# Patient Record
Sex: FEMALE | Race: WHITE | NOT HISPANIC OR LATINO | Employment: OTHER | URBAN - METROPOLITAN AREA
[De-identification: names, ages, dates, MRNs, and addresses within clinical notes are randomized per-mention and may not be internally consistent; named-entity substitution may affect disease eponyms.]

---

## 2017-01-03 ENCOUNTER — COMMUNICATION - HEALTHEAST (OUTPATIENT)
Dept: FAMILY MEDICINE | Facility: CLINIC | Age: 62
End: 2017-01-03

## 2017-01-06 ENCOUNTER — COMMUNICATION - HEALTHEAST (OUTPATIENT)
Dept: FAMILY MEDICINE | Facility: CLINIC | Age: 62
End: 2017-01-06

## 2017-01-09 ENCOUNTER — COMMUNICATION - HEALTHEAST (OUTPATIENT)
Dept: FAMILY MEDICINE | Facility: CLINIC | Age: 62
End: 2017-01-09

## 2017-01-09 DIAGNOSIS — F32.A DEPRESSION: ICD-10-CM

## 2017-01-10 ENCOUNTER — COMMUNICATION - HEALTHEAST (OUTPATIENT)
Dept: FAMILY MEDICINE | Facility: CLINIC | Age: 62
End: 2017-01-10

## 2017-01-12 ENCOUNTER — RECORDS - HEALTHEAST (OUTPATIENT)
Dept: ADMINISTRATIVE | Facility: OTHER | Age: 62
End: 2017-01-12

## 2017-01-17 ENCOUNTER — COMMUNICATION - HEALTHEAST (OUTPATIENT)
Dept: FAMILY MEDICINE | Facility: CLINIC | Age: 62
End: 2017-01-17

## 2017-01-18 ENCOUNTER — AMBULATORY - HEALTHEAST (OUTPATIENT)
Dept: FAMILY MEDICINE | Facility: CLINIC | Age: 62
End: 2017-01-18

## 2017-01-18 ENCOUNTER — COMMUNICATION - HEALTHEAST (OUTPATIENT)
Dept: FAMILY MEDICINE | Facility: CLINIC | Age: 62
End: 2017-01-18

## 2017-02-06 ENCOUNTER — COMMUNICATION - HEALTHEAST (OUTPATIENT)
Dept: FAMILY MEDICINE | Facility: CLINIC | Age: 62
End: 2017-02-06

## 2017-02-09 ENCOUNTER — OFFICE VISIT - HEALTHEAST (OUTPATIENT)
Dept: FAMILY MEDICINE | Facility: CLINIC | Age: 62
End: 2017-02-09

## 2017-02-09 DIAGNOSIS — T07.XXXA MULTIPLE FRACTURES: ICD-10-CM

## 2017-02-09 DIAGNOSIS — M79.603 ARM PAIN: ICD-10-CM

## 2017-02-15 ENCOUNTER — COMMUNICATION - HEALTHEAST (OUTPATIENT)
Dept: FAMILY MEDICINE | Facility: CLINIC | Age: 62
End: 2017-02-15

## 2017-02-23 ENCOUNTER — RECORDS - HEALTHEAST (OUTPATIENT)
Dept: ADMINISTRATIVE | Facility: OTHER | Age: 62
End: 2017-02-23

## 2017-02-24 ENCOUNTER — COMMUNICATION - HEALTHEAST (OUTPATIENT)
Dept: FAMILY MEDICINE | Facility: CLINIC | Age: 62
End: 2017-02-24

## 2017-03-28 ENCOUNTER — COMMUNICATION - HEALTHEAST (OUTPATIENT)
Dept: FAMILY MEDICINE | Facility: CLINIC | Age: 62
End: 2017-03-28

## 2017-04-09 ENCOUNTER — COMMUNICATION - HEALTHEAST (OUTPATIENT)
Dept: FAMILY MEDICINE | Facility: CLINIC | Age: 62
End: 2017-04-09

## 2017-04-09 DIAGNOSIS — F32.A DEPRESSION: ICD-10-CM

## 2017-04-14 ENCOUNTER — COMMUNICATION - HEALTHEAST (OUTPATIENT)
Dept: FAMILY MEDICINE | Facility: CLINIC | Age: 62
End: 2017-04-14

## 2017-04-21 ENCOUNTER — OFFICE VISIT - HEALTHEAST (OUTPATIENT)
Dept: ENDOCRINOLOGY | Facility: CLINIC | Age: 62
End: 2017-04-21

## 2017-04-21 DIAGNOSIS — M81.0 OSTEOPOROSIS: ICD-10-CM

## 2017-04-21 LAB
CREAT SERPL-MCNC: 0.75 MG/DL (ref 0.6–1.1)
GFR SERPL CREATININE-BSD FRML MDRD: >60 ML/MIN/1.73M2

## 2017-04-21 ASSESSMENT — MIFFLIN-ST. JEOR: SCORE: 1100.51

## 2017-04-25 ENCOUNTER — OFFICE VISIT - HEALTHEAST (OUTPATIENT)
Dept: FAMILY MEDICINE | Facility: CLINIC | Age: 62
End: 2017-04-25

## 2017-04-25 DIAGNOSIS — R52 WHOLE BODY PAIN: ICD-10-CM

## 2017-04-25 DIAGNOSIS — R11.0 NAUSEA: ICD-10-CM

## 2017-04-25 DIAGNOSIS — R10.2 PELVIC PAIN: ICD-10-CM

## 2017-04-26 ENCOUNTER — COMMUNICATION - HEALTHEAST (OUTPATIENT)
Dept: ENDOCRINOLOGY | Facility: CLINIC | Age: 62
End: 2017-04-26

## 2017-04-28 ENCOUNTER — COMMUNICATION - HEALTHEAST (OUTPATIENT)
Dept: FAMILY MEDICINE | Facility: CLINIC | Age: 62
End: 2017-04-28

## 2017-04-30 ENCOUNTER — COMMUNICATION - HEALTHEAST (OUTPATIENT)
Dept: FAMILY MEDICINE | Facility: CLINIC | Age: 62
End: 2017-04-30

## 2017-05-01 ENCOUNTER — RECORDS - HEALTHEAST (OUTPATIENT)
Dept: ADMINISTRATIVE | Facility: OTHER | Age: 62
End: 2017-05-01

## 2017-05-01 ENCOUNTER — COMMUNICATION - HEALTHEAST (OUTPATIENT)
Dept: ENDOCRINOLOGY | Facility: CLINIC | Age: 62
End: 2017-05-01

## 2017-05-02 ENCOUNTER — AMBULATORY - HEALTHEAST (OUTPATIENT)
Dept: ENDOCRINOLOGY | Facility: CLINIC | Age: 62
End: 2017-05-02

## 2017-05-02 DIAGNOSIS — M81.0 OSTEOPOROSIS: ICD-10-CM

## 2017-05-04 ENCOUNTER — AMBULATORY - HEALTHEAST (OUTPATIENT)
Dept: FAMILY MEDICINE | Facility: CLINIC | Age: 62
End: 2017-05-04

## 2017-05-04 ENCOUNTER — COMMUNICATION - HEALTHEAST (OUTPATIENT)
Dept: FAMILY MEDICINE | Facility: CLINIC | Age: 62
End: 2017-05-04

## 2017-06-05 ENCOUNTER — COMMUNICATION - HEALTHEAST (OUTPATIENT)
Dept: FAMILY MEDICINE | Facility: CLINIC | Age: 62
End: 2017-06-05

## 2017-06-09 ENCOUNTER — COMMUNICATION - HEALTHEAST (OUTPATIENT)
Dept: FAMILY MEDICINE | Facility: CLINIC | Age: 62
End: 2017-06-09

## 2017-07-13 ENCOUNTER — COMMUNICATION - HEALTHEAST (OUTPATIENT)
Dept: FAMILY MEDICINE | Facility: CLINIC | Age: 62
End: 2017-07-13

## 2017-07-13 DIAGNOSIS — F32.A DEPRESSION: ICD-10-CM

## 2017-07-19 ENCOUNTER — COMMUNICATION - HEALTHEAST (OUTPATIENT)
Dept: FAMILY MEDICINE | Facility: CLINIC | Age: 62
End: 2017-07-19

## 2017-08-05 ENCOUNTER — COMMUNICATION - HEALTHEAST (OUTPATIENT)
Dept: FAMILY MEDICINE | Facility: CLINIC | Age: 62
End: 2017-08-05

## 2017-08-17 ENCOUNTER — COMMUNICATION - HEALTHEAST (OUTPATIENT)
Dept: FAMILY MEDICINE | Facility: CLINIC | Age: 62
End: 2017-08-17

## 2017-09-29 ENCOUNTER — COMMUNICATION - HEALTHEAST (OUTPATIENT)
Dept: FAMILY MEDICINE | Facility: CLINIC | Age: 62
End: 2017-09-29

## 2017-10-03 ENCOUNTER — COMMUNICATION - HEALTHEAST (OUTPATIENT)
Dept: FAMILY MEDICINE | Facility: CLINIC | Age: 62
End: 2017-10-03

## 2017-10-03 DIAGNOSIS — I10 ESSENTIAL HYPERTENSION: ICD-10-CM

## 2017-10-04 ENCOUNTER — COMMUNICATION - HEALTHEAST (OUTPATIENT)
Dept: FAMILY MEDICINE | Facility: CLINIC | Age: 62
End: 2017-10-04

## 2017-10-15 ENCOUNTER — COMMUNICATION - HEALTHEAST (OUTPATIENT)
Dept: FAMILY MEDICINE | Facility: CLINIC | Age: 62
End: 2017-10-15

## 2017-10-15 DIAGNOSIS — F32.A DEPRESSION: ICD-10-CM

## 2017-10-30 ENCOUNTER — COMMUNICATION - HEALTHEAST (OUTPATIENT)
Dept: FAMILY MEDICINE | Facility: CLINIC | Age: 62
End: 2017-10-30

## 2017-11-07 ENCOUNTER — AMBULATORY - HEALTHEAST (OUTPATIENT)
Dept: ENDOCRINOLOGY | Facility: CLINIC | Age: 62
End: 2017-11-07

## 2017-11-20 ENCOUNTER — COMMUNICATION - HEALTHEAST (OUTPATIENT)
Dept: FAMILY MEDICINE | Facility: CLINIC | Age: 62
End: 2017-11-20

## 2017-11-25 ENCOUNTER — COMMUNICATION - HEALTHEAST (OUTPATIENT)
Dept: FAMILY MEDICINE | Facility: CLINIC | Age: 62
End: 2017-11-25

## 2017-12-08 ENCOUNTER — OFFICE VISIT - HEALTHEAST (OUTPATIENT)
Dept: FAMILY MEDICINE | Facility: CLINIC | Age: 62
End: 2017-12-08

## 2017-12-08 ENCOUNTER — RECORDS - HEALTHEAST (OUTPATIENT)
Dept: ADMINISTRATIVE | Facility: OTHER | Age: 62
End: 2017-12-08

## 2017-12-08 DIAGNOSIS — I10 ESSENTIAL HYPERTENSION: ICD-10-CM

## 2017-12-08 DIAGNOSIS — Z12.11 SCREENING FOR MALIGNANT NEOPLASM OF COLON: ICD-10-CM

## 2017-12-08 DIAGNOSIS — Z12.31 VISIT FOR SCREENING MAMMOGRAM: ICD-10-CM

## 2017-12-08 DIAGNOSIS — R10.2 PELVIC PAIN: ICD-10-CM

## 2017-12-25 ENCOUNTER — COMMUNICATION - HEALTHEAST (OUTPATIENT)
Dept: FAMILY MEDICINE | Facility: CLINIC | Age: 62
End: 2017-12-25

## 2017-12-28 ENCOUNTER — COMMUNICATION - HEALTHEAST (OUTPATIENT)
Dept: FAMILY MEDICINE | Facility: CLINIC | Age: 62
End: 2017-12-28

## 2018-01-09 ENCOUNTER — COMMUNICATION - HEALTHEAST (OUTPATIENT)
Dept: FAMILY MEDICINE | Facility: CLINIC | Age: 63
End: 2018-01-09

## 2018-01-09 DIAGNOSIS — I10 ESSENTIAL HYPERTENSION: ICD-10-CM

## 2018-01-17 ENCOUNTER — COMMUNICATION - HEALTHEAST (OUTPATIENT)
Dept: FAMILY MEDICINE | Facility: CLINIC | Age: 63
End: 2018-01-17

## 2018-01-17 DIAGNOSIS — F32.A DEPRESSION: ICD-10-CM

## 2018-01-25 ENCOUNTER — COMMUNICATION - HEALTHEAST (OUTPATIENT)
Dept: FAMILY MEDICINE | Facility: CLINIC | Age: 63
End: 2018-01-25

## 2018-02-05 ENCOUNTER — COMMUNICATION - HEALTHEAST (OUTPATIENT)
Dept: FAMILY MEDICINE | Facility: CLINIC | Age: 63
End: 2018-02-05

## 2018-02-05 DIAGNOSIS — R11.0 NAUSEA: ICD-10-CM

## 2018-02-12 ENCOUNTER — COMMUNICATION - HEALTHEAST (OUTPATIENT)
Dept: FAMILY MEDICINE | Facility: CLINIC | Age: 63
End: 2018-02-12

## 2018-02-26 ENCOUNTER — COMMUNICATION - HEALTHEAST (OUTPATIENT)
Dept: FAMILY MEDICINE | Facility: CLINIC | Age: 63
End: 2018-02-26

## 2018-03-10 ENCOUNTER — COMMUNICATION - HEALTHEAST (OUTPATIENT)
Dept: FAMILY MEDICINE | Facility: CLINIC | Age: 63
End: 2018-03-10

## 2018-03-10 DIAGNOSIS — G89.4 CHRONIC PAIN SYNDROME: ICD-10-CM

## 2018-03-10 DIAGNOSIS — G90.519 REFLEX SYMPATHETIC DYSTROPHY OF UPPER EXTREMITY: ICD-10-CM

## 2018-03-21 ENCOUNTER — AMBULATORY - HEALTHEAST (OUTPATIENT)
Dept: ENDOCRINOLOGY | Facility: CLINIC | Age: 63
End: 2018-03-21

## 2018-03-21 ENCOUNTER — COMMUNICATION - HEALTHEAST (OUTPATIENT)
Dept: ENDOCRINOLOGY | Facility: CLINIC | Age: 63
End: 2018-03-21

## 2018-03-21 DIAGNOSIS — M81.0 OSTEOPOROSIS: ICD-10-CM

## 2018-03-22 ENCOUNTER — COMMUNICATION - HEALTHEAST (OUTPATIENT)
Dept: FAMILY MEDICINE | Facility: CLINIC | Age: 63
End: 2018-03-22

## 2018-03-22 ENCOUNTER — AMBULATORY - HEALTHEAST (OUTPATIENT)
Dept: ENDOCRINOLOGY | Facility: CLINIC | Age: 63
End: 2018-03-22

## 2018-03-22 DIAGNOSIS — R11.0 NAUSEA: ICD-10-CM

## 2018-03-22 DIAGNOSIS — R53.83 FATIGUE: ICD-10-CM

## 2018-03-23 ENCOUNTER — COMMUNICATION - HEALTHEAST (OUTPATIENT)
Dept: ENDOCRINOLOGY | Facility: CLINIC | Age: 63
End: 2018-03-23

## 2018-03-30 ENCOUNTER — COMMUNICATION - HEALTHEAST (OUTPATIENT)
Dept: FAMILY MEDICINE | Facility: CLINIC | Age: 63
End: 2018-03-30

## 2018-04-02 ENCOUNTER — COMMUNICATION - HEALTHEAST (OUTPATIENT)
Dept: FAMILY MEDICINE | Facility: CLINIC | Age: 63
End: 2018-04-02

## 2018-04-02 DIAGNOSIS — G90.519 REFLEX SYMPATHETIC DYSTROPHY OF UPPER EXTREMITY: ICD-10-CM

## 2018-04-02 DIAGNOSIS — G89.4 CHRONIC PAIN SYNDROME: ICD-10-CM

## 2018-04-03 ENCOUNTER — AMBULATORY - HEALTHEAST (OUTPATIENT)
Dept: LAB | Facility: CLINIC | Age: 63
End: 2018-04-03

## 2018-04-03 DIAGNOSIS — R53.83 FATIGUE: ICD-10-CM

## 2018-04-03 DIAGNOSIS — M81.0 OSTEOPOROSIS: ICD-10-CM

## 2018-04-03 LAB
CALCIUM SERPL-MCNC: 8.9 MG/DL (ref 8.5–10.5)
CREAT SERPL-MCNC: 0.73 MG/DL (ref 0.6–1.1)
GFR SERPL CREATININE-BSD FRML MDRD: >60 ML/MIN/1.73M2
POTASSIUM BLD-SCNC: 4 MMOL/L (ref 3.5–5)
T4 FREE SERPL-MCNC: 0.9 NG/DL (ref 0.7–1.8)
TSH SERPL DL<=0.005 MIU/L-ACNC: 0.7 UIU/ML (ref 0.3–5)

## 2018-04-04 LAB
25(OH)D3 SERPL-MCNC: 29.3 NG/ML (ref 30–80)
25(OH)D3 SERPL-MCNC: 29.3 NG/ML (ref 30–80)

## 2018-04-15 ENCOUNTER — COMMUNICATION - HEALTHEAST (OUTPATIENT)
Dept: FAMILY MEDICINE | Facility: CLINIC | Age: 63
End: 2018-04-15

## 2018-04-15 DIAGNOSIS — F32.A DEPRESSION: ICD-10-CM

## 2018-04-24 ENCOUNTER — COMMUNICATION - HEALTHEAST (OUTPATIENT)
Dept: ENDOCRINOLOGY | Facility: CLINIC | Age: 63
End: 2018-04-24

## 2018-04-24 DIAGNOSIS — M81.0 OSTEOPOROSIS: ICD-10-CM

## 2018-04-30 ENCOUNTER — COMMUNICATION - HEALTHEAST (OUTPATIENT)
Dept: FAMILY MEDICINE | Facility: CLINIC | Age: 63
End: 2018-04-30

## 2018-05-07 ENCOUNTER — OFFICE VISIT - HEALTHEAST (OUTPATIENT)
Dept: ENDOCRINOLOGY | Facility: CLINIC | Age: 63
End: 2018-05-07

## 2018-05-07 DIAGNOSIS — M81.0 OSTEOPOROSIS: ICD-10-CM

## 2018-05-07 ASSESSMENT — MIFFLIN-ST. JEOR: SCORE: 1186.13

## 2018-05-26 ENCOUNTER — COMMUNICATION - HEALTHEAST (OUTPATIENT)
Dept: FAMILY MEDICINE | Facility: CLINIC | Age: 63
End: 2018-05-26

## 2018-06-14 ENCOUNTER — COMMUNICATION - HEALTHEAST (OUTPATIENT)
Dept: FAMILY MEDICINE | Facility: CLINIC | Age: 63
End: 2018-06-14

## 2018-06-14 DIAGNOSIS — G90.519 REFLEX SYMPATHETIC DYSTROPHY OF UPPER EXTREMITY: ICD-10-CM

## 2018-06-14 DIAGNOSIS — G89.4 CHRONIC PAIN SYNDROME: ICD-10-CM

## 2018-06-17 ENCOUNTER — COMMUNICATION - HEALTHEAST (OUTPATIENT)
Dept: FAMILY MEDICINE | Facility: CLINIC | Age: 63
End: 2018-06-17

## 2018-06-17 DIAGNOSIS — R11.0 NAUSEA: ICD-10-CM

## 2018-07-14 ENCOUNTER — COMMUNICATION - HEALTHEAST (OUTPATIENT)
Dept: FAMILY MEDICINE | Facility: CLINIC | Age: 63
End: 2018-07-14

## 2018-07-14 DIAGNOSIS — G90.519 REFLEX SYMPATHETIC DYSTROPHY OF UPPER EXTREMITY: ICD-10-CM

## 2018-07-14 DIAGNOSIS — G89.4 CHRONIC PAIN SYNDROME: ICD-10-CM

## 2018-07-15 ENCOUNTER — COMMUNICATION - HEALTHEAST (OUTPATIENT)
Dept: FAMILY MEDICINE | Facility: CLINIC | Age: 63
End: 2018-07-15

## 2018-07-15 DIAGNOSIS — I10 ESSENTIAL HYPERTENSION: ICD-10-CM

## 2018-07-16 ENCOUNTER — COMMUNICATION - HEALTHEAST (OUTPATIENT)
Dept: FAMILY MEDICINE | Facility: CLINIC | Age: 63
End: 2018-07-16

## 2018-07-16 DIAGNOSIS — F32.A DEPRESSION: ICD-10-CM

## 2018-07-27 ENCOUNTER — COMMUNICATION - HEALTHEAST (OUTPATIENT)
Dept: SCHEDULING | Facility: CLINIC | Age: 63
End: 2018-07-27

## 2018-07-31 ENCOUNTER — COMMUNICATION - HEALTHEAST (OUTPATIENT)
Dept: FAMILY MEDICINE | Facility: CLINIC | Age: 63
End: 2018-07-31

## 2018-08-13 ENCOUNTER — COMMUNICATION - HEALTHEAST (OUTPATIENT)
Dept: FAMILY MEDICINE | Facility: CLINIC | Age: 63
End: 2018-08-13

## 2018-08-13 DIAGNOSIS — G89.4 CHRONIC PAIN SYNDROME: ICD-10-CM

## 2018-08-13 DIAGNOSIS — G90.519 REFLEX SYMPATHETIC DYSTROPHY OF UPPER EXTREMITY: ICD-10-CM

## 2018-08-24 ENCOUNTER — RECORDS - HEALTHEAST (OUTPATIENT)
Dept: GENERAL RADIOLOGY | Facility: CLINIC | Age: 63
End: 2018-08-24

## 2018-08-24 ENCOUNTER — OFFICE VISIT - HEALTHEAST (OUTPATIENT)
Dept: FAMILY MEDICINE | Facility: CLINIC | Age: 63
End: 2018-08-24

## 2018-08-24 DIAGNOSIS — R60.9 EDEMA: ICD-10-CM

## 2018-08-24 DIAGNOSIS — M79.621 PAIN OF RIGHT UPPER ARM: ICD-10-CM

## 2018-08-24 DIAGNOSIS — M79.621 PAIN IN RIGHT UPPER ARM: ICD-10-CM

## 2018-08-24 DIAGNOSIS — L40.9 PSORIASIS: ICD-10-CM

## 2018-09-13 ENCOUNTER — COMMUNICATION - HEALTHEAST (OUTPATIENT)
Dept: FAMILY MEDICINE | Facility: CLINIC | Age: 63
End: 2018-09-13

## 2018-09-13 DIAGNOSIS — G89.4 CHRONIC PAIN SYNDROME: ICD-10-CM

## 2018-09-13 DIAGNOSIS — G90.519 REFLEX SYMPATHETIC DYSTROPHY OF UPPER EXTREMITY: ICD-10-CM

## 2018-10-08 ENCOUNTER — COMMUNICATION - HEALTHEAST (OUTPATIENT)
Dept: FAMILY MEDICINE | Facility: CLINIC | Age: 63
End: 2018-10-08

## 2018-10-08 DIAGNOSIS — R11.0 NAUSEA: ICD-10-CM

## 2018-10-12 ENCOUNTER — OFFICE VISIT - HEALTHEAST (OUTPATIENT)
Dept: FAMILY MEDICINE | Facility: CLINIC | Age: 63
End: 2018-10-12

## 2018-10-12 DIAGNOSIS — R41.3 MEMORY CHANGES: ICD-10-CM

## 2018-10-12 DIAGNOSIS — Z00.01 ENCOUNTER FOR GENERAL ADULT MEDICAL EXAMINATION WITH ABNORMAL FINDINGS: ICD-10-CM

## 2018-10-12 DIAGNOSIS — Z00.00 HEALTH CARE MAINTENANCE: ICD-10-CM

## 2018-10-12 DIAGNOSIS — E53.8 VITAMIN B12 DEFICIENCY (NON ANEMIC): ICD-10-CM

## 2018-10-12 DIAGNOSIS — M54.12 CERVICAL RADICULOPATHY: ICD-10-CM

## 2018-10-12 DIAGNOSIS — J44.9 COPD (CHRONIC OBSTRUCTIVE PULMONARY DISEASE) (H): ICD-10-CM

## 2018-10-12 DIAGNOSIS — F32.4 MAJOR DEPRESSION IN PARTIAL REMISSION (H): ICD-10-CM

## 2018-10-12 DIAGNOSIS — Z79.899 CONTROLLED SUBSTANCE AGREEMENT SIGNED: ICD-10-CM

## 2018-10-12 DIAGNOSIS — M54.16 LUMBAR RADICULOPATHY: ICD-10-CM

## 2018-10-12 DIAGNOSIS — G47.00 INSOMNIA: ICD-10-CM

## 2018-10-12 DIAGNOSIS — M84.454A: ICD-10-CM

## 2018-10-12 ASSESSMENT — MIFFLIN-ST. JEOR: SCORE: 1167.31

## 2018-10-18 ENCOUNTER — AMBULATORY - HEALTHEAST (OUTPATIENT)
Dept: FAMILY MEDICINE | Facility: CLINIC | Age: 63
End: 2018-10-18

## 2018-10-18 ENCOUNTER — COMMUNICATION - HEALTHEAST (OUTPATIENT)
Dept: FAMILY MEDICINE | Facility: CLINIC | Age: 63
End: 2018-10-18

## 2018-11-01 ENCOUNTER — COMMUNICATION - HEALTHEAST (OUTPATIENT)
Dept: FAMILY MEDICINE | Facility: CLINIC | Age: 63
End: 2018-11-01

## 2018-11-01 DIAGNOSIS — I10 ESSENTIAL HYPERTENSION: ICD-10-CM

## 2018-11-03 ENCOUNTER — COMMUNICATION - HEALTHEAST (OUTPATIENT)
Dept: FAMILY MEDICINE | Facility: CLINIC | Age: 63
End: 2018-11-03

## 2018-11-20 ENCOUNTER — HOSPITAL ENCOUNTER (OUTPATIENT)
Dept: MRI IMAGING | Facility: HOSPITAL | Age: 63
Discharge: HOME | End: 2018-11-20
Attending: FAMILY MEDICINE

## 2018-11-20 DIAGNOSIS — M84.454A: ICD-10-CM

## 2018-11-20 DIAGNOSIS — M54.16 LUMBAR RADICULOPATHY: ICD-10-CM

## 2018-11-20 DIAGNOSIS — M54.12 CERVICAL RADICULOPATHY: ICD-10-CM

## 2018-11-23 ENCOUNTER — COMMUNICATION - HEALTHEAST (OUTPATIENT)
Dept: FAMILY MEDICINE | Facility: CLINIC | Age: 63
End: 2018-11-23

## 2018-11-29 ENCOUNTER — COMMUNICATION - HEALTHEAST (OUTPATIENT)
Dept: FAMILY MEDICINE | Facility: CLINIC | Age: 63
End: 2018-11-29

## 2018-11-29 DIAGNOSIS — G89.4 CHRONIC PAIN SYNDROME: ICD-10-CM

## 2018-11-29 DIAGNOSIS — G90.519 REFLEX SYMPATHETIC DYSTROPHY OF UPPER EXTREMITY: ICD-10-CM

## 2018-12-05 ENCOUNTER — HOSPITAL ENCOUNTER (OUTPATIENT)
Dept: MRI IMAGING | Facility: HOSPITAL | Age: 63
Discharge: HOME | End: 2018-12-05
Attending: FAMILY MEDICINE

## 2018-12-05 DIAGNOSIS — M54.12 CERVICAL RADICULOPATHY: ICD-10-CM

## 2018-12-05 DIAGNOSIS — M54.16 LUMBAR RADICULOPATHY: ICD-10-CM

## 2018-12-05 LAB
CREAT BLD-MCNC: 0.9 MG/DL
CREAT BLD-MCNC: 0.9 MG/DL (ref 0.6–1.1)
POC GFR AMER AF HE - HISTORICAL: >60 ML/MIN/1.73M2
POC GFR NON AMER AF HE - HISTORICAL: >60 ML/MIN/1.73M2

## 2018-12-12 ENCOUNTER — AMBULATORY - HEALTHEAST (OUTPATIENT)
Dept: LAB | Facility: CLINIC | Age: 63
End: 2018-12-12

## 2018-12-12 DIAGNOSIS — Z00.00 HEALTH CARE MAINTENANCE: ICD-10-CM

## 2018-12-12 LAB
ALBUMIN SERPL-MCNC: 3.9 G/DL (ref 3.5–5)
ALP SERPL-CCNC: 72 U/L (ref 45–120)
ALT SERPL W P-5'-P-CCNC: 12 U/L (ref 0–45)
ANION GAP SERPL CALCULATED.3IONS-SCNC: 9 MMOL/L (ref 5–18)
AST SERPL W P-5'-P-CCNC: 16 U/L (ref 0–40)
BILIRUB SERPL-MCNC: 0.3 MG/DL (ref 0–1)
BUN SERPL-MCNC: 9 MG/DL (ref 8–22)
CALCIUM SERPL-MCNC: 9.7 MG/DL (ref 8.5–10.5)
CHLORIDE BLD-SCNC: 105 MMOL/L (ref 98–107)
CHOLEST SERPL-MCNC: 219 MG/DL
CO2 SERPL-SCNC: 29 MMOL/L (ref 22–31)
CREAT SERPL-MCNC: 0.82 MG/DL (ref 0.6–1.1)
ERYTHROCYTE [DISTWIDTH] IN BLOOD BY AUTOMATED COUNT: 12 % (ref 11–14.5)
FASTING STATUS PATIENT QL REPORTED: YES
GFR SERPL CREATININE-BSD FRML MDRD: >60 ML/MIN/1.73M2
GLUCOSE BLD-MCNC: 102 MG/DL (ref 70–125)
HCT VFR BLD AUTO: 41.2 % (ref 35–47)
HDLC SERPL-MCNC: 55 MG/DL
HGB BLD-MCNC: 14.3 G/DL (ref 12–16)
LDLC SERPL CALC-MCNC: 133 MG/DL
MCH RBC QN AUTO: 30.3 PG (ref 27–34)
MCHC RBC AUTO-ENTMCNC: 34.8 G/DL (ref 32–36)
MCV RBC AUTO: 87 FL (ref 80–100)
PLATELET # BLD AUTO: 198 THOU/UL (ref 140–440)
PMV BLD AUTO: 8.8 FL (ref 7–10)
POTASSIUM BLD-SCNC: 5 MMOL/L (ref 3.5–5)
PROT SERPL-MCNC: 6.3 G/DL (ref 6–8)
RBC # BLD AUTO: 4.73 MILL/UL (ref 3.8–5.4)
SODIUM SERPL-SCNC: 143 MMOL/L (ref 136–145)
TRIGL SERPL-MCNC: 153 MG/DL
VIT B12 SERPL-MCNC: 150 PG/ML (ref 213–816)
WBC: 5.5 THOU/UL (ref 4–11)

## 2018-12-15 ENCOUNTER — COMMUNICATION - HEALTHEAST (OUTPATIENT)
Dept: FAMILY MEDICINE | Facility: CLINIC | Age: 63
End: 2018-12-15

## 2018-12-28 ENCOUNTER — COMMUNICATION - HEALTHEAST (OUTPATIENT)
Dept: FAMILY MEDICINE | Facility: CLINIC | Age: 63
End: 2018-12-28

## 2019-01-02 ENCOUNTER — AMBULATORY - HEALTHEAST (OUTPATIENT)
Dept: FAMILY MEDICINE | Facility: CLINIC | Age: 64
End: 2019-01-02

## 2019-01-06 ENCOUNTER — COMMUNICATION - HEALTHEAST (OUTPATIENT)
Dept: FAMILY MEDICINE | Facility: CLINIC | Age: 64
End: 2019-01-06

## 2019-01-06 DIAGNOSIS — G89.4 CHRONIC PAIN SYNDROME: ICD-10-CM

## 2019-01-06 DIAGNOSIS — G90.519 REFLEX SYMPATHETIC DYSTROPHY OF UPPER EXTREMITY: ICD-10-CM

## 2019-01-11 ENCOUNTER — COMMUNICATION - HEALTHEAST (OUTPATIENT)
Dept: FAMILY MEDICINE | Facility: CLINIC | Age: 64
End: 2019-01-11

## 2019-01-22 ENCOUNTER — COMMUNICATION - HEALTHEAST (OUTPATIENT)
Dept: FAMILY MEDICINE | Facility: CLINIC | Age: 64
End: 2019-01-22

## 2019-01-25 ENCOUNTER — COMMUNICATION - HEALTHEAST (OUTPATIENT)
Dept: FAMILY MEDICINE | Facility: CLINIC | Age: 64
End: 2019-01-25

## 2019-01-25 DIAGNOSIS — G89.4 CHRONIC PAIN SYNDROME: ICD-10-CM

## 2019-01-25 DIAGNOSIS — G90.519 REFLEX SYMPATHETIC DYSTROPHY OF UPPER EXTREMITY: ICD-10-CM

## 2019-03-04 ENCOUNTER — COMMUNICATION - HEALTHEAST (OUTPATIENT)
Dept: FAMILY MEDICINE | Facility: CLINIC | Age: 64
End: 2019-03-04

## 2019-03-04 DIAGNOSIS — G90.519 REFLEX SYMPATHETIC DYSTROPHY OF UPPER EXTREMITY: ICD-10-CM

## 2019-03-04 DIAGNOSIS — G89.4 CHRONIC PAIN SYNDROME: ICD-10-CM

## 2019-03-07 ENCOUNTER — COMMUNICATION - HEALTHEAST (OUTPATIENT)
Dept: FAMILY MEDICINE | Facility: CLINIC | Age: 64
End: 2019-03-07

## 2019-03-08 ENCOUNTER — COMMUNICATION - HEALTHEAST (OUTPATIENT)
Dept: FAMILY MEDICINE | Facility: CLINIC | Age: 64
End: 2019-03-08

## 2019-04-04 ENCOUNTER — COMMUNICATION - HEALTHEAST (OUTPATIENT)
Dept: FAMILY MEDICINE | Facility: CLINIC | Age: 64
End: 2019-04-04

## 2019-04-04 DIAGNOSIS — G89.4 CHRONIC PAIN SYNDROME: ICD-10-CM

## 2019-04-04 DIAGNOSIS — G90.519 REFLEX SYMPATHETIC DYSTROPHY OF UPPER EXTREMITY: ICD-10-CM

## 2019-04-18 ENCOUNTER — COMMUNICATION - HEALTHEAST (OUTPATIENT)
Dept: ENDOCRINOLOGY | Facility: CLINIC | Age: 64
End: 2019-04-18

## 2019-04-18 ENCOUNTER — AMBULATORY - HEALTHEAST (OUTPATIENT)
Dept: ENDOCRINOLOGY | Facility: CLINIC | Age: 64
End: 2019-04-18

## 2019-04-18 DIAGNOSIS — M81.0 AGE RELATED OSTEOPOROSIS, UNSPECIFIED PATHOLOGICAL FRACTURE PRESENCE: ICD-10-CM

## 2019-04-19 ENCOUNTER — COMMUNICATION - HEALTHEAST (OUTPATIENT)
Dept: FAMILY MEDICINE | Facility: CLINIC | Age: 64
End: 2019-04-19

## 2019-04-19 DIAGNOSIS — F32.A DEPRESSION: ICD-10-CM

## 2019-05-06 ENCOUNTER — COMMUNICATION - HEALTHEAST (OUTPATIENT)
Dept: FAMILY MEDICINE | Facility: CLINIC | Age: 64
End: 2019-05-06

## 2019-05-06 DIAGNOSIS — R11.0 NAUSEA: ICD-10-CM

## 2019-05-07 ENCOUNTER — HOSPITAL ENCOUNTER (OUTPATIENT)
Dept: LAB | Age: 64
Setting detail: SPECIMEN
Discharge: HOME | End: 2019-05-07

## 2019-05-07 ENCOUNTER — OFFICE VISIT - HEALTHEAST (OUTPATIENT)
Dept: FAMILY MEDICINE | Facility: CLINIC | Age: 64
End: 2019-05-07

## 2019-05-07 DIAGNOSIS — E55.9 MILD VITAMIN D DEFICIENCY: ICD-10-CM

## 2019-05-07 DIAGNOSIS — Z12.11 COLON CANCER SCREENING: ICD-10-CM

## 2019-05-07 DIAGNOSIS — E56.9 VITAMIN DEFICIENCY: ICD-10-CM

## 2019-05-07 DIAGNOSIS — R05.9 COUGH: ICD-10-CM

## 2019-05-07 DIAGNOSIS — R10.11 RUQ ABDOMINAL PAIN: ICD-10-CM

## 2019-05-07 DIAGNOSIS — F32.0 CURRENT MILD EPISODE OF MAJOR DEPRESSIVE DISORDER, UNSPECIFIED WHETHER RECURRENT (H): ICD-10-CM

## 2019-05-07 DIAGNOSIS — Z00.00 HEALTH CARE MAINTENANCE: ICD-10-CM

## 2019-05-07 DIAGNOSIS — R11.0 NAUSEA: ICD-10-CM

## 2019-05-07 DIAGNOSIS — Z12.31 VISIT FOR SCREENING MAMMOGRAM: ICD-10-CM

## 2019-05-07 DIAGNOSIS — I10 ESSENTIAL HYPERTENSION: ICD-10-CM
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
## 2019-05-07 LAB — VIT B12 SERPL-MCNC: 673 PG/ML (ref 213–816)

## 2019-05-08 LAB
25(OH)D3 SERPL-MCNC: 39.7 NG/ML (ref 30–80)
25(OH)D3 SERPL-MCNC: 39.7 NG/ML (ref 30–80)

## 2019-05-15 ENCOUNTER — RECORDS - HEALTHEAST (OUTPATIENT)
Dept: ADMINISTRATIVE | Facility: OTHER | Age: 64
End: 2019-05-15

## 2019-05-15 LAB
COLOGUARD-ABSTRACT: NEGATIVE
COLOGUARD-ABSTRACT: NEGATIVE

## 2019-05-18 ENCOUNTER — COMMUNICATION - HEALTHEAST (OUTPATIENT)
Dept: ENDOCRINOLOGY | Facility: CLINIC | Age: 64
End: 2019-05-18

## 2019-05-18 DIAGNOSIS — M81.0 OSTEOPOROSIS: ICD-10-CM

## 2019-05-21 ENCOUNTER — TRANSFERRED RECORDS (OUTPATIENT)
Dept: HEALTH INFORMATION MANAGEMENT | Facility: CLINIC | Age: 64
End: 2019-05-21

## 2019-05-21 ENCOUNTER — RECORDS - HEALTHEAST (OUTPATIENT)
Dept: ADMINISTRATIVE | Facility: OTHER | Age: 64
End: 2019-05-21

## 2019-05-21 ENCOUNTER — COMMUNICATION - HEALTHEAST (OUTPATIENT)
Dept: FAMILY MEDICINE | Facility: CLINIC | Age: 64
End: 2019-05-21

## 2019-05-21 DIAGNOSIS — F40.240 CLAUSTROPHOBIA: ICD-10-CM

## 2019-05-29 ENCOUNTER — TRANSFERRED RECORDS (OUTPATIENT)
Dept: HEALTH INFORMATION MANAGEMENT | Facility: CLINIC | Age: 64
End: 2019-05-29

## 2019-05-29 ENCOUNTER — HOSPITAL ENCOUNTER (OUTPATIENT)
Dept: MRI IMAGING | Facility: HOSPITAL | Age: 64
Discharge: HOME | End: 2019-05-29

## 2019-05-29 DIAGNOSIS — R93.5 ABNORMAL CT OF THE ABDOMEN: ICD-10-CM

## 2019-05-30 ENCOUNTER — TRANSFERRED RECORDS (OUTPATIENT)
Dept: HEALTH INFORMATION MANAGEMENT | Facility: CLINIC | Age: 64
End: 2019-05-30

## 2019-06-04 ENCOUNTER — COMMUNICATION - HEALTHEAST (OUTPATIENT)
Dept: FAMILY MEDICINE | Facility: CLINIC | Age: 64
End: 2019-06-04

## 2019-06-04 ENCOUNTER — RECORDS - HEALTHEAST (OUTPATIENT)
Dept: HEALTH INFORMATION MANAGEMENT | Facility: CLINIC | Age: 64
End: 2019-06-04

## 2019-06-13 ENCOUNTER — COMMUNICATION - HEALTHEAST (OUTPATIENT)
Dept: FAMILY MEDICINE | Facility: CLINIC | Age: 64
End: 2019-06-13

## 2019-06-13 DIAGNOSIS — G90.519 REFLEX SYMPATHETIC DYSTROPHY OF UPPER EXTREMITY: ICD-10-CM

## 2019-06-13 DIAGNOSIS — G89.4 CHRONIC PAIN SYNDROME: ICD-10-CM

## 2019-07-11 ENCOUNTER — COMMUNICATION - HEALTHEAST (OUTPATIENT)
Dept: FAMILY MEDICINE | Facility: CLINIC | Age: 64
End: 2019-07-11

## 2019-07-17 ENCOUNTER — COMMUNICATION - HEALTHEAST (OUTPATIENT)
Dept: FAMILY MEDICINE | Facility: CLINIC | Age: 64
End: 2019-07-17

## 2019-07-17 DIAGNOSIS — G90.519 REFLEX SYMPATHETIC DYSTROPHY OF UPPER EXTREMITY: ICD-10-CM

## 2019-07-17 DIAGNOSIS — G89.4 CHRONIC PAIN SYNDROME: ICD-10-CM

## 2019-08-20 ENCOUNTER — COMMUNICATION - HEALTHEAST (OUTPATIENT)
Dept: FAMILY MEDICINE | Facility: CLINIC | Age: 64
End: 2019-08-20

## 2019-08-20 DIAGNOSIS — G89.4 CHRONIC PAIN SYNDROME: ICD-10-CM

## 2019-08-20 DIAGNOSIS — G90.519 REFLEX SYMPATHETIC DYSTROPHY OF UPPER EXTREMITY: ICD-10-CM

## 2019-08-22 ENCOUNTER — COMMUNICATION - HEALTHEAST (OUTPATIENT)
Dept: FAMILY MEDICINE | Facility: CLINIC | Age: 64
End: 2019-08-22

## 2019-08-26 ENCOUNTER — HOSPITAL ENCOUNTER (OUTPATIENT)
Dept: MAMMOGRAPHY | Facility: CLINIC | Age: 64
Discharge: HOME | End: 2019-08-26
Attending: FAMILY MEDICINE

## 2019-08-26 DIAGNOSIS — Z12.31 VISIT FOR SCREENING MAMMOGRAM: ICD-10-CM

## 2019-08-30 ENCOUNTER — HOSPITAL ENCOUNTER (OUTPATIENT)
Dept: MAMMOGRAPHY | Facility: CLINIC | Age: 64
Discharge: HOME | End: 2019-08-30
Attending: FAMILY MEDICINE

## 2019-08-30 ENCOUNTER — COMMUNICATION - HEALTHEAST (OUTPATIENT)
Dept: FAMILY MEDICINE | Facility: CLINIC | Age: 64
End: 2019-08-30

## 2019-08-30 DIAGNOSIS — N64.89 BREAST ASYMMETRY: ICD-10-CM

## 2019-08-30 DIAGNOSIS — G47.00 INSOMNIA, UNSPECIFIED TYPE: ICD-10-CM

## 2019-09-05 ENCOUNTER — COMMUNICATION - HEALTHEAST (OUTPATIENT)
Dept: FAMILY MEDICINE | Facility: CLINIC | Age: 64
End: 2019-09-05

## 2019-09-05 DIAGNOSIS — R11.0 NAUSEA: ICD-10-CM

## 2019-09-06 ENCOUNTER — COMMUNICATION - HEALTHEAST (OUTPATIENT)
Dept: FAMILY MEDICINE | Facility: CLINIC | Age: 64
End: 2019-09-06

## 2019-09-06 DIAGNOSIS — I10 ESSENTIAL HYPERTENSION: ICD-10-CM

## 2019-09-26 ENCOUNTER — COMMUNICATION - HEALTHEAST (OUTPATIENT)
Dept: FAMILY MEDICINE | Facility: CLINIC | Age: 64
End: 2019-09-26

## 2019-09-26 DIAGNOSIS — G89.4 CHRONIC PAIN SYNDROME: ICD-10-CM

## 2019-09-26 DIAGNOSIS — G90.519 REFLEX SYMPATHETIC DYSTROPHY OF UPPER EXTREMITY: ICD-10-CM

## 2019-10-30 ENCOUNTER — COMMUNICATION - HEALTHEAST (OUTPATIENT)
Dept: FAMILY MEDICINE | Facility: CLINIC | Age: 64
End: 2019-10-30

## 2019-10-30 DIAGNOSIS — G90.519 REFLEX SYMPATHETIC DYSTROPHY OF UPPER EXTREMITY: ICD-10-CM

## 2019-10-30 DIAGNOSIS — G89.4 CHRONIC PAIN SYNDROME: ICD-10-CM

## 2019-11-04 ENCOUNTER — COMMUNICATION - HEALTHEAST (OUTPATIENT)
Dept: FAMILY MEDICINE | Facility: CLINIC | Age: 64
End: 2019-11-04

## 2019-11-04 DIAGNOSIS — I10 ESSENTIAL HYPERTENSION: ICD-10-CM

## 2019-11-15 ENCOUNTER — COMMUNICATION - HEALTHEAST (OUTPATIENT)
Dept: FAMILY MEDICINE | Facility: CLINIC | Age: 64
End: 2019-11-15

## 2019-11-15 DIAGNOSIS — R11.0 NAUSEA: ICD-10-CM

## 2019-11-26 ENCOUNTER — COMMUNICATION - HEALTHEAST (OUTPATIENT)
Dept: FAMILY MEDICINE | Facility: CLINIC | Age: 64
End: 2019-11-26

## 2019-11-26 DIAGNOSIS — G47.00 INSOMNIA, UNSPECIFIED TYPE: ICD-10-CM

## 2019-12-26 ENCOUNTER — COMMUNICATION - HEALTHEAST (OUTPATIENT)
Dept: FAMILY MEDICINE | Facility: CLINIC | Age: 64
End: 2019-12-26

## 2019-12-26 DIAGNOSIS — G90.519 REFLEX SYMPATHETIC DYSTROPHY OF UPPER EXTREMITY: ICD-10-CM

## 2019-12-26 DIAGNOSIS — G89.4 CHRONIC PAIN SYNDROME: ICD-10-CM

## 2020-01-04 ENCOUNTER — COMMUNICATION - HEALTHEAST (OUTPATIENT)
Dept: FAMILY MEDICINE | Facility: CLINIC | Age: 65
End: 2020-01-04

## 2020-01-04 DIAGNOSIS — R11.0 NAUSEA: ICD-10-CM

## 2020-01-23 ENCOUNTER — COMMUNICATION - HEALTHEAST (OUTPATIENT)
Dept: FAMILY MEDICINE | Facility: CLINIC | Age: 65
End: 2020-01-23

## 2020-01-23 DIAGNOSIS — G90.519 REFLEX SYMPATHETIC DYSTROPHY OF UPPER EXTREMITY: ICD-10-CM

## 2020-01-23 DIAGNOSIS — G89.4 CHRONIC PAIN SYNDROME: ICD-10-CM

## 2020-01-30 ENCOUNTER — COMMUNICATION - HEALTHEAST (OUTPATIENT)
Dept: FAMILY MEDICINE | Facility: CLINIC | Age: 65
End: 2020-01-30

## 2020-01-30 DIAGNOSIS — G47.00 INSOMNIA, UNSPECIFIED TYPE: ICD-10-CM

## 2020-01-31 ENCOUNTER — HOSPITAL ENCOUNTER (OUTPATIENT)
Dept: PHYSICAL MEDICINE AND REHAB | Facility: CLINIC | Age: 65
Discharge: HOME | End: 2020-01-31
Attending: PHYSICIAN ASSISTANT

## 2020-01-31 ENCOUNTER — COMMUNICATION - HEALTHEAST (OUTPATIENT)
Dept: PHYSICAL MEDICINE AND REHAB | Facility: CLINIC | Age: 65
End: 2020-01-31

## 2020-01-31 DIAGNOSIS — M48.061 SPINAL STENOSIS OF LUMBAR REGION, UNSPECIFIED WHETHER NEUROGENIC CLAUDICATION PRESENT: ICD-10-CM

## 2020-01-31 DIAGNOSIS — F40.240 CLAUSTROPHOBIA: ICD-10-CM

## 2020-01-31 DIAGNOSIS — R26.9 GAIT DISTURBANCE: ICD-10-CM

## 2020-01-31 DIAGNOSIS — M81.0 SENILE OSTEOPOROSIS: ICD-10-CM

## 2020-01-31 DIAGNOSIS — M47.816 LUMBAR FACET ARTHROPATHY: ICD-10-CM

## 2020-01-31 DIAGNOSIS — M43.16 SPONDYLOLISTHESIS OF LUMBAR REGION: ICD-10-CM

## 2020-01-31 DIAGNOSIS — R29.898 WEAKNESS OF BOTH LOWER EXTREMITIES: ICD-10-CM

## 2020-02-03 ENCOUNTER — TRANSCRIBE ORDERS (OUTPATIENT)
Dept: OTHER | Age: 65
End: 2020-02-03

## 2020-02-03 DIAGNOSIS — R29.898 WEAKNESS OF BOTH LOWER EXTREMITIES: ICD-10-CM

## 2020-02-03 DIAGNOSIS — R26.9 GAIT DISTURBANCE: Primary | ICD-10-CM

## 2020-02-04 ENCOUNTER — COMMUNICATION - HEALTHEAST (OUTPATIENT)
Dept: ADMINISTRATIVE | Facility: CLINIC | Age: 65
End: 2020-02-04

## 2020-02-14 ENCOUNTER — OFFICE VISIT - HEALTHEAST (OUTPATIENT)
Dept: FAMILY MEDICINE | Facility: CLINIC | Age: 65
End: 2020-02-14

## 2020-02-14 DIAGNOSIS — F11.20 OPIOID TYPE DEPENDENCE, CONTINUOUS (H): ICD-10-CM

## 2020-02-14 DIAGNOSIS — J44.9 CHRONIC OBSTRUCTIVE PULMONARY DISEASE, UNSPECIFIED COPD TYPE (H): ICD-10-CM

## 2020-02-14 DIAGNOSIS — J40 BRONCHITIS: ICD-10-CM

## 2020-02-14 DIAGNOSIS — R10.13 ABDOMINAL PAIN, EPIGASTRIC: ICD-10-CM

## 2020-02-14 DIAGNOSIS — R10.11 RUQ ABDOMINAL PAIN: ICD-10-CM

## 2020-02-14 DIAGNOSIS — F32.0 CURRENT MILD EPISODE OF MAJOR DEPRESSIVE DISORDER, UNSPECIFIED WHETHER RECURRENT (H): ICD-10-CM

## 2020-02-14 DIAGNOSIS — R11.0 NAUSEA: ICD-10-CM

## 2020-02-14 ASSESSMENT — ANXIETY QUESTIONNAIRES
IF YOU CHECKED OFF ANY PROBLEMS ON THIS QUESTIONNAIRE, HOW DIFFICULT HAVE THESE PROBLEMS MADE IT FOR YOU TO DO YOUR WORK, TAKE CARE OF THINGS AT HOME, OR GET ALONG WITH OTHER PEOPLE: NOT DIFFICULT AT ALL
3. WORRYING TOO MUCH ABOUT DIFFERENT THINGS: NOT AT ALL
GAD7 TOTAL SCORE: 0
5. BEING SO RESTLESS THAT IT IS HARD TO SIT STILL: NOT AT ALL
6. BECOMING EASILY ANNOYED OR IRRITABLE: NOT AT ALL
2. NOT BEING ABLE TO STOP OR CONTROL WORRYING: NOT AT ALL
1. FEELING NERVOUS, ANXIOUS, OR ON EDGE: NOT AT ALL
4. TROUBLE RELAXING: NOT AT ALL
7. FEELING AFRAID AS IF SOMETHING AWFUL MIGHT HAPPEN: NOT AT ALL

## 2020-02-14 ASSESSMENT — PATIENT HEALTH QUESTIONNAIRE - PHQ9: SUM OF ALL RESPONSES TO PHQ QUESTIONS 1-9: 6

## 2020-02-17 ENCOUNTER — TRANSCRIBE ORDERS (OUTPATIENT)
Dept: OTHER | Age: 65
End: 2020-02-17

## 2020-02-17 DIAGNOSIS — R10.13 ABDOMINAL PAIN, EPIGASTRIC: Primary | ICD-10-CM

## 2020-02-17 DIAGNOSIS — R10.11 RUQ ABDOMINAL PAIN: ICD-10-CM

## 2020-03-10 ENCOUNTER — COMMUNICATION - HEALTHEAST (OUTPATIENT)
Dept: FAMILY MEDICINE | Facility: CLINIC | Age: 65
End: 2020-03-10

## 2020-03-10 DIAGNOSIS — R11.0 NAUSEA: ICD-10-CM

## 2020-03-12 ENCOUNTER — HOSPITAL ENCOUNTER (OUTPATIENT)
Dept: RADIOLOGY | Facility: HOSPITAL | Age: 65
Discharge: HOME | End: 2020-03-12
Attending: PHYSICIAN ASSISTANT

## 2020-03-12 ENCOUNTER — HOSPITAL ENCOUNTER (OUTPATIENT)
Dept: MRI IMAGING | Facility: HOSPITAL | Age: 65
Discharge: HOME | End: 2020-03-12
Attending: PHYSICIAN ASSISTANT

## 2020-03-12 ENCOUNTER — COMMUNICATION - HEALTHEAST (OUTPATIENT)
Dept: PHYSICAL MEDICINE AND REHAB | Facility: CLINIC | Age: 65
End: 2020-03-12

## 2020-03-12 DIAGNOSIS — M43.16 SPONDYLOLISTHESIS OF LUMBAR REGION: ICD-10-CM

## 2020-03-12 DIAGNOSIS — M48.061 SPINAL STENOSIS OF LUMBAR REGION, UNSPECIFIED WHETHER NEUROGENIC CLAUDICATION PRESENT: ICD-10-CM

## 2020-03-12 DIAGNOSIS — M47.816 LUMBAR FACET ARTHROPATHY: ICD-10-CM

## 2020-03-13 ENCOUNTER — COMMUNICATION - HEALTHEAST (OUTPATIENT)
Dept: PHYSICAL MEDICINE AND REHAB | Facility: CLINIC | Age: 65
End: 2020-03-13

## 2020-03-14 ENCOUNTER — COMMUNICATION - HEALTHEAST (OUTPATIENT)
Dept: FAMILY MEDICINE | Facility: CLINIC | Age: 65
End: 2020-03-14

## 2020-03-14 DIAGNOSIS — G89.4 CHRONIC PAIN SYNDROME: ICD-10-CM

## 2020-03-14 DIAGNOSIS — G90.519 REFLEX SYMPATHETIC DYSTROPHY OF UPPER EXTREMITY: ICD-10-CM

## 2020-03-26 ENCOUNTER — DOCUMENTATION ONLY (OUTPATIENT)
Dept: CARE COORDINATION | Facility: CLINIC | Age: 65
End: 2020-03-26

## 2020-03-27 ENCOUNTER — COMMUNICATION - HEALTHEAST (OUTPATIENT)
Dept: FAMILY MEDICINE | Facility: CLINIC | Age: 65
End: 2020-03-27

## 2020-03-27 DIAGNOSIS — G47.00 INSOMNIA, UNSPECIFIED TYPE: ICD-10-CM

## 2020-03-30 ENCOUNTER — NURSE TRIAGE (OUTPATIENT)
Dept: NURSING | Facility: CLINIC | Age: 65
End: 2020-03-30

## 2020-03-30 NOTE — TELEPHONE ENCOUNTER
Nauseated with vomiting and headache. Sx x 2 days. Drinking clear fluids in sips.  Afebrile. Upper abdominal pain [5 on 1-10 scale] has been ongoing couple months, but has been worse past 2 days and now constant. Has umbilical hernia.    Reason for Disposition    Pain lasting > 10 minutes and over 35 years old and at least one cardiac risk factor    Additional Information    Negative: Passed out (i.e., fainted, collapsed and was not responding)    Negative: Shock suspected (e.g., cold/pale/clammy skin, too weak to stand, low BP, rapid pulse)    Negative: Visible sweat on face or sweat is dripping down    Negative: Chest pain    Protocols used: ABDOMINAL PAIN - UPPER-A-OH

## 2020-03-31 ENCOUNTER — OFFICE VISIT - HEALTHEAST (OUTPATIENT)
Dept: FAMILY MEDICINE | Facility: CLINIC | Age: 65
End: 2020-03-31

## 2020-03-31 DIAGNOSIS — K44.9 HIATAL HERNIA: ICD-10-CM

## 2020-03-31 DIAGNOSIS — R10.13 ABDOMINAL PAIN, CHRONIC, EPIGASTRIC: ICD-10-CM

## 2020-03-31 DIAGNOSIS — R11.0 NAUSEA: ICD-10-CM

## 2020-03-31 DIAGNOSIS — G89.29 ABDOMINAL PAIN, CHRONIC, EPIGASTRIC: ICD-10-CM

## 2020-04-07 ENCOUNTER — COMMUNICATION - HEALTHEAST (OUTPATIENT)
Dept: FAMILY MEDICINE | Facility: CLINIC | Age: 65
End: 2020-04-07

## 2020-04-07 DIAGNOSIS — R11.0 NAUSEA: ICD-10-CM

## 2020-04-08 ENCOUNTER — COMMUNICATION - HEALTHEAST (OUTPATIENT)
Dept: FAMILY MEDICINE | Facility: CLINIC | Age: 65
End: 2020-04-08

## 2020-04-13 ENCOUNTER — COMMUNICATION - HEALTHEAST (OUTPATIENT)
Dept: FAMILY MEDICINE | Facility: CLINIC | Age: 65
End: 2020-04-13

## 2020-04-13 DIAGNOSIS — G90.519 REFLEX SYMPATHETIC DYSTROPHY OF UPPER EXTREMITY: ICD-10-CM

## 2020-04-13 DIAGNOSIS — G89.4 CHRONIC PAIN SYNDROME: ICD-10-CM

## 2020-04-14 ENCOUNTER — COMMUNICATION - HEALTHEAST (OUTPATIENT)
Dept: FAMILY MEDICINE | Facility: CLINIC | Age: 65
End: 2020-04-14

## 2020-04-14 DIAGNOSIS — R11.0 NAUSEA: ICD-10-CM

## 2020-04-20 ENCOUNTER — PATIENT OUTREACH (OUTPATIENT)
Dept: GASTROENTEROLOGY | Facility: CLINIC | Age: 65
End: 2020-04-20

## 2020-04-20 ENCOUNTER — VIRTUAL VISIT (OUTPATIENT)
Dept: GASTROENTEROLOGY | Facility: CLINIC | Age: 65
End: 2020-04-20
Payer: COMMERCIAL

## 2020-04-20 ENCOUNTER — PRE VISIT (OUTPATIENT)
Dept: GASTROENTEROLOGY | Facility: CLINIC | Age: 65
End: 2020-04-20

## 2020-04-20 ENCOUNTER — TELEPHONE (OUTPATIENT)
Dept: GASTROENTEROLOGY | Facility: CLINIC | Age: 65
End: 2020-04-20

## 2020-04-20 DIAGNOSIS — R10.11 ABDOMINAL PAIN, RIGHT UPPER QUADRANT: Primary | ICD-10-CM

## 2020-04-20 RX ORDER — ATENOLOL 25 MG/1
25 TABLET ORAL
COMMUNITY
Start: 2019-11-04 | End: 2022-04-29

## 2020-04-20 RX ORDER — TIZANIDINE 2 MG/1
1 TABLET ORAL
COMMUNITY
Start: 2020-03-24 | End: 2020-06-25

## 2020-04-20 RX ORDER — ZOLPIDEM TARTRATE 10 MG/1
TABLET ORAL
COMMUNITY
Start: 2020-03-27 | End: 2021-08-09

## 2020-04-20 RX ORDER — ONDANSETRON 4 MG/1
4 TABLET, FILM COATED ORAL
COMMUNITY
Start: 2020-04-08 | End: 2021-08-13

## 2020-04-20 RX ORDER — METOCLOPRAMIDE 10 MG/1
TABLET ORAL
COMMUNITY
Start: 2020-04-15 | End: 2020-06-25

## 2020-04-20 RX ORDER — AMLODIPINE BESYLATE 5 MG/1
TABLET ORAL
COMMUNITY
Start: 2019-09-06 | End: 2021-07-19

## 2020-04-20 RX ORDER — UBIDECARENONE 75 MG
100 CAPSULE ORAL DAILY
COMMUNITY
End: 2021-07-19

## 2020-04-20 RX ORDER — BACLOFEN 10 MG/1
20 TABLET ORAL
COMMUNITY
Start: 2020-04-13 | End: 2021-08-17

## 2020-04-20 ASSESSMENT — PAIN SCALES - GENERAL: PAINLEVEL: MILD PAIN (2)

## 2020-04-20 NOTE — PROGRESS NOTES
"Mariah Koehler is a 64 year old female who is being evaluated via a billable video visit.      The patient has been notified of following:     \"This video visit will be conducted via a call between you and your physician/provider. We have found that certain health care needs can be provided without the need for an in-person physical exam.  This service lets us provide the care you need with a video conversation.  If a prescription is necessary we can send it directly to your pharmacy.  If lab work is needed we can place an order for that and you can then stop by our lab to have the test done at a later time.    Video visits are billed at different rates depending on your insurance coverage.  Please reach out to your insurance provider with any questions.    If during the course of the call the physician/provider feels a video visit is not appropriate, you will not be charged for this service.\"    Patient has given verbal consent for Video visit? Yes    How would you like to obtain your AVS? Jac    Patient would like the video invitation sent by: Text to cell phone: [phone]      Video Start Time: 2:05        ------------------------------------------------------------------------------------------------------------------------------------        Reason for consultation: Chronic nausea vomiting right upper quadrant abdominal pain    History of present illness: This is a 64-year-old who underwent cholecystectomy back in 2011.  Prior to this had ERCP with biliary sphinterotomy and balloon stone extraction, removing 2-3 large cholesterol stones.  This apparently alleviated her symptoms of right upper quadrant abdominal pain and nausea for around 6 to  9 months.  Unfortunately, her pain recurred and she underwent repeat endoscopic ultrasound had normal liver tests at the time found to have some filling defects in the biliary tree which were eventually not found at ERCP in 2013.  Her symptoms improved after this " again for about 6 to 9 months she did not have biliary sphincterotomy done on the second procedure.      The patient has a complex past medical history of chronic pain in the back and neck and ultimately in the last few years  underwent implantation of a spinal pump which administers fentanyl for her left upper extremity.  This extremity was injured in a fall less than week after carpal tunnel surgery back in 2004.  Apparently she was on relatively high-dose oral narcotics up until that point for quite a while developed constipation nausea vomiting gastroesophageal reflux-like symptoms while on narcotics.  However, when she withdrew from most of her oral narcotics (she still takes some orally (most of her constipation improved her her vomiting improved as well.  However, and interestingly, the patient's main complaint is the nausea and vomiting she takes oxycodone 5 mg twice daily and also takes baclofen 3 times a day.      She tells me that she is nauseated pretty much constantly lives on Zofran.  There are times when she becomes extremely nauseated and cannot do much and is lay in bed and then during those episodes usually begins to vomit and these episodes last for days so she eventually needs to go to the emergency department and be treated with IV medication.  Multiple transaxial abdominal imaging studies have been performed multiple CTs and multiple MRIs detailing the mildly dilated post cholecystectomy like biliary system as well as a mildly very mildly dilated pancreatic ductal system.  MRCP is have not demonstrated any filling defects or strictures or stones.     She does not vomit blood she has had multiple abdominal surgeries which include cholecystectomy hysterectomy laparoscopy appendectomy.  Apparently had abdominal pain evaluation past that included heavy metals.    She was evaluated for some type of muscle weakness that would occur after muscle use with some concern she might have myasthenia gravis  had a muscle biopsy back in the early 1990s.  She was never given a clear-cut diagnosis for this.  She tells me she is to golf and bowl but cannot do either of those things anymore due to her weakness.  She denies fevers cough shortness of breath she does get headaches but she does not think these are migraine headaches she takes ibuprofen maybe 3 times a week 2 tablets uses Tylenol as well.      She denies chest pain vomiting blood black or bloody stools she has some difficulty with balance.  She was told she had a hiatal hernia and has some occasional issues with heartburn and sore throat as well as lower chest/upper abdominal discomfort.  She is some difficulty urinating feels like she is having pelvic floor dysfunction.  She was evaluated in 2014 while on high-dose narcotics for pelvic floor dysfunction.  Her issues with constipation improved dramatically after her dramatically reducing the dose of her narcotics.  She currently uses a cane and a walker and does not walk far.  She is not losing weight does not have night sweats.    She tells me she has not had a colonoscopy but she did undergo Cologuard testing mid June last year and the test was negative.    Her abdominal pain is located on the right side right by the ribs it does not radiate it is constant it does not ever completely leave.  It occasionally gets much worse and at times it is associated with her vomiting.  She usually gets severe abdominal pain about 2 times a week.     Past medical history:   Asthma/COPD currently smoking  Osteoporosis  Arthritis-back and neck  B12 deficiency  Hypertension  Chronic nausea and vomiting  Chronic right upper quadrant abdominal pain  Chronic narcotic use  Tobacco abuse  RSD left upper extremity-has an indwelling fentanyl pump  Pelvic floor dysfunction (although evaluation was done on narcotics)  Prior history of cholelithiasis and choledocholithiasis  Status post cholecystectomy, appendectomy, and  hysterectomy.    Current medications:   Current Outpatient Medications   Medication     amLODIPine (NORVASC) 5 MG tablet     atenolol (TENORMIN) 25 MG tablet     baclofen (LIORESAL) 10 MG tablet     cyanocobalamin (VITAMIN B-12) 100 MCG tablet     metoclopramide (REGLAN) 10 MG tablet     omeprazole (PRILOSEC) 20 MG DR capsule     ondansetron (ZOFRAN) 4 MG tablet     tiZANidine (ZANAFLEX) 2 MG tablet     zolpidem (AMBIEN) 10 MG tablet     No current facility-administered medications for this visit.         Allergies   Allergen Reactions     Codeine Nausea and Vomiting     Morphine Nausea and Vomiting     Bacitracin Rash     Methadone Rash     Family history:  Has a sister with severe abdominal pain is more generalized  Her mother had gallbladder issues.    Social history:  Current everyday smoker  Does not drink  Has limited ability to ambulate due to neuromuscular issues    Physical exam:  General: Appears well in no acute distress  Skin: No jaundice  Eyes: Pupils are equal normal size, sclera anicteric, non-injected  Neurologically: Cranial nerves intact patient is alert and oriented    Multiple recent CTs and MRIs of showed mildly dilated pancreatic and biliary ductal systems without any other objective evidence for cause for abdominal pain or nausea.      Assessment:  Chronic nausea vomiting and right upper quadrant abdominal pain for greater than 7 years.  Distressing attacks of nausea vomiting abdominal pain which have not been able to be definitively diagnosed or treated  Chronic narcotic use  Chronic baclofen use  Dilated pancreatic and biliary ducts likely secondary to postcholecystectomy state    Plan:  Patient is here for second opinion about her nausea and vomiting abdominal pain was seen by [place] [facility] recently who told her that she would have to live with this.  She is not satisfied with this and wants this to be further evaluated at this time.  Recommendations will include #1 upper  endoscopy and (yes again) endoscopic ultrasound to evaluate for chronic pancreatitis  #2 nuclear medicine gastric emptying study possible EMS and impedance pH if that is negative  #3 laboratory evaluation for celiac disease (TTG) nutrition (iron copper selenium vitamin D INR vitamin A vitamin E)   Repeat blood liver enzymes, pancreatic enzymes, CRP, chest x-ray,     If all negative recommend discontinuing narcotics and baclofen.     I spent 65 minutes in non-face to face time reviewing this patient's chart.  The prior consult notes, ed visits, laboratory results, imaging results, procedural results were reviewed.      Video-Visit Details    Type of service:  Video Visit    Video End Time (time video stopped): 2:52    Originating Location (pt. Location): Home     Distant Location (provider location):  [facility]     Mode of Communication:  Video Conference via [employer]     pe  Abhi Nash MD

## 2020-04-20 NOTE — NURSING NOTE
Chief Complaint   Patient presents with     Consult     new pt here for abdominal pain       There were no vitals filed for this visit.    There is no height or weight on file to calculate BMI.    Jonnathan Pineda, EMT

## 2020-04-20 NOTE — PROGRESS NOTES
Spoke with pt to set up video visit for appointment with Dr. Nash 4/20.  Pt will enter the visit via text  [phone]  
Normal

## 2020-05-06 ENCOUNTER — RECORDS - HEALTHEAST (OUTPATIENT)
Dept: ADMINISTRATIVE | Facility: OTHER | Age: 65
End: 2020-05-06

## 2020-05-06 ENCOUNTER — COMMUNICATION - HEALTHEAST (OUTPATIENT)
Dept: FAMILY MEDICINE | Facility: CLINIC | Age: 65
End: 2020-05-06

## 2020-05-06 DIAGNOSIS — G47.00 INSOMNIA, UNSPECIFIED TYPE: ICD-10-CM

## 2020-05-06 DIAGNOSIS — G89.4 CHRONIC PAIN SYNDROME: ICD-10-CM

## 2020-05-06 DIAGNOSIS — G90.519 REFLEX SYMPATHETIC DYSTROPHY OF UPPER EXTREMITY: ICD-10-CM

## 2020-05-13 ENCOUNTER — AMBULATORY - HEALTHEAST (OUTPATIENT)
Dept: FAMILY MEDICINE | Facility: CLINIC | Age: 65
End: 2020-05-13

## 2020-05-13 ENCOUNTER — COMMUNICATION - HEALTHEAST (OUTPATIENT)
Dept: FAMILY MEDICINE | Facility: CLINIC | Age: 65
End: 2020-05-13

## 2020-05-13 DIAGNOSIS — Z00.00 HEALTH CARE MAINTENANCE: ICD-10-CM

## 2020-05-13 DIAGNOSIS — I10 ESSENTIAL HYPERTENSION: ICD-10-CM

## 2020-05-21 ENCOUNTER — RECORDS - HEALTHEAST (OUTPATIENT)
Dept: ADMINISTRATIVE | Facility: OTHER | Age: 65
End: 2020-05-21

## 2020-05-29 ENCOUNTER — COMMUNICATION - HEALTHEAST (OUTPATIENT)
Dept: FAMILY MEDICINE | Facility: CLINIC | Age: 65
End: 2020-05-29

## 2020-05-29 DIAGNOSIS — G90.519 REFLEX SYMPATHETIC DYSTROPHY OF UPPER EXTREMITY: ICD-10-CM

## 2020-05-29 DIAGNOSIS — G89.4 CHRONIC PAIN SYNDROME: ICD-10-CM

## 2020-06-07 ENCOUNTER — COMMUNICATION - HEALTHEAST (OUTPATIENT)
Dept: FAMILY MEDICINE | Facility: CLINIC | Age: 65
End: 2020-06-07

## 2020-06-07 DIAGNOSIS — K44.9 HIATAL HERNIA: ICD-10-CM

## 2020-06-08 ENCOUNTER — COMMUNICATION - HEALTHEAST (OUTPATIENT)
Dept: FAMILY MEDICINE | Facility: CLINIC | Age: 65
End: 2020-06-08

## 2020-06-08 DIAGNOSIS — G90.519 REFLEX SYMPATHETIC DYSTROPHY OF UPPER EXTREMITY: ICD-10-CM

## 2020-06-08 DIAGNOSIS — G89.4 CHRONIC PAIN SYNDROME: ICD-10-CM

## 2020-06-15 ENCOUNTER — HOSPITAL ENCOUNTER (OUTPATIENT)
Dept: MRI IMAGING | Facility: HOSPITAL | Age: 65
Discharge: HOME | End: 2020-06-15
Attending: PSYCHIATRY & NEUROLOGY

## 2020-06-15 ENCOUNTER — RECORDS - HEALTHEAST (OUTPATIENT)
Dept: LAB | Facility: HOSPITAL | Age: 65
End: 2020-06-15

## 2020-06-15 DIAGNOSIS — R29.818 POSITIVE ROMBERG TEST: ICD-10-CM

## 2020-06-15 DIAGNOSIS — R26.89 BALANCE PROBLEM: ICD-10-CM

## 2020-06-15 LAB
CK SERPL-CCNC: 131 U/L (ref 30–190)
ERYTHROCYTE [SEDIMENTATION RATE] IN BLOOD BY WESTERGREN METHOD: 2 MM/HR (ref 0–20)
FASTING STATUS PATIENT QL REPORTED: YES
FERRITIN SERPL-MCNC: 74 NG/ML (ref 10–130)
GLUCOSE BLD-MCNC: 95 MG/DL (ref 70–125)
TSH SERPL DL<=0.005 MIU/L-ACNC: 0.49 UIU/ML (ref 0.3–5)
VIT B12 SERPL-MCNC: 468 PG/ML (ref 213–816)

## 2020-06-16 ENCOUNTER — COMMUNICATION - HEALTHEAST (OUTPATIENT)
Dept: FAMILY MEDICINE | Facility: CLINIC | Age: 65
End: 2020-06-16

## 2020-06-16 DIAGNOSIS — G90.519 REFLEX SYMPATHETIC DYSTROPHY OF UPPER EXTREMITY: ICD-10-CM

## 2020-06-16 DIAGNOSIS — R11.0 NAUSEA: ICD-10-CM

## 2020-06-16 DIAGNOSIS — G89.4 CHRONIC PAIN SYNDROME: ICD-10-CM

## 2020-06-16 LAB
ALBUMIN PERCENT: 72.8 % (ref 51–67)
ALBUMIN SERPL ELPH-MCNC: 4.6 G/DL (ref 3.2–4.7)
ALPHA 1 PERCENT: 1.9 % (ref 2–4)
ALPHA 2 PERCENT: 8.3 % (ref 5–13)
ALPHA1 GLOB SERPL ELPH-MCNC: 0.1 G/DL (ref 0.1–0.3)
ALPHA2 GLOB SERPL ELPH-MCNC: 0.5 G/DL (ref 0.4–0.9)
ANA SER QL: 0.2 U
B-GLOBULIN SERPL ELPH-MCNC: 0.6 G/DL (ref 0.7–1.2)
BETA PERCENT: 9.2 % (ref 10–17)
GAMMA GLOB SERPL ELPH-MCNC: 0.5 G/DL (ref 0.6–1.4)
GAMMA GLOBULIN PERCENT: 7.8 % (ref 9–20)
LYME TOTAL ANTIBODY - HISTORICAL: 0.04 INDEX VALUE
PATH ICD:: ABNORMAL
PROT PATTERN SERPL ELPH-IMP: ABNORMAL
PROT SERPL-MCNC: 6.3 G/DL (ref 6–8)
REVIEWING PATHOLOGIST: ABNORMAL

## 2020-06-18 LAB — METHYLMALONATE SERPL-SCNC: 0.23 UMOL/L (ref 0–0.4)

## 2020-06-19 LAB — VIT B1 PYROPHOSHATE BLD-SCNC: 83 NMOL/L (ref 70–180)

## 2020-06-25 ENCOUNTER — OFFICE VISIT (OUTPATIENT)
Dept: NEUROLOGY | Facility: CLINIC | Age: 65
End: 2020-06-25
Payer: COMMERCIAL

## 2020-06-25 ENCOUNTER — RECORDS - HEALTHEAST (OUTPATIENT)
Dept: ADMINISTRATIVE | Facility: OTHER | Age: 65
End: 2020-06-25

## 2020-06-25 VITALS — HEIGHT: 63 IN | WEIGHT: 130 LBS | BODY MASS INDEX: 23.04 KG/M2

## 2020-06-25 DIAGNOSIS — R29.818 POSITIVE ROMBERG TEST: ICD-10-CM

## 2020-06-25 DIAGNOSIS — R26.89 POOR BALANCE: Primary | ICD-10-CM

## 2020-06-25 PROCEDURE — 99214 OFFICE O/P EST MOD 30 MIN: CPT | Mod: GT | Performed by: PSYCHIATRY & NEUROLOGY

## 2020-06-25 ASSESSMENT — MIFFLIN-ST. JEOR: SCORE: 1108.81

## 2020-06-25 NOTE — NURSING NOTE
Chief Complaint   Patient presents with     balance issues     Extremity Weakness     Positive Romberg test       Abigail Ni on 6/25/2020 at 10:00 AM

## 2020-06-25 NOTE — PROGRESS NOTES
"NEUROLOGY OUTPATIENT PROGRESS NOTE (VIDEO)  Jun 25, 2020     CHIEF COMPLAINT/REASON FOR VISIT/REASON FOR CONSULT  Patient presents with:  balance issues  Extremity Weakness  Positive Romberg test    Video Visit Consent  Patient is being evaluated via a billable video visit. The patient has been notified of following:   \"This video visit will be conducted via a call between you and your physician/provider. We have found that certain health care needs can be provided without the need for an in-person physical exam. This service lets us provide the care you need with a video conversation. If a prescription is necessary we can send it directly to your pharmacy. If lab work is needed we can place an order for that and you can then stop by our lab to have the test done at a later time.  If during the course of the call the physician/provider feels a video visit is not appropriate, you will not be charged for this service.  Physician has received verbal consent for a Video Visit from the patient? YES  Patient would like the video invitation sent by: Email/SMS    Video Visit Details  Type of service: Video Visit  Video Start Time: 1:25  Video End Time (time video stopped): 1:45  Originating Location (pt. Location): Patient's Home  Distant Location (provider location): [facility] [place]   Mode of Communication: [facility] Conference [facility]      HISTORY OF PRESENT ILLNESS  Mariah Koehler is a 64 year old female seen today for evaluation of weakness in the legs and feet. Symptoms have been going on for the last 9 months. She reports that her balance is off and she has difficulty standing on her feet. She does report difficulty getting out of chairs as well. In addition to the weakness she also complains of some numbness in her toes in the ball of her feet. She does complain of baseline neck pain. She also has numbness in the left arm. This is more related to previous RSD. She is also had 7 carpal " tunnel surgeries bilaterally. The strength on the right side is normal. Symptoms have been progressive over the last 9 months and have been constant. Denies any triggers for her symptoms.    Patient does have some history of B12 deficiency in the past.    Patient also complains of some mid back pain.    Patient recently had a MRI of her lumbar spine where there is mild spinal stenosis.    > Patient returns today reports that the weakness is about the same.  Denies any other new numbness.  Does complain of some pain in the back which was more of a sharp pain. Her MRI cervical spine and thoracic spine did not show any significant spinal stenosis.  Has not done the EMGs of her.  Blood work was negative for causes of balance issues.  B12 was within normal range.  No positive test for neuropathy.  Patient is concerned about myasthenia gravis.  We talked about possible Parkinson's disease though that does not cause a positive Romberg.  Patient has not been able to do physical therapy.  She does some therapy for her back.    Previous history is reviewed and this is unchanged.    PAST MEDICAL/SURGICAL HISTORY  Past Medical History:   Diagnosis Date     Hypertension    Significant for back pain, stomach issues, dilated common bile duct and gallbladder surgery and pancreatic issues.    FAMILY HISTORY  Family history positive for hemorrhagic stroke and non-incisional tremors    SOCIAL HISTORY  Social History     Tobacco Use     Smoking status: Current Every Day Smoker     Packs/day: 0.75     Years: 51.00     Pack years: 38.25     Types: Cigarettes     Start date: 1/1/1968     Smokeless tobacco: Never Used   Substance Use Topics     Alcohol use: Yes     Drug use: Never       SYSTEMS REVIEW  Twelve-system ROS was done and other than the HPI this was negative.    MEDICATIONS  amLODIPine (NORVASC) 5 MG tablet, TAKE 1 TABLET BY MOUTH EVERY DAY  atenolol (TENORMIN) 25 MG tablet, Take 25 mg by mouth  baclofen (LIORESAL) 10 MG tablet,  "Take 20 mg by mouth  cyanocobalamin (VITAMIN B-12) 100 MCG tablet, Take 100 mcg by mouth daily  omeprazole (PRILOSEC) 20 MG DR capsule, Take 20 mg by mouth  ondansetron (ZOFRAN) 4 MG tablet, Take 4 mg by mouth  zolpidem (AMBIEN) 10 MG tablet, Take one daily for insomnia    No current facility-administered medications on file prior to visit.        PHYSICAL EXAMINATION  VITALS: Ht 1.6 m (5' 3\")   Wt 59 kg (130 lb)   BMI 23.03 kg/m    PHYSICAL EXAM  Exam was limited due to video encounter.    Vitals-Unable to do on video  GENERAL -Health appearing, No apparent distress  EYES- No scleral icterus, no eyelid droop, Pupils symmetric  HEENT - Normocephalic, atraumatic, Hearing grossly intact; Oral mucosa moist and pink in color. External Ears and nose intact.   Neck - soft/flexible with normal ROM  PULM - Good spontaneous respiratory effort;  CV- No edema on visual inspection  MSK- Gait - see Neuro section; Strength and tone- see Neuro section; Range of motion grossly intact.  Psych- Normal mood and affect. Good judgment and insight.    Neurological  Mental status - Patient is awake and oriented. Attention span is normal. Language is fluent and follows commands appropriately.   Cranial nerves - Pupils are reactive and symmetric; EOMI, NLF symmetric  Motor - Antigravity in all 4 ext.  Tone - No evidence of rigidity on visual inspection. No tremor.  Reflexes - Unable to do on video  Sensation - Unable to do on Video  Coordination - Finger to nose without dysmetria.  Mild dysmetria in the left upper extremity related to her RSD.  Gait and station - Romberg is positive. Gait is wide based.    DIAGNOSTICS  MRI  IMPRESSION: - 2020 L spine  1.  Severe facet arthropathy L4-L5 with progressive degenerative anterior spondylolisthesis. Mild spinal canal stenosis with severe lateral recess stenoses. Severe right and moderate left foraminal stenosis.  2.  Unchanged small right posterior disc protrusion at L2-L3 causing mild right " lateral recess stenosis.  3.  Otherwise mild spondylosis without significant spinal canal or foraminal stenosis.  4.  Unchanged dilation of the biliary and pancreatic ducts.    MRI C spine  IMPRESSION:   CONCLUSION:  1. Mild multilevel degenerative changes of the cervical spine, slightly progressed from the prior exam. There is no high-grade spinal canal or foraminal stenosis.  2. No abnormal cord enhancement, or cord signal abnormality.    MRI 2020  CERVICAL SPINE MRI:  1.  Moderate degenerative changes of the cervical spine, stable to slightly progressed compared to prior cervical spine MRI 12/05/2018.  2.  Mild spinal canal narrowing at C5-C6 and C6-C7.  3.  Mild to moderate left and mild right neuroforaminal narrowing at C5-C6. Mild right neuroforaminal narrowing at C3-C4. Mild left neuroforaminal narrowing at C4-C5.  4.  Moderate intervertebral degenerative disc changes at C5-C6 and C6-C7.    THORACIC SPINE MRI:  1.  Mild degenerative changes of the thoracic spine.  2.  Mild narrowing of the right lateral recess at T6-T7.  3.  No high-grade spinal canal neuroforaminal narrowing.    RELEVANT LABS  Component      Latest Ref Rng & Units 6/15/2020   Albumin %      51.0 - 67.0 % 72.8 (H)   Albumin       3.2 - 4.7 g/dL 4.6   Alpha 1 %      2.0 - 4.0 % 1.9 (L)   Alpha 1      0.1 - 0.3 g/dL 0.1   Alpha 2 %      5.0 - 13.0 % 8.3   Alpha 2      0.4 - 0.9 g/dL 0.5   % Beta      10.0 - 17.0 % 9.2 (L)   Beta      0.7 - 1.2 g/dL 0.6 (L)   Gamma Globulin %      9.0 - 20.0 % 7.8 (L)   Gamma Globulin      0.6 - 1.4 g/dL 0.5 (L)   ELP Comment       Decreased beta globulin fraction, which can be seen in kidney disease, coagulopathies, and malnutrition, among other etiologies. . . .   Protein, Total      6.0 - 8.0 g/dL 6.3   Path ICD:       R23.89; R29.818   Interpreted By:       Steve Moses MD   Glucose      70 - 125 mg/dL 95   Patient Fasting > 8hrs?       Yes   CK, Total      30 - 190 U/L 131   TSH      0.30 - 5.00  uIU/mL 0.49   Sed Rate      0 - 20 mm/hr 2   REEMA Screen [place]      <=2.9 U 0.2   Ferritin      10 - 130 ng/mL 74   Vitamin B1, Whole Blood      70 - 180 nmol/L 83   Vitamin B-12      213 - 816 pg/mL 468   Lyme Total Antibody      <0.90 Index Value 0.04   MMA Serum/Plasma, Vitamin B12 Status      0.00 - 0.40 umol/L 0.23       OUTSIDE RECORDS  Outside referral notes were reviewed and pertinent information has been summarized in the HPI.    IMPRESSION/REPORT/PLAN  Poor balance  Positive Romberg test    This is a 64 year old female with some weakness in her legs and balance issues with a positive Romberg on exam.  Her MRIs and blood work has been noncontributory.  I would like to have her get an EMG and then meet in person in clinic to see what might be causing her balance issues.  We will need to decide further testing after that.  We might need to consider MRI of the brain to better evaluate her cerebellum if the EMG is negative.    -     EMG    Nikko Peter MD  Neurologist  [facility]  Tel:- [phone]    This note was dictated using voice recognition software.  Any grammatical or context distortions are unintentional and inherent to the software.

## 2020-06-25 NOTE — LETTER
"    6/25/2020         RE: Mariah Koehler  [address]  [facility] [address]        Dear Colleague,    Thank you for referring your patient, Mariah Koehler, to the [facility] [place]. Please see a copy of my visit note below.    NEUROLOGY OUTPATIENT PROGRESS NOTE (VIDEO)  Jun 25, 2020     CHIEF COMPLAINT/REASON FOR VISIT/REASON FOR CONSULT  Patient presents with:  balance issues  Extremity Weakness  Positive Romberg test    Video Visit Consent  Patient is being evaluated via a billable video visit. The patient has been notified of following:   \"This video visit will be conducted via a call between you and your physician/provider. We have found that certain health care needs can be provided without the need for an in-person physical exam. This service lets us provide the care you need with a video conversation. If a prescription is necessary we can send it directly to your pharmacy. If lab work is needed we can place an order for that and you can then stop by our lab to have the test done at a later time.  If during the course of the call the physician/provider feels a video visit is not appropriate, you will not be charged for this service.  Physician has received verbal consent for a Video Visit from the patient? YES  Patient would like the video invitation sent by: Email/SMS    Video Visit Details  Type of service: Video Visit  Video Start Time: 1:25  Video End Time (time video stopped): 1:45  Originating Location (pt. Location): Patient's Home  Distant Location (provider location): [facility] [place]   Mode of Communication: Video Conference via Angle      HISTORY OF PRESENT ILLNESS  Mariah Koehler is a 64 year old female seen today for evaluation of weakness in the legs and feet. Symptoms have been going on for the last 9 months. She reports that her balance is off and she has difficulty standing on her feet. She does report difficulty getting out of " chairs as well. In addition to the weakness she also complains of some numbness in her toes in the ball of her feet. She does complain of baseline neck pain. She also has numbness in the left arm. This is more related to previous RSD. She is also had 7 carpal tunnel surgeries bilaterally. The strength on the right side is normal. Symptoms have been progressive over the last 9 months and have been constant. Denies any triggers for her symptoms.    Patient does have some history of B12 deficiency in the past.    Patient also complains of some mid back pain.    Patient recently had a MRI of her lumbar spine where there is mild spinal stenosis.    > Patient returns today reports that the weakness is about the same.  Denies any other new numbness.  Does complain of some pain in the back which was more of a sharp pain. Her MRI cervical spine and thoracic spine did not show any significant spinal stenosis.  Has not done the EMGs of her.  Blood work was negative for causes of balance issues.  B12 was within normal range.  No positive test for neuropathy.  Patient is concerned about myasthenia gravis.  We talked about possible Parkinson's disease though that does not cause a positive Romberg.  Patient has not been able to do physical therapy.  She does some therapy for her back.    Previous history is reviewed and this is unchanged.    PAST MEDICAL/SURGICAL HISTORY  Past Medical History:   Diagnosis Date     Hypertension    Significant for back pain, stomach issues, dilated common bile duct and gallbladder surgery and pancreatic issues.    FAMILY HISTORY  Family history positive for hemorrhagic stroke and non-incisional tremors    SOCIAL HISTORY  Social History     Tobacco Use     Smoking status: Current Every Day Smoker     Packs/day: 0.75     Years: 51.00     Pack years: 38.25     Types: Cigarettes     Start date: 1/1/1968     Smokeless tobacco: Never Used   Substance Use Topics     Alcohol use: Yes     Drug use: Never  "      SYSTEMS REVIEW  Twelve-system ROS was done and other than the HPI this was negative.    MEDICATIONS  amLODIPine (NORVASC) 5 MG tablet, TAKE 1 TABLET BY MOUTH EVERY DAY  atenolol (TENORMIN) 25 MG tablet, Take 25 mg by mouth  baclofen (LIORESAL) 10 MG tablet, Take 20 mg by mouth  cyanocobalamin (VITAMIN B-12) 100 MCG tablet, Take 100 mcg by mouth daily  omeprazole (PRILOSEC) 20 MG DR capsule, Take 20 mg by mouth  ondansetron (ZOFRAN) 4 MG tablet, Take 4 mg by mouth  zolpidem (AMBIEN) 10 MG tablet, Take one daily for insomnia    No current facility-administered medications on file prior to visit.        PHYSICAL EXAMINATION  VITALS: Ht 1.6 m (5' 3\")   Wt 59 kg (130 lb)   BMI 23.03 kg/m    PHYSICAL EXAM  Exam was limited due to video encounter.    Vitals-Unable to do on video  GENERAL -Health appearing, No apparent distress  EYES- No scleral icterus, no eyelid droop, Pupils symmetric  HEENT - Normocephalic, atraumatic, Hearing grossly intact; Oral mucosa moist and pink in color. External Ears and nose intact.   Neck - soft/flexible with normal ROM  PULM - Good spontaneous respiratory effort;  CV- No edema on visual inspection  MSK- Gait - see Neuro section; Strength and tone- see Neuro section; Range of motion grossly intact.  Psych- Normal mood and affect. Good judgment and insight.    Neurological  Mental status - Patient is awake and oriented. Attention span is normal. Language is fluent and follows commands appropriately.   Cranial nerves - Pupils are reactive and symmetric; EOMI, NLF symmetric  Motor - Antigravity in all 4 ext.  Tone - No evidence of rigidity on visual inspection. No tremor.  Reflexes - Unable to do on video  Sensation - Unable to do on Video  Coordination - Finger to nose without dysmetria.  Mild dysmetria in the left upper extremity related to her RSD.  Gait and station - Romberg is positive. Gait is wide based.    DIAGNOSTICS  MRI  IMPRESSION: - 2020 L spine  1.  Severe facet arthropathy " L4-L5 with progressive degenerative anterior spondylolisthesis. Mild spinal canal stenosis with severe lateral recess stenoses. Severe right and moderate left foraminal stenosis.  2.  Unchanged small right posterior disc protrusion at L2-L3 causing mild right lateral recess stenosis.  3.  Otherwise mild spondylosis without significant spinal canal or foraminal stenosis.  4.  Unchanged dilation of the biliary and pancreatic ducts.    MRI C spine  IMPRESSION:   CONCLUSION:  1. Mild multilevel degenerative changes of the cervical spine, slightly progressed from the prior exam. There is no high-grade spinal canal or foraminal stenosis.  2. No abnormal cord enhancement, or cord signal abnormality.    MRI 2020  CERVICAL SPINE MRI:  1.  Moderate degenerative changes of the cervical spine, stable to slightly progressed compared to prior cervical spine MRI 12/05/2018.  2.  Mild spinal canal narrowing at C5-C6 and C6-C7.  3.  Mild to moderate left and mild right neuroforaminal narrowing at C5-C6. Mild right neuroforaminal narrowing at C3-C4. Mild left neuroforaminal narrowing at C4-C5.  4.  Moderate intervertebral degenerative disc changes at C5-C6 and C6-C7.    THORACIC SPINE MRI:  1.  Mild degenerative changes of the thoracic spine.  2.  Mild narrowing of the right lateral recess at T6-T7.  3.  No high-grade spinal canal neuroforaminal narrowing.    RELEVANT LABS  Component      Latest Ref Rng & Units 6/15/2020   Albumin %      51.0 - 67.0 % 72.8 (H)   Albumin       3.2 - 4.7 g/dL 4.6   Alpha 1 %      2.0 - 4.0 % 1.9 (L)   Alpha 1      0.1 - 0.3 g/dL 0.1   Alpha 2 %      5.0 - 13.0 % 8.3   Alpha 2      0.4 - 0.9 g/dL 0.5   % Beta      10.0 - 17.0 % 9.2 (L)   Beta      0.7 - 1.2 g/dL 0.6 (L)   Gamma Globulin %      9.0 - 20.0 % 7.8 (L)   Gamma Globulin      0.6 - 1.4 g/dL 0.5 (L)   ELP Comment       Decreased beta globulin fraction, which can be seen in kidney disease, coagulopathies, and malnutrition, among other  etiologies. . . .   Protein, Total      6.0 - 8.0 g/dL 6.3   Path ICD:       R23.89; R29.818   Interpreted By:       Steve Moses MD   Glucose      70 - 125 mg/dL 95   Patient Fasting > 8hrs?       Yes   CK, Total      30 - 190 U/L 131   TSH      0.30 - 5.00 uIU/mL 0.49   Sed Rate      0 - 20 mm/hr 2   REEMA Screen [place]      <=2.9 U 0.2   Ferritin      10 - 130 ng/mL 74   Vitamin B1, Whole Blood      70 - 180 nmol/L 83   Vitamin B-12      213 - 816 pg/mL 468   Lyme Total Antibody      <0.90 Index Value 0.04   MMA Serum/Plasma, Vitamin B12 Status      0.00 - 0.40 umol/L 0.23       OUTSIDE RECORDS  Outside referral notes were reviewed and pertinent information has been summarized in the HPI.    IMPRESSION/REPORT/PLAN  Poor balance  Positive Romberg test    This is a 64 year old female with some weakness in her legs and balance issues with a positive Romberg on exam.  Her MRIs and blood work has been noncontributory.  I would like to have her get an EMG and then meet in person in clinic to see what might be causing her balance issues.  We will need to decide further testing after that.  We might need to consider MRI of the brain to better evaluate her cerebellum if the EMG is negative.    -     EMG    Nikko Peter MD  Neurologist  [facility]  Tel:- [phone]    This note was dictated using voice recognition software.  Any grammatical or context distortions are unintentional and inherent to the software.      Again, thank you for allowing me to participate in the care of your patient.        Sincerely,        Nikko Peter MD

## 2020-07-24 ENCOUNTER — TELEPHONE (OUTPATIENT)
Dept: NEUROLOGY | Facility: CLINIC | Age: 65
End: 2020-07-24

## 2020-07-24 DIAGNOSIS — R26.89 BALANCE PROBLEMS: Primary | ICD-10-CM

## 2020-07-24 NOTE — TELEPHONE ENCOUNTER
----- Message from Ye Hensley CMA sent at 7/24/2020  1:22 PM CDT -----  Regarding: RE: EMG and Follow up  There is no order. I can't tell what extremities are being tested. This is needed for scheduling purposes.  ----- Message -----  From: Abigail Ni  Sent: 7/24/2020  11:34 AM CDT  To: [facility] Work Team 2  Subject: EMG and Follow up                                Please schedule her EMG and follow up please.

## 2020-08-05 ENCOUNTER — COMMUNICATION - HEALTHEAST (OUTPATIENT)
Dept: FAMILY MEDICINE | Facility: CLINIC | Age: 65
End: 2020-08-05

## 2020-08-05 DIAGNOSIS — G89.4 CHRONIC PAIN SYNDROME: ICD-10-CM

## 2020-08-05 DIAGNOSIS — G90.519 REFLEX SYMPATHETIC DYSTROPHY OF UPPER EXTREMITY: ICD-10-CM

## 2020-08-10 ENCOUNTER — COMMUNICATION - HEALTHEAST (OUTPATIENT)
Dept: FAMILY MEDICINE | Facility: CLINIC | Age: 65
End: 2020-08-10

## 2020-08-10 DIAGNOSIS — I10 ESSENTIAL HYPERTENSION: ICD-10-CM

## 2020-09-03 ENCOUNTER — COMMUNICATION - HEALTHEAST (OUTPATIENT)
Dept: FAMILY MEDICINE | Facility: CLINIC | Age: 65
End: 2020-09-03

## 2020-09-03 DIAGNOSIS — G89.4 CHRONIC PAIN SYNDROME: ICD-10-CM

## 2020-09-03 DIAGNOSIS — G90.519 REFLEX SYMPATHETIC DYSTROPHY OF UPPER EXTREMITY: ICD-10-CM

## 2020-09-03 DIAGNOSIS — G47.00 INSOMNIA, UNSPECIFIED TYPE: ICD-10-CM

## 2020-10-09 ENCOUNTER — COMMUNICATION - HEALTHEAST (OUTPATIENT)
Dept: FAMILY MEDICINE | Facility: CLINIC | Age: 65
End: 2020-10-09

## 2020-10-09 DIAGNOSIS — G90.519 REFLEX SYMPATHETIC DYSTROPHY OF UPPER EXTREMITY: ICD-10-CM

## 2020-10-09 DIAGNOSIS — G89.4 CHRONIC PAIN SYNDROME: ICD-10-CM

## 2020-10-11 ENCOUNTER — COMMUNICATION - HEALTHEAST (OUTPATIENT)
Dept: FAMILY MEDICINE | Facility: CLINIC | Age: 65
End: 2020-10-11

## 2020-10-11 DIAGNOSIS — R11.0 NAUSEA: ICD-10-CM

## 2020-10-16 ENCOUNTER — COMMUNICATION - HEALTHEAST (OUTPATIENT)
Dept: FAMILY MEDICINE | Facility: CLINIC | Age: 65
End: 2020-10-16

## 2020-10-16 DIAGNOSIS — R11.0 NAUSEA: ICD-10-CM

## 2020-10-17 ENCOUNTER — COMMUNICATION - HEALTHEAST (OUTPATIENT)
Dept: FAMILY MEDICINE | Facility: CLINIC | Age: 65
End: 2020-10-17

## 2020-10-17 DIAGNOSIS — G47.00 INSOMNIA, UNSPECIFIED TYPE: ICD-10-CM

## 2020-11-10 ENCOUNTER — COMMUNICATION - HEALTHEAST (OUTPATIENT)
Dept: FAMILY MEDICINE | Facility: CLINIC | Age: 65
End: 2020-11-10

## 2020-11-10 DIAGNOSIS — G90.519 REFLEX SYMPATHETIC DYSTROPHY OF UPPER EXTREMITY: ICD-10-CM

## 2020-11-10 DIAGNOSIS — G89.4 CHRONIC PAIN SYNDROME: ICD-10-CM

## 2020-11-16 ENCOUNTER — COMMUNICATION - HEALTHEAST (OUTPATIENT)
Dept: FAMILY MEDICINE | Facility: CLINIC | Age: 65
End: 2020-11-16

## 2020-11-16 DIAGNOSIS — R11.0 NAUSEA: ICD-10-CM

## 2020-12-08 ENCOUNTER — COMMUNICATION - HEALTHEAST (OUTPATIENT)
Dept: FAMILY MEDICINE | Facility: CLINIC | Age: 65
End: 2020-12-08

## 2020-12-08 DIAGNOSIS — G90.519 REFLEX SYMPATHETIC DYSTROPHY OF UPPER EXTREMITY: ICD-10-CM

## 2020-12-08 DIAGNOSIS — G89.4 CHRONIC PAIN SYNDROME: ICD-10-CM

## 2020-12-16 ENCOUNTER — COMMUNICATION - HEALTHEAST (OUTPATIENT)
Dept: FAMILY MEDICINE | Facility: CLINIC | Age: 65
End: 2020-12-16

## 2020-12-16 ENCOUNTER — COMMUNICATION - HEALTHEAST (OUTPATIENT)
Dept: SCHEDULING | Facility: CLINIC | Age: 65
End: 2020-12-16

## 2020-12-16 DIAGNOSIS — R11.0 NAUSEA: ICD-10-CM

## 2020-12-17 ENCOUNTER — COMMUNICATION - HEALTHEAST (OUTPATIENT)
Dept: FAMILY MEDICINE | Facility: CLINIC | Age: 65
End: 2020-12-17

## 2020-12-17 DIAGNOSIS — G47.00 INSOMNIA, UNSPECIFIED TYPE: ICD-10-CM

## 2020-12-20 ENCOUNTER — COMMUNICATION - HEALTHEAST (OUTPATIENT)
Dept: FAMILY MEDICINE | Facility: CLINIC | Age: 65
End: 2020-12-20

## 2020-12-20 DIAGNOSIS — R11.0 NAUSEA: ICD-10-CM

## 2021-01-13 ENCOUNTER — COMMUNICATION - HEALTHEAST (OUTPATIENT)
Dept: FAMILY MEDICINE | Facility: CLINIC | Age: 66
End: 2021-01-13

## 2021-01-13 DIAGNOSIS — G89.4 CHRONIC PAIN SYNDROME: ICD-10-CM

## 2021-01-13 DIAGNOSIS — G90.519 REFLEX SYMPATHETIC DYSTROPHY OF UPPER EXTREMITY: ICD-10-CM

## 2021-01-15 ENCOUNTER — HEALTH MAINTENANCE LETTER (OUTPATIENT)
Age: 66
End: 2021-01-15

## 2021-01-20 ENCOUNTER — COMMUNICATION - HEALTHEAST (OUTPATIENT)
Dept: FAMILY MEDICINE | Facility: CLINIC | Age: 66
End: 2021-01-20

## 2021-01-20 DIAGNOSIS — R11.0 NAUSEA: ICD-10-CM

## 2021-01-26 ENCOUNTER — COMMUNICATION - HEALTHEAST (OUTPATIENT)
Dept: FAMILY MEDICINE | Facility: CLINIC | Age: 66
End: 2021-01-26

## 2021-01-26 DIAGNOSIS — G47.00 INSOMNIA, UNSPECIFIED TYPE: ICD-10-CM

## 2021-02-12 ENCOUNTER — COMMUNICATION - HEALTHEAST (OUTPATIENT)
Dept: FAMILY MEDICINE | Facility: CLINIC | Age: 66
End: 2021-02-12

## 2021-02-12 DIAGNOSIS — G89.4 CHRONIC PAIN SYNDROME: ICD-10-CM

## 2021-02-12 DIAGNOSIS — G90.519 REFLEX SYMPATHETIC DYSTROPHY OF UPPER EXTREMITY: ICD-10-CM

## 2021-02-18 ENCOUNTER — COMMUNICATION - HEALTHEAST (OUTPATIENT)
Dept: FAMILY MEDICINE | Facility: CLINIC | Age: 66
End: 2021-02-18

## 2021-02-18 DIAGNOSIS — R11.0 NAUSEA: ICD-10-CM

## 2021-02-25 ENCOUNTER — COMMUNICATION - HEALTHEAST (OUTPATIENT)
Dept: FAMILY MEDICINE | Facility: CLINIC | Age: 66
End: 2021-02-25

## 2021-02-25 DIAGNOSIS — G47.00 INSOMNIA, UNSPECIFIED TYPE: ICD-10-CM

## 2021-03-20 ENCOUNTER — HEALTH MAINTENANCE LETTER (OUTPATIENT)
Age: 66
End: 2021-03-20

## 2021-03-26 ENCOUNTER — COMMUNICATION - HEALTHEAST (OUTPATIENT)
Dept: FAMILY MEDICINE | Facility: CLINIC | Age: 66
End: 2021-03-26

## 2021-03-26 DIAGNOSIS — G47.00 INSOMNIA, UNSPECIFIED TYPE: ICD-10-CM

## 2021-04-14 ENCOUNTER — COMMUNICATION - HEALTHEAST (OUTPATIENT)
Dept: FAMILY MEDICINE | Facility: CLINIC | Age: 66
End: 2021-04-14

## 2021-04-14 ENCOUNTER — OFFICE VISIT - HEALTHEAST (OUTPATIENT)
Dept: FAMILY MEDICINE | Facility: CLINIC | Age: 66
End: 2021-04-14

## 2021-04-14 DIAGNOSIS — J44.9 CHRONIC OBSTRUCTIVE PULMONARY DISEASE, UNSPECIFIED COPD TYPE (H): ICD-10-CM

## 2021-04-14 DIAGNOSIS — K21.00 GASTROESOPHAGEAL REFLUX DISEASE WITH ESOPHAGITIS, UNSPECIFIED WHETHER HEMORRHAGE: ICD-10-CM

## 2021-04-14 DIAGNOSIS — K21.00 GASTROESOPHAGEAL REFLUX DISEASE WITH ESOPHAGITIS WITHOUT HEMORRHAGE: ICD-10-CM

## 2021-04-14 DIAGNOSIS — G47.00 INSOMNIA, UNSPECIFIED TYPE: ICD-10-CM

## 2021-04-14 DIAGNOSIS — R11.0 NAUSEA: ICD-10-CM

## 2021-04-14 DIAGNOSIS — F11.20 OPIOID TYPE DEPENDENCE, CONTINUOUS (H): ICD-10-CM

## 2021-04-14 DIAGNOSIS — I10 ESSENTIAL HYPERTENSION: ICD-10-CM

## 2021-04-14 DIAGNOSIS — Z00.00 HEALTH CARE MAINTENANCE: ICD-10-CM

## 2021-04-14 LAB
ALBUMIN SERPL-MCNC: 4.2 G/DL (ref 3.5–5)
ALP SERPL-CCNC: 77 U/L (ref 45–120)
ALT SERPL W P-5'-P-CCNC: <9 U/L (ref 0–45)
ANION GAP SERPL CALCULATED.3IONS-SCNC: 9 MMOL/L (ref 5–18)
AST SERPL W P-5'-P-CCNC: 15 U/L (ref 0–40)
BILIRUB SERPL-MCNC: 0.4 MG/DL (ref 0–1)
BUN SERPL-MCNC: 14 MG/DL (ref 8–22)
CALCIUM SERPL-MCNC: 8.9 MG/DL (ref 8.5–10.5)
CHLORIDE BLD-SCNC: 104 MMOL/L (ref 98–107)
CHOLEST SERPL-MCNC: 194 MG/DL
CO2 SERPL-SCNC: 27 MMOL/L (ref 22–31)
CREAT SERPL-MCNC: 0.9 MG/DL (ref 0.6–1.1)
FASTING STATUS PATIENT QL REPORTED: NO
GFR SERPL CREATININE-BSD FRML MDRD: >60 ML/MIN/1.73M2
GLUCOSE BLD-MCNC: 88 MG/DL (ref 70–125)
HDLC SERPL-MCNC: 56 MG/DL
LDLC SERPL CALC-MCNC: 113 MG/DL
POTASSIUM BLD-SCNC: 4.6 MMOL/L (ref 3.5–5)
PROT SERPL-MCNC: 6.4 G/DL (ref 6–8)
SODIUM SERPL-SCNC: 140 MMOL/L (ref 136–145)
TRIGL SERPL-MCNC: 124 MG/DL

## 2021-04-14 ASSESSMENT — PATIENT HEALTH QUESTIONNAIRE - PHQ9: SUM OF ALL RESPONSES TO PHQ QUESTIONS 1-9: 5

## 2021-04-14 ASSESSMENT — MIFFLIN-ST. JEOR: SCORE: 1094.73

## 2021-04-17 ENCOUNTER — COMMUNICATION - HEALTHEAST (OUTPATIENT)
Dept: FAMILY MEDICINE | Facility: CLINIC | Age: 66
End: 2021-04-17

## 2021-04-17 DIAGNOSIS — K44.9 HIATAL HERNIA: ICD-10-CM

## 2021-04-17 DIAGNOSIS — I10 ESSENTIAL HYPERTENSION: ICD-10-CM

## 2021-04-18 ENCOUNTER — COMMUNICATION - HEALTHEAST (OUTPATIENT)
Dept: FAMILY MEDICINE | Facility: CLINIC | Age: 66
End: 2021-04-18

## 2021-04-18 DIAGNOSIS — R11.0 NAUSEA: ICD-10-CM

## 2021-04-28 ENCOUNTER — COMMUNICATION - HEALTHEAST (OUTPATIENT)
Dept: FAMILY MEDICINE | Facility: CLINIC | Age: 66
End: 2021-04-28

## 2021-04-28 DIAGNOSIS — G47.00 INSOMNIA, UNSPECIFIED TYPE: ICD-10-CM

## 2021-05-10 ENCOUNTER — COMMUNICATION - HEALTHEAST (OUTPATIENT)
Dept: FAMILY MEDICINE | Facility: CLINIC | Age: 66
End: 2021-05-10

## 2021-05-10 DIAGNOSIS — K21.00 GASTROESOPHAGEAL REFLUX DISEASE WITH ESOPHAGITIS WITHOUT HEMORRHAGE: ICD-10-CM

## 2021-05-19 ENCOUNTER — COMMUNICATION - HEALTHEAST (OUTPATIENT)
Dept: FAMILY MEDICINE | Facility: CLINIC | Age: 66
End: 2021-05-19

## 2021-05-19 DIAGNOSIS — K21.00 GASTROESOPHAGEAL REFLUX DISEASE WITH ESOPHAGITIS WITHOUT HEMORRHAGE: ICD-10-CM

## 2021-05-24 ENCOUNTER — RECORDS - HEALTHEAST (OUTPATIENT)
Dept: ADMINISTRATIVE | Facility: CLINIC | Age: 66
End: 2021-05-24

## 2021-05-25 ENCOUNTER — RECORDS - HEALTHEAST (OUTPATIENT)
Dept: ADMINISTRATIVE | Facility: CLINIC | Age: 66
End: 2021-05-25

## 2021-05-27 ENCOUNTER — COMMUNICATION - HEALTHEAST (OUTPATIENT)
Dept: FAMILY MEDICINE | Facility: CLINIC | Age: 66
End: 2021-05-27

## 2021-05-27 DIAGNOSIS — R11.0 NAUSEA: ICD-10-CM

## 2021-05-27 ASSESSMENT — PATIENT HEALTH QUESTIONNAIRE - PHQ9
SUM OF ALL RESPONSES TO PHQ QUESTIONS 1-9: 5
SUM OF ALL RESPONSES TO PHQ QUESTIONS 1-9: 6

## 2021-05-27 NOTE — TELEPHONE ENCOUNTER
RN cannot approve Refill Request    RN can NOT refill this medication med is not covered by policy/route to provider.    Sinan Phillips, [facility] Triage/Med Refill 4/4/2019    Requested Prescriptions   Pending Prescriptions Disp Refills     baclofen (LIORESAL) 10 MG tablet [Pharmacy Med Name: BACLOFEN 10 MG TABLET] 240 tablet 0     Sig: TAKE 2 TABLETS (20 MG TOTAL) BY MOUTH 4 (FOUR) TIMES A DAY    There is no refill protocol information for this order

## 2021-05-28 ENCOUNTER — RECORDS - HEALTHEAST (OUTPATIENT)
Dept: ADMINISTRATIVE | Facility: CLINIC | Age: 66
End: 2021-05-28

## 2021-05-28 ASSESSMENT — ANXIETY QUESTIONNAIRES: GAD7 TOTAL SCORE: 0

## 2021-05-28 NOTE — TELEPHONE ENCOUNTER
Refill Approved    Rx renewed per Medication Renewal Policy. Medication was last renewed on 07 17 18  Seen 10 12 18  [facility] Triage/Med Refill 4/19/2019     Requested Prescriptions   Pending Prescriptions Disp Refills     sertraline (ZOLOFT) 50 MG tablet 90 tablet 2     Sig: Take 1 tablet (50 mg total) by mouth daily.       SSRI Refill Protocol  Passed - 4/19/2019  9:56 AM        Passed - PCP or prescribing provider visit in last year     Last office visit with prescriber/PCP: 8/24/2018 Alex Nolasco MD OR same dept: 8/24/2018 Alex Nolasco MD OR same specialty: 8/24/2018 Alex Nolasco MD  Last physical: 10/12/2018 Last MTM visit: Visit date not found   Next visit within 3 mo: Visit date not found  Next physical within 3 mo: Visit date not found  Prescriber OR PCP: Alex Nolasco MD  Last diagnosis associated with med order: 1. Depression  - sertraline (ZOLOFT) 50 MG tablet; Take 1 tablet (50 mg total) by mouth daily.  Dispense: 90 tablet; Refill: 2    If protocol passes may refill for 12 months if within 3 months of last provider visit (or a total of 15 months).

## 2021-05-28 NOTE — PATIENT INSTRUCTIONS - HE
-Recheck blood pressure at clinic in 10 days  -GI group will contact you for right upper abd pain  -start azithromycin for cough

## 2021-05-28 NOTE — PROGRESS NOTES
ASSESSMENT/PLAN  1. Fall, initial encounter  Patient is here status post ER visit for a fall  Because of the fall is unknown  Reviewed imaging and ER notes with the patient today  Discussed with patient with unknown etiology the possibility of an arrhythmia versus hypotension due to multiple medications versus even a seizure as patient has no recollection of why she fell she has a lot of lack of memory shortly afterwards  Patient is been asymptomatic since the incident  If further symptoms develop consideration for work-up by neurology  Lip laceration is healing well-absorbable sutures visible  Bruising healing on right temporal forehead  Any worsening neurological symptoms need to further evaluate discussed with the patient  She had been having headaches after the fall but these are slowly resolving    2. Current mild episode of major depressive disorder, unspecified whether recurrent (H)  Patient has been on sertraline in the past but discontinued the medication on her own  She like to see how she is doing without the medication moving forward    3. Nausea  Patient continues use Reglan and ondansetron for ongoing nausea  Refill sent to the pharmacy  - metoclopramide (REGLAN) 10 MG tablet; TAKE 1 TABLET (10 MG TOTAL) BY MOUTH DAILY AS NEEDED FOR NAUSEA.  Dispense: 30 tablet; Refill: 5    4. Visit for screening mammogram  Patient is due for mammogram referral be placed today  - Mammo Screening Bilateral; Future    5. Essential hypertension  Blood pressure elevated patient had previously been taking amlodipine periodically but has not been taking it on a regular basis  Discussed with her starting back on amlodipine 5 mg daily and rechecking blood pressure in 10 days  - amLODIPine (NORVASC) 5 MG tablet; Take 1 tablet (5 mg total) by mouth daily.  Dispense: 30 tablet; Refill: 5    6. Colon cancer screening  Patient is due for colon cancer screening  - Faby    7. RUQ abdominal pain  Noted enlarged biliary duct on  imaging  Patient has been followed by GI in the past and has had ERCP  Patient has been having regular right upper quadrant pain again and discussed with her unknown if bile duct dilation is secondary to procedures versus the possibility of further problems in the ductal system  Referral placed back to [facility]  - Ambulatory referral to Gastroenterology    8. Cough  Patient was having a productive loose sounding cough for greater than 10 days  No fevers or chills  She has rhonchi on examination we will start the patient on a Z-Eleonora  Contact me if symptoms are not improving  - azithromycin (ZITHROMAX Z-ELEONORA) 250 MG tablet; Take 2 tablets (500 mg) on  Day 1,  followed by 1 tablet (250 mg) once daily on Days 2 through 5.  Dispense: 6 tablet; Refill: 0    9. Health care maintenance  Patient has had problems with vitamin B-12 and D in the past  Check levels today and follow-up based on results  - Vitamin B12  - Vitamin D, Total (25-Hydroxy)    10. Vitamin deficiency  See above  - Vitamin B12  - Vitamin D, Total (25-Hydroxy)    11. Mild vitamin D deficiency  See above  - Vitamin D, Total (25-Hydroxy)        SUBJECTIVE:   Chief Complaint   Patient presents with     Follow-up     ED, [facility], [facility] on 05/02/19     Headache     Patient has been having severe headaches since falling,      Abdominal Pain     Patient has been having constant pain in abdonimal pain that shots up to the neck and jaw. x 2 months     Mariah Koehler 63 y.o. female    Current Outpatient Medications   Medication Sig Dispense Refill     albuterol (PROAIR HFA) 90 mcg/actuation inhaler Inhale 1-2 puffs every 4 (four) hours as needed. 1 Inhaler 3     albuterol (PROVENTIL) 2.5 mg /3 mL (0.083 %) nebulizer solution Take 3 mL (2.5 mg total) by nebulization every 6 (six) hours as needed for wheezing. 75 mL 12     atenolol (TENORMIN) 25 MG tablet Take 1 tablet (25 mg total) by mouth daily. 90 tablet 3     baclofen (LIORESAL) 10 MG tablet TAKE 2 TABLETS (20  MG TOTAL) BY MOUTH 4 (FOUR) TIMES A  tablet 0     ergocalciferol (VITAMIN D2) 50,000 unit capsule Take 1 tablet twice a week Monday and Friday 12 capsule 3     ipratropium (ATROVENT) 0.02 % nebulizer solution Take 2.5 mL (0.5 mg total) by nebulization 4 (four) times a day. 300 mL 0     metoclopramide (REGLAN) 10 MG tablet TAKE 1 TABLET (10 MG TOTAL) BY MOUTH DAILY AS NEEDED FOR NAUSEA. 30 tablet 5     ondansetron (ZOFRAN) 4 MG tablet TAKE 1 TABLET BY MOUTH EVERY 8 HOURS AS NEEDED FOR NAUSEA 90 tablet 1     pain IT pump - patient supplied 600 mcg by Intrathecal route continuous. The medication in this patient's IT pump  Fentanyl and bupivacaine as directed       polyethylene glycol (GLYCOLAX) 17 gram/dose powder Take 527 g by mouth daily.       triamcinolone (KENALOG) 0.1 % ointment Apply topically 2 (two) times a day. Apply to affected area twice daily 80 g 0     zolpidem (AMBIEN) 10 mg tablet TAKE 1 TABLET BY MOUTH EVERYDAY AT BEDTIME 30 tablet 0     amLODIPine (NORVASC) 5 MG tablet Take 1 tablet (5 mg total) by mouth daily. 30 tablet 5     azithromycin (ZITHROMAX Z-ELEONORA) 250 MG tablet Take 2 tablets (500 mg) on  Day 1,  followed by 1 tablet (250 mg) once daily on Days 2 through 5. 6 tablet 0     Current Facility-Administered Medications   Medication Dose Route Frequency Provider Last Rate Last Dose     denosumab 60 mg (PROLIA 60 mg/ml)  60 mg Subcutaneous Q6 Months Jonathan Watts MD   60 mg at 11/07/17 0924     Allergies: Bacitracin; Codeine; Morphine; and Methadone   No LMP recorded. Patient is postmenopausal.    HPI:   Patient is here for ER follow-up.  Patient sustained a fall while at home and unknown to why she fell.  She does not have good memory of the incident nor shortly afterwards.  She was seen at the ER and had a lip laceration repaired.  She has absorbable sutures in place and lip looks to be healing well.  Patient had imaging completed and we reviewed that imaging with her today.  No  signs of.  She does have some resolving bruising on her right temporal region.  She did imaging of her abdominal area which indicated a enlarged bile duct and she has been experiencing right upper quadrant pain on a regular basis she is status post gallbladder removal and further ERCP procedures for gallstones that were retained in the bile duct.  Will refer back to GI for further work-up of this enlarged bile duct.  Patient was having headache status post the fall but these headaches are slowly improving.  She has a productive cough on examination today and rhonchi on-she has had problems for greater than 10 days with her cough and we discussed starting antibiotics for suspected bronchitis.  Patient continues to work with her pain clinic-she is on oral medications as well as a spinal stimulator-discussed with her the possibility that the fall was brought on by medication-discussed the possibility of arrhythmias although.  Discussed possibility hypotension but as well with no recollection of the incident and being not clear about the following time.  Up until the ER concerns for the possibility of a seizure episode.  Patient has been asymptomatic since the episode and would like to monitor at this time but understands with any further symptoms or change or recurrence consideration for work-up by neurology.    Patient would like refills of her medication for her nausea.  She is due for colon cancer screening and mammogram.  Patient has had problems consisting of vitamin B12 and D levels we will check these levels today.  She has had problems with bone density but cannot afford her insurance will cover Prolia which she will need to reassess with DEXA scan in the near future.    Patient discontinued her sertraline on her own and would like to see how she goes patient-she has been doing well for some time without it and we will not refill that medication at this time.    Patient's blood pressure is elevated and she had  previously in the past been taking amlodipine but not taking on a regular basis any longer-I would encourage her to start back on her amlodipine 5 mg daily and recheck blood pressure in 10 days.    ROS: negative except as per HPI    OBJECTIVE:   The patient appears well, alert, oriented x 3, in no distress.  /87 (Patient Site: Left Arm, Patient Position: Sitting, Cuff Size: Adult Regular)   Pulse 72   Temp 98.1  F (36.7  C) (Oral)   Resp 18   Wt 144 lb 6.4 oz (65.5 kg)   BMI 25.58 kg/m      HEENT: bruising right temporal region, lip laceration healing well- absorbable sutures visible, minimal swelling but no other signs of infection  Lungs: clear, good air entry, no wheezes, rhonchi noted in BL lower lungs  Cardiac: S1 and S2 normal, no murmurs, regular rate and rhythm.   Abdomen: normal bowel sounds, soft- pt has been having RUQ pain- not currently  Extremities: show no edema, normal peripheral pulses.   Neurological: normal, no focal findings.  Skin: clear, dry, no rashes/lesions  Psych- normal mood and affect      Pt states an understanding and agrees to the above plan.  Greater than 40 minutes was spent today in interview and examination with Mariah Koehler with more than 50% of that time in counseling and coordination of care.

## 2021-05-28 NOTE — TELEPHONE ENCOUNTER
Patient Returning Call  Reason for call:  Patient returned missed call  Information relayed to patient:  Advised patient that appointment needs to be 40 minutes instead of a 20 minute appointment. Patient agreed to the 3:10 timeframe for the appointment change. Please correct schedule. [facility] unable to override held spots.  Patient has additional questions:  No  If YES, what are your questions/concerns:  n/a  Okay to leave a detailed message?: No call back needed

## 2021-05-28 NOTE — TELEPHONE ENCOUNTER
RN cannot approve Refill Request    RN can NOT refill this medication med is not covered by policy/route to provider. Last office visit: 8/24/2018 Alex Nolasco MD Last Physical: 10/12/2018 Last MTM visit: Visit date not found Last visit same specialty: 8/24/2018 Alex Nolasco MD.  Next visit within 3 mo: Visit date not found  Next physical within 3 mo: Visit date not found      Dustin Mckeon, [facility] Triage/Med Refill 5/6/2019    Requested Prescriptions   Pending Prescriptions Disp Refills     ondansetron (ZOFRAN) 4 MG tablet [Pharmacy Med Name: ONDANSETRON HCL 4 MG TABLET] 90 tablet 1     Sig: TAKE 1 TABLET BY MOUTH EVERY 8 HOURS AS NEEDED FOR NAUSEA       There is no refill protocol information for this order

## 2021-05-29 NOTE — TELEPHONE ENCOUNTER
RN cannot approve Refill Request    RN can NOT refill this medication med is not covered by policy/route to provider.    Sinan Phillips, [facility] Triage/Med Refill 6/13/2019    Requested Prescriptions   Pending Prescriptions Disp Refills     baclofen (LIORESAL) 10 MG tablet [Pharmacy Med Name: BACLOFEN 10 MG TABLET] 240 tablet 0     Sig: TAKE 2 TABLETS (20 MG TOTAL) BY MOUTH 4 (FOUR) TIMES A DAY       There is no refill protocol information for this order

## 2021-05-29 NOTE — TELEPHONE ENCOUNTER
----- Message from Alex Nolasco MD sent at 6/4/2019  5:00 PM CDT -----  Please inform patient that Cologuard test was negative  Recommend repeat in 3 years

## 2021-05-30 VITALS — WEIGHT: 125 LBS | BODY MASS INDEX: 22.14 KG/M2

## 2021-05-30 VITALS — BODY MASS INDEX: 22.75 KG/M2 | HEIGHT: 63 IN | WEIGHT: 128.4 LBS

## 2021-05-30 VITALS — WEIGHT: 128 LBS | BODY MASS INDEX: 22.5 KG/M2

## 2021-05-30 NOTE — TELEPHONE ENCOUNTER
RN cannot approve Refill Request    RN can NOT refill this medication med is not covered by policy/route to provider. Last office visit: Visit date not found Last Physical: Visit date not found Last MTM visit: Visit date not found Last visit same specialty: 5/7/2019 Alex Nolasco MD.  Next visit within 3 mo: Visit date not found  Next physical within 3 mo: Visit date not found  Last refill 6/13/2019 for 240/0  Last OV 5/7/2019  Keila Collier, [facility] Triage/Med Refill 7/17/2019    Requested Prescriptions   Pending Prescriptions Disp Refills     baclofen (LIORESAL) 10 MG tablet [Pharmacy Med Name: BACLOFEN 10 MG TABLET] 240 tablet 0     Sig: TAKE 2 TABLETS (20 MG TOTAL) BY MOUTH 4 (FOUR) TIMES A DAY       There is no refill protocol information for this order

## 2021-05-31 VITALS — BODY MASS INDEX: 25.48 KG/M2 | WEIGHT: 145 LBS

## 2021-05-31 NOTE — TELEPHONE ENCOUNTER
RN cannot approve Refill Request    RN can NOT refill this medication med is not covered by policy/route to provider. Last office visit: 5/7/2019 Alex Nolasco MD Last Physical: 10/12/2018 Last MTM visit: Visit date not found Last visit same specialty: 5/7/2019 Alex Nolasco MD.  Next visit within 3 mo: Visit date not found  Next physical within 3 mo: Visit date not found      Diann Wilkinson, [facility] Triage/Med Refill 8/20/2019    Requested Prescriptions   Pending Prescriptions Disp Refills     baclofen (LIORESAL) 10 MG tablet [Pharmacy Med Name: BACLOFEN 10 MG TABLET] 240 tablet 0     Sig: TAKE 2 TABLETS (20 MG TOTAL) BY MOUTH 4 (FOUR) TIMES A DAY       There is no refill protocol information for this order

## 2021-06-01 ENCOUNTER — COMMUNICATION - HEALTHEAST (OUTPATIENT)
Dept: FAMILY MEDICINE | Facility: CLINIC | Age: 66
End: 2021-06-01

## 2021-06-01 ENCOUNTER — RECORDS - HEALTHEAST (OUTPATIENT)
Dept: ADMINISTRATIVE | Facility: CLINIC | Age: 66
End: 2021-06-01

## 2021-06-01 VITALS — HEIGHT: 64 IN | WEIGHT: 146.4 LBS | BODY MASS INDEX: 25 KG/M2

## 2021-06-01 DIAGNOSIS — G47.00 INSOMNIA, UNSPECIFIED TYPE: ICD-10-CM

## 2021-06-01 DIAGNOSIS — R11.0 NAUSEA: ICD-10-CM

## 2021-06-01 NOTE — TELEPHONE ENCOUNTER
RN cannot approve Refill Request    RN can NOT refill this medication med is not covered by policy/route to provider. Last office visit: 5/7/2019 Alex Nolasco MD Last Physical: 10/12/2018 Last MTM visit: Visit date not found Last visit same specialty: 5/7/2019 Alex Nolasco MD.  Next visit within 3 mo: Visit date not found  Next physical within 3 mo: Visit date not found      Diann Wilkinson, [facility] Triage/Med Refill 9/26/2019    Requested Prescriptions   Pending Prescriptions Disp Refills     baclofen (LIORESAL) 10 MG tablet [Pharmacy Med Name: BACLOFEN 10 MG TABLET] 240 tablet 0     Sig: TAKE 2 TABLETS (20 MG TOTAL) BY MOUTH 4 (FOUR) TIMES A DAY       There is no refill protocol information for this order

## 2021-06-01 NOTE — TELEPHONE ENCOUNTER
RN cannot approve Refill Request    RN can NOT refill this medication med is not covered by policy/route to provider. Last office visit: 5/7/2019 Alex Nolasco MD Last Physical: 10/12/2018 Last MTM visit: Visit date not found Last visit same specialty: 5/7/2019 Alex Nolasco MD.  Next visit within 3 mo: Visit date not found  Next physical within 3 mo: Visit date not found      Becca Rdz, [facility] Triage/Med Refill 9/5/2019    Requested Prescriptions   Pending Prescriptions Disp Refills     ondansetron (ZOFRAN) 4 MG tablet [Pharmacy Med Name: ONDANSETRON HCL 4 MG TABLET] 90 tablet 1     Sig: TAKE 1 TABLET BY MOUTH EVERY 8 HOURS AS NEEDED FOR NAUSEA       There is no refill protocol information for this order

## 2021-06-01 NOTE — TELEPHONE ENCOUNTER
Refill Approved    Rx renewed per Medication Renewal Policy. Medication was last renewed on 5/7/2019.  Last OV 5/7/2019.    Na Davalos, [facility] Triage/Med Refill 9/6/2019     Requested Prescriptions   Pending Prescriptions Disp Refills     amLODIPine (NORVASC) 5 MG tablet [Pharmacy Med Name: AMLODIPINE BESYLATE 5 MG TAB] 90 tablet 1     Sig: TAKE 1 TABLET BY MOUTH EVERY DAY       Calcium-Channel Blockers Protocol Passed - 9/6/2019  1:33 AM        Passed - PCP or prescribing provider visit in past 12 months or next 3 months     Last office visit with prescriber/PCP: 5/7/2019 Alex Nolasco MD OR same dept: 5/7/2019 Alex Nolasco MD OR same specialty: 5/7/2019 Alex Nolasco MD  Last physical: 10/12/2018 Last MTM visit: Visit date not found   Next visit within 3 mo: Visit date not found  Next physical within 3 mo: Visit date not found  Prescriber OR PCP: Alex Nolasco MD  Last diagnosis associated with med order: 1. Essential hypertension  - amLODIPine (NORVASC) 5 MG tablet [Pharmacy Med Name: AMLODIPINE BESYLATE 5 MG TAB]; TAKE 1 TABLET BY MOUTH EVERY DAY  Dispense: 90 tablet; Refill: 1    If protocol passes may refill for 12 months if within 3 months of last provider visit (or a total of 15 months).             Passed - Blood pressure filed in past 12 months     BP Readings from Last 1 Encounters:   05/07/19 168/87

## 2021-06-02 ENCOUNTER — RECORDS - HEALTHEAST (OUTPATIENT)
Dept: ADMINISTRATIVE | Facility: CLINIC | Age: 66
End: 2021-06-02

## 2021-06-02 VITALS — HEIGHT: 63 IN | WEIGHT: 144 LBS | BODY MASS INDEX: 25.52 KG/M2

## 2021-06-02 VITALS — BODY MASS INDEX: 25.11 KG/M2 | WEIGHT: 144 LBS

## 2021-06-02 NOTE — TELEPHONE ENCOUNTER
RN cannot approve Refill Request    RN can NOT refill this medication med is not covered by policy/route to provider. Last office visit: 5/7/2019 Alex Nolasco MD Last Physical: 10/12/2018 Last MTM visit: Visit date not found Last visit same specialty: 5/7/2019 Alex Nolasco MD.      Makenna Landis, [facility] Triage/Med Refill 10/30/2019    Requested Prescriptions   Pending Prescriptions Disp Refills     baclofen (LIORESAL) 10 MG tablet [Pharmacy Med Name: BACLOFEN 10 MG TABLET] 240 tablet 0     Sig: TAKE 2 TABLETS (20 MG TOTAL) BY MOUTH 4 (FOUR) TIMES A DAY       There is no refill protocol information for this order

## 2021-06-03 VITALS — WEIGHT: 144.4 LBS | BODY MASS INDEX: 25.58 KG/M2

## 2021-06-03 NOTE — TELEPHONE ENCOUNTER
RN cannot approve Refill Request    RN can NOT refill this medication med is not covered by policy/route to provider. Last office visit: 5/7/2019 Alex Nolasco MD Last Physical: 10/12/2018 Last MTM visit: Visit date not found Last visit same specialty: 5/7/2019 Alex Nolasco MD.  Next visit within 3 mo: Visit date not found  Next physical within 3 mo: Visit date not found      Renetta Barton, [facility] Triage/Med Refill 11/15/2019    Requested Prescriptions   Pending Prescriptions Disp Refills     ondansetron (ZOFRAN) 4 MG tablet [Pharmacy Med Name: ONDANSETRON HCL 4 MG TABLET] 90 tablet 1     Sig: TAKE 1 TABLET BY MOUTH EVERY 8 HOURS AS NEEDED FOR NAUSEA       There is no refill protocol information for this order

## 2021-06-03 NOTE — TELEPHONE ENCOUNTER
Refill Approved    Rx renewed per Medication Renewal Policy. Medication was last renewed on 11/1/18.    Maria C Jimenez, [facility] Triage/Med Refill 11/4/2019     Requested Prescriptions   Pending Prescriptions Disp Refills     atenolol (TENORMIN) 25 MG tablet 90 tablet 3     Sig: Take 1 tablet (25 mg total) by mouth daily.       Beta-Blockers Refill Protocol Passed - 11/4/2019  7:09 AM        Passed - PCP or prescribing provider visit in past 12 months or next 3 months     Last office visit with prescriber/PCP: 5/7/2019 Alex Nolasco MD OR same dept: 5/7/2019 Alex Nolasco MD OR same specialty: 5/7/2019 Alex Nolasco MD  Last physical: 10/12/2018 Last MTM visit: Visit date not found   Next visit within 3 mo: Visit date not found  Next physical within 3 mo: Visit date not found  Prescriber OR PCP: Alex Nolasco MD  Last diagnosis associated with med order: 1. Essential hypertension  - atenolol (TENORMIN) 25 MG tablet; Take 1 tablet (25 mg total) by mouth daily.  Dispense: 90 tablet; Refill: 3    If protocol passes may refill for 12 months if within 3 months of last provider visit (or a total of 15 months).             Passed - Blood pressure filed in past 12 months     BP Readings from Last 1 Encounters:   05/07/19 168/87

## 2021-06-03 NOTE — TELEPHONE ENCOUNTER
Controlled Substance Refill Request  Medication:   Requested Prescriptions     Pending Prescriptions Disp Refills     zolpidem (AMBIEN) 10 mg tablet [Pharmacy Med Name: ZOLPIDEM TARTRATE 10 MG TABLET] 30 tablet 0     Sig: TAKE 1 TABLET BY MOUTH EVERYDAY AT BEDTIME     Date Last Fill: 8/30/19  Pharmacy:[id number]    Submit electronically to pharmacy  Controlled Substance Agreement on File:   Encounter-Level CSA Scan Date - 11/22/2016:    Scan on 11/22/2016       Last office visit: Last office visit pertaining to requested medication was 5/7/19.

## 2021-06-04 VITALS
OXYGEN SATURATION: 97 % | HEART RATE: 71 BPM | WEIGHT: 130.38 LBS | RESPIRATION RATE: 18 BRPM | TEMPERATURE: 97.1 F | DIASTOLIC BLOOD PRESSURE: 61 MMHG | SYSTOLIC BLOOD PRESSURE: 123 MMHG | BODY MASS INDEX: 23.09 KG/M2

## 2021-06-04 VITALS — BODY MASS INDEX: 23.37 KG/M2 | WEIGHT: 131.9 LBS

## 2021-06-04 NOTE — TELEPHONE ENCOUNTER
RN cannot approve Refill Request    RN can NOT refill this medication med is not covered by policy/route to provider. Last office visit: 5/7/2019 Alex Nolasco MD Last Physical: 10/12/2018 Last MTM visit: Visit date not found Last visit same specialty: 5/7/2019 Alex Nolasco MD.  Next visit within 3 mo: Visit date not found  Next physical within 3 mo: Visit date not found      Darlene Lewis, [facility] Triage/Med Refill 1/4/2020    Requested Prescriptions   Pending Prescriptions Disp Refills     ondansetron (ZOFRAN) 4 MG tablet 90 tablet 1     Sig: Take 1 tablet (4 mg total) by mouth every 8 (eight) hours as needed for nausea.       There is no refill protocol information for this order

## 2021-06-04 NOTE — TELEPHONE ENCOUNTER
RN cannot approve Refill Request    RN can NOT refill this medication med is not covered by policy/route to provider     . Last office visit: 5/7/2019 Alex Nolasco MD Last Physical: 10/12/2018 Last MTM visit: Visit date not found Last visit same specialty: 5/7/2019 Alex Nolasco MD.  Next visit within 3 mo: Visit date not found  Next physical within 3 mo: Visit date not found      Blanka Grimm, [facility] Triage/Med Refill 12/27/2019    Requested Prescriptions   Pending Prescriptions Disp Refills     baclofen (LIORESAL) 10 MG tablet [Pharmacy Med Name: BACLOFEN 10 MG TABLET] 240 tablet 0     Sig: TAKE 2 TABLETS (20 MG TOTAL) BY MOUTH 4 (FOUR) TIMES A DAY       There is no refill protocol information for this order

## 2021-06-05 VITALS
BODY MASS INDEX: 22.68 KG/M2 | RESPIRATION RATE: 24 BRPM | SYSTOLIC BLOOD PRESSURE: 128 MMHG | HEIGHT: 63 IN | WEIGHT: 128 LBS | DIASTOLIC BLOOD PRESSURE: 74 MMHG | HEART RATE: 64 BPM

## 2021-06-05 NOTE — TELEPHONE ENCOUNTER
Provider: Stella Larsen PA-C   Diagnosis: Senile osteoporosis     Is Amanda willing to manage? Patient of Dr Watts.

## 2021-06-05 NOTE — PROGRESS NOTES
ASSESSMENT: Mariah Koehler is a 64 y.o. female with past medical history significant for opioid dependence with continuous use, COPD, chronic pain syndrome, vitamin B12 deficiency, reflux sympathetic dystrophy of the left upper limb, osteoporosis, hypertension, depression, irritable bowel syndrome, GERD who presents today for new patient evaluation of chronic and progressive bilateral low back pain with radiation into the right lower extremity with left greater than right lower extremity weakness and bilateral foot numbness.  MRI lumbar spine from 2018 showed degenerative spondylolisthesis L4-5.  There is fluid in the facet joints.  This results in mild spinal canal stenosis with mild to moderate bilateral foraminal stenosis.  There is also a right paracentral/foraminal disc protrusion L2-3.  I am concerned that the patient may have dynamic instability at L4-5 given the amount of fluid in her facet joints which would cause a more significant spinal stenosis than appreciated on a supine MRI.  On exam, patient did demonstrate significant weakness in the left lower extremity.  She also ambulated with an abnormal gait.  It was quite unsteady and admits to significant balance problems.  She had an MRI scan of the cervical spine in December 2018 which did not show any spinal stenosis.  She did not have any hyperreflexia or pathologic reflexes. Patient had absent lower extremity reflexes.  I think there may be additional extraspinal factors contributing to gait disturbance/balance problems.  Patient reported sensory deficit bilateral plantar feet.      ESTEPHANIA: 60  Who 5:14    PLAN:  A shared decision making model was used.  The patient's values and choices were respected.  The following represents what was discussed and decided upon by the physician assistant and the patient.      1.  DIAGNOSTIC TESTS: I reviewed the x-ray lumbar spine from 2018.  I ordered an updated MRI lumbar spine.  I also ordered lumbar spine  flexion-extension x-rays for further evaluation.  Would like to evaluate for possible dynamic instability at L4-5 given the fluid in the facet joints.  I also ordered an EMG bilateral lower extremities for further evaluation.    2.  PHYSICAL THERAPY: No physical therapy was ordered.  We will await the results of her diagnostic tests.    3.  MEDICATIONS:   -Patient requests a prescription for oral sedation for claustrophobia the MRI scan.  I provided a prescription for Valium 5 mg, #2 with no refills.  -No other changes are made to the patient's medications.  Patient goes to [facility] pain clinic.  -She has an intrathecal pain pump with fentanyl, bupivacaine, and morphine.  -She also uses oxycodone 5 mg twice daily.  -She takes baclofen 20 mg 4 times daily.  -Uses ibuprofen as needed.    4.  INTERVENTIONS: No interventions were ordered.    5.  PATIENT EDUCATION: Patient is in agreement the above plan.  All questions were answered.    6.  REFERRALS:  -I entered a referral to neurology.  Patient's gait is not what I would expect for patient was spondylolisthesis.  He was more unsteady than his typical.  I would like to evaluate for other possible causes for gait disturbance/balance problems.  - I told the patient that if she has an abnormal EMG, significant movement on flexion-extension x-rays, or significant neural compromise on MRI, will refer to neurosurgery.  -Patient's osteoporosis is not currently treated.  She states that she cannot afford Prolia.  I am going to refer the patient back to the osteoporosis clinic to discuss options.    7.  FOLLOW-UP:   A nurse will call the patient with the results of her MRI, flexion-extension x-rays, EMG.  If she has questions or concerns in the meantime, she should not hesitate to call.      SUBJECTIVE:  Mariah Koehler  Is a 64 y.o. female who presents today as a self-referral for new patient evaluation of chronic and progressive low back pain with radiation into the right lower  extremity with left greater than right lower extremity weakness and bilateral foot paresthesias.  Patient reports that she has had chronic low back pain.  Symptoms have been getting progressively worse over the past 1 year.  Especially over the past 7 months, patient has noticed weakness and numbness and tingling in the legs which is abnormal for her.  She denies any specific injury or event to cause the pain.  She states that she was diagnosed with degenerative disc disease in her 20s and the pain has just been getting gradually worse.  Patient works with [facility] pain clinic for her chronic pain.  She has an intrathecal pain pump and uses oral medications to manage pain.  Patient self refers because she would like further evaluation of her worsening symptoms/weakness.    Patient complains of bilateral low back pain.  Pain spans across the low back at the lumbosacral junction.  She states that this pain is unbearable.  It is equally severe on both sides.  The pain then radiates into the right buttock and about intermediate down the right posterior thigh.  Patient states that she feels weak in both legs.  This is worse on the left than the right.  She states that she has fallen a couple of times.  She is very scared of falling.  She states that she feels like when she transitions from seated to standing that her legs could just give way from under her.  She is especially afraid of falling when she is going downstairs.  She does use a walker at home and a cane when she is out.  She has needed to use assistance since 2016 when she had pelvic insufficiency fractures.  Patient does admit that she has had some longstanding weakness.  She states that when she was in college she had weakness in her hands.  She was tested for myasthenia gravis and she was told that the muscle biopsy was negative for myasthenia gravis, but the results were not normal.  Nevertheless, the lower extremity weakness is much worse over the past 7 months  than her typical baseline.  Patient states that she has numbness and tingling on the plantar aspect of both feet affecting the balls of the feet and the toes.  Patient reports that symptoms are aggravated with standing for more than 5 minutes, sitting too long, and trying to stand with an upright posture.  Pain is alleviated with frequently repositioning.  She also feels compelled to lean forward.  She states that even in a sitting position she finds herself leaning forward at the waist.  She states that she feels like she cannot hold herself upright.  When she is walking, she leans forward drastically on her walker.  The patient reports that for 1 to 2 years she has had difficulty with her urinary stream.  She states this seemed to begin after getting her pain pump.  She states that she has to sit for  several minutes to start her urinary stream and then she is not sure that she empties completely.  She feels this is getting worse.  She denies loss of bowel control.    Patient ports that she did physical therapy many years ago.  She does not go to a chiropractor.  She had epidural steroid injections x2 in the lower back about 20 years ago which were not helpful.  She states that within the past few years she had injections for her neck pain.  These were very painful and did not help so she is tentative to get any additional injections.  She has never had spine surgery.  She states that she was told in her 20s or 30s by a spine surgeon that they did not have anything to offer her to help her with her symptoms at that time.  Patient has an intrathecal pain pump with fentanyl, bupivacaine, and morphine.  She uses oxycodone 5 mg twice daily.  She takes baclofen 20 mg 4 times per day.  She uses ibuprofen as needed.    Current Outpatient Medications on File Prior to Visit   Medication Sig Dispense Refill     albuterol (PROAIR HFA) 90 mcg/actuation inhaler Inhale 1-2 puffs every 4 (four) hours as needed. 1 Inhaler 3      albuterol (PROVENTIL) 2.5 mg /3 mL (0.083 %) nebulizer solution Take 3 mL (2.5 mg total) by nebulization every 6 (six) hours as needed for wheezing. 75 mL 12     amLODIPine (NORVASC) 5 MG tablet TAKE 1 TABLET BY MOUTH EVERY DAY 90 tablet 2     atenolol (TENORMIN) 25 MG tablet Take 1 tablet (25 mg total) by mouth daily. 90 tablet 1     baclofen (LIORESAL) 10 MG tablet TAKE 2 TABLETS (20 MG TOTAL) BY MOUTH 4 (FOUR) TIMES A  tablet 0     ergocalciferol (ERGOCALCIFEROL) 50,000 unit capsule TAKE 1 CAPSULE BY MOUTH TWICE A WEEK ON MONDAYS AND FRIDAYS AS DIRECTED. 12 capsule 0     ipratropium (ATROVENT) 0.02 % nebulizer solution Take 2.5 mL (0.5 mg total) by nebulization 4 (four) times a day. 300 mL 0     metoclopramide (REGLAN) 10 MG tablet TAKE 1 TABLET (10 MG TOTAL) BY MOUTH DAILY AS NEEDED FOR NAUSEA. 30 tablet 5     ondansetron (ZOFRAN) 4 MG tablet Take 1 tablet (4 mg total) by mouth every 8 (eight) hours as needed for nausea. 90 tablet 1     pain IT pump - patient supplied 600 mcg by Intrathecal route continuous. The medication in this patient's IT pump  Fentanyl and bupivacaine as directed       polyethylene glycol (GLYCOLAX) 17 gram/dose powder Take 527 g by mouth daily.       triamcinolone (KENALOG) 0.1 % ointment Apply topically 2 (two) times a day. Apply to affected area twice daily 80 g 0     zolpidem (AMBIEN) 10 mg tablet TAKE 1 TABLET BY MOUTH EVERYDAY AT BEDTIME 30 tablet 0     Current Facility-Administered Medications on File Prior to Visit   Medication Dose Route Frequency Provider Last Rate Last Dose     denosumab 60 mg (PROLIA 60 mg/ml)  60 mg Subcutaneous Q6 Months Jonathan Watts MD   60 mg at 11/07/17 0924       Allergies   Allergen Reactions     Bacitracin      Codeine Nausea And Vomiting     Morphine Nausea And Vomiting     Methadone Rash       Past Medical History:   Diagnosis Date     Opiate dependence (H)      RSD (reflex sympathetic dystrophy)      Sacral fracture, closed (H)        Patient Active Problem List   Diagnosis     Nausea     Chest Pain     Opioid Dependence With Continuous Use     Edema     Wheezing (Symptom)     Pain During Urination (Dysuria)     Frequent, Full-bladder Emptying (Polyuria)     Chronic Obstructive Pulmonary Disease     Chronic Pain Syndrome     Vitamin B12 Deficiency     Microscopic Hematuria     Cough     Abdominal Pain In The Central Upper Belly (Epigastric)     Reflex Sympathetic Dystrophy Of The Left Upper Limb     Osteoporosis     Carpal Tunnel Syndrome     Hypertension     Constipation     Abdominal Pain     Fatigue         Past Surgical History:   Procedure Laterality Date     HYSTERECTOMY  1984     OOPHORECTOMY Bilateral 1985     NC DECOMPRESS FASCIOTOMY FINGR/HAND      Description: Decompression Fasciotomy Of The Hand;  Recorded: 09/19/2011;     NC LAP,CHOLECYSTECTOMY      Description: Cholecystectomy Laparoscopic;  Recorded: 12/09/2011;     NC REVISE MEDIAN N/CARPAL TUNNEL SURG      Description: Neuroplasty Decompression Median Nerve At Carpal Tunnel;  Recorded: 09/19/2011;     NC SYMPATHECTOMY,CERVICOTHORACIC      Description: Surgical Sympathectomy Cervicothoracic;  Recorded: 09/19/2011;     NC TOTAL ABDOM HYSTERECTOMY      Description: Hysterectomy;  Recorded: 03/23/2011;       Family History   Problem Relation Age of Onset     Stroke Mother      Heart disease Father      Pulmonary Hypertension Father      Kidney failure Father        Social history: Patient is .  She is disabled.  She smokes 1/2-3/4 of pack of cigarettes per day.  She drinks 1-2 alcoholic beverages per year.  She denies illicit drug use.      ROS: Positive for headache, ringing in ears, changes in vision, chest pain, color changes in hands or feet, shortness of breath, cough, wheezing, abdominal pain, constipation, nausea/vomiting, reflux, difficulty urinating, joint pain, muscle pain, muscle fatigue, imbalance, dizziness, depression.  Specifically negative for bowel  dysfunction, fevers,chills, appetite changes, unexplained weight loss.   Otherwise 13 systems reviewed are negative.  Please see the patient's intake questionnaire from today for details.      OBJECTIVE:  PHYSICAL EXAMINATION:    CONSTITUTIONAL:  Vital signs as above.  Patient appears uncomfortable during the visit.  The patient is well nourished and well groomed.  PSYCHIATRIC:  The patient is awake, alert, oriented to person, place, time and answering questions appropriately with clear speech.    HEENT: Normocephalic, atraumatic.  Sclera clear.  Neck is supple.  SKIN:  Skin over the face, bilateral lower extremities, and posterior torso is clean, dry, intact without rashes.    GAIT:  Gait is very unsteady.  She walks with a cane.  She has a severely flexed forward posture at the hips, about 60 degrees.  The patient is able to rise onto toes and heels bilaterally with support.  STANDING EXAMINATION: Tender to palpation right greater than left lower lumbar paraspinous muscles and right greater than left sacroiliac joint.  Lumbar flexion within normal limits.  Lumbar extension severely restricted.  Patient is barely able to reach neutral posture before she has to stop due to back pain.  MUSCLE STRENGTH: The patient has 4/5 strength in the bilateral hip flexors, 5/5 right and 4/5 left knee flexors and extensors, 5/5 right and 4/5 left ankle dorsi/plantar flexors, 5/5 right and 3/5 left EHL.  NEUROLOGICAL: Absent patellar, and achilles reflexes bilaterally.  Negative Babinski's bilaterally.  No ankle clonus bilaterally.  Diminished sensation bilateral plantar feet.  VASCULAR:  2/4 dorsalis pedis pulses bilaterally.  Bilateral lower extremities are warm.  There is no pitting edema of the bilateral lower extremities.  ABDOMINAL:  Soft, non-distended, non-tender throughout all quadrants.  No pulsatile mass palpated in the left lower quadrant.  LYMPH NODES:  No palpable or tender inguinal lymph nodes.  MUSCULOSKELETAL:  Straight leg raise positive bilaterally.    RESULTS: I reviewed the MRI lumbar spine from [facility] dated December 5, 2018.  This shows mild anterolisthesis of L4-5.  There is moderate facet arthropathy with fluid in the facet joints.  To my eye, there may be some hyperintensity through the right L4 pedicle on STIR sequence.  There is mild spinal canal stenosis with mild to moderate bilateral foraminal stenosis level.  At L2-3 there is a right paracentral/foraminal disc protrusion with right lateral recess stenosis.  No severe spinal canal or neuroforaminal stenosis.  No lumbar spine compression fracture.  Please see report for further details.

## 2021-06-05 NOTE — TELEPHONE ENCOUNTER
Call placed to pt. Discussed this information with her. She stated understanding. She is aware she will be contacted to schedule.

## 2021-06-05 NOTE — TELEPHONE ENCOUNTER
Controlled Substance Refill Request  Medication Name:   Requested Prescriptions     Pending Prescriptions Disp Refills     zolpidem (AMBIEN) 10 mg tablet [Pharmacy Med Name: ZOLPIDEM TARTRATE 10 MG TABLET] 30 tablet 0     Sig: TAKE 1 TABLET BY MOUTH EVERYDAY AT BEDTIME     Date Last Fill: 11/27/19  Requested Pharmacy: [facility]  Submit electronically to pharmacy  Controlled Substance Agreement on file:   Encounter-Level CSA Scan Date - 10/12/2018:    Scan on 10/16/2018  1:55 PM           Encounter-Level CSA Scan Date - 11/22/2016:    Scan on 11/22/2016        Last office visit:  5/7/19

## 2021-06-05 NOTE — TELEPHONE ENCOUNTER
----- Message from Stella Larsen PA-C sent at 1/31/2020 12:47 PM CST -----  Regarding: osteoporosis clinic  Please call the patient let her know that I entered a referral for the patient to return to the osteoporosis clinic.  She said that she could not afford to the osteoporosis treatment recommended years ago.  I would like her to meet with them to find her with her any other options for her.

## 2021-06-05 NOTE — TELEPHONE ENCOUNTER
RN cannot approve Refill Request    RN can NOT refill this medication med is not covered by policy/route to provider. Last office visit: 5/7/2019 Alex Nolasco MD Last Physical: 10/12/2018 Last MTM visit: Visit date not found Last visit same specialty: 5/7/2019 Alex Nolasco MD.  Next visit within 3 mo: Visit date not found  Next physical within 3 mo: Visit date not found      Debbie Argueta, [facility] Triage/Med Refill 1/23/2020    Requested Prescriptions   Pending Prescriptions Disp Refills     baclofen (LIORESAL) 10 MG tablet [Pharmacy Med Name: BACLOFEN 10 MG TABLET] 240 tablet 0     Sig: TAKE 2 TABLETS (20 MG TOTAL) BY MOUTH 4 (FOUR) TIMES A DAY       There is no refill protocol information for this order

## 2021-06-06 NOTE — TELEPHONE ENCOUNTER
----- Message from Ameena Rubio DO sent at 3/12/2020  4:20 PM CDT -----  Nurse navigation to call and let her know that her MRI shows severe facet arthropathy with lateral recess stenosis and likely nerve root impingement at the L4-5 level.  This is likely the cause of her weakness in her legs as well as her pain.  She has her EMG with Dr. Keen on 3-18-20.  Would recommend she have that first before any recommendations for injections are given.  Her EMG can be reviewed and 3-18-20 and she can be contacted on 3-19-20 with further recommendations.

## 2021-06-06 NOTE — PROGRESS NOTES
ASSESSMENT/PLAN  1. Nausea  Patient regular still having problems with day-to-day nausea  She has is having epigastric and right upper quadrant pain on a regular basis  She has been seen by a couple different GI specialist without an answer to her ongoing problems she like to seek a second opinion at a different group  Referral placed to [facility]  - metoclopramide (REGLAN) 10 MG tablet; Take 1 tablet (10 mg total) by mouth 3 (three) times a day. TAKE 1 TABLET (10 MG TOTAL) BY MOUTH DAILY AS NEEDED FOR NAUSEA.  Dispense: 90 tablet; Refill: 1    2. Bronchitis  Patient has had upper respiratory symptoms over the last few weeks  She has some slight rhonchi at the bases bilaterally no wheezing  She is afebrile and vitals are otherwise stable  With duration of symptoms we will treat for bronchitis  Send Tessalon Perles for cough suppression  - azithromycin (ZITHROMAX Z-EELONORA) 250 MG tablet; Take 2 tablets (500 mg) on  Day 1,  followed by 1 tablet (250 mg) once daily on Days 2 through 5.  Dispense: 6 tablet; Refill: 0  - benzonatate (TESSALON PERLES) 100 MG capsule; Take 1 capsule (100 mg total) by mouth 3 (three) times a day as needed for cough.  Dispense: 30 capsule; Refill: 0    3. Abdominal pain, epigastric  Referral to GI at [facility]  Patient has known dilated bile duct  - Ambulatory referral to Gastroenterology    4. RUQ abdominal pain  Patient has a chronic nausea and abdominal discomfort that is been worsening over the last couple months  She like to seek a second opinion to sort out the etiology of this and we discussed referral to GI specialty at the [facility]  - Ambulatory referral to Gastroenterology    5. Opioid type dependence, continuous (H)  Patient follows with pain clinic is on a pain pump    6. Current mild episode of major depressive disorder, unspecified whether recurrent (H)  Patient has a history of depression is not on any current antidepressant medication  Feels  she is doing well at this time and we will monitor    7. Chronic obstructive pulmonary disease, unspecified COPD type (H)  Patient feels her breathing is at baseline  Continue with inhalers as needed        SUBJECTIVE:   Chief Complaint   Patient presents with     Abdominal Pain     c/o pain in the right side, upper abdomen x 2 months      Nausea     c/o bad nausea      Mariah Koehler 64 y.o. female    Current Outpatient Medications   Medication Sig Dispense Refill     albuterol (PROAIR HFA) 90 mcg/actuation inhaler Inhale 1-2 puffs every 4 (four) hours as needed. 1 Inhaler 3     albuterol (PROVENTIL) 2.5 mg /3 mL (0.083 %) nebulizer solution Take 3 mL (2.5 mg total) by nebulization every 6 (six) hours as needed for wheezing. 75 mL 12     amLODIPine (NORVASC) 5 MG tablet TAKE 1 TABLET BY MOUTH EVERY DAY 90 tablet 2     atenolol (TENORMIN) 25 MG tablet Take 1 tablet (25 mg total) by mouth daily. 90 tablet 1     baclofen (LIORESAL) 10 MG tablet TAKE 2 TABLETS (20 MG TOTAL) BY MOUTH 4 (FOUR) TIMES A  tablet 0     ipratropium (ATROVENT) 0.02 % nebulizer solution Take 2.5 mL (0.5 mg total) by nebulization 4 (four) times a day. 300 mL 0     metoclopramide (REGLAN) 10 MG tablet Take 1 tablet (10 mg total) by mouth 3 (three) times a day. TAKE 1 TABLET (10 MG TOTAL) BY MOUTH DAILY AS NEEDED FOR NAUSEA. 90 tablet 1     ondansetron (ZOFRAN) 4 MG tablet Take 1 tablet (4 mg total) by mouth every 8 (eight) hours as needed for nausea. 90 tablet 1     oxyCODONE (ROXICODONE) 5 MG immediate release tablet        pain IT pump - patient supplied 600 mcg by Intrathecal route continuous. The medication in this patient's IT pump  Fentanyl and bupivacaine as directed       polyethylene glycol (GLYCOLAX) 17 gram/dose powder Take 527 g by mouth daily.       triamcinolone (KENALOG) 0.1 % ointment Apply topically 2 (two) times a day. Apply to affected area twice daily 80 g 0     zolpidem (AMBIEN) 10 mg tablet TAKE 1 TABLET BY MOUTH  EVERYDAY AT BEDTIME 30 tablet 0     azithromycin (ZITHROMAX Z-ELEONORA) 250 MG tablet Take 2 tablets (500 mg) on  Day 1,  followed by 1 tablet (250 mg) once daily on Days 2 through 5. 6 tablet 0     benzonatate (TESSALON PERLES) 100 MG capsule Take 1 capsule (100 mg total) by mouth 3 (three) times a day as needed for cough. 30 capsule 0     diazePAM (VALIUM) 5 MG tablet Take 1-2 tablets (5-10 mg total) by mouth once in imaging for anxiety. Fill at preferred pharmacy and bring to MRI appointment. Do not take prior to arriving. You will need a  to bring you home after your appointment. 2 tablet 0     ergocalciferol (ERGOCALCIFEROL) 50,000 unit capsule TAKE 1 CAPSULE BY MOUTH TWICE A WEEK ON MONDAYS AND FRIDAYS AS DIRECTED. 12 capsule 0     Current Facility-Administered Medications   Medication Dose Route Frequency Provider Last Rate Last Dose     denosumab 60 mg (PROLIA 60 mg/ml)  60 mg Subcutaneous Q6 Months Jonathan Watts MD   60 mg at 11/07/17 0924     Allergies: Bacitracin; Codeine; Morphine; and Methadone   Patient's last menstrual period was 08/26/1984.    HPI:   Patient is here for follow-up regarding ongoing nausea abdominal discomfort  Patient has seen a couple different GI specialist in the past without clear explanation of her ongoing nausea and abdominal discomfort  She has had imaging indicating dilated bile duct with no other indication of overall discomfort  She has had an ERCP in the past 1 or 2 times that has led to improvement of symptoms afterwards but then there is seems to be progression back of her nausea and abdominal discomfort  She had to seek another opinion on her chronic nausea and abdominal discomfort like to do this with another group  Patient is a known history of depression is not on any active antidepressants at this time she feels things are stable and she like to monitor  She feels her breathing is at baseline she has a history of COPD and is on as needed inhalers    Patient  has had some upper respiratory symptoms over the last few weeks and has had increasing productive cough no fevers or chills  She has rhonchi on bilateral lung examination today and we discussed treating for bronchitis  Was sent Tessalon Perles to help with suppression of her cough    Patient continues to follow with pain clinic she uses a pain pump    ROS: negative except as per HPI    OBJECTIVE:   The patient appears alert, oriented x 3, in no distress.  /61 (Patient Site: Left Arm, Patient Position: Sitting, Cuff Size: Adult Regular)   Pulse 71   Temp 97.1  F (36.2  C) (Oral)   Resp 18   Wt 130 lb 6 oz (59.1 kg)   LMP 08/26/1984   SpO2 97%   BMI 23.09 kg/m      Lungs: clear, good air entry, no wheezes, rhonchi or rales.   Cardiac: S1 and S2 normal, no murmurs, regular rate and rhythm.   Abdomen: normal bowel sounds, pain in epigastric and RUQ chronic  Extremities: show no edema, normal peripheral pulses.   Neurological: normal, no focal findings.  Skin: clear, dry, no rashes/lesions  Psych- normal mood and affect      Pt states an understanding and agrees to the above plan.  Greater than 25 minutes was spent today in interview and examination with Mariah Koehler with more than 50% of that time in counseling and coordination of care.

## 2021-06-06 NOTE — TELEPHONE ENCOUNTER
Patient returning call. Results given and explained. Discussed she should move forward with the ordered MRI and EMG. States she had the MRI done today and is having the EMG next week. She is also scheduled to see neurology on 4/2/20.     Explained she will be called with the MRI and EMG results once the provider has received and reviewed them. Stated understanding.

## 2021-06-06 NOTE — TELEPHONE ENCOUNTER
Per Dr. Irvin: Nurse navigation to call the patient and let her know she does not have any instability of her spine at the L4-5 level on her xrays.  She should proceed with the updated MRI and EMG testing as ordered per Stella PARRA.     Phone call to patient to review results and provider's recommendations. Left message to return call.

## 2021-06-07 NOTE — TELEPHONE ENCOUNTER
Central PA team  [phone]  Pool: HE PA MED ([id number])          PA has been initiated.       PA form completed and faxed insurance via Cover My Meds     Key:  [id number] - PA Case ID: [id number]     Medication:  Omeprazole 20MG dr capsules    Insurance:  [employer]         Response will be received via fax and may take up to 5-10 business days depending on plan

## 2021-06-07 NOTE — TELEPHONE ENCOUNTER
Prior Authorization Request  Who s requesting:  Pharmacy  Pharmacy Name and Location: [id number]  Medication Name: Omeprazole 20 mg, one capsule two times a day  Insurance Plan: [employer]  Insurance Member ID Number: [id number]  CoverMyMeds Key:N/A - electronic request   Informed patient that prior authorizations can take up to 10 business days for response:   NA  Okay to leave a detailed message: No

## 2021-06-07 NOTE — TELEPHONE ENCOUNTER
Medication: zolpidem    Last Date Filled 1/30/2020     pulled: NO  Unable to pull  for this provider    Only PCP Prescribing?: YES    Taken as prescribed from physician notes?: YES    CSA in last year: NO 10/12/2018    Random Utox in last year: NO    Opioids + benzodiazepines? NO

## 2021-06-07 NOTE — TELEPHONE ENCOUNTER
RN cannot approve Refill Request    RN can NOT refill this medication med is not covered by policy/route to provider     . Last office visit: 2/14/2020 Alex Nolasco MD Last Physical: 10/12/2018 Last MTM visit: Visit date not found Last visit same specialty: 2/14/2020 Alex Nolasco MD.  Next visit within 3 mo: Visit date not found  Next physical within 3 mo: Visit date not found      Blanka Grimm, [facility] Triage/Med Refill 4/13/2020    Requested Prescriptions   Pending Prescriptions Disp Refills     baclofen (LIORESAL) 10 MG tablet [Pharmacy Med Name: BACLOFEN 10 MG TABLET] 240 tablet 0     Sig: TAKE 2 TABLETS (20 MG TOTAL) BY MOUTH 4 (FOUR) TIMES A DAY       There is no refill protocol information for this order

## 2021-06-07 NOTE — TELEPHONE ENCOUNTER
Refill Approved    Rx renewed per Medication Renewal Policy. Medication was last renewed on 3/11/20.    Blanka Grimm, [facility] Connection Triage/Med Refill 4/15/2020     Requested Prescriptions   Pending Prescriptions Disp Refills     metoclopramide (REGLAN) 10 MG tablet [Pharmacy Med Name: METOCLOPRAMIDE 10 MG TABLET] 90 tablet 0     Sig: TAKE 1 TABLET BY MOUTH 3 TIMES A DAY AS NEEDED FOR NAUSEA       Metoclopramide/Misoprostol Refill Protocol Passed - 4/14/2020 12:12 PM        Passed - Visit with PCP or prescribing provider visit in last 6 months or next 3 months     Last office visit with prescriber/PCP: Visit date not found OR same dept: 2/14/2020 Alex Nolasco MD OR same specialty: 2/14/2020 Alex Nolasco MD Last physical: Visit date not found Last MTM visit: Visit date not found     Next appt within 3 mo: Visit date not found  Next physical within 3 mo: Visit date not found  Prescriber OR PCP: Cherelle Aldana MD  Last diagnosis associated with med order: 1. Nausea  - metoclopramide (REGLAN) 10 MG tablet [Pharmacy Med Name: METOCLOPRAMIDE 10 MG TABLET]; TAKE 1 TABLET BY MOUTH 3 TIMES A DAY AS NEEDED FOR NAUSEA  Dispense: 90 tablet; Refill: 0    If protocol passes may refill for 6 months if within 3 months of last provider visit (or a total of 9 months).

## 2021-06-07 NOTE — TELEPHONE ENCOUNTER
RN cannot approve Refill Request    RN can NOT refill this medication med is not covered by policy/route to provider       . Last office visit: 2/14/2020 Alex Nolasco MD Last Physical: 10/12/2018 Last MTM visit: Visit date not found Last visit same specialty: 2/14/2020 Alex Nolasco MD.  Next visit within 3 mo: Visit date not found  Next physical within 3 mo: Visit date not found      Blanka Grimm, [facility] Triage/Med Refill 4/8/2020    Requested Prescriptions   Pending Prescriptions Disp Refills     ondansetron (ZOFRAN) 4 MG tablet 90 tablet 1     Sig: Take 1 tablet (4 mg total) by mouth every 8 (eight) hours as needed for nausea.       There is no refill protocol information for this order

## 2021-06-08 NOTE — TELEPHONE ENCOUNTER
Refill Approved    Rx renewed per Medication Renewal Policy. Medication was last renewed on 3/31/20.    Blanka Grimm, [facility] Connection Triage/Med Refill 6/9/2020     Requested Prescriptions   Pending Prescriptions Disp Refills     omeprazole (PRILOSEC) 20 MG capsule [Pharmacy Med Name: OMEPRAZOLE DR 20 MG CAPSULE] 60 capsule 1     Sig: TAKE 1 CAPSULE (20 MG TOTAL) BY MOUTH 2 (TWO) TIMES A DAY BEFORE MEALS.       GI Medications Refill Protocol Passed - 6/7/2020  9:28 AM        Passed - PCP or prescribing provider visit in last 12 or next 3 months.     Last office visit with prescriber/PCP: 6/17/2016 Olga Lim DO OR same dept: 2/14/2020 Alex Nolasco MD OR same specialty: 2/14/2020 Alex Nolasco MD  Last physical: Visit date not found Last MTM visit: Visit date not found   Next visit within 3 mo: Visit date not found  Next physical within 3 mo: Visit date not found  Prescriber OR PCP: Olga Lim DO  Last diagnosis associated with med order: 1. Hiatal hernia  - omeprazole (PRILOSEC) 20 MG capsule [Pharmacy Med Name: OMEPRAZOLE DR 20 MG CAPSULE]; Take 1 capsule (20 mg total) by mouth 2 (two) times a day before meals.  Dispense: 60 capsule; Refill: 1    If protocol passes may refill for 12 months if within 3 months of last provider visit (or a total of 15 months).

## 2021-06-08 NOTE — TELEPHONE ENCOUNTER
Patient Returning Call  Reason for call:  Patient called back.  Information relayed to patient:  n/a  Patient has additional questions:  Yes  If YES, what are your questions/concerns:  Calling on the status of this medication as she has been out of med for last 3 days and needs new one sent today to the pharmacy.  Okay to leave a detailed message?: Yes

## 2021-06-08 NOTE — TELEPHONE ENCOUNTER
Medication Request  Medication name: Valium one dose.  Requested Pharmacy: [facility]  Reason for request: Is having a MRI done and needs to take valium to have this done.  When did you use medication last?:  n/a  Patient offered appointment:  no  Okay to leave a detailed message: yes

## 2021-06-08 NOTE — TELEPHONE ENCOUNTER
Refill Request  Did you contact pharmacy: Yes  Medication name:   Requested Prescriptions     Pending Prescriptions Disp Refills     baclofen (LIORESAL) 10 MG tablet 240 tablet 0     Sig: Take 2 tablets (20 mg total) by mouth 4 (four) times a day.     Who prescribed the medication: Dr Nolasco  Requested Pharmacy: [facility]  Is patient out of medication: Yes  Patient states the pharmacy never received this medication.  Please resend due to no conformation from pharmacy that it was received.    E-Prescribing Status: Sent to pharmacy (6/1/2020  8:02 AM CDT)       Okay to leave a detailed message: yes

## 2021-06-08 NOTE — TELEPHONE ENCOUNTER
Requested Prescriptions     Pending Prescriptions Disp Refills     ondansetron (ZOFRAN) 4 MG tablet 90 tablet 3     Sig: Take 1 tablet (4 mg total) by mouth every 8 (eight) hours as needed for nausea.

## 2021-06-08 NOTE — TELEPHONE ENCOUNTER
Controlled Substance Refill Request  Medication Name:   Requested Prescriptions     Pending Prescriptions Disp Refills     zolpidem (AMBIEN) 10 mg tablet [Pharmacy Med Name: ZOLPIDEM TARTRATE 10 MG TABLET] 30 tablet 0     Sig: TAKE 1 TABLET BY MOUTH EVERY DAY FOR INSOMNIA     baclofen (LIORESAL) 10 MG tablet [Pharmacy Med Name: BACLOFEN 10 MG TABLET] 240 tablet 0     Sig: TAKE 2 TABLETS (20 MG TOTAL) BY MOUTH 4 (FOUR) TIMES A DAY     Date Last Fill: 3/27/20  Requested [facility]  Submit electronically to pharmacy  Controlled Substance Agreement on file:   Encounter-Level CSA Scan Date - 10/12/2018:    Scan on 10/16/2018  1:55 PM           Encounter-Level CSA Scan Date - 11/22/2016:    Scan on 11/22/2016        Last office visit:  3/31/20         Provider Refill Request  Medication:  baclofen  RN can NOT refill this medication per RN refill protocol because med is not covered by policy/route to provider

## 2021-06-08 NOTE — TELEPHONE ENCOUNTER
Patient Returning Call  Reason for call:  Patient called back.  Information relayed to patient:  n/a  Patient has additional questions:  Yes  If YES, what are your questions/concerns:  Calling on the status of this medication.  Please address and send to the pharmacy today as she is out of med and is nauseated.  Call patient when sent.  Okay to leave a detailed message?: Yes

## 2021-06-08 NOTE — TELEPHONE ENCOUNTER
Refill Approved    Rx renewed per Medication Renewal Policy. Medication was last renewed on 11/2/2019 with 1 refill.  Last office visit: 3/31/2020 with Dr ARTUR Lim @ [facility] virtual visit     Debbie Argueta, [facility] Triage/Med Refill 5/13/2020     Requested Prescriptions   Pending Prescriptions Disp Refills     atenoloL (TENORMIN) 25 MG tablet 90 tablet 1     Sig: Take 1 tablet (25 mg total) by mouth daily.       Beta-Blockers Refill Protocol Passed - 5/13/2020  2:44 PM        Passed - PCP or prescribing provider visit in past 12 months or next 3 months     Last office visit with prescriber/PCP: 2/14/2020 Alex Nolasco MD OR same dept: 2/14/2020 Alex Nolasco MD OR same specialty: 2/14/2020 Alex Nolasco MD  Last physical: 10/12/2018 Last MTM visit: Visit date not found   Next visit within 3 mo: Visit date not found  Next physical within 3 mo: Visit date not found  Prescriber OR PCP: Alex Nolasco MD  Last diagnosis associated with med order: 1. Essential hypertension  - atenoloL (TENORMIN) 25 MG tablet; Take 1 tablet (25 mg total) by mouth daily.  Dispense: 90 tablet; Refill: 1    If protocol passes may refill for 12 months if within 3 months of last provider visit (or a total of 15 months).             Passed - Blood pressure filed in past 12 months     BP Readings from Last 1 Encounters:   03/30/20 154/72

## 2021-06-08 NOTE — TELEPHONE ENCOUNTER
RN cannot approve Refill Request    RN can NOT refill this medication med is not covered by policy/route to provider. Last office visit: 2/14/2020 Alex Nolasco MD Last Physical: 10/12/2018 Last MTM visit: Visit date not found Last visit same specialty: 2/14/2020 Alex Nolasco MD.  Next visit within 3 mo: Visit date not found  Next physical within 3 mo: Visit date not found      Darlene Lewis, [facility] Triage/Med Refill 5/30/2020    Requested Prescriptions   Pending Prescriptions Disp Refills     baclofen (LIORESAL) 10 MG tablet [Pharmacy Med Name: BACLOFEN 10 MG TABLET] 240 tablet 0     Sig: TAKE 2 TABLETS (20 MG TOTAL) BY MOUTH 4 (FOUR) TIMES A DAY       There is no refill protocol information for this order

## 2021-06-08 NOTE — PROGRESS NOTES
ASSESSMENT & PLAN:    1. Multiple fractures  Due to patient's multiple fractures and nonhealing nature over the last few months I think consideration for cementing is a possibility for further improvement  She is in a contact the orthopedic surgeon who she visited with months ago to discuss this further  Due to multiple fractures and nonhealing nature she would like to meet with endocrinology which I think is a good idea for further discussion over options for increasing bone density  - Ambulatory referral to Endocrinology    2. Arm pain  Due to old work comp injury she has chronic left upper extremity arm and neck pain that has been improved with baclofen daily as well as zolpidem at night to help with her sleep  She needs documentation to why this is being prescribed to her  This letter will be formed up today and faxed      There are no Patient Instructions on file for this visit.    Orders Placed This Encounter   Procedures     Ambulatory referral to Endocrinology     Referral Priority:   Routine     Referral Type:   Consultation     Referral Reason:   Evaluation and Treatment     Requested Specialty:   Endocrinology     Number of Visits Requested:   1     Medications Discontinued During This Encounter   Medication Reason     oxyCODONE (OXYCONTIN) 10 mg 12 hr tablet Therapy completed     oxyCODONE (ROXICODONE) 5 MG immediate release tablet Duplicate order     oxyCODONE-acetaminophen (PERCOCET) 5-325 mg per tablet Therapy completed     ondansetron (ZOFRAN) 4 MG tablet Reorder     zolpidem (AMBIEN) 10 mg tablet Reorder       No Follow-up on file.    CHIEF COMPLAINT:  Chief Complaint   Patient presents with     Follow-up     Would like to discuss possible surgery, discuss possibly starting Reclast      Pain Management     Requesting letter to send to work comp regarding Baclofen and Zolpidem        HISTORY OF PRESENT ILLNESS:  Mariah is a 61 y.o. female presenting to the clinic today to follow up on her hip pain.      Hip Pain: She has bilateral sacral insufficiency fractures that are not healing. She has been seen by both an orthopedic surgeon and a pain specialist to discuss surgery options such as a sacroplasty. One suggested she have the surgery and the other did not. She would like another opinion today. She has had the insufficiency fracture for quite a while, and it has not improving. She also has pubic fractures that are giving her even more pain than her back. Unfortunately, she was told that there is not much that can be done about that. She used to get injections for increasing bone density, but she could not follow through with it because of financial and insurance reasons. It was recommended that she look into starting Reclast and inquires if that would be a good option for her. She is willing to meet with an endocrinologist to discuss this further.     Arm Pain: She would like a letter sent to work comp regarding her baclofen and zolpidem. She has a lot of residual nerve pain in her arm and shoulder from an old work injury. She has been taking baclofen and zolpidem for years.  Patient has been on these medications for years since seen her previous physician here who is retired.  Since starting these medications her pain in her upper extremity on the left side and neck have been improved.  She has better control during the day as well as sleep at night.    REVIEW OF SYSTEMS:   She has been vitamin D deficient in the past. All other systems are negative.    PFSH:  Reviewed, as below.     TOBACCO USE:  History   Smoking Status     Current Every Day Smoker     Packs/day: 0.50     Types: Cigarettes   Smokeless Tobacco     Not on file       VITALS:  Vitals:    02/09/17 1118   BP: 136/78   Patient Site: Right Arm   Patient Position: Sitting   Cuff Size: Adult Regular   Pulse: 92   Resp: 18   Temp: 99.3  F (37.4  C)   TempSrc: Oral   Weight: 125 lb (56.7 kg)     Wt Readings from Last 3 Encounters:   02/09/17 125 lb  (56.7 kg)   11/22/16 133 lb (60.3 kg)   11/18/16 131 lb (59.4 kg)       PHYSICAL EXAM:  GENERAL APPEARANCE: Alert, cooperative, no distress, appears stated age. She ambulates with a cane.  She is in discomfort sitting and standing both in clinical examination of the day she switches positions frequently to try to alleviate discomfort  LUNGS: Clear to auscultation bilaterally, respirations unlabored .  HEART: Regular rate and rhythm, S1 and S2 normal, no murmur, rub, or gallop.  NEUROLOGIC: No gross focal deficits  PSYCHOLOGIC: Normal mood and affect      ADDITIONAL HISTORY SUMMARIZED (FROM OLD RECORDS OR HISTORY FROM SOMEONE OTHER THAN THE PATIENT OR ANOTHER HEALTHCARE PROVIDER) (2 TOTAL): Reviewed 1/12/2017 pain specialist note regarding hip pain and treatment options    DECISION TO OBTAIN EXTRA INFORMATION (OLD RECORDS REQUESTED OR HISTORY FROM ANOTHER PERSON OR ACCESSING CARE EVERYWHERE) (1 TOTAL): None.     RADIOLOGY TESTS SUMMARIZED OR ORDERED (XRAY/CT/MRI/DXA) (1 TOTAL): Reviewed November MRI regarding fractures.     LABS REVIEWED OR ORDERED (1 TOTAL): None.    MEDICINE TESTS SUMMARIZED OR ORDERED (EKG/ECHO) (1 TOTAL): None.    INDEPENDENT REVIEW OF EKG OR X-RAY (2 EACH): None.     The visit lasted a total of 20 minutes face to face with the patient. Over 50% of the time was spent counseling and educating the patient about her back and hip pain.    ITeja, am scribing for and in the presence of, Dr. Nolasco.    I, Dr. Nolasco, personally performed the services described in this documentation, as scribed by Teja Oropeza in my presence, and it is both accurate and complete.    MEDICATIONS:  Current Outpatient Prescriptions   Medication Sig Dispense Refill     albuterol (PROAIR HFA) 90 mcg/actuation inhaler Inhale 1-2 puffs every 4 (four) hours as needed. 1 Inhaler 3     albuterol (PROVENTIL) 2.5 mg /3 mL (0.083 %) nebulizer solution Take 3 mL (2.5 mg total) by nebulization every 6 (six) hours as  needed for wheezing. 75 mL 12     amLODIPine (NORVASC) 5 MG tablet Take 1 tablet (5 mg total) by mouth daily. 90 tablet 1     atenolol (TENORMIN) 25 MG tablet TAKE 1 TABLET BY MOUTH DAILY 90 tablet 3     baclofen (LIORESAL) 10 MG tablet TAKE 2 TABLETS BY MOUTH FOUR TIMES DAILY 240 tablet 0     nitroglycerin (NITROSTAT) 0.4 MG SL tablet Place 1 tablet (0.4 mg total) under the tongue every 5 (five) minutes as needed for chest pain. 20 tablet 0     oxyCODONE (ROXICODONE) 5 MG immediate release tablet For break through pain not covered by Slow release - one tablet Q4-6hrs 30 tablet 0     pain IT pump - patient supplied 600 mcg by Intrathecal route continuous. The medication in this patient's IT pump  Fentanyl and bupivacaine as directed       polyethylene glycol (GLYCOLAX) 17 gram/dose powder Take 527 g by mouth daily.       sertraline (ZOLOFT) 50 MG tablet TAKE 1 TABLET BY MOUTH ONCE A DAY. 90 tablet 0     ondansetron (ZOFRAN) 4 MG tablet Take 1 tablet (4 mg total) by mouth every 8 (eight) hours as needed for nausea. 90 tablet 1     zolpidem (AMBIEN) 10 mg tablet Take 1 tablet (10 mg total) by mouth bedtime. 30 tablet 3     No current facility-administered medications for this visit.        Total Data Points: 3

## 2021-06-10 NOTE — PROGRESS NOTES
Progress Note    Reason for Visit:      Progress Note:    HPI:        This pleasant 61-year-old female patient seen in consultation at the request of her primary care physician because of osteoporosis.    The patient had multiple exposed spontaneous fracture of her pelvis she also had fracture of her elbow in 14 years ago.    She is a heavy smoker all her life.    She had hysterectomy at the age of 28.    She takes Norvasc 5 mg for hypertension together with atenolol 25 mg    She takes Zoloft 50 mg Ambien she has been controlled pump for RSD.        INDICATION: Pelvic and right-sided hip pain. Known sacral fractures on recent MRI.  TECHNIQUE: Routine.  COMPARISON: None.     FINDINGS:  RIGHT HIP JOINT: There are acute nondisplaced insufficiency fractures of the left and right superior and inferior pubic rami with surrounding marrow and soft tissue edema, see for example series 3 image 21 and series 3 image 14 as well as on series 6   image 20 and series 5 image 20. No evidence of a femoral fracture. There are small hip joint effusions bilaterally. Mild degenerative osteoarthritic changes in both hips. No muscle atrophy. No evidence of trochanteric bursitis.     SACRUM: There are bilateral acute sacral insufficiency fractures. No focal marrow replacing lesion.     BONES AND SOFT TISSUES: No soft tissue edema or mass. Pelvic intraperitoneal contents normal.     IMPRESSION:   CONCLUSION:  1. There are acute insufficiency fractures of the left and right superior and inferior pubic rami as well as both sacral ala.     2. Mild degenerative osteoarthritic changes in both hips.      Component      Latest Ref Rng & Units 1/19/2016 6/1/2016 8/12/2016 4/6/2017   Potassium      3.5 - 5.0 mmol/L 4.5   3.6   ALBUMIN      3.5 - 5.0 g/dL 4.2      Bilirubin, Total      0.0 - 1.0 mg/dL 0.4      BUN      8 - 22 mg/dL 18   16   Calcium      8.5 - 10.5 mg/dL 9.5  9.5 9.8   Chloride      98 - 107 mmol/L 106   103   Creatinine      0.60 -  1.10 mg/dL 0.72  0.76 0.69   CO2      22 - 31 mmol/L 25   28   GFR MDRD Non Af Amer      >60 mL/min/1.73m2 >60  >60 >60   GFR MDRD Af Amer      >60 mL/min/1.73m2 >60  >60 >60   PTH      10 - 86 pg/mL   69    Phosphorus      2.5 - 4.5 mg/dL   4.2    Vitamin D, Total (25-Hydroxy)      30.0 - 80.0 ng/mL 7.7 (L) 24.1 (L) 20.0 (L)         Patient Profile:  60 y.o. female, postmenopausal, is here for the first bone density test.   History of fractures - Elbow and sacrum Family history of osteoporosis - None. Family history of hip fracture: None. Smoking history - Current. Osteoporosis treatment past - No. Osteoporosis treatment current - No. Chronic medical problems - Chronic low back problems, Height loss, History of kidney stones, Hysterectomy, Oophorectomy and Premature menopause. High risk medications - None.        Assessment:     1. The spine bone density L1-L4 with T-score -2.6.  2. Femoral bone densities show left femoral neck T- score -2.4 and right femoral neck T-score -2.1.  3. Trabecular bone score indicates poor trabecular bone architecture.  4. Vertebral fracture assessment, done because of the height loss, did not show any vertebral compression fracture.     60 y.o. female with OSTEOPOROSIS and HIGH fracture risk.     Review of Systems:    Nervous System: No headache, dizziness, fainting or memory loss. No tingling sensation of hand or feet.  Ears: No hearing loss or ringing in the ears  Eyes: No blurring of vision, redness, itching or dryness.  Nose: No nosebleed or loss of smell  Mouth: No mouth sores or loss of taste  Throat: No hoarseness or difficulty swallowing  Neck: No enlarged thyroid or lymph nodes.  Heart: No chest pain, palpitation or irregular heartbeat. No swelling of hands or feet  Lungs: No shortness of breath, cough, night sweats, wheezing or hemoptysis.  Gastrointestinal: No nausea or vomiting, constipation or diarrhea.  No acid reflux, abdominal pain or blood in stools.  Kidney/Bladdr: No  polyuria, polydipsia, nocturia or hematuria.  Genital/Sexual: No loss of libido  Skin: No rash, hair loss or hirsutism.  No abnormal striae  Muscles/Joints/Bones: No morning stiffness, muscle aches and pain or loss of height.    Current Medications:  Current Outpatient Prescriptions   Medication Sig     albuterol (PROAIR HFA) 90 mcg/actuation inhaler Inhale 1-2 puffs every 4 (four) hours as needed.     albuterol (PROVENTIL) 2.5 mg /3 mL (0.083 %) nebulizer solution Take 3 mL (2.5 mg total) by nebulization every 6 (six) hours as needed for wheezing.     amLODIPine (NORVASC) 5 MG tablet Take 1 tablet (5 mg total) by mouth daily.     atenolol (TENORMIN) 25 MG tablet TAKE 1 TABLET BY MOUTH DAILY     baclofen (LIORESAL) 10 MG tablet TAKE 2 TABLETS BY MOUTH FOUR TIMES DAILY     nitroglycerin (NITROSTAT) 0.4 MG SL tablet Place 1 tablet (0.4 mg total) under the tongue every 5 (five) minutes as needed for chest pain.     ondansetron (ZOFRAN) 4 MG tablet Take 1 tablet (4 mg total) by mouth every 8 (eight) hours as needed for nausea.     ondansetron (ZOFRAN) 4 MG tablet Take 1 tablet (4 mg total) by mouth every 6 (six) hours for 3 days.     oxyCODONE (ROXICODONE) 5 MG immediate release tablet For break through pain not covered by Slow release - one tablet Q4-6hrs     pain IT pump - patient supplied 600 mcg by Intrathecal route continuous. The medication in this patient's IT pump  Fentanyl and bupivacaine as directed     polyethylene glycol (GLYCOLAX) 17 gram/dose powder Take 527 g by mouth daily.     sertraline (ZOLOFT) 50 MG tablet TAKE 1 TABLET BY MOUTH ONCE A DAY.     zolpidem (AMBIEN) 10 mg tablet Take 1 tablet (10 mg total) by mouth bedtime.       Patients Active Problems:  Patient Active Problem List   Diagnosis     Nausea     Chest Pain     Opioid Dependence With Continuous Use     Edema     Wheezing (Symptom)     Pain During Urination (Dysuria)     Frequent, Full-bladder Emptying (Polyuria)     Chronic Obstructive  Pulmonary Disease     Chronic Pain Syndrome     Vitamin B12 Deficiency     Microscopic Hematuria     Cough     Abdominal Pain In The Central Upper Belly (Epigastric)     Reflex Sympathetic Dystrophy Of The Left Upper Limb     Osteoporosis     Carpal Tunnel Syndrome     Hypertension     Constipation     Abdominal Pain       History:   reports that she has been smoking Cigarettes.  She has been smoking about 0.50 packs per day. She does not have any smokeless tobacco history on file.   reports that she has been smoking Cigarettes.  She has been smoking about 0.50 packs per day. She does not have any smokeless tobacco history on file. Her alcohol and drug histories are not on file.  History   Smoking Status     Current Every Day Smoker     Packs/day: 0.50     Types: Cigarettes   Smokeless Tobacco     Not on file      reports that she has been smoking Cigarettes.  She has been smoking about 0.50 packs per day. She does not have any smokeless tobacco history on file. Her alcohol and drug histories are not on file.  History   Sexual Activity     Sexual activity: Not on file     Past Medical History:   Diagnosis Date     Opiate dependence      RSD (reflex sympathetic dystrophy)      Sacral fracture, closed      Family History   Problem Relation Age of Onset     Stroke Mother      Heart disease Father      Pulmonary Hypertension Father      Kidney failure Father      Past Medical History:   Diagnosis Date     Opiate dependence      RSD (reflex sympathetic dystrophy)      Sacral fracture, closed      Past Surgical History:   Procedure Laterality Date     MA DECOMPRESS FASCIOTOMY FINGR/HAND      Description: Decompression Fasciotomy Of The Hand;  Recorded: 09/19/2011;     MA LAP,CHOLECYSTECTOMY      Description: Cholecystectomy Laparoscopic;  Recorded: 12/09/2011;     MA REVISE MEDIAN N/CARPAL TUNNEL SURG      Description: Neuroplasty Decompression Median Nerve At Carpal Tunnel;  Recorded: 09/19/2011;     MA  SYMPATHECTOMY,CERVICOTHORACIC      Description: Surgical Sympathectomy Cervicothoracic;  Recorded: 09/19/2011;     ME TOTAL ABDOM HYSTERECTOMY      Description: Hysterectomy;  Recorded: 03/23/2011;       Vitals   vitals were not taken for this visit.        Exam  General appearance: The patient looked well, not in acute distress.  Eyes: no evidence of thyroid eye disease.   Retinal exam: No evidence of diabetic retinopathy.  Mouth and Throat: Normal  Neck: No evidence of thyromegaly, enlarged lymph node or tenderness  Chest: Trachea is central. Chest is clear to auscultation and percussion. Breat sounds are normal.  Cardiovascular exam: JVP is not raised. Heart sounds are normal, no murmurs or rub  Peripheral pulses are palpable.   Abdomen: No masses or tenderness.    Back: No vertebral tenderness or kyphosis.  Extremities: No evidence of leg edema.   Skin: Normal to touch.  No abnormal striae  Neurologic exam:  Visual fields are intact by confrontation, grossly intact. No evidence of peripheral neuropathy.  Detailed foot exam normal.        Diagnosis:  No diagnosis found.    Orders:   No orders of the defined types were placed in this encounter.        Assessment and Plan:  Osteoporosis.    The patient had 3 children.    She had previous history of kidney stone.    Parathyroid hormone 80 phosphorus 4.6 TSH 0.6.    She does have acid reflux.    Family history mother and sister has osteoporosis.    She has significant vitamin D of 5.9.    Calcium cause her to have diarrhea.    She had a bone DEXA scan which told T score at the spine is -2.6 at the left hip -2.4 on the right -2.1.    I have discussed the pathophysiology of the disease with the patient and will go ahead and give her Prolia 60 mg subcutaneously every 6 month she cannot afford or insurance will not cover Forteo.    Vitamin D deficiency was given 50,000 units once a week.    We discussed about stopping smoking.    I also advised her not to have dental  work at least 8 weeks after the injection.    Patient return to clinic in 1 year I did spend 60 minutes with the patient more than 50% was spent on counseling and managing her care.

## 2021-06-10 NOTE — TELEPHONE ENCOUNTER
Refill Request  Did you contact pharmacy: Yes  Medication name:   Requested Prescriptions     Pending Prescriptions Disp Refills     amLODIPine (NORVASC) 5 MG tablet 90 tablet 2     Sig: Take 1 tablet (5 mg total) by mouth daily.     Who prescribed the medication: Dr. Nolasco  Requested Pharmacy: [facility]  Is patient out of medication: No.  7 days left  Patient notified refills processed in 3 business days:  yes  Okay to leave a detailed message: yes

## 2021-06-10 NOTE — TELEPHONE ENCOUNTER
RN cannot approve Refill Request    RN can NOT refill this medication med is not covered by policy/route to provider     . Last office visit: 2/14/2020 Alex Nolasco MD Last Physical: 10/12/2018 Last MTM visit: Visit date not found Last visit same specialty: 2/14/2020 Alex Nolasco MD.  Next visit within 3 mo: Visit date not found  Next physical within 3 mo: Visit date not found      Blanka Grimm, [facility] Triage/Med Refill 8/5/2020    Requested Prescriptions   Pending Prescriptions Disp Refills     baclofen (LIORESAL) 10 MG tablet 240 tablet 0     Sig: Take 2 tablets (20 mg total) by mouth 4 (four) times a day.       There is no refill protocol information for this order

## 2021-06-10 NOTE — TELEPHONE ENCOUNTER
Refill Approved    Rx renewed per Medication Renewal Policy. Medication was last renewed on 9/6/19.    Blanka Grimm, [facility] [place] Connection Triage/Med Refill 8/11/2020     Requested Prescriptions   Pending Prescriptions Disp Refills     amLODIPine (NORVASC) 5 MG tablet 90 tablet 2     Sig: Take 1 tablet (5 mg total) by mouth daily.       Calcium-Channel Blockers Protocol Passed - 8/10/2020 10:10 AM        Passed - PCP or prescribing provider visit in past 12 months or next 3 months     Last office visit with prescriber/PCP: 2/14/2020 Alex Nolasco MD OR same dept: 2/14/2020 Alex Nolasco MD OR same specialty: 2/14/2020 Alex Nolasco MD  Last physical: 10/12/2018 Last MTM visit: Visit date not found   Next visit within 3 mo: Visit date not found  Next physical within 3 mo: Visit date not found  Prescriber OR PCP: Alex Nolasco MD  Last diagnosis associated with med order: 1. Essential hypertension  - amLODIPine (NORVASC) 5 MG tablet; Take 1 tablet (5 mg total) by mouth daily.  Dispense: 90 tablet; Refill: 2    If protocol passes may refill for 12 months if within 3 months of last provider visit (or a total of 15 months).             Passed - Blood pressure filed in past 12 months     BP Readings from Last 1 Encounters:   03/30/20 154/72

## 2021-06-10 NOTE — PROGRESS NOTES
ASSESSMENT & PLAN:    1. Whole body pain  For years pt has had whole body pain and would like testing for possible tick born etiology  Discussed options and will move forward with testing for local tick born illness  F/u based on results  - Lyme Antibody Nash  - Ehrlichia Antibody Panel  - Babesia microti Antibody IgG    2. Pelvic pain  Discussed with patient- no injury to cause new fracture- pt in agreement no imaging needed today- will not change plan  Radiculopathy symptoms resolved- and pain slightly improved- in review of previous MRI- possible disc herniation leading to symptoms  Will cont to follow with pain clinic    3. Nausea  Start PA for zofran- working well but not getting enough for a month due to need      There are no Patient Instructions on file for this visit.    Orders Placed This Encounter   Procedures     Lyme Antibody Nash     Ehrlichia Antibody Panel     Babesia microti Antibody IgG     Medications Discontinued During This Encounter   Medication Reason     ondansetron (ZOFRAN) 4 MG tablet Duplicate order       No Follow-up on file.    CHIEF COMPLAINT:  Chief Complaint   Patient presents with     Pelvic Pain     Concern for possible fracture, having left sided pelvic and groin pain X2-3 weeks, did a lot of walking at [employer] and the following day started having pain      Nausea     Need PA for Zofran medication        HISTORY OF PRESENT ILLNESS:  Mariah is a 61 y.o. female presenting to the clinic today with complaints of pelvic pain.     Pelvic Pain: She has a history of pelvic insuffiencey fractures and is concerned that she might have suffered another one. She was walking around the [email or web address] a few weeks ago when she developed significant left sided pelvic pain. She did not fall and does not recall any form of injury. She notes that it feels the same as her other fractures, just in a different location. This pain is in the left upper and posterior pelvic region. She started to  develop pain deep in her groin that moved down her leg to her knee. Her toes also feel numb, but this is not new. The pain going down her leg resolved after the first few days, and she now just has pain in her left upper buttocks region. It has improved slightly since the original injury. It was discussed this could also be a back problem. She had a lumbar MRI done in November 2016 that showed disc herniations. 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 She is scared to do anything at this point. She recently met with endocrinology to discuss her osteoporosis. She was started on Vitamin D and discussed starting Prolia injections. She just needs to check with her insurance before starting Prolia.    Nausea: She had morphine added to her pain pump a couple of weeks ago. Prior to that she had fentanyl, and she did not react well to the addition of morphine. She became very nauseous and starting vomiting. She eventually went to the ER and was given IV fluids because she could not keep anything down. She was told it might take a little while to acclimate to the medication and that it is a large hassle to switch out the medication, so it was recommended she try to wait it out. Her next pump fill is not until June. She still has some nausea, but it has improved since the original change was made. She usually takes a couple of Zofran per day for nausea, but she needs a prior authorization to get the Zofran now. She does not remember the name of the last medication she tried for nausea, but it did not work. She was taking three per day at first, but she is taking one or two per day now because she is running low.     Body Pain: Last year she was bit by a tick and inquires if that could be causing her pain. It was embedded in her hand for a couple of days and was very small. She feeds the deer in her back yard and thinks it was a deer tick.  She would be interested in checking for tick borne diseases to see if that could be causing some of her problems.      REVIEW OF SYSTEMS:   Her blood pressure is in a good range. All other systems are negative.    PFSH:  Reviewed, as below.     TOBACCO USE:  History   Smoking Status     Current Every Day Smoker     Packs/day: 0.50     Types: Cigarettes   Smokeless Tobacco     Not on file       VITALS:  Vitals:    04/25/17 1523   BP: 110/64   Patient Site: Right Arm   Patient Position: Sitting   Cuff Size: Adult Regular   Pulse: 70   Resp: 16   Temp: 98.3  F (36.8  C)   TempSrc: Oral   Weight: 128 lb (58.1 kg)     Wt Readings from Last 3 Encounters:   04/25/17 128 lb (58.1 kg)   04/21/17 128 lb 6.4 oz (58.2 kg)   04/06/17 122 lb (55.3 kg)       PHYSICAL EXAM:  GENERAL APPEARANCE: Alert, cooperative, seems to be in pain with sitting and moves around frequently   She ambulates with a cane.   LUNGS: Clear to auscultation bilaterally, respirations unlabored .  HEART: Regular rate and rhythm, S1 and S2 normal, no murmur, rub, or gallop.   NEUROLOGIC: No gross focal deficits- pt describes symptoms that have resolved consistent with radiculopathy  PSYCHOLOGIC: Normal mood and affect      ADDITIONAL HISTORY SUMMARIZED (FROM OLD RECORDS OR HISTORY FROM SOMEONE OTHER THAN THE PATIENT OR ANOTHER HEALTHCARE PROVIDER) (2 TOTAL): Reviewed 4/6/2017 ER note regarding pain pump and nausea. Reviewed 4/21/2017 endocrinology note regarding osteoporosis.     DECISION TO OBTAIN EXTRA INFORMATION (OLD RECORDS REQUESTED OR HISTORY FROM ANOTHER PERSON OR ACCESSING CARE EVERYWHERE) (1 TOTAL): None.     RADIOLOGY TESTS SUMMARIZED OR ORDERED (XRAY/CT/MRI/DXA) (1 TOTAL): Reviewed November 2016 MRI    LABS REVIEWED OR ORDERED (1 TOTAL): Labs from 4/21/2017 reviewed. Labs ordered.     MEDICINE TESTS SUMMARIZED OR ORDERED (EKG/ECHO) (1 TOTAL): None.    INDEPENDENT REVIEW OF EKG OR X-RAY (2 EACH): None.     The visit lasted a total of 21 minutes  face to face with the patient. Over 50% of the time was spent counseling and educating the patient about her pelvic pain.    I, Teja Oropeza, am scribing for and in the presence of, Dr. Nolasco.    I, Dr. Nolasco, personally performed the services described in this documentation, as scribed by Teja Oropeza in my presence, and it is both accurate and complete.    MEDICATIONS:  Current Outpatient Prescriptions   Medication Sig Dispense Refill     albuterol (PROAIR HFA) 90 mcg/actuation inhaler Inhale 1-2 puffs every 4 (four) hours as needed. 1 Inhaler 3     albuterol (PROVENTIL) 2.5 mg /3 mL (0.083 %) nebulizer solution Take 3 mL (2.5 mg total) by nebulization every 6 (six) hours as needed for wheezing. 75 mL 12     amLODIPine (NORVASC) 5 MG tablet Take 1 tablet (5 mg total) by mouth daily. 90 tablet 1     atenolol (TENORMIN) 25 MG tablet TAKE 1 TABLET BY MOUTH DAILY 90 tablet 3     baclofen (LIORESAL) 10 MG tablet TAKE 2 TABLETS BY MOUTH FOUR TIMES DAILY 240 tablet 0     ergocalciferol (VITAMIN D2) 50,000 unit capsule Take 1 capsule (50,000 Units total) by mouth once a week. 4 capsule 12     nitroglycerin (NITROSTAT) 0.4 MG SL tablet Place 1 tablet (0.4 mg total) under the tongue every 5 (five) minutes as needed for chest pain. 20 tablet 0     ondansetron (ZOFRAN) 4 MG tablet Take 1 tablet (4 mg total) by mouth every 8 (eight) hours as needed for nausea. 90 tablet 1     oxyCODONE (ROXICODONE) 5 MG immediate release tablet For break through pain not covered by Slow release - one tablet Q4-6hrs 30 tablet 0     pain IT pump - patient supplied 600 mcg by Intrathecal route continuous. The medication in this patient's IT pump  Fentanyl and bupivacaine as directed       polyethylene glycol (GLYCOLAX) 17 gram/dose powder Take 527 g by mouth daily.       sertraline (ZOLOFT) 50 MG tablet TAKE 1 TABLET BY MOUTH ONCE A DAY. 90 tablet 0     zolpidem (AMBIEN) 10 mg tablet Take 1 tablet (10 mg total) by mouth bedtime. 30  tablet 3     No current facility-administered medications for this visit.        Total Data Points: 4

## 2021-06-11 NOTE — TELEPHONE ENCOUNTER
Controlled Substance Refill Request  Medication Name:   Requested Prescriptions     Pending Prescriptions Disp Refills     baclofen (LIORESAL) 10 MG tablet [Pharmacy Med Name: BACLOFEN 10 MG TABLET] 240 tablet 0     Sig: TAKE 2 TABLETS (20 MG TOTAL) BY MOUTH 4 (FOUR) TIMES A DAY.     zolpidem (AMBIEN) 10 mg tablet [Pharmacy Med Name: ZOLPIDEM TARTRATE 10 MG TABLET] 30 tablet 0     Sig: TAKE 1 TABLET BY MOUTH EVERY DAY FOR INSOMNIA     Date Last Fill: 5/7/20  Requested Pharmacy: [facility]  Submit electronically to pharmacy  Controlled Substance Agreement on file:   Encounter-Level CSA Scan Date - 10/12/2018:    Scan on 10/16/2018  1:55 PM           Encounter-Level CSA Scan Date - 11/22/2016:    Scan on 11/22/2016        Last office visit:  3/31/20       Provider Refill Request  Medication:  baclofen  RN can NOT refill this medication per RN refill protocol because med is not covered by policy/route to provider

## 2021-06-12 NOTE — TELEPHONE ENCOUNTER
Controlled Substance Refill Request  Medication Name:   Requested Prescriptions     Pending Prescriptions Disp Refills     zolpidem (AMBIEN) 10 mg tablet [Pharmacy Med Name: ZOLPIDEM TARTRATE 10 MG TABLET] 30 tablet 0     Sig: TAKE 1 TABLET BY MOUTH EVERY DAY FOR INSOMNIA     Date Last Fill: 9/9/20  Requested [facility]  Submit electronically to pharmacy  Controlled Substance Agreement on file:   Encounter-Level CSA Scan Date - 10/12/2018:    Scan on 10/16/2018  1:55 PM           Encounter-Level CSA Scan Date - 11/22/2016:    Scan on 11/22/2016        Last office visit:  3/31/20

## 2021-06-12 NOTE — TELEPHONE ENCOUNTER
RN cannot approve Refill Request    RN can NOT refill this medication PCP messaged that patient is overdue for Office Visit. Last office visit: Visit date not found Last Physical: Visit date not found Last MTM visit: Visit date not found Last visit same specialty: 2/14/2020 Alex Nolasco MD.  Next visit within 3 mo: Visit date not found  Next physical within 3 mo: Visit date not found      Darlene Lewis, [facility] Triage/Med Refill 10/18/2020    Requested Prescriptions   Pending Prescriptions Disp Refills     metoclopramide (REGLAN) 10 MG tablet [Pharmacy Med Name: METOCLOPRAMIDE 10 MG TABLET] 90 tablet 5     Sig: TAKE 1 TABLET BY MOUTH 3 TIMES A DAY AS NEEDED FOR NAUSEA       Metoclopramide/Misoprostol Refill Protocol Failed - 10/16/2020 11:07 AM        Failed - Visit with PCP or prescribing provider visit in last 6 months or next 3 months     Last office visit with prescriber/PCP: Visit date not found OR same dept: 2/14/2020 Alex Nolasco MD OR same specialty: 2/14/2020 Alex Nolasco MD Last physical: Visit date not found Last MTM visit: Visit date not found     Next appt within 3 mo: Visit date not found  Next physical within 3 mo: Visit date not found  Prescriber OR PCP: Cherelle Aldana MD  Last diagnosis associated with med order: 1. Nausea  - metoclopramide (REGLAN) 10 MG tablet [Pharmacy Med Name: METOCLOPRAMIDE 10 MG TABLET]; TAKE 1 TABLET BY MOUTH 3 TIMES A DAY AS NEEDED FOR NAUSEA  Dispense: 90 tablet; Refill: 5    If protocol passes may refill for 6 months if within 3 months of last provider visit (or a total of 9 months).

## 2021-06-12 NOTE — TELEPHONE ENCOUNTER
RN cannot approve Refill Request    RN can NOT refill this medication med is not covered by policy/route to provider. Last office visit: 2/14/2020 Alex Nolasco MD Last Physical: 10/12/2018 Last MTM visit: Visit date not found Last visit same specialty: 2/14/2020 Alex Nolasco MD.  Next visit within 3 mo: Visit date not found  Next physical within 3 mo: Visit date not found      Blanka Grimm, [facility] Triage/Med Refill 10/9/2020    Requested Prescriptions   Pending Prescriptions Disp Refills     baclofen (LIORESAL) 10 MG tablet 240 tablet 0     Sig: Take 2 tablets (20 mg total) by mouth 4 (four) times a day.       There is no refill protocol information for this order

## 2021-06-12 NOTE — TELEPHONE ENCOUNTER
Medication: ambien    Last Date Filled 9/9/2020     pulled: NO  ( Insert snip-it from last 2 months of )     Only PCP Prescribing?: unsure    Taken as prescribed from physician notes?: unsure  ( Insert text from last clinic note assessing medication)     CSA in last year: NO    Random Utox in last year: NO    Opioids + benzodiazepines? YES

## 2021-06-12 NOTE — TELEPHONE ENCOUNTER
RN cannot approve Refill Request    RN can NOT refill this medication med is not covered by policy/route to provider. Last office visit: 2/14/2020 Alex Nolasco MD Last Physical: 10/12/2018 Last MTM visit: Visit date not found Last visit same specialty: 2/14/2020 Alex Nolasco MD.  Next visit within 3 mo: Visit date not found  Next physical within 3 mo: Visit date not found      Debbie Argueta, [facility] Triage/Med Refill 10/11/2020    Requested Prescriptions   Pending Prescriptions Disp Refills     ondansetron (ZOFRAN) 4 MG tablet [Pharmacy Med Name: ONDANSETRON HCL 4 MG TABLET] 90 tablet 3     Sig: TAKE 1 TABLET BY MOUTH EVERY 8 HOURS AS NEEDED FOR NAUSEA       There is no refill protocol information for this order

## 2021-06-13 NOTE — TELEPHONE ENCOUNTER
RN cannot approve Refill Request    RN can NOT refill this medication med is not covered by policy/route to provider. Last office visit: 2/14/2020 Alex Nolasco MD Last Physical: 10/12/2018 Last MTM visit: Visit date not found Last visit same specialty: 2/14/2020 Alex Nolasco MD.  Next visit within 3 mo: Visit date not found  Next physical within 3 mo: Visit date not found      Debbie Argueta, [facility] Triage/Med Refill 11/11/2020    Requested Prescriptions   Pending Prescriptions Disp Refills     baclofen (LIORESAL) 10 MG tablet [Pharmacy Med Name: BACLOFEN 10 MG TABLET] 240 tablet 0     Sig: TAKE 2 TABLETS (20 MG TOTAL) BY MOUTH 4 (FOUR) TIMES A DAY.       There is no refill protocol information for this order

## 2021-06-13 NOTE — TELEPHONE ENCOUNTER
Requested Prescriptions     Pending Prescriptions Disp Refills     ondansetron (ZOFRAN) 4 MG tablet 90 tablet 0     Sig: Take 1 tablet (4 mg total) by mouth every 8 (eight) hours as needed for nausea.   Last Office Visit:  6/25/2020  Last Refill:  10/12/2020  CSA:  No  Date of Last Labs:  6/15/2020

## 2021-06-13 NOTE — TELEPHONE ENCOUNTER
"Spouse Alfred calling with patient. Stating symptoms started yesterday at 8 a.m. with nausea and vomiting. Reporting vomiting 8-10 times in past 24 hours. Most recent vomiting episode at 8 a.m. today. Patient stating she is very nauseated. Voiding scant amount of urine. Reporting \"a little diarrhea.\" Stating she is unable to take fluids. Temp 99.2 Oral. Increased fatigue weakness.     Disposition Emergency Room.    Alfred will drive patient to [facility] ED now.       Ayah Ramirez RN  [facility] Nurse Advisors    COVID 19 Nurse Triage Plan/Patient Instructions    Please be aware that novel coronavirus (COVID-19) may be circulating in the community. If you develop symptoms such as fever, cough, or SOB or if you have concerns about the presence of another infection including coronavirus (COVID-19), please contact your health care provider or visit [email or web address].     Disposition/Instructions    ED Visit recommended. Follow protocol based instructions.      Bring Your Own Device:  Please also bring your smart device(s) (smart phones, tablets, laptops) and their charging cables for your personal use and to communicate with your care team during your visit.      Thank you for taking steps to prevent the spread of this virus.  o Limit your contact with others.  o Wear a simple mask to cover your cough.  o Wash your hands well and often.    Resources    [facility]: About COVID-19: [email or web address]    CDC: What to Do If You're Sick: www.cdc.gov/coronavirus/2019-ncov/about/steps-when-sick.html    CDC: Ending Home Isolation: www.cdc.gov/coronavirus/2019-ncov/hcp/disposition-in-home-patients.html     CDC: Caring for Someone: www.cdc.gov/coronavirus/2019-ncov/if-you-are-sick/care-for-someone.html     [facility]: Interim Guidance for Hospital Discharge to Home: www.health.[facility].[place]/diseases/coronavirus/hcp/hospdischarge.pdf    [facility] clinical trials (COVID-19 research studies): " [email or web address]-clinical-trials     Below are the COVID-19 hotlines at the [place] Department of Health ([facility]). Interpreters are available.   o For health questions: Call 335-731-9349 or 1-588.426.6148 (7 a.m. to 7 p.m.)  o For questions about schools and childcare: Call 194-182-0263 or 1-674.311.7561 (7 a.m. to 7 p.m.)     Reason for Disposition    MODERATE vomiting (e.g., 3 - 5 times/day) and age > 60    Additional Information    Negative: Shock suspected (e.g., cold/pale/clammy skin, too weak to stand, low BP, rapid pulse)    Negative: Difficult to awaken or acting confused (e.g., disoriented, slurred speech)    Negative: Sounds like a life-threatening emergency to the triager    Negative: Vomiting occurs only while coughing    Negative: Pregnant < 20 Weeks and nausea/vomiting began in early pregnancy (i.e., 4-8 weeks pregnant)    Negative: Chest pain    Negative: Headache is main symptom    Negative: SEVERE vomiting (e.g., 6 or more times/day)    Protocols used: VOMITING-A-[place]

## 2021-06-13 NOTE — TELEPHONE ENCOUNTER
Controlled Substance Refill Request  Medication Name:   Requested Prescriptions     Pending Prescriptions Disp Refills     zolpidem (AMBIEN) 10 mg tablet [Pharmacy Med Name: ZOLPIDEM TARTRATE 10 MG TABLET] 30 tablet 0     Sig: TAKE 1 TABLET BY MOUTH EVERY DAY FOR INSOMNIA     Date Last Fill: 10/19/20  [facility]ed [facility]  Submit electronically to pharmacy  Controlled Substance Agreement on file:   Encounter-Level CSA Scan Date - 10/12/2018:    Scan on 10/16/2018  1:55 PM           Encounter-Level CSA Scan Date - 11/22/2016:    Scan on 11/22/2016        Last office visit:  3/31/20  Darlene Lewis RN, MA  [facility]    Triage Nurse Advisor

## 2021-06-13 NOTE — TELEPHONE ENCOUNTER
RN cannot approve Refill Request    RN can NOT refill this medication med is not covered by policy/route to provider. Last office visit: 2/14/2020 Alex Nolasco MD Last Physical: 10/12/2018 Last MTM visit: Visit date not found Last visit same specialty: 2/14/2020 Alex Nolasco MD.  Next visit within 3 mo: Visit date not found  Next physical within 3 mo: Visit date not found      Darlene Lewis, [facility] Triage/Med Refill 12/20/2020    Requested Prescriptions   Pending Prescriptions Disp Refills     ondansetron (ZOFRAN) 4 MG tablet [Pharmacy Med Name: ONDANSETRON HCL 4 MG TABLET] 90 tablet 0     Sig: TAKE 1 TABLET BY MOUTH EVERY 8 HOURS AS NEEDED FOR NAUSEA       There is no refill protocol information for this order

## 2021-06-13 NOTE — TELEPHONE ENCOUNTER
RN cannot approve Refill Request    RN can NOT refill this medication Protocol failed and NO refill given. Last office visit: 2/14/2020 Alex Nolasco MD Last Physical: 10/12/2018 Last MTM visit: Visit date not found Last visit same specialty: 2/14/2020 Alex Nolasco MD.  Next visit within 3 mo: Visit date not found  Next physical within 3 mo: Visit date not found      Blanka Grimm, [facility] Connection Triage/Med Refill 12/17/2020    Requested Prescriptions   Pending Prescriptions Disp Refills     metoclopramide (REGLAN) 10 MG tablet [Pharmacy Med Name: METOCLOPRAMIDE 10 MG TABLET] 90 tablet 1     Sig: TAKE 1 TABLET BY MOUTH 3 TIMES A DAY AS NEEDED FOR NAUSEA       Metoclopramide/Misoprostol Refill Protocol Failed - 12/16/2020  1:00 AM        Failed - Visit with PCP or prescribing provider visit in last 6 months or next 3 months     Last office visit with prescriber/PCP: Visit date not found OR same dept: 2/14/2020 Alex Nolasco MD OR same specialty: 2/14/2020 Alex Nolasco MD Last physical: Visit date not found Last MTM visit: Visit date not found     Next appt within 3 mo: Visit date not found  Next physical within 3 mo: Visit date not found  Prescriber OR PCP: Alex Nolasco MD  Last diagnosis associated with med order: 1. Nausea  - metoclopramide (REGLAN) 10 MG tablet [Pharmacy Med Name: METOCLOPRAMIDE 10 MG TABLET]; TAKE 1 TABLET BY MOUTH 3 TIMES A DAY AS NEEDED FOR NAUSEA  Dispense: 90 tablet; Refill: 1    If protocol passes may refill for 6 months if within 3 months of last provider visit (or a total of 9 months).

## 2021-06-13 NOTE — TELEPHONE ENCOUNTER
RN cannot approve Refill Request    RN can NOT refill this medication med is not covered by policy/route to provider. Last office visit: 2/14/2020 Alex Nolasco MD Last Physical: 10/12/2018 Last MTM visit: Visit date not found Last visit same specialty: 2/14/2020 Alex Nolasco MD.  Next visit within 3 mo: Visit date not found  Next physical within 3 mo: Visit date not found      Debbie Argueta, [facility] Triage/Med Refill 12/8/2020    Requested Prescriptions   Pending Prescriptions Disp Refills     baclofen (LIORESAL) 10 MG tablet 240 tablet 0     Sig: Take 2 tablets (20 mg total) by mouth 4 (four) times a day.       There is no refill protocol information for this order

## 2021-06-14 NOTE — TELEPHONE ENCOUNTER
RN cannot approve Refill Request    RN can NOT refill this medication med is not covered by policy/route to provider. Last office visit: 2/14/2020 Alex Nolasco MD Last Physical: 10/12/2018 Last MTM visit: Visit date not found Last visit same specialty: 2/14/2020 Alex Nolasco MD.  Next visit within 3 mo: Visit date not found  Next physical within 3 mo: Visit date not found      Blanka Grimm, [facility] Triage/Med Refill 1/13/2021    Requested Prescriptions   Pending Prescriptions Disp Refills     baclofen (LIORESAL) 10 MG tablet [Pharmacy Med Name: BACLOFEN 10 MG TABLET] 240 tablet 0     Sig: TAKE 2 TABLETS (20 MG TOTAL) BY MOUTH 4 (FOUR) TIMES A DAY.       There is no refill protocol information for this order

## 2021-06-14 NOTE — PROGRESS NOTES
ASSESSMENT/PLAN  1. Visit for screening mammogram  Will place referral  - Mammo Screening Bilateral; Future    2. Screening for malignant neoplasm of colon  Discussed options- paperwork given to patient  - [facility]    3. Essential hypertension  At goal- cont with current medications  Refills sent to the pharmacy    4. Pelvic pain  Pt follows with pain clinic  Discussed need to f/u with ortho again to discuss options- conservative treatment is not having improvement- pt is having increasing problems with ADLs- she is in agreement to contact ortho again  Suspect that further imaging will be needed- but will defer to ortho at this time        SUBJECTIVE:   Chief Complaint   Patient presents with     Pain Management     Having increased back and pelvic pain     Weight Gain     Concerns about continued weight gain      Mariah GARCIA Rodo 61 y.o. female    Current Outpatient Prescriptions   Medication Sig Dispense Refill     albuterol (PROAIR HFA) 90 mcg/actuation inhaler Inhale 1-2 puffs every 4 (four) hours as needed. 1 Inhaler 3     albuterol (PROVENTIL) 2.5 mg /3 mL (0.083 %) nebulizer solution Take 3 mL (2.5 mg total) by nebulization every 6 (six) hours as needed for wheezing. 75 mL 12     amLODIPine (NORVASC) 5 MG tablet TAKE 1 TABLET (5 MG TOTAL) BY MOUTH DAILY. 90 tablet 0     atenolol (TENORMIN) 25 MG tablet TAKE 1 TABLET BY MOUTH DAILY 90 tablet 0     baclofen (LIORESAL) 10 MG tablet TAKE 2 TABLETS BY MOUTH FOUR TIMES DAILY 240 tablet 0     ergocalciferol (VITAMIN D2) 50,000 unit capsule Take 1 capsule (50,000 Units total) by mouth once a week. 4 capsule 12     metoclopramide (REGLAN) 10 MG tablet TAKE 1 TABLET (10 MG TOTAL) BY MOUTH DAILY AS NEEDED FOR NAUSEA. 30 tablet 1     ondansetron (ZOFRAN) 4 MG tablet Take 1 tablet (4 mg total) by mouth every 8 (eight) hours as needed for nausea. 90 tablet 1     oxyCODONE (ROXICODONE) 5 MG immediate release tablet For break through pain not covered by Slow release - one  tablet Q4-6hrs 30 tablet 0     pain IT pump - patient supplied 600 mcg by Intrathecal route continuous. The medication in this patient's IT pump  Fentanyl and bupivacaine as directed       polyethylene glycol (GLYCOLAX) 17 gram/dose powder Take 527 g by mouth daily.       sertraline (ZOLOFT) 50 MG tablet TAKE 1 TABLET (50 MG TOTAL) BY MOUTH DAILY. 90 tablet 0     zolpidem (AMBIEN) 10 mg tablet TAKE 1 TABLET (10 MG TOTAL) BY MOUTH BEDTIME. 30 tablet 0     Current Facility-Administered Medications   Medication Dose Route Frequency Provider Last Rate Last Dose     denosumab 60 mg (PROLIA 60 mg/ml)  60 mg Subcutaneous Q6 Months Jonathan Watts MD   60 mg at 11/07/17 0924     Allergies: Bacitracin; Codeine; Morphine; and Methadone   No LMP recorded. Patient is postmenopausal.    HPI:   Pt is here for f/u on pain from pelvic insufficiency fractures  She was following with ortho last year and conservative choice was made but it has been a year and patient is not improving- she follows with pain clinic and her pain has not been improving- discussed with her as her ADL's are being affected she needs to pursue other options as well- initially conservative treatment was likely best but with a year and no improvement she needs to see other options- she is in agreement and will f/u with ortho.  HTN is at goal- cont with current medications.  She is due for colon cancer screening and mammogram.    ROS: negative except as per HPI    OBJECTIVE:   The patient appears in pain, can not sit or stand without grimacing in pain  /68 (Patient Site: Right Arm, Patient Position: Sitting, Cuff Size: Adult Regular)  Pulse 66  Temp 98.2  F (36.8  C) (Oral)   Resp 16  Wt 145 lb (65.8 kg)  BMI 25.48 kg/m2      Lungs: clear, good air entry, no wheezes, rhonchi or rales.   Cardiac: S1 and S2 normal, no murmurs, regular rate and rhythm.   Skin: clear, dry, no rashes/lesions  Psych- normal mood and affect      Pt states an  understanding and agrees to the above plan.

## 2021-06-14 NOTE — TELEPHONE ENCOUNTER
Controlled Substance Refill Request  Medication Name:   Requested Prescriptions     Pending Prescriptions Disp Refills     zolpidem (AMBIEN) 10 mg tablet [Pharmacy Med Name: ZOLPIDEM TARTRATE 10 MG TABLET] 30 tablet 0     Sig: TAKE 1 TABLET BY MOUTH EVERY DAY FOR INSOMNIA     Date Last Fill: 12/21/2020  Requested Pharmacy: [facility]  Submit electronically to pharmacy  Controlled Substance Agreement on file:   Encounter-Level CSA Scan Date - 10/12/2018:    Scan on 10/16/2018  1:55 PM           Encounter-Level CSA Scan Date - 11/22/2016:    Scan on 11/22/2016        Last office visit:  3/31/2020

## 2021-06-14 NOTE — TELEPHONE ENCOUNTER
RN cannot approve Refill Request    RN can NOT refill this medication med is not covered by policy/route to provider. Last office visit: 2/14/2020 Alex Nolasco MD Last Physical: 10/12/2018 Last MTM visit: Visit date not found Last visit same specialty: 2/14/2020 Alex Nolasco MD.  Next visit within 3 mo: Visit date not found  Next physical within 3 mo: Visit date not found      Blanka Grimm, [facility] Triage/Med Refill 1/21/2021    Requested Prescriptions   Pending Prescriptions Disp Refills     ondansetron (ZOFRAN) 4 MG tablet 90 tablet 0     Sig: Take 1 tablet (4 mg total) by mouth every 8 (eight) hours as needed for nausea.       There is no refill protocol information for this order

## 2021-06-15 NOTE — TELEPHONE ENCOUNTER
Requested Prescriptions     Pending Prescriptions Disp Refills     baclofen (LIORESAL) 10 MG tablet 240 tablet 3     Sig: Take 2 tablets (20 mg total) by mouth 4 (four) times a day.

## 2021-06-15 NOTE — TELEPHONE ENCOUNTER
RN cannot approve Refill Request    RN can NOT refill this medication Protocol failed and NO refill given. Last office visit: 2/14/2020 Alex Nolasco MD Last Physical: 10/12/2018 Last MTM visit: Visit date not found Last visit same specialty: 2/14/2020 Alex Nolasco MD.  Next visit within 3 mo: Visit date not found  Next physical within 3 mo: Visit date not found      Navneet Do, [facility] Connection Triage/Med Refill 2/19/2021    Requested Prescriptions   Pending Prescriptions Disp Refills     metoclopramide (REGLAN) 10 MG tablet 90 tablet 1     Sig: TAKE 1 TABLET BY MOUTH 3 TIMES A DAY AS NEEDED FOR NAUSEA       Metoclopramide/Misoprostol Refill Protocol Failed - 2/18/2021  4:12 PM        Failed - Visit with PCP or prescribing provider visit in last 6 months or next 3 months     Last office visit with prescriber/PCP: Visit date not found OR same dept: Visit date not found OR same specialty: 2/14/2020 Alex Nolasco MD Last physical: Visit date not found Last MTM visit: Visit date not found     Next appt within 3 mo: Visit date not found  Next physical within 3 mo: Visit date not found  Prescriber OR PCP: Alex Nolasco MD  Last diagnosis associated with med order: 1. Nausea  - metoclopramide (REGLAN) 10 MG tablet; TAKE 1 TABLET BY MOUTH 3 TIMES A DAY AS NEEDED FOR NAUSEA  Dispense: 90 tablet; Refill: 1    If protocol passes may refill for 6 months if within 3 months of last provider visit (or a total of 9 months).

## 2021-06-15 NOTE — TELEPHONE ENCOUNTER
RN cannot approve Refill Request    RN can NOT refill this medication med is not covered by policy/route to provider. Last office visit: 2/14/2020 Alex Nolasco MD Last Physical: 10/12/2018 Last MTM visit: Visit date not found Last visit same specialty: 2/14/2020 Alex Nolasco MD.  Next visit within 3 mo: Visit date not found  Next physical within 3 mo: Visit date not found      Lyric Blevins, [facility] Triage/Med Refill 2/12/2021    Requested Prescriptions   Pending Prescriptions Disp Refills     baclofen (LIORESAL) 10 MG tablet 240 tablet 3     Sig: Take 2 tablets (20 mg total) by mouth 4 (four) times a day.       There is no refill protocol information for this order

## 2021-06-15 NOTE — TELEPHONE ENCOUNTER
Controlled Substance Refill Request  Medication Name:   Requested Prescriptions     Pending Prescriptions Disp Refills     zolpidem (AMBIEN) 10 mg tablet [Pharmacy Med Name: ZOLPIDEM TARTRATE 10 MG TABLET] 30 tablet 0     Sig: TAKE 1 TABLET BY MOUTH EVERY DAY FOR INSOMNIA     Date Last Fill: 1/27/21  [facility]ed [facility]  Submit electronically to pharmacy  Controlled Substance Agreement on file:   Encounter-Level CSA Scan Date - 10/12/2018:    Scan on 10/16/2018  1:55 PM           Encounter-Level CSA Scan Date - 11/22/2016:    Scan on 11/22/2016        Last office visit:  3/31/20

## 2021-06-16 PROBLEM — R53.83 FATIGUE: Status: ACTIVE | Noted: 2018-03-22

## 2021-06-16 PROBLEM — F32.0 CURRENT MILD EPISODE OF MAJOR DEPRESSIVE DISORDER, UNSPECIFIED WHETHER RECURRENT (H): Status: ACTIVE | Noted: 2020-02-17

## 2021-06-16 NOTE — TELEPHONE ENCOUNTER
Refill Approved    Rx renewed per Medication Renewal Policy. Medication was last renewed on 2/19/21.    Navneet Do, [facility] Triage/Med Refill 4/19/2021     Requested Prescriptions   Pending Prescriptions Disp Refills     metoclopramide (REGLAN) 10 MG tablet [Pharmacy Med Name: METOCLOPRAMIDE 10 MG TABLET] 90 tablet 1     Sig: TAKE 1 TABLET BY MOUTH 3 TIMES A DAY AS NEEDED FOR NAUSEA       Metoclopramide/Misoprostol Refill Protocol Passed - 4/18/2021  9:31 AM        Passed - Visit with PCP or prescribing provider visit in last 6 months or next 3 months     Last office visit with prescriber/PCP: Visit date not found OR same dept: Visit date not found OR same specialty: 2/14/2020 Alex Nloasco MD Last physical: 4/14/2021 Last MTM visit: Visit date not found     Next appt within 3 mo: Visit date not found  Next physical within 3 mo: Visit date not found  Prescriber OR PCP: Alex Nolasco MD  Last diagnosis associated with med order: 1. Nausea  - metoclopramide (REGLAN) 10 MG tablet [Pharmacy Med Name: METOCLOPRAMIDE 10 MG TABLET]; TAKE 1 TABLET BY MOUTH 3 TIMES A DAY AS NEEDED FOR NAUSEA  Dispense: 90 tablet; Refill: 1    If protocol passes may refill for 6 months if within 3 months of last provider visit (or a total of 9 months).

## 2021-06-16 NOTE — TELEPHONE ENCOUNTER
Telephone Encounter by Kayla Phillips CMA at 3/8/2019  4:06 PM     Author: Kayla Phillips CMA Service: -- Author Type: Certified Medical Assistant    Filed: 3/8/2019  4:10 PM Encounter Date: 3/8/2019 Status: Signed    : Kayla Phillips CMA (Certified Medical Assistant)       Requested Prescriptions     Pending Prescriptions Disp Refills   ? zolpidem (AMBIEN) 10 mg tablet 30 tablet 0     Sig: TAKE 1 TABLET BY MOUTH EVERYDAY AT BEDTIME     Last Office Visit:  10/12/2018  Last Refill:  01/25/2019  CSA:  Yes:  10/12/2018  Date of Last Labs:     Ref Range & Units 12/12/18 0705     Sodium 136 - 145 mmol/L 143     Potassium 3.5 - 5.0 mmol/L 5.0     Chloride 98 - 107 mmol/L 105     CO2 22 - 31 mmol/L 29     Anion Gap, Calculation 5 - 18 mmol/L 9     Glucose 70 - 125 mg/dL 102     BUN 8 - 22 mg/dL 9     Creatinine 0.60 - 1.10 mg/dL 0.82     GFR MDRD Af Amer >60 mL/min/1.73m2 >60     GFR MDRD Non Af Amer >60 mL/min/1.73m2 >60     Bilirubin, Total 0.0 - 1.0 mg/dL 0.3     Calcium 8.5 - 10.5 mg/dL 9.7     Protein, Total 6.0 - 8.0 g/dL 6.3     Albumin 3.5 - 5.0 g/dL 3.9     Alkaline Phosphatase 45 - 120 U/L 72     AST 0 - 40 U/L 16     ALT 0 - 45 U/L 12        PLEASE advise on B12 also.

## 2021-06-16 NOTE — TELEPHONE ENCOUNTER
RN cannot approve Refill Request    RN can NOT refill this medication medication not on med list and change in medication order to Protonix. . Last office visit: Visit date not found Last Physical: Visit date not found Last MTM visit: Visit date not found Last visit same specialty: 2/14/2020 Alex Nolasco MD.  Next visit within 3 mo: Visit date not found  Next physical within 3 mo: Visit date not found    Message sent to pharmacy.     Debbie Arugeta, [facility] Triage/Med Refill 4/18/2021    Requested Prescriptions   Pending Prescriptions Disp Refills     omeprazole (PRILOSEC) 20 MG capsule [Pharmacy Med Name: OMEPRAZOLE DR 20 MG CAPSULE] 180 capsule 2     Sig: TAKE 1 CAPSULE (20 MG TOTAL) BY MOUTH TWICE A DAY BEFORE MEALS.       GI Medications Refill Protocol Passed - 4/17/2021  9:31 AM        Passed - PCP or prescribing provider visit in last 12 or next 3 months.     Last office visit with prescriber/PCP: Visit date not found OR same dept: Visit date not found OR same specialty: 2/14/2020 Alex Nolasco MD  Last physical: Visit date not found Last MTM visit: Visit date not found   Next visit within 3 mo: Visit date not found  Next physical within 3 mo: Visit date not found  Prescriber OR PCP: Olga Lim DO  Last diagnosis associated with med order: 1. Hiatal hernia  - omeprazole (PRILOSEC) 20 MG capsule [Pharmacy Med Name: OMEPRAZOLE DR 20 MG CAPSULE]; TAKE 1 CAPSULE (20 MG TOTAL) BY MOUTH TWICE A DAY BEFORE MEALS.  Dispense: 180 capsule; Refill: 2    If protocol passes may refill for 12 months if within 3 months of last provider visit (or a total of 15 months).

## 2021-06-16 NOTE — TELEPHONE ENCOUNTER
Controlled Substance Refill Request  Medication Name:   Requested Prescriptions     Pending Prescriptions Disp Refills     zolpidem (AMBIEN) 10 mg tablet [Pharmacy Med Name: ZOLPIDEM TARTRATE 10 MG TABLET] 30 tablet 0     Sig: TAKE 1 TABLET BY MOUTH EVERY DAY FOR INSOMNIA     Date Last Fill: 2/26/21  [facility]  Submit electronically to pharmacy  Controlled Substance Agreement on file:   Encounter-Level CSA Scan Date - 10/12/2018:    Scan on 10/16/2018  1:55 PM           Encounter-Level CSA Scan Date - 11/22/2016:    Scan on 11/22/2016        Last office visit:  3/31/20

## 2021-06-16 NOTE — TELEPHONE ENCOUNTER
Please inform patient we switched to 40mg once daily because insurance will not cover twice daily dosing

## 2021-06-16 NOTE — TELEPHONE ENCOUNTER
Telephone Encounter by Genevieve Prince CMA at 11/27/2019  7:49 AM     Author: Genevieve Prince CMA Service: -- Author Type: Certified Medical Assistant    Filed: 11/27/2019  7:54 AM Encounter Date: 11/26/2019 Status: Signed    : Genevieve Prince CMA (Certified Medical Assistant)       Medication: Ambien    Last Date Filled 08/30/19     pulled: YES      Only PCP Prescribing?: YES    Taken as prescribed from physician notes?: YES  5. Controlled substance agreement signed  Patient takes insomnia medications Ambien at night to help her sleep  Controlled substance agreement filled up for her today     6. Insomnia  Patient takes Ambien at night we discussed the risks of taking this medication especially in the setting of taking chronic pain medication in the form of a pain pump due to her chronic pain  Discussed the risk factors  She has been trying to limit her use of Ambien    CSA in last year: No, 10/12/18    Random Utox in last year: NO    Opioids + benzodiazepines? NO

## 2021-06-16 NOTE — TELEPHONE ENCOUNTER
Refill Approved    Rx renewed per Medication Renewal Policy. Medication was last renewed on 5/13/2020 with 3 refills.  Last office visit: 4/14/2021 Physical with PCP Dr LEANNE Nolasco.     Debbie Argueta, [facility] Triage/Med Refill 4/18/2021     Requested Prescriptions   Pending Prescriptions Disp Refills     atenoloL (TENORMIN) 25 MG tablet [Pharmacy Med Name: ATENOLOL 25 MG TABLET] 90 tablet 3     Sig: TAKE 1 TABLET BY MOUTH EVERY DAY       Beta-Blockers Refill Protocol Passed - 4/17/2021  9:31 AM        Passed - PCP or prescribing provider visit in past 12 months or next 3 months     Last office visit with prescriber/PCP: 2/14/2020 Alex Nolasco MD OR same dept: Visit date not found OR same specialty: 2/14/2020 Alex Nolasco MD  Last physical: 4/14/2021 Last MTM visit: Visit date not found   Next visit within 3 mo: Visit date not found  Next physical within 3 mo: Visit date not found  Prescriber OR PCP: Alex Nolasco MD  Last diagnosis associated with med order: 1. Essential hypertension  - atenoloL (TENORMIN) 25 MG tablet [Pharmacy Med Name: ATENOLOL 25 MG TABLET]; TAKE 1 TABLET BY MOUTH EVERY DAY  Dispense: 90 tablet; Refill: 3    If protocol passes may refill for 12 months if within 3 months of last provider visit (or a total of 15 months).             Passed - Blood pressure filed in past 12 months     BP Readings from Last 1 Encounters:   04/14/21 128/74

## 2021-06-16 NOTE — TELEPHONE ENCOUNTER
Telephone Encounter by Marcus Osei at 2/4/2020 12:23 PM     Author: Marcus Osei Service: -- Author Type: --    Filed: 2/4/2020 12:24 PM Encounter Date: 2/4/2020 Status: Signed    : Marcus Osei       Will follow referral      , Sheela GODINEZ RN  [facility] Scheduling Registration Pool 11 minutes ago (12:11 PM)      Pt last DXA was in 2016 so referring will need to order one.     Routing comment       Sheela Mcfarlane RN  [facility] Scheduling Registration Pool 12 minutes ago (12:11 PM)      Amanda can manage but I would offer alternatives as she is scheduling far out.

## 2021-06-16 NOTE — TELEPHONE ENCOUNTER
Fax from pharmacy stating Pantoprazole Sod DR 20 mg is not covered by her insurance for two times a day dosing.   Please advise.

## 2021-06-16 NOTE — TELEPHONE ENCOUNTER
Controlled Substance Refill Request  Medication:   Requested Prescriptions     Pending Prescriptions Disp Refills     zolpidem (AMBIEN) 10 mg tablet [Pharmacy Med Name: ZOLPIDEM TARTRATE 10 MG TABLET] 30 tablet 0     Sig: TAKE 1 TABLET BY MOUTH EVERYDAY AT BEDTIME     Date Last Fill: 3/12/19  [id number]   Submit electronically to pharmacy  Controlled Substance Agreement on File:   Encounter-Level CSA Scan Date - 11/22/2016:    Scan on 11/22/2016 (below)         Last office visit: Last office visit pertaining to requested medication was 5/7/19.    
Telephone Encounter by Genevieve Prince CMA at 8/30/2019  4:22 PM     Author: Genevieve Prince CMA Service: -- Author Type: Certified Medical Assistant    Filed: 8/30/2019  4:30 PM Encounter Date: 8/30/2019 Status: Signed    : Genevieve Prince CMA (Certified Medical Assistant)       Medication: Ambien    Last Date Filled 03/12/19     pulled: YES      Only PCP Prescribing?: YES    Taken as prescribed from physician notes?: YES  5. Controlled substance agreement signed  Patient takes insomnia medications Ambien at night to help her sleep  Controlled substance agreement filled up for her today     6. Insomnia  Patient takes Ambien at night we discussed the risks of taking this medication especially in the setting of taking chronic pain medication in the form of a pain pump due to her chronic pain  Discussed the risk factors  She has been trying to limit her use of Ambien    CSA in last year: Yes, 10/12/2018    Random Utox in last year: No, 06/06/2013    Opioids + benzodiazepines? NO           
General

## 2021-06-16 NOTE — TELEPHONE ENCOUNTER
Telephone Encounter by Genevieve Prince CMA at 1/30/2020 12:22 PM     Author: Genevieve Prince CMA Service: -- Author Type: Certified Medical Assistant    Filed: 1/30/2020 12:26 PM Encounter Date: 1/30/2020 Status: Signed    : Genevieve Prince CMA (Certified Medical Assistant)       Medication:   zolpidem (AMBIEN) 10 mg tablet 30 tablet         Last Date Filled 11/27/19     pulled: YES    Only PCP Prescribing?: YES    Taken as prescribed from physician notes?: YES     . Controlled substance agreement signed  Patient takes insomnia medications Ambien at night to help her sleep  Controlled substance agreement filled up for her today     6. Insomnia  Patient takes Ambien at night we discussed the risks of taking this medication especially in the setting of taking chronic pain medication in the form of a pain pump due to her chronic pain  Discussed the risk factors  She has been trying to limit her use of Ambien    CSA in last year: NO    Random Utox in last year: NO    Opioids + benzodiazepines? NO

## 2021-06-17 NOTE — TELEPHONE ENCOUNTER
Telephone Encounter by Dena Dhaliwal CMA at 9/8/2020 11:25 AM     Author: Dena Dhaliwal CMA Service: -- Author Type: Certified Medical Assistant    Filed: 9/8/2020 11:27 AM Encounter Date: 9/3/2020 Status: Signed    : Dena Dhaliwal CMA (Certified Medical Assistant)       Medication: Zolpidem     Last Date Filled 5/7/2020     pulled: YES      Only PCP Prescribing?: YES    Taken as prescribed from physician notes?: YES      CSA in last year: NO    Random Utox in last year: NO    Opioids + benzodiazepines? YES

## 2021-06-17 NOTE — PROGRESS NOTES
Progress Note    Reason for Visit:  Chief Complaint     Osteoporosis          Progress Note:    HPI: Patient is here for follow-up because of osteoporosis.  She has been taken Prolia 60 mg subcutaneously every 6 month.  Osteoporosis the patient is here for follow-up because of osteoporosis she has been taken Prolia 60 mg subcutaneously every 6 month.    The patient has taken 2 injections she is due for the third 1 today    Component      Latest Ref Rng & Units 8/12/2016 4/21/2017 4/3/2018   Creatinine      0.60 - 1.10 mg/dL  0.75 0.73   GFR MDRD Af Amer      >60 mL/min/1.73m2  >60 >60   GFR MDRD Non Af Amer      >60 mL/min/1.73m2  >60 >60   Vitamin D, Total (25-Hydroxy)      30.0 - 80.0 ng/mL 20.0 (L) 15.1 (L) 29.3 (L)   TSH      0.30 - 5.00 uIU/mL  0.76 0.70   Calcium      8.5 - 10.5 mg/dL  9.3 8.9   Free T4      0.7 - 1.8 ng/dL  0.9 0.9   Potassium      3.5 - 5.0 mmol/L   4.0     Patient Profile:  60 y.o. female, postmenopausal, is here for the first bone density test.   History of fractures - Elbow and sacrum Family history of osteoporosis - None.  Family history of hip fracture: None. Smoking history - Current. Osteoporosis treatment past -  No. Osteoporosis treatment current - No.  Chronic medical problems - Chronic low back problems, Height loss, History of kidney stones, Hysterectomy, Oophorectomy and Premature menopause. High risk medications -  None.        Assessment:     1. The spine bone density L1-L4 with T-score -2.6.  2. Femoral bone densities show left femoral neck T- score -2.4 and right femoral neck T-score -2.1.  3. Trabecular bone score indicates poor trabecular bone architecture.  4. Vertebral fracture assessment, done because of the height loss, did not show any vertebral compression fracture.     60 y.o. female with OSTEOPOROSIS and HIGH fracture risk.       Review of Systems:    Nervous System: No headache, dizziness, fainting or memory loss. No tingling sensation of hand or feet.  Ears: No  [facility]    CC: anemia    HPI 53 y.o. female with several months of microcytic, iron deficiency anemia associated with dark loose stool.  Pertinent medical problems include Protein C deficiency (LE DVTs, AAA thrombus s/p emergent repair) on Xarelto, DM, benign thyroid nodules, and Lupus (plaquenil).  She was noted to have a new anemia 2/2017 and was started on twice daily oral iron: iron studies at that time were consistent with JN.  Since around that time, she has had consistently loose, dark, tarry/sticky stool.  Chronically, she has mild constipation alternating with diarrhea.       She is a former heavy NSAID user but stopped in 2/2017.  She has GERD which is well controlled with a PPI.  She has post prandial abdominal discomfort, but otherwise denies other symptoms.  She had an aunt with colon cancer at age <60.  She has never had previous endoscopy.    EGD/Colonoscopy completed 6/19/17 revealing for mild gastritis (H.P. Negative) and a flat 10mm polyp (removed).    She is here for VCE to further investigate her anemia.      Past Medical History:   Diagnosis Date    Diabetes mellitus type II     Headache associated with hormonal factors     Hyperlipidemia     Hypertension     Lupus     Protein C deficiency     Tachycardia          Review of Systems  General ROS: negative for chills, fever or weight loss  Cardiovascular ROS: no chest pain or dyspnea on exertion  Gastrointestinal ROS: no abdominal pain, change in bowel habits, or black/ bloody stools    Physical Examination  There were no vitals taken for this visit.  General appearance: alert, cooperative, no distress  HENT: Normocephalic, atraumatic, neck symmetrical, no nasal discharge   Lungs: clear to auscultation bilaterally, no dullness to percussion bilaterally  Heart: regular rate and rhythm without rub; no displacement of the PMI   Abdomen: soft, non-tender; bowel sounds normoactive; no organomegaly  Extremities: extremities  hearing loss or ringing in the ears  Eyes: No blurring of vision, redness, itching or dryness.  Nose: No nosebleed or loss of smell  Mouth: No mouth sores or loss of taste  Throat: No hoarseness or difficulty swallowing  Neck: No enlarged thyroid or lymph nodes.  Heart: No chest pain, palpitation or irregular heartbeat. No swelling of hands or feet  Lungs: No shortness of breath, cough, night sweats, wheezing or hemoptysis.  Gastrointestinal: No nausea or vomiting, constipation or diarrhea.  No acid reflux, abdominal pain or blood in stools.  Kidney/Bladdr: No polyuria, polydipsia, nocturia or hematuria.  Genital/Sexual: No loss of libido  Skin: No rash, hair loss or hirsutism.  No abnormal striae  Muscles/Joints/Bones: No morning stiffness, muscle aches and pain or loss of height.    Current Medications:  Current Outpatient Prescriptions   Medication Sig     albuterol (PROAIR HFA) 90 mcg/actuation inhaler Inhale 1-2 puffs every 4 (four) hours as needed.     albuterol (PROVENTIL) 2.5 mg /3 mL (0.083 %) nebulizer solution Take 3 mL (2.5 mg total) by nebulization every 6 (six) hours as needed for wheezing.     atenolol (TENORMIN) 25 MG tablet TAKE 1 TABLET BY MOUTH DAILY     baclofen (LIORESAL) 10 MG tablet TAKE 2 TABLETS BY MOUTH FOUR TIMES DAILY     ergocalciferol (VITAMIN D2) 50,000 unit capsule Take 1 capsule (50,000 Units total) by mouth once a week.     ondansetron (ZOFRAN) 4 MG tablet Take 1 tablet (4 mg total) by mouth every 8 (eight) hours as needed for nausea.     oxyCODONE (ROXICODONE) 5 MG immediate release tablet For break through pain not covered by Slow release - one tablet Q4-6hrs     pain IT pump - patient supplied 600 mcg by Intrathecal route continuous. The medication in this patient's IT pump  Fentanyl and bupivacaine as directed     polyethylene glycol (GLYCOLAX) 17 gram/dose powder Take 527 g by mouth daily.     sertraline (ZOLOFT) 50 MG tablet TAKE 1 TABLET (50 MG TOTAL) BY MOUTH DAILY. DUE FOR  symmetric; no clubbing, cyanosis, or edema  Neurologic: Alert and oriented X 3, normal strength, normal coordination and gait    Assessment:   53 year old female with HTN, lupus, protein C deficiency with a new iron deficiency anemia and loose dark stool.  Her presentation is suspicion for chronic GI blood loss anemia, and EGD/colonoscopy were unrevealing for a source.     Plan:  - VCE today    Markos Calvert MD   [address] [place] [address], [address]   [place] [id number] [phone]     MEDICATION CHECK IN APRIL     zolpidem (AMBIEN) 10 mg tablet TAKE 1 TABLET (10 MG TOTAL) BY MOUTH BEDTIME.     metoclopramide (REGLAN) 10 MG tablet TAKE 1 TABLET (10 MG TOTAL) BY MOUTH DAILY AS NEEDED FOR NAUSEA.       Patients Active Problems:  Patient Active Problem List   Diagnosis     Nausea     Chest Pain     Opioid Dependence With Continuous Use     Edema     Wheezing (Symptom)     Pain During Urination (Dysuria)     Frequent, Full-bladder Emptying (Polyuria)     Chronic Obstructive Pulmonary Disease     Chronic Pain Syndrome     Vitamin B12 Deficiency     Microscopic Hematuria     Cough     Abdominal Pain In The Central Upper Belly (Epigastric)     Reflex Sympathetic Dystrophy Of The Left Upper Limb     Osteoporosis     Carpal Tunnel Syndrome     Hypertension     Constipation     Abdominal Pain     Fatigue       History:   reports that she has been smoking Cigarettes.  She has been smoking about 0.50 packs per day. She uses smokeless tobacco.   reports that she has been smoking Cigarettes.  She has been smoking about 0.50 packs per day. She uses smokeless tobacco. Her alcohol and drug histories are not on file.  History   Smoking Status     Current Every Day Smoker     Packs/day: 0.50     Types: Cigarettes   Smokeless Tobacco     Current User      reports that she has been smoking Cigarettes.  She has been smoking about 0.50 packs per day. She uses smokeless tobacco. Her alcohol and drug histories are not on file.  History   Sexual Activity     Sexual activity: Not on file     Past Medical History:   Diagnosis Date     Opiate dependence      RSD (reflex sympathetic dystrophy)      Sacral fracture, closed      Family History   Problem Relation Age of Onset     Stroke Mother      Heart disease Father      Pulmonary Hypertension Father      Kidney failure Father      Past Medical History:   Diagnosis Date     Opiate dependence      RSD (reflex sympathetic dystrophy)      Sacral fracture, closed      Past  "Surgical History:   Procedure Laterality Date     [place] DECOMPRESS FASCIOTOMY FINGR/HAND      Description: Decompression Fasciotomy Of The Hand;  Recorded: 09/19/2011;     [place] LAP,CHOLECYSTECTOMY      Description: Cholecystectomy Laparoscopic;  Recorded: 12/09/2011;     [place] REVISE MEDIAN N/CARPAL TUNNEL SURG      Description: Neuroplasty Decompression Median Nerve At Carpal Tunnel;  Recorded: 09/19/2011;     [place] SYMPATHECTOMY,CERVICOTHORACIC      Description: Surgical Sympathectomy Cervicothoracic;  Recorded: 09/19/2011;     [place] TOTAL ABDOM HYSTERECTOMY      Description: Hysterectomy;  Recorded: 03/23/2011;       Vitals   height is 5' 3.5\" (1.613 m) and weight is 146 lb 6.4 oz (66.4 kg). Her blood pressure is 126/74.         Exam  General appearance: The patient looked well, not in acute distress.  Eyes: no evidence of thyroid eye disease.   Retinal exam: No evidence of diabetic retinopathy.  Mouth and Throat: Normal  Neck: No evidence of thyromegaly, enlarged lymph node or tenderness  Chest: Trachea is central. Chest is clear to auscultation and percussion. Breat sounds are normal.  Cardiovascular exam: JVP is not raised. Heart sounds are normal, no murmurs or rub  Peripheral pulses are palpable.   Abdomen: No masses or tenderness.    Back: No vertebral tenderness or kyphosis.  Extremities: No evidence of leg edema.   Skin: Normal to touch.  No abnormal striae  Neurologic exam:  Visual fields are intact by confrontation, grossly intact. No evidence of peripheral neuropathy.  Detailed foot exam normal.        Diagnosis:  No diagnosis found.    Orders:   No orders of the defined types were placed in this encounter.        Assessment and Plan:   This patient is here for follow-up because of osteoporosis.  She has taken Prolia 60 mg subcutaneously every 6 months she has taken 2 injections she is due for the third 1 today.    The patient said that she since she has started the Prolia her blood pressure has been " dropping.    For the hypertension she was taken Norvasc 5 mg plus atenolol 25 mg right now Norvasc is stopped and she is just taking atenolol 25 mg.    I discussed with the patient the Prolia if anything causes hypertension hypotension and she has subcutaneous pain pump and they have added morphine to the fentanyl and that is the most likely cause of the hypotension not the Prolia.    The patient said that she has been having more bone aches as well.    She has multiple pelvic fractures but no vertebral fractures    She had a bone DEXA scan in 2016 which showed T score at the spine is -2.6.    I discussed with the patient that I would like her to continue on the Prolia if possible turned out that the patient cannot afford the co-pay which is $500 and she said that she would discuss with insurance and see if they will cover that and after that she will take the injection    Vitamin D deficiency her vitamin D was 5.9 I give her 50,000 units once a week slowly coming up at 29.3 would increase her vitamin D to twice a week.    She unfortunately she is still a smoker and advised her against    Hypertension on atenolol 25 mg blood pressure 126/74 she takes Zoloft 50 mg plus Ambien 10 mg    Calcium 8.9 she cannot tolerate calcium supplements because her to have diarrhea creatinine 0.73 TSH 0.7    Patient return to clinic in 1 year with a bone DEXA scan before.    I have reviewed and ordered clinical lab test    I have reviewed and ordered radiology tests.    I have reviewed and ordered her medication as required.    I have reviewed her test results and advised with the performing physician.    I have reviewed the patient's old records.    I have reviewed and summarized the patient old records.    I did spend 40 minutes with the patient more than 50% was spent on counseling and managing her care.

## 2021-06-17 NOTE — PROGRESS NOTES
Assessment and Plan:     Patient has been advised of split billing requirements and indicates understanding: Yes  1. Health care maintenance  Patient is not fasting today but we will obtain lab work  - Lipid Cascade  - Comprehensive Metabolic Panel    2. Nausea  Refills of Zofran sent to the pharmacy  - ondansetron (ZOFRAN) 4 MG tablet; Take 1 tablet (4 mg total) by mouth every 8 (eight) hours as needed for nausea.  Dispense: 90 tablet; Refill: 0    3. Insomnia, unspecified type  Uses zolpidem to help with sleep at night    4. Gastroesophageal reflux disease with esophagitis, unspecified whether hemorrhage  Patient taking proton pump inhibitor in the form of Protonix  Refill sent to the pharmacy  - pantoprazole (PROTONIX) 20 MG tablet; Take 1 tablet (20 mg total) by mouth 2 (two) times a day.  Dispense: 60 tablet; Refill: 1    5. Essential hypertension  Blood pressure goal range continue with current medication    6. Chronic obstructive pulmonary disease, unspecified COPD type (H)  Patient is on no chronic inhaler at this time  Encouraged her to exercise as tolerated    7. Opioid type dependence, continuous (H)  Patient follows with a pain clinic    The patient's current medical problems were reviewed.    The following high BMI interventions were performed this visit: encouragement to exercise  The following health maintenance schedule was reviewed with the patient and provided in printed form in the after visit summary:   Health Maintenance   Topic Date Due     DEPRESSION ACTION PLAN  Never done     HEPATITIS C SCREENING  Never done     SPIROMETRY  Never done     COPD ACTION PLAN  Never done     HIV SCREENING  Never done     ZOSTER VACCINES (1 of 2) Never done     Pneumococcal Vaccine: Pediatrics (0 to 5 Years) and At-Risk Patients (6 to 64 Years) (2 of 2) 01/19/2021     Pneumococcal Vaccine: 65+ Years (2 of 2) 01/19/2021     INFLUENZA VACCINE RULE BASED (1) 06/30/2021 (Originally 8/1/2020)     MAMMOGRAM   08/30/2021     MEDICARE ANNUAL WELLNESS VISIT  04/14/2022     FALL RISK ASSESSMENT  04/14/2022     COLORECTAL CANCER SCREENING  05/16/2022     LIPID  04/14/2026     ADVANCE CARE PLANNING  04/14/2026     TD 18+ HE  05/02/2029     DEXA SCAN  09/12/2031     TDAP ADULT ONE TIME DOSE  Completed     COVID-19 Vaccine  Completed     HEPATITIS B VACCINES  Aged Out       Subjective:   Chief Complaint: Mariah Koehler is an 65 y.o. female here for an Annual Wellness visit.   HPI: She is here for annual exam.  Patient is not fasting but will obtain blood work today.  Patient due for mammogram.  Blood pressure is at goal range we will continue with current dosing.  Patient follows with the pain clinic.  Patient is on zolpidem at night to help with sleep.  She takes Zofran on a regular basis for chronic nausea.  Patient has COPD and is just using albuterol as needed.  Patient feels her current Protonix prescription is helping with some of her nausea and acid reflux symptoms.  Patient has a pain pump-is following with pain clinic on a regular basis.  Discussed Covid pandemic, patient has received both Covid Pfizer vaccines.      Review of Systems:    Please see above.  The rest of the review of systems are negative for all systems.    Patient Care Team:  Alex Nolasco MD as PCP - General (Family Medicine)  Alex Nolasco MD as Assigned PCP     Patient Active Problem List   Diagnosis     Nausea     Chest Pain     Opioid Dependence With Continuous Use     Edema     Wheezing (Symptom)     Pain During Urination (Dysuria)     Frequent, Full-bladder Emptying (Polyuria)     Chronic Obstructive Pulmonary Disease     Chronic Pain Syndrome     Vitamin B12 Deficiency     Microscopic Hematuria     Cough     Abdominal Pain In The Central Upper Belly (Epigastric)     Reflex Sympathetic Dystrophy Of The Left Upper Limb     Osteoporosis     Carpal Tunnel Syndrome     Hypertension     Constipation     Abdominal Pain     Fatigue      Current mild episode of major depressive disorder, unspecified whether recurrent (H)     Past Medical History:   Diagnosis Date     Opiate dependence (H)      RSD (reflex sympathetic dystrophy)      Sacral fracture, closed (H)       Past Surgical History:   Procedure Laterality Date     HYSTERECTOMY  1984     OOPHORECTOMY Bilateral 1985     [place] DECOMPRESS FASCIOTOMY FINGR/HAND      Description: Decompression Fasciotomy Of The Hand;  Recorded: 09/19/2011;     [place] LAP,CHOLECYSTECTOMY      Description: Cholecystectomy Laparoscopic;  Recorded: 12/09/2011;     [place] REVISE MEDIAN N/CARPAL TUNNEL SURG      Description: Neuroplasty Decompression Median Nerve At Carpal Tunnel;  Recorded: 09/19/2011;     [place] SYMPATHECTOMY,CERVICOTHORACIC      Description: Surgical Sympathectomy Cervicothoracic;  Recorded: 09/19/2011;     [place] TOTAL ABDOM HYSTERECTOMY      Description: Hysterectomy;  Recorded: 03/23/2011;      Family History   Problem Relation Age of Onset     Stroke Mother      Diabetes Mother      Heart disease Mother      Hypertension Mother      Rheumatologic disease Mother      Heart disease Father      Pulmonary Hypertension Father      Kidney failure Father      Cancer Father         melanoma     Diabetes Sister      Hypertension Sister      Hypertension Brother       Social History     Socioeconomic History     Marital status:      Spouse name: Not on file     Number of children: Not on file     Years of education: Not on file     Highest education level: Not on file   Occupational History     Not on file   Social Needs     Financial resource strain: Not on file     Food insecurity     Worry: Not on file     Inability: Not on file     Transportation needs     Medical: Not on file     Non-medical: Not on file   Tobacco Use     Smoking status: Current Every Day Smoker     Packs/day: 0.50     Types: Cigarettes     Smokeless tobacco: Current User   Substance and Sexual Activity     Alcohol use: Not on file     Drug  use: Not on file     Sexual activity: Not on file   Lifestyle     Physical activity     Days per week: Not on file     Minutes per session: Not on file     Stress: Not on file   Relationships     Social connections     Talks on phone: Not on file     Gets together: Not on file     Attends L[employer]-day service: Not on file     Active member of club or organization: Not on file     Attends meetings of clubs or organizations: Not on file     Relationship status: Not on file     Intimate partner violence     Fear of current or ex partner: Not on file     Emotionally abused: Not on file     Physically abused: Not on file     Forced sexual activity: Not on file   Other Topics Concern     Not on file   Social History Narrative     Not on file      Current Outpatient Medications   Medication Sig Dispense Refill     albuterol (PROAIR HFA) 90 mcg/actuation inhaler Inhale 1-2 puffs every 4 (four) hours as needed. 1 Inhaler 3     albuterol (PROVENTIL) 2.5 mg /3 mL (0.083 %) nebulizer solution Take 3 mL (2.5 mg total) by nebulization every 6 (six) hours as needed for wheezing. 75 mL 12     amLODIPine (NORVASC) 5 MG tablet Take 1 tablet (5 mg total) by mouth daily. 90 tablet 2     baclofen (LIORESAL) 10 MG tablet Take 2 tablets (20 mg total) by mouth 4 (four) times a day. 240 tablet 3     NARCAN 4 mg/actuation nasal spray        ondansetron (ZOFRAN) 4 MG tablet Take 1 tablet (4 mg total) by mouth every 8 (eight) hours as needed for nausea. 90 tablet 0     oxyCODONE (ROXICODONE) 5 MG immediate release tablet        pain IT pump - patient supplied 600 mcg by Intrathecal route continuous. The medication in this patient's IT pump  Fentanyl and bupivacaine as directed       triamcinolone (KENALOG) 0.1 % ointment Apply topically 2 (two) times a day. Apply to affected area twice daily 80 g 0     zolpidem (AMBIEN) 10 mg tablet TAKE 1 TABLET BY MOUTH EVERY DAY FOR INSOMNIA 30 tablet 0     atenoloL (TENORMIN) 25 MG tablet Take 1 tablet (25 mg  "total) by mouth daily. 90 tablet 3     metoclopramide (REGLAN) 10 MG tablet TAKE 1 TABLET BY MOUTH 3 TIMES A DAY AS NEEDED FOR NAUSEA 270 tablet 1     pantoprazole (PROTONIX) 20 MG tablet Take 1 tablet (20 mg total) by mouth 2 (two) times a day. 60 tablet 1     pantoprazole (PROTONIX) 40 MG tablet Take 1 tablet (40 mg total) by mouth daily. 30 tablet 1     No current facility-administered medications for this visit.       Objective:   Vital Signs:   Visit Vitals  /74 (Patient Site: Right Arm, Patient Position: Sitting, Cuff Size: Adult Regular)   Pulse 64   Resp 24   Ht 5' 3\" (1.6 m)   Wt 128 lb (58.1 kg)   LMP 08/26/1984   BMI 22.67 kg/m           VisionScreening:  No exam data present     PHYSICAL EXAM  Physical Examination: General appearance - alert, well appearing, and in no distress  Mental status - alert, oriented to person, place, and time  Eyes - pupils equal and reactive, extraocular eye movements intact  Chest - clear to auscultation, no wheezes, rales or rhonchi, symmetric air entry  Heart - normal rate, regular rhythm, normal S1, S2, no murmurs, rubs, clicks or gallops  Abdomen - active bowel sounds  Extremities - peripheral pulses normal, no pedal edema, no clubbing or cyanosis  Skin - normal coloration and turgor, no rashes, no suspicious skin lesions noted      Assessment Results 4/14/2021   Activities of Daily Living No help needed   Instrumental Activities of Daily Living No help needed   Get Up and Go Score 12 seconds or more   Mini Cog Total Score 5   Some recent data might be hidden     A Mini-Cog score of 0-2 suggests the possibility of dementia, score of 3-5 suggests no dementia    Identified Health Risks:     She is at risk for lack of exercise and has been provided with information to increase physical activity for the benefit of her well-being.  The patient was counseled and encouraged to consider modifying their diet and eating habits. She was provided with information on recommended " healthy diet options.  The patient's PHQ-9 score is consistent with mild depression.   She is at risk for falling and has been provided with information to reduce the risk of falling at home.  Information regarding advance directives (living josé), including where she can download the appropriate form, was provided to the patient via the AVS.

## 2021-06-17 NOTE — TELEPHONE ENCOUNTER
Medication: Zolpidem     Last Date Filled 3/26/21      pulled: NO    Only PCP Prescribing?: YES    Taken as prescribed from physician notes?: YES     3. Insomnia, unspecified type  Uses zolpidem to help with sleep at night    CSA in last year: NO    Random Utox in last year: NO    Opioids + benzodiazepines? NO

## 2021-06-17 NOTE — TELEPHONE ENCOUNTER
Telephone Encounter by Nani Waters at 4/13/2020  6:11 AM     Author: Nani Waters Service: -- Author Type: --    Filed: 4/13/2020  6:11 AM Encounter Date: 4/8/2020 Status: Signed    : Nani Waters APPROVED:    Approval start date: 03/10/2020  Approval end date:  04/09/2023    Pharmacy has been notified of approval and will contact patient when medication is ready for pickup.

## 2021-06-18 NOTE — PATIENT INSTRUCTIONS - HE
Patient Instructions by Alex Nolasco MD at 4/14/2021  2:10 PM     Author: Alex Nolasco MD Service: -- Author Type: Physician    Filed: 4/26/2021  9:06 AM Encounter Date: 4/14/2021 Status: Signed    : Alex Nolasco MD (Physician)         Patient Education     Exercise for a Healthier Heart  You may wonder how you can improve the health of your heart. If youre thinking about exercise, youre on the right track. You dont need to become an athlete, but you do need a certain amount of brisk exercise to help strengthen your heart. If you have been diagnosed with a heart condition, your doctor may recommend exercise to help stabilize your condition. To help make exercise a habit, choose safe, fun activities.       Be sure to check with your health care provider before starting an exercise program.    Why exercise?  Exercising regularly offers many healthy rewards. It can help you do all of the following:    Improve your blood cholesterol levels to help prevent further heart trouble    Lower your blood pressure to help prevent a stroke or heart attack    Control diabetes, or reduce your risk of getting this disease    Improve your heart and lung function    Reach and maintain a healthy weight    Make your muscles stronger and more limber so you can stay active    Prevent falls and fractures by slowing the loss of bone mass (osteoporosis)    Manage stress better  Exercise tips  Ease into your routine. Set small goals. Then build on them.  Exercise on most days. Aim for a total of 150 or more minutes of moderate to  vigorous intensity activity each week. Consider 40 minutes, 3 to 4 times a week. For best results, activity should last for 40 minutes on average. It is OK to work up to the 40 minute period over time. Examples of moderate-intensity activity is walking one mile in 15 minutes or 30 to 45 minutes of yard work.  Step up your daily activity level. Along with your exercise program, try  being more active throughout the day. Walk instead of drive. Do more household tasks or yard work.  Choose one or more activities you enjoy. Walking is one of the easiest things you can do. You can also try swimming, riding a bike, or taking an exercise class.  Stop exercising and call your doctor if you:    Have chest pain or feel dizzy or lightheaded    Feel burning, tightness, pressure, or heaviness in your chest, neck, shoulders, back, or arms    Have unusual shortness of breath    Have increased joint or muscle pain    Have palpitations or an irregular heartbeat      3810-5176 [employer]. [address] [place] [address]. All rights reserved. This information is not intended as a substitute for professional medical care. Always follow your healthcare professional's instructions.         Patient Education   Understanding LiveStub MyPlate  The USDA (US Department of Agriculture) has guidelines to help you make healthy food choices. These are called MyPlate. MyPlate shows the food groups that make up healthy meals using the image of a place setting. Before you eat, think about the healthiest choices for what to put onto your plate or into your cup or bowl. To learn more about building a healthy plate, visit www.choosemyplate.gov.       The Food Groups    Fruits: Any fruit or 100% fruit juice counts as part of the Fruit Group. Fruits may be fresh, canned, frozen, or dried, and may be whole, cut-up, or pureed. Make half your plate fruits and vegetables.    Vegetables: Any vegetable or 100% vegetable juice counts as a member of the Vegetable Group. Vegetables may be fresh, frozen, canned, or dried. They can be served raw or cooked and may be whole, cut-up, or mashed. Make half your plate fruits and vegetables.     Grains: All foods made from grains are part of the Grains Group. These include wheat, rice, oats, cornmeal, and barley such as bread, pasta, oatmeal, cereal, tortillas, and grits. Grains  should be no more than a quarter of your plate. At least half of your grains should be whole grains.    Protein: This group includes meat, poultry, seafood, beans and peas, eggs, processed soy products (like tofu), nuts (including nut butters), and seeds. Make protein choices no more than a quarter of your plate. Meat and poultry choices should be lean or low fat.    Dairy: All fluid milk products and foods made from milk that contain calcium, like yogurt and cheese are part of the Dairy Group. (Foods that have little calcium, such as cream, butter, and cream cheese, are not part of the group.) Most dairy choices should be low-fat or fat-free.    Oils: These are fats that are liquid at room temperature. They include canola, corn, olive, soybean, and sunflower oil. Foods that are mainly oil include mayonnaise, certain salad dressings, and soft margarines. You should have only 5 to 7 teaspoons of oils a day. You probably already get this much from the food you eat.  Use Astrid to Help Build Your Meals  The Sijibang.comcker can help you plan and track your meals and activity. You can look up individual foods to see or compare their nutritional value. You can get guidelines for what and how much you should eat. You can compare your food choices. And you can assess personal physical activities and see ways you can improve. Go to www.kalidea.gov/supertracker/.    2672-0362 The happin!. [address] [place] [address], [place] [address]. All rights reserved. This information is not intended as a substitute for professional medical care. Always follow your healthcare professional's instructions.           Patient Education   Depression and Suicide in Older Adults  Nearly 2 million older Americans have some type of depression. Sadly, some of them even take their own lives. Yet depression among older adults is often ignored. Learn the warning signs. You may help spare a loved one needless pain. You may also save  a life.       What Is Depression?  Depression is a mood disorder that affects the way you think and feel. The most common symptom is a feeling of deep sadness. People who are depressed also may seem tired and listless. And nothing seems to give them pleasure. Its normal to grieve or be sad sometimes. But sadness lessens or passes with time. Depression rarely goes away or improves on its own. Other symptoms of depression are:    Sleeping more or less than normal    Eating more or less than normal    Having headaches, stomachaches, or other pains that dont go away    Feeling nervous, empty, or worthless    Crying a great deal    Thinking or talking about suicide or death    Feeling confused or forgetful  What Causes It?  The causes of depression arent fully known. Certain chemicals in the brain play a role. Depression does run in families. And life stresses can also trigger depression in some people. That may be the case with older adults. They often face great burdens, such as the death of friends or a spouse. They may have failing health. And they are more likely to be alone, lonely, or poor.  How You Can Help  Often, depressed people may not want to ask for help. When they do, they may be ignored. Or, they may receive the wrong treatment. You can help by showing parents and older friends love and support. If they seem depressed, help them find the right treatment. Talk to your doctor. Or contact a local mental health center, social service agency, or hospital. With modern treatment, no one has to suffer from depression.  Resources:    National [place] of Mental Health  887.152.6429  www.nimh.nih.gov    National [place] on Mental Illness  731.676.8315  www.anjana.org    Mental Health Sil  882.667.5846  www.nmha.org    National Suicide Hotline  494.606.2826 (800-SUICIDE)      0296-0262 The [employer]. [address] [place] [address], [place] [address]. All rights reserved. This information is not intended as a  substitute for professional medical care. Always follow your healthcare professional's instructions.         Patient Education   Preventing Falls in the Home  As you get older, falls are more likely. Thats because your reaction time slows. Your muscles and joints may also get stiffer, making them less flexible. Illness, medications, and vision changes can also affect your balance. A fall could leave you unable to live on your own. To make your home safer, follow these tips:    Floors    Put nonskid pads under area rugs.    Remove throw rugs.    Replace worn floor coverings.    Tack carpets firmly to each step on carpeted stairs. Put nonskid strips on the edges of uncarpeted stairs.    Keep floors and stairs free of clutter and cords.    Arrange furniture so there are clear pathways.    Clean up any spills right away.    Bathrooms    Install grab bars in the tub or shower.    Apply nonskid strips or put a nonskid rubber mat in the tub or shower.    Sit on a bath chair to bathe.    Use bathmats with nonskid backing.    Lighting    Keep a flashlight in each room.    Put a nightlight along the pathway between the bedroom and the bathroom.    3461-5216 The [employer]. [address] [place] [address]. All rights reserved. This information is not intended as a substitute for professional medical care. Always follow your healthcare professional's instructions.         Patient Education   Understanding Advance Care Planning  Advance care planning is the process of deciding ones own future medical care. It helps ensure that if you cant speak for yourself, your wishes can still be carried out. The plan is a series of legal documents that note a persons wishes. The documents vary by state. Advance care planning may be done when a person has a serious illness that is expected to get worse. It may be done before major surgery. And it can help you and your family be prepared in case of a major illness or  injury. Advance care planning helps with making decisions at these times.       A health care proxy is a person who acts as the voice of a patient when the patient cant speak for himself or herself. The name of this role varies by state. It may be called a Durable Medical Power of  or Durable Power of  for Healthcare. It may be called an agent, surrogate, or advocate. Or it may be called a representative or decision maker. It is an official duty that is identified by a legal document. The document also varies by state.    Why Is Advance Care Planning Important?  If a person communicates their healthcare wishes:    They will be given medical care that matches their values and goals.    Their family members will not be forced to make decisions in a crisis with no guidance.  Creating a Plan  Making an advance care plan is often done in 3 steps:    Thinking about ones wishes. To create an advance care plan, you should think about what kind of medical treatment you would want if you lose the ability to communicate. Are there any situations in which you would refuse or stop treatment? Are there therapies you would want or not want? And whom do you want to make decisions for you? There are many places to learn more about how to plan for your care. Ask your doctor or  for resources.    Picking a health care proxy. This means choosing a trusted person to speak for you only when you cant speak for yourself. When you cannot make medical decisions, your proxy makes sure the instructions in your advance care plan are followed. A proxy does not make decisions based on his or her own opinions. They must put aside those opinions and values if needed, and carry out your wishes.    Filling out the legal documents. There are several kinds of legal documents for advance care planning. Each one tells health care providers your wishes. The documents may vary by state. They must be signed and may need to be  witnessed or notarized. You can cancel or change them whenever you wish. Depending on your state, the documents may include a Healthcare Proxy form, Living Will, Durable Medical Power of , Advance Directive, or others.  The Familys Role  The best help a family can give is to support their loved ones wishes. Open and honest communication is vital. Family should express any concerns they have about the patients choices while the patient can still make decisions.    2500-6975 The [employer]. [address] [place] [address]. All rights reserved. This information is not intended as a substitute for professional medical care. Always follow your healthcare professional's instructions.         Also, [facility] [employer] [place] offers a free, downloadable health care directive that allows you to share your treatment choices and personal preferences if you cannot communicate your wishes. It also allows you to appoint another person (called a health care agent) to make health care decisions if you are unable to do so. You can download an advance directive by going here: [email or web address]     Patient Education   Personalized Prevention Plan  You are due for the preventive services outlined below.  Your care team is available to assist you in scheduling these services.  If you have already completed any of these items, please share that information with your care team to update in your medical record.  Health Maintenance Due   Topic Date Due   ? DEPRESSION ACTION PLAN  Never done   ? HEPATITIS C SCREENING  Never done   ? SPIROMETRY  Never done   ? COPD ACTION PLAN  Never done   ? HIV SCREENING  Never done   ? ZOSTER VACCINES (1 of 2) Never done   ? Pneumococcal Vaccine: Pediatrics (0 to 5 Years) and At-Risk Patients (6 to 64 Years) (2 of 2) 01/19/2021   ? Pneumococcal Vaccine: 65+ Years (2 of 2) 01/19/2021

## 2021-06-19 NOTE — LETTER
Letter by Alex Nolasco MD at      Author: Alex Nolasco MD Service: -- Author Type: --    Filed:  Encounter Date: 8/22/2019 Status: (Other)       Mariah Koehler  [address] [facility] [address]    August 22, 2019    Dear Mariah    In reviewing your records, we have determined a gap in your preventive services. Based on your age and health history, we recommend the follow service.     ? General Physical  ? Physical with a Pap Smear  ? Colon cancer screening  ? Mammogram  ? Immunization  ? Diabetic check  ? Blood pressure  ? Asthma check  ? Cholesterol test  ? Lab work  ? Med check    If you have had the service elsewhere, please contact us so we can update our records. Please let us know if you have transferred your care to another clinic.    Please call [phone] to schedule a nurse only appointment.    We believe that a strong preventive care program, including regular physicals and follow-up care is an important part of a healthy lifestyle and we are committed to helping you maintain your health.    Thank you for choosing us as your health care provider.    Sincerely,   [facility]  [address] [facility] [address]  Phone Number:  [phone]

## 2021-06-19 NOTE — LETTER
Letter by Alex Nolasco MD at      Author: Alex Nolasco MD Service: -- Author Type: --    Filed:  Encounter Date: 8/22/2019 Status: (Other)       Mariah Koehler  [address] [facility] [address]    August 22, 2019    Dear Mariah    In reviewing your records, we have determined a gap in your preventive services. Based on your age and health history, we recommend the follow service.     ? General Physical  ? Physical with a Pap Smear  ? Colon cancer screening  ? Mammogram  ? Immunization  ? Diabetic check  ? Blood pressure  ? Asthma check  ? Cholesterol test  ? Lab work  ? Med check    If you have had the service elsewhere, please contact us so we can update our records. Please let us know if you have transferred your care to another clinic.    Please call [phone] to schedule a nurse only appointment.    We believe that a strong preventive care program, including regular physicals and follow-up care is an important part of a healthy lifestyle and we are committed to helping you maintain your health.    Thank you for choosing us as your health care provider.    Sincerely,   [facility]  [address] [facility] [address]  Phone Number:  [phone]

## 2021-06-20 NOTE — PROGRESS NOTES
ASSESSMENT/PLAN  1. Edema  Patient periodically has some lower extremity edema that gets worse throughout the day but improved by the following morning  She presently has no edema at this time  We discussed with her exercise and foot elevation but if symptoms are to be progressive and not recycling on a day-to-day process to contact me for consideration of starting a diuretic    2. Pain of right upper arm  Status post a fall many months ago patient continues to have some irritation in her right shoulder and some pain in the deltoid and upper humerus  X-ray imaging today showing no acute fractures  Suspect contusion with some calcification in the joint there is leading to decreased range of motion and pain  Continue with home day-to-day exercise but if not improving consideration for physical therapy  - XR Humerus Right 2 VWS; Future    3. Psoriasis  Patient is a rash in her right lower extremity in her bilateral extensor surfaces of the elbows that looks to be consistent with possible psoriasis  She states this comes and goes over the years but never been this prevalent  We discussed a topical triamcinolone  If improving consideration as well as for referral to rheumatology for discussion over her ongoing arthritic problems with possibly new diagnosis of psoriasis        SUBJECTIVE:   Chief Complaint   Patient presents with     Arm Pain     Right arm pain      Edema     c/o bilateral leg edema since January, with shortness of breath, excessive sweating      Nausea     Medication check -would like Rx changed to at least 2 tablets daily      Mariah Koehler 62 y.o. female    Current Outpatient Prescriptions   Medication Sig Dispense Refill     albuterol (PROAIR HFA) 90 mcg/actuation inhaler Inhale 1-2 puffs every 4 (four) hours as needed. 1 Inhaler 3     albuterol (PROVENTIL) 2.5 mg /3 mL (0.083 %) nebulizer solution Take 3 mL (2.5 mg total) by nebulization every 6 (six) hours as needed for wheezing. 75 mL 12      atenolol (TENORMIN) 25 MG tablet Take 1 tablet (25 mg total) by mouth daily. 90 tablet 0     baclofen (LIORESAL) 10 MG tablet TAKE 2 TABLETS BY MOUTH FOUR TIMES DAILY 240 tablet 0     ergocalciferol (VITAMIN D2) 50,000 unit capsule Take 1 tablet twice a week Monday and Friday 12 capsule 3     metoclopramide (REGLAN) 10 MG tablet TAKE 1 TABLET (10 MG TOTAL) BY MOUTH DAILY AS NEEDED FOR NAUSEA. 30 tablet 2     ondansetron (ZOFRAN) 4 MG tablet Take 1 tablet (4 mg total) by mouth every 8 (eight) hours as needed for nausea. 90 tablet 1     oxyCODONE (ROXICODONE) 5 MG immediate release tablet For break through pain not covered by Slow release - one tablet Q4-6hrs 30 tablet 0     pain IT pump - patient supplied 600 mcg by Intrathecal route continuous. The medication in this patient's IT pump  Fentanyl and bupivacaine as directed       polyethylene glycol (GLYCOLAX) 17 gram/dose powder Take 527 g by mouth daily.       sertraline (ZOLOFT) 50 MG tablet Take 1 tablet (50 mg total) by mouth daily. 90 tablet 2     zolpidem (AMBIEN) 10 mg tablet TAKE 1 TABLET (10 MG TOTAL) BY MOUTH BEDTIME. 30 tablet 0     triamcinolone (KENALOG) 0.1 % ointment Apply topically 2 (two) times a day. Apply to affected area twice daily 80 g 0     Current Facility-Administered Medications   Medication Dose Route Frequency Provider Last Rate Last Dose     denosumab 60 mg (PROLIA 60 mg/ml)  60 mg Subcutaneous Q6 Months Jonathan Watts MD   60 mg at 11/07/17 0924     Allergies: Bacitracin; Codeine; Morphine; and Methadone   No LMP recorded. Patient is postmenopausal.    HPI:   Patient presented here for discussion over periodic problems with edema in her lower extremities for the last 6 months, refill needed for her nausea medication, right arm pain and a rash.  She is status post a fall many months ago was never evaluated but is been having ongoing problems with range of motion her right upper extremity and pain in the mid humeral shaft region-we  will obtain x-ray imaging today showing the area of some irritation but does not look like any acute fractures or previous fractures of the humerus-my suspicion was a contusion which is led to some calcifications in her joint and with range of motion of her upper extremity she is having some more discomfort.  We discussed with her continue with normal range of motion activities but if not improving referral to physical therapy to prevent further progression.    She uses Zofran on a regular basis but insurance is only been feeling small quantity of her medication we will refill that for her today    She periodically sees a problem with some lower extremity edema throughout the day but is improved by the following morning this is not a regular occurrence but does happen every now and then we discussed with her if it happens for more than 2 or 3 days in a row to consider starting a diuretic medication we consider echocardiogram as well    She has a rash she wants to get in her right lower extremity bilateral elbows which seems consistent with psoriasis-we discussed with her topical steroid use for this but also discussed with her that if this improves rapidly we should consider referral to rheumatology as she has had many problems in the past with ongoing arthritic problems which could be psoriatic arthritis and not osteoarthritis.    ROS: negative except as per HPI    OBJECTIVE:   The patient appears well, alert, oriented x 3, in no distress.  /89 (Patient Site: Right Arm, Patient Position: Sitting, Cuff Size: Adult Regular)  Pulse 67  Temp 98.3  F (36.8  C) (Oral)   Resp 16  Wt 144 lb (65.3 kg)  BMI 25.11 kg/m2    Lungs: clear, good air entry, no wheezes, rhonchi or rales.   Cardiac: S1 and S2 normal, no murmurs, regular rate and rhythm.   Abdomen: normal bowel sounds, soft   Extremities: show no edema, normal peripheral pulses.   Neurological: normal, no focal findings.  Skin: Right lower posterior leg  the popliteal region has a 2 cm psoriasis appearing lesion and she has small psoriasis appearing irritation in her bilateral extensor surfaces of her elbows  Psych- normal mood and affect      Pt states an understanding and agrees to the above plan.  Greater than 25 minutes was spent today in interview and examination with Mariah Koehler with more than 50% of that time in counseling and coordination of care.

## 2021-06-21 NOTE — PROGRESS NOTES
Assessment and Plan:     1. Health care maintenance  Patient is not fasting they will return fasting  Discussed the new shingles vaccine with her  She will return for flu vaccine in the future  She is due for mammogram and we gave her referral today  - Comprehensive Metabolic Panel; Future  - HM2(CBC w/o Differential); Future  - Lipid Cascade; Future  - Vitamin B12; Future    2. Cervical radiculopathy  Patient is following with the pain clinic but is having ongoing problems with lower extremity and upper extremity pain  She has had problems with stenosis and herniation in the past and concerning for increasing problems and pain with the right upper extremity and muscular weakness for the possibility of cervical etiology we discussed with her MRI of the cervical spine  We will follow-up based on results  - MR Cervical Spine Without Contrast; Future    3. Lumbar radiculopathy  Patient has had known stenosis and disc herniation has had increasing problems with pain in her lumbar back and radiation down her bilateral lower extremities  She like to further evaluate to see if the symptoms have worsened to see if there is a surgical option for improvement  She is also on a pain pump at this time  - MR Lumbar Spine Without Contrast; Future    4. Insufficiency fracture of pelvis  Patient has known insufficiency fractures of her pelvis x4 in the past  She does not feel these been healing well in the past and continues to have problems with pain  She like to have further evaluation to see if these have worsened or new ones develop that she has ongoing pain day-to-day  - MR Pelvis Without Contrast; Future    5. Controlled substance agreement signed  Patient takes insomnia medications Ambien at night to help her sleep  Controlled substance agreement filled up for her today    6. Insomnia  Patient takes Ambien at night we discussed the risks of taking this medication especially in the setting of taking chronic pain medication  in the form of a pain pump due to her chronic pain  Discussed the risk factors  She has been trying to limit her use of Ambien    7. Memory changes  Patient's been noticing some increasing memory changes  We discussed different possibilities and options for starting medication such as Aricept or Namenda  She like to first start evaluating her chronic pain situations with the imaging and consider the memory medications    8. COPD (chronic obstructive pulmonary disease) (H)  Patient is not taking any inhalers currently for her COPD and does note some shortness of breath with exertion she had previously in years past been on multiple different inhalers and always had problems with thrush and did not really see improvement with her breathing so she is no longer on them  She recalls having some improvement with Combivent and we discussed alternative form with nebulizers  She will try this and contact me with an update    9. Vitamin B12 deficiency (non anemic)  Patient has history of B12 deficiency and she feels her memory is worse when she is long B12  She like to have this checked when she returns for her lab work    10. Major depression in partial remission (H)  Patient feels her current dose of sertraline is working well like to continue with that medication at this time    The patient's current medical problems were reviewed.    The following high BMI interventions were performed this visit: encouragement to exercise  The following health maintenance schedule was reviewed with the patient and provided in printed form in the after visit summary:   Health Maintenance   Topic Date Due     DEPRESSION FOLLOW UP  1955     MAMMOGRAM  1955     PAP SMEAR  12/25/1985     COLONOSCOPY  12/25/2005     ADVANCE DIRECTIVES DISCUSSED WITH PATIENT  08/20/2014     ZOSTER VACCINE  12/25/2015     INFLUENZA VACCINE RULE BASED (1) 08/01/2018     TD 18+ HE  01/11/2021     TDAP ADULT ONE TIME DOSE  Completed        Subjective:    Chief Complaint: Mariah Koehler is an 62 y.o. female here for an Annual Wellness visit.   HPI: Patient is here for yearly exam.  She is not fasting and will return at a future date for fasting labs.  Patient's controlled substance agreement was updated and she uses zolpidem at night to help her sleep as she is trying to use this infrequently as she is aware of the possible complications and side effects of of interaction between this medication and her pain pump.  She has been noticing some increasing memory changes but also notes that she been having some problems with sleep because of uncontrolled pain.  We discussed options such as memory medications donepezil and Namenda but patient like to hold off at this time.  She follows with the pain clinic and has chronic pain issues they do not seem to be resolving and some are worsening.  She has known stenosis and disc herniation in her lumbar spine she has known sacral insufficiency fractures and she has been dealing with increasing right upper extremity pain and muscle weakness.  She like to have further imaging of these areas to assess change in symptoms in comparison and change in anatomy 2 years ago when she had the MRI is completed.  I think with her advancing pain I think this is appropriate.  She has had some edema in her lower extremities and restart frusemide 20 mg daily    Patient has COPD and is on no controller inhalers-she has been on multiple in the past and has had complications such as thrush on many of them and did not notice improvement of symptoms and due to the cost discontinue those-she did note she remembered some improvement with Combivent and we discussed with her a cheaper alternative in the form of nebulization she will try this over the next month  Patient has had problems with vitamin B12 deficiency in the past and noted to have problems with memory during that time due to her recent recollection of having increasing memory problems she  would also like to check a B12 level when she returns for fasting labs    Patient has been taking sertraline for depression and is controlling her symptoms well she like to continue with current dosing  Patient will check with insurance about coverage for the new shingles vaccine  She will delay influenza vaccine at this time  She is due for mammogram and we have placed referral for today    Review of Systems:   Please see above.  The rest of the review of systems are negative for all systems.    Patient Care Team:  Alex Nolasco MD as PCP - General (Family Medicine)     Patient Active Problem List   Diagnosis     Nausea     Chest Pain     Opioid Dependence With Continuous Use     Edema     Wheezing (Symptom)     Pain During Urination (Dysuria)     Frequent, Full-bladder Emptying (Polyuria)     Chronic Obstructive Pulmonary Disease     Chronic Pain Syndrome     Vitamin B12 Deficiency     Microscopic Hematuria     Cough     Abdominal Pain In The Central Upper Belly (Epigastric)     Reflex Sympathetic Dystrophy Of The Left Upper Limb     Osteoporosis     Carpal Tunnel Syndrome     Hypertension     Constipation     Abdominal Pain     Fatigue     Past Medical History:   Diagnosis Date     Opiate dependence (H)      RSD (reflex sympathetic dystrophy)      Sacral fracture, closed (H)       Past Surgical History:   Procedure Laterality Date     TX DECOMPRESS FASCIOTOMY FINGR/HAND      Description: Decompression Fasciotomy Of The Hand;  Recorded: 09/19/2011;     TX LAP,CHOLECYSTECTOMY      Description: Cholecystectomy Laparoscopic;  Recorded: 12/09/2011;     TX REVISE MEDIAN N/CARPAL TUNNEL SURG      Description: Neuroplasty Decompression Median Nerve At Carpal Tunnel;  Recorded: 09/19/2011;     TX SYMPATHECTOMY,CERVICOTHORACIC      Description: Surgical Sympathectomy Cervicothoracic;  Recorded: 09/19/2011;     TX TOTAL ABDOM HYSTERECTOMY      Description: Hysterectomy;  Recorded: 03/23/2011;      Family History    Problem Relation Age of Onset     Stroke Mother      Heart disease Father      Pulmonary Hypertension Father      Kidney failure Father       Social History     Social History     Marital status:      Spouse name: N/A     Number of children: N/A     Years of education: N/A     Occupational History     Not on file.     Social History Main Topics     Smoking status: Current Every Day Smoker     Packs/day: 0.50     Types: Cigarettes     Smokeless tobacco: Current User     Alcohol use Not on file     Drug use: Not on file     Sexual activity: Not on file     Other Topics Concern     Not on file     Social History Narrative      Current Outpatient Prescriptions   Medication Sig Dispense Refill     albuterol (PROAIR HFA) 90 mcg/actuation inhaler Inhale 1-2 puffs every 4 (four) hours as needed. 1 Inhaler 3     albuterol (PROVENTIL) 2.5 mg /3 mL (0.083 %) nebulizer solution Take 3 mL (2.5 mg total) by nebulization every 6 (six) hours as needed for wheezing. 75 mL 12     atenolol (TENORMIN) 25 MG tablet Take 1 tablet (25 mg total) by mouth daily. 90 tablet 0     baclofen (LIORESAL) 10 MG tablet TAKE 2 TABLETS BY MOUTH FOUR TIMES DAILY 240 tablet 0     ergocalciferol (VITAMIN D2) 50,000 unit capsule Take 1 tablet twice a week Monday and Friday 12 capsule 3     metoclopramide (REGLAN) 10 MG tablet TAKE 1 TABLET (10 MG TOTAL) BY MOUTH DAILY AS NEEDED FOR NAUSEA. 30 tablet 5     ondansetron (ZOFRAN) 4 MG tablet Take 1 tablet (4 mg total) by mouth every 8 (eight) hours as needed for nausea. 90 tablet 1     pain IT pump - patient supplied 600 mcg by Intrathecal route continuous. The medication in this patient's IT pump  Fentanyl and bupivacaine as directed       polyethylene glycol (GLYCOLAX) 17 gram/dose powder Take 527 g by mouth daily.       sertraline (ZOLOFT) 50 MG tablet Take 1 tablet (50 mg total) by mouth daily. 90 tablet 2     triamcinolone (KENALOG) 0.1 % ointment Apply topically 2 (two) times a day. Apply to  "affected area twice daily 80 g 0     zolpidem (AMBIEN) 10 mg tablet Take 1 tablet (10 mg total) by mouth at bedtime. 30 tablet 0     furosemide (LASIX) 20 MG tablet Take 1 tablet (20 mg total) by mouth daily. 30 tablet 1     ipratropium (ATROVENT) 0.02 % nebulizer solution Take 2.5 mL (0.5 mg total) by nebulization 4 (four) times a day. 300 mL 0     Current Facility-Administered Medications   Medication Dose Route Frequency Provider Last Rate Last Dose     denosumab 60 mg (PROLIA 60 mg/ml)  60 mg Subcutaneous Q6 Months Jonathan Watts MD   60 mg at 11/07/17 0924      Objective:   Vital Signs:   Visit Vitals     /70 (Patient Site: Right Arm, Patient Position: Sitting, Cuff Size: Adult Regular)     Pulse 68     Temp 97.9  F (36.6  C) (Oral)     Resp 18     Ht 5' 3\" (1.6 m)     Wt 144 lb (65.3 kg)     BMI 25.51 kg/m2        VisionScreening:  No exam data present     PHYSICAL EXAM  Physical Examination: General appearance - alert, well appearing, moves as in pain frequently  Mental status - alert, oriented to person, place, and time  Eyes - pupils equal and reactive, extraocular eye movements intact  Ears - bilateral TM's and external ear canals normal  Nose - normal and patent, no erythema, discharge or polyps  Mouth - mucous membranes moist, pharynx normal without lesions  Lymphatics - no palpable lymphadenopathy, no hepatosplenomegaly  Chest - clear to auscultation, no wheezes, rales or rhonchi, symmetric air entry  Heart - normal rate, regular rhythm, normal S1, S2, no murmurs, rubs, clicks or gallops  Abdomen - soft, nontender, nondistended, no masses or organomegaly  Back exam -she expresses pain in her lumbar back with radiation down the bilateral lower extremities  Musculoskeletal -diffuse pain in multiple joints and radicular symptoms expressed in her upper and lower extremities  Extremities - peripheral pulses normal, no pedal edema, no clubbing or cyanosis  Skin - normal coloration and turgor, no " rashes, no suspicious skin lesions noted      Assessment Results 10/12/2018   Activities of Daily Living 1 - Full function   Instrumental Activities of Daily Living 2-4 - Moderate impairment   Get Up and Go Score 12 seconds or more   Mini Cog Total Score 4   Some recent data might be hidden     A Mini-Cog score of 0-2 suggests the possibility of dementia, score of 3-5 suggests no dementia    Identified Health Risks:     The patient was provided with suggestions to help her develop a healthy lifestyle.   She is at risk for lack of exercise and has been provided with information to increase physical activity for the benefit of her well-being.  The patient was counseled and encouraged to consider modifying their diet and eating habits. She was provided with information on recommended healthy diet options.  The patient reports that she has difficulty with activities of daily living.  The patient reports that she has difficulty with instrumental activities of daily living.    The patient's PHQ-9 score is consistent with mild depression.   She is at risk for falling and has been provided with information to reduce the risk of falling at home.  Information regarding advance directives (living josé), including where she can download the appropriate form, was provided to the patient via the AVS.

## 2021-06-22 NOTE — TELEPHONE ENCOUNTER
RN cannot approve Refill Request    RN can NOT refill this medication med is not covered by policy/route to provider. Last office visit: 8/24/2018 Alex Nolasco MD Last Physical: 10/12/2018 Last MTM visit: Visit date not found Last visit same specialty: 8/24/2018 Alex Nolasco MD.  Next visit within 3 mo: Visit date not found  Next physical within 3 mo: Visit date not found      Debbie Argueta, [facility] Triage/Med Refill 1/6/2019    Requested Prescriptions   Pending Prescriptions Disp Refills     baclofen (LIORESAL) 10 MG tablet 240 tablet 0     Sig: Take 2 tablets (20 mg total) by mouth 4 (four) times a day.    There is no refill protocol information for this order

## 2021-06-23 ENCOUNTER — RECORDS - HEALTHEAST (OUTPATIENT)
Dept: FAMILY MEDICINE | Facility: CLINIC | Age: 66
End: 2021-06-23

## 2021-06-23 NOTE — TELEPHONE ENCOUNTER
Controlled Substance Refill Request  Medication:   Requested Prescriptions     Pending Prescriptions Disp Refills     zolpidem (AMBIEN) 10 mg tablet [Pharmacy Med Name: ZOLPIDEM TARTRATE 10 MG TABLET] 30 tablet 0     Sig: TAKE 1 TABLET BY MOUTH EVERYDAY AT BEDTIME     Date Last Fill: 12/18/18  [id number]   Submit electronically to pharmacy  Controlled Substance Agreement on File:   Encounter-Level CSA Scan Date - 11/22/2016:    Scan on 11/22/2016 (below)         Last office visit: Last office visit pertaining to requested medication was 10/12/18

## 2021-06-23 NOTE — TELEPHONE ENCOUNTER
RN cannot approve Refill Request    RN can NOT refill this medication med is not covered by policy/route to provider. Last office visit: 8/24/2018 Alex Nolasco MD Last Physical: 10/12/2018 Last MTM visit: Visit date not found Last visit same specialty: 8/24/2018 Alex Nolasco MD.  Next visit within 3 mo: Visit date not found  Next physical within 3 mo: Visit date not found      Maria C Jimenez, [facility] Triage/Med Refill 1/25/2019    Requested Prescriptions   Pending Prescriptions Disp Refills     baclofen (LIORESAL) 10 MG tablet [Pharmacy Med Name: BACLOFEN 10 MG TABLET] 240 tablet 0     Sig: TAKE 2 TABLETS (20 MG TOTAL) BY MOUTH 4 (FOUR) TIMES A DAY    There is no refill protocol information for this order

## 2021-06-24 NOTE — TELEPHONE ENCOUNTER
RN cannot approve Refill Request    RN can NOT refill this medication med is not covered by policy/route to provider. Last office visit: Visit date not found Last Physical: Visit date not found Last MTM visit: Visit date not found Last visit same specialty: 8/24/2018 Alex Nolasco MD.  Next visit within 3 mo: Visit date not found  Next physical within 3 mo: Visit date not found      Maria C Jimenez, [facility] Triage/Med Refill 3/5/2019    Requested Prescriptions   Pending Prescriptions Disp Refills     baclofen (LIORESAL) 10 MG tablet [Pharmacy Med Name: BACLOFEN 10 MG TABLET] 240 tablet 0     Sig: TAKE 2 TABLETS (20 MG TOTAL) BY MOUTH 4 (FOUR) TIMES A DAY    There is no refill protocol information for this order

## 2021-06-24 NOTE — TELEPHONE ENCOUNTER
Patient notified. She has been taking the Vitamin B12 since labs were drawn in 12/2018. She will be seeing endocrinology in May and can have the level rechecked at that time.

## 2021-06-24 NOTE — TELEPHONE ENCOUNTER
Please inform patient that vitamin supplement is very dependent upon absorption- the amount she is taking is fine- if it is enough for her would need to be determined with a blood level draw at her next visit.  Dr. Nolasco

## 2021-06-24 NOTE — TELEPHONE ENCOUNTER
RN cannot approve Refill Request    RN can NOT refill this medication med is not covered by policy/route to provider. Last office visit: 8/24/2018 Alex Nolasco MD Last Physical: 10/12/2018 Last MTM visit: Visit date not found Last visit same specialty: 8/24/2018 Alex Nolasco MD.  Next visit within 3 mo: Visit date not found  Next physical within 3 mo: Visit date not found      Maria C Jimenez, [facility] Triage/Med Refill 3/7/2019    Requested Prescriptions   Pending Prescriptions Disp Refills     ondansetron (ZOFRAN) 4 MG tablet 90 tablet 1     Sig: Take 1 tablet (4 mg total) by mouth every 8 (eight) hours as needed for nausea.    There is no refill protocol information for this order

## 2021-06-25 NOTE — TELEPHONE ENCOUNTER
Controlled Substance Refill Request  Medication Name:   Requested Prescriptions     Pending Prescriptions Disp Refills     zolpidem (AMBIEN) 10 mg tablet [Pharmacy Med Name: ZOLPIDEM TARTRATE 10 MG TABLET] 30 tablet 0     Sig: TAKE 1 TABLET BY MOUTH AT BEDTIME AS NEEDED FOR SLEEP.     ondansetron (ZOFRAN) 4 MG tablet [Pharmacy Med Name: ONDANSETRON HCL 4 MG TABLET] 90 tablet 0     Sig: TAKE 1 TABLET BY MOUTH EVERY 8 HOURS AS NEEDED FOR NAUSEA     Date Last Fill: 4/30/21  [facility]ed [facility]  Submit electronically to pharmacy  Controlled Substance Agreement on file:   Encounter-Level CSA Scan Date - 10/12/2018:    Scan on 10/16/2018  1:55 PM           Encounter-Level CSA Scan Date - 11/22/2016:    Scan on 11/22/2016        Last office visit:  4/14/21   Darlene Lewis RN, MA  [facility]    Triage Nurse Advisor

## 2021-06-25 NOTE — TELEPHONE ENCOUNTER
RN cannot approve Refill Request    RN can NOT refill this medication med is not covered by policy/route to provider. Last office visit: 2/14/2020 Alex Nolasco MD Last Physical: 4/14/2021 Last MTM visit: Visit date not found Last visit same specialty: 2/14/2020 Alex Nolasco MD.  Next visit within 3 mo: Visit date not found  Next physical within 3 mo: Visit date not found      Sonia Davies, [facility] Triage/Med Refill 5/27/2021    Requested Prescriptions   Pending Prescriptions Disp Refills     ondansetron (ZOFRAN) 4 MG tablet [Pharmacy Med Name: ONDANSETRON HCL 4 MG TABLET] 90 tablet 0     Sig: TAKE 1 TABLET BY MOUTH EVERY 8 HOURS AS NEEDED FOR NAUSEA       There is no refill protocol information for this order

## 2021-06-26 NOTE — TELEPHONE ENCOUNTER
She is scheduled with you on 7/19 already for an office visit. Should we just switch that to her pre op? Not sure if you want to do it the afternoon prior to surgery. Otherwise we can schedule with a partner sooner

## 2021-06-26 NOTE — TELEPHONE ENCOUNTER
New Appointment Needed  What is the reason for the visit:    Pre-Op Appt Request  When is the surgery? :  7/20  Where is the surgery?:   [facility]  Who is the surgeon? :  Dr. Aly  What type of surgery is being done?: Pain pump replacing or moving something  Provider Preference: Any available  How soon do you need to be seen?: before 7/20  Waitlist offered?: No  Okay to leave a detailed message:  Yes

## 2021-06-28 ENCOUNTER — COMMUNICATION - HEALTHEAST (OUTPATIENT)
Dept: FAMILY MEDICINE | Facility: CLINIC | Age: 66
End: 2021-06-28

## 2021-06-28 DIAGNOSIS — G47.00 INSOMNIA, UNSPECIFIED TYPE: ICD-10-CM

## 2021-07-03 NOTE — ADDENDUM NOTE
Addendum Note by Sarai Lazcano DO at 3/31/2020 11:20 AM     Author: Sarai Lazcano DO Service: -- Author Type: Physician    Filed: 4/1/2020  6:07 AM Encounter Date: 3/31/2020 Status: Signed    : Sarai Lazcano DO (Physician)    Addended by: SARAI LAZCANO on: 4/1/2020 06:07 AM        Modules accepted: Level of Service

## 2021-07-07 NOTE — TELEPHONE ENCOUNTER
Controlled Substance Refill Request  Medication Name:   Requested Prescriptions     Pending Prescriptions Disp Refills     zolpidem (AMBIEN) 10 mg tablet [Pharmacy Med Name: ZOLPIDEM TARTRATE 10 MG TABLET] 30 tablet 0     Sig: TAKE 1 TABLET BY MOUTH EVERY DAY AT BEDTIME AS NEEDED FOR SLEEP     Date Last Fill: 6/2/21  [facility]  Submit electronically to pharmacy  Controlled Substance Agreement on file:   Encounter-Level CSA Scan Date - 10/12/2018:    Scan on 10/16/2018  1:55 PM           Encounter-Level CSA Scan Date - 11/22/2016:    Scan on 11/22/2016        Last office visit:  4/14/21  Darlene Lewis RN, MA  [facility]    Triage Nurse Advisor

## 2021-07-19 ENCOUNTER — OFFICE VISIT (OUTPATIENT)
Dept: FAMILY MEDICINE | Facility: CLINIC | Age: 66
End: 2021-07-19
Payer: COMMERCIAL

## 2021-07-19 VITALS
DIASTOLIC BLOOD PRESSURE: 88 MMHG | WEIGHT: 123 LBS | RESPIRATION RATE: 20 BRPM | BODY MASS INDEX: 21.79 KG/M2 | HEART RATE: 74 BPM | SYSTOLIC BLOOD PRESSURE: 164 MMHG

## 2021-07-19 DIAGNOSIS — M79.662 PAIN OF LEFT LOWER LEG: ICD-10-CM

## 2021-07-19 DIAGNOSIS — G89.29 OTHER CHRONIC PAIN: ICD-10-CM

## 2021-07-19 DIAGNOSIS — Z01.818 PREOP GENERAL PHYSICAL EXAM: Primary | ICD-10-CM

## 2021-07-19 DIAGNOSIS — R41.3 MEMORY CHANGES: ICD-10-CM

## 2021-07-19 LAB
CREAT SERPL-MCNC: 0.73 MG/DL (ref 0.6–1.1)
ERYTHROCYTE [DISTWIDTH] IN BLOOD BY AUTOMATED COUNT: 13.2 % (ref 10–15)
GFR SERPL CREATININE-BSD FRML MDRD: 87 ML/MIN/1.73M2
HCT VFR BLD AUTO: 44.2 % (ref 35–47)
HGB BLD-MCNC: 14.3 G/DL (ref 11.7–15.7)
MCH RBC QN AUTO: 28.9 PG (ref 26.5–33)
MCHC RBC AUTO-ENTMCNC: 32.4 G/DL (ref 31.5–36.5)
MCV RBC AUTO: 90 FL (ref 78–100)
PLATELET # BLD AUTO: 221 10E3/UL (ref 150–450)
RBC # BLD AUTO: 4.94 10E6/UL (ref 3.8–5.2)
WBC # BLD AUTO: 6.4 10E3/UL (ref 4–11)

## 2021-07-19 PROCEDURE — 93005 ELECTROCARDIOGRAM TRACING: CPT | Performed by: FAMILY MEDICINE

## 2021-07-19 PROCEDURE — 36415 COLL VENOUS BLD VENIPUNCTURE: CPT | Performed by: FAMILY MEDICINE

## 2021-07-19 PROCEDURE — 82565 ASSAY OF CREATININE: CPT | Performed by: FAMILY MEDICINE

## 2021-07-19 PROCEDURE — 85027 COMPLETE CBC AUTOMATED: CPT | Performed by: FAMILY MEDICINE

## 2021-07-19 PROCEDURE — 99215 OFFICE O/P EST HI 40 MIN: CPT | Performed by: FAMILY MEDICINE

## 2021-07-19 PROCEDURE — 93010 ELECTROCARDIOGRAM REPORT: CPT | Performed by: INTERNAL MEDICINE

## 2021-07-19 RX ORDER — DONEPEZIL HYDROCHLORIDE 5 MG/1
5 TABLET, FILM COATED ORAL AT BEDTIME
Qty: 90 TABLET | Refills: 1 | Status: SHIPPED | OUTPATIENT
Start: 2021-07-19 | End: 2022-01-11

## 2021-07-19 RX ORDER — METOCLOPRAMIDE 10 MG/1
10 TABLET ORAL 3 TIMES DAILY
COMMUNITY
Start: 2021-06-28 | End: 2021-12-28

## 2021-07-19 RX ORDER — OXYCODONE HYDROCHLORIDE 5 MG/1
TABLET ORAL
COMMUNITY
Start: 2021-07-03 | End: 2022-09-30

## 2021-07-19 NOTE — PROGRESS NOTES
[facility]  [address] [facility] [address]  Phone: [phone]  Fax: [phone]  Primary Provider: Alex Nolasco  Pre-op Performing Provider: ALEX NOLASCO      PREOPERATIVE EVALUATION:  Today's date: 7/19/2021    Mariah Koehler is a 65 year old female who presents for a preoperative evaluation.    Surgical Information:  Surgery/Procedure: Replacing pain pump   Surgery Location: [facility]   Surgeon: Dr Ch  Surgery Date: 7/20/2021   Time of Surgery:  10 AM  Where patient plans to recover: At home with family  Fax number for surgical facility: [phone]    Type of Anesthesia Anticipated: General    Assessment & Plan     The proposed surgical procedure is considered LOW risk.    Preop general physical exam  Patient to proceed with surgery tomorrow  - EKG 12-lead, tracing only  - CBC with platelets; Future  - Creatinine; Future  - CBC with platelets  - Creatinine    Other chronic pain  Patient has a pain pump that is being exchanged tomorrow  She continues to have lower extremity left greater than right discomfort  Suspect progressive stenosis  Patient has concerns about falls with increasing weakness and we discussed physical therapy she will consider    Memory changes  Patient has been noting increasing issues with her memory in the last year  Discussed a trial on Aricept  Continue to work on mental and physical activities  - donepezil (ARICEPT) 5 MG tablet; Take 1 tablet (5 mg) by mouth At Bedtime    Pain of left lower leg  Suspect worsening stenosis in the lower extremities  She has extensive stenosis in cervical region as well  Discussed physical therapy which she is going to consider           Risks and Recommendations:  The patient has the following additional risks and recommendations for perioperative complications:   - No identified additional risk factors other than previously addressed    Medication Instructions:  Pt will  take beta blocker in the a.m.    RECOMMENDATION:  APPROVAL GIVEN to proceed with proposed procedure, without further diagnostic evaluation.      Subjective     HPI related to upcoming procedure: Patient with chronic pain following with pain clinic-scheduled for surgery [place] for replacement of pain pump.  She states a few months ago they replaced the battery but now her exchanging the entire pain pump.  They might be placing the pump a little deeper for more protection as she feels she a lot of problems bumping the current placement.  Patient expresses no new cardiovascular symptoms of concern.  Patient has noted some increasing memory problems we discussed this a few months ago and discussed mental and physical activity help maintain cognition.  We discussed a trial with Aricept which she is in agreement.  Patient has increasing problems and concerns about falls due to increasing lower extremity pain left greater than right.  She ambulates with the aid of a walker.  Discussed physical therapy which she will consider.  Suspect worsening stenosis leading to her current symptoms.    Preop Questions 7/19/2021   1. Have you ever had a heart attack or stroke? No   2. Have you ever had surgery on your heart or blood vessels, such as a stent placement, a coronary artery bypass, or surgery on an artery in your head, neck, heart, or legs? No   3. Do you have chest pain with activity? No   4. Do you have a history of  heart failure? No   5. Do you currently have a cold, bronchitis or symptoms of other infection? No   6. Do you have a cough, shortness of breath, or wheezing? No   7. Do you or anyone in your family have previous history of blood clots? No   8. Do you or does anyone in your family have a serious bleeding problem such as prolonged bleeding following surgeries or cuts? No   9. Have you ever had problems with anemia or been told to take iron pills? No   10. Have you had any abnormal blood loss such as black, tarry  or bloody stools, or abnormal vaginal bleeding? No   11. Have you ever had a blood transfusion? No   12. Are you willing to have a blood transfusion if it is medically needed before, during, or after your surgery? Yes   13. Have you or any of your relatives ever had problems with anesthesia? No   14. Do you have sleep apnea, excessive snoring or daytime drowsiness? No   15. Do you have any artifical heart valves or other implanted medical devices like a pacemaker, defibrillator, or continuous glucose monitor? No   16. Do you have artificial joints? No   17. Are you allergic to latex? No       Health Care Directive:  Patient does not have a Health Care Directive or Living Will: Discussed advance care planning with patient; information given to patient to review.    Preoperative Review of :   reviewed - controlled substances reflected in medication list.      Status of Chronic Conditions:  See problem list for active medical problems.  Problems all longstanding and stable, except as noted/documented.  See ROS for pertinent symptoms related to these conditions.      Review of Systems  Constitutional, neuro, ENT, endocrine, pulmonary, cardiac, gastrointestinal, genitourinary, musculoskeletal, integument and psychiatric systems are negative, except as otherwise noted.    Patient Active Problem List    Diagnosis Date Noted     Current mild episode of major depressive disorder, unspecified whether recurrent (H) 02/17/2020     Priority: Medium     Fatigue 03/22/2018     Priority: Medium     Nausea      Priority: Medium     Created by Conversion         Reflex Sympathetic Dystrophy Of The Left Upper Limb      Priority: Medium     Created by Conversion  Replacement Utility updated for latest IMO load         Osteoporosis      Priority: Medium     Created by Conversion  Replacement Utility updated for latest IMO load         Hypertension      Priority: Medium     Created by Conversion  Replacement Utility updated for  latest IMO load         Constipation      Priority: Medium     Created by Conversion  Replacement Utility updated for latest IMO load         Abdominal Pain      Priority: Medium     Created by Conversion  Replacement Utility updated for latest IMO load         Abdominal Pain In The Central Upper Belly (Epigastric)      Priority: Medium     Created by Conversion         Chest Pain      Priority: Medium     Created by Conversion         Opioid Dependence With Continuous Use      Priority: Medium     Created by Conversion         Edema      Priority: Medium     Created by Conversion         Wheezing (Symptom)      Priority: Medium     Created by Conversion         Pain During Urination (Dysuria)      Priority: Medium     Created by Conversion         Frequent, Full-bladder Emptying (Polyuria)      Priority: Medium     Created by Conversion         Chronic Obstructive Pulmonary Disease      Priority: Medium     Created by Conversion         Chronic Pain Syndrome      Priority: Medium     Created by Conversion         Vitamin B12 Deficiency      Priority: Medium     Created by Conversion         Microscopic Hematuria      Priority: Medium     Created by Conversion         Cough      Priority: Medium     Created by Conversion         Carpal Tunnel Syndrome      Priority: Medium     Created by Conversion          Past Medical History:   Diagnosis Date     Current mild episode of major depressive disorder, unspecified whether recurrent (H) 2/17/2020     Hypertension      Opiate dependence (H)      RSD (reflex sympathetic dystrophy)      Sacral fracture, closed (H)      Past Surgical History:   Procedure Laterality Date     APPENDECTOMY       C DECOMPRESS FASCIOTOMY FINGR/HAND      Description: Decompression Fasciotomy Of The Hand;  Recorded: 09/19/2011;     C LAP,CHOLECYSTECTOMY/EXPLORE      Description: Cholecystectomy Laparoscopic;  Recorded: 12/09/2011;     C TOTAL ABDOM HYSTERECTOMY      Description: Hysterectomy;   Recorded: 03/23/2011;     GYN SURGERY       HC REVISE MEDIAN N/CARPAL TUNNEL SURG      Description: Neuroplasty Decompression Median Nerve At Carpal Tunnel;  Recorded: 09/19/2011;     HYSTERECTOMY  1984     IR CERVICAL EPIDURAL STEROID INJECTION  1/14/2005     OOPHORECTOMY Bilateral 1985     TX SYMPATHECTOMY,CERVICOTHORACIC      Description: Surgical Sympathectomy Cervicothoracic;  Recorded: 09/19/2011;     Current Outpatient Medications   Medication Sig Dispense Refill     atenolol (TENORMIN) 25 MG tablet Take 25 mg by mouth       baclofen (LIORESAL) 10 MG tablet Take 20 mg by mouth       metoclopramide (REGLAN) 10 MG tablet Take 10 mg by mouth 3 times daily AS NEEDED FOR NAUSEA       omeprazole (PRILOSEC) 20 MG DR capsule Take 20 mg by mouth       ondansetron (ZOFRAN) 4 MG tablet Take 4 mg by mouth       oxyCODONE (ROXICODONE) 5 MG tablet TAKE ONE TABLET BY ORAL ROUTE UP TO TWO TIMES A DAY AS NEEDED FOR SEVERE PAIN**7/3       zolpidem (AMBIEN) 10 MG tablet Take one daily for insomnia       amLODIPine (NORVASC) 5 MG tablet TAKE 1 TABLET BY MOUTH EVERY DAY (Patient not taking: Reported on 7/19/2021)       cyanocobalamin (VITAMIN B-12) 100 MCG tablet Take 100 mcg by mouth daily (Patient not taking: Reported on 7/19/2021)         Allergies   Allergen Reactions     Codeine Nausea and Vomiting     Morphine Nausea and Vomiting     Bacitracin Rash     Methadone Rash        Social History     Tobacco Use     Smoking status: Current Every Day Smoker     Packs/day: 0.75     Years: 51.00     Pack years: 38.25     Types: Cigarettes     Start date: 1/1/1968     Smokeless tobacco: Never Used   Substance Use Topics     Alcohol use: Yes     Family History   Problem Relation Age of Onset     Cerebrovascular Disease Mother      Diabetes Mother      Heart Disease Mother      Hypertension Mother      Rheumatologic Disease Mother      Heart Disease Father      Pulmonary Hypertension Father      Kidney failure Father      Cancer Father          melanoma     Diabetes Sister      Hypertension Sister      Hypertension Brother      History   Drug Use Unknown         Objective     BP (!) 164/88 (BP Location: Left arm, Patient Position: Sitting, Cuff Size: Adult Regular)   Pulse 74   Resp 20   Wt 55.8 kg (123 lb)   BMI 21.79 kg/m      Physical Exam    GENERAL APPEARANCE: healthy, alert and no distress     EYES: EOMI,  PERRL     HENT: ear canals and TM's normal and nose and mouth without ulcers or lesions     RESP: lungs clear to auscultation - no rales, rhonchi or wheezes     CV: regular rates and rhythm, normal S1 S2, no S3 or S4 and no murmur, click or rub     ABDOMEN:  soft, nontender, no HSM or masses and bowel sounds normal     MS: extremities normal- no gross deformities noted, no evidence of inflammation in joints, FROM in all extremities.     SKIN: no suspicious lesions or rashes     NEURO: Normal strength and tone, sensory exam grossly normal, mentation intact and speech normal     PSYCH: mentation appears normal. and affect normal/bright    Recent Labs   Lab Test 04/14/21  1449 12/16/20  1544 12/16/20  1544 03/30/20  1451 11/25/19  1441   HGB  --  15.3  --  14.8 13.0   PLT  --  230  --  223 196   INR  --   --   --   --  1.01     --  141 139 143   POTASSIUM 4.6  --  3.6 3.9 3.9   CR 0.90  --  0.70 0.73 0.84        Diagnostics:  Labs pending   EKG: appears normal, NSR, reviewed by provider today, normal axis, normal intervals, no acute ST/T changes c/w ischemia, no LVH by voltage criteria, unchanged from previous tracings    Revised Cardiac Risk Index (RCRI):  The patient has the following serious cardiovascular risks for perioperative complications:   - No serious cardiac risks = 0 points     RCRI Interpretation: 0 points: Class I (very low risk - 0.4% complication rate)           Signed Electronically by: Alex Nolasco MD  Copy of this evaluation report is provided to requesting physician.

## 2021-07-19 NOTE — PATIENT INSTRUCTIONS
Monitor blood pressure at home next couple of weeks- if blood pressure running high over 130/80 then contact Dr. Nolasco and we can start back on amlodipine

## 2021-07-22 LAB
ATRIAL RATE - MUSE: 75 BPM
DIASTOLIC BLOOD PRESSURE - MUSE: NORMAL MMHG
INTERPRETATION ECG - MUSE: NORMAL
P AXIS - MUSE: 10 DEGREES
PR INTERVAL - MUSE: 140 MS
QRS DURATION - MUSE: 82 MS
QT - MUSE: 368 MS
QTC - MUSE: 410 MS
R AXIS - MUSE: 45 DEGREES
SYSTOLIC BLOOD PRESSURE - MUSE: NORMAL MMHG
T AXIS - MUSE: 55 DEGREES
VENTRICULAR RATE- MUSE: 75 BPM

## 2021-08-13 DIAGNOSIS — R11.0 NAUSEA: ICD-10-CM

## 2021-08-13 RX ORDER — ONDANSETRON 4 MG/1
TABLET, FILM COATED ORAL
Qty: 90 TABLET | Refills: 0 | Status: SHIPPED | OUTPATIENT
Start: 2021-08-13 | End: 2021-09-23

## 2021-09-02 DIAGNOSIS — G47.00 INSOMNIA, UNSPECIFIED TYPE: ICD-10-CM

## 2021-09-03 RX ORDER — ZOLPIDEM TARTRATE 10 MG/1
TABLET ORAL
Qty: 30 TABLET | Refills: 0 | Status: SHIPPED | OUTPATIENT
Start: 2021-09-03 | End: 2021-10-18

## 2021-09-03 NOTE — TELEPHONE ENCOUNTER
Routing refill request to provider for review/approval because:  Controlled substance request    Last Written Prescription Date:  8/9/21  Last Fill Quantity: 30,  # refills: 0   Last office visit provider:  7/19/21     Requested Prescriptions   Pending Prescriptions Disp Refills     zolpidem (AMBIEN) 10 MG tablet [Pharmacy Med Name: ZOLPIDEM TARTRATE 10 MG TABLET] 30 tablet 0     Sig: TAKE 1 TABLET BY MOUTH EVERY DAY AT BEDTIME AS NEEDED FOR SLEEP       There is no refill protocol information for this order          Navneet Do RN 09/03/21 11:46 AM

## 2021-09-04 ENCOUNTER — HEALTH MAINTENANCE LETTER (OUTPATIENT)
Age: 66
End: 2021-09-04

## 2021-09-21 DIAGNOSIS — R11.0 NAUSEA: ICD-10-CM

## 2021-09-21 DIAGNOSIS — K21.00 GASTRO-ESOPHAGEAL REFLUX DISEASE WITH ESOPHAGITIS, WITHOUT BLEEDING: ICD-10-CM

## 2021-09-22 NOTE — TELEPHONE ENCOUNTER
"Routing refill request to provider for review/approval because:  Failed protocol.    Ondansetron Last Written Prescription Date:  6/2/2021  Last Fill Quantity: 90,  # refills: 0   Last office visit provider:  7/19/2021 Dr. Nolasco     Omeprazole Last Written Prescription Date:  5/19/2021  Last Fill Quantity: 180,  # refills: 1   Last office visit provider:  7/19/2021 Dr. Nolasco       Requested Prescriptions   Pending Prescriptions Disp Refills     ondansetron (ZOFRAN) 4 MG tablet [Pharmacy Med Name: ONDANSETRON HCL 4 MG TABLET] 90 tablet 0     Sig: TAKE 1 TABLET BY MOUTH EVERY 8 HOURS AS NEEDED FOR NAUSEA        Antivertigo/Antiemetic Agents Passed - 9/21/2021  1:43 PM        Passed - Recent (12 mo) or future (30 days) visit within the authorizing provider's specialty     Patient has had an office visit with the authorizing provider or a provider within the authorizing providers department within the previous 12 mos or has a future within next 30 days. See \"Patient Info\" tab in inbasket, or \"Choose Columns\" in Meds & Orders section of the refill encounter.              Passed - Medication is active on med list        Passed - Patient is 18 years of age or older           omeprazole (PRILOSEC) 20 MG DR capsule [Pharmacy Med Name: OMEPRAZOLE DR 20 MG CAPSULE] 180 capsule 1     Sig: TAKE 1 CAPSULE BY MOUTH TWICE A DAY BEFORE MEALS       PPI Protocol Failed - 9/21/2021  1:43 PM        Failed - No diagnosis of osteoporosis on record        Passed - Not on Clopidogrel (unless Pantoprazole ordered)        Passed - Recent (12 mo) or future (30 days) visit within the authorizing provider's specialty     Patient has had an office visit with the authorizing provider or a provider within the authorizing providers department within the previous 12 mos or has a future within next 30 days. See \"Patient Info\" tab in inbasket, or \"Choose Columns\" in Meds & Orders section of the refill encounter.              Passed - Medication " is active on med list        Passed - Patient is age 18 or older        Passed - No active pregnacy on record        Passed - No positive pregnancy test in past 12 months             Maryuri Knox RN 09/21/21 7:47 PM

## 2021-09-23 RX ORDER — ONDANSETRON 4 MG/1
TABLET, FILM COATED ORAL
Qty: 90 TABLET | Refills: 0 | Status: SHIPPED | OUTPATIENT
Start: 2021-09-23 | End: 2021-10-18

## 2021-10-14 DIAGNOSIS — R11.0 NAUSEA: ICD-10-CM

## 2021-10-14 DIAGNOSIS — G47.00 INSOMNIA, UNSPECIFIED TYPE: ICD-10-CM

## 2021-10-15 NOTE — TELEPHONE ENCOUNTER
"Routing refill request to provider for review/approval because:  Controlled substance request    Last Written Prescription Date:  9/3/21  Last Fill Quantity: 30,  # refills: 0   Last office visit provider:  7/19/21     Requested Prescriptions   Pending Prescriptions Disp Refills     zolpidem (AMBIEN) 10 MG tablet [Pharmacy Med Name: ZOLPIDEM TARTRATE 10 MG TABLET] 30 tablet 0     Sig: TAKE 1 TABLET BY MOUTH AT BEDTIME AS NEEDED FOR SLEEP       There is no refill protocol information for this order        ondansetron (ZOFRAN) 4 MG tablet [Pharmacy Med Name: ONDANSETRON HCL 4 MG TABLET] 90 tablet 0     Sig: TAKE 1 TABLET BY MOUTH EVERY 8 HOURS AS NEEDED FOR NAUSEA        Antivertigo/Antiemetic Agents Passed - 10/14/2021  1:21 PM        Passed - Recent (12 mo) or future (30 days) visit within the authorizing provider's specialty     Patient has had an office visit with the authorizing provider or a provider within the authorizing providers department within the previous 12 mos or has a future within next 30 days. See \"Patient Info\" tab in inbasket, or \"Choose Columns\" in Meds & Orders section of the refill encounter.              Passed - Medication is active on med list        Passed - Patient is 18 years of age or older             Navneet Do RN 10/15/21 11:57 AM  "

## 2021-10-15 NOTE — TELEPHONE ENCOUNTER
"Routing refill request to provider for review/approval because:  Provider fill for acute dosing.    Last Written Prescription Date:  9/23/21  Last Fill Quantity: 90,  # refills: 0   Last office visit provider:  7/19/21     Requested Prescriptions   Pending Prescriptions Disp Refills     zolpidem (AMBIEN) 10 MG tablet [Pharmacy Med Name: ZOLPIDEM TARTRATE 10 MG TABLET] 30 tablet 0     Sig: TAKE 1 TABLET BY MOUTH AT BEDTIME AS NEEDED FOR SLEEP       There is no refill protocol information for this order        ondansetron (ZOFRAN) 4 MG tablet [Pharmacy Med Name: ONDANSETRON HCL 4 MG TABLET] 90 tablet 0     Sig: TAKE 1 TABLET BY MOUTH EVERY 8 HOURS AS NEEDED FOR NAUSEA        Antivertigo/Antiemetic Agents Passed - 10/14/2021  1:21 PM        Passed - Recent (12 mo) or future (30 days) visit within the authorizing provider's specialty     Patient has had an office visit with the authorizing provider or a provider within the authorizing providers department within the previous 12 mos or has a future within next 30 days. See \"Patient Info\" tab in inbasket, or \"Choose Columns\" in Meds & Orders section of the refill encounter.              Passed - Medication is active on med list        Passed - Patient is 18 years of age or older             Navneet Do RN 10/15/21 11:56 AM  "

## 2021-10-18 RX ORDER — ZOLPIDEM TARTRATE 10 MG/1
TABLET ORAL
Qty: 30 TABLET | Refills: 0 | Status: SHIPPED | OUTPATIENT
Start: 2021-10-18 | End: 2021-11-22

## 2021-10-18 RX ORDER — ONDANSETRON 4 MG/1
TABLET, FILM COATED ORAL
Qty: 90 TABLET | Refills: 0 | Status: SHIPPED | OUTPATIENT
Start: 2021-10-18 | End: 2021-12-31

## 2021-10-30 ENCOUNTER — HEALTH MAINTENANCE LETTER (OUTPATIENT)
Age: 66
End: 2021-10-30

## 2021-11-17 DIAGNOSIS — G47.00 INSOMNIA, UNSPECIFIED TYPE: ICD-10-CM

## 2021-11-19 NOTE — TELEPHONE ENCOUNTER
Routing refill request to provider for review/approval because:  Controlled substance request    Last Written Prescription Date:  10/18/21  Last Fill Quantity: 30,  # refills: 0   Last office visit provider:  7/19/21     Requested Prescriptions   Pending Prescriptions Disp Refills     zolpidem (AMBIEN) 10 MG tablet [Pharmacy Med Name: ZOLPIDEM TARTRATE 10 MG TABLET] 30 tablet 0     Sig: TAKE 1 TABLET BY MOUTH EVERY DAY AT BEDTIME AS NEEDED FOR SLEEP       There is no refill protocol information for this order          Navneet Do RN 11/19/21 11:10 AM

## 2021-11-22 RX ORDER — ZOLPIDEM TARTRATE 10 MG/1
TABLET ORAL
Qty: 30 TABLET | Refills: 0 | Status: SHIPPED | OUTPATIENT
Start: 2021-11-22 | End: 2021-12-31

## 2021-12-28 DIAGNOSIS — R11.0 NAUSEA: ICD-10-CM

## 2021-12-28 RX ORDER — METOCLOPRAMIDE 10 MG/1
TABLET ORAL
Qty: 270 TABLET | Refills: 1 | Status: ON HOLD | OUTPATIENT
Start: 2021-12-28 | End: 2023-02-21

## 2021-12-31 DIAGNOSIS — G47.00 INSOMNIA, UNSPECIFIED TYPE: ICD-10-CM

## 2021-12-31 DIAGNOSIS — R11.0 NAUSEA: ICD-10-CM

## 2021-12-31 RX ORDER — ZOLPIDEM TARTRATE 10 MG/1
TABLET ORAL
Qty: 30 TABLET | Refills: 0 | Status: SHIPPED | OUTPATIENT
Start: 2021-12-31 | End: 2022-01-31

## 2021-12-31 RX ORDER — ONDANSETRON 4 MG/1
TABLET, FILM COATED ORAL
Qty: 90 TABLET | Refills: 0 | Status: SHIPPED | OUTPATIENT
Start: 2021-12-31 | End: 2022-03-01

## 2022-01-11 DIAGNOSIS — R41.3 MEMORY CHANGES: ICD-10-CM

## 2022-01-11 RX ORDER — DONEPEZIL HYDROCHLORIDE 5 MG/1
TABLET, FILM COATED ORAL
Qty: 90 TABLET | Refills: 1 | Status: SHIPPED | OUTPATIENT
Start: 2022-01-11 | End: 2022-04-06

## 2022-01-27 DIAGNOSIS — G47.00 INSOMNIA, UNSPECIFIED TYPE: ICD-10-CM

## 2022-01-30 NOTE — TELEPHONE ENCOUNTER
Routing refill request to provider for review/approval because:  Controlled substance request    Last Written Prescription Date:  12/31/21  Last Fill Quantity: 30,  # refills: 0   Last office visit provider:  7/19/21     Requested Prescriptions   Pending Prescriptions Disp Refills     zolpidem (AMBIEN) 10 MG tablet [Pharmacy Med Name: ZOLPIDEM TARTRATE 10 MG TABLET] 30 tablet 0     Sig: TAKE 1 TABLET BY MOUTH EVERY DAY AT BEDTIME AS NEEDED FOR SLEEP       There is no refill protocol information for this order          Navneet Do RN 01/30/22 7:50 AM

## 2022-01-31 RX ORDER — ZOLPIDEM TARTRATE 10 MG/1
TABLET ORAL
Qty: 30 TABLET | Refills: 0 | Status: SHIPPED | OUTPATIENT
Start: 2022-01-31 | End: 2022-03-01

## 2022-02-25 DIAGNOSIS — R11.0 NAUSEA: ICD-10-CM

## 2022-02-25 DIAGNOSIS — G47.00 INSOMNIA, UNSPECIFIED TYPE: ICD-10-CM

## 2022-02-28 NOTE — TELEPHONE ENCOUNTER
"Routing refill request to provider for review/approval because:  Provider fill for acute dosing.    Last Written Prescription Date:  12/31/21  Last Fill Quantity: 90,  # refills: 0   Last office visit provider:  7/19/21     Requested Prescriptions   Pending Prescriptions Disp Refills     zolpidem (AMBIEN) 10 MG tablet [Pharmacy Med Name: ZOLPIDEM TARTRATE 10 MG TABLET] 30 tablet 0     Sig: TAKE 1 TABLET BY MOUTH AT BEDTIME AS NEEDED FOR SLEEP       There is no refill protocol information for this order        ondansetron (ZOFRAN) 4 MG tablet [Pharmacy Med Name: ONDANSETRON HCL 4 MG TABLET] 90 tablet 0     Sig: TAKE 1 TABLET BY MOUTH EVERY 8 HOURS AS NEEDED FOR NAUSEA        Antivertigo/Antiemetic Agents Passed - 2/25/2022 10:52 AM        Passed - Recent (12 mo) or future (30 days) visit within the authorizing provider's specialty     Patient has had an office visit with the authorizing provider or a provider within the authorizing providers department within the previous 12 mos or has a future within next 30 days. See \"Patient Info\" tab in inbasket, or \"Choose Columns\" in Meds & Orders section of the refill encounter.              Passed - Medication is active on med list        Passed - Patient is 18 years of age or older             Navneet Do RN 02/28/22 11:32 AM  "

## 2022-02-28 NOTE — TELEPHONE ENCOUNTER
"Routing refill request to provider for review/approval because:  Controlled substance request    Last Written Prescription Date:  1/31/22  Last Fill Quantity: 30,  # refills: 0   Last office visit provider:  7/19/21     Requested Prescriptions   Pending Prescriptions Disp Refills     zolpidem (AMBIEN) 10 MG tablet [Pharmacy Med Name: ZOLPIDEM TARTRATE 10 MG TABLET] 30 tablet 0     Sig: TAKE 1 TABLET BY MOUTH AT BEDTIME AS NEEDED FOR SLEEP       There is no refill protocol information for this order        ondansetron (ZOFRAN) 4 MG tablet [Pharmacy Med Name: ONDANSETRON HCL 4 MG TABLET] 90 tablet 0     Sig: TAKE 1 TABLET BY MOUTH EVERY 8 HOURS AS NEEDED FOR NAUSEA        Antivertigo/Antiemetic Agents Passed - 2/25/2022 10:52 AM        Passed - Recent (12 mo) or future (30 days) visit within the authorizing provider's specialty     Patient has had an office visit with the authorizing provider or a provider within the authorizing providers department within the previous 12 mos or has a future within next 30 days. See \"Patient Info\" tab in inbasket, or \"Choose Columns\" in Meds & Orders section of the refill encounter.              Passed - Medication is active on med list        Passed - Patient is 18 years of age or older             Navneet Do RN 02/28/22 11:33 AM  "

## 2022-03-01 RX ORDER — ZOLPIDEM TARTRATE 10 MG/1
TABLET ORAL
Qty: 30 TABLET | Refills: 0 | Status: SHIPPED | OUTPATIENT
Start: 2022-03-01 | End: 2022-03-28

## 2022-03-01 RX ORDER — ONDANSETRON 4 MG/1
TABLET, FILM COATED ORAL
Qty: 90 TABLET | Refills: 0 | Status: SHIPPED | OUTPATIENT
Start: 2022-03-01 | End: 2022-05-04

## 2022-03-24 DIAGNOSIS — G47.00 INSOMNIA, UNSPECIFIED TYPE: ICD-10-CM

## 2022-03-26 NOTE — TELEPHONE ENCOUNTER
Routing refill request to provider for review/approval because:  Controlled Substance Request    Last Written Prescription Date:  3/1/2022  Last Fill Quantity: 30,  # refills: 0   Last office visit provider:  7/19/2021     Requested Prescriptions   Pending Prescriptions Disp Refills     zolpidem (AMBIEN) 10 MG tablet [Pharmacy Med Name: ZOLPIDEM TARTRATE 10 MG TABLET] 30 tablet 0     Sig: TAKE 1 TABLET BY MOUTH EVERY DAY AT BEDTIME AS NEEDED FOR SLEEP       There is no refill protocol information for this order          Ana Paula Witt RN 03/25/22 11:08 PM

## 2022-03-28 RX ORDER — ZOLPIDEM TARTRATE 10 MG/1
TABLET ORAL
Qty: 30 TABLET | Refills: 0 | Status: SHIPPED | OUTPATIENT
Start: 2022-03-28 | End: 2022-04-06

## 2022-04-06 ENCOUNTER — OFFICE VISIT (OUTPATIENT)
Dept: FAMILY MEDICINE | Facility: CLINIC | Age: 67
End: 2022-04-06
Payer: COMMERCIAL

## 2022-04-06 VITALS
RESPIRATION RATE: 16 BRPM | WEIGHT: 117 LBS | HEART RATE: 73 BPM | HEIGHT: 63 IN | DIASTOLIC BLOOD PRESSURE: 76 MMHG | SYSTOLIC BLOOD PRESSURE: 144 MMHG | BODY MASS INDEX: 20.73 KG/M2

## 2022-04-06 DIAGNOSIS — R41.89 COGNITIVE CHANGE: Primary | ICD-10-CM

## 2022-04-06 DIAGNOSIS — F11.20 OPIOID TYPE DEPENDENCE, CONTINUOUS (H): ICD-10-CM

## 2022-04-06 DIAGNOSIS — F32.0 CURRENT MILD EPISODE OF MAJOR DEPRESSIVE DISORDER, UNSPECIFIED WHETHER RECURRENT (H): ICD-10-CM

## 2022-04-06 DIAGNOSIS — J44.9 CHRONIC OBSTRUCTIVE PULMONARY DISEASE, UNSPECIFIED COPD TYPE (H): ICD-10-CM

## 2022-04-06 DIAGNOSIS — Z12.31 VISIT FOR SCREENING MAMMOGRAM: ICD-10-CM

## 2022-04-06 DIAGNOSIS — G47.00 INSOMNIA, UNSPECIFIED TYPE: ICD-10-CM

## 2022-04-06 DIAGNOSIS — Z79.899 ENCOUNTER FOR LONG-TERM (CURRENT) USE OF MEDICATIONS: ICD-10-CM

## 2022-04-06 PROCEDURE — 99214 OFFICE O/P EST MOD 30 MIN: CPT | Performed by: FAMILY MEDICINE

## 2022-04-06 RX ORDER — MEMANTINE HYDROCHLORIDE 5 MG/1
5 TABLET ORAL 2 TIMES DAILY
Qty: 30 TABLET | Refills: 1 | Status: SHIPPED | OUTPATIENT
Start: 2022-04-06 | End: 2022-09-30

## 2022-04-06 RX ORDER — TRAZODONE HYDROCHLORIDE 50 MG/1
25 TABLET, FILM COATED ORAL AT BEDTIME
Qty: 30 TABLET | Refills: 0 | Status: SHIPPED | OUTPATIENT
Start: 2022-04-06 | End: 2022-05-03

## 2022-04-12 NOTE — PROGRESS NOTES
Assessment & Plan     Encounter for long-term (current) use of medications  Patient follows with a pain clinic  Has felt some improvement with recent pain pump changes  Discussed that chronic medications are likely contributing to memory deficits    Visit for screening mammogram  Patient due for mammogram referral placed  - MA SCREENING DIGITAL BILAT - Future  (s+30); Future    Cognitive change  Discussed options with the patient today she would like to do a trial with Namenda  She had side effects from donepezil and stop the medication  Discussed options for referral to neurology and neuropsychological testing which she declined at this time but will consider for future  Discussed the importance of regular physical and mental activity  - memantine (NAMENDA) 5 MG tablet; Take 1 tablet (5 mg) by mouth 2 times daily    Insomnia, unspecified type  Patient had trouble with sleep we discussed that a trial on trazodone  - traZODone (DESYREL) 50 MG tablet; Take 0.5 tablets (25 mg) by mouth At Bedtime    Chronic obstructive pulmonary disease, unspecified COPD type (H)  Patient continues to smoke and has COPD  Encouraged her to quit smoking she has cut down considerably    Opioid type dependence, continuous (H)  Patient has a longstanding history of use of narcotics for pain control she follows with the pain clinic    Current mild episode of major depressive disorder, unspecified whether recurrent (H)  Patient currently not on depression medication  She feels that she is doing well without it continue to monitor           Tobacco Cessation:   reports that she has been smoking cigarettes. She started smoking about 54 years ago. She has a 38.25 pack-year smoking history. She has never used smokeless tobacco.    No follow-ups on file.    Alex Nolasco MD  [facility]    Brad Lemus is a 66 year old who presents for the following health issues     History of Present Illness  "      Reason for visit:  Having problems with my memory  Symptom onset:  More than a month  Symptoms include:  Having a hard time remembering things  Symptom intensity:  Mild  Symptom progression:  Staying the same  Had these symptoms before:  No  What makes it worse:  No  What makes it better:  No    She eats 0-1 servings of fruits and vegetables daily.She consumes 1 sweetened beverage(s) daily.She exercises with enough effort to increase her heart rate 9 or less minutes per day.  She exercises with enough effort to increase her heart rate 3 or less days per week.   She is taking medications regularly.     Patient here for follow-up regards to ongoing memory and cognition changes  Patient started on donepezil months ago please continue the medication secondary to GI side effects  She is interested in trying alternative we discussed Namenda  Discussed the importance of physical and mental activity  Discussed options of referral to neurology which she declined  She is looking for some to help her with sleep discussed multiple contributing factors to cognition including her chronic pain medications and poor sleep habits  Discussed trial on trazodone not patient follow with the pain clinic for history of chronic pain she feels current adjustments to pain pump have had some improvement for recently but acknowledges that they could be affecting her memory    Unfortunate patient continues to smoke and she has a history of COPD she is not requiring supplemental oxygen at this time nor inhalers  She has a history of depression-currently not on any medication management feels she is doing well and we will continue to monitor    Review of Systems   Constitutional, HEENT, cardiovascular, pulmonary, gi and gu systems are negative, except as otherwise noted.      Objective    BP (!) 144/76 (BP Location: Left arm, Patient Position: Sitting, Cuff Size: Adult Regular)   Pulse 73   Resp 16   Ht 1.59 m (5' 2.6\")   Wt 53.1 kg (117 " lb)   BMI 20.99 kg/m    Body mass index is 20.99 kg/m .  Physical Exam   GENERAL: healthy, alert, fatigued and legs bothering her bilateral which he is constantly having in motion  EYES: Eyes grossly normal to inspection, PERRL and conjunctivae and sclerae normal  RESP: lungs clear to auscultation at the apices mild rhonchi at the bases no wheezing  CV: regular rates and rhythm, no murmur, click or rub and no peripheral edema  MS: Ambulates with the help of a walker moving bilateral lower legs quite regularly in clinic secondary to irritation  PSYCH: mentation appears normal, affect normal/bright

## 2022-04-28 DIAGNOSIS — K21.00 GASTRO-ESOPHAGEAL REFLUX DISEASE WITH ESOPHAGITIS, WITHOUT BLEEDING: ICD-10-CM

## 2022-04-28 DIAGNOSIS — I10 ESSENTIAL (PRIMARY) HYPERTENSION: ICD-10-CM

## 2022-04-29 RX ORDER — ATENOLOL 25 MG/1
TABLET ORAL
Qty: 90 TABLET | Refills: 3 | Status: SHIPPED | OUTPATIENT
Start: 2022-04-29 | End: 2023-04-24

## 2022-04-30 DIAGNOSIS — R11.0 NAUSEA: ICD-10-CM

## 2022-04-30 DIAGNOSIS — G47.00 INSOMNIA, UNSPECIFIED TYPE: ICD-10-CM

## 2022-05-03 RX ORDER — TRAZODONE HYDROCHLORIDE 50 MG/1
25 TABLET, FILM COATED ORAL AT BEDTIME
Qty: 45 TABLET | Refills: 3 | Status: SHIPPED | OUTPATIENT
Start: 2022-05-03 | End: 2023-05-18

## 2022-05-03 RX ORDER — ZOLPIDEM TARTRATE 10 MG/1
TABLET ORAL
Qty: 30 TABLET | Refills: 0 | OUTPATIENT
Start: 2022-05-03

## 2022-05-03 NOTE — TELEPHONE ENCOUNTER
"Routing refill request to provider for review/approval because:  Provider fill for acute dosing.    Last Written Prescription Date:  3/1/22  Last Fill Quantity: 90,  # refills: 0   Last office visit provider:  4/6/22     Requested Prescriptions   Pending Prescriptions Disp Refills     ondansetron (ZOFRAN) 4 MG tablet [Pharmacy Med Name: ONDANSETRON HCL 4 MG TABLET] 90 tablet 0     Sig: TAKE 1 TABLET BY MOUTH EVERY 8 HOURS AS NEEDED FOR NAUSEA        Antivertigo/Antiemetic Agents Passed - 4/30/2022  3:10 PM        Passed - Recent (12 mo) or future (30 days) visit within the authorizing provider's specialty     Patient has had an office visit with the authorizing provider or a provider within the authorizing providers department within the previous 12 mos or has a future within next 30 days. See \"Patient Info\" tab in inbasket, or \"Choose Columns\" in Meds & Orders section of the refill encounter.              Passed - Medication is active on med list        Passed - Patient is 18 years of age or older         Signed Prescriptions Disp Refills    traZODone (DESYREL) 50 MG tablet 45 tablet 3     Sig: TAKE 0.5 TABLETS (25 MG) BY MOUTH AT BEDTIME       Serotonin Modulators Passed - 4/30/2022  3:10 PM        Passed - Recent (12 mo) or future (30 days) visit within the authorizing provider's specialty     Patient has had an office visit with the authorizing provider or a provider within the authorizing providers department within the previous 12 mos or has a future within next 30 days. See \"Patient Info\" tab in inbasket, or \"Choose Columns\" in Meds & Orders section of the refill encounter.              Passed - Medication is active on med list        Passed - Patient is age 18 or older        Passed - No active pregnancy on record        Passed - No positive pregnancy test in past 12 months         Refused Prescriptions Disp Refills     zolpidem (AMBIEN) 10 MG tablet [Pharmacy Med Name: ZOLPIDEM TARTRATE 10 MG TABLET] 30 " tablet 0     Sig: TAKE 1 TABLET BY MOUTH AT BEDTIME AS NEEDED FOR SLEEP       There is no refill protocol information for this order          Navneet Do RN 05/03/22 2:07 PM

## 2022-05-03 NOTE — TELEPHONE ENCOUNTER
"Last Written Prescription Date:  4/6/22  Last Fill Quantity: 30,  # refills: 0   Last office visit provider:  4/6/22     Requested Prescriptions   Pending Prescriptions Disp Refills     traZODone (DESYREL) 50 MG tablet [Pharmacy Med Name: TRAZODONE 50 MG TABLET] 30 tablet 0     Sig: TAKE 0.5 TABLETS (25 MG) BY MOUTH AT BEDTIME       Serotonin Modulators Passed - 4/30/2022  3:10 PM        Passed - Recent (12 mo) or future (30 days) visit within the authorizing provider's specialty     Patient has had an office visit with the authorizing provider or a provider within the authorizing providers department within the previous 12 mos or has a future within next 30 days. See \"Patient Info\" tab in inbasket, or \"Choose Columns\" in Meds & Orders section of the refill encounter.              Passed - Medication is active on med list        Passed - Patient is age 18 or older        Passed - No active pregnancy on record        Passed - No positive pregnancy test in past 12 months           ondansetron (ZOFRAN) 4 MG tablet [Pharmacy Med Name: ONDANSETRON HCL 4 MG TABLET] 90 tablet 0     Sig: TAKE 1 TABLET BY MOUTH EVERY 8 HOURS AS NEEDED FOR NAUSEA        Antivertigo/Antiemetic Agents Passed - 4/30/2022  3:10 PM        Passed - Recent (12 mo) or future (30 days) visit within the authorizing provider's specialty     Patient has had an office visit with the authorizing provider or a provider within the authorizing providers department within the previous 12 mos or has a future within next 30 days. See \"Patient Info\" tab in inbasket, or \"Choose Columns\" in Meds & Orders section of the refill encounter.              Passed - Medication is active on med list        Passed - Patient is 18 years of age or older         Refused Prescriptions Disp Refills     zolpidem (AMBIEN) 10 MG tablet [Pharmacy Med Name: ZOLPIDEM TARTRATE 10 MG TABLET] 30 tablet 0     Sig: TAKE 1 TABLET BY MOUTH AT BEDTIME AS NEEDED FOR SLEEP       There is no " refill protocol information for this order          Navneet Do RN 05/03/22 2:05 PM

## 2022-05-03 NOTE — TELEPHONE ENCOUNTER
Outpatient Medication Detail     Disp Refills Start End BROOKE   zolpidem (AMBIEN) 10 MG tablet (Discontinued) 30 tablet 0 3/28/2022 4/6/2022 No   Sig: TAKE 1 TABLET BY MOUTH EVERY DAY AT BEDTIME AS NEEDED FOR SLEEP   Sent to pharmacy as: Zolpidem Tartrate 10 MG Oral Tablet (AMBIEN)   Class: E-Prescribe   Notes to Pharmacy: Not to exceed 5 additional fills before 08/28/2022   Order: [id number]   E-Prescribing Status: Receipt confirmed by pharmacy (3/28/2022  9:54 AM CDT)

## 2022-05-04 RX ORDER — ONDANSETRON 4 MG/1
TABLET, FILM COATED ORAL
Qty: 90 TABLET | Refills: 0 | Status: SHIPPED | OUTPATIENT
Start: 2022-05-04 | End: 2022-08-29

## 2022-05-22 ENCOUNTER — APPOINTMENT (OUTPATIENT)
Dept: CT IMAGING | Facility: HOSPITAL | Age: 67
End: 2022-05-22
Payer: COMMERCIAL

## 2022-05-22 ENCOUNTER — HOSPITAL ENCOUNTER (EMERGENCY)
Facility: HOSPITAL | Age: 67
Discharge: HOME | End: 2022-05-22
Admitting: PHYSICIAN ASSISTANT
Payer: COMMERCIAL

## 2022-05-22 VITALS
SYSTOLIC BLOOD PRESSURE: 161 MMHG | OXYGEN SATURATION: 96 % | HEART RATE: 88 BPM | RESPIRATION RATE: 16 BRPM | TEMPERATURE: 98 F | DIASTOLIC BLOOD PRESSURE: 72 MMHG

## 2022-05-22 DIAGNOSIS — B34.9 VIRAL SYNDROME: ICD-10-CM

## 2022-05-22 DIAGNOSIS — R31.29 MICROSCOPIC HEMATURIA: ICD-10-CM

## 2022-05-22 LAB
ALBUMIN SERPL-MCNC: 3.9 G/DL (ref 3.5–5)
ALBUMIN UR-MCNC: 20 MG/DL
ALP SERPL-CCNC: 81 U/L (ref 45–120)
ALT SERPL W P-5'-P-CCNC: 13 U/L (ref 0–45)
ANION GAP SERPL CALCULATED.3IONS-SCNC: 9 MMOL/L (ref 5–18)
APPEARANCE UR: CLEAR
AST SERPL W P-5'-P-CCNC: 16 U/L (ref 0–40)
BASOPHILS # BLD AUTO: 0 10E3/UL (ref 0–0.2)
BASOPHILS NFR BLD AUTO: 0 %
BILIRUB SERPL-MCNC: 0.4 MG/DL (ref 0–1)
BILIRUB UR QL STRIP: NEGATIVE
BUN SERPL-MCNC: 11 MG/DL (ref 8–22)
CALCIUM SERPL-MCNC: 8.7 MG/DL (ref 8.5–10.5)
CHLORIDE BLD-SCNC: 104 MMOL/L (ref 98–107)
CO2 SERPL-SCNC: 25 MMOL/L (ref 22–31)
COLOR UR AUTO: ABNORMAL
CREAT SERPL-MCNC: 0.68 MG/DL (ref 0.6–1.1)
EOSINOPHIL # BLD AUTO: 0 10E3/UL (ref 0–0.7)
EOSINOPHIL NFR BLD AUTO: 0 %
ERYTHROCYTE [DISTWIDTH] IN BLOOD BY AUTOMATED COUNT: 13.5 % (ref 10–15)
FLUAV RNA SPEC QL NAA+PROBE: NEGATIVE
FLUBV RNA RESP QL NAA+PROBE: NEGATIVE
GFR SERPL CREATININE-BSD FRML MDRD: >90 ML/MIN/1.73M2
GLUCOSE BLD-MCNC: 111 MG/DL (ref 70–125)
GLUCOSE UR STRIP-MCNC: NEGATIVE MG/DL
HCT VFR BLD AUTO: 48.3 % (ref 35–47)
HGB BLD-MCNC: 16.1 G/DL (ref 11.7–15.7)
HGB UR QL STRIP: ABNORMAL
HOLD SPECIMEN: NORMAL
IMM GRANULOCYTES # BLD: 0 10E3/UL
IMM GRANULOCYTES NFR BLD: 1 %
KETONES UR STRIP-MCNC: 20 MG/DL
LEUKOCYTE ESTERASE UR QL STRIP: NEGATIVE
LYMPHOCYTES # BLD AUTO: 0.9 10E3/UL (ref 0.8–5.3)
LYMPHOCYTES NFR BLD AUTO: 10 %
MCH RBC QN AUTO: 29 PG (ref 26.5–33)
MCHC RBC AUTO-ENTMCNC: 33.3 G/DL (ref 31.5–36.5)
MCV RBC AUTO: 87 FL (ref 78–100)
MONOCYTES # BLD AUTO: 0.3 10E3/UL (ref 0–1.3)
MONOCYTES NFR BLD AUTO: 4 %
NEUTROPHILS # BLD AUTO: 7.3 10E3/UL (ref 1.6–8.3)
NEUTROPHILS NFR BLD AUTO: 85 %
NITRATE UR QL: NEGATIVE
NRBC # BLD AUTO: 0 10E3/UL
NRBC BLD AUTO-RTO: 0 /100
PH UR STRIP: 6.5 [PH] (ref 5–7)
PLATELET # BLD AUTO: 225 10E3/UL (ref 150–450)
POTASSIUM BLD-SCNC: 3.7 MMOL/L (ref 3.5–5)
PROT SERPL-MCNC: 6.4 G/DL (ref 6–8)
RBC # BLD AUTO: 5.55 10E6/UL (ref 3.8–5.2)
RBC URINE: 20 /HPF
SARS-COV-2 RNA RESP QL NAA+PROBE: NEGATIVE
SODIUM SERPL-SCNC: 138 MMOL/L (ref 136–145)
SP GR UR STRIP: 1.02 (ref 1–1.03)
UROBILINOGEN UR STRIP-MCNC: <2 MG/DL
WBC # BLD AUTO: 8.5 10E3/UL (ref 4–11)
WBC URINE: 2 /HPF

## 2022-05-22 PROCEDURE — 81001 URINALYSIS AUTO W/SCOPE: CPT | Performed by: PHYSICIAN ASSISTANT

## 2022-05-22 PROCEDURE — 250N000011 HC RX IP 250 OP 636: Performed by: PHYSICIAN ASSISTANT

## 2022-05-22 PROCEDURE — 85025 COMPLETE CBC W/AUTO DIFF WBC: CPT | Performed by: PHYSICIAN ASSISTANT

## 2022-05-22 PROCEDURE — 99285 EMERGENCY DEPT VISIT HI MDM: CPT | Mod: 25

## 2022-05-22 PROCEDURE — 96361 HYDRATE IV INFUSION ADD-ON: CPT

## 2022-05-22 PROCEDURE — 258N000003 HC RX IP 258 OP 636: Performed by: PHYSICIAN ASSISTANT

## 2022-05-22 PROCEDURE — 36415 COLL VENOUS BLD VENIPUNCTURE: CPT | Performed by: PHYSICIAN ASSISTANT

## 2022-05-22 PROCEDURE — 96375 TX/PRO/DX INJ NEW DRUG ADDON: CPT

## 2022-05-22 PROCEDURE — 250N000013 HC RX MED GY IP 250 OP 250 PS 637: Performed by: PHYSICIAN ASSISTANT

## 2022-05-22 PROCEDURE — 82040 ASSAY OF SERUM ALBUMIN: CPT | Performed by: PHYSICIAN ASSISTANT

## 2022-05-22 PROCEDURE — 87636 SARSCOV2 & INF A&B AMP PRB: CPT | Performed by: PHYSICIAN ASSISTANT

## 2022-05-22 PROCEDURE — 36415 COLL VENOUS BLD VENIPUNCTURE: CPT | Performed by: STUDENT IN AN ORGANIZED HEALTH CARE EDUCATION/TRAINING PROGRAM

## 2022-05-22 PROCEDURE — 96374 THER/PROPH/DIAG INJ IV PUSH: CPT

## 2022-05-22 PROCEDURE — 80053 COMPREHEN METABOLIC PANEL: CPT | Performed by: PHYSICIAN ASSISTANT

## 2022-05-22 PROCEDURE — 70450 CT HEAD/BRAIN W/O DYE: CPT

## 2022-05-22 PROCEDURE — C9803 HOPD COVID-19 SPEC COLLECT: HCPCS

## 2022-05-22 RX ORDER — METOCLOPRAMIDE HYDROCHLORIDE 5 MG/ML
10 INJECTION INTRAMUSCULAR; INTRAVENOUS ONCE
Status: COMPLETED | OUTPATIENT
Start: 2022-05-22 | End: 2022-05-22

## 2022-05-22 RX ORDER — DIPHENHYDRAMINE HYDROCHLORIDE 50 MG/ML
25 INJECTION INTRAMUSCULAR; INTRAVENOUS ONCE
Status: COMPLETED | OUTPATIENT
Start: 2022-05-22 | End: 2022-05-22

## 2022-05-22 RX ORDER — ACETAMINOPHEN 325 MG/1
975 TABLET ORAL ONCE
Status: COMPLETED | OUTPATIENT
Start: 2022-05-22 | End: 2022-05-22

## 2022-05-22 RX ORDER — KETOROLAC TROMETHAMINE 30 MG/ML
15 INJECTION, SOLUTION INTRAMUSCULAR; INTRAVENOUS ONCE
Status: COMPLETED | OUTPATIENT
Start: 2022-05-22 | End: 2022-05-22

## 2022-05-22 RX ADMIN — METOCLOPRAMIDE HYDROCHLORIDE 10 MG: 5 INJECTION INTRAMUSCULAR; INTRAVENOUS at 14:46

## 2022-05-22 RX ADMIN — ACETAMINOPHEN 975 MG: 325 TABLET, COATED ORAL at 17:31

## 2022-05-22 RX ADMIN — DIPHENHYDRAMINE HYDROCHLORIDE 25 MG: 50 INJECTION, SOLUTION INTRAMUSCULAR; INTRAVENOUS at 14:43

## 2022-05-22 RX ADMIN — SODIUM CHLORIDE 1000 ML: 9 INJECTION, SOLUTION INTRAVENOUS at 14:43

## 2022-05-22 RX ADMIN — KETOROLAC TROMETHAMINE 15 MG: 30 INJECTION, SOLUTION INTRAMUSCULAR; INTRAVENOUS at 14:53

## 2022-05-22 ASSESSMENT — ENCOUNTER SYMPTOMS
ABDOMINAL PAIN: 0
DIARRHEA: 1
SHORTNESS OF BREATH: 0
NUMBNESS: 0
WEAKNESS: 0
HEADACHES: 1
SINUS PRESSURE: 1
CHILLS: 1
SINUS PAIN: 1
FEVER: 0
NECK PAIN: 0
HEMATURIA: 0
FREQUENCY: 0
NAUSEA: 1
DYSURIA: 0
NECK STIFFNESS: 0
COUGH: 0
VOMITING: 1
PHOTOPHOBIA: 1
BLOOD IN STOOL: 0

## 2022-05-22 NOTE — DISCHARGE INSTRUCTIONS
I suspect likely a viral infection.  Your COVID and influenza were negative.  Make sure you are staying well-hydrated.  Use the Zofran that you have at home as needed for nausea and vomiting.  You can try a decongestant to see if that helps with the sinus congestion which is likely contributing to your headaches.  I recommend using Sudafed.  You can use Tylenol and ibuprofen as needed for the headache.  Return with any new or worsening symptoms.  Follow-up with your primary care provider this week.

## 2022-05-22 NOTE — ED NOTES
Bed: JNED-18  Expected date: 5/22/22  Expected time: 1:51 PM  Means of arrival: Ambulance  Comments:  [facility] - N/V/D

## 2022-05-22 NOTE — ED TRIAGE NOTES
The pt presents via EMS from  Home where she lives with her . The pt has had a hedache x 24 hours with nausea and vomiting the past 24 hrs, and diarrhea. L a/c placed 4 zofran given, /100 unable to get BP meds down.

## 2022-05-22 NOTE — ED PROVIDER NOTES
EMERGENCY DEPARTMENT ENCOUNTER      NAME: Mariah Koehler  AGE: 66 year old female  YOB: 1955  MRN: [id number]  EVALUATION DATE & TIME: 5/22/2022  1:58 PM    PCP: Alex Nolasco    ED PROVIDER: Sobeida Omalley PA-C      Chief Complaint   Patient presents with     Nausea, Vomiting, & Diarrhea         FINAL IMPRESSION:  1. Viral syndrome    2. Microscopic hematuria          MEDICAL DECISION MAKING:    Pertinent Labs & Imaging studies reviewed. (See chart for details)  66 year old female with a pertinent history of opioid dependence, chronic pain, COPD, HTN presents to the Emergency Department for evaluation of headache, congestion, nausea, vomiting, diarrhea onset 24 hours ago. History of headaches but never this bad. Denies fevers, vision changes, neck stiffness, cough, shortness of breath, chest pain.     Vitals reviewed and notable for elevated blood pressure. This did improve with pain management and IVF. Slightly tachycardic at 101 BPM on arrival, improved into the 70s-80s. On physical exam she has no neurological deficits. She has tenderness to percussion of maxillary sinuses and congestion noted. Photophobic. No nuchal rigidity. Lungs CTAB. Abdomen soft and non-tender. No flank or CVA tenderness. No jaundice or scleral icterus. Differential diagnosis includes but not limited to migraine, hypertension, intracranial hemorrhage (subarachnoid, intraparenchymal, subdural), meningitis, encephalitis, intracranial mass, viral syndrome, CVA, sinusitis, dehydration.    She had good relief of headache with migraine cocktail of toradol, reglan, benadryl and IVF. Head CT unremarkable. No fevers or meningeal signs on exam to suggest meningitis or infectious source along with a normal white count. No TTP over temporal arteries and no proximal muscle weakness or jaw claudication to suggest temporal arteritis. Normal renal function, electrolytes and LFTs and no reproducible abdominal tenderness on exam to  suggest surgical or acute abdomen. UA without evidence of infection. A few RBCs. No flank or CVA tenderness to suggest ureterolithiasis. She should repeat a UA with PCP to ensure resolution of asymptomatic microscopic hematuria. COVID and influenza negative. She did not have diarrhea while in ED so stool culture was not able to be collected. I suspect she has likely a viral syndrome with the congestion and GI symptoms causing dehydration contributing to headache. She reports having Zofran at home. Will have her follow up with her PCP this week. Discussed return precautions and patient discharged home in stable condition.     0 minutes of critical care time     ED COURSE:  2:18 PM I met with the patient, obtained history, performed an initial exam, and discussed options and plan for diagnostics and treatment here in the ED.  5:31 PM Patient discharged after being provided with extensive anticipatory guidance and given return precautions, importance of PCP follow-up emphasized.    At the conclusion of the encounter I discussed the results of all of the tests and the disposition. The questions were answered. The patient acknowledged understanding and was agreeable with the care plan.     MEDICATIONS GIVEN IN THE EMERGENCY:  Medications   0.9% sodium chloride BOLUS (0 mLs Intravenous Stopped 5/22/22 1734)   ketorolac (TORADOL) injection 15 mg (15 mg Intravenous Given 5/22/22 1453)   metoclopramide (REGLAN) injection 10 mg (10 mg Intravenous Given 5/22/22 1446)   diphenhydrAMINE (BENADRYL) injection 25 mg (25 mg Intravenous Given 5/22/22 1443)   acetaminophen (TYLENOL) tablet 975 mg (975 mg Oral Given 5/22/22 1731)       NEW PRESCRIPTIONS STARTED AT TODAY'S ER VISIT  Discharge Medication List as of 5/22/2022  5:37 PM               =================================================================    HPI:    Patient information was obtained from: Patient    Use of Interpretor: N/A      Mariah GARCIA Rodo is a 66 year old  "female with a pertinent history of opioid dependence, chronic pain, COPD, HTN who presents to this ED via EMS for evaluation of N/V/D.    Patient coming from home where she lives with her . She reports gradually worsening headache, sinus congestion, nausea, vomiting, diarrhea over the last 24 hours. She denies any fevers but reports chills. She denies any cough, chest pain, shortness of breath, vision changes, extremity weakness or paresthesias, neck stiffness, urinary symptoms, abdominal pain, blood in her emesis or stools. She reports a history of headaches but \"nothing like this.\" She reports sensitivity to light. She denies any known sick contacts. No recent travel. She is vaccinated for covid with 2 boosters. Patient smokes cigarettes. Longstanding history of narcotic use for pain control. She follows with pain clinic.    REVIEW OF SYSTEMS:  Review of Systems   Constitutional: Positive for chills. Negative for fever.   HENT: Positive for congestion, sinus pressure and sinus pain.    Eyes: Positive for photophobia. Negative for visual disturbance.   Respiratory: Negative for cough and shortness of breath.    Gastrointestinal: Positive for diarrhea, nausea and vomiting. Negative for abdominal pain and blood in stool.   Genitourinary: Negative for dysuria, frequency and hematuria.   Musculoskeletal: Negative for neck pain and neck stiffness.   Neurological: Positive for headaches. Negative for weakness and numbness.   All other systems reviewed and are negative.      PAST MEDICAL HISTORY:  Past Medical History:   Diagnosis Date     Current mild episode of major depressive disorder, unspecified whether recurrent (H) 2/17/2020     Hypertension      Opiate dependence (H)      RSD (reflex sympathetic dystrophy)      Sacral fracture, closed (H)        PAST SURGICAL HISTORY:  Past Surgical History:   Procedure Laterality Date     APPENDECTOMY       GYN SURGERY       HC REVISE MEDIAN N/CARPAL TUNNEL SURG      " Description: Neuroplasty Decompression Median Nerve At Carpal Tunnel;  Recorded: 09/19/2011;     HYSTERECTOMY  1984     IR CERVICAL EPIDURAL STEROID INJECTION  1/14/2005     OOPHORECTOMY Bilateral 1985     SC SYMPATHECTOMY,CERVICOTHORACIC      Description: Surgical Sympathectomy Cervicothoracic;  Recorded: 09/19/2011;     [facility] [place] DECOMPRESS FASCIOTOMY FINGR/HAND      Description: Decompression Fasciotomy Of The Hand;  Recorded: 09/19/2011;     [facility] [place] LAP,CHOLECYSTECTOMY/EXPLORE      Description: Cholecystectomy Laparoscopic;  Recorded: 12/09/2011;     [facility] [place] TOTAL ABDOM HYSTERECTOMY      Description: Hysterectomy;  Recorded: 03/23/2011;           CURRENT MEDICATIONS:    No current facility-administered medications for this encounter.    Current Outpatient Medications:      atenolol (TENORMIN) 25 MG tablet, TAKE 1 TABLET BY MOUTH EVERY DAY, Disp: 90 tablet, Rfl: 3     baclofen (LIORESAL) 10 MG tablet, TAKE 2 TABLETS (20 MG TOTAL) BY MOUTH FOUR TIMES A DAY., Disp: 720 tablet, Rfl: 3     memantine (NAMENDA) 5 MG tablet, Take 1 tablet (5 mg) by mouth 2 times daily, Disp: 30 tablet, Rfl: 1     metoclopramide (REGLAN) 10 MG tablet, TAKE 1 TABLET BY MOUTH 3 TIMES A DAY AS NEEDED FOR NAUSEA, Disp: 270 tablet, Rfl: 1     omeprazole (PRILOSEC) 20 MG DR capsule, TAKE 1 CAPSULE BY MOUTH TWICE A DAY BEFORE MEALS, Disp: 180 capsule, Rfl: 1     ondansetron (ZOFRAN) 4 MG tablet, TAKE 1 TABLET BY MOUTH EVERY 8 HOURS AS NEEDED FOR NAUSEA, Disp: 90 tablet, Rfl: 0     oxyCODONE (ROXICODONE) 5 MG tablet, TAKE ONE TABLET BY ORAL ROUTE UP TO TWO TIMES A DAY AS NEEDED FOR SEVERE PAIN**7/3, Disp: , Rfl:      traZODone (DESYREL) 50 MG tablet, TAKE 0.5 TABLETS (25 MG) BY MOUTH AT BEDTIME, Disp: 45 tablet, Rfl: 3      ALLERGIES:  Allergies   Allergen Reactions     Codeine Nausea and Vomiting     Morphine Nausea and Vomiting     Bacitracin Rash     Methadone Rash       FAMILY HISTORY:  Family History   Problem Relation Age of Onset     Cerebrovascular  Disease Mother      Diabetes Mother      Heart Disease Mother      Hypertension Mother      Rheumatologic Disease Mother      Heart Disease Father      Pulmonary Hypertension Father      Kidney failure Father      Cancer Father         melanoma     Diabetes Sister      Hypertension Sister      Hypertension Brother        SOCIAL HISTORY:   Social History     Socioeconomic History     Marital status:    Tobacco Use     Smoking status: Current Every Day Smoker     Packs/day: 0.75     Years: 51.00     Pack years: 38.25     Types: Cigarettes     Start date: 1/1/1968     Smokeless tobacco: Never Used   Substance and Sexual Activity     Alcohol use: Yes     Drug use: Never       VITALS:  Patient Vitals for the past 24 hrs:   BP Temp Temp src Pulse Resp SpO2   05/22/22 1730 (!) 161/72 -- -- 88 -- 96 %   05/22/22 1700 (!) 164/73 -- -- 87 -- 98 %   05/22/22 1630 (!) 170/85 -- -- 77 -- 98 %   05/22/22 1615 (!) 169/87 -- -- -- -- --   05/22/22 1545 (!) 184/80 -- -- 60 -- 97 %   05/22/22 1530 (!) 175/89 -- -- 72 -- 98 %   05/22/22 1523 (!) 172/92 -- -- 74 -- 97 %   05/22/22 1504 (!) 172/74 -- -- 70 -- 97 %   05/22/22 1402 (!) 196/96 98  F (36.7  C) Oral 101 16 95 %       PHYSICAL EXAM  Constitutional: Well developed, Well nourished, NAD. Tearful.   HENT: Normocephalic, Atraumatic, Bilateral external ears normal, Oropharynx normal, mucous membranes moist. Nasal congestion and maxillary sinus TTP.  Neck: Normal range of motion, No tenderness, Supple, No stridor. No nuchal rigidity.  Eyes: PERRL, EOMI, Conjunctiva normal, No discharge. Photophobia.   Respiratory: Normal breath sounds, No respiratory distress, No wheezing, Speaks full sentences easily. No cough.  Cardiovascular: Normal heart rate, Regular rhythm, No murmurs, No rubs, No gallops. Chest wall nontender.  GI: Soft, No tenderness, No masses, No flank or CVA tenderness. No rebound or guarding.  Musculoskeletal: 2+ DP pulses. No edema. No cyanosis, No clubbing. Good  range of motion in all major joints. No tenderness to palpation or major deformities noted. No tenderness of the CTLS spine.   Integument: Warm, Dry, No erythema, No rash. No petechiae.  Neurologic: Alert & oriented x 3, Normal motor function, Normal sensory function, No focal deficits noted. Normal gait.  Psychiatric: Affect normal, Judgment normal, Mood normal. Cooperative.    LAB:  All pertinent labs reviewed and interpreted.  Recent Results (from the past 24 hour(s))   Extra Red Top Tube    Collection Time: 05/22/22  2:14 PM   Result Value Ref Range    Hold Specimen JIC    Extra Green Top (Lithium Heparin) Tube    Collection Time: 05/22/22  2:14 PM   Result Value Ref Range    Hold Specimen JIC    Extra Purple Top Tube    Collection Time: 05/22/22  2:14 PM   Result Value Ref Range    Hold Specimen JIC    CBC with platelets and differential    Collection Time: 05/22/22  2:14 PM   Result Value Ref Range    WBC Count 8.5 4.0 - 11.0 10e3/uL    RBC Count 5.55 (H) 3.80 - 5.20 10e6/uL    Hemoglobin 16.1 (H) 11.7 - 15.7 g/dL    Hematocrit 48.3 (H) 35.0 - 47.0 %    MCV 87 78 - 100 fL    MCH 29.0 26.5 - 33.0 pg    MCHC 33.3 31.5 - 36.5 g/dL    RDW 13.5 10.0 - 15.0 %    Platelet Count 225 150 - 450 10e3/uL    % Neutrophils 85 %    % Lymphocytes 10 %    % Monocytes 4 %    % Eosinophils 0 %    % Basophils 0 %    % Immature Granulocytes 1 %    NRBCs per 100 WBC 0 <1 /100    Absolute Neutrophils 7.3 1.6 - 8.3 10e3/uL    Absolute Lymphocytes 0.9 0.8 - 5.3 10e3/uL    Absolute Monocytes 0.3 0.0 - 1.3 10e3/uL    Absolute Eosinophils 0.0 0.0 - 0.7 10e3/uL    Absolute Basophils 0.0 0.0 - 0.2 10e3/uL    Absolute Immature Granulocytes 0.0 <=0.4 10e3/uL    Absolute NRBCs 0.0 10e3/uL   Symptomatic; Yes; 5/21/2022 Influenza A/B & SARS-CoV2 (COVID-19) Virus PCR Multiplex Nasopharyngeal    Collection Time: 05/22/22  3:07 PM    Specimen: Nasopharyngeal; Swab   Result Value Ref Range    Influenza A PCR Negative Negative    Influenza B PCR  Negative Negative    SARS CoV2 PCR Negative Negative   Comprehensive metabolic panel    Collection Time: 05/22/22  3:24 PM   Result Value Ref Range    Sodium 138 136 - 145 mmol/L    Potassium 3.7 3.5 - 5.0 mmol/L    Chloride 104 98 - 107 mmol/L    Carbon Dioxide (CO2) 25 22 - 31 mmol/L    Anion Gap 9 5 - 18 mmol/L    Urea Nitrogen 11 8 - 22 mg/dL    Creatinine 0.68 0.60 - 1.10 mg/dL    Calcium 8.7 8.5 - 10.5 mg/dL    Glucose 111 70 - 125 mg/dL    Alkaline Phosphatase 81 45 - 120 U/L    AST 16 0 - 40 U/L    ALT 13 0 - 45 U/L    Protein Total 6.4 6.0 - 8.0 g/dL    Albumin 3.9 3.5 - 5.0 g/dL    Bilirubin Total 0.4 0.0 - 1.0 mg/dL    GFR Estimate >90 >60 mL/min/1.73m2   UA with Microscopic reflex to Culture    Collection Time: 05/22/22  3:45 PM    Specimen: Urine, Clean Catch   Result Value Ref Range    Color Urine Light Yellow Colorless, Straw, Light Yellow, Yellow    Appearance Urine Clear Clear    Glucose Urine Negative Negative mg/dL    Bilirubin Urine Negative Negative    Ketones Urine 20  (A) Negative mg/dL    Specific Gravity Urine 1.017 1.001 - 1.030    Blood Urine 0.2 mg/dL (A) Negative    pH Urine 6.5 5.0 - 7.0    Protein Albumin Urine 20  (A) Negative mg/dL    Urobilinogen Urine <2.0 <2.0 mg/dL    Nitrite Urine Negative Negative    Leukocyte Esterase Urine Negative Negative    RBC Urine 20 (H) <=2 /HPF    WBC Urine 2 <=5 /HPF         RADIOLOGY:  Reviewed all pertinent imaging. Please see official radiology report.  Head CT w/o contrast   Final Result   IMPRESSION: Normal head CT.          Sobeida Omalley PA-C  Emergency Medicine  [facility]  5/22/2022       Sobeida Omalley PA-C  05/22/22 2224

## 2022-06-11 ENCOUNTER — HEALTH MAINTENANCE LETTER (OUTPATIENT)
Age: 67
End: 2022-06-11

## 2022-06-18 DIAGNOSIS — G47.00 INSOMNIA, UNSPECIFIED TYPE: ICD-10-CM

## 2022-06-19 NOTE — TELEPHONE ENCOUNTER
Routing refill request to provider for review/approval because:  Drug not on the [facility] refill protocol   Drug not active on patient's medication list    Last Written Prescription Date:  3/1/22  Last Fill Quantity: 30,  # refills: 0   Last office visit provider:  4/6/22     Requested Prescriptions   Pending Prescriptions Disp Refills     zolpidem (AMBIEN) 10 MG tablet [Pharmacy Med Name: ZOLPIDEM TARTRATE 10 MG TABLET] 30 tablet 0     Sig: TAKE 1 TABLET BY MOUTH AT BEDTIME AS NEEDED FOR SLEEP       There is no refill protocol information for this order          Blanka Grimm, RN 06/19/22 10:40 AM

## 2022-06-20 NOTE — TELEPHONE ENCOUNTER
Please inquire with patient if she is requesting this refill  I think this medication was stopped and we were trying trazodone

## 2022-06-22 RX ORDER — ZOLPIDEM TARTRATE 10 MG/1
TABLET ORAL
Qty: 30 TABLET | Refills: 0 | Status: SHIPPED | OUTPATIENT
Start: 2022-06-22 | End: 2022-08-15

## 2022-06-22 NOTE — TELEPHONE ENCOUNTER
Patient states trazodone not working and wants to go back to the Ambien  Please fill or call to advise   [phone]

## 2022-08-15 DIAGNOSIS — G47.00 INSOMNIA, UNSPECIFIED TYPE: ICD-10-CM

## 2022-08-15 RX ORDER — ZOLPIDEM TARTRATE 10 MG/1
TABLET ORAL
Qty: 30 TABLET | Refills: 0 | Status: SHIPPED | OUTPATIENT
Start: 2022-08-15 | End: 2022-09-23

## 2022-08-15 NOTE — TELEPHONE ENCOUNTER
Routing refill request to provider for review/approval because:  Drug not on the [facility] [place]     Last Written Prescription Date: 6/22/22  Last Fill Quantity: 30, # refills: 0  Last office visit provider: Gaurav 4/6/22        Requested Prescriptions   Pending Prescriptions Disp Refills    zolpidem (AMBIEN) 10 MG tablet [Pharmacy Med Name: ZOLPIDEM TARTRATE 10 MG TABLET] 30 tablet 0     Sig: TAKE 1 TABLET BY MOUTH EVERY DAY AT BEDTIME AS NEEDED FOR SLEEP        There is no refill protocol information for this order               Jolynn Thomas RN 08/15/22 3:51 PM

## 2022-09-23 DIAGNOSIS — G47.00 INSOMNIA, UNSPECIFIED TYPE: ICD-10-CM

## 2022-09-23 RX ORDER — ZOLPIDEM TARTRATE 10 MG/1
TABLET ORAL
Qty: 30 TABLET | Refills: 0 | Status: SHIPPED | OUTPATIENT
Start: 2022-09-23 | End: 2022-10-24

## 2022-09-30 ENCOUNTER — OFFICE VISIT (OUTPATIENT)
Dept: FAMILY MEDICINE | Facility: CLINIC | Age: 67
End: 2022-09-30
Payer: COMMERCIAL

## 2022-09-30 VITALS
OXYGEN SATURATION: 97 % | BODY MASS INDEX: 19.2 KG/M2 | RESPIRATION RATE: 20 BRPM | WEIGHT: 107 LBS | DIASTOLIC BLOOD PRESSURE: 70 MMHG | SYSTOLIC BLOOD PRESSURE: 144 MMHG | HEART RATE: 61 BPM

## 2022-09-30 DIAGNOSIS — R63.4 WEIGHT LOSS: Primary | ICD-10-CM

## 2022-09-30 DIAGNOSIS — Z12.11 SCREEN FOR COLON CANCER: ICD-10-CM

## 2022-09-30 DIAGNOSIS — Z23 ENCOUNTER FOR IMMUNIZATION: ICD-10-CM

## 2022-09-30 LAB
ALBUMIN SERPL BCG-MCNC: 4.1 G/DL (ref 3.5–5.2)
ALP SERPL-CCNC: 77 U/L (ref 35–104)
ALT SERPL W P-5'-P-CCNC: 9 U/L (ref 10–35)
ANION GAP SERPL CALCULATED.3IONS-SCNC: 7 MMOL/L (ref 7–15)
AST SERPL W P-5'-P-CCNC: 20 U/L (ref 10–35)
BILIRUB SERPL-MCNC: 0.3 MG/DL
BUN SERPL-MCNC: 16.3 MG/DL (ref 8–23)
CALCIUM SERPL-MCNC: 9 MG/DL (ref 8.8–10.2)
CHLORIDE SERPL-SCNC: 103 MMOL/L (ref 98–107)
CREAT SERPL-MCNC: 0.72 MG/DL (ref 0.51–0.95)
CRP SERPL-MCNC: <3 MG/L
DEPRECATED HCO3 PLAS-SCNC: 31 MMOL/L (ref 22–29)
ERYTHROCYTE [DISTWIDTH] IN BLOOD BY AUTOMATED COUNT: 13.9 % (ref 10–15)
ERYTHROCYTE [SEDIMENTATION RATE] IN BLOOD BY WESTERGREN METHOD: 4 MM/HR (ref 0–20)
GFR SERPL CREATININE-BSD FRML MDRD: >90 ML/MIN/1.73M2
GLUCOSE SERPL-MCNC: 93 MG/DL (ref 70–99)
HCT VFR BLD AUTO: 42.6 % (ref 35–47)
HGB BLD-MCNC: 13.8 G/DL (ref 11.7–15.7)
MCH RBC QN AUTO: 28.9 PG (ref 26.5–33)
MCHC RBC AUTO-ENTMCNC: 32.4 G/DL (ref 31.5–36.5)
MCV RBC AUTO: 89 FL (ref 78–100)
PLATELET # BLD AUTO: 192 10E3/UL (ref 150–450)
POTASSIUM SERPL-SCNC: 4.4 MMOL/L (ref 3.4–5.3)
PROT SERPL-MCNC: 6.3 G/DL (ref 6.4–8.3)
RBC # BLD AUTO: 4.77 10E6/UL (ref 3.8–5.2)
SODIUM SERPL-SCNC: 141 MMOL/L (ref 136–145)
TSH SERPL DL<=0.005 MIU/L-ACNC: 0.96 UIU/ML (ref 0.3–4.2)
WBC # BLD AUTO: 4.1 10E3/UL (ref 4–11)

## 2022-09-30 PROCEDURE — 99213 OFFICE O/P EST LOW 20 MIN: CPT | Mod: 25 | Performed by: FAMILY MEDICINE

## 2022-09-30 PROCEDURE — 36415 COLL VENOUS BLD VENIPUNCTURE: CPT | Performed by: FAMILY MEDICINE

## 2022-09-30 PROCEDURE — 90677 PCV20 VACCINE IM: CPT | Performed by: FAMILY MEDICINE

## 2022-09-30 PROCEDURE — 85027 COMPLETE CBC AUTOMATED: CPT | Performed by: FAMILY MEDICINE

## 2022-09-30 PROCEDURE — G0009 ADMIN PNEUMOCOCCAL VACCINE: HCPCS | Performed by: FAMILY MEDICINE

## 2022-09-30 PROCEDURE — 84443 ASSAY THYROID STIM HORMONE: CPT | Performed by: FAMILY MEDICINE

## 2022-09-30 PROCEDURE — 80053 COMPREHEN METABOLIC PANEL: CPT | Performed by: FAMILY MEDICINE

## 2022-09-30 PROCEDURE — 86140 C-REACTIVE PROTEIN: CPT | Performed by: FAMILY MEDICINE

## 2022-09-30 PROCEDURE — 85652 RBC SED RATE AUTOMATED: CPT | Performed by: FAMILY MEDICINE

## 2022-09-30 ASSESSMENT — PATIENT HEALTH QUESTIONNAIRE - PHQ9
SUM OF ALL RESPONSES TO PHQ QUESTIONS 1-9: 0
SUM OF ALL RESPONSES TO PHQ QUESTIONS 1-9: 0
10. IF YOU CHECKED OFF ANY PROBLEMS, HOW DIFFICULT HAVE THESE PROBLEMS MADE IT FOR YOU TO DO YOUR WORK, TAKE CARE OF THINGS AT HOME, OR GET ALONG WITH OTHER PEOPLE: NOT DIFFICULT AT ALL

## 2022-09-30 NOTE — PROGRESS NOTES
Assessment & Plan       Screen for colon cancer  Patient due for colon cancer screening  - COLOGUARD([employer]ACT SCIENCES)    Weight loss  Patient's concern today is weight loss  At her recent appointment to fill her pain pump they indicated that she had lost about 10 pounds since her previous refill which was about 6 weeks prior  Patient is down about 10 pounds since I saw her half a year ago  No known new symptoms  Discussed with patient starting with blood work today  If blood work is not pointing and direction of reason for weight loss order CT chest abdomen pelvis for further evaluation  Patient is also due for mammogram  - Pneumococcal 20 Valent Conjugate (PCV20)  - COLOGUARD(EXACT SCIENCES)  - ESR: Erythrocyte sedimentation rate; Future  - CRP inflammation; Future  - CBC with platelets; Future  - Comprehensive metabolic panel (BMP + Alb, Alk Phos, ALT, AST, Total. Bili, TP); Future  - TSH with free T4 reflex; Future    Encounter for immunization   Patient be updated with pneumococcal 20 today  - Pneumococcal 20 Valent Conjugate (PCV20)         Nicotine/Tobacco Cessation:  She reports that she has been smoking cigarettes. She started smoking about 54 years ago. She has a 38.25 pack-year smoking history. She has never used smokeless tobacco.        No follow-ups on file.    Alex Nolasco MD  [employer]    Brad Lemus is a 66 year old, presenting for the following health issues:  Weight Loss (Concerns about unexplained weight loss )  66-year-old female here for evaluation of weight loss  Is unintentional weight loss and patient was not familiar that she was losing weight she does not feel that she has had any lifestyle changes or eating habit changes  She is having her pain pump filled and it was noted by facility that she had lost about 10 to 11 pounds since her previous fill which is about every 5 to 6 weeks  She is down 10 pounds on our scale from 6 months ago when I saw  her so it seems that all the weight loss is been recent  She has had no new symptoms that she can vocalize  She is overdue for colon cancer screening overdue for mammogram  Patient continues to smoke  Discussed with her starting with blood work today but proceeding with imaging if blood work is not indicating something specific unintentional weight loss concerning for possible cancer  Patient is understanding we will start with blood work today  Show to be updated with pneumococcal 20 today    History of Present Illness       Reason for visit:  Weight loss  Symptom onset:  More than a month  Symptoms include:  At my last pump refill they noticed that I had lost 11 pounds since my last fill (5 weeks) and they thought I should have it checked out as I have not been trying to lose weight.  Symptom intensity:  Mild  Symptom progression:  Staying the same  Had these symptoms before:  No  What makes it worse:  NA  What makes it better:  NA    She eats 0-1 servings of fruits and vegetables daily.She consumes 2 sweetened beverage(s) daily.She exercises with enough effort to increase her heart rate 9 or less minutes per day.  She exercises with enough effort to increase her heart rate 3 or less days per week.   She is taking medications regularly.    Today's PHQ-9         PHQ-9 Total Score: 0    PHQ-9 Q9 Thoughts of better off dead/self-harm past 2 weeks :   Not at all    How difficult have these problems made it for you to do your work, take care of things at home, or get along with other people: Not difficult at all           Review of Systems         Objective    There were no vitals taken for this visit.  There is no height or weight on file to calculate BMI.  Physical Exam   GENERAL: healthy, alert and no distress  GENERAL: healthy, alert, no distress and walking with the aid of a walker  RESP: lungs clear to auscultation - no rales, rhonchi or wheezes  CV: regular rate and rhythm, normal S1 S2, no S3 or S4, no murmur,  click or rub, no peripheral edema and peripheral pulses strong  MS: no gross musculoskeletal defects noted, no edema  PSYCH: mentation appears normal, affect normal/bright

## 2022-10-04 ENCOUNTER — TELEPHONE (OUTPATIENT)
Dept: FAMILY MEDICINE | Facility: CLINIC | Age: 67
End: 2022-10-04

## 2022-10-04 DIAGNOSIS — R63.4 WEIGHT LOSS: Primary | ICD-10-CM

## 2022-10-04 NOTE — TELEPHONE ENCOUNTER
LMTCB please relay message below.    Jaziel Arroyo RN     [facility]        ----- Message from Alex Nolasco MD sent at 10/4/2022  8:24 AM CDT -----  Please contact patient and inform her that nothing in the labs are pointing at a reason for the weight loss.  I would like to do a chest abdomen and pelvis CT like we discussed last week- I will place this order- she can call  to set appointment.

## 2022-10-23 DIAGNOSIS — G47.00 INSOMNIA, UNSPECIFIED TYPE: ICD-10-CM

## 2022-10-24 RX ORDER — ZOLPIDEM TARTRATE 10 MG/1
TABLET ORAL
Qty: 30 TABLET | Refills: 0 | Status: SHIPPED | OUTPATIENT
Start: 2022-10-24 | End: 2022-11-17

## 2022-10-25 LAB — NONINV COLON CA DNA+OCC BLD SCRN STL QL: NEGATIVE

## 2022-10-26 DIAGNOSIS — K21.00 GASTRO-ESOPHAGEAL REFLUX DISEASE WITH ESOPHAGITIS, WITHOUT BLEEDING: ICD-10-CM

## 2022-10-27 NOTE — TELEPHONE ENCOUNTER
"Routing refill request to provider for review/approval because:  Diagnosis warning    Last Written Prescription Date:  4/29/22  Last Fill Quantity: 180,  # refills: 1   Last office visit provider:  9/30/22     Requested Prescriptions   Pending Prescriptions Disp Refills     omeprazole (PRILOSEC) 20 MG DR capsule [Pharmacy Med Name: OMEPRAZOLE DR 20 MG CAPSULE] 180 capsule 1     Sig: TAKE 1 CAPSULE BY MOUTH TWICE A DAY BEFORE MEALS       PPI Protocol Failed - 10/27/2022 11:13 AM        Failed - No diagnosis of osteoporosis on record        Passed - Not on Clopidogrel (unless Pantoprazole ordered)        Passed - Recent (12 mo) or future (30 days) visit within the authorizing provider's specialty     Patient has had an office visit with the authorizing provider or a provider within the authorizing providers department within the previous 12 mos or has a future within next 30 days. See \"Patient Info\" tab in inbasket, or \"Choose Columns\" in Meds & Orders section of the refill encounter.              Passed - Medication is active on med list        Passed - Patient is age 18 or older        Passed - No active pregnacy on record        Passed - No positive pregnancy test in past 12 months             Navneet Do RN 10/27/22 11:13 AM  "

## 2022-11-08 ENCOUNTER — HOSPITAL ENCOUNTER (OUTPATIENT)
Dept: CT IMAGING | Facility: HOSPITAL | Age: 67
Discharge: HOME | End: 2022-11-08
Attending: FAMILY MEDICINE | Admitting: FAMILY MEDICINE
Payer: COMMERCIAL

## 2022-11-08 DIAGNOSIS — R63.4 WEIGHT LOSS: ICD-10-CM

## 2022-11-08 LAB
CREAT BLD-MCNC: 0.6 MG/DL (ref 0.6–1.1)
GFR SERPL CREATININE-BSD FRML MDRD: >60 ML/MIN/1.73M2

## 2022-11-08 PROCEDURE — 250N000011 HC RX IP 250 OP 636: Performed by: FAMILY MEDICINE

## 2022-11-08 PROCEDURE — 82565 ASSAY OF CREATININE: CPT

## 2022-11-08 PROCEDURE — 74177 CT ABD & PELVIS W/CONTRAST: CPT

## 2022-11-08 RX ORDER — IOPAMIDOL 755 MG/ML
100 INJECTION, SOLUTION INTRAVASCULAR ONCE
Status: COMPLETED | OUTPATIENT
Start: 2022-11-08 | End: 2022-11-08

## 2022-11-08 RX ADMIN — IOPAMIDOL 100 ML: 755 INJECTION, SOLUTION INTRAVENOUS at 12:58

## 2022-11-14 DIAGNOSIS — R63.4 WEIGHT LOSS: Primary | ICD-10-CM

## 2022-11-14 DIAGNOSIS — K22.89 ESOPHAGEAL THICKENING: ICD-10-CM

## 2022-11-15 DIAGNOSIS — M62.838 MUSCLE SPASM: ICD-10-CM

## 2022-11-15 DIAGNOSIS — G89.4 CHRONIC PAIN SYNDROME: ICD-10-CM

## 2022-11-17 DIAGNOSIS — G47.00 INSOMNIA, UNSPECIFIED TYPE: ICD-10-CM

## 2022-11-17 RX ORDER — ZOLPIDEM TARTRATE 10 MG/1
TABLET ORAL
Qty: 30 TABLET | Refills: 0 | Status: SHIPPED | OUTPATIENT
Start: 2022-11-17 | End: 2022-12-23

## 2022-11-17 RX ORDER — BACLOFEN 10 MG/1
TABLET ORAL
Qty: 480 TABLET | Refills: 1 | Status: SHIPPED | OUTPATIENT
Start: 2022-11-17 | End: 2023-03-20

## 2022-12-03 DIAGNOSIS — R11.0 NAUSEA: ICD-10-CM

## 2022-12-05 RX ORDER — ONDANSETRON 4 MG/1
TABLET, FILM COATED ORAL
Qty: 90 TABLET | Refills: 0 | Status: SHIPPED | OUTPATIENT
Start: 2022-12-05 | End: 2023-03-17

## 2022-12-05 NOTE — TELEPHONE ENCOUNTER
"Last Written Prescription Date:  8/29/2022  Last Fill Quantity: 90,  # refills: 0   Last office visit provider:  9/30/2022     Requested Prescriptions   Pending Prescriptions Disp Refills     ondansetron (ZOFRAN) 4 MG tablet [Pharmacy Med Name: ONDANSETRON HCL 4 MG TABLET] 90 tablet 0     Sig: TAKE 1 TABLET BY MOUTH EVERY 8 HOURS AS NEEDED FOR NAUSEA        Antivertigo/Antiemetic Agents Passed - 12/5/2022  2:59 PM        Passed - Recent (12 mo) or future (30 days) visit within the authorizing provider's specialty     Patient has had an office visit with the authorizing provider or a provider within the authorizing providers department within the previous 12 mos or has a future within next 30 days. See \"Patient Info\" tab in inbasket, or \"Choose Columns\" in Meds & Orders section of the refill encounter.              Passed - Medication is active on med list        Passed - Patient is 18 years of age or older             Luanne Garg RN 12/05/22 2:59 PM  "

## 2022-12-22 DIAGNOSIS — G47.00 INSOMNIA, UNSPECIFIED TYPE: ICD-10-CM

## 2022-12-23 RX ORDER — ZOLPIDEM TARTRATE 10 MG/1
TABLET ORAL
Qty: 30 TABLET | Refills: 0 | Status: SHIPPED | OUTPATIENT
Start: 2022-12-23 | End: 2023-04-04

## 2023-01-19 ENCOUNTER — TELEPHONE (OUTPATIENT)
Dept: GASTROENTEROLOGY | Facility: CLINIC | Age: 68
End: 2023-01-19
Payer: COMMERCIAL

## 2023-01-19 NOTE — TELEPHONE ENCOUNTER
[facility] Call Center    Phone Message    May a detailed message be left on voicemail: yes     Reason for Call: Other: Pt called to inform that she had a CAT scan done of chest w/ contrast. Does she need to call [facility] to get a copy of this scan for Dr Nash? Or, does she not need to do this? Please call pt back at [phone].      Action Taken: Other: csc gi    Travel Screening: Not Applicable

## 2023-01-20 NOTE — TELEPHONE ENCOUNTER
Spoke to Mariah, clarified questions.   Mariah states wanted to make sure Dr. Nash could see the last CT she had in November. Writer reassured her the CT records were available for Dr. Nash.   Mariah thanked writer for the call , has no further questions.

## 2023-01-23 ENCOUNTER — VIRTUAL VISIT (OUTPATIENT)
Dept: GASTROENTEROLOGY | Facility: CLINIC | Age: 68
End: 2023-01-23
Attending: FAMILY MEDICINE
Payer: COMMERCIAL

## 2023-01-23 DIAGNOSIS — R63.4 WEIGHT LOSS: ICD-10-CM

## 2023-01-23 DIAGNOSIS — K22.89 ESOPHAGEAL THICKENING: ICD-10-CM

## 2023-01-23 PROCEDURE — 99213 OFFICE O/P EST LOW 20 MIN: CPT | Mod: 95 | Performed by: INTERNAL MEDICINE

## 2023-01-23 NOTE — PROGRESS NOTES
Mariah is a 67 year old who is being evaluated via a billable telephone visit.      What phone number would you like to be contacted at? [phone]  How would you like to obtain your AVS? MyChart  {PROVIDER LOCATION On-site should be selected for visits conducted from your clinic location or adjoining [facility] hospital, academic office, or other nearby [facility] building. Off-site should be selected for all other provider locations, including home:[id number]}  Distant Location (provider location):  {virtual location provider:[id number]}  Phone call duration: *** minutes

## 2023-01-23 NOTE — PROGRESS NOTES
GASTROENTEROLOGY  Return Visit       Mariah is a 67 year old who is being evaluated via a billable telephone visit.            Distant Location (provider location):  On-site         Subjective   Mariah is a 67 year old, presenting for the following health issues:  Follow Up      HPI   Mariah is a 67-year-old who is in [place] who I previously saw virtually during the pandemic who had nausea vomiting abdominal pain who recommended several different studies for which she deferred due to the pandemic.  She is been doing okay.  Recently last several months she is lost about 15 pounds.  She underwent CT scan of the abdomen pelvis.  This did not reveal much with exception of some mild thickening in the distal esophagus.  Her primary physician referred her back to me at this time.  She denies dysphagia she does have some occasional breakthrough heartburn.  She is currently using omeprazole 20 mg p.o. daily.  In addition to this she is using Reglan, and Zofran.    Review of Systems   Weight loss 15#  Occasional breakthough heartburn  NO issues swallowing.         Current Outpatient Medications   Medication Sig Dispense Refill     atenolol (TENORMIN) 25 MG tablet TAKE 1 TABLET BY MOUTH EVERY DAY 90 tablet 3     baclofen (LIORESAL) 10 MG tablet TAKE 2 TABLETS (20 MG TOTAL) BY MOUTH FOUR TIMES A DAY. 480 tablet 1     metoclopramide (REGLAN) 10 MG tablet TAKE 1 TABLET BY MOUTH 3 TIMES A DAY AS NEEDED FOR NAUSEA 270 tablet 1     omeprazole (PRILOSEC) 20 MG DR capsule TAKE 1 CAPSULE BY MOUTH TWICE A DAY BEFORE MEALS 180 capsule 3     ondansetron (ZOFRAN) 4 MG tablet TAKE 1 TABLET BY MOUTH EVERY 8 HOURS AS NEEDED FOR NAUSEA 90 tablet 0     traZODone (DESYREL) 50 MG tablet TAKE 0.5 TABLETS (25 MG) BY MOUTH AT BEDTIME 45 tablet 3     zolpidem (AMBIEN) 10 MG tablet TAKE 1 TABLET BY MOUTH EVERY DAY AT BEDTIME AS NEEDED FOR SLEEP 30 tablet 0     In addition to this  Objective    Vitals - Patient Reported  Weight (Patient  Reported): 48.5 kg (107 lb)  Pain Score: Moderate Pain (4)  Pain Loc: Arm        Physical Exam   healthy, alert and no distress  PSYCH: Alert and oriented times 3; coherent speech, normal   rate and volume, able to articulate logical thoughts, able   to abstract reason, no tangential thoughts, no hallucinations   or delusions  Her affect is normal  RESP: No cough, no audible wheezing, able to talk in full sentences  Remainder of exam unable to be completed due to telephone visits    CT reviewed -  + for esophageal thickening        Phone call duration: 8 minutes      Assessment plan:  Abnormal CT scan with distal esophageal thickening, this is a phone visit only for the purposes of discussing an upcoming procedure is this acceptable in this clinical scenario.  The patient will come for an upper endoscopy at [facility] endoscopy unit as an outpatient.  I will meet with her there.  If she needs further GI consultation she will need to be seen in person in clinic.

## 2023-01-26 ENCOUNTER — NURSE TRIAGE (OUTPATIENT)
Dept: NURSING | Facility: CLINIC | Age: 68
End: 2023-01-26
Payer: COMMERCIAL

## 2023-01-26 ENCOUNTER — HOSPITAL ENCOUNTER (EMERGENCY)
Facility: HOSPITAL | Age: 68
Discharge: HOME | End: 2023-01-26
Admitting: PHYSICIAN ASSISTANT
Payer: COMMERCIAL

## 2023-01-26 ENCOUNTER — APPOINTMENT (OUTPATIENT)
Dept: ULTRASOUND IMAGING | Facility: HOSPITAL | Age: 68
End: 2023-01-26
Attending: EMERGENCY MEDICINE
Payer: COMMERCIAL

## 2023-01-26 ENCOUNTER — APPOINTMENT (OUTPATIENT)
Dept: RADIOLOGY | Facility: HOSPITAL | Age: 68
End: 2023-01-26
Attending: EMERGENCY MEDICINE
Payer: COMMERCIAL

## 2023-01-26 VITALS
WEIGHT: 107 LBS | HEART RATE: 77 BPM | RESPIRATION RATE: 20 BRPM | SYSTOLIC BLOOD PRESSURE: 172 MMHG | HEIGHT: 63 IN | BODY MASS INDEX: 18.96 KG/M2 | DIASTOLIC BLOOD PRESSURE: 78 MMHG | OXYGEN SATURATION: 99 % | TEMPERATURE: 98.4 F

## 2023-01-26 DIAGNOSIS — L03.90 CELLULITIS: ICD-10-CM

## 2023-01-26 PROCEDURE — 73590 X-RAY EXAM OF LOWER LEG: CPT | Mod: LT

## 2023-01-26 PROCEDURE — 99284 EMERGENCY DEPT VISIT MOD MDM: CPT | Mod: 25

## 2023-01-26 PROCEDURE — 250N000013 HC RX MED GY IP 250 OP 250 PS 637: Performed by: PHYSICIAN ASSISTANT

## 2023-01-26 PROCEDURE — 93971 EXTREMITY STUDY: CPT | Mod: LT

## 2023-01-26 PROCEDURE — 250N000013 HC RX MED GY IP 250 OP 250 PS 637: Performed by: EMERGENCY MEDICINE

## 2023-01-26 RX ORDER — OXYCODONE HYDROCHLORIDE 5 MG/1
5 TABLET ORAL ONCE
Status: COMPLETED | OUTPATIENT
Start: 2023-01-26 | End: 2023-01-26

## 2023-01-26 RX ORDER — ACETAMINOPHEN 325 MG/1
650 TABLET ORAL ONCE
Status: COMPLETED | OUTPATIENT
Start: 2023-01-26 | End: 2023-01-26

## 2023-01-26 RX ORDER — OXYCODONE HYDROCHLORIDE 5 MG/1
5 TABLET ORAL EVERY 6 HOURS PRN
Qty: 6 TABLET | Refills: 0 | Status: SHIPPED | OUTPATIENT
Start: 2023-01-26 | End: 2023-01-29

## 2023-01-26 RX ORDER — CEPHALEXIN 500 MG/1
500 CAPSULE ORAL ONCE
Status: COMPLETED | OUTPATIENT
Start: 2023-01-26 | End: 2023-01-26

## 2023-01-26 RX ORDER — CEPHALEXIN 500 MG/1
500 CAPSULE ORAL 4 TIMES DAILY
Qty: 28 CAPSULE | Refills: 0 | Status: SHIPPED | OUTPATIENT
Start: 2023-01-26 | End: 2023-02-02

## 2023-01-26 RX ADMIN — ACETAMINOPHEN 650 MG: 325 TABLET ORAL at 15:37

## 2023-01-26 RX ADMIN — OXYCODONE HYDROCHLORIDE 5 MG: 5 TABLET ORAL at 15:37

## 2023-01-26 RX ADMIN — CEPHALEXIN 500 MG: 500 CAPSULE ORAL at 15:37

## 2023-01-26 NOTE — DISCHARGE INSTRUCTIONS
Your xrays do not show any bony injuries and your ultrasound does not show a blood clot. We are going to cover you with antibiotics for a cellulitis.    We have prescribed a pain medication. Only take as needed for severe pain.     Monitor your symptoms.  If you have worsening redness, swelling or pain or you develop fevers or other more systemic symptoms, return to the ER.

## 2023-01-26 NOTE — ED TRIAGE NOTES
Pt c/o left shin pain above the ankle since wed at 1am. no swelling . No injury to the leg.  No hx of clots.

## 2023-01-26 NOTE — TELEPHONE ENCOUNTER
"Pt reports sudden onset L shin pain 36 hours ago.  Pain awoke pt from sleep overnight.  Spontaneous onset.  No injuries.    Pain currently in L shin.  \"Above the ankle.\"  \"Swollen.\"  \"Hard.\"  \"A little red.\"  \"Hurts.\"  \"Hurts to move foot up & down or sideways.\"  \"[place]  \"Pain throbbed up the leg into mid-thigh area last night.\"    Currently unable to bear weight.  Advised pt to seek prompt ED eval.  Pt agrees to plan.  Reports  can drive her to [facility] now.    Kathleen GODINEZ [facility] Nurse Advisor     Reason for Disposition    Unable to walk    Additional Information    Negative: Looks like a broken bone or dislocated joint (e.g., crooked or deformed)    Negative: Sounds like a life-threatening emergency to the triager    Negative: Followed a hip injury    Negative: Followed a knee injury    Negative: Followed an ankle or foot injury    Negative: Back pain radiating (shooting) into leg(s)    Negative: Foot pain is main symptom    Negative: Ankle pain is main symptom    Negative: Knee pain is main symptom    Negative: Leg swelling is main symptom    Negative: Chest pain    Negative: Difficulty breathing    Negative: Entire foot is cool or blue in comparison to other side    Protocols used: LEG PAIN-A-OH      "

## 2023-01-26 NOTE — ED PROVIDER NOTES
ED PROVIDER NOTE    EMERGENCY DEPARTMENT ENCOUNTER      NAME: Mariah Koehler  AGE: 67 year old female  YOB: 1955  MRN: [id number]  EVALUATION DATE & TIME: No admission date for patient encounter.    PCP: Alex Nolasco    ED PROVIDER: Chantell Flynn PA-C      Chief Complaint   Patient presents with     Leg Pain         FINAL IMPRESSION:  1. Cellulitis          MEDICAL DECISION MAKING:    Pertinent Labs & Imaging studies reviewed. (See chart for details)  67 year old female with a h/o chronic pain and HTN and COPD presents to the Emergency Department for evaluation of left shin redness and swelling. No trauma. No fevers, chills. No h/o similar.    Quite hypertensive here - likely related to underlying HTN and acute pain.  There is mild erythema, swelling and warmth over the left anterior lower shin. Normal ROM of the left knee and ankle - low suspicion for septic joint. No significant calf swelling or pain and low risk for DVT but US obtained for further evaluation. No trauma but xray obtianed to evaluate for bony pathology.    No signs of DVT or bony injury/destruction on imaging.     Symptoms seem most c/w mild cellulitis. Will start patient on keflex. No indication for labs or additional imaging at this time. I think outpatient antibioics are appropriate to trial first - no indication for admission today.    Erythema borders marked. Encouraged close recheck with pcp in a few days and Signs and symptoms that should prompt return to the ER were explained. Patient understood and was comfortable with plan.       At the conclusion of the encounter I discussed the results of all of the tests and the disposition. The questions were answered. The patient or family acknowledged understanding and was agreeable with the care plan.     Medical Decision Making    History:    Supplemental history from: N/A    External Record(s) reviewed: Documented in chart, if applicable.    Work Up:    Chart documentation  includes differential considered and any EKGs or imaging independently interpreted by provider, where specified.    In additional to work up documented, I considered the following work up: Documented in chart, if applicable.    External consultation:    Discussion of management with another provider: Documented in chart, if applicable    Complicating factors:    Care impacted by chronic illness: Chronic Lung Disease    Care affected by social determinants of health: N/A    Disposition considerations: Discharge. I prescribed additional prescription strength medication(s) as charted. N/A.          ED COURSE  3:20 PM  Met and evaluated patient. Discussed ED plan.     MEDICATIONS GIVEN IN THE EMERGENCY:  Medications   acetaminophen (TYLENOL) tablet 650 mg (650 mg Oral Given 1/26/23 1537)   oxyCODONE (ROXICODONE) tablet 5 mg (5 mg Oral Given 1/26/23 1537)   cephALEXin (KEFLEX) capsule 500 mg (500 mg Oral Given 1/26/23 1537)       NEW PRESCRIPTIONS STARTED AT TODAY'S ER VISIT  New Prescriptions    No medications on file          =================================================================    HPI    Patient information was obtained from: patient     Use of : None.        Mariah Koehler is a 67 year old female with pertinent history of chronic pain syndrome and HTN who presents to the ED for evaluation of leg pain.    The patient reports waking up at 0100 Wednesday 1/25 and with a sudden onset of pain in her left lower leg as soon as she stepped on it. The pain originates in her left ankle and radiates up her anterior right shin. She states her pain has been worsening since onset, prompting presentation to the ED. The patient also endorse some increased erythema and swelling to her left ankle. She denies any trauma or injury to the leg. The patient denies any fever, chills, or any other complaints at this time. She has been taking Tylenol and Ibuprofen with minimal pain relief. She has not gotten any pain  medication since arriving here in the ED.     At baseline, the patient walks with a walker.    The patient reports taking hydrocodone in the past and tolerating it fine.     SHx: The patient lives at home with her . He drove her here to the ED today and will be driving her home.      REVIEW OF SYSTEMS   See HPI, otherwise all other systems reviewed and are negative    PAST MEDICAL HISTORY:  Past Medical History:   Diagnosis Date     Current mild episode of major depressive disorder, unspecified whether recurrent (H) 2/17/2020     Hypertension      Opiate dependence (H)      RSD (reflex sympathetic dystrophy)      Sacral fracture, closed (H)        PAST SURGICAL HISTORY:  Past Surgical History:   Procedure Laterality Date     APPENDECTOMY       GYN SURGERY       HC REVISE MEDIAN N/CARPAL TUNNEL SURG      Description: Neuroplasty Decompression Median Nerve At Carpal Tunnel;  Recorded: 09/19/2011;     HYSTERECTOMY  1984     IR CERVICAL EPIDURAL STEROID INJECTION  1/14/2005     OOPHORECTOMY Bilateral 1985     GA SYMPATHECTOMY,CERVICOTHORACIC      Description: Surgical Sympathectomy Cervicothoracic;  Recorded: 09/19/2011;     [facility] DECOMPRESS FASCIOTOMY FINGR/HAND      Description: Decompression Fasciotomy Of The Hand;  Recorded: 09/19/2011;     [facility] LAP,CHOLECYSTECTOMY/EXPLORE      Description: Cholecystectomy Laparoscopic;  Recorded: 12/09/2011;     [facility] TOTAL ABDOM HYSTERECTOMY      Description: Hysterectomy;  Recorded: 03/23/2011;           CURRENT MEDICATIONS:    No current facility-administered medications for this encounter.    Current Outpatient Medications:      atenolol (TENORMIN) 25 MG tablet, TAKE 1 TABLET BY MOUTH EVERY DAY, Disp: 90 tablet, Rfl: 3     baclofen (LIORESAL) 10 MG tablet, TAKE 2 TABLETS (20 MG TOTAL) BY MOUTH FOUR TIMES A DAY., Disp: 480 tablet, Rfl: 1     metoclopramide (REGLAN) 10 MG tablet, TAKE 1 TABLET BY MOUTH 3 TIMES A DAY AS NEEDED FOR NAUSEA, Disp: 270 tablet, Rfl: 1     omeprazole  "(PRILOSEC) 20 MG DR capsule, TAKE 1 CAPSULE BY MOUTH TWICE A DAY BEFORE MEALS, Disp: 180 capsule, Rfl: 3     ondansetron (ZOFRAN) 4 MG tablet, TAKE 1 TABLET BY MOUTH EVERY 8 HOURS AS NEEDED FOR NAUSEA, Disp: 90 tablet, Rfl: 0     traZODone (DESYREL) 50 MG tablet, TAKE 0.5 TABLETS (25 MG) BY MOUTH AT BEDTIME, Disp: 45 tablet, Rfl: 3     zolpidem (AMBIEN) 10 MG tablet, TAKE 1 TABLET BY MOUTH EVERY DAY AT BEDTIME AS NEEDED FOR SLEEP, Disp: 30 tablet, Rfl: 0    ALLERGIES:  Allergies   Allergen Reactions     Codeine Nausea and Vomiting     Morphine Nausea and Vomiting     Bacitracin Rash     Methadone Rash       FAMILY HISTORY:  Family History   Problem Relation Age of Onset     Cerebrovascular Disease Mother      Diabetes Mother      Heart Disease Mother      Hypertension Mother      Rheumatologic Disease Mother      Heart Disease Father      Pulmonary Hypertension Father      Kidney failure Father      Cancer Father         melanoma     Diabetes Sister      Hypertension Sister      Hypertension Brother        SOCIAL HISTORY:   Other: patient lives at home with her .     VITALS:  Vitals:    01/26/23 1252 01/26/23 1600   BP: (!) 220/99 (!) 172/78   Pulse: 110 77   Resp: 20 20   Temp: (!) 96.2  F (35.7  C) 98.4  F (36.9  C)   TempSrc: Tympanic Oral   SpO2: 93% 99%   Weight: 48.5 kg (107 lb)    Height: 1.6 m (5' 3\")        PHYSICAL EXAM    General Appearance:  Alert, cooperative, no distress, appears stated age  HENT: Normocephalic without obvious deformity, atraumatic. Mucous membranes moist   Eyes: Conjunctiva clear, Lids normal. No discharge.   Respiratory: No distress.   Musculoskeletal: There is mild erythema, swelling and warmth over the left anterior lower shin. Normal ROM of the left knee and ankle. No calf tenderness. 2+ DP pulse and normal sensation in leg. No crepitus and no rapidly extending erythema during ED Stay  Integument: Warm, dry, no rashes or lesions  Neurologic: Alert and orientated x3. No " focal deficits.  Psych: Normal mood and affect        LAB:  Labs Ordered and Resulted from Time of ED Arrival to Time of ED Departure - No data to display    RADIOLOGY:  XR Tibia and Fibula Left 2 Views   Final Result   IMPRESSION: The tibia and fibula are intact without evidence of a fracture. No malalignment. There is lateral ankle soft tissue swelling.      US Lower Extremity Venous Duplex Left   Final Result   IMPRESSION:   1.  No deep venous thrombosis in the left lower extremity.            PROCEDURES:   None.      I, Chantell Medina, am serving as a scribe to document services personally performed by Chantell Flynn PA-C based on my observation and the provider's statements to me. I, Chantell Flynn PA-C attest that Chantell Medina is acting in a scribe capacity, has observed my performance of the services and has documented them in accordance with my direction.    Chantell Flynn PA-C   Emergency Medicine           Chantell Flynn PA-C  01/27/23 2493

## 2023-01-26 NOTE — ED NOTES
"ED Provider In Triage Note  [facility]  Encounter Date: Jan 26, 2023    Chief Complaint   Patient presents with     Leg Pain       Brief HPI:   Mariah Koehler is a 67 year old female presenting to the Emergency Department with a chief complaint of left anterior leg pain no trauma  Minimal edema    Brief Physical Exam:  BP (!) 220/99   Pulse 110   Temp (!) 96.2  F (35.7  C) (Tympanic)   Resp 20   Ht 1.6 m (5' 3\")   Wt 48.5 kg (107 lb)   SpO2 93%   BMI 18.95 kg/m    General: Non-toxic appearing  HEENT: Atraumatic  Resp: No respiratory distress  Abdomen: Non-peritoneal  Neuro: Alert, oriented, answers questions appropriately  Psych: Behavior appropriate    Thin  Intact pulses  No calf ttp  Anterior tib fib pain    Plan Initiated in Triage:  Orders Placed This Encounter     US Lower Extremity Venous Duplex Left     XR Tibia and Fibula Left 2 Views     acetaminophen (TYLENOL) tablet 650 mg       PIT Dispo:   Return to lobby while awaiting workup and ED bed availability    Trudi Garay MD on 1/26/2023 at 12:56 PM    Patient was evaluated by the Physician in Triage due to a limitation of available rooms in the Emergency Department. A plan of care was discussed based on the information obtained on the initial evaluation and patient was consuled to return back to the Emergency Department lobby after this initial evalutaiton until results were obtained or a room became available in the Emergency Department. Patient was counseled not to leave prior to receiving the results of their workup.     Trudi Garay MD  [facility] EMERGENCY DEPARTMENT  [address] [place] [id number]  [phone]     Trudi Garay MD  01/26/23 1256    "

## 2023-01-27 ENCOUNTER — TELEPHONE (OUTPATIENT)
Dept: FAMILY MEDICINE | Facility: CLINIC | Age: 68
End: 2023-01-27
Payer: COMMERCIAL

## 2023-01-27 NOTE — TELEPHONE ENCOUNTER
Called and informed patient of message below from provider.   She agree with the plan and will just monitor for now.

## 2023-01-27 NOTE — TELEPHONE ENCOUNTER
Reason for call:  Other   Patient called regarding (reason for call): appointment  Additional comments: Pt was seen in [facility] ED 1/26 for cellulitis in left leg. Needs follow up. Requesting to be seen today or Monday, would like to see PCP only    Phone number to reach patient:  Home number on file [phone] (home)    Best Time:  any    Can we leave a detailed message on this number?  YES    Travel screening: Negative

## 2023-01-27 NOTE — TELEPHONE ENCOUNTER
I can not add on patient today or Monday  Please note patient should be watching for the redness and swelling to not be spreading outside of marked edges- if it spreads then she needs to be seen. If she is tolerating the antibiotics and swelling and redness are decreasing patient can just monitor.

## 2023-01-27 NOTE — TELEPHONE ENCOUNTER
There are no openings with any provider in clinic today or Monday.     Did you want to add her on?   She prefers only to see you

## 2023-02-02 ENCOUNTER — MYC MEDICAL ADVICE (OUTPATIENT)
Dept: FAMILY MEDICINE | Facility: CLINIC | Age: 68
End: 2023-02-02
Payer: COMMERCIAL

## 2023-02-02 DIAGNOSIS — K22.89 ESOPHAGEAL THICKENING: ICD-10-CM

## 2023-02-02 DIAGNOSIS — R63.4 WEIGHT LOSS: Primary | ICD-10-CM

## 2023-02-03 DIAGNOSIS — L03.90 CELLULITIS, UNSPECIFIED CELLULITIS SITE: Primary | ICD-10-CM

## 2023-02-03 RX ORDER — CEPHALEXIN 500 MG/1
500 CAPSULE ORAL 3 TIMES DAILY
Qty: 15 CAPSULE | Refills: 0 | Status: SHIPPED | OUTPATIENT
Start: 2023-02-03 | End: 2023-02-17

## 2023-02-17 ENCOUNTER — APPOINTMENT (OUTPATIENT)
Dept: CT IMAGING | Facility: CLINIC | Age: 68
End: 2023-02-17
Attending: EMERGENCY MEDICINE
Payer: COMMERCIAL

## 2023-02-17 ENCOUNTER — HOSPITAL ENCOUNTER (OUTPATIENT)
Facility: CLINIC | Age: 68
Setting detail: OBSERVATION
Discharge: HOME | End: 2023-02-21
Attending: EMERGENCY MEDICINE | Admitting: EMERGENCY MEDICINE
Payer: COMMERCIAL

## 2023-02-17 DIAGNOSIS — E86.0 DEHYDRATION: ICD-10-CM

## 2023-02-17 DIAGNOSIS — R11.2 NAUSEA AND VOMITING, UNSPECIFIED VOMITING TYPE: ICD-10-CM

## 2023-02-17 DIAGNOSIS — R10.826 EPIGASTRIC ABDOMINAL TENDERNESS WITH REBOUND TENDERNESS: ICD-10-CM

## 2023-02-17 DIAGNOSIS — R10.84 GENERALIZED ABDOMINAL PAIN: Primary | ICD-10-CM

## 2023-02-17 DIAGNOSIS — R93.3 ABNORMAL CT SCAN, COLON: ICD-10-CM

## 2023-02-17 DIAGNOSIS — K44.9 HIATAL HERNIA: ICD-10-CM

## 2023-02-17 DIAGNOSIS — R11.0 NAUSEA: ICD-10-CM

## 2023-02-17 LAB
ALBUMIN SERPL-MCNC: 4.3 G/DL (ref 3.5–5)
ALP SERPL-CCNC: 87 U/L (ref 45–120)
ALT SERPL W P-5'-P-CCNC: 15 U/L (ref 0–45)
ANION GAP SERPL CALCULATED.3IONS-SCNC: 14 MMOL/L (ref 5–18)
AST SERPL W P-5'-P-CCNC: 16 U/L (ref 0–40)
ATRIAL RATE - MUSE: 103 BPM
BASOPHILS # BLD AUTO: 0 10E3/UL (ref 0–0.2)
BASOPHILS NFR BLD AUTO: 0 %
BILIRUB SERPL-MCNC: 0.4 MG/DL (ref 0–1)
BUN SERPL-MCNC: 13 MG/DL (ref 8–22)
C REACTIVE PROTEIN LHE: <0.1 MG/DL (ref 0–?)
CALCIUM SERPL-MCNC: 9.6 MG/DL (ref 8.5–10.5)
CHLORIDE BLD-SCNC: 103 MMOL/L (ref 98–107)
CO2 SERPL-SCNC: 24 MMOL/L (ref 22–31)
CREAT SERPL-MCNC: 0.68 MG/DL (ref 0.6–1.1)
DIASTOLIC BLOOD PRESSURE - MUSE: NORMAL MMHG
EOSINOPHIL # BLD AUTO: 0 10E3/UL (ref 0–0.7)
EOSINOPHIL NFR BLD AUTO: 0 %
ERYTHROCYTE [DISTWIDTH] IN BLOOD BY AUTOMATED COUNT: 13.2 % (ref 10–15)
FLUAV RNA SPEC QL NAA+PROBE: NEGATIVE
FLUBV RNA RESP QL NAA+PROBE: NEGATIVE
GFR SERPL CREATININE-BSD FRML MDRD: >90 ML/MIN/1.73M2
GLUCOSE BLD-MCNC: 151 MG/DL (ref 70–125)
HCT VFR BLD AUTO: 48 % (ref 35–47)
HGB BLD-MCNC: 16 G/DL (ref 11.7–15.7)
HOLD SPECIMEN: NORMAL
HOLD SPECIMEN: NORMAL
IMM GRANULOCYTES # BLD: 0 10E3/UL
IMM GRANULOCYTES NFR BLD: 0 %
INTERPRETATION ECG - MUSE: NORMAL
LACTATE SERPL-SCNC: 1.1 MMOL/L (ref 0.7–2)
LIPASE SERPL-CCNC: 14 U/L (ref 0–52)
LYMPHOCYTES # BLD AUTO: 0.9 10E3/UL (ref 0.8–5.3)
LYMPHOCYTES NFR BLD AUTO: 10 %
MCH RBC QN AUTO: 28.6 PG (ref 26.5–33)
MCHC RBC AUTO-ENTMCNC: 33.3 G/DL (ref 31.5–36.5)
MCV RBC AUTO: 86 FL (ref 78–100)
MONOCYTES # BLD AUTO: 0.4 10E3/UL (ref 0–1.3)
MONOCYTES NFR BLD AUTO: 4 %
NEUTROPHILS # BLD AUTO: 8.1 10E3/UL (ref 1.6–8.3)
NEUTROPHILS NFR BLD AUTO: 86 %
NRBC # BLD AUTO: 0 10E3/UL
NRBC BLD AUTO-RTO: 0 /100
P AXIS - MUSE: 78 DEGREES
PLATELET # BLD AUTO: 272 10E3/UL (ref 150–450)
POTASSIUM BLD-SCNC: 3.8 MMOL/L (ref 3.5–5)
PR INTERVAL - MUSE: 140 MS
PROT SERPL-MCNC: 7.3 G/DL (ref 6–8)
QRS DURATION - MUSE: 80 MS
QT - MUSE: 340 MS
QTC - MUSE: 445 MS
R AXIS - MUSE: 64 DEGREES
RBC # BLD AUTO: 5.59 10E6/UL (ref 3.8–5.2)
RSV RNA SPEC NAA+PROBE: NEGATIVE
SARS-COV-2 RNA RESP QL NAA+PROBE: NEGATIVE
SODIUM SERPL-SCNC: 141 MMOL/L (ref 136–145)
SYSTOLIC BLOOD PRESSURE - MUSE: NORMAL MMHG
T AXIS - MUSE: 56 DEGREES
TROPONIN I SERPL-MCNC: <0.01 NG/ML (ref 0–0.29)
VENTRICULAR RATE- MUSE: 103 BPM
WBC # BLD AUTO: 9.4 10E3/UL (ref 4–11)

## 2023-02-17 PROCEDURE — G0378 HOSPITAL OBSERVATION PER HR: HCPCS

## 2023-02-17 PROCEDURE — 86140 C-REACTIVE PROTEIN: CPT | Performed by: STUDENT IN AN ORGANIZED HEALTH CARE EDUCATION/TRAINING PROGRAM

## 2023-02-17 PROCEDURE — 83690 ASSAY OF LIPASE: CPT | Performed by: EMERGENCY MEDICINE

## 2023-02-17 PROCEDURE — 36415 COLL VENOUS BLD VENIPUNCTURE: CPT | Performed by: EMERGENCY MEDICINE

## 2023-02-17 PROCEDURE — 96376 TX/PRO/DX INJ SAME DRUG ADON: CPT

## 2023-02-17 PROCEDURE — 99285 EMERGENCY DEPT VISIT HI MDM: CPT | Mod: 25,CS

## 2023-02-17 PROCEDURE — 96374 THER/PROPH/DIAG INJ IV PUSH: CPT

## 2023-02-17 PROCEDURE — 96375 TX/PRO/DX INJ NEW DRUG ADDON: CPT

## 2023-02-17 PROCEDURE — 250N000011 HC RX IP 250 OP 636: Performed by: EMERGENCY MEDICINE

## 2023-02-17 PROCEDURE — 96361 HYDRATE IV INFUSION ADD-ON: CPT

## 2023-02-17 PROCEDURE — 85025 COMPLETE CBC W/AUTO DIFF WBC: CPT | Performed by: EMERGENCY MEDICINE

## 2023-02-17 PROCEDURE — 258N000003 HC RX IP 258 OP 636: Performed by: EMERGENCY MEDICINE

## 2023-02-17 PROCEDURE — 80053 COMPREHEN METABOLIC PANEL: CPT | Performed by: EMERGENCY MEDICINE

## 2023-02-17 PROCEDURE — 87637 SARSCOV2&INF A&B&RSV AMP PRB: CPT | Performed by: EMERGENCY MEDICINE

## 2023-02-17 PROCEDURE — 74177 CT ABD & PELVIS W/CONTRAST: CPT

## 2023-02-17 PROCEDURE — 84484 ASSAY OF TROPONIN QUANT: CPT | Performed by: EMERGENCY MEDICINE

## 2023-02-17 PROCEDURE — 93005 ELECTROCARDIOGRAM TRACING: CPT | Performed by: EMERGENCY MEDICINE

## 2023-02-17 PROCEDURE — 83605 ASSAY OF LACTIC ACID: CPT | Performed by: EMERGENCY MEDICINE

## 2023-02-17 RX ORDER — ONDANSETRON 4 MG/1
4 TABLET, ORALLY DISINTEGRATING ORAL EVERY 6 HOURS PRN
Status: DISCONTINUED | OUTPATIENT
Start: 2023-02-17 | End: 2023-02-21 | Stop reason: HOSPADM

## 2023-02-17 RX ORDER — ZOLPIDEM TARTRATE 5 MG/1
10 TABLET ORAL
Status: DISCONTINUED | OUTPATIENT
Start: 2023-02-17 | End: 2023-02-21 | Stop reason: HOSPADM

## 2023-02-17 RX ORDER — PROCHLORPERAZINE MALEATE 5 MG
5 TABLET ORAL EVERY 6 HOURS PRN
Status: DISCONTINUED | OUTPATIENT
Start: 2023-02-17 | End: 2023-02-21 | Stop reason: HOSPADM

## 2023-02-17 RX ORDER — NICOTINE 21 MG/24HR
1 PATCH, TRANSDERMAL 24 HOURS TRANSDERMAL DAILY PRN
Status: DISCONTINUED | OUTPATIENT
Start: 2023-02-17 | End: 2023-02-20

## 2023-02-17 RX ORDER — AMOXICILLIN 250 MG
1 CAPSULE ORAL 2 TIMES DAILY PRN
Status: DISCONTINUED | OUTPATIENT
Start: 2023-02-17 | End: 2023-02-21 | Stop reason: HOSPADM

## 2023-02-17 RX ORDER — ACETAMINOPHEN 325 MG/1
650 TABLET ORAL EVERY 6 HOURS PRN
Status: DISCONTINUED | OUTPATIENT
Start: 2023-02-17 | End: 2023-02-21 | Stop reason: HOSPADM

## 2023-02-17 RX ORDER — ATENOLOL 25 MG/1
25 TABLET ORAL DAILY
Status: DISCONTINUED | OUTPATIENT
Start: 2023-02-18 | End: 2023-02-21 | Stop reason: HOSPADM

## 2023-02-17 RX ORDER — IOPAMIDOL 755 MG/ML
90 INJECTION, SOLUTION INTRAVASCULAR ONCE
Status: COMPLETED | OUTPATIENT
Start: 2023-02-17 | End: 2023-02-17

## 2023-02-17 RX ORDER — OXYMETAZOLINE HCL 0.05 %
15-30 SPRAY, NON-AEROSOL (ML) NASAL
COMMUNITY
End: 2023-07-25

## 2023-02-17 RX ORDER — ACETAMINOPHEN 650 MG/1
650 SUPPOSITORY RECTAL EVERY 6 HOURS PRN
Status: DISCONTINUED | OUTPATIENT
Start: 2023-02-17 | End: 2023-02-21 | Stop reason: HOSPADM

## 2023-02-17 RX ORDER — BACLOFEN 10 MG/1
20 TABLET ORAL 4 TIMES DAILY
Status: DISCONTINUED | OUTPATIENT
Start: 2023-02-18 | End: 2023-02-21 | Stop reason: HOSPADM

## 2023-02-17 RX ORDER — ONDANSETRON 2 MG/ML
4 INJECTION INTRAMUSCULAR; INTRAVENOUS EVERY 30 MIN PRN
Status: COMPLETED | OUTPATIENT
Start: 2023-02-17 | End: 2023-02-17

## 2023-02-17 RX ORDER — PROCHLORPERAZINE 25 MG
12.5 SUPPOSITORY, RECTAL RECTAL EVERY 12 HOURS PRN
Status: DISCONTINUED | OUTPATIENT
Start: 2023-02-17 | End: 2023-02-21 | Stop reason: HOSPADM

## 2023-02-17 RX ORDER — SODIUM CHLORIDE 9 MG/ML
INJECTION, SOLUTION INTRAVENOUS CONTINUOUS
Status: DISCONTINUED | OUTPATIENT
Start: 2023-02-17 | End: 2023-02-21 | Stop reason: HOSPADM

## 2023-02-17 RX ORDER — POLYETHYLENE GLYCOL 3350 17 G/17G
17 POWDER, FOR SOLUTION ORAL DAILY PRN
Status: DISCONTINUED | OUTPATIENT
Start: 2023-02-17 | End: 2023-02-21 | Stop reason: HOSPADM

## 2023-02-17 RX ORDER — MAGNESIUM HYDROXIDE/ALUMINUM HYDROXICE/SIMETHICONE 120; 1200; 1200 MG/30ML; MG/30ML; MG/30ML
30 SUSPENSION ORAL EVERY 4 HOURS PRN
Status: DISCONTINUED | OUTPATIENT
Start: 2023-02-17 | End: 2023-02-21 | Stop reason: HOSPADM

## 2023-02-17 RX ORDER — METOCLOPRAMIDE 5 MG/1
10 TABLET ORAL 3 TIMES DAILY PRN
Status: DISCONTINUED | OUTPATIENT
Start: 2023-02-17 | End: 2023-02-21 | Stop reason: HOSPADM

## 2023-02-17 RX ORDER — AMOXICILLIN 250 MG
2 CAPSULE ORAL 2 TIMES DAILY PRN
Status: DISCONTINUED | OUTPATIENT
Start: 2023-02-17 | End: 2023-02-21 | Stop reason: HOSPADM

## 2023-02-17 RX ORDER — PANTOPRAZOLE SODIUM 40 MG/1
40 TABLET, DELAYED RELEASE ORAL
Status: DISCONTINUED | OUTPATIENT
Start: 2023-02-18 | End: 2023-02-19

## 2023-02-17 RX ORDER — ONDANSETRON 2 MG/ML
4 INJECTION INTRAMUSCULAR; INTRAVENOUS EVERY 6 HOURS PRN
Status: DISCONTINUED | OUTPATIENT
Start: 2023-02-17 | End: 2023-02-21 | Stop reason: HOSPADM

## 2023-02-17 RX ORDER — SODIUM CHLORIDE 9 MG/ML
INJECTION, SOLUTION INTRAVENOUS CONTINUOUS
Status: DISCONTINUED | OUTPATIENT
Start: 2023-02-18 | End: 2023-02-18

## 2023-02-17 RX ORDER — HYDRALAZINE HYDROCHLORIDE 20 MG/ML
10 INJECTION INTRAMUSCULAR; INTRAVENOUS EVERY 6 HOURS PRN
Status: DISCONTINUED | OUTPATIENT
Start: 2023-02-17 | End: 2023-02-21 | Stop reason: HOSPADM

## 2023-02-17 RX ADMIN — IOPAMIDOL 90 ML: 755 INJECTION, SOLUTION INTRAVENOUS at 21:09

## 2023-02-17 RX ADMIN — ONDANSETRON 4 MG: 2 INJECTION INTRAMUSCULAR; INTRAVENOUS at 20:29

## 2023-02-17 RX ADMIN — SODIUM CHLORIDE 1000 ML: 9 INJECTION, SOLUTION INTRAVENOUS at 22:01

## 2023-02-17 RX ADMIN — SODIUM CHLORIDE: 9 INJECTION, SOLUTION INTRAVENOUS at 20:30

## 2023-02-17 RX ADMIN — FAMOTIDINE 20 MG: 10 INJECTION, SOLUTION INTRAVENOUS at 21:21

## 2023-02-17 RX ADMIN — ONDANSETRON 4 MG: 2 INJECTION INTRAMUSCULAR; INTRAVENOUS at 21:19

## 2023-02-17 RX ADMIN — ONDANSETRON 4 MG: 2 INJECTION INTRAMUSCULAR; INTRAVENOUS at 22:06

## 2023-02-17 ASSESSMENT — ENCOUNTER SYMPTOMS
ABDOMINAL PAIN: 0
VOMITING: 1
NAUSEA: 1
FEVER: 0
DIARRHEA: 1
DYSURIA: 0
COUGH: 0

## 2023-02-17 ASSESSMENT — ACTIVITIES OF DAILY LIVING (ADL)
ADLS_ACUITY_SCORE: 37
ADLS_ACUITY_SCORE: 35

## 2023-02-18 LAB
ALBUMIN SERPL-MCNC: 3.7 G/DL (ref 3.5–5)
ALP SERPL-CCNC: 67 U/L (ref 45–120)
ALT SERPL W P-5'-P-CCNC: 11 U/L (ref 0–45)
ANION GAP SERPL CALCULATED.3IONS-SCNC: 9 MMOL/L (ref 5–18)
AST SERPL W P-5'-P-CCNC: 15 U/L (ref 0–40)
BILIRUB SERPL-MCNC: 0.6 MG/DL (ref 0–1)
BUN SERPL-MCNC: 15 MG/DL (ref 8–22)
C REACTIVE PROTEIN LHE: <0.1 MG/DL (ref 0–?)
CALCIUM SERPL-MCNC: 8.8 MG/DL (ref 8.5–10.5)
CHLORIDE BLD-SCNC: 108 MMOL/L (ref 98–107)
CO2 SERPL-SCNC: 24 MMOL/L (ref 22–31)
CREAT SERPL-MCNC: 0.64 MG/DL (ref 0.6–1.1)
ERYTHROCYTE [DISTWIDTH] IN BLOOD BY AUTOMATED COUNT: 13.6 % (ref 10–15)
GFR SERPL CREATININE-BSD FRML MDRD: >90 ML/MIN/1.73M2
GLUCOSE BLD-MCNC: 90 MG/DL (ref 70–125)
HCT VFR BLD AUTO: 40.9 % (ref 35–47)
HGB BLD-MCNC: 13.6 G/DL (ref 11.7–15.7)
MCH RBC QN AUTO: 29 PG (ref 26.5–33)
MCHC RBC AUTO-ENTMCNC: 33.3 G/DL (ref 31.5–36.5)
MCV RBC AUTO: 87 FL (ref 78–100)
PLATELET # BLD AUTO: 220 10E3/UL (ref 150–450)
POTASSIUM BLD-SCNC: 4.1 MMOL/L (ref 3.5–5)
PROT SERPL-MCNC: 5.9 G/DL (ref 6–8)
RBC # BLD AUTO: 4.69 10E6/UL (ref 3.8–5.2)
SODIUM SERPL-SCNC: 141 MMOL/L (ref 136–145)
WBC # BLD AUTO: 7.8 10E3/UL (ref 4–11)

## 2023-02-18 PROCEDURE — 258N000003 HC RX IP 258 OP 636: Performed by: STUDENT IN AN ORGANIZED HEALTH CARE EDUCATION/TRAINING PROGRAM

## 2023-02-18 PROCEDURE — 85014 HEMATOCRIT: CPT | Performed by: STUDENT IN AN ORGANIZED HEALTH CARE EDUCATION/TRAINING PROGRAM

## 2023-02-18 PROCEDURE — 250N000013 HC RX MED GY IP 250 OP 250 PS 637: Performed by: INTERNAL MEDICINE

## 2023-02-18 PROCEDURE — 86140 C-REACTIVE PROTEIN: CPT | Performed by: STUDENT IN AN ORGANIZED HEALTH CARE EDUCATION/TRAINING PROGRAM

## 2023-02-18 PROCEDURE — 250N000013 HC RX MED GY IP 250 OP 250 PS 637: Performed by: STUDENT IN AN ORGANIZED HEALTH CARE EDUCATION/TRAINING PROGRAM

## 2023-02-18 PROCEDURE — 96361 HYDRATE IV INFUSION ADD-ON: CPT

## 2023-02-18 PROCEDURE — 96376 TX/PRO/DX INJ SAME DRUG ADON: CPT

## 2023-02-18 PROCEDURE — 80053 COMPREHEN METABOLIC PANEL: CPT | Performed by: STUDENT IN AN ORGANIZED HEALTH CARE EDUCATION/TRAINING PROGRAM

## 2023-02-18 PROCEDURE — G0378 HOSPITAL OBSERVATION PER HR: HCPCS

## 2023-02-18 PROCEDURE — 258N000003 HC RX IP 258 OP 636: Performed by: EMERGENCY MEDICINE

## 2023-02-18 PROCEDURE — 99222 1ST HOSP IP/OBS MODERATE 55: CPT | Mod: GC | Performed by: STUDENT IN AN ORGANIZED HEALTH CARE EDUCATION/TRAINING PROGRAM

## 2023-02-18 PROCEDURE — 36415 COLL VENOUS BLD VENIPUNCTURE: CPT | Performed by: STUDENT IN AN ORGANIZED HEALTH CARE EDUCATION/TRAINING PROGRAM

## 2023-02-18 PROCEDURE — 250N000011 HC RX IP 250 OP 636: Performed by: STUDENT IN AN ORGANIZED HEALTH CARE EDUCATION/TRAINING PROGRAM

## 2023-02-18 PROCEDURE — 96375 TX/PRO/DX INJ NEW DRUG ADDON: CPT

## 2023-02-18 RX ADMIN — METOCLOPRAMIDE 10 MG: 10 TABLET ORAL at 12:00

## 2023-02-18 RX ADMIN — NICOTINE 1 PATCH: 21 PATCH, EXTENDED RELEASE TRANSDERMAL at 01:10

## 2023-02-18 RX ADMIN — METOCLOPRAMIDE 10 MG: 10 TABLET ORAL at 23:36

## 2023-02-18 RX ADMIN — PROCHLORPERAZINE EDISYLATE 5 MG: 5 INJECTION INTRAMUSCULAR; INTRAVENOUS at 17:22

## 2023-02-18 RX ADMIN — SODIUM CHLORIDE: 9 INJECTION, SOLUTION INTRAVENOUS at 01:06

## 2023-02-18 RX ADMIN — ATENOLOL 25 MG: 25 TABLET ORAL at 00:45

## 2023-02-18 RX ADMIN — PROCHLORPERAZINE EDISYLATE 5 MG: 5 INJECTION INTRAMUSCULAR; INTRAVENOUS at 00:23

## 2023-02-18 RX ADMIN — ONDANSETRON 4 MG: 2 INJECTION INTRAMUSCULAR; INTRAVENOUS at 20:10

## 2023-02-18 RX ADMIN — BACLOFEN 20 MG: 10 TABLET ORAL at 15:37

## 2023-02-18 RX ADMIN — SODIUM CHLORIDE: 9 INJECTION, SOLUTION INTRAVENOUS at 12:00

## 2023-02-18 RX ADMIN — TRAZODONE HYDROCHLORIDE 25 MG: 50 TABLET ORAL at 21:20

## 2023-02-18 RX ADMIN — ONDANSETRON 4 MG: 2 INJECTION INTRAMUSCULAR; INTRAVENOUS at 06:05

## 2023-02-18 RX ADMIN — SODIUM CHLORIDE: 9 INJECTION, SOLUTION INTRAVENOUS at 20:10

## 2023-02-18 RX ADMIN — PANTOPRAZOLE SODIUM 40 MG: 40 TABLET, DELAYED RELEASE ORAL at 07:39

## 2023-02-18 RX ADMIN — ONDANSETRON 4 MG: 2 INJECTION INTRAMUSCULAR; INTRAVENOUS at 00:01

## 2023-02-18 RX ADMIN — BACLOFEN 20 MG: 10 TABLET ORAL at 12:00

## 2023-02-18 RX ADMIN — ONDANSETRON 4 MG: 2 INJECTION INTRAMUSCULAR; INTRAVENOUS at 14:11

## 2023-02-18 RX ADMIN — ZOLPIDEM TARTRATE 10 MG: 5 TABLET ORAL at 23:39

## 2023-02-18 RX ADMIN — HYDRALAZINE HYDROCHLORIDE 10 MG: 20 INJECTION INTRAMUSCULAR; INTRAVENOUS at 20:09

## 2023-02-18 RX ADMIN — ZOLPIDEM TARTRATE 10 MG: 5 TABLET ORAL at 01:44

## 2023-02-18 RX ADMIN — PROCHLORPERAZINE EDISYLATE 5 MG: 5 INJECTION INTRAMUSCULAR; INTRAVENOUS at 10:22

## 2023-02-18 RX ADMIN — BACLOFEN 20 MG: 10 TABLET ORAL at 07:37

## 2023-02-18 RX ADMIN — TRAZODONE HYDROCHLORIDE 25 MG: 50 TABLET ORAL at 00:46

## 2023-02-18 RX ADMIN — ACETAMINOPHINE, CAFFEINE 2 TABLET: 500; 65 TABLET, FILM COATED ORAL at 07:38

## 2023-02-18 RX ADMIN — ATENOLOL 25 MG: 25 TABLET ORAL at 07:39

## 2023-02-18 RX ADMIN — BACLOFEN 20 MG: 10 TABLET ORAL at 20:11

## 2023-02-18 ASSESSMENT — ACTIVITIES OF DAILY LIVING (ADL)
ADLS_ACUITY_SCORE: 37
ADLS_ACUITY_SCORE: 37
ADLS_ACUITY_SCORE: 30
ADLS_ACUITY_SCORE: 30
DEPENDENT_IADLS:: CLEANING;SHOPPING
ADLS_ACUITY_SCORE: 30
ADLS_ACUITY_SCORE: 37
ADLS_ACUITY_SCORE: 30
ADLS_ACUITY_SCORE: 37
ADLS_ACUITY_SCORE: 30

## 2023-02-18 NOTE — PROGRESS NOTES
"PRIMARY DIAGNOSIS: \"GENERIC\" NURSING  OUTPATIENT/OBSERVATION GOALS TO BE MET BEFORE DISCHARGE:  1. ADLs back to baseline: No    2. Activity and level of assistance: Up with standby assistance.    3. Pain status: IV antimetics    4. Return to near baseline physical activity: Yes    Pt. A/ox4. Stand by assist to bathroom with walker. Vitals stable on room air. Blood pressures running on the higher side. NPO. IV fluids running. IV antiemetics administered, nausea decreased. Having some trouble keeping down PO medications.   "

## 2023-02-18 NOTE — ED NOTES
Pt with increasing nausea again. Does not feel that she can tolerate PO meds. Not due for zofran again yet so IV compazine administered.

## 2023-02-18 NOTE — H&P
[facility]    History and Physical - Teaching Service       Date of Admission:  2/17/2023    Assessment & Plan      Mariah Koehler is a 67 year old female w/ PMH of unintentional weight loss, multiple prior abdominal surgeries, chronic pain syndrome with a pain pump, MDD, insomnia, HTN and treated LLE cellulitis 1/26/23 admitted on 2/17/2023 for nausea and vomiting.    Nausea  Vomiting  Moderate hiatal hernia  Mid-distal colon thickening  Patient presents with acute nausea with NBNB emesis without associated abdominal pain and one loose BM. She is hypertensive, but otherwise vitally stable. Lab workup including CMP, lipase, lactic acid, and CRP were normal. CBC without leukocytosis; had Hgb 16, but this could be hemoconcentration in setting of emesis. This emesis could be secondary to her moderate hiatal hernia, but would have expected this to be more chronic or perhaps cause some dysphagia sensation. CT also showed mid-distal colon thickening, but no abdominal pain and negative inflammatory markers make a clinically significant diverticulitis less likely. This could also be viral gastroenteritis. Has a history of migraines, however, no head pain at onset of these symptoms. Unlikely cardiac given normal EKG and troponin.  - admit for observation  - NPO except meds  - NS at 100 mL/hr  - zofran and compazine PRN  - I/O and daily scale weight  - AM CMP, CRP, CBC  - consult GI if symptoms not improving    Unintentional weight loss  9/2022 note mentions 10lb unintentional weight loss w/ normal lab workup. No weights noted in past several months and admit weight of 107lb seems to be reported.  - follow up with outpatient GI for EGD  - weights as above    Chronic pain syndrome  Has a pain pump, but does not know specific medication or her pain clinic.  - PTA pain pump  - PTA baclofen 20mg PO QID  - PTA metoclopramide 10mg PO TID PRN    Hypertensive urgency  HTN  - PTA atenolol 25 mg PO daily  -  hydralazine 10mg IV q6hr PRN SBP >180 or DBP >110    Hx migraine  - acetaminophen-caffeine 500-65mg PO q6hr PRN    Insomnia  Hx MDD  - PTA trazodone 25mg PO at bedtime (insomnia dose)  - PTA zolpidem 10mg PO at bedtime PRN       Diet: NPO for Medical/Clinical Reasons Except for: Meds, Ice Chips  DVT Prophylaxis: Pneumatic Compression Devices  Bellamy Catheter: Not present  Lines: None     Cardiac Monitoring: None  Code Status: Full Code    Disposition Plan      Expected Discharge Date: 02/18/2023                The patient's care was discussed with the Attending Physician, Dr. Jonas. Patient will be seen by Dr. Williamson in the morning.    Noah Cooney MD PGY3  Teaching Service  [facility]  Securely message with [employer] (more info)  Text page via [facility] Paging/Directory     ______________________________________________________________________    Chief Complaint   Nausea and vomiting    History is obtained from the patient and chart review. Patient is a poor historian.    History of Present Illness   Mariah Koehler is a 67 year old female w/ PMH of unintentional weight loss, chronic pain syndrome with a pain pump, MDD, insomnia, HTN and treated LLE cellulitis 1/26/23 who presents for acute nausea vomiting.    Patient notes that she has had nausea with approximately 7 episodes of nonbloody nonbilious emesis since 3 AM on 2/17/2023.  She also had 1 episode of loose nonbloody nonmelanotic bowel movement; no preceding constipation.  Notes no fever, chills, esophageal/abdominal pain, food stuck sensation, recent travel, sick contact, chest pain, shortness of breath, dysuria, or recent medication changes.      Per patient interview, she has had unintentional weight loss since 9/2022; she had a CT head and pelvis that revealed mild thickening in the distal esophagus, but otherwise normal.  He had a virtual GI evaluation on 1/23/2023 with plan for a future EGD at [facility] that has yet to be  scheduled.    In the ED, patient was hypertensive but otherwise hemodynamically stable. She had a normal CMP, CRP, lipase, lactic acid, troponin, COVID/flu/RSV. CBC normal other than Hgb 16.0. EKG sinus tachycardia w/o ischemia; had one PVC. CT abdomen/pelvis w/ contrast noted Moderate hiatal hernia is more distended compared to 11/8/2022 and There is some wall thickening of the colon involving the transverse through the descending and sigmoid colon.      Past Medical History    Past Medical History:   Diagnosis Date     Current mild episode of major depressive disorder, unspecified whether recurrent (H) 2/17/2020     Hypertension      Opiate dependence (H)      RSD (reflex sympathetic dystrophy)      Sacral fracture, closed (H)        Past Surgical History   Past Surgical History:   Procedure Laterality Date     APPENDECTOMY       GYN SURGERY       HC REVISE MEDIAN N/CARPAL TUNNEL SURG      Description: Neuroplasty Decompression Median Nerve At Carpal Tunnel;  Recorded: 09/19/2011;     HYSTERECTOMY  1984     IR CERVICAL EPIDURAL STEROID INJECTION  1/14/2005     OOPHORECTOMY Bilateral 1985     NE SYMPATHECTOMY,CERVICOTHORACIC      Description: Surgical Sympathectomy Cervicothoracic;  Recorded: 09/19/2011;     [facility] DECOMPRESS FASCIOTOMY FINGR/HAND      Description: Decompression Fasciotomy Of The Hand;  Recorded: 09/19/2011;     [facility] LAP,CHOLECYSTECTOMY/EXPLORE      Description: Cholecystectomy Laparoscopic;  Recorded: 12/09/2011;     [facility] TOTAL ABDOM HYSTERECTOMY      Description: Hysterectomy;  Recorded: 03/23/2011;       Prior to Admission Medications   Prior to Admission Medications   Prescriptions Last Dose Informant Patient Reported? Taking?   atenolol (TENORMIN) 25 MG tablet 2/16/2023  No Yes   Sig: TAKE 1 TABLET BY MOUTH EVERY DAY   baclofen (LIORESAL) 10 MG tablet 2/16/2023  No Yes   Sig: TAKE 2 TABLETS (20 MG TOTAL) BY MOUTH FOUR TIMES A DAY.   diphenhydrAMINE HCl (ZZZQUIL) 50 MG/30ML LIQD Past Month at PRN   Yes Yes   Sig: Take 15-30 mLs by mouth nightly as needed   metoclopramide (REGLAN) 10 MG tablet 2/17/2023 at took today but didn't keep down  No Yes   Sig: TAKE 1 TABLET BY MOUTH 3 TIMES A DAY AS NEEDED FOR NAUSEA   omeprazole (PRILOSEC) 20 MG DR capsule 2/16/2023  No Yes   Sig: TAKE 1 CAPSULE BY MOUTH TWICE A DAY BEFORE MEALS   ondansetron (ZOFRAN) 4 MG tablet Past Week at PRN  No Yes   Sig: TAKE 1 TABLET BY MOUTH EVERY 8 HOURS AS NEEDED FOR NAUSEA   traZODone (DESYREL) 50 MG tablet 2/16/2023  No Yes   Sig: TAKE 0.5 TABLETS (25 MG) BY MOUTH AT BEDTIME   zolpidem (AMBIEN) 10 MG tablet 2/16/2023  No Yes   Sig: TAKE 1 TABLET BY MOUTH EVERY DAY AT BEDTIME AS NEEDED FOR SLEEP      Facility-Administered Medications: None           Physical Exam   Vital Signs: Temp: 97.6  F (36.4  C) Temp src: Temporal BP: (!) 189/91 Pulse: 98   Resp: 22 SpO2: 98 % O2 Device: None (Room air)    Weight: 107 lbs 0 oz    Exam:  Constitutional: alert, moderate distress and cooperative; slender  Head: Normocephalic. No masses, lesions, tenderness or abnormalities  Neck: Neck supple. No adenopathy.  ENT: Poor dentition; no neck nodes or sinus tenderness  Cardiovascular: RRR. No murmurs, clicks gallops or rub  Respiratory: Good diaphragmatic excursion. Lungs clear  Gastrointestinal: Abdomen soft, non-tender. BS normal. No masses, organomegaly  : Deferred  Musculoskeletal: extremities normal- no gross deformities noted and normal muscle tone  Skin: no suspicious lesions or rashes  Neurologic: Sensation grossly WNL.  Psychiatric: judgment and insight intact and worried  Hematologic/Lymphatic/Immunologic: Normal cervical lymph nodes      Data   Imaging results reviewed over the past 24 hrs:   Recent Results (from the past 24 hour(s))   CT Abdomen Pelvis w Contrast    Narrative    EXAM: CT ABDOMEN PELVIS W CONTRAST  LOCATION: [facility]  DATE/TIME: 2/17/2023 9:23 PM    INDICATION: History of hiatal hernia. Recurrent N/V  since this am. No diarrhea.  COMPARISON: CT chest, abdomen and pelvis 11/8/2022.  TECHNIQUE: CT scan of the abdomen and pelvis was performed following injection of IV contrast. Multiplanar reformats were obtained. Dose reduction techniques were used.  CONTRAST: Isovue 370 90 mL    FINDINGS:   LOWER CHEST: Moderate hiatal hernia is more distended with gas.    HEPATOBILIARY: Cholecystectomy. No acute liver abnormality.    PANCREAS: Normal.    SPLEEN: Normal.    ADRENAL GLANDS: Normal.    KIDNEYS/BLADDER: No significant mass, stones, or hydronephrosis. There are simple or benign cysts. No follow up is needed.    BOWEL: No obstruction. There is some wall thickening of the colon involving the transverse through the descending and sigmoid colon. No abscess or free air. The appendix is not seen. No signs of appendicitis.    LYMPH NODES: Normal.    VASCULATURE: Scattered vascular calcifications.    PELVIC ORGANS: No acute abnormality identified.    MUSCULOSKELETAL: L4-L5 degenerative change.      Impression    IMPRESSION:   1.  Moderate hiatal hernia is more distended compared to 11/8/2022.  2.  Wall thickening of the mid to distal colon could be a colitis. No abscess or bowel obstruction.     Most Recent 3 CBC's:  Recent Labs   Lab Test 02/17/23 2023 09/30/22  1005 05/22/22  1414   WBC 9.4 4.1 8.5   HGB 16.0* 13.8 16.1*   MCV 86 89 87    192 225     Most Recent 3 BMP's:  Recent Labs   Lab Test 02/17/23 2023 11/08/22  1243 09/30/22  1005 05/22/22  1524     --  141 138   POTASSIUM 3.8  --  4.4 3.7   CHLORIDE 103  --  103 104   CO2 24  --  31* 25   BUN 13  --  16.3 11   CR 0.68 0.6 0.72 0.68   ANIONGAP 14  --  7 9   ESTEFANY 9.6  --  9.0 8.7   *  --  93 111     Most Recent 2 LFT's:  Recent Labs   Lab Test 02/17/23 2023 09/30/22  1005   AST 16 20   ALT 15 9*   ALKPHOS 87 77   BILITOTAL 0.4 0.3     Most Recent ESR & CRP:  Recent Labs   Lab Test 02/17/23 2023 09/30/22  1005   SED  --  4   CRP <0.1  --    CRPI   --  <3.00

## 2023-02-18 NOTE — PROGRESS NOTES
[facility]    Progress Note - Hospitalist Service       Date of Admission:  2/17/2023    Assessment & Plan   Mariah Koehler is a 67 year old female w/ PMH of unintentional weight loss, multiple prior abdominal surgeries, chronic pain syndrome with a pain pump, MDD, insomnia, HTN and treated LLE cellulitis 1/26/23 admitted on 2/17/2023 for nausea and vomiting.      Nausea  Vomiting  Moderate hiatal hernia  Mid-distal colon thickening  Patient presents with acute nausea with NBNB emesis without associated abdominal pain and one loose BM. She is hypertensive, but otherwise vitally stable. Lab workup including CMP, lipase, lactic acid, and CRP were normal. CBC without leukocytosis; had Hgb 16, but this could be hemoconcentration in setting of emesis. This emesis could be secondary to her moderate hiatal hernia, but would have expected this to be more chronic or perhaps cause some dysphagia sensation. CT also showed mid-distal colon thickening, but no abdominal pain and negative inflammatory markers make a clinically significant diverticulitis less likely. This could also be viral gastroenteritis. Has a history of migraines, however, no head pain at onset of these symptoms. Unlikely cardiac given normal EKG and troponin.  - Diet: full liquids, advance as tolerated   - NS at 100 mL/hr  - zofran and compazine PRN  - I/O and daily scale weight  - consult GI if symptoms not improving     Unintentional weight loss  Reports unintentional weight loss w/ normal lab workup. Last measured weight on chart review was April 2021 patient wt was 128Ib. No weights noted in past several months and admit weight of 107lb seems to be reported. Actual wt was 105Ib. Cologuard negative in October 2022. Being followed by primary with plans for outpatient EGD, although not yet scheduled.    -Follow up with outpatient GI for EGD  - weights as above     Chronic pain syndrome  Has a pain pump, but does not know  specific medication or her pain clinic.  - PTA pain pump, consider pain consult.   - PTA baclofen 20mg PO QID  - PTA metoclopramide 10mg PO TID PRN     Hypertensive urgency  HTN  - PTA atenolol 25 mg PO daily  - hydralazine 10mg IV q6hr PRN SBP >180 or DBP >110     Hx migraine  - acetaminophen-caffeine 500-65mg PO q6hr PRN     Insomnia  Hx MDD  - PTA trazodone 25mg PO at bedtime (insomnia dose)  - PTA zolpidem 10mg PO at bedtime PRN     Diet: Full Liquid Diet    DVT Prophylaxis: Pneumatic Compression Devices  Bellamy Catheter: Not present  Fluids: IVF  Lines: None     Cardiac Monitoring: None  Code Status: Full Code      Clinically Significant Risk Factors Present on Admission                             Disposition Plan      Expected Discharge Date: 02/18/2023      Destination: home with family          The patient's care was discussed with the Attending Physician, Dr. Williamson.    Jodie Belcher MD  Hospitalist Service  [facility]  Securely message with [employer] (more info)  Text page via [facility] Paging/Directory   ______________________________________________________________________    Interval History   Admitted overnight.    This morning patient reported some improvement with the nausea, however continuing to have dry heaves. Her headaches has improved rates it 2/10.  She denies any abdominal pain, or diarrhea. Denies fevers, or chills, SOB, chest pain.     Physical Exam   Vital Signs: Temp: 98.2  F (36.8  C) Temp src: Oral BP: (!) 172/79 Pulse: 79   Resp: 20 SpO2: 98 % O2 Device: None (Room air)    Weight: 105 lbs 4.8 oz    Constitutional:  Appears fatigued,  cooperative, no apparent distress, thin appearing  Respiratory: No increased work of breathing, good air exchange, clear to auscultation bilaterally, no crackles or wheezing  Cardiovascular: Regular rate and rhythm, normal S1 and S2, no S3 or S4, and no murmur noted  GI: Normal bowel sounds, flat,  soft, non-distended, non-tender, no  masses palpated, no hepatosplenomegally  Musculoskeletal: There is no redness, warmth, or swelling of the joints.  Neurologic: Awake, alert, oriented to name, place and time.    Medical Decision Making       Please see A&P for additional details of medical decision making.      Data     I have personally reviewed the following data over the past 24 hrs:    7.8  \   13.6   / 220     141 108 (H) 15 /  90   4.1 24 0.64 \       ALT: 11 AST: 15 AP: 67 TBILI: 0.6   ALB: 3.7 TOT PROTEIN: 5.9 (L) LIPASE: 14       Procal: N/A CRP: <0.1 Lactic Acid: 1.1         Imaging results reviewed over the past 24 hrs:   Recent Results (from the past 24 hour(s))   CT Abdomen Pelvis w Contrast    Narrative    EXAM: CT ABDOMEN PELVIS W CONTRAST  LOCATION: [facility]  DATE/TIME: 2/17/2023 9:23 PM    INDICATION: History of hiatal hernia. Recurrent N/V since this am. No diarrhea.  COMPARISON: CT chest, abdomen and pelvis 11/8/2022.  TECHNIQUE: CT scan of the abdomen and pelvis was performed following injection of IV contrast. Multiplanar reformats were obtained. Dose reduction techniques were used.  CONTRAST: Isovue 370 90 mL    FINDINGS:   LOWER CHEST: Moderate hiatal hernia is more distended with gas.    HEPATOBILIARY: Cholecystectomy. No acute liver abnormality.    PANCREAS: Normal.    SPLEEN: Normal.    ADRENAL GLANDS: Normal.    KIDNEYS/BLADDER: No significant mass, stones, or hydronephrosis. There are simple or benign cysts. No follow up is needed.    BOWEL: No obstruction. There is some wall thickening of the colon involving the transverse through the descending and sigmoid colon. No abscess or free air. The appendix is not seen. No signs of appendicitis.    LYMPH NODES: Normal.    VASCULATURE: Scattered vascular calcifications.    PELVIC ORGANS: No acute abnormality identified.    MUSCULOSKELETAL: L4-L5 degenerative change.      Impression    IMPRESSION:   1.  Moderate hiatal hernia is more distended compared  to 11/8/2022.  2.  Wall thickening of the mid to distal colon could be a colitis. No abscess or bowel obstruction.

## 2023-02-18 NOTE — PHARMACY-ADMISSION MEDICATION HISTORY
Pharmacy Note - Admission Medication History    Pertinent Provider Information: [facility];  Received pump settings per pump nurse;  See entry below.  Patient verbally reports to me she has received some of her bolus doses today     ______________________________________________________________________    Prior To Admission (PTA) med list completed and updated in EMR.       PTA Med List   Medication Sig Last Dose     atenolol (TENORMIN) 25 MG tablet TAKE 1 TABLET BY MOUTH EVERY DAY 2/16/2023     baclofen (LIORESAL) 10 MG tablet TAKE 2 TABLETS (20 MG TOTAL) BY MOUTH FOUR TIMES A DAY. 2/16/2023     diphenhydrAMINE HCl (ZZZQUIL) 50 MG/30ML LIQD Take 15-30 mLs by mouth nightly as needed Past Month at PRN     medication given by implanted intrathecal pump continuous Drug # 1: Fentanyl (Sublimaze) - Conc: 2000 mcg/mL - Total Dose / 24 hours: 873.8 mcg    Drug # 2: Bupivacaine (Marcaine)  - Conc:17.7 mg/mL - Total Dose / 24 hours: 7.733 mg    Drug # 3: Hydromorphone (Dilaudid)  - Conc: 2.1 mg/mL - Total Dose / 24 hours: 0.9175 mg    Diluent: NS    Infusion Rate: 0.4369 mL/24 hrs  Pump Reservoir Volume: 40 mL    Bolus doses: up to 14 bolus doses/24 hours;    Each bolus: fentanyl 81.1 mcg, 0.708 mg Bupivacaine, 0.0841 mg Dilaudid    Outside Clinic & Provider: [facility] [phone]  Last Refill Date:   Next Refill Date: 03/10/2023  [place] Alarm Date:  / /20   Pump : Ranovus    Deliver Pump Medication to:   For [place]: If pump is within 7 days of depletion, pharmacist will enter a 'Pain Management Adult IP Consult' into the EHR, including 'IT pain pump management' as the reason for the consult. 2/18/2023     metoclopramide (REGLAN) 10 MG tablet TAKE 1 TABLET BY MOUTH 3 TIMES A DAY AS NEEDED FOR NAUSEA 2/17/2023 at took today but didn't keep down     omeprazole (PRILOSEC) 20 MG DR capsule TAKE 1 CAPSULE BY MOUTH TWICE A DAY BEFORE MEALS 2/16/2023     ondansetron (ZOFRAN) 4 MG tablet TAKE  1 TABLET BY MOUTH EVERY 8 HOURS AS NEEDED FOR NAUSEA Past Week at PRN     traZODone (DESYREL) 50 MG tablet TAKE 0.5 TABLETS (25 MG) BY MOUTH AT BEDTIME 2/16/2023     zolpidem (AMBIEN) 10 MG tablet TAKE 1 TABLET BY MOUTH EVERY DAY AT BEDTIME AS NEEDED FOR SLEEP 2/16/2023           The information provided in this note is only as accurate as the sources available at the time of the update(s).    Thank you for the opportunity to participate in the care of this patient.    Sherly Palumbo, [facility]  2/18/2023 1:32 PM

## 2023-02-18 NOTE — ED TRIAGE NOTES
Here for 6 episodes of emesis that started around 0300   Patient reports unable to keep anything down including home medications. Has a history of hiatal hernia, takes reglan and zofran at home however has not been able to take medications at home due to emesis  Denies abdominal pain and  symptoms, does endorse loose stools that started today and a severe headache    Partner reports patient has a history of reflux which needs an endoscopy however has not scheduled yet     Triage Assessment     Row Name 02/17/23 2015       Triage Assessment (Adult)    Airway WDL WDL       Respiratory WDL    Respiratory WDL WDL       Skin Circulation/Temperature WDL    Skin Circulation/Temperature WDL WDL       Cardiac WDL    Cardiac WDL WDL       Peripheral/Neurovascular WDL    Peripheral Neurovascular WDL WDL       Cognitive/Neuro/Behavioral WDL    Cognitive/Neuro/Behavioral WDL X  headache       [place] Coma Scale    Best Eye Response 4-->(E4) spontaneous    Best Motor Response 6-->(M6) obeys commands    Best Verbal Response 5-->(V5) oriented    [place] Coma Scale Score 15

## 2023-02-18 NOTE — CONSULTS
Care Management Initial Consult    General Information  Assessment completed with: Patient,    Type of CM/SW Visit: Initial Assessment    Primary Care Provider verified and updated as needed: Yes   Readmission within the last 30 days: no previous admission in last 30 days      Reason for Consult: discharge planning  Advance Care Planning: Advance Care Planning Reviewed: other (see comments) (Declined Honoring Choices)          Communication Assessment  Patient's communication style: spoken language (English or Bilingual)    Hearing Difficulty or Deaf: no   Wear Glasses or Blind: yes    Cognitive  Cognitive/Neuro/Behavioral: WDL                      Living Environment:   People in home: spouse     Current living Arrangements: house      Able to return to prior arrangements: yes  Living Arrangement Comments: Lives with  Alfred    Family/Social Support:  Care provided by: self  Provides care for: no one  Marital Status:     Alfred       Description of Support System: Supportive    Support Assessment: Adequate family and caregiver support    Current Resources:   Patient receiving home care services: No     Community Resources: None  Equipment currently used at home: cane, straight, walker, rolling, other (see comments) (Pain pump)  Supplies currently used at home: Other (Pain pump supplies)    Employment/Financial:  Employment Status: retired        Financial Concerns: No concerns identified         Lifestyle & Psychosocial Needs:  Social Determinants of Health     Tobacco Use: High Risk     Smoking Tobacco Use: Every Day     Smokeless Tobacco Use: Never     Passive Exposure: Not on file   Alcohol Use: Not on file   Financial Resource Strain: Not on file   Food Insecurity: Not on file   Transportation Needs: Not on file   Physical Activity: Not on file   Stress: Not on file   Social Connections: Not on file   Intimate Partner Violence: Not on file   Depression: Not at risk     PHQ-2 Score: 0   Housing  Stability: Not on file       Functional Status:  Prior to admission patient needed assistance:   Dependent ADLs:: Ambulation-walker  Dependent IADLs:: Cleaning, Shopping  Assesssment of Functional Status: Not at  functional baseline    Mental Health Status:          Chemical Dependency Status:              Values/Beliefs:  Spiritual, Cultural Beliefs, Zoroastrianism Practices, Values that affect care:                 Additional Information:   Patient presents with acute nausea with NBNB emesis without associated abdominal pain and one loose BM. She is hypertensive, but otherwise vitally stable. Lab workup including CMP, lipase, lactic acid, and CRP were normal. CBC without leukocytosis; had Hgb 16, but this could be hemoconcentration in setting of emesis. This emesis could be secondary to her moderate hiatal hernia but would have expected this to be more chronic or perhaps cause some dysphagia sensation. CT also showed mid-distal colon thickening, but no abdominal pain and negative inflammatory markers make a clinically significant diverticulitis less likely .    Patient reports she lives with  Alfred in a private residence. Is independent with most I/ADLs; uses a roller-walker or cane for ambulation; no home care services; does drive when feeling well. Has a pain medication pump (see pharmacy notes for details). Declined [employer] information. Explained BROWN/Observation Status and patient verbalized understanding. States she doesn t feel she has any home care needs and that her  assists when needed.  will transport patient on discharge. Other needs pending clinical progress.      GUNNER HICKEY, ROBERTO/CM

## 2023-02-18 NOTE — PROGRESS NOTES
Tolerated clear liquids okay. Became nauseas about an hour later. IV Compazine given. No emesis.     Report given to ROBERTO Perez  Pt transferred to [facility]

## 2023-02-18 NOTE — ED NOTES
Pt resting in bed. States nausea has improved, rates her nausea at about a 4/10. Her biggest complaint at this time is her headache, administering prn excedrin. No other complaints at this time. Vitals stable.

## 2023-02-18 NOTE — ED PROVIDER NOTES
Emergency Department Encounter     Evaluation Date & Time:   No admission date for patient encounter.    CHIEF COMPLAINT:  Vomiting      Triage Note:     FINAL IMPRESSION:    ICD-10-CM    1. Nausea and vomiting, unspecified vomiting type  R11.2       2. Hiatal hernia  K44.9       3. Abnormal CT scan, colon  R93.3       4. Dehydration  E86.0       5. Epigastric abdominal tenderness with rebound tenderness  R10.826           Impression and Plan     ED COURSE & MEDICAL DECISION MAKIN:29 PM I met with the patient for the initial interview and physical examination. Discussed plan for treatment and workup in the ED.    8:54 PM I rechecked and updated the patient   ED Course as of 23 Mariah is having recurrent nausea vomiting.  She has a history of a hiatal hernia and so would consider the possibility of an internal incarcerated hernia, but she is not really having pain that she complains of with this so I think that makes it at least a little bit less likely.  Otherwise consider small bowel obstruction, viral, gastritis, foodborne, pancreatitis or cholecystitis, less likely cardiac ischemia.  Patient's EKG was done in the waiting room and shows no acute ST changes.  No severely peaked T waves to suggest an elevated potassium level.  She does seem a bit dehydrated and we already have her hemoglobin coming back as being elevated slightly which goes along with hemoconcentration in this case.  Given her history of hiatal hernia and persistent nausea vomiting at age 67 without associated diarrhea we will proceed with CT abdomen imaging.  Patient is comfortable with the plan and will treat symptomatically in the interim.  COVID swab done and pending.    Mariah CT was reviewed.  There is some questionable colitis and also change in her hiatal hernia.  The change in hiatal hernia may still be secondary to essentially a viral illness as well as the colitis, but she continues to be  quite nauseous despite 3 doses of Zofran so we discussed discharge home versus observation admission to the hospital and she does prefer observation admission as she still feels rather nauseous and is not able to take in oral fluids.  She is weak with this and so I do think that is reasonable.  No specific intervention tonight for the change in hiatal hernia but she is tender to palpation in that area, though she denies persistent pain in that area unless I am palpating it.  Cannot rule out incarceration but lactic acid is wnl.   2240 Admitted to family practice resident       At the conclusion of the encounter I discussed the results of all the tests and the disposition. The questions were answered. The patient or family acknowledged understanding and was agreeable with the care plan.          0 minutes of critical care time        MEDICATIONS GIVEN IN THE EMERGENCY DEPARTMENT:  Medications   sodium chloride 0.9% infusion ( Intravenous New Bag 2/17/23 2030)   ondansetron (ZOFRAN) injection 4 mg (4 mg Intravenous Given 2/17/23 2206)   0.9% sodium chloride BOLUS (1,000 mLs Intravenous New Bag 2/17/23 2201)   famotidine (PEPCID) injection 20 mg (20 mg Intravenous Given 2/17/23 2121)   iopamidol (ISOVUE-370) solution 90 mL (90 mLs Intravenous Given 2/17/23 2109)       NEW PRESCRIPTIONS STARTED AT TODAY'S ED VISIT:  New Prescriptions    No medications on file       HPI     HPI     Mariah Koehler is a 67 year old female with a pertinent history of hypertension and COPD who presents to this ED via private vehicle for evaluation of vomiting.    Per patient's , patient was losing significant amounts of weight last November. She ended up getting a scan which showed a thickening of the lower esophagus and was recommended and referred for an endoscopy which has yet to happen.     Patient reports several episodes of vomiting (~6 episodes) since ~3 AM today with associated nausea and a single episode of diarrhea. Patient  has a pain pump for chronic body pain, does not usually cause nausea, and is currently working for her pain. She denies recent travel or sickness. Patient denies cough, fever, chest pain, abdominal pain, dysuria, or additional symptoms or complaints at this time.     REVIEW OF SYSTEMS:  Review of Systems   Constitutional: Negative for fever.   Respiratory: Negative for cough.    Cardiovascular: Negative for chest pain.   Gastrointestinal: Positive for diarrhea, nausea and vomiting. Negative for abdominal pain.   Genitourinary: Negative for dysuria.   All other systems reviewed and are negative.            Medical History     Past Medical History:   Diagnosis Date     Current mild episode of major depressive disorder, unspecified whether recurrent (H) 2/17/2020     Hypertension      Opiate dependence (H)      RSD (reflex sympathetic dystrophy)      Sacral fracture, closed (H)        Past Surgical History:   Procedure Laterality Date     APPENDECTOMY       GYN SURGERY       HC REVISE MEDIAN N/CARPAL TUNNEL SURG      Description: Neuroplasty Decompression Median Nerve At Carpal Tunnel;  Recorded: 09/19/2011;     HYSTERECTOMY  1984     IR CERVICAL EPIDURAL STEROID INJECTION  1/14/2005     OOPHORECTOMY Bilateral 1985     [place] SYMPATHECTOMY,CERVICOTHORACIC      Description: Surgical Sympathectomy Cervicothoracic;  Recorded: 09/19/2011;     [facility] DECOMPRESS FASCIOTOMY FINGR/HAND      Description: Decompression Fasciotomy Of The Hand;  Recorded: 09/19/2011;     C LAP,CHOLECYSTECTOMY/EXPLORE      Description: Cholecystectomy Laparoscopic;  Recorded: 12/09/2011;     [facility] TOTAL ABDOM HYSTERECTOMY      Description: Hysterectomy;  Recorded: 03/23/2011;       Family History   Problem Relation Age of Onset     Cerebrovascular Disease Mother      Diabetes Mother      Heart Disease Mother      Hypertension Mother      Rheumatologic Disease Mother      Heart Disease Father      Pulmonary Hypertension Father      Kidney failure Father   "    Cancer Father         melanoma     Diabetes Sister      Hypertension Sister      Hypertension Brother        Social History     Tobacco Use     Smoking status: Every Day     Packs/day: 0.75     Years: 51.00     Pack years: 38.25     Types: Cigarettes     Start date: 1/1/1968     Smokeless tobacco: Never   Substance Use Topics     Alcohol use: Yes     Drug use: Never       atenolol (TENORMIN) 25 MG tablet  baclofen (LIORESAL) 10 MG tablet  diphenhydrAMINE HCl (ZZZQUIL) 50 MG/30ML LIQD  metoclopramide (REGLAN) 10 MG tablet  omeprazole (PRILOSEC) 20 MG DR capsule  ondansetron (ZOFRAN) 4 MG tablet  traZODone (DESYREL) 50 MG tablet  zolpidem (AMBIEN) 10 MG tablet        Physical Exam     First Vitals:  Patient Vitals for the past 24 hrs:   BP Temp Temp src Pulse Resp SpO2 Height Weight   02/17/23 2014 (!) 166/86 97.6  F (36.4  C) Temporal 96 22 97 % 1.6 m (5' 3\") 48.5 kg (107 lb)       PHYSICAL EXAM:   Constitutional:   Uncomfortable-appearing   HENT:  Normocephalic, posterior pharynx wnl, mucous membranes moist and dark pink   Eyes:  PERRL, EOMI, Conjunctiva normal, No discharge, no scleral icterus.  Respiratory:  Breathing easily, clear to auscultation   Cardiovascular:  Normal rate and rhythm, nl s1s2 0 murmurs, rubs, or gallops.  Peripheral pulses dp, pt, and radial are wnl.  No peripheral edema   GI:  Bowel sounds normal, Soft, No flank tenderness, nondistended. Tenderness to the epigastrium with some pulsation but she is very slender  :No CVA tenderness.   Musculoskeletal:  Moves all extremities.  No erythematous or swollen major joints, no edema in extremities  Integument:  Normal skin color, Pain pump present in the right hip slightly tender but not erythematous and is very thin in the area  Lymphatic:  No cervical lymphadenopathy  Neurologic:  Alert & oriented x 3, Normal motor function, Normal sensory function, No focal deficits noted. Normal speech.  Psychiatric:  Affect normal, Judgment normal, Mood " normal.       Results     LAB AND RADIOLOGY:  All pertinent labs reviewed and interpreted  Results for orders placed or performed during the hospital encounter of 02/17/23   CT Abdomen Pelvis w Contrast     Status: None    Narrative    EXAM: CT ABDOMEN PELVIS W CONTRAST  LOCATION: [facility]  DATE/TIME: 2/17/2023 9:23 PM    INDICATION: History of hiatal hernia. Recurrent N/V since this am. No diarrhea.  COMPARISON: CT chest, abdomen and pelvis 11/8/2022.  TECHNIQUE: CT scan of the abdomen and pelvis was performed following injection of IV contrast. Multiplanar reformats were obtained. Dose reduction techniques were used.  CONTRAST: Isovue 370 90 mL    FINDINGS:   LOWER CHEST: Moderate hiatal hernia is more distended with gas.    HEPATOBILIARY: Cholecystectomy. No acute liver abnormality.    PANCREAS: Normal.    SPLEEN: Normal.    ADRENAL GLANDS: Normal.    KIDNEYS/BLADDER: No significant mass, stones, or hydronephrosis. There are simple or benign cysts. No follow up is needed.    BOWEL: No obstruction. There is some wall thickening of the colon involving the transverse through the descending and sigmoid colon. No abscess or free air. The appendix is not seen. No signs of appendicitis.    LYMPH NODES: Normal.    VASCULATURE: Scattered vascular calcifications.    PELVIC ORGANS: No acute abnormality identified.    MUSCULOSKELETAL: L4-L5 degenerative change.      Impression    IMPRESSION:   1.  Moderate hiatal hernia is more distended compared to 11/8/2022.  2.  Wall thickening of the mid to distal colon could be a colitis. No abscess or bowel obstruction.   [facility] Draw     Status: None    Narrative    The following orders were created for panel order [facility] Draw.  Procedure                               Abnormality         Status                     ---------                               -----------         ------                     Extra Green Top (Lithium...[[id number]]                       Final result               Extra Purple Top Tube[[id number]]                            Final result                 Please view results for these tests on the individual orders.   Symptomatic Influenza A/B & SARS-CoV2 (COVID-19) Virus PCR Multiplex Nasopharyngeal     Status: Normal    Specimen: Nasopharyngeal; Swab   Result Value Ref Range    Influenza A PCR Negative Negative    Influenza B PCR Negative Negative    RSV PCR Negative Negative    SARS CoV2 PCR Negative Negative    Narrative    Testing was performed using the Xpert Xpress CoV2/Flu/RSV Assay on the [employer] GeneXpert Instrument. This test should be ordered for the detection of SARS-CoV-2 and influenza viruses in individuals who meet clinical and/or epidemiological criteria. Test performance is unknown in asymptomatic patients. This test is for in vitro diagnostic use under the FDA EUA for laboratories certified under CLIA to perform high or moderate complexity testing. This test has not been FDA cleared or approved. A negative result does not rule out the presence of PCR inhibitors in the specimen or target RNA in concentration below the limit of detection for the assay. If only one viral target is positive but coinfection with multiple targets is suspected, the sample should be re-tested with another FDA cleared, approved, or authorized test, if coinfection would change clinical management. This test was validated by the [facility]. These laboratories are certified under the Clinical Laboratory Improvement Amendments of 1988 (CLIA-88) as qualified to perform high complexity laboratory testing.   Extra Green Top (Lithium Heparin) Tube     Status: None   Result Value Ref Range    Hold Specimen JIC    Extra Purple Top Tube     Status: None   Result Value Ref Range    Hold Specimen JIC    Comprehensive metabolic panel     Status: Abnormal   Result Value Ref Range    Sodium 141 136 - 145 mmol/L    Potassium 3.8 3.5 - 5.0 mmol/L     Chloride 103 98 - 107 mmol/L    Carbon Dioxide (CO2) 24 22 - 31 mmol/L    Anion Gap 14 5 - 18 mmol/L    Urea Nitrogen 13 8 - 22 mg/dL    Creatinine 0.68 0.60 - 1.10 mg/dL    Calcium 9.6 8.5 - 10.5 mg/dL    Glucose 151 (H) 70 - 125 mg/dL    Alkaline Phosphatase 87 45 - 120 U/L    AST 16 0 - 40 U/L    ALT 15 0 - 45 U/L    Protein Total 7.3 6.0 - 8.0 g/dL    Albumin 4.3 3.5 - 5.0 g/dL    Bilirubin Total 0.4 0.0 - 1.0 mg/dL    GFR Estimate >90 >60 mL/min/1.73m2   Lipase     Status: Normal   Result Value Ref Range    Lipase 14 0 - 52 U/L   Troponin I     Status: Normal   Result Value Ref Range    Troponin I <0.01 0.00 - 0.29 ng/mL   CBC with platelets and differential     Status: Abnormal   Result Value Ref Range    WBC Count 9.4 4.0 - 11.0 10e3/uL    RBC Count 5.59 (H) 3.80 - 5.20 10e6/uL    Hemoglobin 16.0 (H) 11.7 - 15.7 g/dL    Hematocrit 48.0 (H) 35.0 - 47.0 %    MCV 86 78 - 100 fL    MCH 28.6 26.5 - 33.0 pg    MCHC 33.3 31.5 - 36.5 g/dL    RDW 13.2 10.0 - 15.0 %    Platelet Count 272 150 - 450 10e3/uL    % Neutrophils 86 %    % Lymphocytes 10 %    % Monocytes 4 %    % Eosinophils 0 %    % Basophils 0 %    % Immature Granulocytes 0 %    NRBCs per 100 WBC 0 <1 /100    Absolute Neutrophils 8.1 1.6 - 8.3 10e3/uL    Absolute Lymphocytes 0.9 0.8 - 5.3 10e3/uL    Absolute Monocytes 0.4 0.0 - 1.3 10e3/uL    Absolute Eosinophils 0.0 0.0 - 0.7 10e3/uL    Absolute Basophils 0.0 0.0 - 0.2 10e3/uL    Absolute Immature Granulocytes 0.0 <=0.4 10e3/uL    Absolute NRBCs 0.0 10e3/uL   Lactic acid whole blood     Status: Normal   Result Value Ref Range    Lactic Acid 1.1 0.7 - 2.0 mmol/L   ECG 12-LEAD WITH MUSE (LHE)     Status: None   Result Value Ref Range    Systolic Blood Pressure  mmHg    Diastolic Blood Pressure  mmHg    Ventricular Rate 103 BPM    Atrial Rate 103 BPM    SD Interval 140 ms    QRS Duration 80 ms     ms    QTc 445 ms    P Axis 78 degrees    R AXIS 64 degrees    T Axis 56 degrees    Interpretation ECG        Sinus tachycardia with Premature atrial complexes with Aberrant conduction  Otherwise normal ECG  When compared with ECG of 19-JUL-2021 13:58,  Aberrant conduction is now Present  Confirmed by SEE ED PROVIDER NOTE FOR, ECG INTERPRETATION ([id number]),  LILI AVILES ([id number]) on 2/17/2023 8:53:32 PM     CBC with platelets differential     Status: Abnormal    Narrative    The following orders were created for panel order CBC with platelets differential.  Procedure                               Abnormality         Status                     ---------                               -----------         ------                     CBC with platelets and d...[[id number]]  Abnormal            Final result                 Please view results for these tests on the individual orders.         ECG:    Performed at: 20:33    Impression: nst rate of 103,  With occasional pvc.    Rate: 103 BPM  Rhythm: Sinus  NE Interval: 140 ms  QRS Interval: 80 ms  QTc Interval: 445 ms    Comparison: When compared with EKG of 7/19/21, no significant change was found    I have independently reviewed and interpreted the EKS(s) documented above      [facility] Documentation     Medical Decision Making    History:    Supplemental history from: Documented in chart, if applicable and Family Member/Significant Other    External Record(s) reviewed: Documented in chart, if applicable.    Work Up:    Chart documentation includes differential considered and any EKGs or imaging independently interpreted by provider, where specified.    In additional to work up documented, I considered the following work up: Documented in chart, if applicable.    External consultation:    Discussion of management with another provider: Documented in chart, if applicable    Complicating factors:    Care impacted by chronic illness: Chronic Lung Disease and Hypertension    Care affected by social determinants of health: N/A    Disposition considerations: Admit.      The  creation of this record is based on the scribe s observations of the work being performed by Jarrod Mazariegos and the provider s statements to them. This document has been checked and approved by MD Gloria Amaya MD  Emergency Medicine  [facility] EMERGENCY ROOM       Gloria Martin MD  02/17/23 [id number]

## 2023-02-18 NOTE — PHARMACY-ADMISSION MEDICATION HISTORY
Pharmacy Note - Admission Medication History    Pertinent Provider Information: N/A     ______________________________________________________________________    Prior To Admission (PTA) med list completed and updated in EMR.       PTA Med List   Medication Sig Last Dose     atenolol (TENORMIN) 25 MG tablet TAKE 1 TABLET BY MOUTH EVERY DAY 2/16/2023     baclofen (LIORESAL) 10 MG tablet TAKE 2 TABLETS (20 MG TOTAL) BY MOUTH FOUR TIMES A DAY. 2/16/2023     diphenhydrAMINE HCl (ZZZQUIL) 50 MG/30ML LIQD Take 15-30 mLs by mouth nightly as needed Past Month at PRN     metoclopramide (REGLAN) 10 MG tablet TAKE 1 TABLET BY MOUTH 3 TIMES A DAY AS NEEDED FOR NAUSEA 2/17/2023 at took today but didn't keep down     omeprazole (PRILOSEC) 20 MG DR capsule TAKE 1 CAPSULE BY MOUTH TWICE A DAY BEFORE MEALS 2/16/2023     ondansetron (ZOFRAN) 4 MG tablet TAKE 1 TABLET BY MOUTH EVERY 8 HOURS AS NEEDED FOR NAUSEA Past Week at PRN     traZODone (DESYREL) 50 MG tablet TAKE 0.5 TABLETS (25 MG) BY MOUTH AT BEDTIME 2/16/2023     zolpidem (AMBIEN) 10 MG tablet TAKE 1 TABLET BY MOUTH EVERY DAY AT BEDTIME AS NEEDED FOR SLEEP 2/16/2023       Information source(s): Patient, Family member and CareEverywhere/[facility]  Method of interview communication: in-person    Summary of Changes to PTA Med List  New: diphenhydramine liquid - per spouse  Discontinued: cephalexin - course complete (rx 2/3/23 x5 days)  Changed: None    Patient was asked about OTC/herbal products specifically.  PTA med list reflects this.    In the past week, patient estimated taking medication this percent of the time: Not assessed - patient has been unable to keep medications down.     Allergies were reviewed, assessed, and updated with the patient.      Patient does not use any multi-dose medications prior to admission.    The information provided in this note is only as accurate as the sources available at the time of the update(s).    Thank you for the opportunity to  participate in the care of this patient.    Sherly Yu [facility]  2/17/2023 10:32 PM

## 2023-02-19 LAB
ANION GAP SERPL CALCULATED.3IONS-SCNC: 10 MMOL/L (ref 5–18)
BUN SERPL-MCNC: 17 MG/DL (ref 8–22)
CALCIUM SERPL-MCNC: 8.3 MG/DL (ref 8.5–10.5)
CHLORIDE BLD-SCNC: 107 MMOL/L (ref 98–107)
CO2 SERPL-SCNC: 23 MMOL/L (ref 22–31)
CREAT SERPL-MCNC: 0.63 MG/DL (ref 0.6–1.1)
GFR SERPL CREATININE-BSD FRML MDRD: >90 ML/MIN/1.73M2
GLUCOSE BLD-MCNC: 62 MG/DL (ref 70–125)
HOLD SPECIMEN: NORMAL
POTASSIUM BLD-SCNC: 4 MMOL/L (ref 3.5–5)
SODIUM SERPL-SCNC: 140 MMOL/L (ref 136–145)

## 2023-02-19 PROCEDURE — 250N000013 HC RX MED GY IP 250 OP 250 PS 637: Performed by: STUDENT IN AN ORGANIZED HEALTH CARE EDUCATION/TRAINING PROGRAM

## 2023-02-19 PROCEDURE — 99232 SBSQ HOSP IP/OBS MODERATE 35: CPT | Mod: GC | Performed by: STUDENT IN AN ORGANIZED HEALTH CARE EDUCATION/TRAINING PROGRAM

## 2023-02-19 PROCEDURE — 80048 BASIC METABOLIC PNL TOTAL CA: CPT | Performed by: STUDENT IN AN ORGANIZED HEALTH CARE EDUCATION/TRAINING PROGRAM

## 2023-02-19 PROCEDURE — C9113 INJ PANTOPRAZOLE SODIUM, VIA: HCPCS | Performed by: STUDENT IN AN ORGANIZED HEALTH CARE EDUCATION/TRAINING PROGRAM

## 2023-02-19 PROCEDURE — 96376 TX/PRO/DX INJ SAME DRUG ADON: CPT

## 2023-02-19 PROCEDURE — 96375 TX/PRO/DX INJ NEW DRUG ADDON: CPT

## 2023-02-19 PROCEDURE — 250N000011 HC RX IP 250 OP 636: Performed by: STUDENT IN AN ORGANIZED HEALTH CARE EDUCATION/TRAINING PROGRAM

## 2023-02-19 PROCEDURE — 250N000013 HC RX MED GY IP 250 OP 250 PS 637: Performed by: INTERNAL MEDICINE

## 2023-02-19 PROCEDURE — 36415 COLL VENOUS BLD VENIPUNCTURE: CPT | Performed by: STUDENT IN AN ORGANIZED HEALTH CARE EDUCATION/TRAINING PROGRAM

## 2023-02-19 PROCEDURE — 258N000003 HC RX IP 258 OP 636: Performed by: EMERGENCY MEDICINE

## 2023-02-19 PROCEDURE — 96361 HYDRATE IV INFUSION ADD-ON: CPT

## 2023-02-19 PROCEDURE — G0378 HOSPITAL OBSERVATION PER HR: HCPCS

## 2023-02-19 RX ORDER — NALOXONE HYDROCHLORIDE 0.4 MG/ML
0.4 INJECTION, SOLUTION INTRAMUSCULAR; INTRAVENOUS; SUBCUTANEOUS
Status: DISCONTINUED | OUTPATIENT
Start: 2023-02-19 | End: 2023-02-21 | Stop reason: HOSPADM

## 2023-02-19 RX ORDER — NALOXONE HYDROCHLORIDE 0.4 MG/ML
0.2 INJECTION, SOLUTION INTRAMUSCULAR; INTRAVENOUS; SUBCUTANEOUS
Status: DISCONTINUED | OUTPATIENT
Start: 2023-02-19 | End: 2023-02-21 | Stop reason: HOSPADM

## 2023-02-19 RX ORDER — PROMETHAZINE HYDROCHLORIDE 12.5 MG/1
12.5 TABLET ORAL EVERY 6 HOURS PRN
Status: DISCONTINUED | OUTPATIENT
Start: 2023-02-19 | End: 2023-02-21 | Stop reason: HOSPADM

## 2023-02-19 RX ADMIN — BACLOFEN 20 MG: 10 TABLET ORAL at 20:25

## 2023-02-19 RX ADMIN — ONDANSETRON 4 MG: 2 INJECTION INTRAMUSCULAR; INTRAVENOUS at 20:23

## 2023-02-19 RX ADMIN — METOCLOPRAMIDE 10 MG: 10 TABLET ORAL at 08:10

## 2023-02-19 RX ADMIN — TRAZODONE HYDROCHLORIDE 25 MG: 50 TABLET ORAL at 21:28

## 2023-02-19 RX ADMIN — BACLOFEN 20 MG: 10 TABLET ORAL at 12:48

## 2023-02-19 RX ADMIN — SODIUM CHLORIDE: 9 INJECTION, SOLUTION INTRAVENOUS at 06:18

## 2023-02-19 RX ADMIN — ONDANSETRON 4 MG: 2 INJECTION INTRAMUSCULAR; INTRAVENOUS at 12:48

## 2023-02-19 RX ADMIN — PANTOPRAZOLE SODIUM 40 MG: 40 INJECTION, POWDER, FOR SOLUTION INTRAVENOUS at 20:22

## 2023-02-19 RX ADMIN — NICOTINE 1 PATCH: 21 PATCH, EXTENDED RELEASE TRANSDERMAL at 05:33

## 2023-02-19 RX ADMIN — BACLOFEN 20 MG: 10 TABLET ORAL at 08:11

## 2023-02-19 RX ADMIN — SODIUM CHLORIDE: 9 INJECTION, SOLUTION INTRAVENOUS at 17:22

## 2023-02-19 RX ADMIN — ACETAMINOPHINE, CAFFEINE 2 TABLET: 500; 65 TABLET, FILM COATED ORAL at 09:10

## 2023-02-19 RX ADMIN — PROCHLORPERAZINE EDISYLATE 5 MG: 5 INJECTION INTRAMUSCULAR; INTRAVENOUS at 14:00

## 2023-02-19 RX ADMIN — ATENOLOL 25 MG: 25 TABLET ORAL at 08:11

## 2023-02-19 RX ADMIN — PROMETHAZINE HYDROCHLORIDE 12.5 MG: 12.5 TABLET ORAL at 16:09

## 2023-02-19 RX ADMIN — BACLOFEN 20 MG: 10 TABLET ORAL at 16:09

## 2023-02-19 RX ADMIN — ONDANSETRON 4 MG: 2 INJECTION INTRAMUSCULAR; INTRAVENOUS at 05:33

## 2023-02-19 RX ADMIN — PROCHLORPERAZINE EDISYLATE 5 MG: 5 INJECTION INTRAMUSCULAR; INTRAVENOUS at 06:25

## 2023-02-19 RX ADMIN — PANTOPRAZOLE SODIUM 40 MG: 40 TABLET, DELAYED RELEASE ORAL at 08:11

## 2023-02-19 ASSESSMENT — ACTIVITIES OF DAILY LIVING (ADL)
ADLS_ACUITY_SCORE: 32
ADLS_ACUITY_SCORE: 32
ADLS_ACUITY_SCORE: 30
ADLS_ACUITY_SCORE: 30
ADLS_ACUITY_SCORE: 32
ADLS_ACUITY_SCORE: 32
ADLS_ACUITY_SCORE: 30
ADLS_ACUITY_SCORE: 32
ADLS_ACUITY_SCORE: 30
ADLS_ACUITY_SCORE: 32

## 2023-02-19 NOTE — PLAN OF CARE
"PRIMARY DIAGNOSIS: \"GENERIC\" NURSING  OUTPATIENT/OBSERVATION GOALS TO BE MET BEFORE DISCHARGE:  1. ADLs back to baseline: Yes    2. Activity and level of assistance: Up with standby assistance.    3. Pain status: Improved-controlled with oral pain medications.    4. Return to near baseline physical activity: Yes     Discharge Planner Nurse   Safe discharge environment identified: Yes  Barriers to discharge: Yes, not medically stable.       Entered by: Loc Ro RN 02/18/2023 6:57 PM     Please review provider order for any additional goals.   Nurse to notify provider when observation goals have been met and patient is ready for discharge.Goal Outcome Evaluation:      Plan of Care Reviewed With: patient    Overall Patient Progress: improvingOverall Patient Progress: improving pt alert and oriented, blood pressure elevated hydralazine given with good effect, had compazine in ED before she transferred here and had zofran here . She is for assist of one with a cane or walker. Fully continent. Pt has got a pain pump from home. Denies any pain.           "

## 2023-02-19 NOTE — CONSULTS
Will not see today:  PAIN MANAGEMENT SERVICE CHART CHECK    This patient's chart has been reviewed by the Pain Service. It has been determined that no change is necessary to the pain management regimen at this time. Eva Lewis Pain Provider discussed with consultant, wanted a second look at IT pain pump order which was entered correctly via med rec and inpatient order.  If you would like the patient to be seen, please contact the service at [phone] and ask to have the patient seen. Pain team will sign off.    Thank you!      Jose A Shipley, PharmD, BCPS  Acute Care Pain Management Program  [facility]  Page via online Omnistream[employer] system   [phone]  Please infoNet page rather than call Click here to page

## 2023-02-19 NOTE — PLAN OF CARE
Patient alert & oriented. Slightly hypertensive otherwise VSS. Headache rated 4/10; medication offered but declined. Nauseous intermittently throughout the night; given PRN Reglan, Zofran & Compazine. Nicotine patch removed and replaced on left arm. Pt able to rest comfortably most of the night in between cares.

## 2023-02-19 NOTE — PROGRESS NOTES
[facility]    Progress Note - Hospitalist Service       Date of Admission:  2/17/2023    Assessment & Plan   Mariah Koehler is a 67 year old female w/ PMH of unintentional weight loss, multiple prior abdominal surgeries, chronic pain syndrome with a pain pump, MDD, insomnia, HTN and treated LLE cellulitis 1/26/23 admitted on 2/17/2023 for nausea and vomiting.      Nausea  Vomiting  Moderate hiatal hernia  Mid-distal colon thickening  Patient presents with acute nausea with NBNB emesis without associated abdominal pain and one loose BM. Lab workup including CMP, lipase, lactic acid, and CRP were normal. CBC without leukocytosis; had Hgb 16, but this could be hemoconcentration in setting of emesis. This emesis could be secondary to her moderate hiatal hernia, but would have expected this to be more chronic or perhaps cause some dysphagia sensation. CT also showed mid-distal colon thickening, but no abdominal pain and negative inflammatory markers make a clinically significant diverticulitis less likely. This could also be viral gastroenteritis. Concern for malignancy given prior CT findings of distal esophagus changes and weight loss.   - Diet: full liquids, advance as tolerated   - NS at 100 mL/hr  - zofran, reglan, and compazine PRN  - add Phenergan as needed for nausea  - I/O and daily scale weight  - GI consult given ongoing nausea and CT findings, appreciate recs     Unintentional weight loss  Reports unintentional weight loss w/ normal lab workup. Last measured weight on chart review was April 2021 patient wt was 128Ib. No weights noted in past several months and admit weight of 107lb seems to be reported. Actual wt was 105Ib. Cologuard negative in October 2022. Being followed by primary with plans for outpatient EGD, although not yet scheduled.    - GI consulted as above     Chronic pain syndrome  Has a pain pump, but does not know specific medication or her pain clinic.  - PTA  pain pump  - pain team consulted for orders   - PTA baclofen 20mg PO QID  - PTA metoclopramide 10mg PO TID PRN     Hypertensive urgency  HTN  - PTA atenolol 25 mg PO daily  - hydralazine 10mg IV q6hr PRN SBP >180 or DBP >110     Hx migraine  - acetaminophen-caffeine 500-65mg PO q6hr PRN     Insomnia  Hx MDD  - PTA trazodone 25mg PO at bedtime (insomnia dose)  - PTA zolpidem 10mg PO at bedtime PRN     Diet: Full Liquid Diet    DVT Prophylaxis: Pneumatic Compression Devices  Bellamy Catheter: Not present  Fluids: IVF  Lines: None     Cardiac Monitoring: None  Code Status: Full Code      Clinically Significant Risk Factors Present on Admission          # Hypocalcemia: Lowest Ca = 8.3 mg/dL in last 2 days, will monitor and replace as appropriate                    Disposition Plan      Expected Discharge Date: 02/20/2023      Destination: home with family          The patient's care was discussed with the Attending Physician, Dr. Williamson.    Kayla Lewis MD  Hospitalist Service  [facility]  Securely message with [employer] (more info)  Text page via [employer] ([place]) Paging/Directory   ______________________________________________________________________    Interval History   NAEOs. Still having a lot of nausea. Antiemetics only work for about 30 min. She has not noticed if one works better than the other 2 or not. Has a headache but just got tylenol.  She denies any abdominal pain, or diarrhea. Has not had a bowel movement for 2 days, was diarrhea prior to that. Denies fevers, or chills, SOB, chest pain.     Physical Exam   Vital Signs: Temp: 98.2  F (36.8  C) Temp src: Oral BP: 113/54 Pulse: 77   Resp: 16 SpO2: 94 % O2 Device: None (Room air)    Weight: 105 lbs 4.8 oz    Constitutional:  Appears fatigued,  cooperative, no apparent distress, thin appearing  Respiratory: No increased work of breathing, good air exchange, clear to auscultation bilaterally, no crackles or wheezing  Cardiovascular: Regular rate  and rhythm, normal S1 and S2, no S3 or S4, and no murmur noted  GI: Normal bowel sounds, flat,  soft, non-distended, non-tender, no masses palpated, no hepatosplenomegally  Musculoskeletal: There is no redness, warmth, or swelling of the joints.  Neurologic: Awake, alert, oriented to name, place and time.    Medical Decision Making     Please see A&P for additional details of medical decision making.      Data     I have personally reviewed the following data over the past 24 hrs:    N/A  \   N/A   / N/A     140 107 17 /  62 (L)   4.0 23 0.63 \       Imaging results reviewed over the past 24 hrs:   No results found for this or any previous visit (from the past 24 hour(s)).

## 2023-02-19 NOTE — CONSULTS
"GI CONSULT NOTE  Name: Mariah Koehler  Medical Record #: [id number]  YOB: 1955  Date of Admission: 2/17/2023  Date/Time: 2/19/2023/1:05 PM     CHIEF COMPLAINT: Nausea and Vomiting     HISTORY OF PRESENT ILLNESS: We were asked to see Mariah Koehler by Dr. Lewis for \"nausea, vomiting, unintentional weight loss, distal esophagus changes on CT, wondering if inpatient EGD is indicated.\"    Mariah Koehler is a 67 year old year old female with history of chronic pain with opioid dependence via chronic pain pump, COPD, HTN, insomnia, migraines, depression and recent LLE cellulitis who presented on 2/17/23 with vomiting, nausea, and diarrhea.     In the three days leading up to hospital presentation patient states symptoms of nausea and vomiting increased to the point of her not being able to keep down anything by mouth, this is more severe than chronic symptoms. Does also have epigastric pain, denies any low abdomen pain. No dysphagia, only slight heartburn, no fever or chills. Currently tolerating clear liquids, no appetite. No recent sick contacts or travel. No change in medications or diet.     Patient has dealt with chronic nausea and vomiting on and off for quite some time. Does think symptoms worsened at the time she started to notice significant weight loss since approximately 11/2022. Around that time she did not make any medication or lifestyle changes. Had initial workup through PCP. She denies ever having dysphagia, she does have some occasional breakthrough heartburn.      States continues to use omeprazole 20 mg daily. Also using Reglan and Zofran at home. Denies any anticoagulation or NSAIDS at home. On pain pump for chronic pain in her back and left side, this has been present for many years.     Patient states one episode of loose stool on the day she presented to the ED but none since. At home has variable stool pattern but no hematochezia or melena noted.     Was seen by GI MD through " [facility] on 1/23/2023 for symptoms, please see this note. Recommendations for EGD at that time, not yet scheduled per patient.     REVIEW OF SYSTEMS (ROS): Complete review of systems negative other than listed in HPI.    PAST MEDICAL HISTORY:  Past Medical History:   Diagnosis Date     Current mild episode of major depressive disorder, unspecified whether recurrent (H) 2/17/2020     Hypertension      Opiate dependence (H)      RSD (reflex sympathetic dystrophy)      Sacral fracture, closed (H)       FAMILY HISTORY:  Family History   Problem Relation Age of Onset     Cerebrovascular Disease Mother      Diabetes Mother      Heart Disease Mother      Hypertension Mother      Rheumatologic Disease Mother      Heart Disease Father      Pulmonary Hypertension Father      Kidney failure Father      Cancer Father         melanoma     Diabetes Sister      Hypertension Sister      Hypertension Brother      SOCIAL HISTORY:  Social History     Socioeconomic History     Marital status:      Spouse name: Not on file     Number of children: Not on file     Years of education: Not on file     Highest education level: Not on file   Occupational History     Not on file   Tobacco Use     Smoking status: Every Day     Packs/day: 0.75     Years: 51.00     Pack years: 38.25     Types: Cigarettes     Start date: 1/1/1968     Smokeless tobacco: Never   Substance and Sexual Activity     Alcohol use: Yes     Drug use: Never     Sexual activity: Not on file   Other Topics Concern     Parent/sibling w/ CABG, MI or angioplasty before 65F 55M? Not Asked   Social History Narrative     Not on file     Social Determinants of Health     Financial Resource Strain: Not on file   Food Insecurity: Not on file   Transportation Needs: Not on file   Physical Activity: Not on file   Stress: Not on file   Social Connections: Not on file   Intimate Partner Violence: Not on file   Housing Stability: Not on file     MEDICATIONS PRIOR TO ADMISSION:    Medications Prior to Admission   Medication Sig Dispense Refill Last Dose     atenolol (TENORMIN) 25 MG tablet TAKE 1 TABLET BY MOUTH EVERY DAY 90 tablet 3 2/16/2023     baclofen (LIORESAL) 10 MG tablet TAKE 2 TABLETS (20 MG TOTAL) BY MOUTH FOUR TIMES A DAY. 480 tablet 1 2/16/2023     diphenhydrAMINE HCl (ZZZQUIL) 50 MG/30ML LIQD Take 15-30 mLs by mouth nightly as needed   Past Month at PRN     medication given by implanted intrathecal pump continuous Drug # 1: Fentanyl (Sublimaze) - Conc: 2000 mcg/mL - Total Dose / 24 hours: 873.8 mcg    Drug # 2: Bupivacaine (Marcaine)  - Conc:17.7 mg/mL - Total Dose / 24 hours: 7.733 mg    Drug # 3: Hydromorphone (Dilaudid)  - Conc: 2.1 mg/mL - Total Dose / 24 hours: 0.9175 mg    Diluent: NS    Infusion Rate: 0.4369 mL/24 hrs  Pump Reservoir Volume: 40 mL    Bolus doses: up to 14 bolus doses/24 hours;    Each bolus: fentanyl 81.1 mcg, 0.708 mg Bupivacaine, 0.0841 mg Dilaudid    Outside Clinic & Provider: [facility] [phone]  Last Refill Date:   Next Refill Date: 03/10/2023  [place] Alarm Date:  / /20   Pump : [employer]    Deliver Pump Medication to:   For [place]: If pump is within 7 days of depletion, pharmacist will enter a 'Pain Management Adult IP Consult' into the EHR, including 'IT pain pump management' as the reason for the consult.   2/18/2023     metoclopramide (REGLAN) 10 MG tablet TAKE 1 TABLET BY MOUTH 3 TIMES A DAY AS NEEDED FOR NAUSEA 270 tablet 1 2/17/2023 at took today but didn't keep down     omeprazole (PRILOSEC) 20 MG DR capsule TAKE 1 CAPSULE BY MOUTH TWICE A DAY BEFORE MEALS 180 capsule 3 2/16/2023     ondansetron (ZOFRAN) 4 MG tablet TAKE 1 TABLET BY MOUTH EVERY 8 HOURS AS NEEDED FOR NAUSEA 90 tablet 0 Past Week at PRN     traZODone (DESYREL) 50 MG tablet TAKE 0.5 TABLETS (25 MG) BY MOUTH AT BEDTIME 45 tablet 3 2/16/2023     zolpidem (AMBIEN) 10 MG tablet TAKE 1 TABLET BY MOUTH EVERY DAY AT BEDTIME AS NEEDED FOR SLEEP 30 tablet 0  "2/16/2023        ALLERGIES: Codeine, Morphine, Bacitracin, and Methadone    PHYSICAL EXAM:    /54 (BP Location: Right arm)   Pulse 77   Temp 98.2  F (36.8  C) (Oral)   Resp 16   Ht 1.6 m (5' 3\")   Wt 47.8 kg (105 lb 4.8 oz)   SpO2 94%   BMI 18.65 kg/m      GENERAL: Pleasant, thin  NECK: Supple without adenopathy  EYES: No scleral icterus  LUNGS: Clear to auscultation bilaterally  HEART: Regular rate and rhythm, S1 and S2 present, no lower extremity edema  ABDOMEN: Non-distended. Positive bowel sounds. Soft, tenderness below sternum in epigastric region, no guarding/rebound/mass, no obvious organomegaly  MUSKULOSKELETAL:  Warm and well perfused, moves all extremities well  SKIN: No jaundice  NEUROLOGIC: Alert and oriented  PSYCHIATRIC: Depressed affect     LAB DATA:  CMP Results:   Recent Labs   Lab Test 02/19/23  0724 02/18/23  0939 02/17/23 2023 11/08/22  1243 09/30/22  1005    141 141  --  141   POTASSIUM 4.0 4.1 3.8  --  4.4   CHLORIDE 107 108* 103  --  103   CO2 23 24 24  --  31*   ANIONGAP 10 9 14  --  7   GLC 62* 90 151*  --  93   BUN 17 15 13  --  16.3   CR 0.63 0.64 0.68   < > 0.72   BILITOTAL  --  0.6 0.4  --  0.3   ALKPHOS  --  67 87  --  77   ALT  --  11 15  --  9*   AST  --  15 16  --  20    < > = values in this interval not displayed.      CBC  Recent Labs   Lab 02/18/23  0939 02/17/23 2023   WBC 7.8 9.4   RBC 4.69 5.59*   HGB 13.6 16.0*   HCT 40.9 48.0*   MCV 87 86   MCH 29.0 28.6   MCHC 33.3 33.3   RDW 13.6 13.2    272     INRNo lab results found in last 7 days.   Lipase   Date Value Ref Range Status   02/17/2023 14 0 - 52 U/L Final   12/16/2020 <9 0 - 52 U/L Final   03/30/2020 81 (H) 0 - 52 U/L Final   11/25/2019 20 0 - 52 U/L Final     IMAGING:  EXAM: CT ABDOMEN PELVIS W CONTRAST  LOCATION: [facility]  DATE/TIME: 2/17/2023 9:23 PM     INDICATION: History of hiatal hernia. Recurrent N/V since this am. No diarrhea.  COMPARISON: CT chest, abdomen " and pelvis 11/8/2022.  TECHNIQUE: CT scan of the abdomen and pelvis was performed following injection of IV contrast. Multiplanar reformats were obtained. Dose reduction techniques were used.  CONTRAST: Isovue 370 90 mL     FINDINGS:   LOWER CHEST: Moderate hiatal hernia is more distended with gas.     HEPATOBILIARY: Cholecystectomy. No acute liver abnormality.     PANCREAS: Normal.     SPLEEN: Normal.     ADRENAL GLANDS: Normal.     KIDNEYS/BLADDER: No significant mass, stones, or hydronephrosis. There are simple or benign cysts. No follow up is needed.     BOWEL: No obstruction. There is some wall thickening of the colon involving the transverse through the descending and sigmoid colon. No abscess or free air. The appendix is not seen. No signs of appendicitis.     LYMPH NODES: Normal.     VASCULATURE: Scattered vascular calcifications.     PELVIC ORGANS: No acute abnormality identified.     MUSCULOSKELETAL: L4-L5 degenerative change.                                                                  IMPRESSION:   1.  Moderate hiatal hernia is more distended compared to 11/8/2022.  2.  Wall thickening of the mid to distal colon could be a colitis. No abscess or bowel obstruction    ASSESSMENT:    1. Nausea and Vomiting, acute on chronic    2. Weight loss, progressive unintentional  67 year old female with history of chronic pain with opioid dependence via chronic pain pump, COPD, HTN, insomnia, migraines, depression and recent LLE cellulitis who presented on 2/17/23 with vomiting, nausea, and diarrhea. CT on 2/17/23 noted moderate hiatal hernia is more distended compared to 11/8/2022. Lab work unrevealing. Hemodynamically stable and afebrile. Non responsive, for the most part, to antiemetics. Differential includes esophagitis, gastroparesis- opioid related, GERD, malignancy among others. Low suspicion for PUD or biliary origin based on workup but considered. Could also be infectious etiology for current symptoms but  does not explain chronic componenet or continued weight loss.    - CT completed on 11/08/22 noted moderate size esophageal hiatal hernia. Wall thickening distal esophagus above the hernia is more pronounced compared to the prior exam. This could be related to reflux/esophagitis, however neoplasm not entirely excluded.  - LFTs normal, lipase normal.     3. Colon wall thickening  CT on admit noted wall thickening of the mid to distal colon could be a colitis. No abscess or bowel obstruction. Negative Cologaurd 10/20/22. Only one episode of diarrhea, can consider stool studies if noted again.   - Outpatient colonoscopy for follow up.   - CRP normal, WBC normal. Hgb normal. Lactic acid normal.     PLAN:    1. Clear liquid diet as tolerated then NPO at midnight for possible EGD tomorrow  2. Supportive cares and pain control per primary   3. Continue antiemetics prn  4. IV PPI BID  5. GI will continue to follow, please call with questions and concerns.     Discussed with Dr. Ko. GI MD to visit tomorrow 2/20/23.     TIME SPENT: 60min including chart review, patient interview and care coordination.                                                Pauline Unger CNP  Thank you for the opportunity to participate in the care of this patient.   Please feel free to call me with any questions or concerns.  Phone number [phone].            The patient was seen and evaluated in conjunction with the advanced practice provider. Please see their note for details. 67 year old with chronic pain, HTN, COPD, recent cellulitis. Presented with acute gastroenteritis type illness, though has also had several months of intermittent nausea, weight loss. Rare vomiting before this past week. +heartburn and epigastric pain. No dysphagia. Was seen at [facility] 1/23, as above. She did have complaint of diarrhea on presentation but none since admission. Patient is A&O, NAD. Abd benign. CT 2/17 with hiatal hernia, possible distal colitis. Prior  CT scan  showed esophageal thickening, hiatal hernia and no colon abnormalities. She has had negative cologuard in . Labs notable for Calcium 8.1, albumin normal. Normal LFTs, CBC, lytes.     Impression is the followin) Acute gastroenteritis illness in evolution- improving  2) Chronic epigastric pain, nausea, weight loss- suspect reflux esophagitis vs PUD vs malignancy.   3) Colitis on CT- likely due to #1. This was not present on prior CT, and she does not have diarrhea symptoms at baseline. I cannot exclude malignancy though this would be unlikely due to recent negative Cologuard.  4) Hypocalcemia- drop from admission value, initially normal    Plan:  EGD today with gastric biopsies  Anticipate liquid diet after EGD, ADAT  Potential for outpatient colonoscopy if she has persistent diarrhea issues. Defer to primary provider and/or [facility] GI provider.    Total time spent providing patient care: 20 min with at least 50% spent in reviewing documentation/test results, decision making, and coordination of care.     Na Brooks MD  :19 PM  [facility]

## 2023-02-20 ENCOUNTER — ANCILLARY PROCEDURE (OUTPATIENT)
Dept: ULTRASOUND IMAGING | Facility: CLINIC | Age: 68
End: 2023-02-20
Attending: ANESTHESIOLOGY
Payer: COMMERCIAL

## 2023-02-20 ENCOUNTER — ANESTHESIA EVENT (OUTPATIENT)
Dept: SURGERY | Facility: CLINIC | Age: 68
End: 2023-02-20
Payer: COMMERCIAL

## 2023-02-20 ENCOUNTER — ANESTHESIA (OUTPATIENT)
Dept: SURGERY | Facility: CLINIC | Age: 68
End: 2023-02-20
Payer: COMMERCIAL

## 2023-02-20 LAB
ANION GAP SERPL CALCULATED.3IONS-SCNC: 7 MMOL/L (ref 5–18)
BUN SERPL-MCNC: 9 MG/DL (ref 8–22)
CALCIUM SERPL-MCNC: 8.1 MG/DL (ref 8.5–10.5)
CHLORIDE BLD-SCNC: 110 MMOL/L (ref 98–107)
CO2 SERPL-SCNC: 24 MMOL/L (ref 22–31)
CREAT SERPL-MCNC: 0.6 MG/DL (ref 0.6–1.1)
ERYTHROCYTE [DISTWIDTH] IN BLOOD BY AUTOMATED COUNT: 13.6 % (ref 10–15)
GFR SERPL CREATININE-BSD FRML MDRD: >90 ML/MIN/1.73M2
GLUCOSE BLD-MCNC: 87 MG/DL (ref 70–125)
HCT VFR BLD AUTO: 38.6 % (ref 35–47)
HGB BLD-MCNC: 12.6 G/DL (ref 11.7–15.7)
MCH RBC QN AUTO: 28.6 PG (ref 26.5–33)
MCHC RBC AUTO-ENTMCNC: 32.6 G/DL (ref 31.5–36.5)
MCV RBC AUTO: 88 FL (ref 78–100)
PLATELET # BLD AUTO: 181 10E3/UL (ref 150–450)
POTASSIUM BLD-SCNC: 3.6 MMOL/L (ref 3.5–5)
RBC # BLD AUTO: 4.4 10E6/UL (ref 3.8–5.2)
SODIUM SERPL-SCNC: 141 MMOL/L (ref 136–145)
UPPER GI ENDOSCOPY: NORMAL
WBC # BLD AUTO: 5.6 10E3/UL (ref 4–11)

## 2023-02-20 PROCEDURE — 360N000075 HC SURGERY LEVEL 2, PER MIN: Performed by: INTERNAL MEDICINE

## 2023-02-20 PROCEDURE — 250N000013 HC RX MED GY IP 250 OP 250 PS 637: Performed by: STUDENT IN AN ORGANIZED HEALTH CARE EDUCATION/TRAINING PROGRAM

## 2023-02-20 PROCEDURE — G0378 HOSPITAL OBSERVATION PER HR: HCPCS

## 2023-02-20 PROCEDURE — 80048 BASIC METABOLIC PNL TOTAL CA: CPT | Performed by: STUDENT IN AN ORGANIZED HEALTH CARE EDUCATION/TRAINING PROGRAM

## 2023-02-20 PROCEDURE — 96376 TX/PRO/DX INJ SAME DRUG ADON: CPT

## 2023-02-20 PROCEDURE — 250N000011 HC RX IP 250 OP 636: Performed by: STUDENT IN AN ORGANIZED HEALTH CARE EDUCATION/TRAINING PROGRAM

## 2023-02-20 PROCEDURE — C9113 INJ PANTOPRAZOLE SODIUM, VIA: HCPCS | Performed by: STUDENT IN AN ORGANIZED HEALTH CARE EDUCATION/TRAINING PROGRAM

## 2023-02-20 PROCEDURE — 272N000001 HC OR GENERAL SUPPLY STERILE: Performed by: INTERNAL MEDICINE

## 2023-02-20 PROCEDURE — 250N000013 HC RX MED GY IP 250 OP 250 PS 637: Performed by: INTERNAL MEDICINE

## 2023-02-20 PROCEDURE — 99232 SBSQ HOSP IP/OBS MODERATE 35: CPT | Mod: GC | Performed by: STUDENT IN AN ORGANIZED HEALTH CARE EDUCATION/TRAINING PROGRAM

## 2023-02-20 PROCEDURE — 258N000003 HC RX IP 258 OP 636: Performed by: INTERNAL MEDICINE

## 2023-02-20 PROCEDURE — 250N000009 HC RX 250: Performed by: NURSE ANESTHETIST, CERTIFIED REGISTERED

## 2023-02-20 PROCEDURE — 370N000017 HC ANESTHESIA TECHNICAL FEE, PER MIN: Performed by: INTERNAL MEDICINE

## 2023-02-20 PROCEDURE — 258N000003 HC RX IP 258 OP 636: Performed by: EMERGENCY MEDICINE

## 2023-02-20 PROCEDURE — 999N000141 HC STATISTIC PRE-PROCEDURE NURSING ASSESSMENT: Performed by: INTERNAL MEDICINE

## 2023-02-20 PROCEDURE — 88305 TISSUE EXAM BY PATHOLOGIST: CPT | Mod: TC | Performed by: INTERNAL MEDICINE

## 2023-02-20 PROCEDURE — 85027 COMPLETE CBC AUTOMATED: CPT | Performed by: STUDENT IN AN ORGANIZED HEALTH CARE EDUCATION/TRAINING PROGRAM

## 2023-02-20 PROCEDURE — 36415 COLL VENOUS BLD VENIPUNCTURE: CPT | Performed by: STUDENT IN AN ORGANIZED HEALTH CARE EDUCATION/TRAINING PROGRAM

## 2023-02-20 PROCEDURE — 250N000011 HC RX IP 250 OP 636: Performed by: NURSE ANESTHETIST, CERTIFIED REGISTERED

## 2023-02-20 RX ORDER — HYDROMORPHONE HCL IN WATER/PF 6 MG/30 ML
0.2 PATIENT CONTROLLED ANALGESIA SYRINGE INTRAVENOUS EVERY 5 MIN PRN
Status: CANCELLED | OUTPATIENT
Start: 2023-02-20

## 2023-02-20 RX ORDER — LIDOCAINE 40 MG/G
CREAM TOPICAL
Status: DISCONTINUED | OUTPATIENT
Start: 2023-02-20 | End: 2023-02-21 | Stop reason: HOSPADM

## 2023-02-20 RX ORDER — FENTANYL CITRATE 50 UG/ML
25 INJECTION, SOLUTION INTRAMUSCULAR; INTRAVENOUS EVERY 5 MIN PRN
Status: CANCELLED | OUTPATIENT
Start: 2023-02-20

## 2023-02-20 RX ORDER — FENTANYL CITRATE 50 UG/ML
100 INJECTION, SOLUTION INTRAMUSCULAR; INTRAVENOUS
Status: DISCONTINUED | OUTPATIENT
Start: 2023-02-20 | End: 2023-02-20

## 2023-02-20 RX ORDER — ONDANSETRON 2 MG/ML
4 INJECTION INTRAMUSCULAR; INTRAVENOUS EVERY 30 MIN PRN
Status: CANCELLED | OUTPATIENT
Start: 2023-02-20

## 2023-02-20 RX ORDER — ONDANSETRON 4 MG/1
4 TABLET, ORALLY DISINTEGRATING ORAL EVERY 30 MIN PRN
Status: CANCELLED | OUTPATIENT
Start: 2023-02-20

## 2023-02-20 RX ORDER — LIDOCAINE HYDROCHLORIDE 10 MG/ML
INJECTION, SOLUTION INFILTRATION; PERINEURAL PRN
Status: DISCONTINUED | OUTPATIENT
Start: 2023-02-20 | End: 2023-02-20

## 2023-02-20 RX ORDER — SODIUM CHLORIDE, SODIUM LACTATE, POTASSIUM CHLORIDE, CALCIUM CHLORIDE 600; 310; 30; 20 MG/100ML; MG/100ML; MG/100ML; MG/100ML
INJECTION, SOLUTION INTRAVENOUS CONTINUOUS
Status: DISCONTINUED | OUTPATIENT
Start: 2023-02-20 | End: 2023-02-21 | Stop reason: HOSPADM

## 2023-02-20 RX ORDER — PANTOPRAZOLE SODIUM 40 MG/1
40 TABLET, DELAYED RELEASE ORAL
Status: DISCONTINUED | OUTPATIENT
Start: 2023-02-20 | End: 2023-02-21 | Stop reason: HOSPADM

## 2023-02-20 RX ORDER — SODIUM CHLORIDE, SODIUM LACTATE, POTASSIUM CHLORIDE, CALCIUM CHLORIDE 600; 310; 30; 20 MG/100ML; MG/100ML; MG/100ML; MG/100ML
INJECTION, SOLUTION INTRAVENOUS CONTINUOUS
Status: CANCELLED | OUTPATIENT
Start: 2023-02-20

## 2023-02-20 RX ORDER — FENTANYL CITRATE 50 UG/ML
50 INJECTION, SOLUTION INTRAMUSCULAR; INTRAVENOUS EVERY 5 MIN PRN
Status: CANCELLED | OUTPATIENT
Start: 2023-02-20

## 2023-02-20 RX ORDER — NICOTINE 21 MG/24HR
1 PATCH, TRANSDERMAL 24 HOURS TRANSDERMAL DAILY
Status: DISCONTINUED | OUTPATIENT
Start: 2023-02-21 | End: 2023-02-21 | Stop reason: HOSPADM

## 2023-02-20 RX ORDER — OXYCODONE HYDROCHLORIDE 5 MG/1
10 TABLET ORAL EVERY 4 HOURS PRN
Status: CANCELLED | OUTPATIENT
Start: 2023-02-20

## 2023-02-20 RX ORDER — HYDROMORPHONE HCL IN WATER/PF 6 MG/30 ML
0.4 PATIENT CONTROLLED ANALGESIA SYRINGE INTRAVENOUS EVERY 5 MIN PRN
Status: CANCELLED | OUTPATIENT
Start: 2023-02-20

## 2023-02-20 RX ORDER — LIDOCAINE 40 MG/G
CREAM TOPICAL
Status: DISCONTINUED | OUTPATIENT
Start: 2023-02-20 | End: 2023-02-20

## 2023-02-20 RX ORDER — OXYCODONE HYDROCHLORIDE 5 MG/1
5 TABLET ORAL EVERY 4 HOURS PRN
Status: CANCELLED | OUTPATIENT
Start: 2023-02-20

## 2023-02-20 RX ORDER — PROPOFOL 10 MG/ML
INJECTION, EMULSION INTRAVENOUS PRN
Status: DISCONTINUED | OUTPATIENT
Start: 2023-02-20 | End: 2023-02-20

## 2023-02-20 RX ORDER — PROPOFOL 10 MG/ML
INJECTION, EMULSION INTRAVENOUS CONTINUOUS PRN
Status: DISCONTINUED | OUTPATIENT
Start: 2023-02-20 | End: 2023-02-20

## 2023-02-20 RX ORDER — OXYCODONE HYDROCHLORIDE 5 MG/1
5 TABLET ORAL EVERY 4 HOURS PRN
Status: DISCONTINUED | OUTPATIENT
Start: 2023-02-20 | End: 2023-02-20

## 2023-02-20 RX ORDER — FENTANYL CITRATE 50 UG/ML
50 INJECTION, SOLUTION INTRAMUSCULAR; INTRAVENOUS
Status: DISCONTINUED | OUTPATIENT
Start: 2023-02-20 | End: 2023-02-20

## 2023-02-20 RX ORDER — FENTANYL CITRATE 50 UG/ML
100 INJECTION, SOLUTION INTRAMUSCULAR; INTRAVENOUS ONCE
Status: DISCONTINUED | OUTPATIENT
Start: 2023-02-20 | End: 2023-02-20

## 2023-02-20 RX ORDER — OXYCODONE HYDROCHLORIDE 5 MG/1
10 TABLET ORAL EVERY 4 HOURS PRN
Status: DISCONTINUED | OUTPATIENT
Start: 2023-02-20 | End: 2023-02-20

## 2023-02-20 RX ADMIN — TRAZODONE HYDROCHLORIDE 25 MG: 50 TABLET ORAL at 21:01

## 2023-02-20 RX ADMIN — ATENOLOL 25 MG: 25 TABLET ORAL at 07:57

## 2023-02-20 RX ADMIN — PROPOFOL 50 MG: 10 INJECTION, EMULSION INTRAVENOUS at 12:31

## 2023-02-20 RX ADMIN — PANTOPRAZOLE SODIUM 40 MG: 40 INJECTION, POWDER, FOR SOLUTION INTRAVENOUS at 08:00

## 2023-02-20 RX ADMIN — LIDOCAINE HYDROCHLORIDE 2 ML: 10 INJECTION, SOLUTION INFILTRATION; PERINEURAL at 12:31

## 2023-02-20 RX ADMIN — BACLOFEN 20 MG: 10 TABLET ORAL at 07:57

## 2023-02-20 RX ADMIN — ONDANSETRON 4 MG: 4 TABLET, ORALLY DISINTEGRATING ORAL at 07:57

## 2023-02-20 RX ADMIN — PROCHLORPERAZINE EDISYLATE 5 MG: 5 INJECTION INTRAMUSCULAR; INTRAVENOUS at 09:49

## 2023-02-20 RX ADMIN — ACETAMINOPHEN 650 MG: 325 TABLET ORAL at 03:05

## 2023-02-20 RX ADMIN — PROPOFOL 150 MCG/KG/MIN: 10 INJECTION, EMULSION INTRAVENOUS at 12:31

## 2023-02-20 RX ADMIN — BACLOFEN 20 MG: 10 TABLET ORAL at 21:00

## 2023-02-20 RX ADMIN — PANTOPRAZOLE SODIUM 40 MG: 40 TABLET, DELAYED RELEASE ORAL at 16:35

## 2023-02-20 RX ADMIN — SODIUM CHLORIDE: 9 INJECTION, SOLUTION INTRAVENOUS at 03:05

## 2023-02-20 RX ADMIN — ZOLPIDEM TARTRATE 10 MG: 5 TABLET ORAL at 21:01

## 2023-02-20 RX ADMIN — SODIUM CHLORIDE, POTASSIUM CHLORIDE, SODIUM LACTATE AND CALCIUM CHLORIDE: 600; 310; 30; 20 INJECTION, SOLUTION INTRAVENOUS at 13:51

## 2023-02-20 RX ADMIN — BACLOFEN 20 MG: 10 TABLET ORAL at 16:35

## 2023-02-20 ASSESSMENT — ACTIVITIES OF DAILY LIVING (ADL)
ADLS_ACUITY_SCORE: 32
ADLS_ACUITY_SCORE: 35
ADLS_ACUITY_SCORE: 32

## 2023-02-20 NOTE — INTERVAL H&P NOTE
I have reviewed the surgical (or preoperative) H&P that is linked to this encounter, and examined the patient. There are no significant changes    Clinical Conditions Present on Arrival:  Clinically Significant Risk Factors Present on Admission             # Hypocalcemia: Lowest Ca = 8.1 mg/dL in last 30 days, will monitor and replace as appropriate

## 2023-02-20 NOTE — ANESTHESIA PREPROCEDURE EVALUATION
Anesthesia Pre-Procedure Evaluation    Patient: Mariah Koehler   MRN: [id number] : 1955        Procedure : Procedure(s):  ESOPHAGOGASTRODUODENOSCOPY (EGD)          Past Medical History:   Diagnosis Date     Current mild episode of major depressive disorder, unspecified whether recurrent (H) 2020     Hypertension      Opiate dependence (H)      RSD (reflex sympathetic dystrophy)      Sacral fracture, closed (H)       Past Surgical History:   Procedure Laterality Date     APPENDECTOMY       GYN SURGERY       HC REVISE MEDIAN N/CARPAL TUNNEL SURG      Description: Neuroplasty Decompression Median Nerve At Carpal Tunnel;  Recorded: 2011;     HYSTERECTOMY  1984     IR CERVICAL EPIDURAL STEROID INJECTION  2005     OOPHORECTOMY Bilateral      [place] SYMPATHECTOMY,CERVICOTHORACIC      Description: Surgical Sympathectomy Cervicothoracic;  Recorded: 2011;     [facility] DECOMPRESS FASCIOTOMY FINGR/HAND      Description: Decompression Fasciotomy Of The Hand;  Recorded: 2011;     [facility] LAP,CHOLECYSTECTOMY/EXPLORE      Description: Cholecystectomy Laparoscopic;  Recorded: 2011;     [facility] TOTAL ABDOM HYSTERECTOMY      Description: Hysterectomy;  Recorded: 2011;      Allergies   Allergen Reactions     Codeine Nausea and Vomiting     Morphine Nausea and Vomiting     Bacitracin Rash     Methadone Rash      Social History     Tobacco Use     Smoking status: Every Day     Packs/day: 0.75     Years: 51.00     Pack years: 38.25     Types: Cigarettes     Start date: 1968     Smokeless tobacco: Never   Substance Use Topics     Alcohol use: Not Currently     Comment: 1 glass a year      Wt Readings from Last 1 Encounters:   23 47.6 kg (105 lb)        Anesthesia Evaluation            ROS/MED HX  ENT/Pulmonary:  - neg pulmonary ROS     Neurologic:  - neg neurologic ROS     Cardiovascular:       METS/Exercise Tolerance: >4 METS    Hematologic:  - neg hematologic  ROS     Musculoskeletal:  -  neg musculoskeletal ROS     GI/Hepatic:  - neg GI/hepatic ROS     Renal/Genitourinary:  - neg Renal ROS     Endo:  - neg endo ROS     Psychiatric/Substance Use:  - neg psychiatric ROS     Infectious Disease:  - neg infectious disease ROS     Malignancy:  - neg malignancy ROS     Other:  - neg other ROS          Physical Exam    Airway  airway exam normal      Mallampati: II       Respiratory Devices and Support         Dental           Cardiovascular   cardiovascular exam normal          Pulmonary   pulmonary exam normal                OUTSIDE LABS:  CBC:   Lab Results   Component Value Date    WBC 5.6 02/20/2023    WBC 7.8 02/18/2023    HGB 12.6 02/20/2023    HGB 13.6 02/18/2023    HCT 38.6 02/20/2023    HCT 40.9 02/18/2023     02/20/2023     02/18/2023     BMP:   Lab Results   Component Value Date     02/20/2023     02/19/2023    POTASSIUM 3.6 02/20/2023    POTASSIUM 4.0 02/19/2023    CHLORIDE 110 (H) 02/20/2023    CHLORIDE 107 02/19/2023    CO2 24 02/20/2023    CO2 23 02/19/2023    BUN 9 02/20/2023    BUN 17 02/19/2023    CR 0.60 02/20/2023    CR 0.63 02/19/2023    GLC 87 02/20/2023    GLC 62 (L) 02/19/2023     COAGS:   Lab Results   Component Value Date    PTT 29 11/25/2019    INR 1.01 11/25/2019     POC: No results found for: BGM, HCG, HCGS  HEPATIC:   Lab Results   Component Value Date    ALBUMIN 3.7 02/18/2023    PROTTOTAL 5.9 (L) 02/18/2023    ALT 11 02/18/2023    AST 15 02/18/2023    ALKPHOS 67 02/18/2023    BILITOTAL 0.6 02/18/2023     OTHER:   Lab Results   Component Value Date    LACT 1.1 02/17/2023    A1C 6.0 06/21/2011    ESTEFANY 8.1 (L) 02/20/2023    MAG 1.7 (L) 12/16/2020    LIPASE 14 02/17/2023    TSH 0.96 09/30/2022    CRP <0.1 02/18/2023    SED 4 09/30/2022       Anesthesia Plan    ASA Status:  3      Anesthesia Type: MAC.              Consents    Anesthesia Plan(s) and associated risks, benefits, and realistic alternatives discussed. Questions answered and  patient/representative(s) expressed understanding.    - Discussed:     - Discussed with:  Patient         Postoperative Care            Comments:                Anastacio Badillo MD

## 2023-02-20 NOTE — PLAN OF CARE
"  Problem: Nausea and Vomiting  Goal: Nausea and Vomiting Relief  Outcome: Progressing  Intervention: Prevent and Manage Nausea and Vomiting  Recent Flowsheet Documentation  Taken 2/20/2023 0800 by Amie Cespedes RN  Nausea/Vomiting Interventions:   antiemetic   nausea triggers minimized   sips of clear liquids given     PRIMARY DIAGNOSIS: \"GENERIC\" NURSING  OUTPATIENT/OBSERVATION GOALS TO BE MET BEFORE DISCHARGE:  ADLs back to baseline: Yes    Activity and level of assistance: Up with standby assistance.    Pain status: Improved-controlled with oral pain medications.    Return to near baseline physical activity: Yes     Discharge Planner Nurse   Safe discharge environment identified: Yes  Barriers to discharge: Yes, pt will be undergoing an EGD procedure today       Entered by: Amie Cespedes RN 02/20/2023 0800 AM     Please review provider order for any additional goals.   Nurse to notify provider when observation goals have been met and patient is ready for discharge.      Pt is A & O x 4. Pt endorses mild pain to the head. Offered PRN analgesics but pt has declined at this time. CMS intact x 4. Voiding spontaneously. BS present & passing flatus. NS running at 100 ml. VSS. NPO in anticipation for today's EGD procedure. Up w/ a SBA & walker. Anticipating discharge pending recovery from EGD. Continuing to monitor.    GERALD Bland  Shift: 0700 - 1930  "

## 2023-02-20 NOTE — ANESTHESIA POSTPROCEDURE EVALUATION
Patient: Mariah Koehler    Procedure: Procedure(s):  ESOPHAGOGASTRODUODENOSCOPY (EGD) with biopsies       Anesthesia Type:  MAC    Note:  Disposition: Outpatient   Postop Pain Control: Uneventful            Sign Out: Well controlled pain   PONV: No   Neuro/Psych: Uneventful            Sign Out: Acceptable/Baseline neuro status   Airway/Respiratory: Uneventful            Sign Out: Acceptable/Baseline resp. status   CV/Hemodynamics: Uneventful            Sign Out: Acceptable CV status; No obvious hypovolemia; No obvious fluid overload   Other NRE: NONE   DID A NON-ROUTINE EVENT OCCUR? No           Last vitals:  Vitals:    02/20/23 0410 02/20/23 0728 02/20/23 1013   BP: (!) 163/75 (!) 165/77 (!) 144/62   Pulse: 60 65 65   Resp: 17  18   Temp: 36.4  C (97.5  F) 36.8  C (98.2  F) 36.7  C (98.1  F)   SpO2: 94% 95% 96%       Electronically Signed By: Anastacio Badillo MD  February 20, 2023  2:58 PM

## 2023-02-20 NOTE — PROGRESS NOTES
[facility]    Progress Note - Hospitalist Service       Date of Admission:  2/17/2023    Assessment & Plan   Mariah Koehler is a 67 year old female w/ PMH of unintentional weight loss, multiple prior abdominal surgeries, chronic pain syndrome with a pain pump, MDD, insomnia, HTN and treated LLE cellulitis 1/26/23 admitted on 2/17/2023 for nausea and vomiting.      Nausea  Vomiting  Moderate hiatal hernia  Mid-distal colon thickening  Patient presents with acute nausea with NBNB emesis without associated abdominal pain and one loose BM. Lab workup including CMP, lipase, lactic acid, and CRP were normal. CBC without leukocytosis; had Hgb 16, but this could be hemoconcentration in setting of emesis. This emesis could be secondary to her moderate hiatal hernia, but would have expected this to be more chronic or perhaps cause some dysphagia sensation. CT also showed mid-distal colon thickening, but no abdominal pain and negative inflammatory markers make a clinically significant diverticulitis less likely. This could also be viral gastroenteritis. Concern for malignancy given prior CT findings of distal esophagus changes and weight loss. GI consulted given ongoing nausea and CT findings  - EGD done 2/20/23 showed normal esophagus, 7cm hiatal hernia, erythematous mucosa in the antrum that was biopsied otherwise normal duodenum.   - Follow up pathology results   - Diet: full liquids, advance as tolerated   - zofran, reglan, and compazine PRN  - add Phenergan as needed for nausea  - I/O and daily scale weight       Unintentional weight loss  Reports unintentional weight loss w/ normal lab workup. Last measured weight on chart review was April 2021 patient wt was 128Ib. No weights noted in past several months and admit weight of 107lb seems to be reported. Actual wt was 105Ib. Cologuard negative in October 2022. Being followed by primary with plans for outpatient EGD, although not yet  scheduled.    - GI consulted as above     Chronic pain syndrome  Has a pain pump, but does not know specific medication or her pain clinic.  - PTA pain pump  - pain team consulted for orders   - PTA baclofen 20mg PO QID  - PTA metoclopramide 10mg PO TID PRN     Hypertensive urgency  HTN  - PTA atenolol 25 mg PO daily  - hydralazine 10mg IV q6hr PRN SBP >180 or DBP >110     Hx migraine  - acetaminophen-caffeine 500-65mg PO q6hr PRN     Insomnia  Hx MDD  - PTA trazodone 25mg PO at bedtime (insomnia dose)  - PTA zolpidem 10mg PO at bedtime PRN     Diet: NPO per Anesthesia Guidelines for Procedure/Surgery Except for: Meds    DVT Prophylaxis: Pneumatic Compression Devices  Bellamy Catheter: Not present  Fluids: IVF  Lines: None     Cardiac Monitoring: None  Code Status: Full Code      Clinically Significant Risk Factors Present on Admission          # Hypocalcemia: Lowest Ca = 8.3 mg/dL in last 2 days, will monitor and replace as appropriate                    Disposition Plan      Expected Discharge Date: 02/21/2023      Destination: home with family          The patient's care was discussed with the Attending Physician, Dr. Venegas.    Jodie Belcher MD  Hospitalist Service  [facility]  Securely message with [employer] (more info)  Text page via [employer] Paging/Directory   ______________________________________________________________________    Interval History   No acute events overnight    This morning patient reporting improvement in nausea but continues to have dry heaves.  Continues to deny abdominal pain.  States that she did have a bowel movement yesterday that was semiformed denies any bleeding.  No fevers or chills.    Physical Exam   Vital Signs: Temp: 98.2  F (36.8  C) Temp src: Oral BP: (!) 165/77 Pulse: 65   Resp: 17 SpO2: 95 % O2 Device: None (Room air)    Weight: 105 lbs 4.8 oz    Constitutional: Awake, alert, cooperative, no apparent distress, thin appearing  Respiratory: No increased  work of breathing, good air exchange, clear to auscultation bilaterally, no crackles or wheezing  Cardiovascular: Regular rate and rhythm, normal S1 and S2, no S3 or S4, and no murmur noted  GI: Normal bowel sounds, flat,  soft, non-distended, tenderness at the epigastric region, no masses palpated, no hepatosplenomegally  Musculoskeletal: There is no redness, warmth, or swelling of the joints.  Neurologic: Awake, alert, oriented to name, place and time.    Medical Decision Making     Please see A&P for additional details of medical decision making.      Data     I have personally reviewed the following data over the past 24 hrs:    N/A  \   N/A   / N/A     N/A N/A N/A /  N/A   N/A N/A N/A \       Imaging results reviewed over the past 24 hrs:   No results found for this or any previous visit (from the past 24 hour(s)).

## 2023-02-20 NOTE — PROGRESS NOTES
"PRIMARY DIAGNOSIS: \"Nausea\" NURSING  OUTPATIENT/OBSERVATION GOALS TO BE MET BEFORE DISCHARGE:  1. ADLs back to baseline: Yes    2. Activity and level of assistance: Up with standby assistance.    3. Pain status: Improved-controlled with oral pain medications.    4. Return to near baseline physical activity: Yes     Discharge Planner Nurse   Safe discharge environment identified: No  Barriers to discharge: Yes, pt. Continues to be nauseous.       Entered by: Maxine Antunez RN 02/19/2023 3:10 PM     Please review provider order for any additional goals.   Nurse to notify provider when observation goals have been met and patient is ready for discharge.  "

## 2023-02-20 NOTE — PROGRESS NOTES
"PRIMARY DIAGNOSIS: \"Nausea\" NURSING  OUTPATIENT/OBSERVATION GOALS TO BE MET BEFORE DISCHARGE:  1. ADLs back to baseline: Yes    2. Activity and level of assistance: Up with standby assistance.    3. Pain status: Improved-controlled with oral pain medications.    4. Return to near baseline physical activity: Yes     Discharge Planner Nurse   Safe discharge environment identified: No  Barriers to discharge: Yes, pt. Nauseous and reports not feeling well.       Entered by: Maxine Antunez RN 02/19/2023 7:26 PM     Please review provider order for any additional goals.   Nurse to notify provider when observation goals have been met and patient is ready for discharge.  "

## 2023-02-20 NOTE — PLAN OF CARE
Goal Outcome Evaluation:       Pt slept well throughout shift.  Pt unsteady on feet.  Requires assist of 1. Pt c/o minor pain in back and headache.  Tylenol administered x1 and pain improved.  Pt has been NPO since midnight for EGD this AM.  Pt slightly HTN, no PRN medications needed though.  Other VS stable.

## 2023-02-20 NOTE — ANESTHESIA CARE TRANSFER NOTE
Patient: Mariah Koehler    Procedure: Procedure(s):  ESOPHAGOGASTRODUODENOSCOPY (EGD) with biopsies       Diagnosis: Nausea and vomiting, unspecified vomiting type [R11.2]  Generalized abdominal pain [R10.84]  Diagnosis Additional Information: No value filed.    Anesthesia Type:   MAC     Note:    Oropharynx: oropharynx clear of all foreign objects  Level of Consciousness: awake  Oxygen Supplementation: room air    Independent Airway: airway patency satisfactory and stable  Dentition: dentition unchanged  Vital Signs Stable: post-procedure vital signs reviewed and stable  Report to RN Given: handoff report given  Patient transferred to: Phase II    Handoff Report: Identifed the Patient, Identified the Reponsible Provider, Reviewed the pertinent medical history, Discussed the surgical course, Reviewed Intra-OP anesthesia mangement and issues during anesthesia, Set expectations for post-procedure period and Allowed opportunity for questions and acknowledgement of understanding      Vitals:  Vitals Value Taken Time   BP     Temp     Pulse     Resp     SpO2         Electronically Signed By: TIM VELARDE CRNA  February 20, 2023  12:54 PM

## 2023-02-20 NOTE — PROGRESS NOTES
"POST OP CHECK     Mariah Koehler is a 67 year old female on 2/17/2023 for nausea and vomiting and inability to tolarated PO diet. She is s/p EGD this morning.  Patient reports that her nausea has improved she was able to tolerate some clear liquid diet.  Otherwise denies any abdominal pain, is passing gas and has voided.    BP (!) 153/69 (BP Location: Right arm)   Pulse 61   Temp 98.4  F (36.9  C) (Oral)   Resp 18   Ht 1.6 m (5' 3\")   Wt 47.6 kg (105 lb)   SpO2 96%   BMI 18.60 kg/m       Constitutional: awake, alert, cooperative, no apparent distress, and appears stated age  Respiratory: No increased work of breathing, good air exchange, clear to auscultation bilaterally, no crackles or wheezing  Cardiovascular: Normal apical impulse, regular rate and rhythm, normal S1 and S2, no S3 or S4, and no murmur noted  GI: No scars, normal bowel sounds, soft, non-distended, tender at the epigastric region, no masses palpated, no hepatosplenomegally  Neurologic: Awake, alert, oriented to name, place and time.       Assessment/Plan: Continue on current plan as detailed on formal progress note.  Discussed with attending physician Dr. Venegas who agrees with the plan.        "

## 2023-02-20 NOTE — PLAN OF CARE
"  Problem: Pain Acute  Goal: Optimal Pain Control and Function  Outcome: Progressing  Intervention: Develop Pain Management Plan  Recent Flowsheet Documentation  Taken 2/20/2023 0750 by Amie Cespedes RN  Pain Management Interventions:    medication offered but refused    rest  Intervention: Prevent or Manage Pain  Recent Flowsheet Documentation  Taken 2/20/2023 1330 by Amie Cespedes RN  Medication Review/Management: medications reviewed  Taken 2/20/2023 0800 by Amie Cespedes RN  Medication Review/Management: medications reviewed     Problem: Nausea and Vomiting  Goal: Nausea and Vomiting Relief  2/20/2023 1509 by Amie Cespedes RN  Outcome: Progressing  2/20/2023 0825 by Amie Cespedes RN  Outcome: Progressing  Intervention: Prevent and Manage Nausea and Vomiting  Recent Flowsheet Documentation  Taken 2/20/2023 0800 by Amie Cespedes RN  Nausea/Vomiting Interventions:    antiemetic    nausea triggers minimized    sips of clear liquids given     PRIMARY DIAGNOSIS: \"GENERIC\" NURSING  OUTPATIENT/OBSERVATION GOALS TO BE MET BEFORE DISCHARGE:  1. ADLs back to baseline: Yes    2. Activity and level of assistance: Up with standby assistance.    3. Pain status: Improved-controlled with oral pain medications.    4. Return to near baseline physical activity: Yes     Discharge Planner Nurse   Safe discharge environment identified: Yes  Barriers to discharge: Yes, pending EGD results       Entered by: Amie Cespedes RN 02/20/2023 3:10 PM     Please review provider order for any additional goals.   Nurse to notify provider when observation goals have been met and patient is ready for discharge.    Pt is A & O x 4. Pt denies pain at this time. Pt returned from EGD procedure at 1250; Biopsies pending. CMS intact x 4. Voiding spontaneously. BS present & passing flatus. LR running at 100 ml. VSS. Tolerating a full liquid diet. Up w/ a SBA & walker. Anticipating discharge pending EGD results. Continuing to " monitor.     Amie Cespedes, BSN  Shift: [id number] - [id number]

## 2023-02-20 NOTE — PROGRESS NOTES
"PRIMARY DIAGNOSIS: \"Nausea\" NURSING  OUTPATIENT/OBSERVATION GOALS TO BE MET BEFORE DISCHARGE:  1. ADLs back to baseline: Yes    2. Activity and level of assistance: Up with standby assistance.    3. Pain status: Improved-controlled with oral pain medications.    4. Return to near baseline physical activity: Yes     Discharge Planner Nurse   Safe discharge environment identified: No  Barriers to discharge: Yes, Pt. Continues to present with nausea.Anti- nausea meds administered with minimal relieve. Pt. Has been able o hold food down so far. Continues to be on full liquids diet.       Entered by: Maxine Antunez RN 02/19/2023 7:23 PM     Please review provider order for any additional goals.   Nurse to notify provider when observation goals have been met and patient is ready for discharge.  "

## 2023-02-21 VITALS
HEART RATE: 84 BPM | BODY MASS INDEX: 18.61 KG/M2 | SYSTOLIC BLOOD PRESSURE: 120 MMHG | HEIGHT: 63 IN | TEMPERATURE: 98.1 F | WEIGHT: 105 LBS | OXYGEN SATURATION: 96 % | RESPIRATION RATE: 17 BRPM | DIASTOLIC BLOOD PRESSURE: 58 MMHG

## 2023-02-21 PROBLEM — R63.4 UNINTENTIONAL WEIGHT LOSS: Status: ACTIVE | Noted: 2023-02-21

## 2023-02-21 PROBLEM — A08.4 VIRAL GASTROENTERITIS: Status: ACTIVE | Noted: 2023-02-21

## 2023-02-21 LAB
PATH REPORT.COMMENTS IMP SPEC: NORMAL
PATH REPORT.COMMENTS IMP SPEC: NORMAL
PATH REPORT.FINAL DX SPEC: NORMAL
PATH REPORT.GROSS SPEC: NORMAL
PATH REPORT.MICROSCOPIC SPEC OTHER STN: NORMAL
PATH REPORT.RELEVANT HX SPEC: NORMAL
PHOTO IMAGE: NORMAL

## 2023-02-21 PROCEDURE — 99238 HOSP IP/OBS DSCHRG MGMT 30/<: CPT | Mod: GC | Performed by: STUDENT IN AN ORGANIZED HEALTH CARE EDUCATION/TRAINING PROGRAM

## 2023-02-21 PROCEDURE — 88342 IMHCHEM/IMCYTCHM 1ST ANTB: CPT | Mod: 26 | Performed by: PATHOLOGY

## 2023-02-21 PROCEDURE — 88305 TISSUE EXAM BY PATHOLOGIST: CPT | Mod: 26 | Performed by: PATHOLOGY

## 2023-02-21 PROCEDURE — 250N000013 HC RX MED GY IP 250 OP 250 PS 637: Performed by: FAMILY MEDICINE

## 2023-02-21 PROCEDURE — 250N000011 HC RX IP 250 OP 636: Performed by: INTERNAL MEDICINE

## 2023-02-21 PROCEDURE — G0378 HOSPITAL OBSERVATION PER HR: HCPCS

## 2023-02-21 PROCEDURE — 250N000013 HC RX MED GY IP 250 OP 250 PS 637: Performed by: INTERNAL MEDICINE

## 2023-02-21 PROCEDURE — 96376 TX/PRO/DX INJ SAME DRUG ADON: CPT

## 2023-02-21 PROCEDURE — 258N000003 HC RX IP 258 OP 636: Performed by: INTERNAL MEDICINE

## 2023-02-21 RX ORDER — METOCLOPRAMIDE 10 MG/1
10 TABLET ORAL 3 TIMES DAILY PRN
Qty: 30 TABLET | Refills: 0 | Status: SHIPPED | OUTPATIENT
Start: 2023-02-21 | End: 2023-07-10

## 2023-02-21 RX ADMIN — ACETAMINOPHEN 650 MG: 325 TABLET ORAL at 00:21

## 2023-02-21 RX ADMIN — ACETAMINOPHEN 650 MG: 325 TABLET ORAL at 08:11

## 2023-02-21 RX ADMIN — SODIUM CHLORIDE, POTASSIUM CHLORIDE, SODIUM LACTATE AND CALCIUM CHLORIDE: 600; 310; 30; 20 INJECTION, SOLUTION INTRAVENOUS at 00:18

## 2023-02-21 RX ADMIN — ONDANSETRON 4 MG: 4 TABLET, ORALLY DISINTEGRATING ORAL at 10:13

## 2023-02-21 RX ADMIN — PANTOPRAZOLE SODIUM 40 MG: 40 TABLET, DELAYED RELEASE ORAL at 08:11

## 2023-02-21 RX ADMIN — ATENOLOL 25 MG: 25 TABLET ORAL at 08:11

## 2023-02-21 RX ADMIN — PROCHLORPERAZINE EDISYLATE 5 MG: 5 INJECTION INTRAMUSCULAR; INTRAVENOUS at 06:16

## 2023-02-21 RX ADMIN — HYDRALAZINE HYDROCHLORIDE 10 MG: 20 INJECTION INTRAMUSCULAR; INTRAVENOUS at 00:59

## 2023-02-21 RX ADMIN — NICOTINE 1 PATCH: 21 PATCH, EXTENDED RELEASE TRANSDERMAL at 08:12

## 2023-02-21 RX ADMIN — BACLOFEN 20 MG: 10 TABLET ORAL at 08:11

## 2023-02-21 RX ADMIN — ONDANSETRON 4 MG: 2 INJECTION INTRAMUSCULAR; INTRAVENOUS at 01:26

## 2023-02-21 ASSESSMENT — ACTIVITIES OF DAILY LIVING (ADL)
ADLS_ACUITY_SCORE: 35

## 2023-02-21 NOTE — UTILIZATION REVIEW
Concurrent stay review; Secondary Review Determination     Under the authority of the Utilization Management Committee, the utilization review process indicated a secondary review on Mariah Koehler.  The review outcome is based on review of the medical records, discussions with staff, and applying clinical experience noted on the date of the review.        (x) Observation Status Appropriate - Concurrent stay review    RATIONALE FOR DETERMINATION   67-year-old female admitted with nausea and vomiting and inability to tolerate p.o. diet.  Had EGD yesterday showing erythema in the mucosa of the antrum which was biopsied.  And is being discharged today.  She reports improvement and able to tolerate a diet.  Vitals are normal and no significant lab abnormalities.  History of chronic pain and her chronic pain pump was continued during the hospitalization.    Patient is clinically improving and there is no clear indication to change patient's status to inpatient. The severity of illness, intensity of service provided, expected LOS and risk for adverse outcome make the care appropriate for observation.    The information on this document is developed by the utilization review team in order for the business office to ensure compliance.  This only denotes the appropriateness of proper admission status and does not reflect the quality of care rendered.         The definitions of Inpatient Status and Observation Status used in making the determination above are those provided in the CMS Coverage Manual, Chapter 1 and Chapter 6, section 70.4.      Sincerely,   Shad Nichole MD  Utilization Review  Physician Advisor  [facility]

## 2023-02-21 NOTE — PROGRESS NOTES
"[facility] Progress Note       SUBJECTIVE:  Patient denies abdominal pain today. No BM since admission. She would like to go home.    EGD findings reviewed with patient.        OBJECTIVE:  /58 (BP Location: Left arm)   Pulse 84   Temp 98.1  F (36.7  C) (Oral)   Resp 17   Ht 1.6 m (5' 3\")   Wt 47.6 kg (105 lb)   SpO2 96%   BMI 18.60 kg/m    Temp (24hrs), Av.2  F (36.8  C), Min:98.1  F (36.7  C), Max:98.4  F (36.9  C)    Patient Vitals for the past 72 hrs:   Weight   23 1032 47.6 kg (105 lb)       Intake/Output Summary (Last 24 hours) at 2023 0953  Last data filed at 2023 0300  Gross per 24 hour   Intake 2041 ml   Output --   Net 2041 ml        PHYSICAL EXAM  GEN: NAD, thin female appears stated age sitting on edge of bed  HRT: no LE edema  RESP: unlabored  ABD: soft, nontender  SKIN: No rash or jaundice      Additional Data:  I have reviewed the patient's new clinical lab results:     Recent Labs   Lab Test 23  0839 23  0939 23  1451 19  1441   WBC 5.6 7.8 9.4   < > 8.1   HGB 12.6 13.6 16.0*   < > 13.0   MCV 88 87 86   < > 91    220 272   < > 196   INR  --   --   --   --  1.01    < > = values in this interval not displayed.     Recent Labs   Lab Test 23  0839 23  0724 23  0939   POTASSIUM 3.6 4.0 4.1   CHLORIDE 110* 107 108*   CO2 24 23 24   BUN 9 17 15   ANIONGAP 7 10 9     Recent Labs   Lab Test 23  0939 23  1005 22  1545 21  1449 20  1834 20  1544 20  1451   ALBUMIN 3.7 4.3 4.1  --    < >  --  4.4 4.3   BILITOTAL 0.6 0.4 0.3  --    < >  --  0.4 0.3   ALT 11 15 9*  --    < >  --  10 12   AST 15 16 20  --    < >  --  15 16   PROTEIN  --   --   --  20*  --  30 mg/dL*  --   --    LIPASE  --  14  --   --   --   --  <9 81*    < > = values in this interval not displayed.         Imaging results:  CT abdomen/pelvis 23:  FINDINGS:   LOWER CHEST: Moderate " hiatal hernia is more distended with gas.  HEPATOBILIARY: Cholecystectomy. No acute liver abnormality.  PANCREAS: Normal.  SPLEEN: Normal.  ADRENAL GLANDS: Normal.  KIDNEYS/BLADDER: No significant mass, stones, or hydronephrosis. There are simple or benign cysts. No follow up is needed.  BOWEL: No obstruction. There is some wall thickening of the colon involving the transverse through the descending and sigmoid colon. No abscess or free air. The appendix is not seen. No signs of appendicitis.  LYMPH NODES: Normal.  VASCULATURE: Scattered vascular calcifications.  PELVIC ORGANS: No acute abnormality identified.  MUSCULOSKELETAL: L4-L5 degenerative change.                                                                   IMPRESSION:   1.  Moderate hiatal hernia is more distended compared to 11/8/2022.  2.  Wall thickening of the mid to distal colon could be a colitis. No abscess or bowel obstruction.    Procedure results:  [place] [place]  Findings:        Esophagogastric landmarks were identified: the Z-line was found at 34        cm, the upper extent of the gastric folds was found at 34 cm and the        site of hiatal narrowing was found at 41 cm from the incisors.        The esophagus was normal.        A 7 cm hiatal hernia was present.        Striped moderately erythematous mucosa without bleeding was found in the        gastric antrum. Biopsies were taken with a cold forceps for Helicobacter        pylori testing.        The examined duodenum was normal.     Impression:            - Esophagogastric landmarks identified.                          - Normal esophagus.                          - 7 cm hiatal hernia.                          - Erythematous mucosa in the antrum. Biopsied.                          - Normal examined duodenum.   Recommendation:        - Await pathology results.                          - [facility] patient to [facility] for [facility] care.                          - Full liquid diet and  advance diet as tolerated                          - No further inpatient GI evaluation anticipated. Will                          chart check tomorrow. Call with questions.    Final Diagnosis   STOMACH, BIOPSY:  -ANTRAL FUNDIC TRANSITIONAL ZONE MUCOSA WITH MILD CHRONIC GASTRITIS  -NO INTESTINAL METAPLASIA  -NO HELICOBACTER PYLORI          IMPRESSION:  Acute gastroenteritis  Epigastric pain  N/V  Colitis on CT  66 y/o female with PMH HTN, COPD, recent cellulitis, chronic pain admitted 2/17 for symptoms of acute gastroenteritis after presenting with emesis and one loose stool. CT showed colitis but no diarrhea since admission for stool testing. She has had several months of intermittent nausea with rare vomiting and associated weight loss. EGD 2/20 with hiatal hernia and gastric erythema, path with chronic gastritis but no H. Pylori. No obvious source for symptoms. She was seen at [facility] 1/23 but prefers to follow up at [facility]. Would consider outpatient colonoscopy to follow up on colitis once patient can tolerate preparation.    PLAN:  - Okay to discharge today per GI  - Discharge on omeprazole 20 mg twice daily  - Ondansetron as needed for nausea  - Outpatient follow up at [facility] with eventual colonoscopy ([facility] will call pt to arrange)    We will no longer actively follow this patient in-house. Please call if questions arise or patient's status changes.         (Dr. Brooks)  Nani Rankin PA-C  [facility]  2/21/2023 9:53 AM  [phone] (office)    30 minutes of total time was spent providing patient care, including patient evaluation, reviewing documentation/test results, , and documentation.  ________________________________________________________________________

## 2023-02-21 NOTE — PLAN OF CARE
Goal Outcome Evaluation:      Plan of Care Reviewed With: patient    Overall Patient Progress: improvingOverall Patient Progress: improving    Outcome Evaluation: Pt vitally stable except has had elevated BP.  Given PRN hydralazine x1 with good effect.  Zofran given x1 also with good effect. Pt up to commode frequently through the noc with good output each time. Remains on LR @100ml/hr.  Tolerating solid foods, snacking on fruit and potato chips through the noc.  Given PRN ambien for sleep which was effective approximately 2hrs after administration. Pain well controlled with PRN APAP.      Problem: Pain Acute  Goal: Optimal Pain Control and Function  Outcome: Progressing  Intervention: Prevent or Manage Pain  Recent Flowsheet Documentation  Taken 2/21/2023 0000 by Daniela Arriola, RN  Medication Review/Management: medications reviewed  Taken 2/20/2023 2000 by Daniela Arriola, RN  Medication Review/Management: medications reviewed     Problem: Nausea and Vomiting  Goal: Nausea and Vomiting Relief  Outcome: Progressing

## 2023-02-21 NOTE — PROGRESS NOTES
Nurse teaching given on 2/23/21 and the patient expresses understanding and acceptance of instructions. Sophie Ansari RN 2/21/2023 1:05 PM

## 2023-02-21 NOTE — PLAN OF CARE
"  Problem: Pain Acute  Goal: Optimal Pain Control and Function  2/20/2023 1811 by Amie Cespedes RN  Outcome: Progressing  2/20/2023 1509 by Amie Cespedes RN  Outcome: Progressing  Intervention: Develop Pain Management Plan  Recent Flowsheet Documentation  Taken 2/20/2023 1730 by Amie Cespedes RN  Pain Management Interventions: food  Taken 2/20/2023 0750 by Amie Cespedes RN  Pain Management Interventions:    medication offered but refused    rest  Intervention: Prevent or Manage Pain  Recent Flowsheet Documentation  Taken 2/20/2023 1730 by Amie Cespedes RN  Medication Review/Management: medications reviewed  Taken 2/20/2023 1330 by Amie Cespedes RN  Medication Review/Management: medications reviewed  Taken 2/20/2023 0800 by Amie Cespedes RN  Medication Review/Management: medications reviewed     Problem: Nausea and Vomiting  Goal: Nausea and Vomiting Relief  2/20/2023 1811 by Amie Cespedes RN  Outcome: Progressing  2/20/2023 1509 by Amie Cespedes RN  Outcome: Progressing  2/20/2023 0825 by Amie Cespedes RN  Outcome: Progressing  Intervention: Prevent and Manage Nausea and Vomiting  Recent Flowsheet Documentation  Taken 2/20/2023 0800 by Amie Cespedes RN  Nausea/Vomiting Interventions:    antiemetic    nausea triggers minimized    sips of clear liquids given     PRIMARY DIAGNOSIS: \"GENERIC\" NURSING  OUTPATIENT/OBSERVATION GOALS TO BE MET BEFORE DISCHARGE:  1. ADLs back to baseline: Yes    2. Activity and level of assistance: Up with standby assistance.    3. Pain status: Improved-controlled with oral pain medications.    4. Return to near baseline physical activity: Yes     Discharge Planner Nurse   Safe discharge environment identified: Yes  Barriers to discharge: Yes       Entered by: Amie Cespedes RN 02/20/2023 5:30 PM     Please review provider order for any additional goals.   Nurse to notify provider when observation goals have been met and patient is ready for " discharge.      Pt is A & O x 4 & continues to deny pain at this time. CMS intact x 4. Voiding spontaneously. BS present & passing flatus. LR running at 100 ml. VSS. Advanced pt to a regular diet & pt tolerating well. Up w/ a SBA & walker. Anticipating discharge pending EGD biopysy results. Continuing to monitor.     GERALD Bland  Shift: 0700 - [phone]

## 2023-02-21 NOTE — DISCHARGE SUMMARY
[facility]  Discharge Summary - Medicine & Pediatrics       Date of Admission:  2/17/2023  Date of Discharge:  2/21/2023  Discharging Provider: Dr. Venegas   Discharge Service: Hospitalist Service    Discharge Diagnoses   Principal Problem:    Viral gastroenteritis   Active Problems:    Nausea and vomiting    Unintentional weight loss    Follow-ups Needed After Discharge   Please follow up with your PCP within 1 week of discharge for hospital follow up.     Unresulted Labs Ordered in the Past 30 Days of this Admission     No orders found from 1/18/2023 to 2/18/2023.      These results will be followed up by GI     Discharge Disposition   Discharged to home  Condition at discharge: Stable    Hospital Course   Mariah Koehler is a 67 year old female w/ PMH of unintentional weight loss, multiple prior abdominal surgeries, chronic pain syndrome with a pain pump, MDD, insomnia, HTN and treated LLE cellulitis 1/26/23 admitted on 2/17/2023 for nausea and vomiting and inability to tolerate p.o. diet concerning for viral gastroenteritis.     She presented with acute nausea with NBNB emesis without associated abdominal pain and one loose BM. Lab workup including CMP, lipase, lactic acid, and CRP were normal. CBC without leukocytosis. CT showed mid-distal colon thickening, but no abdominal pain and negative inflammatory markers make a clinically significant diverticulitis less likely.  Symptoms likely secondary to viral gastroenteritis. Prior to admission patient was going to follow-up with GI for outpatient EGD due to intentional weight loss. GI was consulted given ongoing nausea, inability to tolerate p.o. diet, CT findings and unintentional weight loss. EGD done 2/20/23 showed normal esophagus, 7cm hiatal hernia, erythematous mucosa in the antrum that was biopsied otherwise normal duodenum.  Nausea was controlled with antiemetics. Patient was hydrated with IVF. She was started on a liquid diet and  was advanced as tolerated.  Prior to discharge patient able to keep food down. Patient will follow-up with gastroenterology for outpatient colonoscopy.  Pain management was consulted secondary to pain pump.  No adjustments were made and patient was continued with PTA pain medications.    Consultations This Hospital Stay   CARE MANAGEMENT / SOCIAL WORK IP CONSULT  GASTROENTEROLOGY IP CONSULT  PAIN MANAGEMENT ADULT IP CONSULT    Code Status   Prior       The patient was discussed with Dr. Rubi Belcher MD  [address] [address]  Phone: [phone]  Fax: [phone]  ______________________________________________________________________    Physical Exam   Vital Signs: Temp: 98.1  F (36.7  C) Temp src: Oral BP: 120/58 Pulse: 84   Resp: 17 SpO2: 96 % O2 Device: None (Room air)    Weight: 105 lbs 0 oz  Constitutional: awake, alert, cooperative, no apparent distress, and appears stated age  Respiratory: No increased work of breathing, good air exchange, clear to auscultation bilaterally, no crackles or wheezing  Cardiovascular: Normal apical impulse, regular rate and rhythm, normal S1 and S2, no S3 or S4, and no murmur noted  GI: No scars, normal bowel sounds, soft, non-distended, tender at the epigastric region, no masses palpated, no hepatosplenomegally  Neurologic: Awake, alert, oriented to name, place and time.      Primary Care Physician   Alex Nolasco    Discharge Orders      Primary Care - Care Coordination Referral      Reason for your hospital stay    Viral gastroenteritis, inability tolerate PO     Activity    Your activity upon discharge: activity as tolerated     Follow-up and recommended labs and tests     Follow up with primary care provider, Alex Nolasco, within 7 days for hospital follow- up.     Please follow up with Gastroenterology regarding the EGD results. If you do not hear from them  please give them a call at [phone] (office)     Diet    Follow this diet upon discharge: Orders Placed This Encounter      Advance Diet as Tolerated: Regular Diet Adult; Regular Diet Adult       Significant Results and Procedures   Most Recent 3 CBC's:  Recent Labs   Lab Test 02/20/23  0839 02/18/23  0939 02/17/23 2023   WBC 5.6 7.8 9.4   HGB 12.6 13.6 16.0*   MCV 88 87 86    220 272     Most Recent 3 BMP's:  Recent Labs   Lab Test 02/20/23  0839 02/19/23  0724 02/18/23 0939    140 141   POTASSIUM 3.6 4.0 4.1   CHLORIDE 110* 107 108*   CO2 24 23 24   BUN 9 17 15   CR 0.60 0.63 0.64   ANIONGAP 7 10 9   ESTEFANY 8.1* 8.3* 8.8   GLC 87 62* 90   ,   Results for orders placed or performed during the hospital encounter of 02/17/23   CT Abdomen Pelvis w Contrast    Narrative    EXAM: CT ABDOMEN PELVIS W CONTRAST  LOCATION: [facility]  DATE/TIME: 2/17/2023 9:23 PM    INDICATION: History of hiatal hernia. Recurrent N/V since this am. No diarrhea.  COMPARISON: CT chest, abdomen and pelvis 11/8/2022.  TECHNIQUE: CT scan of the abdomen and pelvis was performed following injection of IV contrast. Multiplanar reformats were obtained. Dose reduction techniques were used.  CONTRAST: Isovue 370 90 mL    FINDINGS:   LOWER CHEST: Moderate hiatal hernia is more distended with gas.    HEPATOBILIARY: Cholecystectomy. No acute liver abnormality.    PANCREAS: Normal.    SPLEEN: Normal.    ADRENAL GLANDS: Normal.    KIDNEYS/BLADDER: No significant mass, stones, or hydronephrosis. There are simple or benign cysts. No follow up is needed.    BOWEL: No obstruction. There is some wall thickening of the colon involving the transverse through the descending and sigmoid colon. No abscess or free air. The appendix is not seen. No signs of appendicitis.    LYMPH NODES: Normal.    VASCULATURE: Scattered vascular calcifications.    PELVIC ORGANS: No acute abnormality identified.    MUSCULOSKELETAL: L4-L5  "degenerative change.      Impression    IMPRESSION:   1.  Moderate hiatal hernia is more distended compared to 11/8/2022.  2.  Wall thickening of the mid to distal colon could be a colitis. No abscess or bowel obstruction.   POC US Guidance Needle Placement    Narrative    Ultrasound was performed as guidance to an anesthesia procedure.  Click   \"PACS images\" hyperlink below to view any stored images.  For specific   procedure details, view procedure note authored by anesthesia.       Discharge Medications   Discharge Medication List as of 2/21/2023 11:07 AM      CONTINUE these medications which have CHANGED    Details   metoclopramide (REGLAN) 10 MG tablet Take 1 tablet (10 mg) by mouth 3 times daily as needed (Nausea) AS NEEDED FOR NAUSEA, Disp-30 tablet, R-0, E-Prescribe         CONTINUE these medications which have NOT CHANGED    Details   atenolol (TENORMIN) 25 MG tablet TAKE 1 TABLET BY MOUTH EVERY DAY, Disp-90 tablet, R-3, E-Prescribe      baclofen (LIORESAL) 10 MG tablet TAKE 2 TABLETS (20 MG TOTAL) BY MOUTH FOUR TIMES A DAY., Disp-480 tablet, R-1, E-Prescribe      diphenhydrAMINE HCl (ZZZQUIL) 50 MG/30ML LIQD Take 15-30 mLs by mouth nightly as needed, Historical      medication given by implanted intrathecal pump continuous Drug # 1: Fentanyl (Sublimaze) - Conc: 2000 mcg/mL - Total Dose / 24 hours: 873.8 mcg    Drug # 2: Bupivacaine (Marcaine)  - Conc:17.7 mg/mL - Total Dose / 24 hours: 7.733 mg    Drug # 3: Hydromorphone (Dilaudid)  - Conc: 2.1 mg/mL - Total Dos e / 24 hours: 0.9175 mg    Diluent: NS    Infusion Rate: 0.4369 mL/24 hrs  Pump Reservoir Volume: 40 mL    Bolus doses: up to 14 bolus doses/24 hours;    Each bolus: fentanyl 81.1 mcg, 0.708 mg Bupivacaine, 0.0841 mg Dilaudid    Outside Clinic & Provider : Ash Schmidt [phone]  Last Refill Date:   Next Refill Date: 03/10/2023  Low [place] Alarm Date:  / /20   Pump : [employer]    Deliver Pump Medication to:   For [place]: If " pump is within 7 days of depletion, pharmacist will enter a  'Pain Management Adult IP Consult' into the EHR, including 'IT pain pump management' as the reason for the consult., Historical      omeprazole (PRILOSEC) 20 MG DR capsule TAKE 1 CAPSULE BY MOUTH TWICE A DAY BEFORE MEALS, Disp-180 capsule, R-3, E-Prescribe      ondansetron (ZOFRAN) 4 MG tablet TAKE 1 TABLET BY MOUTH EVERY 8 HOURS AS NEEDED FOR NAUSEA, Disp-90 tablet, R-0, E-Prescribe      traZODone (DESYREL) 50 MG tablet TAKE 0.5 TABLETS (25 MG) BY MOUTH AT BEDTIME, Disp-45 tablet, R-3, E-Prescribe      zolpidem (AMBIEN) 10 MG tablet TAKE 1 TABLET BY MOUTH EVERY DAY AT BEDTIME AS NEEDED FOR SLEEP, Disp-30 tablet, R-0, E-PrescribeNot to exceed 5 additional fills before 05/16/2023 DX Code Needed  .           Allergies   Allergies   Allergen Reactions     Codeine Nausea and Vomiting     Morphine Nausea and Vomiting     Bacitracin Rash     Methadone Rash

## 2023-02-21 NOTE — PROGRESS NOTES
Care Management Discharge Note    Discharge Date: 02/21/2023       Discharge Disposition: Home    Discharge Services: none    Discharge DME: none    Discharge Transportation: family or friend will provide    Private pay costs discussed: Not applicable    PAS Confirmation Code:  NA  Patient/family educated on Medicare website which has current facility and service quality ratings:  NA    Education Provided on the Discharge Plan:  Yes  Persons Notified of Discharge Plans: pt  Patient/Family in Agreement with the Plan: yes    Handoff Referral Completed: Yes    Additional Information:  11:20 AM  Pt medically ready to discharge home, no needs.  Anticipate family transport.        ONESIMO Reilly

## 2023-02-22 ENCOUNTER — PATIENT OUTREACH (OUTPATIENT)
Dept: CARE COORDINATION | Facility: CLINIC | Age: 68
End: 2023-02-22
Payer: COMMERCIAL

## 2023-02-22 ENCOUNTER — TELEPHONE (OUTPATIENT)
Dept: CARE COORDINATION | Facility: CLINIC | Age: 68
End: 2023-02-22
Payer: COMMERCIAL

## 2023-02-22 NOTE — TELEPHONE ENCOUNTER
Spoke with Mariah for post hospital follow up see note.  Admitted 2/17-2/21 for Viral Gastroenteritis.  She currently has an appointment 3/17.  She is wondering is she needs to be seen any sooner for post hospital follow up or if she is okay to keep this appointment.    Could your team please reach out to her to let her know?

## 2023-02-22 NOTE — PROGRESS NOTES
Clinic Care Coordination Contact  [facility]: Post-Discharge Note  SITUATION                                                      Admission:    Admission Date: 02/17/23   Reason for Admission: Vomiting  Discharge:   Discharge Date: 02/21/23  Discharge Diagnosis: Viral gastroenteritis       Nausea and vomiting    Unintentional weight loss    BACKGROUND                                                      Per hospital discharge summary and inpatient provider notes:  Mariah Koehler is a 67 year old female w/ PMH of unintentional weight loss, multiple prior abdominal surgeries, chronic pain syndrome with a pain pump, MDD, insomnia, HTN and treated LLE cellulitis 1/26/23 admitted on 2/17/2023 for nausea and vomiting and inability to tolerate p.o. diet concerning for viral gastroenteritis.    ASSESSMENT           Discharge Assessment  How are you doing now that you are home?: just resting, it's going slow  How are your symptoms? (Red Flag symptoms escalate to triage hotline per guidelines): Improved  Do you feel your condition is stable enough to be safe at home until your provider visit?: Yes  Does the patient have their discharge instructions? : Yes  Does the patient have questions regarding their discharge instructions? : No  Were you started on any new medications or were there changes to any of your previous medications? : Yes  Does the patient have all of their medications?: Yes  Do you have questions regarding any of your medications? : No  Do you have all of your needed medical supplies or equipment (DME)?  (i.e. oxygen tank, CPAP, cane, etc.): Yes  Discharge follow-up appointment scheduled within 14 calendar days? : Yes (Sent note to PCP to inquire if an appointment was needed sooner)  Discharge Follow Up Appointment Date: 03/17/23  Discharge Follow Up Appointment Scheduled with?: Primary Care Provider    Post-op ([facility] Only)  If the patient had a surgery or procedure, do they have any questions for a  nurse?: No    Post-op (Clinicians Only)  Did the patient have surgery or a procedure: Yes  Drainage: No  Fever: No  Chills: No  Eating & Drinking:  (eating regular food slowly)  PO Intake: regular diet  Additional Symptoms: decreased appetite;nausea  Urinary Status: voiding without complaint/concerns        PLAN                                                      Outpatient Plan:  Note sent to PCP to inquire on follow up appointment. Gave patient the phone number for 24 nurse triage [phone] ([phone]).      Future Appointments   Date Time Provider Department Center   3/17/2023 11:20 AM [facility]         For any urgent concerns, please contact our 24 hour nurse triage line: [phone] ([phone])         Minda Carreon RN

## 2023-02-22 NOTE — PROGRESS NOTES
Clinic Care Coordination Contact  [facility]/Voicemail       Clinical Data: Care Coordinator Outreach  Outreach attempted x 1.  Left message on patient's voicemail. Gave patient the phone number for 24 nurse triage [phone] ([phone]).        Plan: Care Coordinator will try to reach patient again later today or tomorrow.    [facility] x 1

## 2023-02-23 NOTE — TELEPHONE ENCOUNTER
Please let patient know she can keep her March appointment- unless symptoms worsen- then she should be seen sooner- otherwise please keep March appointment.

## 2023-02-27 ENCOUNTER — TRANSFERRED RECORDS (OUTPATIENT)
Dept: HEALTH INFORMATION MANAGEMENT | Facility: CLINIC | Age: 68
End: 2023-02-27
Payer: COMMERCIAL

## 2023-03-11 ASSESSMENT — ENCOUNTER SYMPTOMS
JOINT SWELLING: 0
NERVOUS/ANXIOUS: 0
NAUSEA: 1
DIZZINESS: 0
ARTHRALGIAS: 0
ABDOMINAL PAIN: 0
DIARRHEA: 0
HEMATOCHEZIA: 0
BREAST MASS: 0
WEAKNESS: 0
CONSTIPATION: 0
HEADACHES: 0
DYSURIA: 0
PALPITATIONS: 0
CHILLS: 0
EYE PAIN: 0
PARESTHESIAS: 0
HEMATURIA: 0
FREQUENCY: 0
SHORTNESS OF BREATH: 0
MYALGIAS: 0
FEVER: 0
SORE THROAT: 0
HEARTBURN: 0
COUGH: 0

## 2023-03-11 ASSESSMENT — ACTIVITIES OF DAILY LIVING (ADL): CURRENT_FUNCTION: NO ASSISTANCE NEEDED

## 2023-03-17 ENCOUNTER — OFFICE VISIT (OUTPATIENT)
Dept: FAMILY MEDICINE | Facility: CLINIC | Age: 68
End: 2023-03-17
Payer: COMMERCIAL

## 2023-03-17 VITALS
WEIGHT: 112 LBS | DIASTOLIC BLOOD PRESSURE: 68 MMHG | HEART RATE: 85 BPM | HEIGHT: 62 IN | OXYGEN SATURATION: 96 % | RESPIRATION RATE: 22 BRPM | SYSTOLIC BLOOD PRESSURE: 135 MMHG | BODY MASS INDEX: 20.61 KG/M2 | TEMPERATURE: 98.1 F

## 2023-03-17 DIAGNOSIS — Z79.899 ENCOUNTER FOR LONG-TERM (CURRENT) USE OF MEDICATIONS: ICD-10-CM

## 2023-03-17 DIAGNOSIS — J44.9 CHRONIC OBSTRUCTIVE PULMONARY DISEASE, UNSPECIFIED COPD TYPE (H): ICD-10-CM

## 2023-03-17 DIAGNOSIS — Z78.0 POST-MENOPAUSAL: ICD-10-CM

## 2023-03-17 DIAGNOSIS — F32.0 CURRENT MILD EPISODE OF MAJOR DEPRESSIVE DISORDER, UNSPECIFIED WHETHER RECURRENT (H): ICD-10-CM

## 2023-03-17 DIAGNOSIS — Z00.00 HEALTH CARE MAINTENANCE: Primary | ICD-10-CM

## 2023-03-17 DIAGNOSIS — M89.9 DISORDER OF BONE, UNSPECIFIED: ICD-10-CM

## 2023-03-17 DIAGNOSIS — Z87.2 HISTORY OF CELLULITIS: ICD-10-CM

## 2023-03-17 DIAGNOSIS — F11.20 OPIOID TYPE DEPENDENCE, CONTINUOUS (H): ICD-10-CM

## 2023-03-17 DIAGNOSIS — Z12.31 ENCOUNTER FOR SCREENING MAMMOGRAM FOR BREAST CANCER: ICD-10-CM

## 2023-03-17 DIAGNOSIS — R11.0 NAUSEA: ICD-10-CM

## 2023-03-17 DIAGNOSIS — R91.8 PULMONARY NODULES: ICD-10-CM

## 2023-03-17 LAB
ALBUMIN SERPL BCG-MCNC: 4.2 G/DL (ref 3.5–5.2)
ALP SERPL-CCNC: 88 U/L (ref 35–104)
ALT SERPL W P-5'-P-CCNC: 11 U/L (ref 10–35)
ANION GAP SERPL CALCULATED.3IONS-SCNC: 10 MMOL/L (ref 7–15)
AST SERPL W P-5'-P-CCNC: 18 U/L (ref 10–35)
BILIRUB SERPL-MCNC: 0.2 MG/DL
BUN SERPL-MCNC: 11.9 MG/DL (ref 8–23)
CALCIUM SERPL-MCNC: 11.9 MG/DL (ref 8.8–10.2)
CHLORIDE SERPL-SCNC: 106 MMOL/L (ref 98–107)
CHOLEST SERPL-MCNC: 157 MG/DL
CREAT SERPL-MCNC: 0.68 MG/DL (ref 0.51–0.95)
DEPRECATED HCO3 PLAS-SCNC: 29 MMOL/L (ref 22–29)
GFR SERPL CREATININE-BSD FRML MDRD: >90 ML/MIN/1.73M2
GLUCOSE SERPL-MCNC: 77 MG/DL (ref 70–99)
HDLC SERPL-MCNC: 60 MG/DL
LDLC SERPL CALC-MCNC: 73 MG/DL
NONHDLC SERPL-MCNC: 97 MG/DL
POTASSIUM SERPL-SCNC: 3.9 MMOL/L (ref 3.4–5.3)
PROT SERPL-MCNC: 6.4 G/DL (ref 6.4–8.3)
SODIUM SERPL-SCNC: 145 MMOL/L (ref 136–145)
TRIGL SERPL-MCNC: 119 MG/DL

## 2023-03-17 PROCEDURE — 80061 LIPID PANEL: CPT | Performed by: FAMILY MEDICINE

## 2023-03-17 PROCEDURE — 36415 COLL VENOUS BLD VENIPUNCTURE: CPT | Performed by: FAMILY MEDICINE

## 2023-03-17 PROCEDURE — 99214 OFFICE O/P EST MOD 30 MIN: CPT | Mod: 25 | Performed by: FAMILY MEDICINE

## 2023-03-17 PROCEDURE — G0438 PPPS, INITIAL VISIT: HCPCS | Performed by: FAMILY MEDICINE

## 2023-03-17 PROCEDURE — 80053 COMPREHEN METABOLIC PANEL: CPT | Performed by: FAMILY MEDICINE

## 2023-03-17 PROCEDURE — 82306 VITAMIN D 25 HYDROXY: CPT | Performed by: FAMILY MEDICINE

## 2023-03-17 RX ORDER — CEPHALEXIN 500 MG/1
500 CAPSULE ORAL 3 TIMES DAILY
Qty: 21 CAPSULE | Refills: 0 | Status: SHIPPED | OUTPATIENT
Start: 2023-03-17 | End: 2023-04-04

## 2023-03-17 RX ORDER — ONDANSETRON 4 MG/1
4 TABLET, FILM COATED ORAL EVERY 8 HOURS PRN
Qty: 90 TABLET | Refills: 0 | Status: SHIPPED | OUTPATIENT
Start: 2023-03-17 | End: 2023-06-08

## 2023-03-17 ASSESSMENT — PAIN SCALES - PAIN ENJOYMENT GENERAL ACTIVITY SCALE (PEG)
INTERFERED_GENERAL_ACTIVITY: 3
PEG_TOTALSCORE: 3.33
INTERFERED_ENJOYMENT_LIFE: 3
AVG_PAIN_PASTWEEK: 4

## 2023-03-17 ASSESSMENT — ENCOUNTER SYMPTOMS
CONSTIPATION: 0
HEMATURIA: 0
MYALGIAS: 0
BREAST MASS: 0
DYSURIA: 0
SHORTNESS OF BREATH: 0
NAUSEA: 1
JOINT SWELLING: 0
DIZZINESS: 0
PARESTHESIAS: 0
NERVOUS/ANXIOUS: 0
FREQUENCY: 0
DIARRHEA: 0
CHILLS: 0
ABDOMINAL PAIN: 0
ARTHRALGIAS: 0
COUGH: 0
HEARTBURN: 0
FEVER: 0
SORE THROAT: 0
HEMATOCHEZIA: 0
PALPITATIONS: 0
EYE PAIN: 0
HEADACHES: 0
WEAKNESS: 0

## 2023-03-17 ASSESSMENT — PATIENT HEALTH QUESTIONNAIRE - PHQ9
5. POOR APPETITE OR OVEREATING: NOT AT ALL
SUM OF ALL RESPONSES TO PHQ QUESTIONS 1-9: 3

## 2023-03-17 ASSESSMENT — ANXIETY QUESTIONNAIRES
7. FEELING AFRAID AS IF SOMETHING AWFUL MIGHT HAPPEN: NOT AT ALL
6. BECOMING EASILY ANNOYED OR IRRITABLE: NOT AT ALL
1. FEELING NERVOUS, ANXIOUS, OR ON EDGE: NOT AT ALL
3. WORRYING TOO MUCH ABOUT DIFFERENT THINGS: NOT AT ALL
GAD7 TOTAL SCORE: 0
GAD7 TOTAL SCORE: 0
2. NOT BEING ABLE TO STOP OR CONTROL WORRYING: NOT AT ALL
5. BEING SO RESTLESS THAT IT IS HARD TO SIT STILL: NOT AT ALL

## 2023-03-17 ASSESSMENT — ACTIVITIES OF DAILY LIVING (ADL): CURRENT_FUNCTION: NO ASSISTANCE NEEDED

## 2023-03-17 NOTE — PROGRESS NOTES
"SUBJECTIVE:   Mariah is a 67 year old who presents for Preventive Visit.  Patient has been advised of split billing requirements and indicates understanding: Yes  Are you in the first 12 months of your Medicare coverage?  No    Healthy Habits:     In general, how would you rate your overall health?  Good    Frequency of exercise:  None    Do you usually eat at least 4 servings of fruit and vegetables a day, include whole grains    & fiber and avoid regularly eating high fat or \"junk\" foods?  No    Taking medications regularly:  Yes    Medication side effects:  None    Ability to successfully perform activities of daily living:  No assistance needed    Home Safety:  No safety concerns identified    Hearing Impairment:  No hearing concerns    In the past 6 months, have you been bothered by leaking of urine?  No    In general, how would you rate your overall mental or emotional health?  Good      PHQ-2 Total Score: 0    Additional concerns today:  Yes  Patient here for annual exam.  Couple months ago patient was treated for cellulitis of the left lower extremity few days ago started noticing similar symptoms developing on the left lower extremity currently there is no erythema or warmth in comparison to the right side however there is some trace edema she is worried about it worsening over the weekend and needing to be seen again.  We discussed with her signs and symptoms to watch out for for possible infection if these develop similar to last time we will give her printed prescription for antibiotics to start otherwise to not start the medication.  She states understanding.  Patient has gained weight since last time we saw her which is good she was having unintentional weight loss in the fall she has been following with GI with a negative work-up so far she is pending colonoscopy.  She follows with the pain clinic and has chronic opioid dependence she has a pain pump.  Patient is a history of depression currently on no " medication for depression we are monitoring.  She is interested in lab work today she is not fasting.  She has a pulmonary nodule right upper side that was recommended for follow-up with imaging.  Order CT follow-up to assure stability.  She has had some problems with low calcium levels will be checking calcium levels and vitamin D levels today she is due for bone density scan as well.  History of COPD and based on imaging not requiring inhalers at this time.  Up-to-date on immunizations.  Patient uses a combination alternating of zolpidem and trazodone for sleep but feels that it is clouding her memory.  Discussed with her that mostly medications will have this is a possible side effect.  Discussed with her risk factor of zolpidem and chronic narcotic use with her pain pump.  She has been aware of this.  We discussed the possibility of CBD Gummies to try to aid in sleep and avoiding trazodone and zolpidem.  Discussed smoking with the patient today greater than 3 minutes was spent in discussion with smoking cessation patient is not interested at this time.  She smokes a quarter to half a pack a day.  She has been smoking since the age of 13.    Have you ever done Advance Care Planning? (For example, a Health Directive, POLST, or a discussion with a medical provider or your loved ones about your wishes): No, advance care planning information given to patient to review.  Advanced care planning was discussed at today's visit.      Fall risk  Fallen 2 or more times in the past year?: No  Any fall with injury in the past year?: No    Cognitive Screening   1) Repeat 3 items (Leader, Season, Table)    2) Clock draw: NORMAL  3) 3 item recall: Recalls 2 objects   Results: NORMAL clock, 1-2 items recalled: COGNITIVE IMPAIRMENT LESS LIKELY    Mini-CogTM Copyright S [employer]. Licensed by the author for use in [facility]; reprinted with permission ([email or web address]@.[email or web address]). All rights reserved.      Do you have sleep apnea,  excessive snoring or daytime drowsiness?: yes, snoring     Reviewed and updated as needed this visit by clinical staff   Tobacco  Allergies  Meds              Reviewed and updated as needed this visit by Provider                 Social History     Tobacco Use     Smoking status: Every Day     Packs/day: 0.75     Years: 51.00     Pack years: 38.25     Types: Cigarettes     Start date: [birth year]1/1968     Smokeless tobacco: Never   Substance Use Topics     Alcohol use: Not Currently     Comment: 1 glass a year         Alcohol Use 3/11/2023   Prescreen: >3 drinks/day or >7 drinks/week? Not Applicable           Current providers sharing in care for this patient include:   Patient Care Team:  Alex Nolasco MD as PCP - General (Family Medicine)  Alex Nolasco MD as Assigned PCP  Abhi Nash MD as MD (Gastroenterology)  Abhi Nash MD as Assigned Gastroenterology Provider    The following health maintenance items are reviewed in Epic and correct as of today:  Health Maintenance   Topic Date Due     SPIROMETRY  Never done     URINE DRUG SCREEN  Never done     ANNUAL REVIEW OF HM ORDERS  Never done     ADVANCE CARE PLANNING  Never done     COPD ACTION PLAN  Never done     DEPRESSION ACTION PLAN  Never done     HEPATITIS C SCREENING  Never done     ZOSTER IMMUNIZATION (1 of 2) Never done     MAMMO SCREENING  08/30/2021     MEDICARE ANNUAL WELLNESS VISIT  04/14/2022     PHQ-9  03/30/2023     NICOTINE/TOBACCO CESSATION COUNSELING Q 1 YR  09/30/2023     LUNG CANCER SCREENING  11/08/2023     FALL RISK ASSESSMENT  03/17/2024     COLORECTAL CANCER SCREENING  10/20/2025     LIPID  04/14/2026     DTAP/TDAP/TD IMMUNIZATION (4 - Td or Tdap) 05/02/2029     DEXA  09/12/2031     INFLUENZA VACCINE  Completed     Pneumococcal Vaccine: 65+ Years  Completed     COVID-19 Vaccine  Completed     IPV IMMUNIZATION  Aged Out     MENINGITIS IMMUNIZATION  Aged Out     Lab work is in process  Labs reviewed in EPIC  Patient will look into  "shingles vaccine at the pharmacy    Breast CA Risk Assessment (FHS-7) 3/11/2023   Do you have a family history of breast, colon, or ovarian cancer? No / Unknown         Recommend mammogram yearly  Pertinent mammograms are reviewed under the imaging tab.    Review of Systems   Constitutional: Negative for chills and fever.   HENT: Negative for congestion, ear pain, hearing loss and sore throat.    Eyes: Negative for pain and visual disturbance.   Respiratory: Negative for cough and shortness of breath.    Cardiovascular: Positive for peripheral edema. Negative for chest pain and palpitations.   Gastrointestinal: Positive for nausea. Negative for abdominal pain, constipation, diarrhea, heartburn and hematochezia.   Breasts:  Negative for tenderness, breast mass and discharge.   Genitourinary: Negative for dysuria, frequency, genital sores, hematuria, pelvic pain, urgency, vaginal bleeding and vaginal discharge.   Musculoskeletal: Negative for arthralgias, joint swelling and myalgias.   Skin: Negative for rash.   Neurological: Negative for dizziness, weakness, headaches and paresthesias.   Psychiatric/Behavioral: Negative for mood changes. The patient is not nervous/anxious.      Constitutional, HEENT, cardiovascular, pulmonary, gi and gu systems are negative, except as otherwise noted.    OBJECTIVE:   Ht 1.585 m (5' 2.4\")   BMI 18.96 kg/m   Estimated body mass index is 18.96 kg/m  as calculated from the following:    Height as of this encounter: 1.585 m (5' 2.4\").    Weight as of 2/20/23: 47.6 kg (105 lb).  Physical Exam  GENERAL: alert, no distress and sitting/more forward to give comfort to her back  EYES: Eyes grossly normal to inspection, PERRL and conjunctivae and sclerae normal  RESP: lungs clear to auscultation - no rales, rhonchi or wheezes  CV: regular rate and rhythm, normal S1 S2, no S3 or S4, no murmur, click or rub, no peripheral edema and peripheral pulses strong  ABDOMEN: bowel sounds normal  MS: no " gross musculoskeletal defects noted, no edema  NEURO: Normal strength and tone, mentation intact and speech normal  PSYCH: mentation appears normal, affect normal/bright    Diagnostic Test Results:  Labs reviewed in Epic    ASSESSMENT / PLAN:   Mariah was seen today for wellness visit, insomnia and edema.    Diagnoses and all orders for this visit:    Health care maintenance  -     Comprehensive metabolic panel (BMP + Alb, Alk Phos, ALT, AST, Total. Bili, TP); Future  -     Lipid Profile (Chol, Trig, HDL, LDL calc); Future  -     Comprehensive metabolic panel (BMP + Alb, Alk Phos, ALT, AST, Total. Bili, TP)  -     Lipid Profile (Chol, Trig, HDL, LDL calc)  Obtain blood work today and follow-up based on results  Nausea  -     ondansetron (ZOFRAN) 4 MG tablet; Take 1 tablet (4 mg) by mouth every 8 hours as needed for nausea  Patient uses Zofran for nausea we will send refills to the pharmacy  Encounter for long-term (current) use of medications  Patient following with pain clinic is on a pain pump  Chronic obstructive pulmonary disease, unspecified COPD type (H)  Imaging based diagnosis COPD patient not requiring inhaler use at this time  Opioid type dependence, continuous (H)  Patient following with pain clinic  Current mild episode of major depressive disorder, unspecified whether recurrent (H)  Patient with history of depression on no current medications for depression we will monitor at this time  Pulmonary nodules  -     CT Chest w/o Contrast; Future  Nodule/ground glass opacity noted right upper lobe recommended follow-up we will place order for CT with her history of smoking will be good to assess stability  History of cellulitis  -     cephALEXin (KEFLEX) 500 MG capsule; Take 1 capsule (500 mg) by mouth 3 times daily  History of cellulitis couple months ago patient started noticing the last few days increased swelling left lower extremity did not note any erythema warmth difference between the legs nor any kind  of erythema  Discussed with her symptoms are worsening or changing similar to a couple months ago we will give her printed out prescription for antibiotics  Post-menopausal  -     Vitamin D Deficiency; Future  -     DX Hip/Pelvis/Spine; Future  -     Vitamin D Deficiency  Due to being postmenopausal is recommended for regular DEXA scans  Patient has low calcium levels we will check calcium and vitamin D levels today  Disorder of bone, unspecified  -     Vitamin D Deficiency; Future  -     Vitamin D Deficiency  With low calcium levels we will be assessing vitamin D level as well to make sure that there is proper building blocks for bone density  Encounter for screening mammogram for breast cancer  -     MA Screening Digital Bilateral; Future  Recommend screening for breast cancer with mammogram      Patient has been advised of split billing requirements and indicates understanding: Yes      COUNSELING:  Reviewed preventive health counseling, as reflected in patient instructions       Regular exercise       Healthy diet/nutrition        She reports that she has been smoking cigarettes. She started smoking about 55 years ago. She has a 38.25 pack-year smoking history. She has never used smokeless tobacco.  Nicotine/Tobacco Cessation Plan:   Self help information given to patient      Appropriate preventive services were discussed with this patient, including applicable screening as appropriate for cardiovascular disease, diabetes, osteopenia/osteoporosis, and glaucoma.  As appropriate for age/gender, discussed screening for colorectal cancer, prostate cancer, breast cancer, and cervical cancer. Checklist reviewing preventive services available has been given to the patient.    Reviewed patients plan of care and provided an AVS. The Basic Care Plan (routine screening as documented in Health Maintenance) for Mariah meets the Care Plan requirement. This Care Plan has been established and reviewed with the  Patient.      Alex Nolasco MD  [facility]    Identified Health Risks:

## 2023-03-19 DIAGNOSIS — M62.838 MUSCLE SPASM: ICD-10-CM

## 2023-03-19 DIAGNOSIS — G89.4 CHRONIC PAIN SYNDROME: ICD-10-CM

## 2023-03-20 LAB — DEPRECATED CALCIDIOL+CALCIFEROL SERPL-MC: 14 UG/L (ref 20–75)

## 2023-03-20 RX ORDER — BACLOFEN 10 MG/1
TABLET ORAL
Qty: 480 TABLET | Refills: 1 | Status: SHIPPED | OUTPATIENT
Start: 2023-03-20 | End: 2023-06-19

## 2023-03-20 NOTE — TELEPHONE ENCOUNTER
Routing refill request to provider for review/approval because:  Drug not on the [facility] refill protocol     Last Written Prescription Date:  11/17/2022  Last Fill Quantity: 480,  # refills: 1   Last office visit provider:  3/17/2023     Requested Prescriptions   Pending Prescriptions Disp Refills     baclofen (LIORESAL) 10 MG tablet [Pharmacy Med Name: BACLOFEN 10 MG TABLET] 480 tablet 1     Sig: TAKE 2 TABLETS (20 MG TOTAL) BY MOUTH FOUR TIMES A DAY.       There is no refill protocol information for this order          Ameena Sands RN 03/19/23 10:03 PM

## 2023-03-24 ENCOUNTER — HOSPITAL ENCOUNTER (OUTPATIENT)
Dept: BONE DENSITY | Facility: HOSPITAL | Age: 68
Discharge: HOME | End: 2023-03-24
Attending: FAMILY MEDICINE
Payer: COMMERCIAL

## 2023-03-24 ENCOUNTER — HOSPITAL ENCOUNTER (OUTPATIENT)
Dept: CT IMAGING | Facility: HOSPITAL | Age: 68
Discharge: HOME | End: 2023-03-24
Attending: FAMILY MEDICINE
Payer: COMMERCIAL

## 2023-03-24 ENCOUNTER — ANCILLARY PROCEDURE (OUTPATIENT)
Dept: MAMMOGRAPHY | Facility: HOSPITAL | Age: 68
End: 2023-03-24
Attending: FAMILY MEDICINE
Payer: COMMERCIAL

## 2023-03-24 DIAGNOSIS — Z78.0 POST-MENOPAUSAL: ICD-10-CM

## 2023-03-24 DIAGNOSIS — Z12.31 ENCOUNTER FOR SCREENING MAMMOGRAM FOR BREAST CANCER: ICD-10-CM

## 2023-03-24 DIAGNOSIS — R91.8 PULMONARY NODULES: ICD-10-CM

## 2023-03-24 PROCEDURE — 77067 SCR MAMMO BI INCL CAD: CPT

## 2023-03-24 PROCEDURE — 77080 DXA BONE DENSITY AXIAL: CPT

## 2023-03-24 PROCEDURE — 71250 CT THORAX DX C-: CPT

## 2023-03-28 ENCOUNTER — TELEPHONE (OUTPATIENT)
Dept: FAMILY MEDICINE | Facility: CLINIC | Age: 68
End: 2023-03-28
Payer: COMMERCIAL

## 2023-03-28 NOTE — TELEPHONE ENCOUNTER
----- Message from Alex Nolasco MD sent at 3/27/2023 12:56 PM CDT -----  Please inform patient that the CT of the chest showed a decrease in size of the nodule on the right side however 2 new nodules have appeared-it is recommended due to their size to repeat imaging in 3 months.  If patient would like to follow with a pulmonary nodule specialty clinic please let me know and I will place referral for her.

## 2023-03-28 NOTE — TELEPHONE ENCOUNTER
Left message to call back for: Results  Information to relay to patient: LMTCB, Please see message below from provider.     Message 1 of 2

## 2023-03-28 NOTE — TELEPHONE ENCOUNTER
Left message to call back for: Results  Information to relay to patient: LMTCB, Please see message below from provider.    Message 2 of 2

## 2023-03-28 NOTE — TELEPHONE ENCOUNTER
----- Message from Alex Nolasco MD sent at 3/27/2023 12:59 PM CDT -----  Please inform patient that bone density scan is showing osteoporosis-indicating increased risk of fracture at multiple sites if she were to fall.  I would recommend a daily calcium and vitamin D supplement and regular weightbearing exercise such as walking to try to improve bone density.  If she is already compliant with those measures the neck step would be to start a medication to try to help the body increase the density of the bones which would be oral Fosamax.  Please let me know what the patient would like to proceed with.

## 2023-03-30 NOTE — TELEPHONE ENCOUNTER
Patient states that she has started take a calcium and vit D. She will see if that helps at the next scan.

## 2023-04-03 LAB
ALBUMIN SERPL BCG-MCNC: 4.6 G/DL (ref 3.5–5.2)
ALP SERPL-CCNC: 99 U/L (ref 35–104)
ALT SERPL W P-5'-P-CCNC: 11 U/L (ref 10–35)
ANION GAP SERPL CALCULATED.3IONS-SCNC: 16 MMOL/L (ref 7–15)
AST SERPL W P-5'-P-CCNC: 24 U/L (ref 10–35)
BASOPHILS # BLD AUTO: 0 10E3/UL (ref 0–0.2)
BASOPHILS NFR BLD AUTO: 0 %
BILIRUB SERPL-MCNC: 0.5 MG/DL
BUN SERPL-MCNC: 13.8 MG/DL (ref 8–23)
CALCIUM SERPL-MCNC: 9.6 MG/DL (ref 8.8–10.2)
CHLORIDE SERPL-SCNC: 100 MMOL/L (ref 98–107)
CREAT SERPL-MCNC: 0.5 MG/DL (ref 0.51–0.95)
DEPRECATED HCO3 PLAS-SCNC: 24 MMOL/L (ref 22–29)
EOSINOPHIL # BLD AUTO: 0 10E3/UL (ref 0–0.7)
EOSINOPHIL NFR BLD AUTO: 0 %
ERYTHROCYTE [DISTWIDTH] IN BLOOD BY AUTOMATED COUNT: 13.4 % (ref 10–15)
GFR SERPL CREATININE-BSD FRML MDRD: >90 ML/MIN/1.73M2
GLUCOSE SERPL-MCNC: 123 MG/DL (ref 70–99)
HCT VFR BLD AUTO: 47.8 % (ref 35–47)
HGB BLD-MCNC: 16.2 G/DL (ref 11.7–15.7)
IMM GRANULOCYTES # BLD: 0 10E3/UL
IMM GRANULOCYTES NFR BLD: 0 %
LIPASE SERPL-CCNC: 11 U/L (ref 13–60)
LYMPHOCYTES # BLD AUTO: 0.9 10E3/UL (ref 0.8–5.3)
LYMPHOCYTES NFR BLD AUTO: 13 %
MCH RBC QN AUTO: 28.9 PG (ref 26.5–33)
MCHC RBC AUTO-ENTMCNC: 33.9 G/DL (ref 31.5–36.5)
MCV RBC AUTO: 85 FL (ref 78–100)
MONOCYTES # BLD AUTO: 0.3 10E3/UL (ref 0–1.3)
MONOCYTES NFR BLD AUTO: 5 %
NEUTROPHILS # BLD AUTO: 5.6 10E3/UL (ref 1.6–8.3)
NEUTROPHILS NFR BLD AUTO: 82 %
NRBC # BLD AUTO: 0 10E3/UL
NRBC BLD AUTO-RTO: 0 /100
PLATELET # BLD AUTO: 228 10E3/UL (ref 150–450)
POTASSIUM SERPL-SCNC: 3.7 MMOL/L (ref 3.4–5.3)
PROT SERPL-MCNC: 7.3 G/DL (ref 6.4–8.3)
RBC # BLD AUTO: 5.6 10E6/UL (ref 3.8–5.2)
SODIUM SERPL-SCNC: 140 MMOL/L (ref 136–145)
WBC # BLD AUTO: 6.8 10E3/UL (ref 4–11)

## 2023-04-03 PROCEDURE — 96375 TX/PRO/DX INJ NEW DRUG ADDON: CPT

## 2023-04-03 PROCEDURE — 84145 PROCALCITONIN (PCT): CPT | Performed by: HOSPITALIST

## 2023-04-03 PROCEDURE — 96361 HYDRATE IV INFUSION ADD-ON: CPT

## 2023-04-03 PROCEDURE — 250N000011 HC RX IP 250 OP 636: Performed by: EMERGENCY MEDICINE

## 2023-04-03 PROCEDURE — 86140 C-REACTIVE PROTEIN: CPT | Performed by: HOSPITALIST

## 2023-04-03 PROCEDURE — 99285 EMERGENCY DEPT VISIT HI MDM: CPT | Mod: 25

## 2023-04-03 PROCEDURE — 80053 COMPREHEN METABOLIC PANEL: CPT | Performed by: EMERGENCY MEDICINE

## 2023-04-03 PROCEDURE — 96374 THER/PROPH/DIAG INJ IV PUSH: CPT

## 2023-04-03 PROCEDURE — 258N000003 HC RX IP 258 OP 636: Performed by: EMERGENCY MEDICINE

## 2023-04-03 PROCEDURE — 85025 COMPLETE CBC W/AUTO DIFF WBC: CPT | Performed by: EMERGENCY MEDICINE

## 2023-04-03 PROCEDURE — 36415 COLL VENOUS BLD VENIPUNCTURE: CPT | Performed by: EMERGENCY MEDICINE

## 2023-04-03 PROCEDURE — 83690 ASSAY OF LIPASE: CPT | Performed by: EMERGENCY MEDICINE

## 2023-04-03 RX ORDER — ONDANSETRON 2 MG/ML
8 INJECTION INTRAMUSCULAR; INTRAVENOUS ONCE
Status: COMPLETED | OUTPATIENT
Start: 2023-04-03 | End: 2023-04-03

## 2023-04-03 RX ADMIN — SODIUM CHLORIDE 1000 ML: 9 INJECTION, SOLUTION INTRAVENOUS at 23:07

## 2023-04-03 RX ADMIN — ONDANSETRON 8 MG: 2 INJECTION INTRAMUSCULAR; INTRAVENOUS at 23:07

## 2023-04-04 ENCOUNTER — APPOINTMENT (OUTPATIENT)
Dept: OCCUPATIONAL THERAPY | Facility: HOSPITAL | Age: 68
End: 2023-04-04
Attending: HOSPITALIST
Payer: COMMERCIAL

## 2023-04-04 ENCOUNTER — HOSPITAL ENCOUNTER (OUTPATIENT)
Facility: HOSPITAL | Age: 68
Setting detail: OBSERVATION
Discharge: HOME | End: 2023-04-05
Attending: EMERGENCY MEDICINE | Admitting: INTERNAL MEDICINE
Payer: COMMERCIAL

## 2023-04-04 ENCOUNTER — APPOINTMENT (OUTPATIENT)
Dept: CT IMAGING | Facility: HOSPITAL | Age: 68
End: 2023-04-04
Attending: EMERGENCY MEDICINE
Payer: COMMERCIAL

## 2023-04-04 ENCOUNTER — APPOINTMENT (OUTPATIENT)
Dept: PHYSICAL THERAPY | Facility: HOSPITAL | Age: 68
End: 2023-04-04
Attending: HOSPITALIST
Payer: COMMERCIAL

## 2023-04-04 DIAGNOSIS — M54.50 CHRONIC MIDLINE LOW BACK PAIN WITHOUT SCIATICA: ICD-10-CM

## 2023-04-04 DIAGNOSIS — R11.2 INTRACTABLE NAUSEA AND VOMITING: ICD-10-CM

## 2023-04-04 DIAGNOSIS — G89.29 CHRONIC MIDLINE LOW BACK PAIN WITHOUT SCIATICA: ICD-10-CM

## 2023-04-04 DIAGNOSIS — G44.89 OTHER HEADACHE SYNDROME: Primary | ICD-10-CM

## 2023-04-04 DIAGNOSIS — K52.9 COLITIS: ICD-10-CM

## 2023-04-04 PROBLEM — Z97.8 PRESENCE OF INTRATHECAL BACLOFEN PUMP: Status: ACTIVE | Noted: 2023-04-04

## 2023-04-04 PROBLEM — R82.4 KETONURIA: Status: ACTIVE | Noted: 2023-04-04

## 2023-04-04 LAB
ALBUMIN UR-MCNC: 50 MG/DL
ANION GAP SERPL CALCULATED.3IONS-SCNC: 10 MMOL/L (ref 7–15)
APPEARANCE UR: CLEAR
BACTERIA #/AREA URNS HPF: ABNORMAL /HPF
BASOPHILS # BLD AUTO: 0 10E3/UL (ref 0–0.2)
BASOPHILS NFR BLD AUTO: 0 %
BILIRUB UR QL STRIP: NEGATIVE
BUN SERPL-MCNC: 12.5 MG/DL (ref 8–23)
CALCIUM SERPL-MCNC: 8.4 MG/DL (ref 8.8–10.2)
CHLORIDE SERPL-SCNC: 102 MMOL/L (ref 98–107)
COLOR UR AUTO: ABNORMAL
CREAT SERPL-MCNC: 0.55 MG/DL (ref 0.51–0.95)
CRP SERPL-MCNC: <3 MG/L
CRP SERPL-MCNC: <3 MG/L
DEPRECATED HCO3 PLAS-SCNC: 25 MMOL/L (ref 22–29)
EOSINOPHIL # BLD AUTO: 0 10E3/UL (ref 0–0.7)
EOSINOPHIL NFR BLD AUTO: 0 %
ERYTHROCYTE [DISTWIDTH] IN BLOOD BY AUTOMATED COUNT: 13.6 % (ref 10–15)
GFR SERPL CREATININE-BSD FRML MDRD: >90 ML/MIN/1.73M2
GLUCOSE SERPL-MCNC: 96 MG/DL (ref 70–99)
GLUCOSE UR STRIP-MCNC: NEGATIVE MG/DL
HCT VFR BLD AUTO: 40.5 % (ref 35–47)
HGB BLD-MCNC: 13.2 G/DL (ref 11.7–15.7)
HGB UR QL STRIP: ABNORMAL
HYALINE CASTS: 3 /LPF
IMM GRANULOCYTES # BLD: 0 10E3/UL
IMM GRANULOCYTES NFR BLD: 0 %
KETONES UR STRIP-MCNC: 60 MG/DL
LEUKOCYTE ESTERASE UR QL STRIP: NEGATIVE
LYMPHOCYTES # BLD AUTO: 1.8 10E3/UL (ref 0.8–5.3)
LYMPHOCYTES NFR BLD AUTO: 22 %
MCH RBC QN AUTO: 28.6 PG (ref 26.5–33)
MCHC RBC AUTO-ENTMCNC: 32.6 G/DL (ref 31.5–36.5)
MCV RBC AUTO: 88 FL (ref 78–100)
MONOCYTES # BLD AUTO: 0.8 10E3/UL (ref 0–1.3)
MONOCYTES NFR BLD AUTO: 10 %
MUCOUS THREADS #/AREA URNS LPF: PRESENT /LPF
NEUTROPHILS # BLD AUTO: 5.6 10E3/UL (ref 1.6–8.3)
NEUTROPHILS NFR BLD AUTO: 68 %
NITRATE UR QL: NEGATIVE
NRBC # BLD AUTO: 0 10E3/UL
NRBC BLD AUTO-RTO: 0 /100
PH UR STRIP: 6.5 [PH] (ref 5–7)
PLATELET # BLD AUTO: 185 10E3/UL (ref 150–450)
POTASSIUM SERPL-SCNC: 3.9 MMOL/L (ref 3.4–5.3)
PROCALCITONIN SERPL IA-MCNC: 0.02 NG/ML
RBC # BLD AUTO: 4.62 10E6/UL (ref 3.8–5.2)
RBC URINE: 38 /HPF
SODIUM SERPL-SCNC: 137 MMOL/L (ref 136–145)
SP GR UR STRIP: 1.02 (ref 1–1.03)
SQUAMOUS EPITHELIAL: <1 /HPF
UROBILINOGEN UR STRIP-MCNC: <2 MG/DL
WBC # BLD AUTO: 8.3 10E3/UL (ref 4–11)
WBC URINE: 2 /HPF

## 2023-04-04 PROCEDURE — 96376 TX/PRO/DX INJ SAME DRUG ADON: CPT

## 2023-04-04 PROCEDURE — C9113 INJ PANTOPRAZOLE SODIUM, VIA: HCPCS | Performed by: HOSPITALIST

## 2023-04-04 PROCEDURE — 36415 COLL VENOUS BLD VENIPUNCTURE: CPT | Performed by: HOSPITALIST

## 2023-04-04 PROCEDURE — 96365 THER/PROPH/DIAG IV INF INIT: CPT

## 2023-04-04 PROCEDURE — 250N000011 HC RX IP 250 OP 636: Performed by: HOSPITALIST

## 2023-04-04 PROCEDURE — 96361 HYDRATE IV INFUSION ADD-ON: CPT

## 2023-04-04 PROCEDURE — 97116 GAIT TRAINING THERAPY: CPT | Mod: GP

## 2023-04-04 PROCEDURE — 99223 1ST HOSP IP/OBS HIGH 75: CPT | Mod: AI | Performed by: HOSPITALIST

## 2023-04-04 PROCEDURE — 80048 BASIC METABOLIC PNL TOTAL CA: CPT | Performed by: HOSPITALIST

## 2023-04-04 PROCEDURE — 74177 CT ABD & PELVIS W/CONTRAST: CPT

## 2023-04-04 PROCEDURE — 85018 HEMOGLOBIN: CPT | Performed by: HOSPITALIST

## 2023-04-04 PROCEDURE — 87040 BLOOD CULTURE FOR BACTERIA: CPT | Performed by: EMERGENCY MEDICINE

## 2023-04-04 PROCEDURE — 36415 COLL VENOUS BLD VENIPUNCTURE: CPT | Performed by: EMERGENCY MEDICINE

## 2023-04-04 PROCEDURE — 86140 C-REACTIVE PROTEIN: CPT | Performed by: HOSPITALIST

## 2023-04-04 PROCEDURE — 250N000013 HC RX MED GY IP 250 OP 250 PS 637: Performed by: HOSPITALIST

## 2023-04-04 PROCEDURE — G0378 HOSPITAL OBSERVATION PER HR: HCPCS

## 2023-04-04 PROCEDURE — 97166 OT EVAL MOD COMPLEX 45 MIN: CPT | Mod: GO

## 2023-04-04 PROCEDURE — 250N000011 HC RX IP 250 OP 636: Performed by: INTERNAL MEDICINE

## 2023-04-04 PROCEDURE — 250N000011 HC RX IP 250 OP 636: Performed by: EMERGENCY MEDICINE

## 2023-04-04 PROCEDURE — 96375 TX/PRO/DX INJ NEW DRUG ADDON: CPT

## 2023-04-04 PROCEDURE — 96367 TX/PROPH/DG ADDL SEQ IV INF: CPT

## 2023-04-04 PROCEDURE — 250N000013 HC RX MED GY IP 250 OP 250 PS 637: Performed by: EMERGENCY MEDICINE

## 2023-04-04 PROCEDURE — 258N000003 HC RX IP 258 OP 636: Performed by: INTERNAL MEDICINE

## 2023-04-04 PROCEDURE — 81001 URINALYSIS AUTO W/SCOPE: CPT | Performed by: EMERGENCY MEDICINE

## 2023-04-04 PROCEDURE — 97162 PT EVAL MOD COMPLEX 30 MIN: CPT | Mod: GP

## 2023-04-04 PROCEDURE — 97535 SELF CARE MNGMENT TRAINING: CPT | Mod: GO

## 2023-04-04 PROCEDURE — 96366 THER/PROPH/DIAG IV INF ADDON: CPT

## 2023-04-04 RX ORDER — METOCLOPRAMIDE HYDROCHLORIDE 5 MG/ML
5 INJECTION INTRAMUSCULAR; INTRAVENOUS EVERY 6 HOURS PRN
Status: DISCONTINUED | OUTPATIENT
Start: 2023-04-04 | End: 2023-04-05 | Stop reason: HOSPADM

## 2023-04-04 RX ORDER — METOCLOPRAMIDE HYDROCHLORIDE 5 MG/ML
10 INJECTION INTRAMUSCULAR; INTRAVENOUS ONCE
Status: COMPLETED | OUTPATIENT
Start: 2023-04-04 | End: 2023-04-04

## 2023-04-04 RX ORDER — HYDROMORPHONE HCL IN WATER/PF 6 MG/30 ML
0.2 PATIENT CONTROLLED ANALGESIA SYRINGE INTRAVENOUS
Status: DISCONTINUED | OUTPATIENT
Start: 2023-04-04 | End: 2023-04-05 | Stop reason: HOSPADM

## 2023-04-04 RX ORDER — IOPAMIDOL 755 MG/ML
67 INJECTION, SOLUTION INTRAVASCULAR ONCE
Status: COMPLETED | OUTPATIENT
Start: 2023-04-04 | End: 2023-04-04

## 2023-04-04 RX ORDER — METRONIDAZOLE 500 MG/100ML
500 INJECTION, SOLUTION INTRAVENOUS EVERY 12 HOURS
Status: COMPLETED | OUTPATIENT
Start: 2023-04-04 | End: 2023-04-05

## 2023-04-04 RX ORDER — SODIUM CHLORIDE AND POTASSIUM CHLORIDE 150; 900 MG/100ML; MG/100ML
INJECTION, SOLUTION INTRAVENOUS CONTINUOUS
Status: DISCONTINUED | OUTPATIENT
Start: 2023-04-04 | End: 2023-04-04

## 2023-04-04 RX ORDER — METRONIDAZOLE 500 MG/1
500 TABLET ORAL ONCE
Status: DISCONTINUED | OUTPATIENT
Start: 2023-04-04 | End: 2023-04-04

## 2023-04-04 RX ORDER — METRONIDAZOLE 500 MG/100ML
500 INJECTION, SOLUTION INTRAVENOUS EVERY 8 HOURS
Status: DISCONTINUED | OUTPATIENT
Start: 2023-04-04 | End: 2023-04-04

## 2023-04-04 RX ORDER — ACETAMINOPHEN 325 MG/1
975 TABLET ORAL EVERY 8 HOURS
Status: DISCONTINUED | OUTPATIENT
Start: 2023-04-04 | End: 2023-04-05 | Stop reason: HOSPADM

## 2023-04-04 RX ORDER — ATENOLOL 25 MG/1
25 TABLET ORAL DAILY
Status: DISCONTINUED | OUTPATIENT
Start: 2023-04-04 | End: 2023-04-05 | Stop reason: HOSPADM

## 2023-04-04 RX ORDER — METRONIDAZOLE 500 MG/100ML
500 INJECTION, SOLUTION INTRAVENOUS ONCE
Status: COMPLETED | OUTPATIENT
Start: 2023-04-04 | End: 2023-04-04

## 2023-04-04 RX ORDER — CIPROFLOXACIN 2 MG/ML
400 INJECTION, SOLUTION INTRAVENOUS ONCE
Status: DISCONTINUED | OUTPATIENT
Start: 2023-04-04 | End: 2023-04-04

## 2023-04-04 RX ORDER — PANTOPRAZOLE SODIUM 40 MG/1
40 TABLET, DELAYED RELEASE ORAL
Status: DISCONTINUED | OUTPATIENT
Start: 2023-04-04 | End: 2023-04-05 | Stop reason: HOSPADM

## 2023-04-04 RX ORDER — ONDANSETRON 2 MG/ML
4 INJECTION INTRAMUSCULAR; INTRAVENOUS EVERY 6 HOURS PRN
Status: DISCONTINUED | OUTPATIENT
Start: 2023-04-04 | End: 2023-04-05 | Stop reason: HOSPADM

## 2023-04-04 RX ORDER — CIPROFLOXACIN 2 MG/ML
400 INJECTION, SOLUTION INTRAVENOUS EVERY 12 HOURS
Status: COMPLETED | OUTPATIENT
Start: 2023-04-04 | End: 2023-04-05

## 2023-04-04 RX ORDER — HYDRALAZINE HYDROCHLORIDE 20 MG/ML
10 INJECTION INTRAMUSCULAR; INTRAVENOUS EVERY 6 HOURS PRN
Status: DISCONTINUED | OUTPATIENT
Start: 2023-04-04 | End: 2023-04-05 | Stop reason: HOSPADM

## 2023-04-04 RX ORDER — CIPROFLOXACIN 500 MG/1
500 TABLET, FILM COATED ORAL ONCE
Status: DISCONTINUED | OUTPATIENT
Start: 2023-04-04 | End: 2023-04-04

## 2023-04-04 RX ORDER — ONDANSETRON 4 MG/1
4 TABLET, ORALLY DISINTEGRATING ORAL EVERY 6 HOURS PRN
Status: DISCONTINUED | OUTPATIENT
Start: 2023-04-04 | End: 2023-04-05 | Stop reason: HOSPADM

## 2023-04-04 RX ORDER — ATENOLOL 25 MG/1
25 TABLET ORAL ONCE
Status: COMPLETED | OUTPATIENT
Start: 2023-04-04 | End: 2023-04-04

## 2023-04-04 RX ADMIN — ATENOLOL 25 MG: 25 TABLET ORAL at 01:37

## 2023-04-04 RX ADMIN — ACETAMINOPHEN 975 MG: 325 TABLET ORAL at 21:26

## 2023-04-04 RX ADMIN — ACETAMINOPHEN 975 MG: 325 TABLET ORAL at 13:00

## 2023-04-04 RX ADMIN — POTASSIUM CHLORIDE AND SODIUM CHLORIDE: 900; 150 INJECTION, SOLUTION INTRAVENOUS at 12:58

## 2023-04-04 RX ADMIN — PANTOPRAZOLE SODIUM 40 MG: 40 INJECTION, POWDER, FOR SOLUTION INTRAVENOUS at 06:51

## 2023-04-04 RX ADMIN — ATENOLOL 25 MG: 25 TABLET ORAL at 12:54

## 2023-04-04 RX ADMIN — METRONIDAZOLE 500 MG: 500 INJECTION, SOLUTION INTRAVENOUS at 02:38

## 2023-04-04 RX ADMIN — METOCLOPRAMIDE 5 MG: 5 INJECTION, SOLUTION INTRAMUSCULAR; INTRAVENOUS at 06:51

## 2023-04-04 RX ADMIN — HYDROMORPHONE HYDROCHLORIDE 0.2 MG: 0.2 INJECTION, SOLUTION INTRAMUSCULAR; INTRAVENOUS; SUBCUTANEOUS at 03:27

## 2023-04-04 RX ADMIN — METOCLOPRAMIDE 10 MG: 5 INJECTION, SOLUTION INTRAMUSCULAR; INTRAVENOUS at 01:36

## 2023-04-04 RX ADMIN — ONDANSETRON 4 MG: 2 INJECTION INTRAMUSCULAR; INTRAVENOUS at 03:07

## 2023-04-04 RX ADMIN — HYDROMORPHONE HYDROCHLORIDE 0.2 MG: 0.2 INJECTION, SOLUTION INTRAMUSCULAR; INTRAVENOUS; SUBCUTANEOUS at 20:38

## 2023-04-04 RX ADMIN — HYDROMORPHONE HYDROCHLORIDE 0.2 MG: 0.2 INJECTION, SOLUTION INTRAMUSCULAR; INTRAVENOUS; SUBCUTANEOUS at 08:11

## 2023-04-04 RX ADMIN — IOPAMIDOL 67 ML: 755 INJECTION, SOLUTION INTRAVENOUS at 00:21

## 2023-04-04 RX ADMIN — POTASSIUM CHLORIDE AND SODIUM CHLORIDE: 900; 150 INJECTION, SOLUTION INTRAVENOUS at 02:28

## 2023-04-04 RX ADMIN — HYDROMORPHONE HYDROCHLORIDE 0.2 MG: 0.2 INJECTION, SOLUTION INTRAMUSCULAR; INTRAVENOUS; SUBCUTANEOUS at 06:06

## 2023-04-04 RX ADMIN — METRONIDAZOLE 500 MG: 500 INJECTION, SOLUTION INTRAVENOUS at 12:55

## 2023-04-04 RX ADMIN — CIPROFLOXACIN 400 MG: 2 INJECTION, SOLUTION INTRAVENOUS at 01:38

## 2023-04-04 RX ADMIN — TRAZODONE HYDROCHLORIDE 25 MG: 50 TABLET ORAL at 03:23

## 2023-04-04 RX ADMIN — PROCHLORPERAZINE EDISYLATE 5 MG: 5 INJECTION INTRAMUSCULAR; INTRAVENOUS at 05:15

## 2023-04-04 RX ADMIN — TRAZODONE HYDROCHLORIDE 25 MG: 50 TABLET ORAL at 21:26

## 2023-04-04 RX ADMIN — PROCHLORPERAZINE EDISYLATE 5 MG: 5 INJECTION INTRAMUSCULAR; INTRAVENOUS at 19:22

## 2023-04-04 RX ADMIN — CIPROFLOXACIN 400 MG: 2 INJECTION, SOLUTION INTRAVENOUS at 14:36

## 2023-04-04 RX ADMIN — ONDANSETRON 4 MG: 2 INJECTION INTRAMUSCULAR; INTRAVENOUS at 17:56

## 2023-04-04 RX ADMIN — PROMETHAZINE HYDROCHLORIDE 25 MG: 25 INJECTION INTRAMUSCULAR; INTRAVENOUS at 10:05

## 2023-04-04 RX ADMIN — HYDROMORPHONE HYDROCHLORIDE 0.2 MG: 0.2 INJECTION, SOLUTION INTRAMUSCULAR; INTRAVENOUS; SUBCUTANEOUS at 17:56

## 2023-04-04 RX ADMIN — METOCLOPRAMIDE 5 MG: 5 INJECTION, SOLUTION INTRAMUSCULAR; INTRAVENOUS at 21:32

## 2023-04-04 ASSESSMENT — ACTIVITIES OF DAILY LIVING (ADL)
DIFFICULTY_COMMUNICATING: NO
FALL_HISTORY_WITHIN_LAST_SIX_MONTHS: NO
ADLS_ACUITY_SCORE: 42
WALKING_OR_CLIMBING_STAIRS: AMBULATION DIFFICULTY, REQUIRES EQUIPMENT
EQUIPMENT_CURRENTLY_USED_AT_HOME: WALKER, ROLLING
TOILETING_ISSUES: NO
DOING_ERRANDS_INDEPENDENTLY_DIFFICULTY: NO
TRANSFERRING: 0-->ASSISTANCE NEEDED (DEVELOPMENTALLY APPROPRIATE)
ADLS_ACUITY_SCORE: 37
CONCENTRATING,_REMEMBERING_OR_MAKING_DECISIONS_DIFFICULTY: NO
ADLS_ACUITY_SCORE: 31
ADLS_ACUITY_SCORE: 43
TRANSFERRING: 1-->ASSISTANCE (EQUIPMENT/PERSON) NEEDED
DRESSING/BATHING_DIFFICULTY: NO
ADLS_ACUITY_SCORE: 30
ADLS_ACUITY_SCORE: 42
ADLS_ACUITY_SCORE: 37
WALKING_OR_CLIMBING_STAIRS_DIFFICULTY: YES
ADLS_ACUITY_SCORE: 42
ADLS_ACUITY_SCORE: 37
DIFFICULTY_EATING/SWALLOWING: NO
WEAR_GLASSES_OR_BLIND: YES
ADLS_ACUITY_SCORE: 42
ADLS_ACUITY_SCORE: 42
CHANGE_IN_FUNCTIONAL_STATUS_SINCE_ONSET_OF_CURRENT_ILLNESS/INJURY: NO
DEPENDENT_IADLS:: CLEANING;SHOPPING

## 2023-04-04 NOTE — PROGRESS NOTES
Previous shift nurse had already given report. Vitals done and stable. Denied pain. [place] [facility] [place]

## 2023-04-04 NOTE — CONSULTS
[facility] Consultation     Mariah Koehler  [address] [facility] [address]  67 year old female     Admission Date/Time: 4/4/2023  Primary Care Provider: Alex Noalsco     We were asked to see the patient in consultation by Dr. Mejia for evaluation of Nausea/vomiting.    ASSESSMENT:    Mariah Koehler is a 67 year old female with PMH of HTN, COPD, Opioid dependence, recurrent nausea/vomiting who presented to the ED today (4/4/23) for recurrent nausea/vomiting.     1. Nausea/vomiting  - Suspect acute gastroenteritis with sudden onset of symptoms. Question if poorly managed GERD/hiatal hernia could be contributing. Narcotic bowel syndrome / narcotic induced gastroparesis/constipation also a possibility.   - This is her second bout of severe nausea/vomiting within the past 2 months. Previous episode in Feb 2023. EGD at the time noted a hiatal hernia and mild chronic gastritis but otherwise unremarkable.     2. Colitis  - CT with mild colonic wall thickening in the rectosigmoid, similar to previous CT in Feb 2023.   - Outpatient colonoscopy recommended however she has not been able to schedule.     RECOMMENDATIONS:  - Agree with supportive cares with antiemetics    - Agree with IV PPI. She should take PPI's more consistently at home.     - Diet: as tolerated    - Patient denies diarrhea for me, but if she has recurrent diarrhea consider testing stool studies.     - No plans for endoscopic evaluation at this time. I would recommend outpatient colonoscopy to evaluate colon/colitis further. She would NOT be able to tolerate a colonoscopy prep at this time.      Case discussed with Dr. Vargas, please review MD addendum below.    Approximately 45 minutes of total time was spent providing patient care, including patient evaluation, reviewing documentation/test results, and     Lucio Dover PA-C  [facility]  [phone]  ________________________________________________________________________        CC: Recurrent N/V     HPI:  Mariah Koehler is a 67 year old female with PMH of HTN, COPD, Opioid dependence, recurrent nausea/vomiting who presented to the ED today (4/4/23) for recurrent nausea/vomiting.      Per chart review, the patient was hospitalized at [facility] on 02/17/2023 for viral gastroenteritis. CT showed mid-distal colon thickening.  CBC without leukocytosis. CMP, lipase, lactic acid, and CRP were normal. EGD done 2/20/2023 showed normal esophagus, 7 cm hiatal hernia, erythematous mucosa in the antrum, gastric biopsies showed mild chronic gastrits negative for H pylori. Patient continued with PTA pain medications. Patient was hydrated with IVF and started on a liquid diet.   Pt followed up in [facility] clinic on 2/27/23 - recommended ondansetron for nausea PRN, Miralax for narcotic-induced constipation, and a colonoscopy.     Pt explains since her last hospitalization, she was doing fairly well up until 2 days ago (April 2nd) when she developed recurrent nausea and decreased appetite. The following day she had persistent nausea, vomiting, and dry heaving without any response to her PRN nausea medications at home. No hematemesis. She reports not eating/drinking anything for the past 2 days because she is unable to keep medications or liquids down. She denies fevers, chills, abdominal pains, diarrhea, bloody stools or melena.    She denies any sick contacts, recent traveling, uncooked foods. No new medications or supplements. She reports a history of a hiatal hernia but very minimal acid reflux symptoms. She has Prilosec at home but only takes this ~3 times per week for intermittent reflux.      In the ED:   - Vitals: Afebrile, /87 -> 150s/60s  - Labs: CBC, CMP, lipase, and CRP unremarkable. UA with hematuria and ketones.   - Imaging: CT abd/pelvis 04/03 with mild colitis,  large hiatal hernia    ROS: A comprehensive ten point review of systems was negative aside from those in mentioned in the HPI.      PAST MEDICAL HISTORY:  Patient Active Problem List    Diagnosis Date Noted     Colitis 04/04/2023     Priority: Medium     Intractable nausea and vomiting 04/04/2023     Priority: Medium     Presence of intrathecal baclofen pump 04/04/2023     Priority: Medium     Ketonuria 04/04/2023     Priority: Medium     Viral gastroenteritis 02/21/2023     Priority: Medium     Unintentional weight loss 02/21/2023     Priority: Medium     Current mild episode of major depressive disorder, unspecified whether recurrent (H) 02/17/2020     Priority: Medium     Fatigue 03/22/2018     Priority: Medium     Nausea and vomiting      Priority: Medium     Created by Conversion         Reflex Sympathetic Dystrophy Of The Left Upper Limb      Priority: Medium     Created by Conversion  Replacement Utility updated for latest IMO load         Osteoporosis      Priority: Medium     Created by Conversion  Replacement Utility updated for latest IMO load         Hypertension      Priority: Medium     Created by Conversion  Replacement Utility updated for latest IMO load         Constipation      Priority: Medium     Created by Conversion  Replacement Utility updated for latest IMO load         Abdominal Pain      Priority: Medium     Created by Conversion  Replacement Utility updated for latest IMO load         Abdominal Pain In The Central Upper Belly (Epigastric)      Priority: Medium     Created by Conversion         Chest Pain      Priority: Medium     Created by Conversion         Opioid Dependence With Continuous Use      Priority: Medium     Created by Conversion         Edema      Priority: Medium     Created by Conversion         Wheezing (Symptom)      Priority: Medium     Created by Conversion         Pain During Urination (Dysuria)      Priority: Medium     Created by Conversion         Frequent,  Full-bladder Emptying (Polyuria)      Priority: Medium     Created by Conversion         Chronic Obstructive Pulmonary Disease      Priority: Medium     Created by Conversion         Chronic pain syndrome      Priority: Medium     Created by Conversion         Vitamin B12 Deficiency      Priority: Medium     Created by Conversion         Microscopic Hematuria      Priority: Medium     Created by Conversion         Cough      Priority: Medium     Created by Conversion         Carpal Tunnel Syndrome      Priority: Medium     Created by Conversion         SOCIAL HISTORY:  Social History     Tobacco Use     Smoking status: Every Day     Packs/day: 0.75     Years: 51.00     Pack years: 38.25     Types: Cigarettes     Start date: 1/1/1968     Smokeless tobacco: Never   Vaping Use     Vaping status: Never Used   Substance Use Topics     Alcohol use: Not Currently     Comment: 1 glass a year     Drug use: Never     FAMILY HISTORY:  Family History   Problem Relation Age of Onset     Cerebrovascular Disease Mother      Diabetes Mother      Heart Disease Mother      Hypertension Mother      Rheumatologic Disease Mother      Heart Disease Father      Pulmonary Hypertension Father      Kidney failure Father      Cancer Father         melanoma     Diabetes Sister      Hypertension Sister      Hypertension Brother      ALLERGIES:   Allergies   Allergen Reactions     Codeine Nausea and Vomiting     Morphine Nausea and Vomiting     Bacitracin Rash     Methadone Rash     MEDICATIONS:   Current Facility-Administered Medications   Medication     0.9% sodium chloride + KCl 20 mEq/L infusion     acetaminophen (TYLENOL) tablet 975 mg     atenolol (TENORMIN) tablet 25 mg     ciprofloxacin (CIPRO) infusion 400 mg     hydrALAZINE (APRESOLINE) injection 10 mg     HYDROmorphone (DILAUDID) injection 0.2 mg     melatonin tablet 1 mg     metoclopramide (REGLAN) injection 5 mg     metroNIDAZOLE (FLAGYL) infusion 500 mg     ondansetron (ZOFRAN ODT)  "ODT tab 4 mg    Or     ondansetron (ZOFRAN) injection 4 mg     pantoprazole (PROTONIX) EC tablet 40 mg     pantoprazole (PROTONIX) IV push injection 40 mg     prochlorperazine (COMPAZINE) injection 5 mg     traZODone (DESYREL) half-tab 25 mg     Current Outpatient Medications   Medication     atenolol (TENORMIN) 25 MG tablet     baclofen (LIORESAL) 10 MG tablet     cephALEXin (KEFLEX) 500 MG capsule     diphenhydrAMINE HCl (ZZZQUIL) 50 MG/30ML LIQD     medication given by implanted intrathecal pump     metoclopramide (REGLAN) 10 MG tablet     omeprazole (PRILOSEC) 20 MG DR capsule     ondansetron (ZOFRAN) 4 MG tablet     traZODone (DESYREL) 50 MG tablet     zolpidem (AMBIEN) 10 MG tablet       PHYSICAL EXAM:   BP (!) 151/68 (BP Location: Left arm)   Pulse 84   Temp 98  F (36.7  C) (Oral)   Resp 20   Ht 1.588 m (5' 2.5\")   Wt 50.8 kg (112 lb)   SpO2 95%   BMI 20.16 kg/m     GEN: Alert, oriented x3, communicative, appears nauseous/ill  KASSI: AT, anicteric, OP without erythema, exudate, or ulcers.    NECK: Supple.    LYMPH: No LAD noted.  HRT: RRR  LUNGS: CTA  ABD:  ND, +BS, no guarding or pain to palpation, no rebound, no HSM.  SKIN: No rash, jaundice or spider angiomata  MSKL: LE free of edema, strength 5/5 all 4 extrems  NEURO: CN grossly intact     ADDITIONAL DATA:   I reviewed the patient's new clinical lab test results.   Recent Labs   Lab Test 04/03/23  2303 02/20/23  0839 02/18/23  0939 03/30/20  1451 11/25/19  1441   WBC 6.8 5.6 7.8   < > 8.1   HGB 16.2* 12.6 13.6   < > 13.0   MCV 85 88 87   < > 91    181 220   < > 196   INR  --   --   --   --  1.01    < > = values in this interval not displayed.     Recent Labs   Lab Test 04/03/23  2303 03/17/23  1209 02/20/23  0839   POTASSIUM 3.7 3.9 3.6   CHLORIDE 100 106 110*   CO2 24 29 24   BUN 13.8 11.9 9   CR 0.50* 0.68 0.60   ANIONGAP 16* 10 7     Recent Labs   Lab Test 04/04/23  0007 04/03/23  2303 03/17/23  1209 02/18/23  0939 02/17/23 2023 " 09/30/22  1005 05/22/22  1545 04/14/21  1449 12/16/20  1834 12/16/20  1544   ALBUMIN  --  4.6 4.2 3.7 4.3   < >  --    < >  --  4.4   BILITOTAL  --  0.5 0.2 0.6 0.4   < >  --    < >  --  0.4   ALT  --  11 11 11 15   < >  --    < >  --  10   AST  --  24 18 15 16   < >  --    < >  --  15   PROTEIN 50*  --   --   --   --   --  20*  --  30 mg/dL*  --    LIPASE  --  11*  --   --  14  --   --   --   --  <9    < > = values in this interval not displayed.        IMAGING:  I reviewed the patient's new imaging results.           CB BENDER Note  Patient interviewed, examined, chart reviewed.  I have discussed the further management of this patient with AIDA Grant, and I agree with his assessment and plan.  Please see his note for further details.    Briefly, Mariah Koehler is a 67-year-old female with multiple medical issues, including hypertension, COPD, opioid dependence, who presents with recurrent nausea and emesis.  She has had work-up for this problem recently including an upper endoscopy in February 2023, which noted a medium sized hiatal hernia along with mild chronic gastritis, which was otherwise unremarkable.  She is currently feeling better after receiving IV fluids.    Imaging study done during this hospitalization does reveal the presence of mild colonic wall thickening in the rectosigmoid, similar to previous CT scan earlier this year, and she is also been recommended an outpatient colonoscopy, though this has yet to be scheduled.    She denies any abdominal pain, GI bleeding or diarrhea at this point.    Her issues with nausea and vomiting, may be related to underlying narcotic induced gastroparesis, though her underlying hiatal hernia as well as gastroesophageal flux disease may also be playing a role.  She has not been taking PPI as recommended, and will certainly benefit from starting this on a regular basis along with antiemetic therapy.    She certainly will benefit from a colonoscopy as an outpatient  as recommended.    Thank you for this consultation, will follow along with you.    Gregorio Vargas MD on 4/4/2023 at 3:27 PM  [facility]

## 2023-04-04 NOTE — PROVIDER NOTIFICATION
Patient given PRN compazine and reports ineffective. Also inquiring about switching PO protonix to IV due to continuous nausea.    Response: Dr. Patten ordered PRN reglan and IV protonix. Continuing to monitor.

## 2023-04-04 NOTE — PROGRESS NOTES
04/04/23 1325   Appointment Info   Signing Clinician's Name / Credentials (PT) Sherly Koehler DPT   Quick Adds   Quick Adds Certification   Living Environment   People in Home spouse   Current Living Arrangements house   Home Accessibility stairs to enter home;stairs within home   Number of Stairs, Main Entrance 1;2   Stair Railings, Main Entrance none   Number of Stairs, Within Home, Primary greater than 10 stairs  (shower in basement)   Stair Railings, Within Home, Primary railings safe and in good condition   Transportation Anticipated family or friend will provide   Self-Care   Usual Activity Tolerance good   Current Activity Tolerance moderate   Equipment Currently Used at Home walker, rolling  (4WW)   General Information   Onset of Illness/Injury or Date of Surgery 04/04/23   Referring Physician Dez Patten MD   Patient/Family Therapy Goals Statement (PT) return home   Pertinent History of Current Problem (include personal factors and/or comorbidities that impact the POC) 67 year old female with PMH of HTN, COPD, Opioid dependence, recurrent nausea/vomiting who presented to the ED today (4/4/23) for recurrent nausea/vomiting   Strength (Manual Muscle Testing)   Strength (Manual Muscle Testing) Deficits observed during functional mobility   Bed Mobility   Comment, (Bed Mobility) Pt sitting eob when PT arrived.   Transfers   Transfers sit-stand transfer   Sit-Stand Transfer   Sit-Stand [place] (Transfers) minimum assist (75% patient effort)   Assistive Device (Sit-Stand Transfers) walker, front-wheeled   Gait/Stairs (Locomotion)   [place] Level (Gait) minimum assist (75% patient effort)   Assistive Device (Gait) walker, front-wheeled   Distance in Feet 30'   Pattern (Gait) step-through   Deviations/Abnormal Patterns (Gait) gait speed decreased   Clinical Impression   Criteria for Skilled Therapeutic Intervention Yes, treatment indicated   PT Diagnosis (PT) Impaired functional mobility    Influenced by the following impairments Weakness, fatigue   Functional limitations due to impairments Impaired strenth, transfers, gait   Clinical Presentation (PT Evaluation Complexity) Stable/Uncomplicated   Clinical Presentation Rationale Presents as diagnosed   Clinical Decision Making (Complexity) moderate complexity   Planned Therapy Interventions (PT) balance training;bed mobility training;gait training;home exercise program;stair training;strengthening;transfer training   Anticipated Equipment Needs at Discharge (PT) walker, rolling  (4WW)   Risk & Benefits of therapy have been explained patient   PT Total Evaluation Time   PT Eval, Moderate Complexity Minutes (10053) 10   Therapy Certification   Start of care date 04/04/23   Certification date from 04/04/23   Certification date to 04/11/23   Medical Diagnosis recurrent nausea/vomiting   Physical Therapy Goals   PT Frequency Daily   PT Predicted Duration/Target Date for Goal Attainment 04/11/23   PT Goals Bed Mobility;Transfers;Gait;Stairs   PT: Bed Mobility Independent;Supine to/from sit   PT: Transfers Supervision/stand-by assist;Sit to/from stand;Bed to/from chair;Assistive device   PT: Gait Supervision/stand-by assist;Rolling walker;100 feet   PT: Stairs Minimal assist;Greater than 10 stairs   PT Discharge Planning   PT Plan progress transfers, amb with 4WW, stairs   PT Discharge Recommendation (DC Rec) home with assist;home with home care physical therapy   PT Rationale for DC Rec Pt would benefit from home PT to improve overall strength and mobility.   PT Brief overview of current status Amb 65'x2 with FWW and min A.   Total Session Time   Total Session Time (sum of timed and untimed services) [address] [place] [address] Rehabilitation Services  OUTPATIENT PHYSICAL THERAPY EVALUATION  PLAN OF TREATMENT FOR OUTPATIENT REHABILITATION  (COMPLETE FOR INITIAL CLAIMS ONLY)  Patient's Last Name, First Name, M.I.  YOB: 1955  Mariah Koehler   A                        Provider's Name  [facility] Medical Record No.  [id number]                             Onset Date:  04/04/23   Start of Care Date:  (P) 04/04/23   Type:     _X_PT   ___OT   ___SLP Medical Diagnosis:  (P) recurrent nausea/vomiting              PT Diagnosis:  Impaired functional mobility Visits from SOC:  1     See note for plan of treatment, functional goals and certification details    I CERTIFY THE NEED FOR THESE SERVICES FURNISHED UNDER        THIS PLAN OF TREATMENT AND WHILE UNDER MY CARE     (Physician co-signature of this document indicates review and certification of the therapy plan).                Sherly Koehler, PT, DPT  4/4/2023

## 2023-04-04 NOTE — PROGRESS NOTES
Patient given PRN zofran for nausea. Patient reports ineffective. Patient looks visibly nauseous and miserable. Patient is flushed and diaphoretic.Hospitalist notified.    Response: Dr. Patten ordered PRN compazine

## 2023-04-04 NOTE — PROGRESS NOTES
PRIMARY DIAGNOSIS: ACUTE PAIN  OUTPATIENT/OBSERVATION GOALS TO BE MET BEFORE DISCHARGE:  1. Pain Status: Improved but still requiring IV narcotics.    2. Return to near baseline physical activity: No. Patient unsteady due to nausea and pain. Assist of one to the bathroom. Typically independent at home.    3. Cleared for discharge by consultants (if involved): No    Discharge Planner Nurse   Safe discharge environment identified: Yes  Barriers to discharge: Yes   Anti-emetics given to help control nausea, no improvement yet.  Patient hypertensive, monitoring and PRN         Entered by: Nelsy Palumbo RN 04/04/2023 3:13 AM     Please review provider order for any additional goals.   Nurse to notify provider when observation goals have been met and patient is ready for discharge.

## 2023-04-04 NOTE — PHARMACY-ADMISSION MEDICATION HISTORY
Pharmacist Admission Medication History    Admission medication history is complete. The information provided in this note is only as accurate as the sources available at the time of the update.    Medication reconciliation/reorder completed by provider prior to medication history? Yes    Information Source(s): Patient via in-person    Pertinent Information: ROBERTO Hutson, called back from [facility] in [place] ([phone]). Received pump settings per Caryl; see entry below.     Changes made to PTA medication list:    Added: None (since last pharmacy-admission medication history note)    Deleted: None (since last pharmacy-admission medication history note)    Changed: None (since last pharmacy-admission medication history note)    Medication Affordability:  Not including over the counter (OTC) medications, was there a time in the past 12 months when you did not take your medications as prescribed because of cost?: No    Allergies reviewed with patient and updates made in EHR: yes    Medication History Completed By: LIDIA BOGGS RPH 4/4/2023 11:37 AM    PTA Med List   Medication Sig Last Dose     atenolol (TENORMIN) 25 MG tablet TAKE 1 TABLET BY MOUTH EVERY DAY 4/2/2023     baclofen (LIORESAL) 10 MG tablet TAKE 2 TABLETS (20 MG TOTAL) BY MOUTH FOUR TIMES A DAY. 4/2/2023     diphenhydrAMINE HCl (ZZZQUIL) 50 MG/30ML LIQD Take 15-30 mLs by mouth nightly as needed      medication given by implanted intrathecal pump continuous Drug # 1: Fentanyl (Sublimaze) - Conc: 2000 mcg/mL - Total Dose / 24 hours: 873.8 mcg    Drug # 2: Bupivacaine (Marcaine)  - Conc:17.7 mg/mL - Total Dose / 24 hours: 7.733 mg    Drug # 3: Hydromorphone (Dilaudid)  - Conc: 2.1 mg/mL - Total Dose / 24 hours: 0.9175 mg    Diluent: NS    Infusion Rate: 0.4369 mL/24 hrs  Pump Reservoir Volume: 40 mL    Bolus doses: up to 14 bolus doses/24 hours;    Each bolus: fentanyl 81.1 mcg, 0.708 mg Bupivacaine, 0.0841 mg Dilaudid    Outside Clinic & Provider:  [facility] in [place] [phone]  Last Refill Date: 03/09/2023  Next Refill Date: 04/13/2023  Low [place] Alarm Date:  04/16/2023  Pump : [employer]    Deliver Pump Medication to:   For [place]: If pump is within 7 days of depletion, pharmacist will enter a 'Pain Management Adult IP Consult' into the EHR, including 'IT pain pump management' as the reason for the consult.      metoclopramide (REGLAN) 10 MG tablet Take 1 tablet (10 mg) by mouth 3 times daily as needed (Nausea) AS NEEDED FOR NAUSEA 4/2/2023     omeprazole (PRILOSEC) 20 MG DR capsule TAKE 1 CAPSULE BY MOUTH TWICE A DAY BEFORE MEALS 4/2/2023     ondansetron (ZOFRAN) 4 MG tablet Take 1 tablet (4 mg) by mouth every 8 hours as needed for nausea 4/2/2023     traZODone (DESYREL) 50 MG tablet TAKE 0.5 TABLETS (25 MG) BY MOUTH AT BEDTIME 4/2/2023

## 2023-04-04 NOTE — ED PROVIDER NOTES
"EMERGENCY DEPARTMENT ENCOUNTER      NAME: Mariah Koehler  AGE: 67 year old female  YOB: 1955  MRN: [id number]  EVALUATION DATE & TIME: No admission date for patient encounter.    PCP: Alex Nolasco    ED PROVIDER: Charlotte Horne M.D.      Chief Complaint   Patient presents with     Nausea, Vomiting, & Diarrhea         FINAL IMPRESSION:  1. Colitis    2. Intractable nausea and vomiting          ED COURSE & MEDICAL DECISION MAKING:    ED Course as of 04/04/23 0115   Mon Apr 03, 2023   2336 Patient with history of recurrent chronic nausea/vomiting episodes and admitted with normal endoscopy in fEbruary for this BIB  with nausea/vomiting today and thus unable to take her medications, including her BP meds for known HTN today with resultant elevated BP in the ER today. On examination patinet with some LLQ pain, atypical affect/flat affect, given IVF and zofran which she has Rx for at home but hasn't yet taken at home. Patient and  ok with plan, LFTs/lipase and CBC pending with UA and will clinically reassess after imaging and medication to ensure she improves.   Tue Apr 04, 2023   0043 UA reassuringly without cells to suggest uti, BMP and CBC WNL   0043 CT abdomen with some intestinal wall thickening suggestive of colitis, will reassess now and cipro/flagyl begun. Normal LFTs and lipase reassuringly.   0059 Patient reassessed with intractable nause,a unable to eat/drink including taking cipro/flagyl oral noting \"I'm too nauseated\" with fragility and iinability to eat/drink including keep down her BP medications, will attempt atenolol. On chart review she is prescribed both reglan and zofran, zofran IV here without improvement, given reglan also. Patient and  engaged in shared medical decision making conversation and with colitis with intractable NV they are amenable to plan to admit, IV abx begun and hospitalist paged for admission.    0114 Pt endorsed to hospitalist to med surg " obs       Pertinent Labs & Imaging studies reviewed. (See chart for details)    N95 worn  A face shield was worn also  COVID PPE    Medical Decision Making    History:    Supplemental history from: Documented in chart, if applicable and Family Member/Significant Other    External Record(s) reviewed: Documented in chart, if applicable.    Work Up:    Chart documentation includes differential considered and any EKGs or imaging independently interpreted by provider, where specified.    In additional to work up documented, I considered the following work up: Documented in chart, if applicable.    External consultation:    Discussion of management with another provider: Documented in chart, if applicable    Complicating factors:    Care impacted by chronic illness: Chronic Pain    Care affected by social determinants of health: N/A    Disposition considerations: Admit.        At the conclusion of the encounter I discussed the results of all of the tests and the disposition. The questions were answered. The patient or family acknowledged understanding and was agreeable with the care plan.     MEDICATIONS GIVEN IN THE EMERGENCY:  Medications   ciprofloxacin (CIPRO) infusion 400 mg (has no administration in time range)   metroNIDAZOLE (FLAGYL) infusion 500 mg (has no administration in time range)   atenolol (TENORMIN) tablet 25 mg (has no administration in time range)   metoclopramide (REGLAN) injection 10 mg (has no administration in time range)   ondansetron (ZOFRAN) injection 8 mg (8 mg Intravenous $Given 4/3/23 2307)   0.9% sodium chloride BOLUS (0 mLs Intravenous Stopped 4/4/23 0020)   iopamidol (ISOVUE-370) solution 67 mL (67 mLs Intravenous $Given 4/4/23 0021)       NEW PRESCRIPTIONS STARTED AT TODAY'S ER VISIT  New Prescriptions    No medications on file          =================================================================    HPI      Mariah Koehler is a 67 year old female with PMHx of HTN, COPD, opioid  dependence with RSD, recurrent nausea and vomiting episodes who presents to the ED today via walk-in with nausea, vomiting, and diarrhea.    The patient has been experiencing nausea and vomiting since 3:30 this morning. She was initially vomiting yellow/brown bile and is now vomiting mucous and having dry heaves since. She has been unable to take her daily medications and consume fluids due to not being able to keep it down. She has had these symptoms before on 02/17/2023 but it was not as severe. She ended up getting an endoscopy done with revealing of some inflammation. The patient does have a pain pump for her pain medications.     Otherwise, the patient denied any fever, cough, and any other complaints or concerns at this time.    Per chart review, the patient was hospitalized at [facility] on 02/17/2023 for viral gastroenteritis. CT showed mid-distal colon thickening.  CBC without leukocytosis. CMP, lipase, lactic acid, and CRP were normal. EGD done 2/20/2023 showed normal esophagus, 7 cm hiatal hernia, erythematous mucosa in the antrum that was biopsied otherwise normal duodenum. Patient continued with PTA pain medications. Patient was hydrated with IVF and started on a liquid diet. Patient was discharged on 02/21/2023 with follow-up with gastroenterology for outpatient colonoscopy.    REVIEW OF SYSTEMS   All other systems reviewed and are negative except as noted above in HPI.    PAST MEDICAL HISTORY:  Past Medical History:   Diagnosis Date     Current mild episode of major depressive disorder, unspecified whether recurrent (H) 02/17/2020     Hypertension      Opiate dependence (H)      RSD (reflex sympathetic dystrophy)      Sacral fracture, closed (H)        PAST SURGICAL HISTORY:  Past Surgical History:   Procedure Laterality Date     APPENDECTOMY       ESOPHAGOSCOPY, GASTROSCOPY, DUODENOSCOPY (EGD), COMBINED N/A 2/20/2023    Procedure: ESOPHAGOGASTRODUODENOSCOPY with biopsies;   Surgeon: Na Brooks MD;  Location: [facility] Main OR     GYN SURGERY       HC REVISE MEDIAN N/CARPAL TUNNEL SURG      Description: Neuroplasty Decompression Median Nerve At Carpal Tunnel;  Recorded: 09/19/2011;     HYSTERECTOMY  1984     IR CERVICAL EPIDURAL STEROID INJECTION  1/14/2005     OOPHORECTOMY Bilateral 1985     CA SYMPATHECTOMY,CERVICOTHORACIC      Description: Surgical Sympathectomy Cervicothoracic;  Recorded: 09/19/2011;     [facility] DECOMPRESS FASCIOTOMY FINGR/HAND      Description: Decompression Fasciotomy Of The Hand;  Recorded: 09/19/2011;     [facility] LAP,CHOLECYSTECTOMY/EXPLORE      Description: Cholecystectomy Laparoscopic;  Recorded: 12/09/2011;     [facility] TOTAL ABDOM HYSTERECTOMY      Description: Hysterectomy;  Recorded: 03/23/2011;       CURRENT MEDICATIONS:    atenolol (TENORMIN) 25 MG tablet  baclofen (LIORESAL) 10 MG tablet  cephALEXin (KEFLEX) 500 MG capsule  diphenhydrAMINE HCl (ZZZQUIL) 50 MG/30ML LIQD  medication given by implanted intrathecal pump  metoclopramide (REGLAN) 10 MG tablet  omeprazole (PRILOSEC) 20 MG DR capsule  ondansetron (ZOFRAN) 4 MG tablet  traZODone (DESYREL) 50 MG tablet  zolpidem (AMBIEN) 10 MG tablet        ALLERGIES:  Allergies   Allergen Reactions     Codeine Nausea and Vomiting     Morphine Nausea and Vomiting     Bacitracin Rash     Methadone Rash       FAMILY HISTORY:  Family History   Problem Relation Age of Onset     Cerebrovascular Disease Mother      Diabetes Mother      Heart Disease Mother      Hypertension Mother      Rheumatologic Disease Mother      Heart Disease Father      Pulmonary Hypertension Father      Kidney failure Father      Cancer Father         melanoma     Diabetes Sister      Hypertension Sister      Hypertension Brother        SOCIAL HISTORY:   Social History     Socioeconomic History     Marital status:      Spouse name: None     Number of children: None     Years of education: None     Highest education level: None   Tobacco  "Use     Smoking status: Every Day     Packs/day: 0.75     Years: 51.00     Pack years: 38.25     Types: Cigarettes     Start date: 1/1/1968     Smokeless tobacco: Never   Vaping Use     Vaping status: Never Used   Substance and Sexual Activity     Alcohol use: Not Currently     Comment: 1 glass a year     Drug use: Never       VITALS:  Patient Vitals for the past 24 hrs:   BP Temp Temp src Pulse Resp SpO2 Height Weight   04/03/23 2228 (!) 200/87 97.7  F (36.5  C) Oral 102 20 97 % 1.588 m (5' 2.5\") 50.8 kg (112 lb)       PHYSICAL EXAM    GENERAL: Awake, alert.  In no acute distress.   HEENT: Normocephalic, atraumatic.  Pupils equal, round and reactive.  Conjunctiva normal.  EOMI.  NECK: No stridor or apparent deformity.  PULMONARY: Symmetrical breath sounds without distress.  Lungs clear to auscultation bilaterally without wheezes, rhonchi or rales.  CARDIO: Regular rate and rhythm.  No significant murmur, rub or gallop.  Radial pulses strong and symmetrical.  ABDOMINAL: Left abdominal tenderness without rebound or guarding.  EXTREMITIES: No lower extremity swelling or edema.    NEURO: Alert and oriented to person, place and time.  Cranial nerves grossly intact.  No focal motor deficit.  PSYCH: Normal mood and affect  SKIN: No rashes      LAB:  All pertinent labs reviewed and interpreted.  Results for orders placed or performed during the hospital encounter of 04/03/23   CT Abdomen Pelvis w Contrast    Impression    IMPRESSION:   1.  Colonic wall thickening consistent with mild colitis. Infectious or inflammatory etiologies are favored.  2.  Large hiatal hernia.  3.  No other significant interval change.   Comprehensive metabolic panel   Result Value Ref Range    Sodium 140 136 - 145 mmol/L    Potassium 3.7 3.4 - 5.3 mmol/L    Chloride 100 98 - 107 mmol/L    Carbon Dioxide (CO2) 24 22 - 29 mmol/L    Anion Gap 16 (H) 7 - 15 mmol/L    Urea Nitrogen 13.8 8.0 - 23.0 mg/dL    Creatinine 0.50 (L) 0.51 - 0.95 mg/dL    " Calcium 9.6 8.8 - 10.2 mg/dL    Glucose 123 (H) 70 - 99 mg/dL    Alkaline Phosphatase 99 35 - 104 U/L    AST 24 10 - 35 U/L    ALT 11 10 - 35 U/L    Protein Total 7.3 6.4 - 8.3 g/dL    Albumin 4.6 3.5 - 5.2 g/dL    Bilirubin Total 0.5 <=1.2 mg/dL    GFR Estimate >90 >60 mL/min/1.73m2   Result Value Ref Range    Lipase 11 (L) 13 - 60 U/L   UA with Microscopic reflex to Culture    Specimen: Urine, Clean Catch   Result Value Ref Range    Color Urine Light Yellow Colorless, Straw, Light Yellow, Yellow    Appearance Urine Clear Clear    Glucose Urine Negative Negative mg/dL    Bilirubin Urine Negative Negative    Ketones Urine 60 (A) Negative mg/dL    Specific Gravity Urine 1.022 1.001 - 1.030    Blood Urine 1.0 mg/dL (A) Negative    pH Urine 6.5 5.0 - 7.0    Protein Albumin Urine 50 (A) Negative mg/dL    Urobilinogen Urine <2.0 <2.0 mg/dL    Nitrite Urine Negative Negative    Leukocyte Esterase Urine Negative Negative    Bacteria Urine Few (A) None Seen /HPF    Mucus Urine Present (A) None Seen /LPF    RBC Urine 38 (H) <=2 /HPF    WBC Urine 2 <=5 /HPF    Squamous Epithelials Urine <1 <=1 /HPF    Hyaline Casts Urine 3 (H) <=2 /LPF   CBC with platelets and differential   Result Value Ref Range    WBC Count 6.8 4.0 - 11.0 10e3/uL    RBC Count 5.60 (H) 3.80 - 5.20 10e6/uL    Hemoglobin 16.2 (H) 11.7 - 15.7 g/dL    Hematocrit 47.8 (H) 35.0 - 47.0 %    MCV 85 78 - 100 fL    MCH 28.9 26.5 - 33.0 pg    MCHC 33.9 31.5 - 36.5 g/dL    RDW 13.4 10.0 - 15.0 %    Platelet Count 228 150 - 450 10e3/uL    % Neutrophils 82 %    % Lymphocytes 13 %    % Monocytes 5 %    % Eosinophils 0 %    % Basophils 0 %    % Immature Granulocytes 0 %    NRBCs per 100 WBC 0 <1 /100    Absolute Neutrophils 5.6 1.6 - 8.3 10e3/uL    Absolute Lymphocytes 0.9 0.8 - 5.3 10e3/uL    Absolute Monocytes 0.3 0.0 - 1.3 10e3/uL    Absolute Eosinophils 0.0 0.0 - 0.7 10e3/uL    Absolute Basophils 0.0 0.0 - 0.2 10e3/uL    Absolute Immature Granulocytes 0.0 <=0.4 10e3/uL     Absolute NRBCs 0.0 10e3/uL       RADIOLOGY:  Reviewed all pertinent imaging. Please see official radiology report.  CT Abdomen Pelvis w Contrast   Final Result   IMPRESSION:    1.  Colonic wall thickening consistent with mild colitis. Infectious or inflammatory etiologies are favored.   2.  Large hiatal hernia.   3.  No other significant interval change.                I, Nakul Davis, am serving as a scribe to document services personally performed by Dr. Charlotte Horne based on my observation and the provider's statements to me. I, Charlotte Horne MD attest that Nakul Davis is acting in a scribe capacity, has observed my performance of the services and has documented them in accordance with my direction..     Charlotte Horne MD  04/04/23 0115

## 2023-04-04 NOTE — PROGRESS NOTES
PRIMARY DIAGNOSIS: GENERALIZED WEAKNESS    OUTPATIENT/OBSERVATION GOALS TO BE MET BEFORE DISCHARGE  1. Orthostatic performed: N/A    2. Tolerating PO medications: No. Pt tolerated scheduled trazodone earlier but now reports, with current nausea, she cannot.    3. Return to near baseline physical activity: No. Patient unsteady due to pain and nausea. Using bedside commode.    4. Cleared for discharge by consultants (if involved): No    Discharge Planner Nurse   Safe discharge environment identified: Yes  Barriers to discharge: Yes   Uncontrolled nausea. Abdominal pain present. On IVF and IV abx. Continuing to monitor.          Entered by: Nelsy Palumbo RN 04/04/2023 6:24 AM     Please review provider order for any additional goals.   Nurse to notify provider when observation goals have been met and patient is ready for discharge.

## 2023-04-04 NOTE — PLAN OF CARE
Patient transferring to [facility] room 27.  Report called.  Belongings sent with patient.  Patient's  Alfred notified of transfer.

## 2023-04-04 NOTE — CONSULTS
Care Management Initial Consult    General Information  Assessment completed with: Patient,    Type of CM/SW Visit: Initial Assessment    Primary Care Provider verified and updated as needed: Yes   Readmission within the last 30 days: no previous admission in last 30 days      Reason for Consult: discharge planning  Advance Care Planning: Advance Care Planning Reviewed: no concerns identified          Communication Assessment  Patient's communication style: spoken language (English or Bilingual)             Cognitive  Cognitive/Neuro/Behavioral: WDL  Level of Consciousness: alert  Arousal Level: opens eyes spontaneously  Orientation: oriented x 4  Mood/Behavior: calm, cooperative          Living Environment:   People in home: spouse  Alfred  Current living Arrangements: house      Able to return to prior arrangements: yes       Family/Social Support:  Care provided by: self  Provides care for: no one  Marital Status:     Alfred       Description of Support System: Supportive, Involved    Support Assessment: Adequate family and caregiver support, Adequate social supports, Patient communicates needs well met    Current Resources:   Patient receiving home care services: No     Community Resources: Other (see comment) (Pain clinic)  Equipment currently used at home:    Supplies currently used at home: Other (pain pump supplies)    Employment/Financial:  Employment Status: retired        Financial Concerns:             Lifestyle & Psychosocial Needs:  Social Determinants of Health     Tobacco Use: High Risk (4/3/2023)    Patient History      Smoking Tobacco Use: Every Day      Smokeless Tobacco Use: Never      Passive Exposure: Not on file   Alcohol Use: Not on file   Financial Resource Strain: Not on file   Food Insecurity: Not on file   Transportation Needs: Not on file   Physical Activity: Not on file   Stress: Not on file   Social Connections: Not on file   Intimate Partner Violence: Not on file    Depression: Not at risk (3/17/2023)    PHQ-2      PHQ-2 Score: 0   Housing Stability: Not on file       Functional Status:  Prior to admission patient needed assistance:   Dependent ADLs:: Ambulation-walker  Dependent IADLs:: Cleaning, Shopping       Mental Health Status:          Chemical Dependency Status:                Values/Beliefs:  Spiritual, Cultural Beliefs, Restoration Practices, Values that affect care:                 Additional Information:  Patient reports she lives with  Alfred in a private residence. Is independent with most I/ADLs; uses a roller-walker or cane for ambulation; no home care services; does drive when feeling well. Has a pain medication pump ([facility] pain clinic). Explained [facility]/Observation Status and patient verbalized understanding.  will transport patient on discharge. Likely no discharge needs, CM to follow hospital progression and assist as needed.     Kalani Swanson RN

## 2023-04-04 NOTE — PLAN OF CARE
PRIMARY DIAGNOSIS: GASTROENTERITIS    OUTPATIENT/OBSERVATION GOALS TO BE MET BEFORE DISCHARGE  1. Orthostatic performed: No    2. Tolerating PO fluid and/or antibiotics (if applicable): Tolerating sips of fluids and small amount of food, complaining of nausea    3. Nausea/Vomiting/Diarrhea symptoms improved: No, complaining of nausea.  No stools this shift.      4. Pain status: Improved but still requiring IV narcotics.    5. Return to near baseline physical activity: No    Discharge Planner Nurse   Safe discharge environment identified:  pending  Barriers to discharge: Yes       Entered by: Nancy K Zollinger, RN 04/04/2023 3:11 PM     Please review provider order for any additional goals.   Nurse to notify provider when observation goals have been met and patient is ready for discharge.  Goal Outcome Evaluation:  Patient complaining of abdominal pain.  Received IV dilaudid x1.  Continues to complain of nausea.  Received a dose of phenergan.  Per patient it was somewhat helpful.

## 2023-04-04 NOTE — PHARMACY-ADMISSION MEDICATION HISTORY
Pharmacist Admission Medication History    Admission medication history is complete. The information provided in this note is only as accurate as the sources available at the time of the update.    Medication reconciliation/reorder completed by provider prior to medication history? Yes    Information Source(s): Patient via in-person    Pertinent Information: Verified with patient she has medication given by implanted intrathecal pump from [facility] in [place] ([phone]). Called [facility] [place] and left voicemail today. Pharmacy will update admission medication history when [facility] calls back.    Changes made to PTA medication list:    Added: None    Deleted: cephalexin, zolpidem    Changed: None    Medication Affordability:  Not including over the counter (OTC) medications, was there a time in the past 12 months when you did not take your medications as prescribed because of cost?: No    Allergies reviewed with patient and updates made in EHR: yes    Medication History Completed By: LIDIA BOGGS RPH 4/4/2023 9:40 AM    PTA Med List   Medication Sig Last Dose     atenolol (TENORMIN) 25 MG tablet TAKE 1 TABLET BY MOUTH EVERY DAY 4/2/2023     baclofen (LIORESAL) 10 MG tablet TAKE 2 TABLETS (20 MG TOTAL) BY MOUTH FOUR TIMES A DAY. 4/2/2023     metoclopramide (REGLAN) 10 MG tablet Take 1 tablet (10 mg) by mouth 3 times daily as needed (Nausea) AS NEEDED FOR NAUSEA 4/2/2023     omeprazole (PRILOSEC) 20 MG DR capsule TAKE 1 CAPSULE BY MOUTH TWICE A DAY BEFORE MEALS 4/2/2023     ondansetron (ZOFRAN) 4 MG tablet Take 1 tablet (4 mg) by mouth every 8 hours as needed for nausea 4/2/2023     traZODone (DESYREL) 50 MG tablet TAKE 0.5 TABLETS (25 MG) BY MOUTH AT BEDTIME 4/2/2023

## 2023-04-04 NOTE — H&P
[facility]    History and Physical - Hospitalist Service       Date of Admission:  4/4/2023      Assessment & Plan       Principal Problem:    Intractable nausea and vomiting  Active Problems:    Reflex Sympathetic Dystrophy Of The Left Upper Limb    Hypertension    Unintentional weight loss    Colitis    Presence of intrathecal baclofen pump    Ketonuria      Mariah Koehler is a 67 year old female with history as noted below, patient has recent hospitalization at [facility] 3 weeks ago for viral gastroenteritis.    She has somewhat acute onset episode today with nausea vomiting and not able to take her meds and keep anything down with left lower quadrant pain and episode of diarrhea.  She also had left lower quadrant pain.  UA has ketonuria and proteinuria, history of recent weight loss and patient is thin concerning for a malnutrition, mildly elevated anion gap possibly due to dehydration or ketones, creatinine normal , she has some hemoconcentration from dehydration.  CT abdomen suggest some colitis and patient due to nausea vomiting not able to take any medication hydration started on Cipro Flagyl IV antibiotics in the ED and plan for hospital observation for further treatment due to significant nausea not able to take any medication.  Her CRP is normal, it could be again a viral etiology but with progressive symptoms will monitor patient -we will also add a procalcitonin and monitor for diarrhea in that case we may be able to get stool samples.  We will treat his colitis with Cipro Flagyl for now and continue monitoring, supportive care with fluids.  Can consider GI consultation and colonoscopy while in the hospital-or if progresses well,we will resume outpatient GI follow-up as planned from recent hospitalization at [facility].    Patient has chronic RSD of the left lower extremity and is on baclofen pump, her pain appears reasonably controlled although nausea vomiting is  significant.     Nausea vomiting control-symptomatic care  Dietary nutrition for evaluation of any malnutrition , with possible starvation ketoacidosis  Treat dehydration with fluids  As needed hydralazine for hypertension and monitor clinically         Diet: Regular Diet Adult  DVT Prophylaxis: Pneumatic Compression Devices  Cardiac Monitoring: None  Code Status: Full Code    Clinically Significant Risk Factors Present on Admission                               Disposition Plan      Expected Discharge Date: 04/05/2023                  Dez Patten MD  Hospitalist Service  [facility]  Securely message with [employer] (more info)  Text page via [employer] Paging/Directory     ______________________________________________________________________    Chief Complaint   Nausea vomiting    History is obtained from the patient    History of Present Illness   Mariah Koehler is a 67 year old female who has past medical history as listed below, with history of COPD and baclofen pump now with nausea vomiting and an episode of diarrhea today as well.  This started more acutely rather this morning and vomiting is mostly bilious with dry heaving not able to keep any medication or fluid and brought to the ED by ambulance.  Per chart review, the patient was hospitalized at [facility] on 02/17/2023 for viral gastroenteritis. CT showed mid-distal colon thickening.  CBC without leukocytosis. CMP, lipase, lactic acid, and CRP were normal. EGD done 2/20/2023 showed normal esophagus, 7 cm hiatal hernia, erythematous mucosa in the antrum that was biopsied otherwise normal duodenum. Patient continued with PTA pain medications. Patient was hydrated with IVF and started on a liquid diet. Patient was discharged on 02/21/2023 with follow-up with gastroenterology for outpatient colonoscopy    Past Medical History    Past Medical History:   Diagnosis Date     Current mild episode of major depressive  disorder, unspecified whether recurrent (H) 02/17/2020     Hypertension      Opiate dependence (H)      RSD (reflex sympathetic dystrophy)      Sacral fracture, closed (H)        Past Surgical History   Past Surgical History:   Procedure Laterality Date     APPENDECTOMY       ESOPHAGOSCOPY, GASTROSCOPY, DUODENOSCOPY (EGD), COMBINED N/A 2/20/2023    Procedure: ESOPHAGOGASTRODUODENOSCOPY with biopsies;  Surgeon: Na Brooks MD;  Location: [facility] Main OR     GYN SURGERY       HC REVISE MEDIAN N/CARPAL TUNNEL SURG      Description: Neuroplasty Decompression Median Nerve At Carpal Tunnel;  Recorded: 09/19/2011;     HYSTERECTOMY  1984     IR CERVICAL EPIDURAL STEROID INJECTION  1/14/2005     OOPHORECTOMY Bilateral 1985     DC SYMPATHECTOMY,CERVICOTHORACIC      Description: Surgical Sympathectomy Cervicothoracic;  Recorded: 09/19/2011;     [facility] DECOMPRESS FASCIOTOMY FINGR/HAND      Description: Decompression Fasciotomy Of The Hand;  Recorded: 09/19/2011;     [facility] LAP,CHOLECYSTECTOMY/EXPLORE      Description: Cholecystectomy Laparoscopic;  Recorded: 12/09/2011;     [facility] TOTAL ABDOM HYSTERECTOMY      Description: Hysterectomy;  Recorded: 03/23/2011;       Prior to Admission Medications   Prior to Admission Medications   Prescriptions Last Dose Informant Patient Reported? Taking?   atenolol (TENORMIN) 25 MG tablet   No No   Sig: TAKE 1 TABLET BY MOUTH EVERY DAY   baclofen (LIORESAL) 10 MG tablet   No No   Sig: TAKE 2 TABLETS (20 MG TOTAL) BY MOUTH FOUR TIMES A DAY.   cephALEXin (KEFLEX) 500 MG capsule   No No   Sig: Take 1 capsule (500 mg) by mouth 3 times daily   diphenhydrAMINE HCl (ZZZQUIL) 50 MG/30ML LIQD   Yes No   Sig: Take 15-30 mLs by mouth nightly as needed   medication given by implanted intrathecal pump   Yes No   Sig: continuous Drug # 1: Fentanyl (Sublimaze) - Conc: 2000 mcg/mL - Total Dose / 24 hours: 873.8 mcg    Drug # 2: Bupivacaine (Marcaine)  - Conc:17.7 mg/mL - Total Dose / 24 hours: 7.733  mg    Drug # 3: Hydromorphone (Dilaudid)  - Conc: 2.1 mg/mL - Total Dose / 24 hours: 0.9175 mg    Diluent: NS    Infusion Rate: 0.4369 mL/24 hrs  Pump Reservoir Volume: 40 mL    Bolus doses: up to 14 bolus doses/24 hours;    Each bolus: fentanyl 81.1 mcg, 0.708 mg Bupivacaine, 0.0841 mg Dilaudid    Outside Clinic & Provider: Ash Schmidt [phone]  Last Refill Date:   Next Refill Date: 03/10/2023  Low [place] Alarm Date:  / /20   Pump : [employer]    Deliver Pump Medication to:   For [place]: If pump is within 7 days of depletion, pharmacist will enter a 'Pain Management Adult IP Consult' into the EHR, including 'IT pain pump management' as the reason for the consult.   metoclopramide (REGLAN) 10 MG tablet   No No   Sig: Take 1 tablet (10 mg) by mouth 3 times daily as needed (Nausea) AS NEEDED FOR NAUSEA   omeprazole (PRILOSEC) 20 MG DR capsule   No No   Sig: TAKE 1 CAPSULE BY MOUTH TWICE A DAY BEFORE MEALS   ondansetron (ZOFRAN) 4 MG tablet   No No   Sig: Take 1 tablet (4 mg) by mouth every 8 hours as needed for nausea   traZODone (DESYREL) 50 MG tablet   No No   Sig: TAKE 0.5 TABLETS (25 MG) BY MOUTH AT BEDTIME   zolpidem (AMBIEN) 10 MG tablet   No No   Sig: TAKE 1 TABLET BY MOUTH EVERY DAY AT BEDTIME AS NEEDED FOR SLEEP      Facility-Administered Medications: None        Physical Exam   Vital Signs: Temp: 98.7  F (37.1  C) Temp src: Oral BP: (!) 174/76 Pulse: 102   Resp: 18 SpO2: 96 % O2 Device: None (Room air)    Weight: 112 lbs 0 oz    Alert awake-clinically appears dehydrated slightly hypertensive and mildly tachycardic but saturating okay  Vision Baseline  Neck supple  Oral mucosa appears dehydrated  bilateral air entry heard, not wheezing  S1-S2 normal-heart rate 102 sinus  Abdomen is soft , patient is curled up in the left neutral position and trying to go to sleep hence abdominal exam is somewhat limited but no focal tenderness  Extremities are fully mobile and there is no visible  swelling noted  No skin cyanosis   Neurologically- no new Gross deficits evident  Psych-mood okay and appropriate to circumstances-although feels nauseous      Medical Decision Making       75 MINUTES SPENT BY ME on the date of service doing chart review, history, exam, documentation & further activities per the note.      Data     I have personally reviewed the following data over the past 24 hrs:    6.8  \   16.2 (H)   / 228     140 100 13.8 /  123 (H)   3.7 24 0.50 (L) \       ALT: 11 AST: 24 AP: 99 TBILI: 0.5   ALB: 4.6 TOT PROTEIN: 7.3 LIPASE: 11 (L)       Procal: N/A CRP: <3.00 Lactic Acid: N/A         Imaging results reviewed over the past 24 hrs:   Recent Results (from the past 24 hour(s))   CT Abdomen Pelvis w Contrast    Narrative    EXAM: CT ABDOMEN PELVIS W CONTRAST  LOCATION: [facility]  DATE/TIME: 4/4/2023 12:20 AM    INDICATION: LLQ abdominal pain with NVD  COMPARISON: 02/17/2023, 11/08/2022  TECHNIQUE: CT scan of the abdomen and pelvis was performed following injection of IV contrast. Multiplanar reformats were obtained. Dose reduction techniques were used.  CONTRAST: isovue 370 67ml    FINDINGS:   LOWER CHEST: 2 to 3 mm lower lobe pulmonary nodules are unchanged. Large hiatal hernia. Mild elevation of the right hemidiaphragm.    HEPATOBILIARY: Cholelithiasis.    PANCREAS: Mild prominence the pancreatic duct, unchanged from 02/17/2023 but decreased compared to 11/08/2022.    SPLEEN: Unremarkable    ADRENAL GLANDS: Unremarkable    KIDNEYS/BLADDER: No hydronephrosis. 1 to 2 mm nonobstructing right lower pole renal calculus. Right upper pole renal cortical scarring. Stable small probable renal cysts. Decompressed bladder.    BOWEL: There is mild colonic wall thickening, greatest in the rectosigmoid. Findings are similar to prior. No small bowel obstruction.    LYMPH NODES: No significant retroperitoneal adenopathy.    VASCULATURE: Moderate atherosclerotic calcification without  abdominal aortic aneurysm. Patent portal vein.    PELVIC ORGANS: No free fluid.    MUSCULOSKELETAL: Baclofen pump. Moderate anterolisthesis of L4 and L5 with associated degenerative change. No acute bony abnormality.      Impression    IMPRESSION:   1.  Colonic wall thickening consistent with mild colitis. Infectious or inflammatory etiologies are favored.  2.  Large hiatal hernia.  3.  No other significant interval change.

## 2023-04-04 NOTE — PROGRESS NOTES
[facility]    Medicine Same Day followup Note - Hospitalist Service    Date of Admission:  4/4/2023    Assessment & Plan   Summary:  67 year old female with history of COPD still smoking, RSD left upper limb, hypertension, weight loss, chronic back pain with implanted baclofen pump, patient had recent hospitalization at [facility] 5 weeks ago for viral gastroenteritis with normal upper endoscopy, mid-distal colon thickening.  18 hours prior to hospitalization, she had onset acute nausea and vomiting, not able to take her meds or keep anything down. Accompanied by left lower quadrant pain and episode of diarrhea.  UA has ketonuria and proteinuria, history of recent weight loss and patient is thin concerning for a malnutrition, mildly elevated anion gap possibly due to dehydration or ketones, creatinine normal , she has some hemoconcentration from dehydration.    Active problems:  Colitis: CT abdomen shows colon wall thickening. Started on Cipro Flagyl IV antibiotics in the ED, CRP normal, no fever. Stop. Consult GI. Planning colonoscopy as outpatient.  N&V: resolving. Pt and  report several episodes last 2 months.  concerned for thecal pump spinal fluid leak.  Headache: 2/10 after medications.  Weight loss: encourage nutritional intake  Apr 2022: 53.1 kg (117 lb)    Sep 2022: 48.5 kg (107 lb)  Jan 2023: 48.5 kg (107 lb)  Feb 2023: 105 lbs   Apr 2023: 112 lbs  Tobacco abuse: encourage cessation       Diet: Regular Diet Adult    DVT Prophylaxis: Ambulate every shift  Bellamy Catheter: Not present  Lines: None     Cardiac Monitoring: None  Code Status: Full Code      Clinically Significant Risk Factors Present on Admission          # Hypocalcemia: Lowest Ca = 8.4 mg/dL in last 2 days, will monitor and replace as appropriate                      Disposition Plan      Expected Discharge Date: 04/05/2023      Destination: home with family            uKldeep Mejia,  MD  Hospitalist Service  [facility]  Securely message with [employer] (more info)  Text page via [employer] Paging/Directory   ______________________________________________________________________    Interval History   Patient improving, N&V finally subsiding after 36 hours of symptoms.    Physical Exam   Vital Signs: Temp: 98.4  F (36.9  C) Temp src: Oral BP: 124/60 Pulse: 60   Resp: 18 SpO2: 94 % O2 Device: None (Room air)    Weight: 112 lbs 0 oz    General Appearance:  Alert, cooperative, nauseated, mild headache, appears stated age. Very thin   Head:  Normocephalic, without obvious abnormality, atraumatic   Eyes:  PERRL, conjunctiva/corneas clear, EOM's intact   Nose: Nares normal, septum midline, mucosa normal, no drainage   Throat: Lips, mucosa, and tongue normal;teeth and gums normal   Lungs:   Clear to auscultation bilaterally, respirations unlabored   Heart:  Regular rate and rhythm, S1, S2 normal, no murmur, rub or gallop   Abdomen:   Soft, non-tender, bowel sounds active all four quadrants,  no masses, no organomegaly   Extremities: Extremities normal, atraumatic, no cyanosis or edema   Skin: Skin color, texture, turgor normal, no rashes or lesions   Neurologic: Normal         Medical Decision Making       35 MINUTES SPENT BY ME on the date of service doing chart review, history, exam, documentation & further activities per the note.      Data     I have personally reviewed the following data over the past 24 hrs:    8.3  \   13.2   / 185     137 102 12.5 /  96   3.9 25 0.55 \       ALT: 11 AST: 24 AP: 99 TBILI: 0.5   ALB: 4.6 TOT PROTEIN: 7.3 LIPASE: 11 (L)       Procal: 0.02 CRP: <3.00 Lactic Acid: N/A         Imaging results reviewed over the past 24 hrs:   Recent Results (from the past 24 hour(s))   CT Abdomen Pelvis w Contrast    Narrative    EXAM: CT ABDOMEN PELVIS W CONTRAST  LOCATION: [facility]  DATE/TIME: 4/4/2023 12:20 AM    INDICATION: LLQ abdominal  pain with NVD  COMPARISON: 02/17/2023, 11/08/2022  TECHNIQUE: CT scan of the abdomen and pelvis was performed following injection of IV contrast. Multiplanar reformats were obtained. Dose reduction techniques were used.  CONTRAST: isovue 370 67ml    FINDINGS:   LOWER CHEST: 2 to 3 mm lower lobe pulmonary nodules are unchanged. Large hiatal hernia. Mild elevation of the right hemidiaphragm.    HEPATOBILIARY: Cholelithiasis.    PANCREAS: Mild prominence the pancreatic duct, unchanged from 02/17/2023 but decreased compared to 11/08/2022.    SPLEEN: Unremarkable    ADRENAL GLANDS: Unremarkable    KIDNEYS/BLADDER: No hydronephrosis. 1 to 2 mm nonobstructing right lower pole renal calculus. Right upper pole renal cortical scarring. Stable small probable renal cysts. Decompressed bladder.    BOWEL: There is mild colonic wall thickening, greatest in the rectosigmoid. Findings are similar to prior. No small bowel obstruction.    LYMPH NODES: No significant retroperitoneal adenopathy.    VASCULATURE: Moderate atherosclerotic calcification without abdominal aortic aneurysm. Patent portal vein.    PELVIC ORGANS: No free fluid.    MUSCULOSKELETAL: Baclofen pump. Moderate anterolisthesis of L4 and L5 with associated degenerative change. No acute bony abnormality.      Impression    IMPRESSION:   1.  Colonic wall thickening consistent with mild colitis. Infectious or inflammatory etiologies are favored.  2.  Large hiatal hernia.  3.  No other significant interval change.

## 2023-04-04 NOTE — ED TRIAGE NOTES
Patient developed nausea, emesis, and diarrhea at 0330. Reports that she has not been able to keep down any food or fluids. Patient having dry heaves in triage.      Triage Assessment     Row Name 04/03/23 2221       Triage Assessment (Adult)    Airway WDL WDL       Respiratory WDL    Respiratory WDL WDL       Skin Circulation/Temperature WDL    Skin Circulation/Temperature WDL WDL       Cardiac WDL    Cardiac WDL WDL       Peripheral/Neurovascular WDL    Peripheral Neurovascular WDL WDL       Cognitive/Neuro/Behavioral WDL    Cognitive/Neuro/Behavioral WDL WDL

## 2023-04-04 NOTE — PROGRESS NOTES
[facility]  OUTPATIENT OCCUPATIONAL THERAPY  EVALUATION  PLAN OF TREATMENT FOR OUTPATIENT REHABILITATION  (COMPLETE FOR INITIAL CLAIMS ONLY)  Patient's Last Name, First Name, M.I.  YOB: 1955  Mariah Koehler                          Provider's Name  [facility] Medical Record No.  [id number]                             Onset Date:  04/04/23   Start of Care Date:  04/04/23   Type:     ___PT   _X_OT   ___SLP Medical Diagnosis:  nausea/vomiting                    OT Diagnosis:  decreased ADLs Visits from SOC:  1     See note for plan of treatment, functional goals and certification details    I CERTIFY THE NEED FOR THESE SERVICES FURNISHED UNDER        THIS PLAN OF TREATMENT AND WHILE UNDER MY CARE     (Physician co-signature of this document indicates review and certification of the therapy plan).                                04/04/23 1330   Appointment Info   Signing Clinician's Name / Credentials (OT) Mary Reyes, OTR/L   Quick Adds   Quick Adds Certification   Living Environment   People in Home spouse   Current Living Arrangements house   Home Accessibility stairs to enter home;stairs within home   Number of Stairs, Main Entrance 2   Number of Stairs, Within Home, Primary greater than 10 stairs   Transportation Anticipated family or friend will provide   Living Environment Comments has stairs down to lower level for showering   Self-Care   Current Activity Tolerance fair   Equipment Currently Used at Home walker, rolling  (4WW)   Instrumental Activities of Daily Living (IADL)   IADL Comments usually indep. ADLs and mobility w/ 4WW,   General Information   Onset of Illness/Injury or Date of Surgery 04/04/23   Patient/Family Therapy Goal Statement (OT) Dr. Sandor Garces   Additional Occupational Profile Info/Pertinent History of Current Problem Mariah Koehler is a 67 year old female with history as noted below, patient has recent  hospitalization at [facility] 3 weeks ago for viral gastroenteritis   Cognitive Status Examination   Behavioral Issues   (cooperative)   Affect/Mental Status (Cognitive) WFL   Follows Commands WFL   Visual Perception   Visual Impairment/Limitations   (no glasses on at evaluation)   Range of Motion Comprehensive   General Range of Motion no range of motion deficits identified   Strength Comprehensive (MMT)   General Manual Muscle Testing (MMT) Assessment no strength deficits identified   Coordination   Upper Extremity Coordination No deficits were identified   Bed Mobility   Bed Mobility sit-supine   Sit-Supine [place] (Bed Mobility) supervision   Transfers   Transfers bed-chair transfer;toilet transfer   Transfer Skill: Bed to Chair/Chair to Bed   Bed-Chair [place] (Transfers) contact guard   Toilet Transfer   Type (Toilet Transfer) sit-stand;stand-sit   [place] Level (Toilet Transfer) minimum assist (75% patient effort)   Balance   Balance Assessment standing balance: dynamic   Balance Comments fair   Activities of Daily Living   BADL Assessment/Intervention lower body dressing;toileting;grooming   Lower Body Dressing Assessment/Training   [place] Level (Lower Body Dressing) contact guard assist   Grooming Assessment/Training   [place] Level (Grooming) contact guard assist   Toileting   [place] Level (Toileting) contact guard assist   Clinical Impression   Criteria for Skilled Therapeutic Interventions Met (OT) Yes, treatment indicated   OT Diagnosis decreased ADLs   OT Problem List-Impairments impacting ADL balance;cognition;mobility   Assessment of Occupational Performance 3-5 Performance Deficits   Identified Performance Deficits toileting, trsfs, grooming, drsg   Planned Therapy Interventions (OT) ADL retraining;cognition;transfer training   Clinical Decision Making Complexity (OT) moderate complexity   Anticipated Equipment Needs Upon Discharge (OT)   (cont. to assess)    Risk & Benefits of therapy have been explained evaluation/treatment results reviewed;care plan/treatment goals reviewed;patient   OT Total Evaluation Time   OT Eval, Moderate Complexity Minutes (31978) 8   Therapy Certification   Medical Diagnosis nausea/vomiting   Start of Care Date 04/04/23   Certification date from 04/04/23   Certification date to 04/11/23   OT Goals   Therapy Frequency (OT) Daily   OT Predicted Duration/Target Date for Goal Attainment 04/10/23   OT Goals Toilet Transfer/Toileting;Transfers;Hygiene/Grooming;Cognition   OT: Hygiene/Grooming supervision/stand-by assist;while standing   OT: Transfer Supervision/stand-by assist;with assistive device   OT: Toilet Transfer/Toileting Supervision/stand-by assist;cleaning and garment management   OT: Cognitive Patient/caregiver will verbalize understanding of cognitive assessment results/recommendations as needed for safe discharge planning   Interventions   Interventions Quick Adds Self-Care/Home Management   Self-Care/Home Management   Self-Care/Home Mgmt/ADL, Compensatory, Meal Prep Minutes (87749) 8   Symptoms Noted During/After Treatment (Meal Preparation/Planning Training) fatigue  (nausea)   Treatment Detail/Skilled Intervention trsf bed>toilet w/ FWW w/ min A, cues for hand placement, min A w/ amb.d/t decreased balance, rtn to supine SBA, appeared fatigued w/ low activity tolerance   [place] Level (Grooming Training) contact guard  (stood at sink)   Assistance (Grooming Training) set-up required;supervision;verbal cues;1 person assist   Lower Body Dressing Training Assistance contact guard  (changed into clean underwaer and pants)   Lower Body Dressing Training Assistance set-up required;supervision;verbal cues;1 person assist   [place] Level (Toilet Training) contact guard   Assistance (Toilet Training) set-up required;supervision;verbal cues;1 person assist   Toilet Training Assistance - Assistive Device wall grab bar   OT Discharge  Planning   OT Plan trsfs, g/h, toileting, SLUMS   OT Discharge Recommendation (DC Rec) home with assist;home with home care occupational therapy   OT Rationale for DC Rec Ax1 for mobility and ADLs, d/t decreased balance, fatigue and low activity tolerance rec. A w/ functional mobility, household tasks and home OT assessment,stated spouse is available to A as needed   OT Brief overview of current status min A trfs, CGA drsg, CGA grooming   Total Session Time   Timed Code Treatment Minutes 8   Total Session Time (sum of timed and untimed services) 16

## 2023-04-05 ENCOUNTER — APPOINTMENT (OUTPATIENT)
Dept: OCCUPATIONAL THERAPY | Facility: HOSPITAL | Age: 68
End: 2023-04-05
Payer: COMMERCIAL

## 2023-04-05 ENCOUNTER — HOSPITAL ENCOUNTER (OUTPATIENT)
Facility: HOSPITAL | Age: 68
Setting detail: OBSERVATION
End: 2023-04-05
Admitting: INTERNAL MEDICINE
Payer: COMMERCIAL

## 2023-04-05 ENCOUNTER — APPOINTMENT (OUTPATIENT)
Dept: CT IMAGING | Facility: HOSPITAL | Age: 68
End: 2023-04-05
Attending: INTERNAL MEDICINE
Payer: COMMERCIAL

## 2023-04-05 VITALS
HEIGHT: 63 IN | OXYGEN SATURATION: 96 % | RESPIRATION RATE: 16 BRPM | TEMPERATURE: 98.2 F | SYSTOLIC BLOOD PRESSURE: 146 MMHG | DIASTOLIC BLOOD PRESSURE: 66 MMHG | WEIGHT: 112 LBS | HEART RATE: 72 BPM | BODY MASS INDEX: 19.84 KG/M2

## 2023-04-05 LAB
HOLD SPECIMEN: NORMAL
MAGNESIUM SERPL-MCNC: 1.7 MG/DL (ref 1.7–2.3)

## 2023-04-05 PROCEDURE — 99223 1ST HOSP IP/OBS HIGH 75: CPT | Performed by: NURSE PRACTITIONER

## 2023-04-05 PROCEDURE — 99239 HOSP IP/OBS DSCHRG MGMT >30: CPT | Performed by: INTERNAL MEDICINE

## 2023-04-05 PROCEDURE — 83735 ASSAY OF MAGNESIUM: CPT | Performed by: INTERNAL MEDICINE

## 2023-04-05 PROCEDURE — 72131 CT LUMBAR SPINE W/O DYE: CPT

## 2023-04-05 PROCEDURE — 250N000011 HC RX IP 250 OP 636: Performed by: INTERNAL MEDICINE

## 2023-04-05 PROCEDURE — 97535 SELF CARE MNGMENT TRAINING: CPT | Mod: GO

## 2023-04-05 PROCEDURE — 250N000013 HC RX MED GY IP 250 OP 250 PS 637: Performed by: HOSPITALIST

## 2023-04-05 PROCEDURE — 250N000011 HC RX IP 250 OP 636: Performed by: HOSPITALIST

## 2023-04-05 PROCEDURE — 36415 COLL VENOUS BLD VENIPUNCTURE: CPT | Performed by: INTERNAL MEDICINE

## 2023-04-05 PROCEDURE — G0378 HOSPITAL OBSERVATION PER HR: HCPCS

## 2023-04-05 PROCEDURE — 96375 TX/PRO/DX INJ NEW DRUG ADDON: CPT

## 2023-04-05 PROCEDURE — 258N000003 HC RX IP 258 OP 636: Performed by: INTERNAL MEDICINE

## 2023-04-05 PROCEDURE — 72128 CT CHEST SPINE W/O DYE: CPT

## 2023-04-05 PROCEDURE — 96376 TX/PRO/DX INJ SAME DRUG ADON: CPT

## 2023-04-05 RX ORDER — ACETAMINOPHEN 325 MG/1
325-650 TABLET ORAL EVERY 6 HOURS PRN
COMMUNITY
Start: 2023-04-05

## 2023-04-05 RX ORDER — LANOLIN ALCOHOL/MO/W.PET/CERES
3 CREAM (GRAM) TOPICAL AT BEDTIME
Status: DISCONTINUED | OUTPATIENT
Start: 2023-04-05 | End: 2023-04-05 | Stop reason: HOSPADM

## 2023-04-05 RX ORDER — TRAZODONE HYDROCHLORIDE 50 MG/1
50 TABLET, FILM COATED ORAL AT BEDTIME
Status: DISCONTINUED | OUTPATIENT
Start: 2023-04-05 | End: 2023-04-05 | Stop reason: HOSPADM

## 2023-04-05 RX ADMIN — Medication 1 MG: at 02:10

## 2023-04-05 RX ADMIN — PROMETHAZINE HYDROCHLORIDE 25 MG: 25 INJECTION INTRAMUSCULAR; INTRAVENOUS at 09:29

## 2023-04-05 RX ADMIN — CIPROFLOXACIN 400 MG: 2 INJECTION, SOLUTION INTRAVENOUS at 02:10

## 2023-04-05 RX ADMIN — PANTOPRAZOLE SODIUM 40 MG: 40 TABLET, DELAYED RELEASE ORAL at 16:18

## 2023-04-05 RX ADMIN — ONDANSETRON 4 MG: 2 INJECTION INTRAMUSCULAR; INTRAVENOUS at 15:26

## 2023-04-05 RX ADMIN — METRONIDAZOLE 500 MG: 500 INJECTION, SOLUTION INTRAVENOUS at 00:40

## 2023-04-05 RX ADMIN — METOCLOPRAMIDE 5 MG: 5 INJECTION, SOLUTION INTRAMUSCULAR; INTRAVENOUS at 06:11

## 2023-04-05 RX ADMIN — HYDRALAZINE HYDROCHLORIDE 10 MG: 20 INJECTION INTRAMUSCULAR; INTRAVENOUS at 12:52

## 2023-04-05 RX ADMIN — ACETAMINOPHEN 975 MG: 325 TABLET ORAL at 14:54

## 2023-04-05 RX ADMIN — HYDROMORPHONE HYDROCHLORIDE 0.2 MG: 0.2 INJECTION, SOLUTION INTRAMUSCULAR; INTRAVENOUS; SUBCUTANEOUS at 03:24

## 2023-04-05 RX ADMIN — ONDANSETRON 4 MG: 2 INJECTION INTRAMUSCULAR; INTRAVENOUS at 03:24

## 2023-04-05 RX ADMIN — HYDROMORPHONE HYDROCHLORIDE 0.2 MG: 0.2 INJECTION, SOLUTION INTRAMUSCULAR; INTRAVENOUS; SUBCUTANEOUS at 00:06

## 2023-04-05 RX ADMIN — ATENOLOL 25 MG: 25 TABLET ORAL at 08:26

## 2023-04-05 RX ADMIN — HYDROMORPHONE HYDROCHLORIDE 0.2 MG: 0.2 INJECTION, SOLUTION INTRAMUSCULAR; INTRAVENOUS; SUBCUTANEOUS at 16:18

## 2023-04-05 RX ADMIN — HYDROMORPHONE HYDROCHLORIDE 0.2 MG: 0.2 INJECTION, SOLUTION INTRAMUSCULAR; INTRAVENOUS; SUBCUTANEOUS at 05:55

## 2023-04-05 RX ADMIN — PANTOPRAZOLE SODIUM 40 MG: 40 TABLET, DELAYED RELEASE ORAL at 07:02

## 2023-04-05 RX ADMIN — PROMETHAZINE HYDROCHLORIDE 25 MG: 25 INJECTION INTRAMUSCULAR; INTRAVENOUS at 00:12

## 2023-04-05 ASSESSMENT — ACTIVITIES OF DAILY LIVING (ADL)
ADLS_ACUITY_SCORE: 31

## 2023-04-05 NOTE — PLAN OF CARE
PRIMARY DIAGNOSIS: GASTROENTERITIS    OUTPATIENT/OBSERVATION GOALS TO BE MET BEFORE DISCHARGE  1. Orthostatic performed: N/A    2. Tolerating PO fluid and/or antibiotics (if applicable): Yes    3. Nausea/Vomiting/Diarrhea symptoms improved: No,  Nausea  persistent    4. Pain status: Improved but still requiring IV narcotics.    5. Return to near baseline physical activity: No    Discharge Planner Nurse   Safe discharge environment identified: No  Barriers to discharge: Yes       Entered by: Kayla Barker RN 04/04/2023 9:52 PM     Please review provider order for any additional goals.   Nurse to notify provider when observation goals have been met and patient is ready for discharge.Goal Outcome Evaluation:

## 2023-04-05 NOTE — PROGRESS NOTES
Care Management Follow Up    Length of Stay (days): 0    Expected Discharge Date: 04/05/2023     Concerns to be Addressed: discharge planning     Patient plan of care discussed at interdisciplinary rounds: Yes    Anticipated Discharge Disposition: Home, Home Care     Anticipated Discharge Services: None  Anticipated Discharge DME: None    Patient/family educated on Medicare website which has current facility and service quality ratings: no  Education Provided on the Discharge Plan: Yes  Patient/Family in Agreement with the Plan: yes    Referrals Placed by CM/SW: Homecare  Private pay costs discussed: Not applicable    Additional Information:  JOSEPH met with pt. Therapist also present. Discussed therapy recommendations for home care. Pt initially stated that she wanted to think about it but then discussed that she has had issues getting coverage for home care through her insurance in the past. She reports that she is open to home care if it is covered. She was agreeable to JOSEPH sending home care referrals and having home care check her insurance coverage. JOSEPH sent initial referral and will follow.      ONESIMO Hammer    Addendum: JOSEPH notified that pt accepted by [facility] and has active insurance.  1:17 PM

## 2023-04-05 NOTE — CONSULTS
NEUROSURGERY CONSULTATION NOTE    Mariah Koehler   [address]  [place] [address]  67 year old female  Admission Date/Time: 4/4/2023  1:46 AM  Primary Care Provider: Alex Nolasco  [facility] Attending Physician: Kuldeep Mejia MD    Neurosurgery was asked to see this patient by Kuldeep Mejia MD for evaluation of headache.     PROBLEM LIST:  Principal Problem:    Intractable nausea and vomiting  Active Problems:    Reflex Sympathetic Dystrophy Of The Left Upper Limb    Hypertension    Unintentional weight loss    Colitis    Presence of intrathecal baclofen pump    Ketonuria       Neurosurgery Attending: The patient's clinical examination, laboratory data, and plan was discussed with Dr. Mcbride     CONSULTATION ASSESSMENT AND PLAN:  67 year old female with long standing hx of headaches, not recently seen by neurology - currently admitted for nausea/vomiting/gastritis and subsequently complained of headache. Mariah tells me her headaches can wax and wane in intensity and often be associated with vision changes, she has seen many providers in the past but none recently for headache. Her  was concerned after he saw information from the [facility] regarding csf leak. As Mariah has a baclofen pump, he thought this may be a cause. Mariah tells me that her headaches are not positional and are not constant, they can wax and wane, be severe or mild. She has had a spinal fluid leak in the past actually after spinal cord stimulator of some kind was removed and she agrees her headaches are not similar.     We discussed her imaging findings did not show any leak and given that her headaches are not positional and have been ongoing for many years (very unlikely to have chronic leaking without infection at some point) that this is very unlikely to be related to spinal fluid leak. As she has not been evaluated by a headache clinic in quite some time, she is agreeable to this plan. She has had good success in  "the past from a \"headache cocktail\"     No further recommendations from neurosurgery perspective, recommend outpatient follow up with headache clinic. We will sign off.     HPI:  67 year old female with many year history of headaches, often associated with vision changes/floaters, can be associated with nausea. Sometimes the headaches are mild and sometimes they are severe. She has not seen anyone for headaches in quite some time and cannot recall medications in the past that she has tried. She does recall if she gets a very severe headache she has gotten a \"headache cocktail\" of likely dexamethasone, toradol, benedryl and this has been significantly helpful in knocking down the bad headaches. She does feel her vision changes and she is not sure if this improves if the headache resolves. Her  was concerned given her baclofen pump that she could have a slow spinal fluid leak. Mariah has actually had a spinal leak in the past which was associated with severe headache and leaking from a lead placement site. This resolved with three days of flat bedrest. She agrees these headaches are not similar. Denies other neurologic deficits. No new numbness/tingling/weakness.   Past Medical History:   Diagnosis Date     Current mild episode of major depressive disorder, unspecified whether recurrent (H) 02/17/2020     Hypertension      Opiate dependence (H)      RSD (reflex sympathetic dystrophy)      Sacral fracture, closed (H)      Past Surgical History:   Procedure Laterality Date     APPENDECTOMY       ESOPHAGOSCOPY, GASTROSCOPY, DUODENOSCOPY (EGD), COMBINED N/A 2/20/2023    Procedure: ESOPHAGOGASTRODUODENOSCOPY with biopsies;  Surgeon: Na Brooks MD;  Location: [facility] Main OR     GYN SURGERY       HC REVISE MEDIAN N/CARPAL TUNNEL SURG      Description: Neuroplasty Decompression Median Nerve At Carpal Tunnel;  Recorded: 09/19/2011;     HYSTERECTOMY  1984     IR CERVICAL EPIDURAL STEROID INJECTION  " "1/14/2005     OOPHORECTOMY Bilateral 1985     MD SYMPATHECTOMY,CERVICOTHORACIC      Description: Surgical Sympathectomy Cervicothoracic;  Recorded: 09/19/2011;     [facility] DECOMPRESS FASCIOTOMY FINGR/HAND      Description: Decompression Fasciotomy Of The Hand;  Recorded: 09/19/2011;     [facility] LAP,CHOLECYSTECTOMY/EXPLORE      Description: Cholecystectomy Laparoscopic;  Recorded: 12/09/2011;     [facility] TOTAL ABDOM HYSTERECTOMY      Description: Hysterectomy;  Recorded: 03/23/2011;       REVIEW OF SYSTEMS:  As above     MEDICATIONS:  No current outpatient medications on file.         ALLERGIES/SENSITIVITIES:     Allergies   Allergen Reactions     Codeine Nausea and Vomiting     Morphine Nausea and Vomiting     Bacitracin Rash     Methadone Rash       PERTINENT SOCIAL HISTORY: reviewed  Social History     Socioeconomic History     Marital status:      Spouse name: None     Number of children: None     Years of education: None     Highest education level: None   Tobacco Use     Smoking status: Every Day     Packs/day: 0.75     Years: 51.00     Pack years: 38.25     Types: Cigarettes     Start date: 1/1/1968     Smokeless tobacco: Never   Vaping Use     Vaping status: Never Used   Substance and Sexual Activity     Alcohol use: Not Currently     Comment: 1 glass a year     Drug use: Never         FAMILY HISTORY:  Family History   Problem Relation Age of Onset     Cerebrovascular Disease Mother      Diabetes Mother      Heart Disease Mother      Hypertension Mother      Rheumatologic Disease Mother      Heart Disease Father      Pulmonary Hypertension Father      Kidney failure Father      Cancer Father         melanoma     Diabetes Sister      Hypertension Sister      Hypertension Brother         PHYSICAL EXAM:   Constitutional: BP (!) 146/66 (BP Location: Left arm)   Pulse 72   Temp 98.2  F (36.8  C) (Oral)   Resp 16   Ht 1.588 m (5' 2.5\")   Wt 50.8 kg (112 lb)   SpO2 96%   BMI 20.16 kg/m       Mental Status: A & O " in no acute distress.  Affect is appropriate.  Speech is fluent.  Recent and remote memory are intact.  Attention span and concentration are normal.     Cranial Nerves: CN1: grossly intact per patient recall. CN2: No funduscopic exam performed. CN3,4 & 6: Pupillary light response, lateral and vertical gaze normal.  No nystagmus.  Visual fields are full to confrontation. CN5: Intact to touch CN7: No facial weakness, smile, facial symmetry intact. CN8: Intact to spoken voice. CN9&10: Gag reflex, uvula midline, palate rises with phonation. CN11: Shoulder shrug 5/5 intact bilaterally. CN12: Tongue midline and moves freely from side to side.     Motor: No pronator drift of upper extremity. Normal bulk and tone all muscle groups of upper and lower extremities.    Strength: GEE     Sensory: Sensation intact bilaterally to light touch.      IMAGING:  I personally reviewed all radiographic images     CT lumbar and CT thoracic reviewed - no evidence of leakage near intrathecal pump insertion site.       I spent more than 80 minutes in this apt, examining the pt, reviewing the scans, reviewing notes from chart, discussing treatment options with risks and benefits and coordinating care. >50 % clinic time was spent in face to face counseling and coordinating care    Rachele Fox NP      Cc:   No referring provider defined for this encounter.    Alex Nolasco  [unfilled]

## 2023-04-05 NOTE — PLAN OF CARE
Problem: Nausea and Vomiting  Goal: Nausea and Vomiting Relief  Outcome: Not Progressing  Intervention: Prevent and Manage Nausea and Vomiting  Recent Flowsheet Documentation  Taken 4/4/2023 2147 by Kayla Barker RN  Nausea/Vomiting Interventions: antiemetic  Taken 4/4/2023 1816 by Kayla Barker, RN  Nausea/Vomiting Interventions: antiemetic   Goal Outcome Evaluation:         Nausea continues to be persistent. PRN zofran, compazine and reglan given and was reported effective for short while. Pain to left arm and lower back, IV dilaudid effective for pain. A1 to BSC. A&Ox4. Ate 50% of dinner.

## 2023-04-05 NOTE — PLAN OF CARE
Goal Outcome Evaluation:       Patient discharged this evening. Discharge summary reviewed, questions encouraged and answered. Belongings returned to patient at discharge. Patient escorted out of building via wheelchair.

## 2023-04-05 NOTE — UTILIZATION REVIEW
Concurrent stay review; Secondary Review Determination       Under the authority of the Utilization Management Committee, the utilization review process indicated a secondary review on the above patient.  The review outcome is based on review of the medical records, discussions with staff, and applying clinical experience noted on the date of the review.          (x) Observation Status Appropriate - Concurrent stay review    RATIONALE FOR DETERMINATION     Ms. Koehler is a 66 yo female with a PMH of IBS, opioid dependence and HTN pt who presents to the ED with recurrent nausea/vomiting and dehydration.  CT imaging with colitis; improved with po abx and supportive care and will be discharging home this afternoon.  GI recommends continuing with prn meds as PTA and outpt colonoscopy.     D/w Dr. Mejia.      Patient is clinically improving and there is no clear indication to change patient's status to inpatient. The severity of illness, intensity of service provided, expected LOS and risk for adverse outcome make the care appropriate for observation.      The information on this document is developed by the utilization review team in order for the business office to ensure compliance.  This only denotes the appropriateness of proper admission status and does not reflect the quality of care rendered.         The definitions of Inpatient Status and Observation Status used in making the determination above are those provided in the CMS Coverage Manual, Chapter 1 and Chapter 6, section 70.4.          Sincerely,       Beth Haywood, DO  Utilization Review  Physician Advisor  [facility].

## 2023-04-05 NOTE — CONSULTS
"CLINICAL NUTRITION SERVICES - Brief NOTE      REASON FOR ASSESSMENT  Mariah Koehler is a/an 67 year old female assessed by the dietitian for Provider Order - ?malnutrition    NUTRITION HISTORY  Met patient and patient's spouse.  They report patient had lost 10 lbs around November last year due to decreased po intake.  Patient has since gained some weight back.  Patient's spouse reports patient is a picky eater.  Prior to admit patient had nausea and emesis.   Patient reports that today she has had some issues with mashed potatoes and pound cake getting stuck in her throat.  She tried the potatoes again with some butter and gravy and they went down much better.     CURRENT NUTRITION ORDERS  Diet: Regular    ANTHROPOMETRICS  Height: 158.8 cm (5' 2.5\")  Most Recent Weight: 50.8 kg (112 lb)    Weight History:   Wt Readings from Last 12 Encounters:   04/03/23 50.8 kg (112 lb)   03/17/23 50.8 kg (112 lb)   02/20/23 47.6 kg (105 lb)   01/26/23 48.5 kg (107 lb)   09/30/22 48.5 kg (107 lb)   04/06/22 53.1 kg (117 lb)   07/19/21 55.8 kg (123 lb)   04/14/21 58.1 kg (128 lb)   06/25/20 59 kg (130 lb)   02/14/20 59.1 kg (130 lb 6 oz)   05/07/19 65.5 kg (144 lb 6.4 oz)   10/12/18 65.3 kg (144 lb)   no recent wt loss found.      MALNUTRITION:  % Weight Loss:  No recent wt loss found.  % Intake:  Decreased intake does not meet criteria for malnutrition.  Patient was eating a bit better at home than she is in the hospital.  Subcutaneous Fat Loss:  Orbital region mild depletion.  No depletion noted in triceps  Muscle Loss:  Temporal region mild depletion. No depletion noted in acromion/clavicle area  Fluid Retention:  None noted    Malnutrition Diagnosis: Patient does not meet two of the above criteria necessary for diagnosing malnutrition    INTERVENTIONS  Implementation  No malnutrition found at this time.  Medical food supplement therapy.  Ensure Enlive daily   Keep food moist        "

## 2023-04-05 NOTE — PROGRESS NOTES
Pt still nauseated despite noc shift giving her almost q2hr antiemetic as ordered/needed. Will give promethazine when arrives from pharmacy. B/p elevated prior to giving atenolol. No diarrhea or difficulties urinating. Gait hunched but steady & alerts staff to toileting needs before she gets up.

## 2023-04-05 NOTE — PROGRESS NOTES
Pt in bed, picking at her lunch. Did report that she has had difficulty swallowing mashed potatoes where she felt it got stuck, states she has gerd as well. Pt called to have ct spine, lumbar area.

## 2023-04-05 NOTE — PROGRESS NOTES
[facility] Progress Note     IMPRESSION:  Mariah Koehler is a 67 year old female with PMH of HTN, COPD, Opioid dependence, recurrent nausea/vomiting who presented to the ED today (4/4/23) for recurrent nausea/vomiting.      1. Nausea/vomiting - Improved  - Suspect acute gastroenteritis with sudden onset of symptoms although this is the second episode of severe nausea/vomiting within the past 2 months. Previous episode in Feb 2023. EGD at the time noted a hiatal hernia and mild chronic gastritis but otherwise unremarkable.   Differential:  poorly managed GERD, hiatal hernia, narcotic bowel syndrome / narcotic induced gastroparesis/constipation.      2. Colitis  - CT with mild colonic wall thickening in the rectosigmoid, similar to previous CT in Feb 2023.   - Outpatient colonoscopy recommended however she has not been able to schedule.    3. New dysphagia?  - New report of dysphagia with bready/starchy foods that occurred over the past 1-2 days. Possibly related to throat irritation from frequent nausea/vomiting.  - Monitor for now, consider outpatient dysphagia workup if persistent.     RECOMMENDATIONS:  - continue with antiemetics PRN at home    - She should take PPI daily    - Diet: as tolerated    - Outpatient colonoscopy (pt will call to schedule). Would consider [facility] clinic follow up for ongoing management of nausea.     GI will sign off at this time. She is likely ok for discharge soon if tolerating diet. Outpatient follow up discussed. Thank you for consulting us on this pleasant patient. Please call if questions arise or the patient's status changes.       Approximately 15 minutes of total time was spent providing patient care, including patient evaluation, reviewing documentation/test results, and     Lucio Dover PA-C  [facility]  [phone]  ________________________________________________________________________      SUBJECTIVE:    Feels better today. Less  "nauseous. Tolerating some dietary intake. Reports new onset of dsyphagia yesterday when trying to swallow mashed potatoes and bread. No difficulties with liquids or other consistencies. No diarrhea.      OBJECTIVE:  BP (!) 175/79 (BP Location: Left arm)   Pulse 80   Temp 97.9  F (36.6  C) (Oral)   Resp 18   Ht 1.588 m (5' 2.5\")   Wt 50.8 kg (112 lb)   SpO2 96%   BMI 20.16 kg/m    Temp (24hrs), Av.3  F (36.8  C), Min:97.9  F (36.6  C), Max:98.6  F (37  C)    Patient Vitals for the past 72 hrs:   Weight   23 2228 50.8 kg (112 lb)       Intake/Output Summary (Last 24 hours) at 2023 1153  Last data filed at 2023 0817  Gross per 24 hour   Intake 1170 ml   Output --   Net 1170 ml        PHYSICAL EXAM  GEN: Alert, oriented x3, communicative and in NAD.    LYMPH: No LAD noted.  HRT: RRR  LUNGS: CTA  ABD:  ND, +BS, no guarding or pain to palpation, no rebound, no HSM.  SKIN: No rash, jaundice or spider angiomata      LABS:  I have reviewed the patient's new clinical lab results:     Recent Labs   Lab Test 23  0839 20  1451 19  1441   WBC 8.3 6.8 5.6   < > 8.1   HGB 13.2 16.2* 12.6   < > 13.0   MCV 88 85 88   < > 91    228 181   < > 196   INR  --   --   --   --  1.01    < > = values in this interval not displayed.     Recent Labs   Lab Test 23  0739 23  1209   POTASSIUM 3.9 3.7 3.9   CHLORIDE 102 100 106   CO2 25 24 29   BUN 12.5 13.8 11.9   CR 0.55 0.50* 0.68   ANIONGAP 10 16* 10     Recent Labs   Lab Test 23  0007 23  1209 23  0939 23  1005 22  1545 21  1449 20  1834 20  1544   ALBUMIN  --  4.6 4.2 3.7 4.3   < >  --    < >  --  4.4   BILITOTAL  --  0.5 0.2 0.6 0.4   < >  --    < >  --  0.4   ALT  --  11 11 11 15   < >  --    < >  --  10   AST  --  24 18 15 16   < >  --    < >  --  15   PROTEIN 50*  --   --   --   --   --  20*  --  30 mg/dL*  " --    LIPASE  --  11*  --   --  14  --   --   --   --  <9    < > = values in this interval not displayed.         IMAGING:  reviewed

## 2023-04-05 NOTE — PLAN OF CARE
Goal Outcome Evaluation:      Plan of Care Reviewed With: patient          Outcome Evaluation: pt has not had significant nausea since promethazine given this am, has been eating meals without discomfort. up with stand by assist in room with steady gait.

## 2023-04-05 NOTE — DISCHARGE SUMMARY
Occupational Therapy Discharge Summary    Reason for therapy discharge:    All goals and outcomes met, no further needs identified.    Progress towards therapy goal(s). See goals on Care Plan in [facility] electronic health record for goal details.  Goals met    Therapy recommendation(s):    pt would benefit from HHOT for joint protection/ back safety for IADLs

## 2023-04-06 ENCOUNTER — PATIENT OUTREACH (OUTPATIENT)
Dept: CARE COORDINATION | Facility: CLINIC | Age: 68
End: 2023-04-06
Payer: COMMERCIAL

## 2023-04-06 NOTE — PROGRESS NOTES
JOSEPH noted that pt was discharged yesterday evening and no home care orders were placed at time of discharge. JOSEPH reached out to discharging MD who was able to place home care PT/OT orders this morning. JOSEPH called [facility] and spoke with liaison Jose Francisco ([phone]). He reports understanding that pt was discharged yesterday. He reports that he was able to get pts home care orders from [facility] and does not need SW to send anything else. They will plan to get pt started with home care services.    ONESIMO Hammer on 4/6/2023 at 9:03 AM

## 2023-04-06 NOTE — PLAN OF CARE
Physical Therapy Discharge Summary    Reason for therapy discharge:    Discharged to home with home therapy.    Progress towards therapy goal(s). See goals on Care Plan in [facility] electronic health record for goal details.  Goals not met.  Barriers to achieving goals:   discharge from facility.    Therapy recommendation(s):    Continued therapy is recommended.  Rationale/Recommendations:  recommend home PT.     Rosy Robles, PT  4/6/2023

## 2023-04-06 NOTE — PROGRESS NOTES
Clinic Care Coordination Contact  [facility]: Post-Discharge Note  SITUATION                                                      Admission:    Admission Date: 04/04/23   Reason for Admission: Intractable nausea and vomiting  Discharge:   Discharge Date: 04/05/23  Discharge Diagnosis: Intractable nausea and vomiting    BACKGROUND                                                      Per hospital discharge summary and inpatient provider notes:Mariah Koehler is a 67 year old female with history as noted below, patient has recent hospitalization at [facility] 3 weeks ago for viral gastroenteritis.    She has somewhat acute onset episode today with nausea vomiting and not able to take her meds and keep anything down with left lower quadrant pain and episode of diarrhea.  She also had left lower quadrant pain.  UA has ketonuria and proteinuria, history of recent weight loss and patient is thin concerning for a malnutrition, mildly elevated anion gap possibly due to dehydration or ketones, creatinine normal , she has some hemoconcentration from dehydration.  CT abdomen suggest some colitis and patient due to nausea vomiting not able to take any medication hydration started on Cipro Flagyl IV antibiotics in the ED and plan for hospital observation for further treatment due to significant nausea not able to take any medication.  Her CRP is normal, it could be again a viral etiology but with progressive symptoms will monitor patient -we will also add a procalcitonin and monitor for diarrhea in that case we may be able to get stool samples.  We will treat his colitis with Cipro Flagyl for now and continue monitoring, supportive care with fluids.  Can consider GI consultation and colonoscopy while in the hospital-or if progresses well,we will resume outpatient GI follow-up as planned from recent hospitalization at [facility]      ASSESSMENT           Discharge Assessment  How are you doing now that you are home?:  "\" I am doing ok, homecare people will be coming soon \"  How are your symptoms? (Red Flag symptoms escalate to triage hotline per guidelines): Improved  Do you feel your condition is stable enough to be safe at home until your provider visit?: Yes  Does the patient have their discharge instructions? : Yes  Does the patient have questions regarding their discharge instructions? : No  Were you started on any new medications or were there changes to any of your previous medications? : Yes  Does the patient have all of their medications?: Yes  Do you have questions regarding any of your medications? : No  Do you have all of your needed medical supplies or equipment (DME)?  (i.e. oxygen tank, CPAP, cane, etc.): Yes    Post-op ([facility] CTA Only)  If the patient had a surgery or procedure, do they have any questions for a nurse?: No             PLAN                                                      Outpatient Plan: Follow-up and recommended labs and tests  Follow up with primary care provider, Alex Nolasco, within 7 days to evaluate medication change, to evaluate  treatment change, and for hospital follow- up. No follow up labs or test are needed.    No future appointments.      For any urgent concerns, please contact our 24 hour nurse triage line: [phone] ([phone])         Johnny Carlos                  "

## 2023-04-07 NOTE — DISCHARGE SUMMARY
[facility]  Hospitalist Discharge Summary      Date of Admission:  4/4/2023  Date of Discharge:  4/5/2023  5:32 PM  Discharging Provider: Kuldeep Mejia MD  Discharge Service: Hospitalist Service     Summary:  67 year old female with history of COPD still smoking, RSD left upper limb, hypertension, weight loss, chronic back pain with implanted baclofen pump, patient had recent hospitalization at [facility] 5 weeks ago for viral gastroenteritis with normal upper endoscopy, mid-distal colon thickening.    18 hours prior to hospitalization, she had onset acute nausea and vomiting, not able to take her meds or keep anything down. Accompanied by left lower quadrant pain and episode of diarrhea.  UA has ketonuria and proteinuria, history of recent weight loss and patient is thin concerning for a malnutrition, mildly elevated anion gap possibly due to dehydration or ketones, creatinine normal , she has some hemoconcentration from dehydration.    Discharge Diagnoses   Colitis: CT abdomen shows colon wall thickening. Started on Cipro Flagyl IV antibiotics in the ED, CRP normal, no fever. Stop. Consult GI. Planning colonoscopy as outpatient.  Normal lactic acid, no bloody stool, less likely to be ischemic colitis  N&V: resolving. Pt and  report several episodes last 2 months.  Headache: 2/10 after medications.   concerned for thecal pump spinal fluid leak.  Consulted neurosurgery - CT of thoracic and lumbar spine does not show free fluid collection - low likelihood of spinal fluid leak  Weight loss: encourage nutritional intake  Apr 2022: 53.1 kg (117 lb)    Sep 2022: 48.5 kg (107 lb)  Jan 2023: 48.5 kg (107 lb)  Feb 2023: 105 lbs   Apr 2023: 112 lbs  Tobacco abuse: encourage cessation   Depressive symptoms: patient feels she is not depressed, not interested in taking SSRI    Follow-ups Needed After Discharge   Follow-up Appointments     Follow-up and recommended labs and tests        Follow up with primary care provider, Alex Nolasco, within 7 days   to evaluate medication change, to evaluate treatment change, and for   hospital follow- up.  No follow up labs or test are needed.             Unresulted Labs Ordered in the Past 30 Days of this Admission     Date and Time Order Name Status Description    4/4/2023 12:58 AM Blood Culture Line, venous Preliminary     4/4/2023 12:58 AM Blood Culture Peripheral Blood Preliminary       These results will be followed up by primary care    Discharge Disposition   Discharged to home  Condition at discharge: Stable    Consultations This Hospital Stay   PHYSICAL THERAPY ADULT IP CONSULT  OCCUPATIONAL THERAPY ADULT IP CONSULT  NUTRITION SERVICES ADULT IP CONSULT  GASTROENTEROLOGY IP CONSULT  CARE MANAGEMENT / SOCIAL WORK IP CONSULT  NEUROSURGERY IP CONSULT    Code Status   Prior    Time Spent on this Encounter   I, Kuldeep Mejia MD, personally saw the patient today and spent greater than 30 minutes discharging this patient.       Kuldeep Mejia MD  [address] [address]  Phone: [phone]  Fax: [phone]  ______________________________________________________________________    Physical Exam   Vital Signs:                   Weight: 112 lbs 0 oz    General Appearance:  Alert, cooperative, appears depressed, appears stated age   Head:  Normocephalic, without obvious abnormality, atraumatic   Eyes:  PERRL, conjunctiva/corneas clear, EOM's intact   Nose: Nares normal, septum midline, mucosa normal, no drainage   Throat: Lips, mucosa, and tongue normal;teeth and gums normal   Lungs:   Clear to auscultation bilaterally, respirations unlabored   Heart:  Regular rate and rhythm, S1, S2 normal, no murmur, rub or gallop   Abdomen:   Soft, non-tender, bowel sounds active all four quadrants,  no masses, no organomegaly   Extremities: Extremities normal, atraumatic, no cyanosis or edema    Skin: Skin color, texture, turgor normal, no rashes or lesions   Neurologic: Normal          Primary Care Physician   Alex Nolasco    Discharge Orders      Neurology Referral (Migraine Care Package)      Home Care Referral      Reason for your hospital stay    N&V, dehydration, headache     Follow-up and recommended labs and tests     Follow up with primary care provider, Alex Nolasco, within 7 days to evaluate medication change, to evaluate treatment change, and for hospital follow- up.  No follow up labs or test are needed.     Activity    Your activity upon discharge: activity as tolerated     Diet    Follow this diet upon discharge: Orders Placed This Encounter      Snacks/Supplements Adult: Ensure Enlive; With Meals      Regular Diet Adult       Significant Results and Procedures   Most Recent 3 CBC's:Recent Labs   Lab Test 04/04/23  0739 04/03/23 2303 02/20/23  0839   WBC 8.3 6.8 5.6   HGB 13.2 16.2* 12.6   MCV 88 85 88    228 181     Most Recent 3 BMP's:Recent Labs   Lab Test 04/04/23  0739 04/03/23 2303 03/17/23  1209    140 145   POTASSIUM 3.9 3.7 3.9   CHLORIDE 102 100 106   CO2 25 24 29   BUN 12.5 13.8 11.9   CR 0.55 0.50* 0.68   ANIONGAP 10 16* 10   ESTEFANY 8.4* 9.6 11.9*   GLC 96 123* 77     Most Recent 2 LFT's:Recent Labs   Lab Test 04/03/23 2303 03/17/23  1209   AST 24 18   ALT 11 11   ALKPHOS 99 88   BILITOTAL 0.5 0.2     Most Recent 3 INR's:Recent Labs   Lab Test 11/25/19  1441   INR 1.01     Most Recent ESR & CRP:Recent Labs   Lab Test 04/04/23  0739 04/03/23 2303 02/18/23  0939 02/17/23 2023 09/30/22  1005   SED  --   --   --   --  4   CRP  --   --  <0.1   < >  --    CRPI <3.00   < >  --   --  <3.00    < > = values in this interval not displayed.   ,   Results for orders placed or performed during the hospital encounter of 04/04/23   CT Abdomen Pelvis w Contrast    Narrative    EXAM: CT ABDOMEN PELVIS W CONTRAST  LOCATION: [facility]  DATE/TIME:  4/4/2023 12:20 AM    INDICATION: LLQ abdominal pain with NVD  COMPARISON: 02/17/2023, 11/08/2022  TECHNIQUE: CT scan of the abdomen and pelvis was performed following injection of IV contrast. Multiplanar reformats were obtained. Dose reduction techniques were used.  CONTRAST: isovue 370 67ml    FINDINGS:   LOWER CHEST: 2 to 3 mm lower lobe pulmonary nodules are unchanged. Large hiatal hernia. Mild elevation of the right hemidiaphragm.    HEPATOBILIARY: Cholelithiasis.    PANCREAS: Mild prominence the pancreatic duct, unchanged from 02/17/2023 but decreased compared to 11/08/2022.    SPLEEN: Unremarkable    ADRENAL GLANDS: Unremarkable    KIDNEYS/BLADDER: No hydronephrosis. 1 to 2 mm nonobstructing right lower pole renal calculus. Right upper pole renal cortical scarring. Stable small probable renal cysts. Decompressed bladder.    BOWEL: There is mild colonic wall thickening, greatest in the rectosigmoid. Findings are similar to prior. No small bowel obstruction.    LYMPH NODES: No significant retroperitoneal adenopathy.    VASCULATURE: Moderate atherosclerotic calcification without abdominal aortic aneurysm. Patent portal vein.    PELVIC ORGANS: No free fluid.    MUSCULOSKELETAL: Baclofen pump. Moderate anterolisthesis of L4 and L5 with associated degenerative change. No acute bony abnormality.      Impression    IMPRESSION:   1.  Colonic wall thickening consistent with mild colitis. Infectious or inflammatory etiologies are favored.  2.  Large hiatal hernia.  3.  No other significant interval change.   CT Thoracic Spine w/o Contrast    Narrative    EXAM: CT THORACIC SPINE W/O CONTRAST, CT LUMBAR SPINE W/O CONTRAST  LOCATION: [facility]  DATE/TIME: 4/5/2023 1:19 PM    INDICATION: headaches, thecal pain pump in place, eval for spinal fluid leak  COMPARISON: Thoracic spine MRI 06/15/2020. CT abdomen/pelvis 04/04/2023.  TECHNIQUE:  1) Routine CT Thoracic Spine without IV contrast. Multiplanar  reformats. Dose reduction techniques were used.   2) Routine CT Lumbar Spine without IV contrast. Multiplanar reformats. Dose reduction techniques were used.     FINDINGS:    THORACIC SPINE CT:  VERTEBRA: Mild upper thoracic levocurvature and thoracolumbar dextroscoliosis. Osseous structures appear diffusely demineralized. No displaced fracture or vertebral body compression. Facets are intact.    CANAL/FORAMINA: Mild multilevel discogenic and facet degenerative changes. No high-grade spinal canal or foraminal stenosis. Intrathecal catheter ascends the spinal canal to terminate at the T1-T2 level posteriorly. Redemonstrated multiple perineural   sleeve cysts seen to better advantage on comparison MRI.    PARASPINAL: No visible soft tissue abnormality. Large hiatal hernia is seen to better advantage on recent comparison CT abdomen pelvis.    LUMBAR SPINE CT:  VERTEBRA: Mild dextroscoliosis. Grade 1 anterolisthesis at L3-L4 and grade 1/2 anterolisthesis at L4-L5 most likely on a degenerative basis. Osseous structures appear diffusely demineralized. No displaced fracture or vertebral body compression. Facets   are intact.    CANAL/FORAMINA: Lower lumbar facet and discogenic degenerative changes include severe disc space narrowing with endplate sclerosis and vacuum disc at L4-L5 as well as moderate - severe right asymmetric disc space narrowing at L5-S1. Intrathecal catheter   enters spinal canal at the L1-L2 interlaminar space extending into the thoracic spine. Multilevel disc bulges with mild spinal canal stenosis at L4-L5. Multifocal foraminal stenosis includes moderate-severe on the left and severe the right at L4-L5 with   additional mild to moderate narrowing.    PARASPINAL: No visible soft tissue abnormality. Vascular calcifications compatible with atherosclerosis.      Impression    IMPRESSION:  THORACIC SPINE CT:  1.  No convincing CT findings of an acute osseous abnormality. Osseous demineralization can limit  this assessment.  2.  Sensitivity for detection of queried CSF leak is limited by noncontrast CT technique. If no contraindication, consider CT myelogram.  3.  Intrathecal catheter terminates at the T1-T2 level.  4.  Mild degenerative changes.    5.  Large hiatal hernia.    LUMBAR SPINE CT:  1.  No convincing CT findings of an acute osseous abnormality. Osseous demineralization can limit this assessment.   2.  Sensitivity for detection of queried CSF leak is limited by noncontrast CT technique. If no contraindication, consider CT myelogram.  3.  Intrathecal catheter ascends into the thoracic spine.  4.  Scoliosis, stepwise anterolisthesis at L3-L4 and L4-L5, and degenerative changes including moderate-severe foraminal stenosis at L4-L5.       CT Lumbar Spine w/o Contrast    Narrative    EXAM: CT THORACIC SPINE W/O CONTRAST, CT LUMBAR SPINE W/O CONTRAST  LOCATION: [facility]  DATE/TIME: 4/5/2023 1:19 PM    INDICATION: headaches, thecal pain pump in place, eval for spinal fluid leak  COMPARISON: Thoracic spine MRI 06/15/2020. CT abdomen/pelvis 04/04/2023.  TECHNIQUE:  1) Routine CT Thoracic Spine without IV contrast. Multiplanar reformats. Dose reduction techniques were used.   2) Routine CT Lumbar Spine without IV contrast. Multiplanar reformats. Dose reduction techniques were used.     FINDINGS:    THORACIC SPINE CT:  VERTEBRA: Mild upper thoracic levocurvature and thoracolumbar dextroscoliosis. Osseous structures appear diffusely demineralized. No displaced fracture or vertebral body compression. Facets are intact.    CANAL/FORAMINA: Mild multilevel discogenic and facet degenerative changes. No high-grade spinal canal or foraminal stenosis. Intrathecal catheter ascends the spinal canal to terminate at the T1-T2 level posteriorly. Redemonstrated multiple perineural   sleeve cysts seen to better advantage on comparison MRI.    PARASPINAL: No visible soft tissue abnormality. Large hiatal  hernia is seen to better advantage on recent comparison CT abdomen pelvis.    LUMBAR SPINE CT:  VERTEBRA: Mild dextroscoliosis. Grade 1 anterolisthesis at L3-L4 and grade 1/2 anterolisthesis at L4-L5 most likely on a degenerative basis. Osseous structures appear diffusely demineralized. No displaced fracture or vertebral body compression. Facets   are intact.    CANAL/FORAMINA: Lower lumbar facet and discogenic degenerative changes include severe disc space narrowing with endplate sclerosis and vacuum disc at L4-L5 as well as moderate - severe right asymmetric disc space narrowing at L5-S1. Intrathecal catheter   enters spinal canal at the L1-L2 interlaminar space extending into the thoracic spine. Multilevel disc bulges with mild spinal canal stenosis at L4-L5. Multifocal foraminal stenosis includes moderate-severe on the left and severe the right at L4-L5 with   additional mild to moderate narrowing.    PARASPINAL: No visible soft tissue abnormality. Vascular calcifications compatible with atherosclerosis.      Impression    IMPRESSION:  THORACIC SPINE CT:  1.  No convincing CT findings of an acute osseous abnormality. Osseous demineralization can limit this assessment.  2.  Sensitivity for detection of queried CSF leak is limited by noncontrast CT technique. If no contraindication, consider CT myelogram.  3.  Intrathecal catheter terminates at the T1-T2 level.  4.  Mild degenerative changes.    5.  Large hiatal hernia.    LUMBAR SPINE CT:  1.  No convincing CT findings of an acute osseous abnormality. Osseous demineralization can limit this assessment.   2.  Sensitivity for detection of queried CSF leak is limited by noncontrast CT technique. If no contraindication, consider CT myelogram.  3.  Intrathecal catheter ascends into the thoracic spine.  4.  Scoliosis, stepwise anterolisthesis at L3-L4 and L4-L5, and degenerative changes including moderate-severe foraminal stenosis at L4-L5.             Discharge  Medications   Discharge Medication List as of 4/5/2023  4:56 PM      START taking these medications    Details   acetaminophen (TYLENOL) 325 MG tablet Take 1-2 tablets (325-650 mg) by mouth every 6 hours as needed for mild pain or headaches, OTC         CONTINUE these medications which have NOT CHANGED    Details   atenolol (TENORMIN) 25 MG tablet TAKE 1 TABLET BY MOUTH EVERY DAY, Disp-90 tablet, R-3, E-Prescribe      baclofen (LIORESAL) 10 MG tablet TAKE 2 TABLETS (20 MG TOTAL) BY MOUTH FOUR TIMES A DAY., Disp-480 tablet, R-1, E-Prescribe      diphenhydrAMINE HCl (ZZZQUIL) 50 MG/30ML LIQD Take 15-30 mLs by mouth nightly as needed, Historical      medication given by implanted intrathecal pump continuous Drug # 1: Fentanyl (Sublimaze) - Conc: 2000 mcg/mL - Total Dose / 24 hours: 873.8 mcg    Drug # 2: Bupivacaine (Marcaine)  - Conc:17.7 mg/mL - Total Dose / 24 hours: 7.733 mg    Drug # 3: Hydromorphone (Dilaudid)  - Conc: 2.1 mg/mL - Total Dos e / 24 hours: 0.9175 mg    Diluent: NS    Infusion Rate: 0.4369 mL/24 hrs  Pump Reservoir Volume: 40 mL    Bolus doses: up to 14 bolus doses/24 hours;    Each bolus: fentanyl 81.1 mcg, 0.708 mg Bupivacaine, 0.0841 mg Dilaudid    Outside Clinic & Provider : [facility] in [place] [phone]  Last Refill Date: 03/09/2023  Next Refill Date: 04/13/2023  Low [place] Alarm Date:  04/16/2023  Pump : Loftware    Deliver Pump Medication to:   For [place]: If pump is within 7 days of deplet ion, pharmacist will enter a 'Pain Management Adult IP Consult' into the EHR, including 'IT pain pump management' as the reason for the consult., Historical      metoclopramide (REGLAN) 10 MG tablet Take 1 tablet (10 mg) by mouth 3 times daily as needed (Nausea) AS NEEDED FOR NAUSEA, Disp-30 tablet, R-0, E-Prescribe      omeprazole (PRILOSEC) 20 MG DR capsule TAKE 1 CAPSULE BY MOUTH TWICE A DAY BEFORE MEALS, Disp-180 capsule, R-3, E-Prescribe      ondansetron (ZOFRAN) 4 MG  tablet Take 1 tablet (4 mg) by mouth every 8 hours as needed for nausea, Disp-90 tablet, R-0, E-Prescribe      traZODone (DESYREL) 50 MG tablet TAKE 0.5 TABLETS (25 MG) BY MOUTH AT BEDTIME, Disp-45 tablet, R-3, E-Prescribe           Allergies   Allergies   Allergen Reactions     Codeine Nausea and Vomiting     Morphine Nausea and Vomiting     Bacitracin Rash     Methadone Rash

## 2023-04-09 LAB
BACTERIA BLD CULT: NO GROWTH
BACTERIA BLD CULT: NO GROWTH

## 2023-04-22 DIAGNOSIS — I10 ESSENTIAL (PRIMARY) HYPERTENSION: ICD-10-CM

## 2023-04-23 NOTE — TELEPHONE ENCOUNTER
"Routing refill request to provider for review/approval because:  Blood pressure out of range    Last Written Prescription Date:  4/29/2022  Last Fill Quantity: 90,  # refills: 3   Last office visit provider:  3/17/2023     Requested Prescriptions   Pending Prescriptions Disp Refills     atenolol (TENORMIN) 25 MG tablet [Pharmacy Med Name: ATENOLOL 25 MG TABLET] 90 tablet 3     Sig: TAKE 1 TABLET BY MOUTH EVERY DAY       Beta-Blockers Protocol Failed - 4/23/2023 10:19 AM        Failed - Blood pressure under 140/90 in past 12 months     BP Readings from Last 3 Encounters:   04/05/23 (!) 146/66   03/17/23 135/68   02/21/23 120/58                 Passed - Patient is age 6 or older        Passed - Recent (12 mo) or future (30 days) visit within the authorizing provider's specialty     Patient has had an office visit with the authorizing provider or a provider within the authorizing providers department within the previous 12 mos or has a future within next 30 days. See \"Patient Info\" tab in inbasket, or \"Choose Columns\" in Meds & Orders section of the refill encounter.              Passed - Medication is active on med list             Michelle Grimes RN 04/23/23 10:20 AM  "

## 2023-04-24 RX ORDER — ATENOLOL 25 MG/1
TABLET ORAL
Qty: 90 TABLET | Refills: 3 | Status: ON HOLD | OUTPATIENT
Start: 2023-04-24 | End: 2023-10-26

## 2023-04-25 ENCOUNTER — TELEPHONE (OUTPATIENT)
Dept: FAMILY MEDICINE | Facility: CLINIC | Age: 68
End: 2023-04-25
Payer: COMMERCIAL

## 2023-04-25 NOTE — TELEPHONE ENCOUNTER
Fax from [facility] stating PA required for 2/day dosing on Omeprazole 20 mg

## 2023-04-28 NOTE — TELEPHONE ENCOUNTER
Central Prior Authorization Team   Phone: [phone]    PA Initiation    Medication: Omeprazole 20 mg   Insurance Company: [employer] - Phone [phone] Fax [phone]  Pharmacy Filling the Rx: [id number] - [address]  Filling Pharmacy Phone: [phone]  Filling Pharmacy Fax:    Start Date: 4/28/2023

## 2023-04-28 NOTE — TELEPHONE ENCOUNTER
Prior Authorization Approval    Authorization Effective Date: 3/29/2023  Authorization Expiration Date: 4/27/2026  Medication: Omeprazole 20 mg   Approved Dose/Quantity:    Reference #:     Insurance Company: [employer] - Phone [phone] Fax [phone]  Expected CoPay:       CoPay Card Available:      Foundation Assistance Needed:    Which Pharmacy is filling the prescription (Not needed for infusion/clinic administered): [id number] - [address]  Pharmacy Notified: Yes  Patient Notified: Comment:  Pharmacy will notify patient

## 2023-05-18 DIAGNOSIS — G47.00 INSOMNIA, UNSPECIFIED TYPE: ICD-10-CM

## 2023-05-18 RX ORDER — TRAZODONE HYDROCHLORIDE 50 MG/1
25 TABLET, FILM COATED ORAL AT BEDTIME
Qty: 45 TABLET | Refills: 3 | Status: SHIPPED | OUTPATIENT
Start: 2023-05-18 | End: 2023-11-17

## 2023-05-18 NOTE — TELEPHONE ENCOUNTER
"Last Written Prescription Date:  5/3/22  Last Fill Quantity: 45,  # refills: 3   Last office visit provider:   3/17/23    Requested Prescriptions   Pending Prescriptions Disp Refills     traZODone (DESYREL) 50 MG tablet [Pharmacy Med Name: TRAZODONE 50 MG TABLET] 45 tablet 3     Sig: TAKE 0.5 TABLETS (25 MG) BY MOUTH AT BEDTIME       Serotonin Modulators Passed - 5/18/2023  3:48 PM        Passed - Recent (12 mo) or future (30 days) visit within the authorizing provider's specialty     Patient has had an office visit with the authorizing provider or a provider within the authorizing providers department within the previous 12 mos or has a future within next 30 days. See \"Patient Info\" tab in inbasket, or \"Choose Columns\" in Meds & Orders section of the refill encounter.              Passed - Medication is active on med list        Passed - Patient is age 18 or older        Passed - No active pregnancy on record        Passed - No positive pregnancy test in past 12 months             GIANFRANCO NARVAEZ RN 05/18/23 3:49 PM  "

## 2023-06-07 DIAGNOSIS — R11.0 NAUSEA: ICD-10-CM

## 2023-06-08 RX ORDER — ONDANSETRON 4 MG/1
TABLET, FILM COATED ORAL
Qty: 90 TABLET | Refills: 1 | Status: SHIPPED | OUTPATIENT
Start: 2023-06-08 | End: 2023-10-30

## 2023-06-08 NOTE — TELEPHONE ENCOUNTER
"Last Written Prescription Date:  3/17/23  Last Fill Quantity: 90,  # refills: 0   Last office visit provider:  3/17/23       Requested Prescriptions   Pending Prescriptions Disp Refills     ondansetron (ZOFRAN) 4 MG tablet [Pharmacy Med Name: ONDANSETRON HCL 4 MG TABLET] 90 tablet 0     Sig: TAKE 1 TABLET BY MOUTH EVERY 8 HOURS AS NEEDED FOR NAUSEA        Antivertigo/Antiemetic Agents Passed - 6/7/2023  1:11 PM        Passed - Recent (12 mo) or future (30 days) visit within the authorizing provider's specialty     Patient has had an office visit with the authorizing provider or a provider within the authorizing providers department within the previous 12 mos or has a future within next 30 days. See \"Patient Info\" tab in inbasket, or \"Choose Columns\" in Meds & Orders section of the refill encounter.              Passed - Medication is active on med list        Passed - Patient is 18 years of age or older             KRYS DAILEY RN 06/08/23 10:26 AM  "

## 2023-06-17 DIAGNOSIS — G89.4 CHRONIC PAIN SYNDROME: ICD-10-CM

## 2023-06-17 DIAGNOSIS — M62.838 MUSCLE SPASM: ICD-10-CM

## 2023-06-19 RX ORDER — BACLOFEN 10 MG/1
TABLET ORAL
Qty: 720 TABLET | Refills: 1 | Status: SHIPPED | OUTPATIENT
Start: 2023-06-19 | End: 2023-11-17

## 2023-07-10 ENCOUNTER — MYC REFILL (OUTPATIENT)
Dept: FAMILY MEDICINE | Facility: CLINIC | Age: 68
End: 2023-07-10
Payer: COMMERCIAL

## 2023-07-10 DIAGNOSIS — R11.0 NAUSEA: ICD-10-CM

## 2023-07-11 RX ORDER — METOCLOPRAMIDE 10 MG/1
10 TABLET ORAL 3 TIMES DAILY PRN
Qty: 30 TABLET | Refills: 0 | Status: ON HOLD | OUTPATIENT
Start: 2023-07-11 | End: 2023-07-28

## 2023-07-11 NOTE — TELEPHONE ENCOUNTER
"Routing refill request to provider for review/approval because:  Please review refill request, prescribed during hospital admission     Last Written Prescription Date:  2/21/23  Last Fill Quantity: 30,  # refills: 0   Last office visit provider:  3/17/23     Requested Prescriptions   Pending Prescriptions Disp Refills     metoclopramide (REGLAN) 10 MG tablet 30 tablet 0     Sig: Take 1 tablet (10 mg) by mouth 3 times daily as needed (Nausea) AS NEEDED FOR NAUSEA        Antivertigo/Antiemetic Agents Passed - 7/11/2023  9:39 AM        Passed - Recent (12 mo) or future (30 days) visit within the authorizing provider's specialty     Patient has had an office visit with the authorizing provider or a provider within the authorizing providers department within the previous 12 mos or has a future within next 30 days. See \"Patient Info\" tab in inbasket, or \"Choose Columns\" in Meds & Orders section of the refill encounter.              Passed - Medication is active on med list        Passed - Patient is 18 years of age or older             Ayah Ramirez RN 07/11/23 9:39 AM  "

## 2023-07-25 ENCOUNTER — HOSPITAL ENCOUNTER (OUTPATIENT)
Facility: HOSPITAL | Age: 68
Setting detail: OBSERVATION
Discharge: HOME | End: 2023-07-28
Attending: EMERGENCY MEDICINE | Admitting: INTERNAL MEDICINE
Payer: COMMERCIAL

## 2023-07-25 ENCOUNTER — APPOINTMENT (OUTPATIENT)
Dept: CT IMAGING | Facility: HOSPITAL | Age: 68
End: 2023-07-25
Attending: EMERGENCY MEDICINE
Payer: COMMERCIAL

## 2023-07-25 DIAGNOSIS — R11.2 NAUSEA AND VOMITING, UNSPECIFIED VOMITING TYPE: Primary | ICD-10-CM

## 2023-07-25 DIAGNOSIS — R11.0 NAUSEA: ICD-10-CM

## 2023-07-25 DIAGNOSIS — K52.9 COLITIS: ICD-10-CM

## 2023-07-25 DIAGNOSIS — R11.2 INTRACTABLE NAUSEA AND VOMITING: ICD-10-CM

## 2023-07-25 LAB
ALBUMIN SERPL BCG-MCNC: 4.5 G/DL (ref 3.5–5.2)
ALBUMIN UR-MCNC: NEGATIVE MG/DL
ALP SERPL-CCNC: 89 U/L (ref 35–104)
ALT SERPL W P-5'-P-CCNC: 12 U/L (ref 0–50)
ANION GAP SERPL CALCULATED.3IONS-SCNC: 12 MMOL/L (ref 7–15)
APPEARANCE UR: CLEAR
AST SERPL W P-5'-P-CCNC: 24 U/L (ref 0–45)
BASOPHILS # BLD AUTO: 0 10E3/UL (ref 0–0.2)
BASOPHILS NFR BLD AUTO: 0 %
BILIRUB SERPL-MCNC: 0.4 MG/DL
BILIRUB UR QL STRIP: NEGATIVE
BUN SERPL-MCNC: 9.3 MG/DL (ref 8–23)
CALCIUM SERPL-MCNC: 9.8 MG/DL (ref 8.8–10.2)
CHLORIDE SERPL-SCNC: 102 MMOL/L (ref 98–107)
COLOR UR AUTO: ABNORMAL
CREAT SERPL-MCNC: 0.6 MG/DL (ref 0.51–0.95)
DEPRECATED HCO3 PLAS-SCNC: 26 MMOL/L (ref 22–29)
EOSINOPHIL # BLD AUTO: 0 10E3/UL (ref 0–0.7)
EOSINOPHIL NFR BLD AUTO: 0 %
ERYTHROCYTE [DISTWIDTH] IN BLOOD BY AUTOMATED COUNT: 13.7 % (ref 10–15)
GFR SERPL CREATININE-BSD FRML MDRD: >90 ML/MIN/1.73M2
GLUCOSE SERPL-MCNC: 120 MG/DL (ref 70–99)
GLUCOSE UR STRIP-MCNC: NEGATIVE MG/DL
HCT VFR BLD AUTO: 48.2 % (ref 35–47)
HGB BLD-MCNC: 16 G/DL (ref 11.7–15.7)
HGB UR QL STRIP: ABNORMAL
IMM GRANULOCYTES # BLD: 0 10E3/UL
IMM GRANULOCYTES NFR BLD: 0 %
KETONES UR STRIP-MCNC: ABNORMAL MG/DL
LACTATE SERPL-SCNC: 1 MMOL/L (ref 0.7–2)
LEUKOCYTE ESTERASE UR QL STRIP: NEGATIVE
LIPASE SERPL-CCNC: 16 U/L (ref 13–60)
LYMPHOCYTES # BLD AUTO: 1 10E3/UL (ref 0.8–5.3)
LYMPHOCYTES NFR BLD AUTO: 14 %
MAGNESIUM SERPL-MCNC: 1.8 MG/DL (ref 1.7–2.3)
MCH RBC QN AUTO: 28.3 PG (ref 26.5–33)
MCHC RBC AUTO-ENTMCNC: 33.2 G/DL (ref 31.5–36.5)
MCV RBC AUTO: 85 FL (ref 78–100)
MONOCYTES # BLD AUTO: 0.3 10E3/UL (ref 0–1.3)
MONOCYTES NFR BLD AUTO: 5 %
MUCOUS THREADS #/AREA URNS LPF: PRESENT /LPF
NEUTROPHILS # BLD AUTO: 5.5 10E3/UL (ref 1.6–8.3)
NEUTROPHILS NFR BLD AUTO: 81 %
NITRATE UR QL: NEGATIVE
NRBC # BLD AUTO: 0 10E3/UL
NRBC BLD AUTO-RTO: 0 /100
PH UR STRIP: 6.5 [PH] (ref 5–7)
PLATELET # BLD AUTO: 234 10E3/UL (ref 150–450)
POTASSIUM SERPL-SCNC: 4 MMOL/L (ref 3.4–5.3)
PROT SERPL-MCNC: 7.1 G/DL (ref 6.4–8.3)
RBC # BLD AUTO: 5.65 10E6/UL (ref 3.8–5.2)
RBC URINE: 12 /HPF
SODIUM SERPL-SCNC: 140 MMOL/L (ref 136–145)
SP GR UR STRIP: 1.01 (ref 1–1.03)
SQUAMOUS EPITHELIAL: <1 /HPF
UROBILINOGEN UR STRIP-MCNC: <2 MG/DL
WBC # BLD AUTO: 6.8 10E3/UL (ref 4–11)
WBC URINE: 1 /HPF

## 2023-07-25 PROCEDURE — 74177 CT ABD & PELVIS W/CONTRAST: CPT

## 2023-07-25 PROCEDURE — 96374 THER/PROPH/DIAG INJ IV PUSH: CPT | Mod: 59

## 2023-07-25 PROCEDURE — 85025 COMPLETE CBC W/AUTO DIFF WBC: CPT | Performed by: EMERGENCY MEDICINE

## 2023-07-25 PROCEDURE — 80053 COMPREHEN METABOLIC PANEL: CPT | Performed by: EMERGENCY MEDICINE

## 2023-07-25 PROCEDURE — 99285 EMERGENCY DEPT VISIT HI MDM: CPT | Mod: 25

## 2023-07-25 PROCEDURE — 250N000011 HC RX IP 250 OP 636: Mod: JZ | Performed by: EMERGENCY MEDICINE

## 2023-07-25 PROCEDURE — 83605 ASSAY OF LACTIC ACID: CPT | Performed by: EMERGENCY MEDICINE

## 2023-07-25 PROCEDURE — 258N000003 HC RX IP 258 OP 636: Performed by: EMERGENCY MEDICINE

## 2023-07-25 PROCEDURE — 83690 ASSAY OF LIPASE: CPT | Performed by: EMERGENCY MEDICINE

## 2023-07-25 PROCEDURE — 70450 CT HEAD/BRAIN W/O DYE: CPT

## 2023-07-25 PROCEDURE — 96361 HYDRATE IV INFUSION ADD-ON: CPT

## 2023-07-25 PROCEDURE — 96375 TX/PRO/DX INJ NEW DRUG ADDON: CPT

## 2023-07-25 PROCEDURE — 83735 ASSAY OF MAGNESIUM: CPT | Performed by: EMERGENCY MEDICINE

## 2023-07-25 PROCEDURE — 36415 COLL VENOUS BLD VENIPUNCTURE: CPT | Performed by: EMERGENCY MEDICINE

## 2023-07-25 PROCEDURE — 81001 URINALYSIS AUTO W/SCOPE: CPT | Performed by: EMERGENCY MEDICINE

## 2023-07-25 RX ORDER — ONDANSETRON 2 MG/ML
4 INJECTION INTRAMUSCULAR; INTRAVENOUS ONCE
Status: COMPLETED | OUTPATIENT
Start: 2023-07-25 | End: 2023-07-25

## 2023-07-25 RX ORDER — DIPHENHYDRAMINE HYDROCHLORIDE 50 MG/ML
25 INJECTION INTRAMUSCULAR; INTRAVENOUS ONCE
Status: COMPLETED | OUTPATIENT
Start: 2023-07-25 | End: 2023-07-25

## 2023-07-25 RX ORDER — METOCLOPRAMIDE HYDROCHLORIDE 5 MG/ML
5 INJECTION INTRAMUSCULAR; INTRAVENOUS ONCE
Status: COMPLETED | OUTPATIENT
Start: 2023-07-25 | End: 2023-07-25

## 2023-07-25 RX ORDER — IOPAMIDOL 755 MG/ML
90 INJECTION, SOLUTION INTRAVASCULAR ONCE
Status: COMPLETED | OUTPATIENT
Start: 2023-07-25 | End: 2023-07-25

## 2023-07-25 RX ORDER — HYDRALAZINE HYDROCHLORIDE 20 MG/ML
10 INJECTION INTRAMUSCULAR; INTRAVENOUS ONCE
Status: COMPLETED | OUTPATIENT
Start: 2023-07-25 | End: 2023-07-25

## 2023-07-25 RX ADMIN — IOPAMIDOL 90 ML: 755 INJECTION, SOLUTION INTRAVENOUS at 22:44

## 2023-07-25 RX ADMIN — PROCHLORPERAZINE EDISYLATE 5 MG: 5 INJECTION, SOLUTION INTRAMUSCULAR; INTRAVENOUS at 23:23

## 2023-07-25 RX ADMIN — DIPHENHYDRAMINE HYDROCHLORIDE 25 MG: 50 INJECTION INTRAMUSCULAR; INTRAVENOUS at 22:08

## 2023-07-25 RX ADMIN — ONDANSETRON 4 MG: 2 INJECTION INTRAMUSCULAR; INTRAVENOUS at 20:19

## 2023-07-25 RX ADMIN — METOCLOPRAMIDE 5 MG: 5 INJECTION, SOLUTION INTRAMUSCULAR; INTRAVENOUS at 22:10

## 2023-07-25 RX ADMIN — SODIUM CHLORIDE 1000 ML: 9 INJECTION, SOLUTION INTRAVENOUS at 20:14

## 2023-07-25 RX ADMIN — HYDRALAZINE HYDROCHLORIDE 10 MG: 20 INJECTION INTRAMUSCULAR; INTRAVENOUS at 21:15

## 2023-07-25 ASSESSMENT — ACTIVITIES OF DAILY LIVING (ADL)
ADLS_ACUITY_SCORE: 35
ADLS_ACUITY_SCORE: 35

## 2023-07-26 LAB
ALBUMIN SERPL BCG-MCNC: 4.2 G/DL (ref 3.5–5.2)
ALP SERPL-CCNC: 83 U/L (ref 35–104)
ALT SERPL W P-5'-P-CCNC: 11 U/L (ref 0–50)
ANION GAP SERPL CALCULATED.3IONS-SCNC: 9 MMOL/L (ref 7–15)
AST SERPL W P-5'-P-CCNC: 23 U/L (ref 0–45)
BILIRUB SERPL-MCNC: 0.5 MG/DL
BUN SERPL-MCNC: 8.9 MG/DL (ref 8–23)
CALCIUM SERPL-MCNC: 9.3 MG/DL (ref 8.8–10.2)
CHLORIDE SERPL-SCNC: 104 MMOL/L (ref 98–107)
CREAT SERPL-MCNC: 0.62 MG/DL (ref 0.51–0.95)
DEPRECATED HCO3 PLAS-SCNC: 27 MMOL/L (ref 22–29)
ERYTHROCYTE [DISTWIDTH] IN BLOOD BY AUTOMATED COUNT: 14 % (ref 10–15)
GFR SERPL CREATININE-BSD FRML MDRD: >90 ML/MIN/1.73M2
GLUCOSE SERPL-MCNC: 92 MG/DL (ref 70–99)
HCT VFR BLD AUTO: 45.4 % (ref 35–47)
HGB BLD-MCNC: 15 G/DL (ref 11.7–15.7)
MAGNESIUM SERPL-MCNC: 1.8 MG/DL (ref 1.7–2.3)
MCH RBC QN AUTO: 28.5 PG (ref 26.5–33)
MCHC RBC AUTO-ENTMCNC: 33 G/DL (ref 31.5–36.5)
MCV RBC AUTO: 86 FL (ref 78–100)
PLATELET # BLD AUTO: 206 10E3/UL (ref 150–450)
POTASSIUM SERPL-SCNC: 3.8 MMOL/L (ref 3.4–5.3)
PROT SERPL-MCNC: 6.5 G/DL (ref 6.4–8.3)
RBC # BLD AUTO: 5.26 10E6/UL (ref 3.8–5.2)
SODIUM SERPL-SCNC: 140 MMOL/L (ref 136–145)
WBC # BLD AUTO: 8.7 10E3/UL (ref 4–11)

## 2023-07-26 PROCEDURE — 36415 COLL VENOUS BLD VENIPUNCTURE: CPT | Performed by: INTERNAL MEDICINE

## 2023-07-26 PROCEDURE — 96375 TX/PRO/DX INJ NEW DRUG ADDON: CPT

## 2023-07-26 PROCEDURE — 80053 COMPREHEN METABOLIC PANEL: CPT | Performed by: INTERNAL MEDICINE

## 2023-07-26 PROCEDURE — 250N000009 HC RX 250: Performed by: PHYSICIAN ASSISTANT

## 2023-07-26 PROCEDURE — G0378 HOSPITAL OBSERVATION PER HR: HCPCS

## 2023-07-26 PROCEDURE — 258N000003 HC RX IP 258 OP 636: Performed by: INTERNAL MEDICINE

## 2023-07-26 PROCEDURE — 250N000013 HC RX MED GY IP 250 OP 250 PS 637: Performed by: INTERNAL MEDICINE

## 2023-07-26 PROCEDURE — 99223 1ST HOSP IP/OBS HIGH 75: CPT | Performed by: INTERNAL MEDICINE

## 2023-07-26 PROCEDURE — C9113 INJ PANTOPRAZOLE SODIUM, VIA: HCPCS | Mod: JZ | Performed by: PHYSICIAN ASSISTANT

## 2023-07-26 PROCEDURE — 96376 TX/PRO/DX INJ SAME DRUG ADON: CPT

## 2023-07-26 PROCEDURE — 250N000011 HC RX IP 250 OP 636: Mod: JZ | Performed by: INTERNAL MEDICINE

## 2023-07-26 PROCEDURE — C9113 INJ PANTOPRAZOLE SODIUM, VIA: HCPCS | Mod: JZ | Performed by: INTERNAL MEDICINE

## 2023-07-26 PROCEDURE — 83735 ASSAY OF MAGNESIUM: CPT | Performed by: INTERNAL MEDICINE

## 2023-07-26 PROCEDURE — 85027 COMPLETE CBC AUTOMATED: CPT | Performed by: INTERNAL MEDICINE

## 2023-07-26 PROCEDURE — 999N000127 HC STATISTIC PERIPHERAL IV START W US GUIDANCE

## 2023-07-26 PROCEDURE — 250N000011 HC RX IP 250 OP 636: Mod: JZ | Performed by: PHYSICIAN ASSISTANT

## 2023-07-26 RX ORDER — ACETAMINOPHEN 650 MG/1
650 SUPPOSITORY RECTAL EVERY 6 HOURS PRN
Status: DISCONTINUED | OUTPATIENT
Start: 2023-07-26 | End: 2023-07-28 | Stop reason: HOSPADM

## 2023-07-26 RX ORDER — SCOLOPAMINE TRANSDERMAL SYSTEM 1 MG/1
1 PATCH, EXTENDED RELEASE TRANSDERMAL
Status: DISCONTINUED | OUTPATIENT
Start: 2023-07-26 | End: 2023-07-28 | Stop reason: HOSPADM

## 2023-07-26 RX ORDER — KETOROLAC TROMETHAMINE 15 MG/ML
15 INJECTION, SOLUTION INTRAMUSCULAR; INTRAVENOUS EVERY 6 HOURS PRN
Status: DISCONTINUED | OUTPATIENT
Start: 2023-07-26 | End: 2023-07-28 | Stop reason: HOSPADM

## 2023-07-26 RX ORDER — HYDRALAZINE HYDROCHLORIDE 20 MG/ML
10 INJECTION INTRAMUSCULAR; INTRAVENOUS EVERY 6 HOURS PRN
Status: DISCONTINUED | OUTPATIENT
Start: 2023-07-26 | End: 2023-07-28 | Stop reason: HOSPADM

## 2023-07-26 RX ORDER — NICOTINE 21 MG/24HR
1 PATCH, TRANSDERMAL 24 HOURS TRANSDERMAL DAILY
Status: DISCONTINUED | OUTPATIENT
Start: 2023-07-26 | End: 2023-07-28 | Stop reason: HOSPADM

## 2023-07-26 RX ORDER — SODIUM CHLORIDE 9 MG/ML
INJECTION, SOLUTION INTRAVENOUS CONTINUOUS
Status: ACTIVE | OUTPATIENT
Start: 2023-07-26 | End: 2023-07-26

## 2023-07-26 RX ORDER — ATENOLOL 25 MG/1
25 TABLET ORAL DAILY
Status: DISCONTINUED | OUTPATIENT
Start: 2023-07-26 | End: 2023-07-28 | Stop reason: HOSPADM

## 2023-07-26 RX ORDER — ACETAMINOPHEN 325 MG/1
325-650 TABLET ORAL EVERY 6 HOURS PRN
Status: DISCONTINUED | OUTPATIENT
Start: 2023-07-26 | End: 2023-07-26

## 2023-07-26 RX ORDER — ACETAMINOPHEN 325 MG/1
650 TABLET ORAL EVERY 6 HOURS PRN
Status: DISCONTINUED | OUTPATIENT
Start: 2023-07-26 | End: 2023-07-28 | Stop reason: HOSPADM

## 2023-07-26 RX ORDER — BACLOFEN 10 MG/1
20 TABLET ORAL 4 TIMES DAILY
Status: DISCONTINUED | OUTPATIENT
Start: 2023-07-26 | End: 2023-07-28 | Stop reason: HOSPADM

## 2023-07-26 RX ORDER — ONDANSETRON 2 MG/ML
4 INJECTION INTRAMUSCULAR; INTRAVENOUS EVERY 6 HOURS PRN
Status: DISCONTINUED | OUTPATIENT
Start: 2023-07-26 | End: 2023-07-28 | Stop reason: HOSPADM

## 2023-07-26 RX ADMIN — ATENOLOL 25 MG: 25 TABLET ORAL at 08:07

## 2023-07-26 RX ADMIN — PANTOPRAZOLE SODIUM 40 MG: 40 INJECTION, POWDER, FOR SOLUTION INTRAVENOUS at 08:08

## 2023-07-26 RX ADMIN — SODIUM CHLORIDE: 9 INJECTION, SOLUTION INTRAVENOUS at 01:36

## 2023-07-26 RX ADMIN — ONDANSETRON 4 MG: 2 INJECTION INTRAMUSCULAR; INTRAVENOUS at 03:43

## 2023-07-26 RX ADMIN — PROCHLORPERAZINE EDISYLATE 5 MG: 5 INJECTION, SOLUTION INTRAMUSCULAR; INTRAVENOUS at 12:15

## 2023-07-26 RX ADMIN — BACLOFEN 20 MG: 10 TABLET ORAL at 21:23

## 2023-07-26 RX ADMIN — BACLOFEN 20 MG: 10 TABLET ORAL at 16:34

## 2023-07-26 RX ADMIN — TRAZODONE HYDROCHLORIDE 25 MG: 50 TABLET ORAL at 01:36

## 2023-07-26 RX ADMIN — SODIUM CHLORIDE 75 ML/HR: 9 INJECTION, SOLUTION INTRAVENOUS at 15:19

## 2023-07-26 RX ADMIN — BACLOFEN 20 MG: 10 TABLET ORAL at 08:07

## 2023-07-26 RX ADMIN — TRAZODONE HYDROCHLORIDE 25 MG: 50 TABLET ORAL at 21:23

## 2023-07-26 RX ADMIN — SCOPALAMINE 1 PATCH: 1 PATCH, EXTENDED RELEASE TRANSDERMAL at 12:16

## 2023-07-26 RX ADMIN — NICOTINE 1 PATCH: 21 PATCH, EXTENDED RELEASE TRANSDERMAL at 13:40

## 2023-07-26 RX ADMIN — KETOROLAC TROMETHAMINE 15 MG: 15 INJECTION, SOLUTION INTRAMUSCULAR; INTRAVENOUS at 03:45

## 2023-07-26 RX ADMIN — PROCHLORPERAZINE EDISYLATE 5 MG: 5 INJECTION, SOLUTION INTRAMUSCULAR; INTRAVENOUS at 06:06

## 2023-07-26 RX ADMIN — BACLOFEN 20 MG: 10 TABLET ORAL at 12:14

## 2023-07-26 RX ADMIN — PANTOPRAZOLE SODIUM 40 MG: 40 INJECTION, POWDER, FOR SOLUTION INTRAVENOUS at 16:34

## 2023-07-26 RX ADMIN — PROCHLORPERAZINE EDISYLATE 5 MG: 5 INJECTION, SOLUTION INTRAMUSCULAR; INTRAVENOUS at 18:17

## 2023-07-26 ASSESSMENT — ACTIVITIES OF DAILY LIVING (ADL)
ADLS_ACUITY_SCORE: 35
ADLS_ACUITY_SCORE: 22
ADLS_ACUITY_SCORE: 35
DEPENDENT_IADLS:: CLEANING;SHOPPING
ADLS_ACUITY_SCORE: 22
ADLS_ACUITY_SCORE: 35

## 2023-07-26 NOTE — CONSULTS
GI CONSULT NOTE      Name: Mariah Koehler  Medical Record #: [id number]  YOB: 1955  Date of Admission: 7/25/2023  Date/Time: 7/26/2023/8:03 AM     CHIEF COMPLAINT: Nausea and vomiting    HISTORY OF PRESENT ILLNESS: We were asked to see Mariah Koehler by Dr. Figueroa for nausea, vomiting and colitis.    Mariah Koehler is a 67 year old year old female with history of chronic back pain with intrathecal pump, HTN, recurrent nausea and vomiting, large hiatal hernia, left lower extremity cellulitis , constipation secondary to chronic opioid use, choledocholithiasis status post laparoscopic cholecystectomy in late 2011, ERCP with biliary sphincterotomy and removal of 2-3 stones, follow-up EUS and ERCP in February 2013 for right upper quadrant abdominal pain which were unremarkable.     She has struggled with weight loss and recurrent nausea and vomiting. She was seen at [facility] 2/17 -02/21/2023 for nausea and vomiting and 1 loose stool. CT abdomen and pelvis 2/17/23 showed mid distal colon wall thickening, negative inflammatory markers. Symptoms felt secondary to viral gastroenteritis. EGD performed 2/20/23 showed a 7 cm hiatal hernia , chronic gastritis negative for H pylori , but was otherwise unremarkable. She improved with supportive cares and symptoms resolved. She had a similar admission in April, ? GERD, HH, viral gastroenteritis. She was not taking her PPI at that time. Outpatient colonoscopy recommended.     She has been struggling with weight loss, and had undergone outpatient work-up last fall. Cologuard, CRP, sed rate were all negative. TSH was normal 09/30/2022. She has had repeat abdominal CT scans since (4/4/23 and yesterday 7/25/23) which both showed ongoing diffuse colonic wall thickening.     She denies lower abdominal pain or diarrhea. Her main complaint is nausea, which occurs with or without oral intake. She has some vomiting, mostly dry heaves. No bleeding. She has been  taking omeprazole 20mg twice daily at home. She has Reglan and Zofran at home which haven't been helpful for her recent flare of symptoms. LFTs, BMP, WBC are normal. CT abdomen and pelvis show diffuse wall thickening throughout the colon, similar to past CT. Large esophageal hiatal hernia. Head CT without acute intracranial process.      REVIEW OF SYSTEMS (ROS): Complete review of systems negative other than listed in HPI.    PAST MEDICAL HISTORY:  Past Medical History:   Diagnosis Date    Current mild episode of major depressive disorder, unspecified whether recurrent (H) 02/17/2020    Hypertension     Opiate dependence (H)     RSD (reflex sympathetic dystrophy)     Sacral fracture, closed (H)       FAMILY HISTORY:  Family History   Problem Relation Age of Onset    Cerebrovascular Disease Mother     Diabetes Mother     Heart Disease Mother     Hypertension Mother     Rheumatologic Disease Mother     Heart Disease Father     Pulmonary Hypertension Father     Kidney failure Father     Cancer Father         melanoma    Diabetes Sister     Hypertension Sister     Hypertension Brother      SOCIAL HISTORY:  Social History     Socioeconomic History    Marital status:      Spouse name: Not on file    Number of children: Not on file    Years of education: Not on file    Highest education level: Not on file   Occupational History    Not on file   Tobacco Use    Smoking status: Every Day     Packs/day: 0.75     Years: 51.00     Pack years: 38.25     Types: Cigarettes     Start date: 1/1/1968    Smokeless tobacco: Never   Vaping Use    Vaping Use: Never used   Substance and Sexual Activity    Alcohol use: Not Currently     Comment: 1 glass a year    Drug use: Never    Sexual activity: Not on file   Other Topics Concern    Parent/sibling w/ CABG, MI or angioplasty before 65F 55M? Not Asked   Social History Narrative    Not on file     Social Determinants of Health     Financial Resource Strain: Not on file   Food  Insecurity: Not on file   Transportation Needs: Not on file   Physical Activity: Not on file   Stress: Not on file   Social Connections: Not on file   Intimate Partner Violence: Not on file   Housing Stability: Not on file     MEDICATIONS PRIOR TO ADMISSION: (Not in a hospital admission)       ALLERGIES: Codeine, Morphine, Bacitracin, and Methadone    PHYSICAL EXAM:    BP (!) 183/75   Pulse 69   Temp 98  F (36.7  C) (Tympanic)   Resp 18   Wt 45.3 kg (99 lb 12.8 oz)   SpO2 94%   BMI 17.96 kg/m      GENERAL: No acute distress, fatigued   NECK: Supple without adenopathy  EYES: No scleral icterus  LUNGS: Clear to auscultation bilaterally  HEART: Regular rate and rhythm, S1 and S2 present, no lower extremity edema  ABDOMEN: Non-distended. Positive bowel sounds. Soft, non-tender, no guarding/rebound/mass, no obvious organomegaly  MUSKULOSKELETAL:  Warm and well perfused, moves all extremities well  SKIN: No jaundice  NEUROLOGIC: Alert and oriented  PSYCHIATRIC: Normal affect    LAB DATA:  CMP Results:   Recent Labs   Lab Test 07/26/23  0604 07/25/23 2014 04/04/23  0739 04/03/23  2303    140 137 140   POTASSIUM 3.8 4.0 3.9 3.7   CHLORIDE 104 102 102 100   CO2 27 26 25 24   ANIONGAP 9 12 10 16*   GLC 92 120* 96 123*   BUN 8.9 9.3 12.5 13.8   CR 0.62 0.60 0.55 0.50*   BILITOTAL 0.5 0.4  --  0.5   ALKPHOS 83 89  --  99   ALT 11 12  --  11   AST 23 24  --  24      CBC  Recent Labs   Lab 07/26/23  0604 07/25/23 2014   WBC 8.7 6.8   RBC 5.26* 5.65*   HGB 15.0 16.0*   HCT 45.4 48.2*   MCV 86 85   MCH 28.5 28.3   MCHC 33.0 33.2   RDW 14.0 13.7    234     INRNo lab results found in last 7 days.   Lipase   Date Value Ref Range Status   07/25/2023 16 13 - 60 U/L Final   04/03/2023 11 (L) 13 - 60 U/L Final   02/17/2023 14 0 - 52 U/L Final   12/16/2020 <9 0 - 52 U/L Final   03/30/2020 81 (H) 0 - 52 U/L Final   11/25/2019 20 0 - 52 U/L Final     IMAGING:  EXAM: CT ABDOMEN PELVIS W CONTRAST  LOCATION: [facility]  DATE: 7/25/2023     INDICATION: vomiting, hypertension, eval for bowel obstruction, choledocholithiasis, pancreatitis, etc  COMPARISON: CT abdomen and pelvis 04/04/2023 and 02/17/2023  TECHNIQUE: CT scan of the abdomen and pelvis was performed following injection of IV contrast. Multiplanar reformats were obtained. Dose reduction techniques were used.  CONTRAST: isovue 370  90ml     FINDINGS:   LOWER CHEST: Large esophageal hiatal hernia, unchanged.     HEPATOBILIARY: Cholecystectomy with tiny amount of air in the intrahepatic bile ducts, unchanged.     PANCREAS: Slight dilatation of the pancreatic duct, unchanged. No acute inflammatory changes.     SPLEEN: Normal.     ADRENAL GLANDS: Normal.     KIDNEYS/BLADDER: 1 mm calyceal tip nonobstructing stone lower pole right kidney and tiny right renal cyst.     BOWEL: Diffuse wall thickening throughout the length of the colon from the cecum to the rectum as seen on prior studies. No evidence for obstruction. The small bowel is unremarkable.     LYMPH NODES: Normal.     VASCULATURE: Atherosclerotic disease abdominal aorta and iliac arteries     PELVIC ORGANS: Normal.     MUSCULOSKELETAL: Advanced degenerative disc disease L4 level with low-grade anterior spondylolisthesis of L4 and L5. Stimulator pack right buttocks with epidural lead tip extending into the mid thoracic spine.                                  IMPRESSION:   1.  Diffuse wall thickening throughout the length of the colon as seen previously consistent with ongoing infectious or inflammatory colitis. Pseudomembranous colitis also possible. No evidence for obstruction.    PROCEDURES:  EGD 2/20/23:  Impression:    - Esophagogastric landmarks identified.                          - Normal esophagus.                          - 7 cm hiatal hernia.                          - Erythematous mucosa in the antrum. Biopsied.                          - Normal examined duodenum.      ASSESSMENT:  Nausea with vomiting  67 year old female with history of history of chronic back pain with intrathecal pump, recurrent nausea and vomiting, large hiatal hernia, h/o choledocholithiasis status post laparoscopic cholecystectomy, ERCP in late 2011, chronic colonic inflammatory changes on imaging who is admitted with recurrent nausea and vomiting. She denies significant abdominal pain or change in bowel habits. No signs of biliary obstruction. Abdominal CT reviewed, no signs of significant constipation. Head CT negative. Recent EGD for same symptoms unremarkable aside from large HH. Hiatal hernia may be contributing. She may have gastroparesis related to her chronic opioid use, but Reglan not overly helpful at home per her report. Recommend supportive treatment with acid suppression and trial of scopolamine patch.     PLAN:  Scopolamine patch trial  2.  IV PPI BID  3.  IV antiemetics as needed    Discussed with Dr. Meyer, GI attending.      TIME SPENT: 50 min including chart review, patient interview and care coordination.                                                Sheron George PA-C  Thank you for the opportunity to participate in the care of this patient.   Please feel free to call me with any questions or concerns.  Phone number [phone].

## 2023-07-26 NOTE — CONSULTS
"Care Management Initial Consult    General Information  Assessment completed with: PatientMariah  Type of CM/SW Visit: Initial Assessment    Primary Care Provider verified and updated as needed: Yes   Readmission within the last 30 days: no previous admission in last 30 days      Reason for Consult: discharge planning  Advance Care Planning: Advance Care Planning Reviewed: no concerns identified          Communication Assessment  Patient's communication style: spoken language (English or Bilingual)             Cognitive  Cognitive/Neuro/Behavioral:                        Living Environment:   People in home: spouse  Mariah and  Alfred  Current living Arrangements: house (\"I can mostly live on the main level. There are steps to the lower level and I have to go to the laundry down there\".)      Able to return to prior arrangements: yes       Family/Social Support:  Care provided by: self  Provides care for: no one  Marital Status:     Alfred       Description of Support System: Supportive, Involved    Support Assessment: Adequate family and caregiver support, Adequate social supports, Patient communicates needs well met    Current Resources:   Patient receiving home care services: No     Community Resources: Other (see comment) (Pain Clinic)  Equipment currently used at home: walker, rolling, cane, straight (4WW, \"I use my walker most of the time\")  Supplies currently used at home: Other (Intrathecal pain pump supplies, glasses)    Employment/Financial:  Employment Status: retired     Employment/ Comments: \"no  history\"  Financial Concerns:     Referral to Financial Worker: No       Does the patient's insurance plan have a 3 day qualifying hospital stay waiver?  No    Lifestyle & Psychosocial Needs:  Social Determinants of Health     Tobacco Use: High Risk (7/25/2023)    Patient History     Smoking Tobacco Use: Every Day     Smokeless Tobacco Use: Never     Passive Exposure: Not on " "file   Alcohol Use: Not on file   Financial Resource Strain: Not on file   Food Insecurity: Not on file   Transportation Needs: Not on file   Physical Activity: Not on file   Stress: Not on file   Social Connections: Not on file   Intimate Partner Violence: Not on file   Depression: Not at risk (3/17/2023)    PHQ-2     PHQ-2 Score: 0   Housing Stability: Not on file       Functional Status:  Prior to admission patient needed assistance:   Dependent ADLs:: Ambulation-walker, Independent  Dependent IADLs:: Cleaning, Shopping  Assesssment of Functional Status: At functional baseline    Mental Health Status:  Mental Health Status: Past Concern  Mental Health Management: Medication    Chemical Dependency Status:                Values/Beliefs:  Spiritual, Cultural Beliefs, Restorationism Practices, Values that affect care:                 Additional Information:  Mariah lives in a house with her . \"I can mostly live on the main level. There are steps to the lower level and I have to go to the laundry down there\".     She is independent with ADLs and most IADLs. Both she and her  still drive. She uses a 4WW and a cane for mobility, \"mostly uses the 4WW.\" She has a Intrathecal pain pump and follows up with the pain clinic.    Unknown discharge needs at this time, but likely able to return home.     Family to transport at discharge.    CM to follow for medical progression of care, discharge recommendations, and final discharge plan.    Elena Olsen RN    "

## 2023-07-26 NOTE — ED NOTES
Up to bedside commode with SBA- provided pt with hospital bed due to chronic back/leg pain. She is now resting comfortably. No additional requests at this time.

## 2023-07-26 NOTE — PHARMACY-ADMISSION MEDICATION HISTORY
Pharmacist Admission Medication History    Admission medication history is complete. The information provided in this note is only as accurate as the sources available at the time of the update.    Medication reconciliation/reorder completed by provider prior to medication history? No    Information Source(s): Patient and CareEverywhere/SureScripts via in-person    Pertinent Information: Patient has pain pump, refilled today, 7/25/23 ([facility] in [place]), updated pump information 7/26/23 am.     Changes made to PTA medication list:  Added: None  Deleted: diphenhydramine liquid   Changed: None    Medication Affordability:  Not including over the counter (OTC) medications, was there a time in the past 3 months when you did not take your medications as prescribed because of cost?: No    Allergies reviewed with patient and updates made in EHR: yes    Medication History Completed By: ADRIANA LEE RPH 7/25/2023 9:06 PM    Prior to Admission medications    Medication Sig Last Dose Taking? Auth Provider Long Term End Date   acetaminophen (TYLENOL) 325 MG tablet Take 1-2 tablets (325-650 mg) by mouth every 6 hours as needed for mild pain or headaches 7/24/2023 at PM Yes Kuldeep Mejia MD     atenolol (TENORMIN) 25 MG tablet TAKE 1 TABLET BY MOUTH EVERY DAY 7/25/2023 at AM Yes Alex Nolasco MD Yes    baclofen (LIORESAL) 10 MG tablet TAKE 2 TABLETS (20 MG TOTAL) BY MOUTH FOUR TIMES A DAY. 7/25/2023 at AM Yes Alex Nolasco MD     medication given by implanted intrathecal pump continuous Drug # 1: Fentanyl (Sublimaze) - Conc: 2000 mcg/mL - Total Dose / 24 hours: 873.8 mcg    Drug # 2: Bupivacaine (Marcaine)  - Conc:17.7 mg/mL - Total Dose / 24 hours: 7.733 mg    Drug # 3: Hydromorphone (Dilaudid)  - Conc: 2.1 mg/mL - Total Dose / 24 hours: 0.9175 mg    Diluent: NS    Infusion Rate: 0.4369 mL/24 hrs  Pump Reservoir Volume: 40 mL    Bolus doses: up to 14 bolus doses/24 hours;    Each bolus: fentanyl 81.1 mcg,  0.708 mg Bupivacaine, 0.0841 mg Dilaudid    Outside Clinic & Provider: [facility] in [place] [phone]  Last Refill Date: 03/09/2023  Next Refill Date: 04/13/2023  [place] Alarm Date:  04/16/2023  Pump : syncromed    Deliver Pump Medication to:   For [place]: If pump is within 7 days of depletion, pharmacist will enter a 'Pain Management Adult IP Consult' into the EHR, including 'IT pain pump management' as the reason for the consult. 7/25/2023 Yes Unknown, Entered By History     metoclopramide (REGLAN) 10 MG tablet Take 1 tablet (10 mg) by mouth 3 times daily as needed (Nausea) AS NEEDED FOR NAUSEA 7/25/2023 at 1300 Yes Alex Nolasco MD     omeprazole (PRILOSEC) 20 MG DR capsule TAKE 1 CAPSULE BY MOUTH TWICE A DAY BEFORE MEALS 7/25/2023 at AM Yes Alex Nolasco MD     ondansetron (ZOFRAN) 4 MG tablet TAKE 1 TABLET BY MOUTH EVERY 8 HOURS AS NEEDED FOR NAUSEA 7/25/2023 at 1300 Yes Alex Nolasco MD     traZODone (DESYREL) 50 MG tablet TAKE 0.5 TABLETS (25 MG) BY MOUTH AT BEDTIME Past Week Yes Alex Nolasco MD Yes

## 2023-07-26 NOTE — PROGRESS NOTES
Patient admitted this morning by my colleague for evaluation of nausea, vomiting and 20lbs weight loss.     Meds reviewed. Appreciate GI consultation. Please see the H&P from this morning for detail assessment and plan.

## 2023-07-26 NOTE — ED TRIAGE NOTES
Patient arrives by private car for evaluation of nausea and vomiting x 2 days.  Reports previous episodes, but has not followed up with GI.

## 2023-07-26 NOTE — ED NOTES
[facility] ED Handoff Report    ED Chief Complaint: Nausea/Vomiting    ED Diagnosis:  (R11.2) Intractable nausea and vomiting  Comment:   Plan:     (K52.9) Colitis  Comment:   Plan:        PMH:    Past Medical History:   Diagnosis Date    Current mild episode of major depressive disorder, unspecified whether recurrent (H) 02/17/2020    Hypertension     Opiate dependence (H)     RSD (reflex sympathetic dystrophy)     Sacral fracture, closed (H)         Code Status:  Full Code     Falls Risk: Yes Band: Applied    Current Living Situation/Residence: lives with a significant other and lives in a house     Elimination Status: Continent: Yes     Activity Level: SBA w/ walker    Patients Preferred Language:  English     Needed: No    Vital Signs:  BP (!) 165/70   Pulse 52   Temp 98  F (36.7  C) (Tympanic)   Resp 15   Wt 45.3 kg (99 lb 12.8 oz)   SpO2 94%   BMI 17.96 kg/m       Cardiac Rhythm: Sinus Mekhi    Pain Score: 4/10    Is the Patient Confused:  No    Last Food or Drink: 07/26/23 Clear liquid diet     Focused Assessment:  Pt is alert and oriented. Pt continues to be nauseated with a headache. Pt's HR varies from mid 40's to 70's. MD is aware. Pt has a pain pump that she manages. Is an assist of one to use the commode. 20G IV running at 75ml/hr in right AC.     Tests Performed: Done: Labs and Imaging    Treatments Provided:  See MAR - has normal saline running 75 ml/hr.     Family Dynamics/Concerns: No    Family Updated On Visitor Policy: Yes    Plan of Care Communicated to Family: Yes    Who Was Updated about Plan of Care: pt will contact family    Belongings Checklist Done and Signed by Patient: Yes - pain pump, phone     Medications sent with patient: baclofen        Additional Information:     RN: Parviz Cerrato RN   7/26/2023 3:09 PM

## 2023-07-26 NOTE — H&P
[facility]    History and Physical - Hospitalist Service       Date of Admission:  7/25/2023    Assessment & Plan      Mariah Koehler is a 67 year old female admitted on 7/25/2023. She presented with headache, intractable nausea vomiting, 20 pound weight loss.  CT of the abdomen shows possible colitis but patient denies any abdominal pain or diarrhea.  We will consult GI.  We will give start symptomatic treatment with scheduled Compazine, IV Protonix and IV fluids.  She presented with accelerated hypertension but improved after hydralazine, Reglan and Compazine.  Her intrathecal pump was changed recently.  She does take CBD tablets at bedtime but have low suspicion for cyclical vomiting syndrome    Intractable nausea and vomiting  20 pound weight loss  CT findings of colitis  No recent colonoscopy.  --Ordered symptomatic treatment and consult GI for possible colonoscopy.  Ordered IV Protonix  -- Order LFTs for next a.m.    Chronic pain syndrome with intrathecal pump  --Pump was recently filled  --Did not consult pain team since low suspicion for opioid withdrawal  --Continue baclofen    Moderate protein calorie malnutrition  -- Order clear liquid diet and advance as tolerated  -- Check magnesium level next a.m.    Accelerated hypertension due to nausea and vomiting  Headache due to accelerated hypertension  -- Improving with IV hydralazine.  Ordered IV hydralazine for systolic blood pressure more than 200.  -- Restarted beta-blocker    Current smoker  -- Ordered nicotine gum  -- Does not plan to quit    Anxiety  -- Continue trazodone at night         Diet: Advance Diet as Tolerated: Clear Liquid Diet    DVT Prophylaxis: Low Risk/Ambulatory with no VTE prophylaxis indicated  Bellamy Catheter: Not present  Lines: None     Cardiac Monitoring: None  Code Status: Full Code      Clinically Significant Risk Factors Present on Admission                  # Hypertension: Noted on problem list                Disposition Plan      Expected Discharge Date: 07/26/2023                  Jose Figueroa MD  Hospitalist Service  [facility]  Securely message with ncyclo (more info)  Text page via velingo Paging/Directory     ______________________________________________________________________    Chief Complaint   Intractable nausea vomiting, headache, 20 pound weight loss    History is obtained from the patient    History of Present Illness   Mariah Koehler is a 67 year old female with history of chronic back pain with intrathecal pump that was changed yesterday, essential hypertension on beta-blocker, who presented with intractable nausea and vomiting for several days.  She takes sometimes CBD tablets that are originally prescribed to her .  She did not miss her medication.  She does smoke every day.  No alcohol.  Has not had a colonoscopy for a while.  Has had previous EGD.  Had similar presentation in April of this year but cannot recall the details.  She endorses 20 pound weight loss in the past few months  CT of the abdomen shows colitis but patient denies any fever abdominal pain or diarrhea.  Her appetite is poor.  Patient had blood pressure greater than 200 on presentation in the ER which improved after Compazine, hydralazine and Reglan.        Past Medical History    Past Medical History:   Diagnosis Date    Current mild episode of major depressive disorder, unspecified whether recurrent (H) 02/17/2020    Hypertension     Opiate dependence (H)     RSD (reflex sympathetic dystrophy)     Sacral fracture, closed (H)        Past Surgical History   Past Surgical History:   Procedure Laterality Date    APPENDECTOMY      ESOPHAGOSCOPY, GASTROSCOPY, DUODENOSCOPY (EGD), COMBINED N/A 2/20/2023    Procedure: ESOPHAGOGASTRODUODENOSCOPY with biopsies;  Surgeon: Na Brooks MD;  Location: [facility] Main OR    GYN SURGERY      HC REVISE MEDIAN N/CARPAL TUNNEL SURG      Description:  Neuroplasty Decompression Median Nerve At Carpal Tunnel;  Recorded: 09/19/2011;    HYSTERECTOMY  1984    IR CERVICAL EPIDURAL STEROID INJECTION  1/14/2005    OOPHORECTOMY Bilateral 1985    AR SYMPATHECTOMY,CERVICOTHORACIC      Description: Surgical Sympathectomy Cervicothoracic;  Recorded: 09/19/2011;    [facility] DECOMPRESS FASCIOTOMY FINGR/HAND      Description: Decompression Fasciotomy Of The Hand;  Recorded: 09/19/2011;    [facility] LAP,CHOLECYSTECTOMY/EXPLORE      Description: Cholecystectomy Laparoscopic;  Recorded: 12/09/2011;    [facility] TOTAL ABDOM HYSTERECTOMY      Description: Hysterectomy;  Recorded: 03/23/2011;       Prior to Admission Medications   Prior to Admission Medications   Prescriptions Last Dose Informant Patient Reported? Taking?   acetaminophen (TYLENOL) 325 MG tablet 7/24/2023 at PM  No Yes   Sig: Take 1-2 tablets (325-650 mg) by mouth every 6 hours as needed for mild pain or headaches   atenolol (TENORMIN) 25 MG tablet 7/25/2023 at AM  No Yes   Sig: TAKE 1 TABLET BY MOUTH EVERY DAY   baclofen (LIORESAL) 10 MG tablet 7/25/2023 at AM  No Yes   Sig: TAKE 2 TABLETS (20 MG TOTAL) BY MOUTH FOUR TIMES A DAY.   medication given by implanted intrathecal pump 7/25/2023  Yes Yes   Sig: continuous Drug # 1: Fentanyl (Sublimaze) - Conc: 2000 mcg/mL - Total Dose / 24 hours: 873.8 mcg    Drug # 2: Bupivacaine (Marcaine)  - Conc:17.7 mg/mL - Total Dose / 24 hours: 7.733 mg    Drug # 3: Hydromorphone (Dilaudid)  - Conc: 2.1 mg/mL - Total Dose / 24 hours: 0.9175 mg    Diluent: NS    Infusion Rate: 0.4369 mL/24 hrs  Pump Reservoir Volume: 40 mL    Bolus doses: up to 14 bolus doses/24 hours;    Each bolus: fentanyl 81.1 mcg, 0.708 mg Bupivacaine, 0.0841 mg Dilaudid    Outside Clinic & Provider: [facility] in [place] [phone]  Last Refill Date: 03/09/2023  Next Refill Date: 04/13/2023  Low Vamo Alarm Date:  04/16/2023  Pump : [employer]    Deliver Pump Medication to:   For [place]: If pump is within  7 days of depletion, pharmacist will enter a 'Pain Management Adult IP Consult' into the EHR, including 'IT pain pump management' as the reason for the consult.   metoclopramide (REGLAN) 10 MG tablet 7/25/2023 at 1300  No Yes   Sig: Take 1 tablet (10 mg) by mouth 3 times daily as needed (Nausea) AS NEEDED FOR NAUSEA   omeprazole (PRILOSEC) 20 MG DR capsule 7/25/2023 at AM  No Yes   Sig: TAKE 1 CAPSULE BY MOUTH TWICE A DAY BEFORE MEALS   ondansetron (ZOFRAN) 4 MG tablet 7/25/2023 at 1300  No Yes   Sig: TAKE 1 TABLET BY MOUTH EVERY 8 HOURS AS NEEDED FOR NAUSEA   traZODone (DESYREL) 50 MG tablet Past Week  No Yes   Sig: TAKE 0.5 TABLETS (25 MG) BY MOUTH AT BEDTIME      Facility-Administered Medications: None        Social History   I have reviewed this patient's social history and updated it with pertinent information if needed.  Social History     Tobacco Use    Smoking status: Every Day     Packs/day: 0.75     Years: 51.00     Pack years: 38.25     Types: Cigarettes     Start date: 1/1/1968    Smokeless tobacco: Never   Vaping Use    Vaping Use: Never used   Substance Use Topics    Alcohol use: Not Currently     Comment: 1 glass a year    Drug use: Never    Take CBD tablets for sleeping some days of the week    Physical Exam   Vital Signs: Temp: 98  F (36.7  C) Temp src: Tympanic BP: (!) 159/77 Pulse: 60   Resp: 18 SpO2: 94 %      Weight: 99 lbs 12.8 oz    Malnourished  Mild distress due to nausea  Did not elicit tenderness abdomen  Moves all 4 extremities  No leg edema  S1-S2 normal    Medical Decision Making       75 MINUTES SPENT BY ME on the date of service doing chart review, history, exam, documentation & further activities per the note.  MANAGEMENT DISCUSSED with the following over the past 24 hours: Patient  NOTE(S)/MEDICAL RECORDS REVIEWED over the past 24 hours: ER notes      Data     I have personally reviewed the following data over the past 24 hrs:    6.8  \   16.0 (H)   / 234     140 102 9.3 /  120 (H)    4.0 26 0.60 \     ALT: 12 AST: 24 AP: 89 TBILI: 0.4   ALB: 4.5 TOT PROTEIN: 7.1 LIPASE: 16     Procal: N/A CRP: N/A Lactic Acid: 1.0         Imaging results reviewed over the past 24 hrs:   Recent Results (from the past 24 hour(s))   Head CT w/o contrast    Narrative    EXAM: CT HEAD W/O CONTRAST  LOCATION: [facility]  DATE: 7/25/2023    INDICATION:  Headache, vomiting  COMPARISON: CT head 05/22/2022  TECHNIQUE: Routine CT Head without IV contrast. Multiplanar reformats. Dose reduction techniques were used.    FINDINGS:  INTRACRANIAL CONTENTS: No intracranial hemorrhage, extraaxial collection, or mass effect.  No CT evidence of acute infarct. Normal parenchymal attenuation for age. Mild generalized volume loss. No hydrocephalus.     VISUALIZED ORBITS/SINUSES/MASTOIDS: No intraorbital abnormality. No significant paranasal sinus mucosal disease. No middle ear or mastoid effusion.    BONES/SOFT TISSUES: No acute abnormality.      Impression    IMPRESSION:  1.  No acute intracranial process.   CT Abdomen Pelvis w Contrast    Narrative    EXAM: CT ABDOMEN PELVIS W CONTRAST  LOCATION: [facility]  DATE: 7/25/2023    INDICATION: vomiting, hypertension, eval for bowel obstruction, choledocholithiasis, pancreatitis, etc  COMPARISON: CT abdomen and pelvis 04/04/2023 and 02/17/2023  TECHNIQUE: CT scan of the abdomen and pelvis was performed following injection of IV contrast. Multiplanar reformats were obtained. Dose reduction techniques were used.  CONTRAST: isovue 370  90ml    FINDINGS:   LOWER CHEST: Large esophageal hiatal hernia, unchanged.    HEPATOBILIARY: Cholecystectomy with tiny amount of air in the intrahepatic bile ducts, unchanged.    PANCREAS: Slight dilatation of the pancreatic duct, unchanged. No acute inflammatory changes.    SPLEEN: Normal.    ADRENAL GLANDS: Normal.    KIDNEYS/BLADDER: 1 mm calyceal tip nonobstructing stone lower pole right kidney and tiny  right renal cyst.    BOWEL: Diffuse wall thickening throughout the length of the colon from the cecum to the rectum as seen on prior studies. No evidence for obstruction. The small bowel is unremarkable.    LYMPH NODES: Normal.    VASCULATURE: Atherosclerotic disease abdominal aorta and iliac arteries    PELVIC ORGANS: Normal.    MUSCULOSKELETAL: Advanced degenerative disc disease L4 level with low-grade anterior spondylolisthesis of L4 and L5. Stimulator pack right buttocks with epidural lead tip extending into the mid thoracic spine.      Impression    IMPRESSION:   1.  Diffuse wall thickening throughout the length of the colon as seen previously consistent with ongoing infectious or inflammatory colitis. Pseudomembranous colitis also possible. No evidence for obstruction.

## 2023-07-26 NOTE — ED PROVIDER NOTES
EMERGENCY DEPARTMENT ENCOUNTER      NAME: Mariah Koehler  AGE: 67 year old female  YOB: 1955  MRN: [id number]  EVALUATION DATE & TIME: No admission date for patient encounter.    PCP: Alex Nolasco    ED PROVIDER: Sobeida Holley MD      Chief Complaint   Patient presents with    Nausea & Vomiting         FINAL IMPRESSION:  1. Nausea and vomiting, unspecified vomiting type    2. Intractable nausea and vomiting    3. Colitis    4. Nausea          ED COURSE & MEDICAL DECISION MAKING:    Pertinent Labs & Imaging studies reviewed. (See chart for details)    7:25 PM I introduced myself to the patient, obtained patient history, performed a physical exam, and discussed plan for ED workup including potential diagnostic laboratory/imaging studies and interventions.    67 year old female with a pertinent history of chronic pain syndrome, colitis, intractable nausea and vomiting, hypertension, and opoid dependence who presents to this ED for evaluation of nausea and vomiting.  On exam, the patient is very thin and cachectic appearing.  She appears chronically ill.  She is hypertensive on arrival.  No focal neurologic deficits.  No significant abdominal tenderness or pain.  No signs of peritonitis.  Her pain pump does not have any overlying erythema or tenderness.   reports that this was refilled today.  Thus felt that withdrawal symptoms would be less likely.  Has been having ongoing issues that are cyclic with nausea vomiting and significant weight loss.  Also has associated headaches.  However the patient feels as though this vomiting then triggers the headache and not the other way around.  Possible this could be migraines.  With the hypertension headache and vomiting did also want to ensure no sign of intracranial hemorrhage and CT of the head was ordered.  Also consider the potential bowel obstruction or intra-abdominal pathology and CT of the abdomen and pelvis is ordered.  Laboratory  studies obtained.    She was given IV Zofran as well as IV fluids.  Also given IV Reglan and IV Benadryl.  Was given a dose of 10 mg of IV hydralazine to bring her blood pressure down.  CBC reveals a normal blood count of 6.8.  Hemoglobin 16 likely some hemoconcentration.  She does not have any signs of meningismus to suggest meningitis at this time.  Lactic acid within normal limits.  She is afebrile and has not had fevers at home.  Urinalysis reveals 12 red blood cells with no sign of UTI with 1 white blood cell and negative leukocyte esterase and negative nitrate.  Considered kidney stone as well.  Lipase within normal limits making pancreatitis unlikely.  CMP unremarkable with a glucose of 120.  Magnesium within normal limits.  CT of the head unremarkable.  CT abdomen and pelvis Reveals diffuse wall thickening throughout the length of the colon as seen previously consistent with ongoing infectious or inflammatory colitis.  Pseudomembranous colitis also possible.  No evidence for obstruction.  She has not been on any antibiotics recently making C. difficile unlikely.  Do feel with her weight loss and significant symptoms that continue to be intractable here that she warrants admission.  Was given a dose of IV Compazine here.  Patient in agreement with this plan.  Spoke with the hospitalist who excepted the patient for admission.  She was admitted in stable condition.           At the conclusion of the encounter I discussed the results of all of the tests and the disposition. The questions were answered. The patient or family acknowledged understanding and was agreeable with the care plan.          Medical Decision Making    History:  Supplemental history from: Documented in chart, if applicable  External Record(s) reviewed: Documented in chart, if applicable. and Inpatient Record: [facility] 4/4/23    Work Up:  Chart documentation includes differential considered and any EKGs or imaging independently interpreted  by provider.  In additional to work up documented, I considered the following work up: Documented in chart, if applicable.    External consultation:  Discussion of management with another provider: Documented in chart, if applicable    Complicating factors:  Care impacted by chronic illness: Chronic Pain and Hypertension  Care affected by social determinants of health: Access to Medical Care    Disposition considerations: Admit.      MEDICATIONS GIVEN IN THE EMERGENCY:  Medications   0.9% sodium chloride BOLUS (has no administration in time range)   ondansetron (ZOFRAN) injection 4 mg (has no administration in time range)       NEW PRESCRIPTIONS STARTED AT TODAY'S ER VISIT  New Prescriptions    No medications on file          =================================================================    HPI    Patient information was obtained from: Patient    Use of : N/A       Mariah JOSE Koehler is a 67 year old female with a pertinent history of chronic pain syndrome, colitis, intractable nausea and vomiting, hypertension, and opoid dependence who presents to this ED for evaluation of nausea and vomiting.    Per chart review: Patient was seen at [facility] emergency department on 4/4/23 for evaluation of nausea and vomiting. Told to follow up with PCP. CT showed large hiatal hernia, as well as colonic wall thickening consistent with mild colitis. Discharged.     2 days ago, patient reports an onset of worsening nausea and vomiting. She also states that she has a pretty severe headache (currently 4-5/10). She states that the nausea and vomiting usually trigger the headaches.  notes that patient is on all sorts of medication (Reglan, Zofran), but these don't always help. Her last dose of medications was around 6 hours ago. These symptoms have been on and off with severity starting in February.  reports patient hasn't been eating much besides mac and cheese. Last meal was last night.  notes patient  has been losing a lot of weight lately, too. Patient denies taking anything for migraines or headaches. Denies taking anything for pain today. Due to the vomiting, patient reports that she was unable to keep down her blood pressure medication.      notes patient was supposed to follow up with GI for a colonoscopy, but hasn't done this due to having times of feeling better. He reports that they did do an endoscopy when in the hospital at .  notes patient has been suffering from cognitive decline, but is monitoring this with her doctors.  reports that patient's symptoms are very cyclical He also states that patient had her gallbladder removed around 4-5 years ago and got pretty sick with this.     Denies any abdominal pain or redness or swelling around the pain pump. Denies shortness of breath, melena, hematochezia, hematemesis, chest pain, dysuria, hematuria, fever, cough, chills, numbness, tingling, lightheadedness, or recent falls or related traumas. Denies changing anything for her pain pump.  notes he brought her for a pain pump refill this morning and everything went normally. Last time patient was in the hospital they did a CT to make sure the pump wasn't leaking and it wasn't. Patient denies use of marijuana.    Otherwise in normal state of health. No further concerns at this time.     REVIEW OF SYSTEMS   Review of Systems   Pertinent positives and negatives are documented in the HPI. All other systems reviewed and are negative.      PAST MEDICAL HISTORY:  Past Medical History:   Diagnosis Date    Current mild episode of major depressive disorder, unspecified whether recurrent (H) 02/17/2020    Hypertension     Opiate dependence (H)     RSD (reflex sympathetic dystrophy)     Sacral fracture, closed (H)        PAST SURGICAL HISTORY:  Past Surgical History:   Procedure Laterality Date    APPENDECTOMY      ESOPHAGOSCOPY, GASTROSCOPY, DUODENOSCOPY (EGD), COMBINED N/A 2/20/2023     Procedure: ESOPHAGOGASTRODUODENOSCOPY with biopsies;  Surgeon: Na Brooks MD;  Location: [facility] OR    GYN SURGERY      HC REVISE MEDIAN N/CARPAL TUNNEL SURG      Description: Neuroplasty Decompression Median Nerve At Carpal Tunnel;  Recorded: 09/19/2011;    HYSTERECTOMY  1984    IR CERVICAL EPIDURAL STEROID INJECTION  1/14/2005    OOPHORECTOMY Bilateral 1985    TN SYMPATHECTOMY,CERVICOTHORACIC      Description: Surgical Sympathectomy Cervicothoracic;  Recorded: 09/19/2011;    [facility] DECOMPRESS FASCIOTOMY FINGR/HAND      Description: Decompression Fasciotomy Of The Hand;  Recorded: 09/19/2011;    [facility] LAP,CHOLECYSTECTOMY/EXPLORE      Description: Cholecystectomy Laparoscopic;  Recorded: 12/09/2011;    [facility] TOTAL ABDOM HYSTERECTOMY      Description: Hysterectomy;  Recorded: 03/23/2011;           CURRENT MEDICATIONS:    acetaminophen (TYLENOL) 325 MG tablet  atenolol (TENORMIN) 25 MG tablet  baclofen (LIORESAL) 10 MG tablet  diphenhydrAMINE HCl (ZZZQUIL) 50 MG/30ML LIQD  medication given by implanted intrathecal pump  metoclopramide (REGLAN) 10 MG tablet  omeprazole (PRILOSEC) 20 MG DR capsule  ondansetron (ZOFRAN) 4 MG tablet  traZODone (DESYREL) 50 MG tablet        ALLERGIES:  Allergies   Allergen Reactions    Codeine Nausea and Vomiting    Morphine Nausea and Vomiting    Bacitracin Rash    Methadone Rash       FAMILY HISTORY:  Family History   Problem Relation Age of Onset    Cerebrovascular Disease Mother     Diabetes Mother     Heart Disease Mother     Hypertension Mother     Rheumatologic Disease Mother     Heart Disease Father     Pulmonary Hypertension Father     Kidney failure Father     Cancer Father         melanoma    Diabetes Sister     Hypertension Sister     Hypertension Brother        SOCIAL HISTORY:   Social History     Socioeconomic History    Marital status:    Tobacco Use    Smoking status: Every Day     Packs/day: 0.75     Years: 51.00     Pack years: 38.25     Types:  Cigarettes     Start date: 1/1/1968    Smokeless tobacco: Never   Vaping Use    Vaping Use: Never used   Substance and Sexual Activity    Alcohol use: Not Currently     Comment: 1 glass a year    Drug use: Never       VITALS:  BP (!) 220/100   Pulse 73   Temp 98  F (36.7  C) (Tympanic)   Resp 18   Wt 45.3 kg (99 lb 12.8 oz)   SpO2 93%   BMI 17.96 kg/m      PHYSICAL EXAM    Physical Exam  Constitutional: very thin and cachetic, chronically ill appearing, GCS 15  HENT: Normocephalic, Atraumatic, Bilateral external ears normal, Oropharynx normal, mucous membranes moist, Nose normal. Neck-  Normal range of motion, No tenderness, Supple, No stridor.    Eyes: PERRL, EOMI, Conjunctiva normal, No discharge.   Respiratory: Normal breath sounds, No respiratory distress, No wheezing or crackles, Speaks in full sentences easily.    Cardiovascular: Normal heart rate, Regular rhythm, No murmurs, No rubs, No gallops. 2+ radial pulses bilaterally  GI: Bowel sounds normal, Soft, No tenderness, No masses, No rebound or guarding. No CVA tenderness bilaterally.   Musculoskeletal: 2+ DP pulses. No notable lower extremity edema. No cyanosis, No clubbing. Good range of motion in all major joints. No tenderness to palpation or major deformities noted. No tenderness of the CTLS spine.    Integument: Warm, Dry, No erythema, No rash. No petechiae.   Neurologic: Alert & oriented x 3, 5/5 strength in all 4 extremities bilaterally. Sensation intact to light touch in all 4 extremities and the face bilaterally. No focal deficits noted.   Psychiatric:  Cooperative.      LAB:  All pertinent labs reviewed and interpreted.       Labs Ordered and Resulted from Time of ED Arrival to Time of ED Departure   COMPREHENSIVE METABOLIC PANEL - Abnormal       Result Value    Sodium 140      Potassium 4.0      Chloride 102      Carbon Dioxide (CO2) 26      Anion Gap 12      Urea Nitrogen 9.3      Creatinine 0.60      Calcium 9.8      Glucose 120 (*)      Alkaline Phosphatase 89      AST 24      ALT 12      Protein Total 7.1      Albumin 4.5      Bilirubin Total 0.4      GFR Estimate >90     ROUTINE UA WITH MICROSCOPIC REFLEX TO CULTURE - Abnormal    Color Urine Light Yellow      Appearance Urine Clear      Glucose Urine Negative      Bilirubin Urine Negative      Ketones Urine Trace (*)     Specific Gravity Urine 1.013      Blood Urine 0.2 mg/dL (*)     pH Urine 6.5      Protein Albumin Urine Negative      Urobilinogen Urine <2.0      Nitrite Urine Negative      Leukocyte Esterase Urine Negative      Mucus Urine Present (*)     RBC Urine 12 (*)     WBC Urine 1      Squamous Epithelials Urine <1     CBC WITH PLATELETS AND DIFFERENTIAL - Abnormal    WBC Count 6.8      RBC Count 5.65 (*)     Hemoglobin 16.0 (*)     Hematocrit 48.2 (*)     MCV 85      MCH 28.3      MCHC 33.2      RDW 13.7      Platelet Count 234      % Neutrophils 81      % Lymphocytes 14      % Monocytes 5      % Eosinophils 0      % Basophils 0      % Immature Granulocytes 0      NRBCs per 100 WBC 0      Absolute Neutrophils 5.5      Absolute Lymphocytes 1.0      Absolute Monocytes 0.3      Absolute Eosinophils 0.0      Absolute Basophils 0.0      Absolute Immature Granulocytes 0.0      Absolute NRBCs 0.0     CBC WITH PLATELETS - Abnormal    WBC Count 8.7      RBC Count 5.26 (*)     Hemoglobin 15.0      Hematocrit 45.4      MCV 86      MCH 28.5      MCHC 33.0      RDW 14.0      Platelet Count 206     LIPASE - Normal    Lipase 16     LACTIC ACID WHOLE BLOOD - Normal    Lactic Acid 1.0     MAGNESIUM - Normal    Magnesium 1.8     COMPREHENSIVE METABOLIC PANEL - Normal    Sodium 140      Potassium 3.8      Chloride 104      Carbon Dioxide (CO2) 27      Anion Gap 9      Urea Nitrogen 8.9      Creatinine 0.62      Calcium 9.3      Glucose 92      Alkaline Phosphatase 83      AST 23      ALT 11      Protein Total 6.5      Albumin 4.2      Bilirubin Total 0.5      GFR Estimate >90     MAGNESIUM - Normal     Magnesium 1.8           RADIOLOGY:  Reviewed all pertinent imaging. Please see official radiology report.  CT Abdomen Pelvis w Contrast   Final Result   IMPRESSION:    1.  Diffuse wall thickening throughout the length of the colon as seen previously consistent with ongoing infectious or inflammatory colitis. Pseudomembranous colitis also possible. No evidence for obstruction.      Head CT w/o contrast   Final Result   IMPRESSION:   1.  No acute intracranial process.          PROCEDURES:   None      [facility] Documentation:   CMS Diagnoses:               IJolynn, am serving as a scribe to document services personally performed by Sobeida Holley MD based on my observation and the provider's statements to me. I, Sobeida Holley MD, attest that Jolynn Light is acting in a scribe capacity, has observed my performance of the services and has documented them in accordance with my direction.    Sobeida Holley MD  [facility] EMERGENCY DEPARTMENT  [address] [place] [id number]  [phone]       Sobeida Holley MD  08/03/23 1123

## 2023-07-27 PROCEDURE — 250N000013 HC RX MED GY IP 250 OP 250 PS 637: Performed by: INTERNAL MEDICINE

## 2023-07-27 PROCEDURE — 250N000011 HC RX IP 250 OP 636: Mod: JZ | Performed by: PHYSICIAN ASSISTANT

## 2023-07-27 PROCEDURE — 99232 SBSQ HOSP IP/OBS MODERATE 35: CPT | Performed by: INTERNAL MEDICINE

## 2023-07-27 PROCEDURE — 96376 TX/PRO/DX INJ SAME DRUG ADON: CPT

## 2023-07-27 PROCEDURE — C9113 INJ PANTOPRAZOLE SODIUM, VIA: HCPCS | Mod: JZ | Performed by: PHYSICIAN ASSISTANT

## 2023-07-27 PROCEDURE — 250N000011 HC RX IP 250 OP 636: Mod: JZ | Performed by: INTERNAL MEDICINE

## 2023-07-27 PROCEDURE — G0378 HOSPITAL OBSERVATION PER HR: HCPCS

## 2023-07-27 RX ORDER — PANTOPRAZOLE SODIUM 40 MG/1
40 TABLET, DELAYED RELEASE ORAL
Status: DISCONTINUED | OUTPATIENT
Start: 2023-07-27 | End: 2023-07-28 | Stop reason: HOSPADM

## 2023-07-27 RX ADMIN — NICOTINE 1 PATCH: 21 PATCH, EXTENDED RELEASE TRANSDERMAL at 12:39

## 2023-07-27 RX ADMIN — ONDANSETRON 4 MG: 2 INJECTION INTRAMUSCULAR; INTRAVENOUS at 22:04

## 2023-07-27 RX ADMIN — PANTOPRAZOLE SODIUM 40 MG: 40 INJECTION, POWDER, FOR SOLUTION INTRAVENOUS at 06:41

## 2023-07-27 RX ADMIN — TRAZODONE HYDROCHLORIDE 25 MG: 50 TABLET ORAL at 21:53

## 2023-07-27 RX ADMIN — PANTOPRAZOLE SODIUM 40 MG: 40 TABLET, DELAYED RELEASE ORAL at 18:58

## 2023-07-27 RX ADMIN — PROCHLORPERAZINE EDISYLATE 5 MG: 5 INJECTION, SOLUTION INTRAMUSCULAR; INTRAVENOUS at 05:02

## 2023-07-27 RX ADMIN — ATENOLOL 25 MG: 25 TABLET ORAL at 08:29

## 2023-07-27 RX ADMIN — BACLOFEN 20 MG: 10 TABLET ORAL at 12:34

## 2023-07-27 RX ADMIN — PROCHLORPERAZINE EDISYLATE 5 MG: 5 INJECTION, SOLUTION INTRAMUSCULAR; INTRAVENOUS at 00:24

## 2023-07-27 RX ADMIN — PROCHLORPERAZINE EDISYLATE 5 MG: 5 INJECTION, SOLUTION INTRAMUSCULAR; INTRAVENOUS at 12:33

## 2023-07-27 RX ADMIN — PROCHLORPERAZINE EDISYLATE 5 MG: 5 INJECTION, SOLUTION INTRAMUSCULAR; INTRAVENOUS at 19:00

## 2023-07-27 RX ADMIN — BACLOFEN 20 MG: 10 TABLET ORAL at 08:29

## 2023-07-27 RX ADMIN — BACLOFEN 20 MG: 10 TABLET ORAL at 20:43

## 2023-07-27 ASSESSMENT — ACTIVITIES OF DAILY LIVING (ADL)
ADLS_ACUITY_SCORE: 22
ADLS_ACUITY_SCORE: 25
ADLS_ACUITY_SCORE: 22

## 2023-07-27 NOTE — PLAN OF CARE
Observation goals  PRIOR TO DISCHARGE         Diagnostic tests and consults completed and resulted:    Vital signs normal or at patient baseline: MET, /57 (BP Location: Left arm)   Pulse 63   Temp 97.7  F (36.5  C) (Oral)   Resp 16   Wt 45.3 kg (99 lb 12.8 oz)   SpO2 94%   BMI 17.96 kg/m       Tolerating oral intake to maintain hydration: PARTIALLY MET, Patient still on clear liquid diet so far tolerating well, order to ADAT.     Tolerating oral antibiotics or has plans for home infusion setup: NA    Returns to baseline functional status: NOT MET, generalized weakness.  Bed alarm on, and patient encouraged to call staff for assistance.     Safe disposition plan has been identified: NOT MET.    Nurse to notify provider when observation goals have been met and patient is ready for discharge.

## 2023-07-27 NOTE — PLAN OF CARE
"PRIMARY DIAGNOSIS: \"Nausea and vomit\"  OUTPATIENT/OBSERVATION GOALS TO BE MET BEFORE DISCHARGE:  ADLs back to baseline: Yes    Activity and level of assistance: Up with standby assistance.    Pain status: Pain free.    Return to near baseline physical activity: No     Discharge Planner Nurse   Safe discharge environment identified: No  Barriers to discharge: Yes       Entered by: Lilliam Meyer RN 07/27/2023 8:36 AM   Patient tolerating regular diet denies nausea after eating. Complain of sore in the abdomen.  Please review provider order for any additional goals.   Nurse to notify provider when observation goals have been met and patient is ready for discharge.Goal Outcome Evaluation:                        "

## 2023-07-27 NOTE — PLAN OF CARE
Observation goals  PRIOR TO DISCHARGE          Diagnostic tests and consults completed and resulted:     Vital signs normal or at patient baseline: MET, /57 (BP Location: Left arm)   Pulse 63   Temp 97.7  F (36.5  C) (Oral)   Resp 16   Wt 45.3 kg (99 lb 12.8 oz)   SpO2 94%   BMI 17.96 kg/m        Tolerating oral intake to maintain hydration: MET, Patient still on clear liquid diet so far tolerating well, order to ADAT.  Diet advanced to regular this shift.      Tolerating oral antibiotics or has plans for home infusion setup: NA     Returns to baseline functional status: NOT MET, generalized weakness.  Bed alarm on, and patient encouraged to call staff for assistance.      Safe disposition plan has been identified: NOT MET.     Nurse to notify provider when observation goals have been met and patient is ready for discharge.      Alert an oriented. Denies nausea/vomiting. Receiving scheduled compazine intravenously. Continent of bowel and bladder.

## 2023-07-27 NOTE — PROGRESS NOTES
GI PROGRESS NOTE  7/27/2023  Mariah Koehler  1955  /-76    Subjective:   Nausea has improved. She ate her whole breakfast tray and had no nausea or vomiting. Did have some abdominal pain afterward, wonders if this is due to not eating for so long. Feels the scopolamine patch has been helpful.      Objective:     Blood pressure (!) 169/74, pulse 67, temperature 98.1  F (36.7  C), temperature source Oral, resp. rate 16, weight 45.3 kg (99 lb 12.8 oz), SpO2 99 %.    Body mass index is 17.96 kg/m .  Gen: NAD, sitting up in bed.   Cardio: RRR  GI: Non-distended, BS positive, soft, non-tender  Neuro: alert and orientated  Skin: no jaundice    Laboratory:  BMP  Recent Labs   Lab 07/26/23 0604 07/25/23 2014    140   POTASSIUM 3.8 4.0   CHLORIDE 104 102   ESTEFANY 9.3 9.8   CO2 27 26   BUN 8.9 9.3   CR 0.62 0.60   GLC 92 120*     CBC  Recent Labs   Lab 07/26/23 0604 07/25/23 2014   WBC 8.7 6.8   RBC 5.26* 5.65*   HGB 15.0 16.0*   HCT 45.4 48.2*   MCV 86 85   MCH 28.5 28.3   MCHC 33.0 33.2   RDW 14.0 13.7    234     INRNo lab results found in last 7 days.   LFTs  Recent Labs   Lab Test 07/26/23  0604 07/25/23 2110 07/25/23 2014 04/04/23  0007 04/03/23  2303 02/18/23  0939 02/17/23 2023 09/30/22  1005 05/22/22  1545   ALBUMIN 4.2  --  4.5  --  4.6   < > 4.3   < >  --    BILITOTAL 0.5  --  0.4  --  0.5   < > 0.4   < >  --    ALT 11  --  12  --  11   < > 15   < >  --    AST 23  --  24  --  24   < > 16   < >  --    PROTEIN  --  Negative  --  50*  --   --   --   --  20*   LIPASE  --   --  16  --  11*  --  14  --   --     < > = values in this interval not displayed.     Imaging:  EXAM: CT ABDOMEN PELVIS W CONTRAST  LOCATION: [facility]  DATE: 7/25/2023                                    IMPRESSION:   1.  Diffuse wall thickening throughout the length of the colon as seen previously consistent with ongoing infectious or inflammatory colitis. Pseudomembranous colitis also  possible. No evidence for obstruction.     PROCEDURES:  EGD 2/20/23:  Impression:    - Esophagogastric landmarks identified.                          - Normal esophagus.                          - 7 cm hiatal hernia.                          - Erythematous mucosa in the antrum. Biopsied.                          - Normal examined duodenum.      Assessment:   Nausea with vomiting -improving  67 year old female with history of history of chronic back pain with intrathecal pump, recurrent nausea and vomiting, large hiatal hernia, h/o choledocholithiasis status post laparoscopic cholecystectomy, ERCP in late 2011, chronic colonic inflammatory changes on imaging who is admitted with recurrent nausea and vomiting. She denies significant abdominal pain or change in bowel habits. No signs of biliary obstruction. Abdominal CT reviewed, no signs of significant constipation. Head CT negative. Recent EGD for same symptoms unremarkable aside from large HH. Hiatal hernia may be contributing. She may have gastroparesis related to her chronic opioid use, but Reglan not overly helpful at home per her report. We started scopolamine patch yesterday and she has had some relief. Also receiving scheduled compazine. She is improving.    2. Chronic colon inflammation seen on CT  Has not had a colonoscopy in the past. Recommend outpatient colonoscopy.      Plan:   1. Continue scopolamine patch  2. Continue supportive cares  3. Diet as tolerated  4. Outpatient colonoscopy, our office will arrange.     Ok from our standpoint for discharge home if continues to tolerate diet                                                                         Sheron George PA-C  Thank you for the opportunity to participate in the care of this patient.   Please feel free to call me with any questions or concerns.  Phone number [phone].

## 2023-07-27 NOTE — PROGRESS NOTES
Daily Progress Note        CODE STATUS:  Full Code    07/27/23  Assessment/Plan:  Mariah Koehler is a 67 year old female admitted on 7/25/2023. She presented with headache, intractable nausea vomiting, 20 pound weight loss.  CT of the abdomen shows possible colitis but patient denies any abdominal pain or diarrhea.      Intractable nausea and vomiting  --20 pound weight loss  --CT findings of colitis  --No recent colonoscopy.  --Ordered symptomatic treatment and GI consulted for possible colonoscopy. GI planning outpatient colonoscopy. Added scopolamine patch  -- PPI     Chronic pain syndrome with intrathecal pump  --Pump was recently filled  --Did not consult pain team since low suspicion for opioid withdrawal  --Continue baclofen     Moderate protein calorie malnutrition  -- Order clear liquid diet and advance as tolerated  -- Check magnesium level next a.m.     Accelerated hypertension   Headache due to accelerated hypertension  -- Improving with IV hydralazine.  Ordered IV hydralazine for systolic blood pressure more than 200.  -- Restarted beta-blocker     Current smoker  -- Ordered nicotine gum  -- Does not plan to quit     Anxiety  -- Continue trazodone at night           Diet: Advance Diet as Tolerated: Clear Liquid Diet    DVT Prophylaxis: Low Risk/Ambulatory with no VTE prophylaxis indicated  Bellamy Catheter: Not present  Lines: None     Cardiac Monitoring: None  Code Status: Full Code        Disposition; Likely home tomorrow  Barrier to discharge; Clinical progress     LOS: 0 days     Subjective:  Interval History: Patient seen and examined. Notes, labs, imaging reports personally reviewed. Patient is new to me today. Scopolamine patch is helping. Patient reports feeling better, but would like to see if she can keep her food down today.     Review of Systems:   As mentioned in subjective.    Patient Active Problem List   Diagnosis    Nausea and vomiting    Chest Pain    Opioid Dependence With Continuous  Use    Edema    Wheezing (Symptom)    Pain During Urination (Dysuria)    Frequent, Full-bladder Emptying (Polyuria)    Chronic Obstructive Pulmonary Disease    Chronic pain syndrome    Vitamin B12 Deficiency    Microscopic Hematuria    Cough    Abdominal Pain In The Central Upper Belly (Epigastric)    Reflex Sympathetic Dystrophy Of The Left Upper Limb    Osteoporosis    Carpal Tunnel Syndrome    Hypertension    Constipation    Abdominal Pain    Fatigue    Current mild episode of major depressive disorder, unspecified whether recurrent (H)    Viral gastroenteritis    Unintentional weight loss    Colitis    Intractable nausea and vomiting    Presence of intrathecal baclofen pump    Ketonuria       Scheduled Meds:   atenolol  25 mg Oral Daily    baclofen  20 mg Oral 4x Daily    nicotine  1 patch Transdermal Daily    nicotine   Transdermal Q8H    pantoprazole  40 mg Intravenous BID AC    prochlorperazine  5 mg Intravenous Q6H    scopolamine  1 patch Transdermal Q72H    And    scopolamine   Transdermal Q8H    traZODone  25 mg Oral At Bedtime     Continuous Infusions:  PRN Meds:.acetaminophen **OR** acetaminophen, hydrALAZINE, ketorolac, melatonin, nicotine, ondansetron    Objective:  Vital signs in last 24 hours:  Temp:  [97  F (36.1  C)-98.1  F (36.7  C)] 97  F (36.1  C)  Pulse:  [49-67] 58  Resp:  [15-19] 18  BP: (102-169)/(55-74) 102/55  SpO2:  [94 %-99 %] 98 %        Intake/Output Summary (Last 24 hours) at 7/27/2023 1321  Last data filed at 7/27/2023 1206  Gross per 24 hour   Intake 900 ml   Output --   Net 900 ml       Physical Exam:    General: Not in obvious distress.  HEENT: NC, AT   Chest: Clear to auscultation bilaterally  Heart: S1S2 normal, regular. No M/R/G  Abdomen: Soft. NT, ND. Bowel sounds- active.  Extremities: No legs swelling. Intrathecal pump under the skin near the right butt area laterally.   Neuro: Alert and awake, grossly non-focal      Lab Results:(I have personally reviewed the results)    No  results found for this or any previous visit (from the past 24 hour(s)).    All laboratory and imaging data in the past 24 hours reviewed  Serum Glucose range:   Recent Labs   Lab 07/26/23  0604 07/25/23 2014   GLC 92 120*     ABG: No lab results found in last 7 days.  CBC:   Recent Labs   Lab 07/26/23 0604 07/25/23 2014   WBC 8.7 6.8   HGB 15.0 16.0*   HCT 45.4 48.2*   MCV 86 85    234   NEUTROPHIL  --  81   LYMPH  --  14   MONOCYTE  --  5   EOSINOPHIL  --  0     Chemistry:   Recent Labs   Lab 07/26/23 0604 07/25/23 2014    140   POTASSIUM 3.8 4.0   CHLORIDE 104 102   CO2 27 26   BUN 8.9 9.3   CR 0.62 0.60   GFRESTIMATED >90 >90   ESTEFANY 9.3 9.8   MAG 1.8 1.8   PROTTOTAL 6.5 7.1   ALBUMIN 4.2 4.5   AST 23 24   ALT 11 12   ALKPHOS 83 89   BILITOTAL 0.5 0.4     Coags:  No results for input(s): INR, PROTIME, PTT in the last 168 hours.    Invalid input(s): APTT  Cardiac Markers:  No results for input(s): CKTOTAL, TROPONINI in the last 168 hours.       Head CT w/o contrast    Result Date: 7/25/2023  EXAM: CT HEAD W/O CONTRAST LOCATION: [facility] DATE: 7/25/2023 INDICATION:  Headache, vomiting COMPARISON: CT head 05/22/2022 TECHNIQUE: Routine CT Head without IV contrast. Multiplanar reformats. Dose reduction techniques were used. FINDINGS: INTRACRANIAL CONTENTS: No intracranial hemorrhage, extraaxial collection, or mass effect.  No CT evidence of acute infarct. Normal parenchymal attenuation for age. Mild generalized volume loss. No hydrocephalus. VISUALIZED ORBITS/SINUSES/MASTOIDS: No intraorbital abnormality. No significant paranasal sinus mucosal disease. No middle ear or mastoid effusion. BONES/SOFT TISSUES: No acute abnormality.     IMPRESSION: 1.  No acute intracranial process.    CT Abdomen Pelvis w Contrast    Result Date: 7/25/2023  EXAM: CT ABDOMEN PELVIS W CONTRAST LOCATION: [facility] DATE: 7/25/2023 INDICATION: vomiting, hypertension, eval for  bowel obstruction, choledocholithiasis, pancreatitis, etc COMPARISON: CT abdomen and pelvis 04/04/2023 and 02/17/2023 TECHNIQUE: CT scan of the abdomen and pelvis was performed following injection of IV contrast. Multiplanar reformats were obtained. Dose reduction techniques were used. CONTRAST: isovue 370  90ml FINDINGS: LOWER CHEST: Large esophageal hiatal hernia, unchanged. HEPATOBILIARY: Cholecystectomy with tiny amount of air in the intrahepatic bile ducts, unchanged. PANCREAS: Slight dilatation of the pancreatic duct, unchanged. No acute inflammatory changes. SPLEEN: Normal. ADRENAL GLANDS: Normal. KIDNEYS/BLADDER: 1 mm calyceal tip nonobstructing stone lower pole right kidney and tiny right renal cyst. BOWEL: Diffuse wall thickening throughout the length of the colon from the cecum to the rectum as seen on prior studies. No evidence for obstruction. The small bowel is unremarkable. LYMPH NODES: Normal. VASCULATURE: Atherosclerotic disease abdominal aorta and iliac arteries PELVIC ORGANS: Normal. MUSCULOSKELETAL: Advanced degenerative disc disease L4 level with low-grade anterior spondylolisthesis of L4 and L5. Stimulator pack right buttocks with epidural lead tip extending into the mid thoracic spine.     IMPRESSION: 1.  Diffuse wall thickening throughout the length of the colon as seen previously consistent with ongoing infectious or inflammatory colitis. Pseudomembranous colitis also possible. No evidence for obstruction.      Latest radiology report personally reviewed.    Note created using dragon voice recognition software so sounds alike errors may have escaped editing.      07/27/2023   Darrius Foley MD  Hospitalist, [facility]  Pager: [phone]

## 2023-07-27 NOTE — UTILIZATION REVIEW
Concurrent stay review; Secondary Review Determination     Under the authority of the Utilization Management Committee, the utilization review process indicated a secondary review on the above patient.  The review outcome is based on review of the medical records, discussions with staff, and applying clinical experience noted on the date of the review.          (x) Observation Status Appropriate - Concurrent stay review    RATIONALE FOR DETERMINATION     Ms. Koehler is a 66 yo female who presents to the ED with intractable N/V and weight loss.  CT scan with findings of colitis.  Her symptoms have improved with supportive care and IVF alone.  She is tolerating a regular diet in the hospital today and IVF have been stopped.  GI was consulted and recommends an outpatient colonoscopy.      She will likely be able to discharge home later today or in the am.     Patient is clinically improving and there is no clear indication to change patient's status to inpatient. The severity of illness, intensity of service provided, expected LOS and risk for adverse outcome make the care appropriate for observation.      The information on this document is developed by the utilization review team in order for the business office to ensure compliance.  This only denotes the appropriateness of proper admission status and does not reflect the quality of care rendered.         The definitions of Inpatient Status and Observation Status used in making the determination above are those provided in the CMS Coverage Manual, Chapter 1 and Chapter 6, section 70.4.          Sincerely,       Beth Haywood, DO  Utilization Review  Physician Advisor  [facility].

## 2023-07-27 NOTE — PLAN OF CARE
"PRIMARY DIAGNOSIS: \"NAUSEA and VOMITING\"  OUTPATIENT/OBSERVATION GOALS TO BE MET BEFORE DISCHARGE:  ADLs back to baseline: Yes    Activity and level of assistance: Up with standby assistance.    Pain status: Pain free.    Return to near baseline physical activity: No     Discharge Planner Nurse   Safe discharge environment identified: Yes  Barriers to discharge: Yes       Entered by: Lilliam Meyer RN 07/27/2023 12:53 PM  IV compazine to manage nausea      Please review provider order for any additional goals.   Nurse to notify provider when observation goals have been met and patient is ready for discharge.Goal Outcome Evaluation:                        "

## 2023-07-27 NOTE — PLAN OF CARE
"PRIMARY DIAGNOSIS: \"GENERIC\" NURSING  OUTPATIENT/OBSERVATION GOALS TO BE MET BEFORE DISCHARGE:  ADLs back to baseline: No    Activity and level of assistance: Up with standby assistance.    Pain status: Pain free.    Return to near baseline physical activity: No     Discharge Planner Nurse   Safe discharge environment identified: Yes  Barriers to discharge: Yes       Entered by: Ericka Hodges RN 07/26/2023 9:50 PM   Patient has been having continuous nausea, no vomiting, had schedule compazine, states it is manageable, bp on arrival to the unit was 169/74, did not meet parameters for prn bp med, was rechecked for 139/72, alert and oriented, PIV infiltrated, new one has been placed.  Please review provider order for any additional goals.   Nurse to notify provider when observation goals have been met and patient is ready for discharge.Goal Outcome Evaluation:                        "

## 2023-07-28 VITALS
RESPIRATION RATE: 18 BRPM | BODY MASS INDEX: 17.96 KG/M2 | OXYGEN SATURATION: 97 % | HEART RATE: 71 BPM | SYSTOLIC BLOOD PRESSURE: 138 MMHG | DIASTOLIC BLOOD PRESSURE: 64 MMHG | TEMPERATURE: 98.3 F | WEIGHT: 99.8 LBS

## 2023-07-28 PROCEDURE — 99239 HOSP IP/OBS DSCHRG MGMT >30: CPT | Performed by: INTERNAL MEDICINE

## 2023-07-28 PROCEDURE — 96376 TX/PRO/DX INJ SAME DRUG ADON: CPT

## 2023-07-28 PROCEDURE — 250N000013 HC RX MED GY IP 250 OP 250 PS 637: Performed by: INTERNAL MEDICINE

## 2023-07-28 PROCEDURE — 250N000011 HC RX IP 250 OP 636: Performed by: INTERNAL MEDICINE

## 2023-07-28 PROCEDURE — G0378 HOSPITAL OBSERVATION PER HR: HCPCS

## 2023-07-28 RX ORDER — SCOLOPAMINE TRANSDERMAL SYSTEM 1 MG/1
1 PATCH, EXTENDED RELEASE TRANSDERMAL
Qty: 10 PATCH | Refills: 0 | Status: ON HOLD | OUTPATIENT
Start: 2023-07-29 | End: 2023-11-01

## 2023-07-28 RX ORDER — METOCLOPRAMIDE 10 MG/1
10 TABLET ORAL 3 TIMES DAILY PRN
Qty: 30 TABLET | Refills: 1 | Status: SHIPPED | OUTPATIENT
Start: 2023-07-28 | End: 2023-11-17

## 2023-07-28 RX ADMIN — ATENOLOL 25 MG: 25 TABLET ORAL at 08:34

## 2023-07-28 RX ADMIN — PROCHLORPERAZINE EDISYLATE 5 MG: 5 INJECTION, SOLUTION INTRAMUSCULAR; INTRAVENOUS at 00:26

## 2023-07-28 RX ADMIN — BACLOFEN 20 MG: 10 TABLET ORAL at 08:34

## 2023-07-28 ASSESSMENT — ACTIVITIES OF DAILY LIVING (ADL)
ADLS_ACUITY_SCORE: 25

## 2023-07-28 NOTE — PLAN OF CARE
Patient a&o, ra, c/o prn antiemetic once, stand by assist, regular, may discharge home today.

## 2023-07-28 NOTE — PROGRESS NOTES
Care Management Follow Up    Length of Stay (days): 0    Expected Discharge Date: 07/28/2023     Concerns to be Addressed:     discharge planning  Patient plan of care discussed at interdisciplinary rounds: Yes    Anticipated Discharge Disposition:  home     Anticipated Discharge Services:  outpatient follow up  Anticipated Discharge DME:  NA    Patient/family educated on Medicare website which has current facility and service quality ratings:  NA  Education Provided on the Discharge Plan:  yes, per team  Patient/Family in Agreement with the Plan:  yes, per team    Referrals Placed by CM/SW:  NA  Private pay costs discussed: Not applicable    Additional Information:  GI following, outpatient colonoscopy, [facility] office will arrange  Full diet yesterday  Check magnesium level today    Patient may discharge home today.    Bindu Reyez RN

## 2023-07-28 NOTE — PROGRESS NOTES
Care Management Discharge Note    Discharge Date: 07/28/2023       Discharge Disposition: Home    Discharge Services: None    Discharge DME: None    Discharge Transportation: family or friend will provide    Private pay costs discussed: Not applicable    Does the patient's insurance plan have a 3 day qualifying hospital stay waiver?  Yes   Will the waiver be used for post-acute placement? No    PAS Confirmation Code: NA  Patient/family educated on Medicare website which has current facility and service quality ratings:  NA    Education Provided on the Discharge Plan: Yes (per team)  Persons Notified of Discharge Plans: per team  Patient/Family in Agreement with the Plan: yes    Handoff Referral Completed: Yes    Additional Information:  Patient discharging back to home.   Outpatient colonoscopy, [facility] office will arrange      Bindu Reyez RN

## 2023-07-28 NOTE — DISCHARGE SUMMARY
[facility] MEDICINE  DISCHARGE SUMMARY     Primary Care Physician: Alex Nolasco  Admission Date: 7/25/2023   Discharge Provider: KIRAN Smith Discharge Date: 7/28/2023   Diet: as below   Code Status: Prior   Activity: DCACTIVITY: Activity as tolerated        Condition at Discharge: Stable     REASON FOR PRESENTATION(See Admission Note for Details)   Nausea, vomiting and weight loss    PRINCIPAL & ACTIVE DISCHARGE DIAGNOSES     Principal Problem:    Colitis  Active Problems:    Intractable nausea and vomiting      PENDING LABS     Unresulted Labs Ordered in the Past 30 Days of this Admission       No orders found from 6/25/2023 to 7/26/2023.              PROCEDURES ( this hospitalization only)          RECOMMENDATIONS TO OUTPATIENT PROVIDER FOR F/U VISIT     Follow-up Appointments     Follow-up and recommended labs and tests       Follow up with primary care provider, Alex Nolasco, within 7 days   for hospital follow- up.    Follow up with [facility] for outpatient colonoscopy. GI will arrange.                DISPOSITION     Home    SUMMARY OF HOSPITAL COURSE:    Mariah Koehler is a 67 year old female admitted on 7/25/2023. She presented with intractable nausea vomiting, 20 pound weight loss.  CT of the abdomen shows possible colitis but patient denies any abdominal pain or diarrhea.      Intractable nausea and vomiting  --20 pound weight loss  --CT findings of colitis  --No recent colonoscopy.  --Ordered symptomatic treatment and GI consulted for possible colonoscopy. GI planning outpatient colonoscopy. Added scopolamine patch, which seems to have helped. Able to tolerate regular food  -- Cont PPI     Chronic pain syndrome with intrathecal pump  --Pump was recently filled  --Did not consult pain team since low suspicion for opioid withdrawal  --Continue baclofen     Moderate protein calorie malnutrition  -- Due to above     Accelerated hypertension   Headache due  to accelerated hypertension  -- Improving with IV hydralazine.  Ordered IV hydralazine for systolic blood pressure more than 200.  -- Restarted beta-blocker     Current smoker  -- Ordered nicotine gum  -- Does not plan to quit     Anxiety  -- Continue trazodone at night    Discharge Medications with Med changes:     Discharge Medication List as of 7/28/2023 10:39 AM        START taking these medications    Details   scopolamine (TRANSDERM) 1 MG/3DAYS 72 hr patch Place 1 patch onto the skin every 72 hours, Disp-10 patch, R-0, E-Prescribe           CONTINUE these medications which have CHANGED    Details   metoclopramide (REGLAN) 10 MG tablet Take 1 tablet (10 mg) by mouth 3 times daily as needed (Nausea) AS NEEDED FOR NAUSEA, Disp-30 tablet, R-1, E-Prescribe           CONTINUE these medications which have NOT CHANGED    Details   acetaminophen (TYLENOL) 325 MG tablet Take 1-2 tablets (325-650 mg) by mouth every 6 hours as needed for mild pain or headaches, OTC      atenolol (TENORMIN) 25 MG tablet TAKE 1 TABLET BY MOUTH EVERY DAY, Disp-90 tablet, R-3, E-Prescribe      baclofen (LIORESAL) 10 MG tablet TAKE 2 TABLETS (20 MG TOTAL) BY MOUTH FOUR TIMES A DAY., Disp-720 tablet, R-1, E-Prescribe      medication given by implanted intrathecal pump continuous Drug # 1: Fentanyl (Sublimaze) - Conc: 2000 mcg/mL - Total Dose / 24 hours: 873.8 mcg    Drug # 2: Bupivacaine (Marcaine)  - Conc:17.7 mg/mL - Total Dose / 24 hours: 7.733 mg    Drug # 3: Hydromorphone (Dilaudid)  - Conc: 3.1 mg/mL - Total Dos e / 24 hours: 1.3544 mg    Diluent: NS    Infusion Rate: 0.4369 mL/24 hrs  Pump Reservoir Volume: 40 mL    Bolus doses: up to 14 bolus doses/24 hours with 1 hour lockout  Each bolus: fentanyl 80.1 mcg, 0.708 mg Bupivacaine, 0.1241 mg Dilaudid    Max gregorio y dose: fentanyl 1,969.1 mcg, Bupivacaine 17.427 mg, Dilaudid 3.0522 mg    Outside Clinic & Provider: [facility] in [place] [phone]  Last Refill Date: 9/1/2023  Next  Refill Date: 8/28/2023  [place] Alarm Date:  9/1/2023  Pump :  [employer]    Deliver Pump Medication to:   For [place]: If pump is within 7 days of depletion, pharmacist will enter a 'Pain Management Adult IP Consult' into the EHR, including 'IT pain pump management' as the reason for the consult., Historical      omeprazole (PRILOSEC) 20 MG DR capsule TAKE 1 CAPSULE BY MOUTH TWICE A DAY BEFORE MEALS, Disp-180 capsule, R-3, E-Prescribe      ondansetron (ZOFRAN) 4 MG tablet TAKE 1 TABLET BY MOUTH EVERY 8 HOURS AS NEEDED FOR NAUSEA, Disp-90 tablet, R-1, E-Prescribe      traZODone (DESYREL) 50 MG tablet TAKE 0.5 TABLETS (25 MG) BY MOUTH AT BEDTIME, Disp-45 tablet, R-3, E-Prescribe                   Rationale for medication changes:      Please see above        Consults   GI      Immunizations given this encounter     Most Recent Immunizations   Administered Date(s) Administered     COVID-19 Bivalent 12+ (Pfizer) 09/12/2022     COVID-19 MONOVALENT 12+ (Pfizer) 09/28/2021     COVID-19 Monovalent 12+ (Pfizer 2022) 04/11/2022     DT (PEDS <7y) 07/21/2004     DTaP, Unspecified 05/02/2019     Flu, Unspecified 10/02/2014     Influenza (IIV3) PF 10/02/2014     Influenza Vaccine 65+ (Fluzone HD) 09/12/2022     Pneumo Conj 13-V (2010&after) 01/19/2016     Pneumococcal 20 valent Conjugate (Prevnar 20) 09/30/2022     Pneumococcal 23 valent 01/01/2011     Td (Adult), Adsorbed 07/21/2004     Tdap (Adult) Unspecified Formulation 07/21/2004           Anticoagulation Information      Recent INR results: No results for input(s): INR in the last 168 hours.  Warfarin doses (if applicable) or name of other anticoagulant: NA      SIGNIFICANT IMAGING FINDINGS     Results for orders placed or performed during the hospital encounter of 07/25/23   Head CT w/o contrast    Impression    IMPRESSION:  1.  No acute intracranial process.   CT Abdomen Pelvis w Contrast    Impression    IMPRESSION:   1.  Diffuse wall thickening  throughout the length of the colon as seen previously consistent with ongoing infectious or inflammatory colitis. Pseudomembranous colitis also possible. No evidence for obstruction.       SIGNIFICANT LABORATORY FINDINGS     Most Recent 3 CBC's:  Recent Labs   Lab Test 07/26/23  0604 07/25/23 2014 04/04/23  0739   WBC 8.7 6.8 8.3   HGB 15.0 16.0* 13.2   MCV 86 85 88    234 185     Most Recent 3 BMP's:  Recent Labs   Lab Test 07/26/23  0604 07/25/23 2014 04/04/23  0739    140 137   POTASSIUM 3.8 4.0 3.9   CHLORIDE 104 102 102   CO2 27 26 25   BUN 8.9 9.3 12.5   CR 0.62 0.60 0.55   ANIONGAP 9 12 10   ESTEFANY 9.3 9.8 8.4*   GLC 92 120* 96     Most Recent 2 LFT's:  Recent Labs   Lab Test 07/26/23 0604 07/25/23 2014   AST 23 24   ALT 11 12   ALKPHOS 83 89   BILITOTAL 0.5 0.4     Most Recent 3 INR's:  Recent Labs   Lab Test 11/25/19  1441   INR 1.01         Discharge Orders        Reason for your hospital stay    Nausea and vomiting.     Follow-up and recommended labs and tests     Follow up with primary care provider, Alex Nolasco, within 7 days for hospital follow- up.    Follow up with [facility] for outpatient colonoscopy. GI will arrange.     Activity    Your activity upon discharge: activity as tolerated     Diet    Follow this diet upon discharge: Orders Placed This Encounter      Room Service      Regular Diet Adult       Examination   /64 (BP Location: Right arm)   Pulse 71   Temp 98.3  F (36.8  C) (Oral)   Resp 18   Wt 45.3 kg (99 lb 12.8 oz)   SpO2 97%   BMI 17.96 kg/m      General: Not in obvious distress.  HEENT: NC, AT   Chest: Clear to auscultation bilaterally  Heart: S1S2 normal, regular. No M/R/G  Abdomen: Soft. NT, ND. Bowel sounds- active.  Extremities: No legs swelling. Intrathecal pump under the skin near the right butt area laterally.   Neuro: Alert and awake, grossly non-focal  Please see EMR for more detailed significant labs, imaging, consultant notes etc.    I, Darrius B  KIRAN Foley, personally saw the patient today and spent greater than 30 minutes discharging this patient.    KIRAN Smith  [facility]    CC:Alex Nolasco

## 2023-10-24 ENCOUNTER — APPOINTMENT (OUTPATIENT)
Dept: CT IMAGING | Facility: HOSPITAL | Age: 68
End: 2023-10-24
Attending: EMERGENCY MEDICINE
Payer: COMMERCIAL

## 2023-10-24 ENCOUNTER — HOSPITAL ENCOUNTER (OUTPATIENT)
Facility: HOSPITAL | Age: 68
Setting detail: OBSERVATION
Discharge: HOME | End: 2023-10-27
Attending: EMERGENCY MEDICINE | Admitting: EMERGENCY MEDICINE
Payer: COMMERCIAL

## 2023-10-24 ENCOUNTER — APPOINTMENT (OUTPATIENT)
Dept: RADIOLOGY | Facility: HOSPITAL | Age: 68
End: 2023-10-24
Attending: EMERGENCY MEDICINE
Payer: COMMERCIAL

## 2023-10-24 DIAGNOSIS — R07.9 CHEST PAIN, UNSPECIFIED TYPE: ICD-10-CM

## 2023-10-24 DIAGNOSIS — K44.9 HIATAL HERNIA: Primary | ICD-10-CM

## 2023-10-24 DIAGNOSIS — E44.0 MODERATE PROTEIN-CALORIE MALNUTRITION (H): ICD-10-CM

## 2023-10-24 DIAGNOSIS — I10 ESSENTIAL (PRIMARY) HYPERTENSION: ICD-10-CM

## 2023-10-24 DIAGNOSIS — E53.8 VITAMIN B 12 DEFICIENCY: ICD-10-CM

## 2023-10-24 DIAGNOSIS — R11.2 INTRACTABLE NAUSEA AND VOMITING: ICD-10-CM

## 2023-10-24 LAB
ALBUMIN SERPL BCG-MCNC: 3.9 G/DL (ref 3.5–5.2)
ALBUMIN UR-MCNC: NEGATIVE MG/DL
ALP SERPL-CCNC: 89 U/L (ref 35–104)
ALT SERPL W P-5'-P-CCNC: 11 U/L (ref 0–50)
ANION GAP SERPL CALCULATED.3IONS-SCNC: 9 MMOL/L (ref 7–15)
APPEARANCE UR: CLEAR
AST SERPL W P-5'-P-CCNC: 19 U/L (ref 0–45)
BASOPHILS # BLD AUTO: 0 10E3/UL (ref 0–0.2)
BASOPHILS NFR BLD AUTO: 1 %
BILIRUB SERPL-MCNC: 0.3 MG/DL
BILIRUB UR QL STRIP: NEGATIVE
BUN SERPL-MCNC: 9.7 MG/DL (ref 8–23)
CALCIUM SERPL-MCNC: 9.1 MG/DL (ref 8.8–10.2)
CHLORIDE SERPL-SCNC: 102 MMOL/L (ref 98–107)
COLOR UR AUTO: COLORLESS
CREAT SERPL-MCNC: 0.53 MG/DL (ref 0.51–0.95)
DEPRECATED HCO3 PLAS-SCNC: 27 MMOL/L (ref 22–29)
EGFRCR SERPLBLD CKD-EPI 2021: >90 ML/MIN/1.73M2
EOSINOPHIL # BLD AUTO: 0 10E3/UL (ref 0–0.7)
EOSINOPHIL NFR BLD AUTO: 0 %
ERYTHROCYTE [DISTWIDTH] IN BLOOD BY AUTOMATED COUNT: 13.6 % (ref 10–15)
GLUCOSE SERPL-MCNC: 101 MG/DL (ref 70–99)
GLUCOSE UR STRIP-MCNC: NEGATIVE MG/DL
HCT VFR BLD AUTO: 43 % (ref 35–47)
HGB BLD-MCNC: 14 G/DL (ref 11.7–15.7)
HGB UR QL STRIP: ABNORMAL
IMM GRANULOCYTES # BLD: 0 10E3/UL
IMM GRANULOCYTES NFR BLD: 0 %
KETONES UR STRIP-MCNC: NEGATIVE MG/DL
LEUKOCYTE ESTERASE UR QL STRIP: NEGATIVE
LIPASE SERPL-CCNC: 10 U/L (ref 13–60)
LYMPHOCYTES # BLD AUTO: 1.1 10E3/UL (ref 0.8–5.3)
LYMPHOCYTES NFR BLD AUTO: 25 %
MCH RBC QN AUTO: 28.3 PG (ref 26.5–33)
MCHC RBC AUTO-ENTMCNC: 32.6 G/DL (ref 31.5–36.5)
MCV RBC AUTO: 87 FL (ref 78–100)
MONOCYTES # BLD AUTO: 0.3 10E3/UL (ref 0–1.3)
MONOCYTES NFR BLD AUTO: 6 %
MUCOUS THREADS #/AREA URNS LPF: PRESENT /LPF
NEUTROPHILS # BLD AUTO: 2.8 10E3/UL (ref 1.6–8.3)
NEUTROPHILS NFR BLD AUTO: 68 %
NITRATE UR QL: NEGATIVE
NRBC # BLD AUTO: 0 10E3/UL
NRBC BLD AUTO-RTO: 0 /100
PH UR STRIP: 7.5 [PH] (ref 5–7)
PLATELET # BLD AUTO: 202 10E3/UL (ref 150–450)
POTASSIUM SERPL-SCNC: 3.5 MMOL/L (ref 3.4–5.3)
PROT SERPL-MCNC: 5.9 G/DL (ref 6.4–8.3)
RBC # BLD AUTO: 4.94 10E6/UL (ref 3.8–5.2)
RBC URINE: 3 /HPF
SODIUM SERPL-SCNC: 138 MMOL/L (ref 135–145)
SP GR UR STRIP: 1.01 (ref 1–1.03)
T4 FREE SERPL-MCNC: 1.07 NG/DL (ref 0.9–1.7)
TSH SERPL DL<=0.005 MIU/L-ACNC: 0.22 UIU/ML (ref 0.3–4.2)
UROBILINOGEN UR STRIP-MCNC: <2 MG/DL
WBC # BLD AUTO: 4.2 10E3/UL (ref 4–11)
WBC URINE: 1 /HPF

## 2023-10-24 PROCEDURE — 70450 CT HEAD/BRAIN W/O DYE: CPT

## 2023-10-24 PROCEDURE — 80053 COMPREHEN METABOLIC PANEL: CPT | Performed by: EMERGENCY MEDICINE

## 2023-10-24 PROCEDURE — 85004 AUTOMATED DIFF WBC COUNT: CPT | Performed by: EMERGENCY MEDICINE

## 2023-10-24 PROCEDURE — 83735 ASSAY OF MAGNESIUM: CPT | Performed by: STUDENT IN AN ORGANIZED HEALTH CARE EDUCATION/TRAINING PROGRAM

## 2023-10-24 PROCEDURE — 83690 ASSAY OF LIPASE: CPT | Performed by: EMERGENCY MEDICINE

## 2023-10-24 PROCEDURE — 99285 EMERGENCY DEPT VISIT HI MDM: CPT | Mod: 25

## 2023-10-24 PROCEDURE — 84443 ASSAY THYROID STIM HORMONE: CPT | Performed by: EMERGENCY MEDICINE

## 2023-10-24 PROCEDURE — 84100 ASSAY OF PHOSPHORUS: CPT | Performed by: STUDENT IN AN ORGANIZED HEALTH CARE EDUCATION/TRAINING PROGRAM

## 2023-10-24 PROCEDURE — 93005 ELECTROCARDIOGRAM TRACING: CPT | Performed by: EMERGENCY MEDICINE

## 2023-10-24 PROCEDURE — 96375 TX/PRO/DX INJ NEW DRUG ADDON: CPT

## 2023-10-24 PROCEDURE — 84484 ASSAY OF TROPONIN QUANT: CPT | Performed by: EMERGENCY MEDICINE

## 2023-10-24 PROCEDURE — 81001 URINALYSIS AUTO W/SCOPE: CPT | Performed by: EMERGENCY MEDICINE

## 2023-10-24 PROCEDURE — 258N000003 HC RX IP 258 OP 636: Performed by: EMERGENCY MEDICINE

## 2023-10-24 PROCEDURE — 250N000011 HC RX IP 250 OP 636: Mod: JZ | Performed by: EMERGENCY MEDICINE

## 2023-10-24 PROCEDURE — 84439 ASSAY OF FREE THYROXINE: CPT | Performed by: EMERGENCY MEDICINE

## 2023-10-24 PROCEDURE — 96376 TX/PRO/DX INJ SAME DRUG ADON: CPT

## 2023-10-24 PROCEDURE — 73502 X-RAY EXAM HIP UNI 2-3 VIEWS: CPT

## 2023-10-24 PROCEDURE — 74177 CT ABD & PELVIS W/CONTRAST: CPT

## 2023-10-24 PROCEDURE — 96361 HYDRATE IV INFUSION ADD-ON: CPT

## 2023-10-24 PROCEDURE — 36415 COLL VENOUS BLD VENIPUNCTURE: CPT | Performed by: EMERGENCY MEDICINE

## 2023-10-24 PROCEDURE — 93005 ELECTROCARDIOGRAM TRACING: CPT

## 2023-10-24 RX ORDER — ONDANSETRON 2 MG/ML
4 INJECTION INTRAMUSCULAR; INTRAVENOUS ONCE
Status: COMPLETED | OUTPATIENT
Start: 2023-10-24 | End: 2023-10-24

## 2023-10-24 RX ORDER — IOPAMIDOL 755 MG/ML
50 INJECTION, SOLUTION INTRAVASCULAR ONCE
Status: COMPLETED | OUTPATIENT
Start: 2023-10-25 | End: 2023-10-24

## 2023-10-24 RX ADMIN — ONDANSETRON 4 MG: 2 INJECTION INTRAMUSCULAR; INTRAVENOUS at 20:10

## 2023-10-24 RX ADMIN — IOPAMIDOL 50 ML: 755 INJECTION, SOLUTION INTRAVENOUS at 23:36

## 2023-10-24 RX ADMIN — METOCLOPRAMIDE 10 MG: 5 INJECTION, SOLUTION INTRAMUSCULAR; INTRAVENOUS at 20:33

## 2023-10-24 RX ADMIN — ONDANSETRON 4 MG: 2 INJECTION INTRAMUSCULAR; INTRAVENOUS at 23:18

## 2023-10-24 RX ADMIN — SODIUM CHLORIDE, POTASSIUM CHLORIDE, SODIUM LACTATE AND CALCIUM CHLORIDE 1000 ML: 600; 310; 30; 20 INJECTION, SOLUTION INTRAVENOUS at 20:14

## 2023-10-24 ASSESSMENT — ACTIVITIES OF DAILY LIVING (ADL)
ADLS_ACUITY_SCORE: 35
ADLS_ACUITY_SCORE: 35

## 2023-10-25 ENCOUNTER — APPOINTMENT (OUTPATIENT)
Dept: RADIOLOGY | Facility: HOSPITAL | Age: 68
End: 2023-10-25
Attending: EMERGENCY MEDICINE
Payer: COMMERCIAL

## 2023-10-25 LAB
ALBUMIN SERPL BCG-MCNC: 4 G/DL (ref 3.5–5.2)
ALP SERPL-CCNC: 95 U/L (ref 35–104)
ALT SERPL W P-5'-P-CCNC: 11 U/L (ref 0–50)
ANION GAP SERPL CALCULATED.3IONS-SCNC: 12 MMOL/L (ref 7–15)
AST SERPL W P-5'-P-CCNC: 17 U/L (ref 0–45)
BILIRUB SERPL-MCNC: 0.4 MG/DL
BUN SERPL-MCNC: 9.1 MG/DL (ref 8–23)
CALCIUM SERPL-MCNC: 9 MG/DL (ref 8.8–10.2)
CHLORIDE SERPL-SCNC: 101 MMOL/L (ref 98–107)
CREAT SERPL-MCNC: 0.51 MG/DL (ref 0.51–0.95)
DEPRECATED HCO3 PLAS-SCNC: 25 MMOL/L (ref 22–29)
EGFRCR SERPLBLD CKD-EPI 2021: >90 ML/MIN/1.73M2
ERYTHROCYTE [DISTWIDTH] IN BLOOD BY AUTOMATED COUNT: 13.9 % (ref 10–15)
GLUCOSE SERPL-MCNC: 127 MG/DL (ref 70–99)
HCT VFR BLD AUTO: 45.7 % (ref 35–47)
HGB BLD-MCNC: 15.2 G/DL (ref 11.7–15.7)
MAGNESIUM SERPL-MCNC: 1.6 MG/DL (ref 1.7–2.3)
MCH RBC QN AUTO: 28.7 PG (ref 26.5–33)
MCHC RBC AUTO-ENTMCNC: 33.3 G/DL (ref 31.5–36.5)
MCV RBC AUTO: 86 FL (ref 78–100)
PHOSPHATE SERPL-MCNC: 3.3 MG/DL (ref 2.5–4.5)
PLATELET # BLD AUTO: 215 10E3/UL (ref 150–450)
POTASSIUM SERPL-SCNC: 3.1 MMOL/L (ref 3.4–5.3)
POTASSIUM SERPL-SCNC: 3.6 MMOL/L (ref 3.4–5.3)
PROT SERPL-MCNC: 6.2 G/DL (ref 6.4–8.3)
RBC # BLD AUTO: 5.29 10E6/UL (ref 3.8–5.2)
SODIUM SERPL-SCNC: 138 MMOL/L (ref 135–145)
TROPONIN T SERPL HS-MCNC: 25 NG/L
WBC # BLD AUTO: 14.3 10E3/UL (ref 4–11)

## 2023-10-25 PROCEDURE — 250N000013 HC RX MED GY IP 250 OP 250 PS 637: Performed by: HOSPITALIST

## 2023-10-25 PROCEDURE — 96375 TX/PRO/DX INJ NEW DRUG ADDON: CPT

## 2023-10-25 PROCEDURE — 96366 THER/PROPH/DIAG IV INF ADDON: CPT

## 2023-10-25 PROCEDURE — 96376 TX/PRO/DX INJ SAME DRUG ADON: CPT

## 2023-10-25 PROCEDURE — 250N000011 HC RX IP 250 OP 636: Mod: JZ | Performed by: STUDENT IN AN ORGANIZED HEALTH CARE EDUCATION/TRAINING PROGRAM

## 2023-10-25 PROCEDURE — 96365 THER/PROPH/DIAG IV INF INIT: CPT | Mod: 59

## 2023-10-25 PROCEDURE — 85027 COMPLETE CBC AUTOMATED: CPT | Performed by: STUDENT IN AN ORGANIZED HEALTH CARE EDUCATION/TRAINING PROGRAM

## 2023-10-25 PROCEDURE — 36415 COLL VENOUS BLD VENIPUNCTURE: CPT | Performed by: HOSPITALIST

## 2023-10-25 PROCEDURE — 62367 ANALYZE SPINE INFUS PUMP: CPT | Performed by: CLINICAL NURSE SPECIALIST

## 2023-10-25 PROCEDURE — 99223 1ST HOSP IP/OBS HIGH 75: CPT | Performed by: CLINICAL NURSE SPECIALIST

## 2023-10-25 PROCEDURE — 71045 X-RAY EXAM CHEST 1 VIEW: CPT

## 2023-10-25 PROCEDURE — G0378 HOSPITAL OBSERVATION PER HR: HCPCS

## 2023-10-25 PROCEDURE — 250N000009 HC RX 250: Performed by: STUDENT IN AN ORGANIZED HEALTH CARE EDUCATION/TRAINING PROGRAM

## 2023-10-25 PROCEDURE — 250N000013 HC RX MED GY IP 250 OP 250 PS 637: Performed by: STUDENT IN AN ORGANIZED HEALTH CARE EDUCATION/TRAINING PROGRAM

## 2023-10-25 PROCEDURE — 36415 COLL VENOUS BLD VENIPUNCTURE: CPT | Performed by: STUDENT IN AN ORGANIZED HEALTH CARE EDUCATION/TRAINING PROGRAM

## 2023-10-25 PROCEDURE — 99406 BEHAV CHNG SMOKING 3-10 MIN: CPT | Performed by: STUDENT IN AN ORGANIZED HEALTH CARE EDUCATION/TRAINING PROGRAM

## 2023-10-25 PROCEDURE — 84132 ASSAY OF SERUM POTASSIUM: CPT | Mod: 91 | Performed by: HOSPITALIST

## 2023-10-25 PROCEDURE — 250N000011 HC RX IP 250 OP 636: Performed by: EMERGENCY MEDICINE

## 2023-10-25 PROCEDURE — 80053 COMPREHEN METABOLIC PANEL: CPT | Performed by: STUDENT IN AN ORGANIZED HEALTH CARE EDUCATION/TRAINING PROGRAM

## 2023-10-25 PROCEDURE — C9113 INJ PANTOPRAZOLE SODIUM, VIA: HCPCS | Mod: JZ | Performed by: EMERGENCY MEDICINE

## 2023-10-25 PROCEDURE — 99222 1ST HOSP IP/OBS MODERATE 55: CPT | Performed by: STUDENT IN AN ORGANIZED HEALTH CARE EDUCATION/TRAINING PROGRAM

## 2023-10-25 PROCEDURE — 62367 ANALYZE SPINE INFUS PUMP: CPT

## 2023-10-25 RX ORDER — POTASSIUM CHLORIDE 1500 MG/1
20 TABLET, EXTENDED RELEASE ORAL ONCE
Qty: 1 TABLET | Refills: 0 | Status: COMPLETED | OUTPATIENT
Start: 2023-10-25 | End: 2023-10-25

## 2023-10-25 RX ORDER — BISACODYL 10 MG
10 SUPPOSITORY, RECTAL RECTAL ONCE
Status: COMPLETED | OUTPATIENT
Start: 2023-10-25 | End: 2023-10-25

## 2023-10-25 RX ORDER — PROCHLORPERAZINE 25 MG
12.5 SUPPOSITORY, RECTAL RECTAL EVERY 12 HOURS PRN
Status: DISCONTINUED | OUTPATIENT
Start: 2023-10-25 | End: 2023-10-27 | Stop reason: HOSPADM

## 2023-10-25 RX ORDER — NALOXONE HYDROCHLORIDE 0.4 MG/ML
0.4 INJECTION, SOLUTION INTRAMUSCULAR; INTRAVENOUS; SUBCUTANEOUS
Status: DISCONTINUED | OUTPATIENT
Start: 2023-10-25 | End: 2023-10-27 | Stop reason: HOSPADM

## 2023-10-25 RX ORDER — SCOLOPAMINE TRANSDERMAL SYSTEM 1 MG/1
1 PATCH, EXTENDED RELEASE TRANSDERMAL
Status: DISCONTINUED | OUTPATIENT
Start: 2023-10-25 | End: 2023-10-26

## 2023-10-25 RX ORDER — MAGNESIUM SULFATE 4 G/50ML
4 INJECTION INTRAVENOUS ONCE
Status: COMPLETED | OUTPATIENT
Start: 2023-10-25 | End: 2023-10-25

## 2023-10-25 RX ORDER — HYDRALAZINE HYDROCHLORIDE 20 MG/ML
10 INJECTION INTRAMUSCULAR; INTRAVENOUS EVERY 6 HOURS PRN
Status: DISCONTINUED | OUTPATIENT
Start: 2023-10-25 | End: 2023-10-27 | Stop reason: HOSPADM

## 2023-10-25 RX ORDER — PANTOPRAZOLE SODIUM 40 MG/1
40 TABLET, DELAYED RELEASE ORAL
Status: DISCONTINUED | OUTPATIENT
Start: 2023-10-25 | End: 2023-10-27 | Stop reason: HOSPADM

## 2023-10-25 RX ORDER — ONDANSETRON 2 MG/ML
4 INJECTION INTRAMUSCULAR; INTRAVENOUS EVERY 6 HOURS PRN
Status: DISCONTINUED | OUTPATIENT
Start: 2023-10-25 | End: 2023-10-27 | Stop reason: HOSPADM

## 2023-10-25 RX ORDER — METOCLOPRAMIDE HYDROCHLORIDE 5 MG/ML
5 INJECTION INTRAMUSCULAR; INTRAVENOUS EVERY 6 HOURS PRN
Status: DISCONTINUED | OUTPATIENT
Start: 2023-10-25 | End: 2023-10-26

## 2023-10-25 RX ORDER — ATENOLOL 25 MG/1
25 TABLET ORAL DAILY
Status: DISCONTINUED | OUTPATIENT
Start: 2023-10-25 | End: 2023-10-26

## 2023-10-25 RX ORDER — NICOTINE 21 MG/24HR
1 PATCH, TRANSDERMAL 24 HOURS TRANSDERMAL DAILY
Status: DISCONTINUED | OUTPATIENT
Start: 2023-10-25 | End: 2023-10-27 | Stop reason: HOSPADM

## 2023-10-25 RX ORDER — ACETAMINOPHEN 325 MG/1
650 TABLET ORAL EVERY 6 HOURS PRN
Status: DISCONTINUED | OUTPATIENT
Start: 2023-10-25 | End: 2023-10-27 | Stop reason: HOSPADM

## 2023-10-25 RX ORDER — NALOXONE HYDROCHLORIDE 0.4 MG/ML
0.2 INJECTION, SOLUTION INTRAMUSCULAR; INTRAVENOUS; SUBCUTANEOUS
Status: DISCONTINUED | OUTPATIENT
Start: 2023-10-25 | End: 2023-10-27 | Stop reason: HOSPADM

## 2023-10-25 RX ORDER — FENTANYL CITRATE 50 UG/ML
50 INJECTION, SOLUTION INTRAMUSCULAR; INTRAVENOUS ONCE
Status: COMPLETED | OUTPATIENT
Start: 2023-10-25 | End: 2023-10-25

## 2023-10-25 RX ORDER — POLYETHYLENE GLYCOL 3350 17 G/17G
17 POWDER, FOR SOLUTION ORAL DAILY
Status: DISCONTINUED | OUTPATIENT
Start: 2023-10-25 | End: 2023-10-27 | Stop reason: HOSPADM

## 2023-10-25 RX ORDER — PROCHLORPERAZINE MALEATE 5 MG
5 TABLET ORAL EVERY 6 HOURS PRN
Status: DISCONTINUED | OUTPATIENT
Start: 2023-10-25 | End: 2023-10-27 | Stop reason: HOSPADM

## 2023-10-25 RX ORDER — HYDROMORPHONE HCL IN WATER/PF 6 MG/30 ML
0.2 PATIENT CONTROLLED ANALGESIA SYRINGE INTRAVENOUS
Status: DISCONTINUED | OUTPATIENT
Start: 2023-10-25 | End: 2023-10-27 | Stop reason: HOSPADM

## 2023-10-25 RX ORDER — ONDANSETRON 4 MG/1
4 TABLET, ORALLY DISINTEGRATING ORAL EVERY 6 HOURS PRN
Status: DISCONTINUED | OUTPATIENT
Start: 2023-10-25 | End: 2023-10-27 | Stop reason: HOSPADM

## 2023-10-25 RX ORDER — POTASSIUM CHLORIDE 750 MG/1
10 TABLET, EXTENDED RELEASE ORAL ONCE
Status: COMPLETED | OUTPATIENT
Start: 2023-10-25 | End: 2023-10-25

## 2023-10-25 RX ORDER — AMOXICILLIN 250 MG
1 CAPSULE ORAL AT BEDTIME
Status: DISCONTINUED | OUTPATIENT
Start: 2023-10-25 | End: 2023-10-27 | Stop reason: HOSPADM

## 2023-10-25 RX ORDER — ACETAMINOPHEN 650 MG/1
650 SUPPOSITORY RECTAL EVERY 6 HOURS PRN
Status: DISCONTINUED | OUTPATIENT
Start: 2023-10-25 | End: 2023-10-27 | Stop reason: HOSPADM

## 2023-10-25 RX ORDER — BACLOFEN 10 MG/1
20 TABLET ORAL 4 TIMES DAILY
Status: DISCONTINUED | OUTPATIENT
Start: 2023-10-25 | End: 2023-10-27 | Stop reason: HOSPADM

## 2023-10-25 RX ORDER — TRAMADOL HYDROCHLORIDE 50 MG/1
50 TABLET ORAL EVERY 6 HOURS PRN
Status: DISCONTINUED | OUTPATIENT
Start: 2023-10-25 | End: 2023-10-27 | Stop reason: HOSPADM

## 2023-10-25 RX ADMIN — TRAZODONE HYDROCHLORIDE 25 MG: 50 TABLET ORAL at 02:03

## 2023-10-25 RX ADMIN — BACLOFEN 20 MG: 10 TABLET ORAL at 08:07

## 2023-10-25 RX ADMIN — PROCHLORPERAZINE EDISYLATE 10 MG: 5 INJECTION INTRAMUSCULAR; INTRAVENOUS at 00:12

## 2023-10-25 RX ADMIN — NICOTINE 1 PATCH: 14 PATCH, EXTENDED RELEASE TRANSDERMAL at 08:23

## 2023-10-25 RX ADMIN — HYDROMORPHONE HYDROCHLORIDE 0.2 MG: 0.2 INJECTION, SOLUTION INTRAMUSCULAR; INTRAVENOUS; SUBCUTANEOUS at 07:52

## 2023-10-25 RX ADMIN — POLYETHYLENE GLYCOL 3350 17 G: 17 POWDER, FOR SOLUTION ORAL at 18:53

## 2023-10-25 RX ADMIN — POTASSIUM CHLORIDE 10 MEQ: 750 TABLET, EXTENDED RELEASE ORAL at 18:51

## 2023-10-25 RX ADMIN — PROCHLORPERAZINE EDISYLATE 5 MG: 5 INJECTION INTRAMUSCULAR; INTRAVENOUS at 16:53

## 2023-10-25 RX ADMIN — BACLOFEN 20 MG: 10 TABLET ORAL at 22:40

## 2023-10-25 RX ADMIN — ONDANSETRON 4 MG: 2 INJECTION INTRAMUSCULAR; INTRAVENOUS at 07:52

## 2023-10-25 RX ADMIN — METOCLOPRAMIDE 5 MG: 5 INJECTION, SOLUTION INTRAMUSCULAR; INTRAVENOUS at 18:41

## 2023-10-25 RX ADMIN — FENTANYL CITRATE 50 MCG: 50 INJECTION, SOLUTION INTRAMUSCULAR; INTRAVENOUS at 00:10

## 2023-10-25 RX ADMIN — PROCHLORPERAZINE EDISYLATE 5 MG: 5 INJECTION INTRAMUSCULAR; INTRAVENOUS at 04:49

## 2023-10-25 RX ADMIN — ATENOLOL 25 MG: 25 TABLET ORAL at 08:23

## 2023-10-25 RX ADMIN — NICOTINE 1 PATCH: 14 PATCH, EXTENDED RELEASE TRANSDERMAL at 02:01

## 2023-10-25 RX ADMIN — PANTOPRAZOLE SODIUM 40 MG: 40 TABLET, DELAYED RELEASE ORAL at 08:07

## 2023-10-25 RX ADMIN — PANTOPRAZOLE SODIUM 40 MG: 40 TABLET, DELAYED RELEASE ORAL at 17:44

## 2023-10-25 RX ADMIN — SCOPALAMINE 1 PATCH: 1 PATCH, EXTENDED RELEASE TRANSDERMAL at 04:15

## 2023-10-25 RX ADMIN — PANTOPRAZOLE SODIUM 40 MG: 40 INJECTION, POWDER, FOR SOLUTION INTRAVENOUS at 00:15

## 2023-10-25 RX ADMIN — HYDROMORPHONE HYDROCHLORIDE 0.2 MG: 0.2 INJECTION, SOLUTION INTRAMUSCULAR; INTRAVENOUS; SUBCUTANEOUS at 04:17

## 2023-10-25 RX ADMIN — POTASSIUM CHLORIDE 20 MEQ: 1500 TABLET, EXTENDED RELEASE ORAL at 11:55

## 2023-10-25 RX ADMIN — BACLOFEN 20 MG: 10 TABLET ORAL at 11:55

## 2023-10-25 RX ADMIN — SENNOSIDES AND DOCUSATE SODIUM 1 TABLET: 8.6; 5 TABLET ORAL at 22:41

## 2023-10-25 RX ADMIN — ONDANSETRON 4 MG: 4 TABLET, ORALLY DISINTEGRATING ORAL at 13:56

## 2023-10-25 RX ADMIN — BACLOFEN 20 MG: 10 TABLET ORAL at 17:41

## 2023-10-25 RX ADMIN — TRAZODONE HYDROCHLORIDE 25 MG: 50 TABLET ORAL at 22:41

## 2023-10-25 RX ADMIN — HYDRALAZINE HYDROCHLORIDE 10 MG: 20 INJECTION INTRAMUSCULAR; INTRAVENOUS at 01:54

## 2023-10-25 RX ADMIN — ONDANSETRON 4 MG: 4 TABLET, ORALLY DISINTEGRATING ORAL at 22:52

## 2023-10-25 RX ADMIN — MAGNESIUM SULFATE HEPTAHYDRATE 4 G: 80 INJECTION, SOLUTION INTRAVENOUS at 04:14

## 2023-10-25 ASSESSMENT — ACTIVITIES OF DAILY LIVING (ADL)
ADLS_ACUITY_SCORE: 35
ADLS_ACUITY_SCORE: 35
ADLS_ACUITY_SCORE: 22
ADLS_ACUITY_SCORE: 22
ADLS_ACUITY_SCORE: 37
ADLS_ACUITY_SCORE: 35
ADLS_ACUITY_SCORE: 37
ADLS_ACUITY_SCORE: 24
ADLS_ACUITY_SCORE: 37
ADLS_ACUITY_SCORE: 35
ADLS_ACUITY_SCORE: 37
ADLS_ACUITY_SCORE: 35

## 2023-10-25 NOTE — ED PROVIDER NOTES
EMERGENCY DEPARTMENT ENCOUNTER      NAME: Mariah Koehler  AGE: 67 year old female  YOB: 1955  MRN: [id number]  EVALUATION DATE & TIME: 10/24/2023  7:34 PM    PCP: Alex Nolasco    ED PROVIDER: Ayah Major M.D.      Chief Complaint   Patient presents with    Headache    Urinary Frequency    Nausea & Vomiting       FINAL IMPRESSION:  1. Intractable nausea and vomiting        ED COURSE & MEDICAL DECISION MAKING:    Acute on chronic intractable nausea and vomiting.  Uncertain etiology, no abdominal pain, I ordered meds for symptomatic treatment.  Patient appears cachectic and has had significant weight loss this year.  Reports per family member of negative colonoscopy and EGD this year.    I ordered blood work including CMP, lipase, CBC, straight cath urine sample.  I reviewed her blood work and urinalysis, there is no significant UTI, normal liver enzymes and bilirubin.  Electrolytes are stable.  TSH is low but T4 normal.  CT abdomen pelvis ordered after initial round of medications did not improve symptoms.  Negative for acute process.  EKG showed sinus rhythm with PVCs.,  No ischemia.  Patient's mechanical fall today resulted in some abrasion to the right hip, intrathecal pain pump appears intact, no fracture on x-ray.  Discussed plan with family member before he left, discussed plan with patient.    10:50 PM I met with the patient to gather history and to perform my initial exam. I discussed the plan for care while in the Emergency Department.  12:29 AM  Admitted patient to hospitalist.     Pertinent Labs & Imaging studies reviewed. (See chart for details).    Medical Decision Making    History:  Supplemental history from: Documented in chart, if applicable  External Record(s) reviewed: Documented in chart, if applicable.  History of hypertension, COPD, chronic pain, opioid dependence, recurrent nausea and emesis.    Work Up:  Chart documentation includes differential considered and any EKGs  or imaging independently interpreted by provider, where specified.  In additional to work up documented, I considered the following work up: Documented in chart, if applicable.    External consultation:  Discussion of management with another provider: Documented in chart, if applicable    Complicating factors:  Care impacted by chronic illness: Other: History of colitis, intractable vomiting, weight loss, cognitive decline.  Care affected by social determinants of health: N/A    Disposition considerations: Admit.        At the conclusion of the encounter I discussed the results of all of the tests and the disposition. The questions were answered. The patient or family acknowledged understanding and was agreeable with the care plan.      CRITICAL CARE:  N/A    HPI    Patient information was obtained from: Patient, spouse    Use of : N/A        Mariah Koehler is a 67 year old female who presents who presents for intractable vomiting, has vomited 3 times today and still feels nauseous, not able to eat almost anything today. EGD performed 2/20/23 by Dr. Brooks outpatient,  states scope was normal in recent past.  Patient has diarrhea a few times a day, no blood seen, none today.  She has cyclical bouts of similar symptoms in the past, 20 pound weight loss over the past few months.  States increased urination without burning today, denies any abdominal pain.   states he is concerned about cognitive decline, she has no history of dementia.  Her  states he was just told that she had fallen today, she denies any loss of consciousness, she fell onto the right hip, her baclofen pump is in this location.  Patient also complains of frontal headache all day today, has been seen inpatient by neurology for headaches as well as concern for pump leakage, imaging findings did not show any leak and headache suspected to be benign in etiology.   Patient tried Zofran at home which did not improve  symptoms, ran out of Reglan recently.    Per Chart Review: Admitted April 2023, February 2023.  Chronic gastritis seen on EGD.  Admitted to this hospital July 2023, CT showed colitis.  Treated symptomatically and discharged.  Beta-blocker restarted due to accelerated hypertension with headache.      REVIEW OF SYSTEMS  All other systems negative unless noted in HPI.    PAST MEDICAL HISTORY:  Past Medical History:   Diagnosis Date    Current mild episode of major depressive disorder, unspecified whether recurrent (H) 02/17/2020    Hypertension     Opiate dependence (H)     RSD (reflex sympathetic dystrophy)     Sacral fracture, closed (H)        PAST SURGICAL HISTORY:  Past Surgical History:   Procedure Laterality Date    APPENDECTOMY      ESOPHAGOSCOPY, GASTROSCOPY, DUODENOSCOPY (EGD), COMBINED N/A 2/20/2023    Procedure: ESOPHAGOGASTRODUODENOSCOPY with biopsies;  Surgeon: Na Brooks MD;  Location: [facility] OR    GYN SURGERY      HC REVISE MEDIAN N/CARPAL TUNNEL SURG      Description: Neuroplasty Decompression Median Nerve At Carpal Tunnel;  Recorded: 09/19/2011;    HYSTERECTOMY  1984    IR CERVICAL EPIDURAL STEROID INJECTION  1/14/2005    OOPHORECTOMY Bilateral 1985    [place] SYMPATHECTOMY,CERVICOTHORACIC      Description: Surgical Sympathectomy Cervicothoracic;  Recorded: 09/19/2011;    [facility] DECOMPRESS FASCIOTOMY FINGR/HAND      Description: Decompression Fasciotomy Of The Hand;  Recorded: 09/19/2011;    [facility] LAP,CHOLECYSTECTOMY/EXPLORE      Description: Cholecystectomy Laparoscopic;  Recorded: 12/09/2011;    [facility] TOTAL ABDOM HYSTERECTOMY      Description: Hysterectomy;  Recorded: 03/23/2011;         CURRENT MEDICATIONS:    No current facility-administered medications for this encounter.     Current Outpatient Medications   Medication    acetaminophen (TYLENOL) 325 MG tablet    atenolol (TENORMIN) 25 MG tablet    baclofen (LIORESAL) 10 MG tablet    medication given by implanted intrathecal pump     metoclopramide (REGLAN) 10 MG tablet    omeprazole (PRILOSEC) 20 MG DR capsule    ondansetron (ZOFRAN) 4 MG tablet    scopolamine (TRANSDERM) 1 MG/3DAYS 72 hr patch    traZODone (DESYREL) 50 MG tablet         ALLERGIES:  Allergies   Allergen Reactions    Codeine Nausea and Vomiting    Morphine Nausea and Vomiting    Bacitracin Rash    Methadone Rash       FAMILY HISTORY:  Family History   Problem Relation Age of Onset    Cerebrovascular Disease Mother     Diabetes Mother     Heart Disease Mother     Hypertension Mother     Rheumatologic Disease Mother     Heart Disease Father     Pulmonary Hypertension Father     Kidney failure Father     Cancer Father         melanoma    Diabetes Sister     Hypertension Sister     Hypertension Brother        SOCIAL HISTORY:  Social History     Socioeconomic History    Marital status:    Tobacco Use    Smoking status: Every Day     Packs/day: 0.75     Years: 51.00     Additional pack years: 0.00     Total pack years: 38.25     Types: Cigarettes     Start date: 1/1/1968    Smokeless tobacco: Never   Vaping Use    Vaping Use: Never used   Substance and Sexual Activity    Alcohol use: Not Currently     Comment: 1 glass a year    Drug use: Never       VITALS:  Patient Vitals for the past 24 hrs:   BP Temp Temp src Pulse Resp SpO2   10/24/23 2355 -- -- -- 75 21 94 %   10/24/23 2351 -- -- -- 83 24 97 %   10/24/23 2350 -- -- -- 80 21 96 %   10/24/23 2318 -- -- -- -- 18 --   10/24/23 2315 (!) 194/92 -- -- 76 -- 97 %   10/24/23 2255 (!) 191/91 -- -- 81 20 97 %   10/24/23 2253 -- -- -- 73 19 97 %   10/24/23 2239 (!) 197/93 -- -- 78 12 97 %   10/24/23 2234 -- -- -- 80 20 97 %   10/24/23 2230 (!) 191/91 -- -- 75 12 97 %   10/24/23 2209 (!) 190/91 -- -- 68 18 98 %   10/24/23 2157 -- -- -- 79 15 97 %   10/24/23 2154 (!) 183/87 -- -- 82 18 98 %   10/24/23 2142 (!) 184/84 -- -- 65 13 98 %   10/24/23 2127 (!) 191/91 -- -- 75 -- --   10/24/23 2118 -- -- -- -- -- 98 %   10/24/23 2116 -- -- --  67 18 98 %   10/24/23 2115 (!) 194/91 -- -- 70 -- --   10/24/23 2104 (!) 177/81 -- -- 53 -- --   10/24/23 2103 -- -- -- -- -- (!) 89 %   10/24/23 2030 (!) 189/85 -- -- 69 -- 98 %   10/24/23 2004 (!) 171/74 -- -- -- -- --   10/24/23 1926 (!) 202/88 97.9  F (36.6  C) Temporal 74 24 99 %       PHYSICAL EXAM    VITAL SIGNS: BP (!) 194/92   Pulse 75   Temp 97.9  F (36.6  C) (Temporal)   Resp 21   SpO2 94%   Physical Exam  Vitals and nursing note reviewed.   Constitutional:       Comments: Thin, debilitated, appears older than stated age   HENT:      Head: Normocephalic and atraumatic.   Eyes:      General: No scleral icterus.        Right eye: No discharge.         Left eye: No discharge.      Pupils: Pupils are equal, round, and reactive to light.   Cardiovascular:      Rate and Rhythm: Normal rate and regular rhythm.   Pulmonary:      Effort: Pulmonary effort is normal. No respiratory distress.      Breath sounds: No wheezing.   Abdominal:      General: There is no distension.      Palpations: Abdomen is soft.      Tenderness: There is no abdominal tenderness. There is no guarding or rebound.   Musculoskeletal:      Cervical back: Neck supple. No rigidity.      Right lower leg: No edema.      Left lower leg: No edema.   Skin:     General: Skin is warm and dry.      Capillary Refill: Capillary refill takes less than 2 seconds.      Findings: No bruising or erythema.   Neurological:      General: No focal deficit present.      Mental Status: She is oriented to person, place, and time.      Comments: Oriented to person place and time, does not know exact date.  No slurred speech or facial droop.   Psychiatric:      Comments: Flat affect, tearful.         LABS  Labs Ordered and Resulted from Time of ED Arrival to Time of ED Departure   COMPREHENSIVE METABOLIC PANEL - Abnormal       Result Value    Sodium 138      Potassium 3.5      Carbon Dioxide (CO2) 27      Anion Gap 9      Urea Nitrogen 9.7      Creatinine 0.53       GFR Estimate >90      Calcium 9.1      Chloride 102      Glucose 101 (*)     Alkaline Phosphatase 89      AST 19      ALT 11      Protein Total 5.9 (*)     Albumin 3.9      Bilirubin Total 0.3     LIPASE - Abnormal    Lipase 10 (*)    ROUTINE UA WITH MICROSCOPIC REFLEX TO CULTURE - Abnormal    Color Urine Colorless      Appearance Urine Clear      Glucose Urine Negative      Bilirubin Urine Negative      Ketones Urine Negative      Specific Gravity Urine 1.015      Blood Urine 0.03 mg/dL (*)     pH Urine 7.5 (*)     Protein Albumin Urine Negative      Urobilinogen Urine <2.0      Nitrite Urine Negative      Leukocyte Esterase Urine Negative      Mucus Urine Present (*)     RBC Urine 3 (*)     WBC Urine 1     TSH WITH FREE T4 REFLEX - Abnormal    TSH 0.22 (*)    T4 FREE - Normal    Free T4 1.07     CBC WITH PLATELETS AND DIFFERENTIAL    WBC Count 4.2      RBC Count 4.94      Hemoglobin 14.0      Hematocrit 43.0      MCV 87      MCH 28.3      MCHC 32.6      RDW 13.6      Platelet Count 202      % Neutrophils 68      % Lymphocytes 25      % Monocytes 6      % Eosinophils 0      % Basophils 1      % Immature Granulocytes 0      NRBCs per 100 WBC 0      Absolute Neutrophils 2.8      Absolute Lymphocytes 1.1      Absolute Monocytes 0.3      Absolute Eosinophils 0.0      Absolute Basophils 0.0      Absolute Immature Granulocytes 0.0      Absolute NRBCs 0.0     TROPONIN T, HIGH SENSITIVITY         RADIOLOGY  CT Abdomen Pelvis w Contrast   Final Result   IMPRESSION:    1.  No acute abnormality. No bowel obstruction.   2.  Moderate size hiatal hernia.   3.  Previous cholecystectomy. Small amount of pneumobilia.      XR Pelvis and Hip Right 2 Views   Final Result   IMPRESSION: Normal joint spaces and alignment. No fracture. Battery pack projects over the right iliac bone.      Head CT w/o contrast   Final Result   IMPRESSION:   1.  No intracranial hemorrhage, mass lesions, hydrocephalus or CT evidence for an acute infarct.       XR Chest Port 1 View    (Results Pending)      I have independently reviewed the above image. See radiology report for detail.      EKG:    EKG obtained at 2128 and shows sinus rhythm with occasional PVCs, rate 75 bpm, no acute ST elevation or depression.  Compared to previous EKG 2/17/2023, similar. I have independently reviewed and interpreted today's EKG, pending Cardiologist read       PROCEDURES:  N/A      MEDICATIONS GIVEN IN THE EMERGENCY:  Medications   lactated ringers BOLUS 1,000 mL (0 mLs Intravenous Stopped 10/24/23 2254)   ondansetron (ZOFRAN) injection 4 mg (4 mg Intravenous $Given 10/24/23 2010)   metoclopramide (REGLAN) 10 mg in sodium chloride 0.9 % 50 mL infusion (10 mg Intravenous $Given 10/24/23 2033)   ondansetron (ZOFRAN) injection 4 mg (4 mg Intravenous $Given 10/24/23 2318)   iopamidol (ISOVUE-370) solution 50 mL (50 mLs Intravenous $Given 10/24/23 2336)   prochlorperazine (COMPAZINE) injection 10 mg (10 mg Intravenous $Given 10/25/23 0012)   fentaNYL (PF) (SUBLIMAZE) injection 50 mcg (50 mcg Intravenous $Given 10/25/23 0010)   pantoprazole (PROTONIX) IV push injection 40 mg (40 mg Intravenous $Given 10/25/23 0015)       NEW PRESCRIPTIONS STARTED AT TODAY'S ER VISIT  New Prescriptions    No medications on file        Ayah Major MD  Emergency Medicine  [facility] EMERGENCY DEPARTMENT  [address] [address]  [phone]  Dept: [phone]             Ayah Major MD  10/25/23 0029

## 2023-10-25 NOTE — ED NOTES
"MD notified-  \"pt returned from imaging and finished reglan infusion and sats at 89%, I am going to do an EKG pt HR going into rate of 45 also. no change in pts work of breathing, notes improvement in nausea overall but still present\"  "

## 2023-10-25 NOTE — PLAN OF CARE
Problem: Nausea and Vomiting  Goal: Nausea and Vomiting Relief  Outcome: Progressing  Intervention: Prevent and Manage Nausea and Vomiting  Recent Flowsheet Documentation  Taken 10/25/2023 1641 by Maria C Rodriguez RN  Nausea/Vomiting Interventions: antiemetic     PRIMARY DIAGNOSIS: NAUSEA  OUTPATIENT/OBSERVATION GOALS TO BE MET BEFORE DISCHARGE:  ADLs back to baseline: Yes    Activity and level of assistance: Up with assist x 1, walker and gait belt.    Pain status: Pain free. C/o nausea, prn compazine given with little relief per patient report. Prn reglan given later with better relief. Able to have dinner, no emesis reported.    Return to near baseline physical activity: Yes     Discharge Planner Nurse   Safe discharge environment identified: No  Barriers to discharge: Yes. Symptom management.          Entered by: Maria C Cui RN 10/25/2023 5:57 PM     Please review provider order for any additional goals.   Nurse to notify provider when observation goals have been met and patient is ready for discharge.Goal Outcome Evaluation:

## 2023-10-25 NOTE — PROCEDURES
"  Procedure Note - Intrathecal Pump Interrogation       Procedure:  Pump Check     Pre-Operative Diagnosis: chronic pain and verification of intrathecal pump dosing and schedule     Post-Operative Diagnosis: Same     Indication: pain pump interrogation      Procedure Details: The intended procedure, risks, and alternatives were discussed with the patient and informedconsent was obtained. \"Pause for the cause\" was utilized to identify correct patient and intended procedure. The pump was interrogated.      Findings: Her pump is filled with   Fentanyl concentration is 2000mcg/ML and is delivering 873.8mcg/day 36.4mcg/hour  Hydromorphone with a concentration of 3.8mg/ML delivering mg/day 1.6603mg/day (0.0692mg/hour)  bupivacaine concentration is 17.7mg/mL and delivering 7.733mcg/day.  (0.322mg/hour)     PTM settings 15/day  Fentanyl 80.1mcg Bupivicaine 0.708 mg hydromorphone 0.1521 mg   Duration 3 min with one hour lockout 15/day    Next refill date is 11/6/23   Current volume is 17.6 ml low reserviour alarm 2.0ml.        Pain Clinic notified: [facility] notified and provided faxed copy of current Intrathecal Pain Pump dosing to Acute Inpatient Pain Pharmacist.          Patient tolerated the procedure well.  Leann Lewis, CNS DNP  "

## 2023-10-25 NOTE — CONSULTS
CLINICAL NUTRITION SERVICES - ASSESSMENT NOTE     Nutrition Prescription    RECOMMENDATIONS FOR MDs/PROVIDERS TO ORDER:  Consider MVI daily to meet RDI for vitamins.    Consider 100 mg thiamine daily with severe malnutrition.     Malnutrition Status:    Severe in acute on chronic    Recommendations already ordered by Registered Dietitian (RD):  Ensure Enlive 2x/day, Magic cup daily.    Future/Additional Recommendations:  Intake, supplement accept.     REASON FOR ASSESSMENT  Mariah Koehler is a/an 67 year old female assessed by the dietitian for Provider Order - Supplemental protein recommendations.    NUTRITION HISTORY  Patient admitted with acute intractable nausea/vomiting.  History colitis, malnutrition, N/V, COPD, tobacco dependence, chronic pain.    Met patient in the ED.  Patient reports decreased po intake, food not tasting good.  Patient eating a cookie from lunch.  Patient reports that she eats Mac and cheese, noodles with butter and salt, grapefruit pieces, applesauce.  Beverages are water, coffee and juice.  Patient does not like eggs unless they are scrambled and eats meat sometimes.      CURRENT NUTRITION ORDERS  Diet: Regular  Meal in the ED are generated.  Intake/Tolerance: Only ate part of a cookie at lunch today.    LABS  Potassium 3.1 (L)  Glucose 127 (H)  Labs reviewed    MEDICATIONS  Baclofen, nicoderm, scopolamine, trazodone.  Medications reviewed    ANTHROPOMETRICS  Height: 0 cm (Data Unavailable)  Most Recent Weight: 45.2 kg (99 lb 9.6 oz)    BMI: Underweight BMI <18.5  Weight History:   Wt Readings from Last 8 Encounters:   10/25/23 45.2 kg (99 lb 9.6 oz)   07/25/23 45.3 kg (99 lb 12.8 oz)   04/03/23 50.8 kg (112 lb)   03/17/23 50.8 kg (112 lb)   02/20/23 47.6 kg (105 lb)   01/26/23 48.5 kg (107 lb)   09/30/22 48.5 kg (107 lb)   04/06/22 53.1 kg (117 lb)     11% wt loss in 6 months-significant    Dosing Weight: 45.2 kg    ASSESSED NUTRITION NEEDS  Estimated Energy Needs: 1355+ kcals/day (25+  kcals/kg)  Justification: Underweight  Estimated Protein Needs: 54+ grams protein/day (1.2+ grams of pro/kg)  Justification: Increased needs  Estimated Fluid Needs: 1355+ mL/day (1 mL/kcal)   Justification: Maintenance    PHYSICAL FINDINGS  See malnutrition section below.  Thin   No BM this admission.    MALNUTRITION:  % Weight Loss:  > 10% in 6 months (severe malnutrition)  % Intake:  </= 75% for >/= 1 month (severe malnutrition)  Subcutaneous Fat Loss:  Orbital region moderate depletion  Muscle Loss:  Clavicle bone region moderate depletion and Acromion bone region mild depletion  Fluid Retention:  None noted  Weak  noted with handshake .    Malnutrition Diagnosis: Severe malnutrition  In Context of:  Acute illness or injury  Chronic illness or disease    NUTRITION DIAGNOSIS  Malnutrition related to acute on chronic illness as evidenced by wt loss, decreased intake, loss of lean body mass.      INTERVENTIONS  Implementation  Nutrition Education: we discussed protein needs for healing, strength.  Also protein foods and supplements.   Medical food supplement therapy Ensure Enlive 2x/day with meals, magic cup daily with meals  Consider MVI daily to meet RDI for vitamins.    Consider 100 mg thiamine daily with severe malnutrition.      Goals  Maintain wt  Patient to consume % of nutritionally adequate meals three times per day, or the equivalent with supplements/snacks.     Monitoring/Evaluation  Progress toward goals will be monitored and evaluated per protocol.

## 2023-10-25 NOTE — ED NOTES
Updated given to pt's  Alfred, on pt's admission, with pt's consent. Alfred Informs writer that pt had to cancel colonoscopy that was previously scheduled due to prep made her sick.

## 2023-10-25 NOTE — ED NOTES
Since 0100 pt throwing up and took her morning BP medication at about 0500 then threw up a few hours after. Little intake since.

## 2023-10-25 NOTE — ED NOTES
Pt moaning in pain and arching back, holding epigastric region with hand. Pt unable to answer writer when asked about pain.

## 2023-10-25 NOTE — ED NOTES
MD notified of pts onset of pain in the left lower rib into left flank, pt appeared in increased distress and a bit tearful at notification of this to writer. MD placed medication orders and imaging. Pt explains the pain was there a bit earlier but at this time has spiked in pain level. Reports 6/10

## 2023-10-25 NOTE — PLAN OF CARE
PRIMARY DIAGNOSIS: Intractable Nausea/Vomiting, pain  OUTPATIENT/OBSERVATION GOALS TO BE MET BEFORE DISCHARGE:  ADLs back to baseline: No    Activity and level of assistance: Up with standby assistance.    Pain status: Improved but still requiring IV narcotics.    Return to near baseline physical activity: No     Discharge Planner Nurse   Safe discharge environment identified: No  Barriers to discharge: Yes       Entered by: Cristiano Burch RN 10/25/2023 11:44 AM     Pt is alert and oriented x4, able to communicate needs, standby assist. Pt was having increased abdominal pain and nausea this morning, not verbally responding to questions but would nod yes or shake head no. Pt was able to answer questions easily after receiving IV dilaudid and zofran, pain now down to 3-4/10 and improvement to nausea. Pt started on potassium protocol after K this morning at 3.1 and intermittent bradycardia that became more persistent with rate down to 40s when Pt was sleeping.

## 2023-10-25 NOTE — MEDICATION SCRIBE - ADMISSION MEDICATION HISTORY
Medication Scribe Admission Medication History    Admission medication history is complete. The information provided in this note is only as accurate as the sources available at the time of the update.    Information Source(s): Patient via in-person    Pertinent Information: None    Changes made to PTA medication list:  Added: None  Deleted: None  Changed: None    Medication Affordability:  Not including over the counter (OTC) medications, was there a time in the past 3 months when you did not take your medications as prescribed because of cost?: No    Allergies reviewed with patient and updates made in EHR: yes    Medication History Completed By: Abraham Garland 10/25/2023 1:33 AM    PTA Med List   Medication Sig Last Dose    acetaminophen (TYLENOL) 325 MG tablet Take 1-2 tablets (325-650 mg) by mouth every 6 hours as needed for mild pain or headaches Past Week at prn    atenolol (TENORMIN) 25 MG tablet TAKE 1 TABLET BY MOUTH EVERY DAY 10/24/2023 at am    baclofen (LIORESAL) 10 MG tablet TAKE 2 TABLETS (20 MG TOTAL) BY MOUTH FOUR TIMES A DAY. Past Week at x4    medication given by implanted intrathecal pump continuous Drug # 1: Fentanyl (Sublimaze) - Conc: 2000 mcg/mL - Total Dose / 24 hours: 873.8 mcg    Drug # 2: Bupivacaine (Marcaine)  - Conc:17.7 mg/mL - Total Dose / 24 hours: 7.733 mg    Drug # 3: Hydromorphone (Dilaudid)  - Conc: 3.1 mg/mL - Total Dose / 24 hours: 1.3544 mg    Diluent: NS    Infusion Rate: 0.4369 mL/24 hrs  Pump Reservoir Volume: 40 mL    Bolus doses: up to 14 bolus doses/24 hours with 1 hour lockout  Each bolus: fentanyl 80.1 mcg, 0.708 mg Bupivacaine, 0.1241 mg Dilaudid    Max daily dose: fentanyl 1,969.1 mcg, Bupivacaine 17.427 mg, Dilaudid 3.0522 mg    Outside Clinic & Provider: [facility] in [place] [phone]  Last Refill Date: 9/1/2023  Next Refill Date: 8/28/2023  Low [place] Alarm Date:  9/1/2023  Pump : [employer] Call    Deliver Pump Medication to:   For [place]: If  pump is within 7 days of depletion, pharmacist will enter a 'Pain Management Adult IP Consult' into the EHR, including 'IT pain pump management' as the reason for the consult. 10/24/2023 at pump    metoclopramide (REGLAN) 10 MG tablet Take 1 tablet (10 mg) by mouth 3 times daily as needed (Nausea) AS NEEDED FOR NAUSEA Past Week at prn    omeprazole (PRILOSEC) 20 MG DR capsule TAKE 1 CAPSULE BY MOUTH TWICE A DAY BEFORE MEALS 10/24/2023 at am    ondansetron (ZOFRAN) 4 MG tablet TAKE 1 TABLET BY MOUTH EVERY 8 HOURS AS NEEDED FOR NAUSEA Past Week at prn    scopolamine (TRANSDERM) 1 MG/3DAYS 72 hr patch Place 1 patch onto the skin every 72 hours 10/24/2023 at am    traZODone (DESYREL) 50 MG tablet TAKE 0.5 TABLETS (25 MG) BY MOUTH AT BEDTIME Past Week at pm prn

## 2023-10-25 NOTE — ED NOTES
Pt taken off oxygen as holding sats WDL on room air trial. MD notified  Also requesting something for headache MD messaged

## 2023-10-25 NOTE — ED TRIAGE NOTES
Mariah presents to triage with  in wheel chair. Complaints of headache, nausea/vomiting, and urinary frequency which all started at 1300 today. Tachypnea and hypertensive in triage.    Additionally reports having a fall today landing on her side.     Triage Assessment (Adult)       Row Name 10/24/23 1926          Triage Assessment    Airway WDL WDL        Respiratory WDL    Respiratory WDL X;rhythm/pattern     Rhythm/Pattern, Respiratory shortness of breath;tachypneic        Skin Circulation/Temperature WDL    Skin Circulation/Temperature WDL WDL        Cardiac WDL    Cardiac WDL X  hypertension        Peripheral/Neurovascular WDL    Peripheral Neurovascular WDL WDL        Cognitive/Neuro/Behavioral WDL    Cognitive/Neuro/Behavioral WDL X  headache

## 2023-10-25 NOTE — H&P
[facility]    History and Physical - Hospitalist Service       Date of Admission:  10/24/2023    Assessment & Plan      Mariah Koehler is a 67F presents with acute intractable nausea/vomiting; pmhx includes colitits, intractable nausea/vomiting, moderate protein malnutrition, HTN, COPD, tobacco dependence; admitted to observation for intractable nausea/vomiting.    #intractable nausea/vomiting  NO hematemesis. Chronic, recurring issue. Moderate hiatal hernia.  -CMP trend  -Mg/phos trend  -zofran/compazine/reglan PO/IV PRN  -scopalamine patch q72hrs  -consider CT surgery consult for possible hiatal hernia correction    #protein calorie malnutrition  Likely sequela of chronic nausea/vomiting.  -RD consult for nutritional supplements    #HTN  -hydralazine 10mg IV q6h/PRN    #hypomagnesemia  -Mag trend  -replete as indicated    #tobacco dependence  Current 3/4 to 1ppd cigarette use, currently working towards reduction with goals. Declines chantix on initial discussion. Would like NRT during admission; discussed for 4 minutes on morning of admission.  -NRT patches PRN  -rediscuss chantix/wellbutrin prior to discharge    #chronic pain  -continue pain pump  -baclofen 20mg PO QID  -tylenol PO PRN  -tramadol 50mg PO q6h/PRN  -dilaudid IV breakthrough PRN         Observation Goals: -diagnostic tests and consults completed and resulted, -vital signs normal or at patient baseline, Nurse to notify provider when observation goals have been met and patient is ready for discharge.  Diet: Regular Diet Adult  DVT Prophylaxis: Ambulate every shift  Bellamy Catheter: Not present  Lines: None     Cardiac Monitoring: None  Code Status: Full Code    Clinically Significant Risk Factors Present on Admission                  # Hypertension: Noted on problem list                 Disposition Plan      Expected Discharge Date: 10/26/2023                  Power Parker MD  Hospitalist Service  [facility]  [facility]  Securely message with [employer] (more info)  Text page via [employer] Paging/Directory     ______________________________________________________________________    Chief Complaint   Nausea/vomiting    History is obtained from the patient    History of Present Illness   Mariah Koheler is a 67F presents with acute intractable nausea/vomiting; pmhx includes colitits, intractable nausea/vomiting, moderate protein malnutrition, HTN; admitted to observation for intractable nausea/vomiting.    Admitted to [facility] in July 2023 for intractable nausea and vomiting, weight loss.  Consideration was given to colonoscopy however GI defer this to outpatient.  Trial of Zofran, Reglan, scopolamine patch symptomatically improved nausea/vomiting.  Had a period of improved nausea over the past week has had an increase in her degree of nausea with the vomiting of 3-4 times daily.  No hematemesis.  No recalled sick exposures including COVID.  No ingested seafood, raw foods.  No recent changes in medications.      Past Medical History    Past Medical History:   Diagnosis Date    Current mild episode of major depressive disorder, unspecified whether recurrent (H) 02/17/2020    Hypertension     Opiate dependence (H)     RSD (reflex sympathetic dystrophy)     Sacral fracture, closed (H)        Past Surgical History   Past Surgical History:   Procedure Laterality Date    APPENDECTOMY      ESOPHAGOSCOPY, GASTROSCOPY, DUODENOSCOPY (EGD), COMBINED N/A 2/20/2023    Procedure: ESOPHAGOGASTRODUODENOSCOPY with biopsies;  Surgeon: Na Brooks MD;  Location: [facility] Main OR    GYN SURGERY      HC REVISE MEDIAN N/CARPAL TUNNEL SURG      Description: Neuroplasty Decompression Median Nerve At Carpal Tunnel;  Recorded: 09/19/2011;    HYSTERECTOMY  1984    IR CERVICAL EPIDURAL STEROID INJECTION  1/14/2005    OOPHORECTOMY Bilateral 1985    [place] SYMPATHECTOMY,CERVICOTHORACIC      Description: Surgical Sympathectomy  Cervicothoracic;  Recorded: 09/19/2011;    [facility] DECOMPRESS FASCIOTOMY FINGR/HAND      Description: Decompression Fasciotomy Of The Hand;  Recorded: 09/19/2011;    [facility] LAP,CHOLECYSTECTOMY/EXPLORE      Description: Cholecystectomy Laparoscopic;  Recorded: 12/09/2011;    [facility] TOTAL ABDOM HYSTERECTOMY      Description: Hysterectomy;  Recorded: 03/23/2011;       Prior to Admission Medications   Prior to Admission Medications   Prescriptions Last Dose Informant Patient Reported? Taking?   acetaminophen (TYLENOL) 325 MG tablet   No No   Sig: Take 1-2 tablets (325-650 mg) by mouth every 6 hours as needed for mild pain or headaches   atenolol (TENORMIN) 25 MG tablet   No No   Sig: TAKE 1 TABLET BY MOUTH EVERY DAY   baclofen (LIORESAL) 10 MG tablet   No No   Sig: TAKE 2 TABLETS (20 MG TOTAL) BY MOUTH FOUR TIMES A DAY.   medication given by implanted intrathecal pump   Yes No   Sig: continuous Drug # 1: Fentanyl (Sublimaze) - Conc: 2000 mcg/mL - Total Dose / 24 hours: 873.8 mcg    Drug # 2: Bupivacaine (Marcaine)  - Conc:17.7 mg/mL - Total Dose / 24 hours: 7.733 mg    Drug # 3: Hydromorphone (Dilaudid)  - Conc: 3.1 mg/mL - Total Dose / 24 hours: 1.3544 mg    Diluent: NS    Infusion Rate: 0.4369 mL/24 hrs  Pump Reservoir Volume: 40 mL    Bolus doses: up to 14 bolus doses/24 hours with 1 hour lockout  Each bolus: fentanyl 80.1 mcg, 0.708 mg Bupivacaine, 0.1241 mg Dilaudid    Max daily dose: fentanyl 1,969.1 mcg, Bupivacaine 17.427 mg, Dilaudid 3.0522 mg    Outside Clinic & Provider: [facility] in [place] [phone]  Last Refill Date: 9/1/2023  Next Refill Date: 8/28/2023  Low [place] Alarm Date:  9/1/2023  Pump : Relevant e-solution    Deliver Pump Medication to:   For [place]: If pump is within 7 days of depletion, pharmacist will enter a 'Pain Management Adult IP Consult' into the EHR, including 'IT pain pump management' as the reason for the consult.   metoclopramide (REGLAN) 10 MG tablet   No No   Sig: Take 1  tablet (10 mg) by mouth 3 times daily as needed (Nausea) AS NEEDED FOR NAUSEA   omeprazole (PRILOSEC) 20 MG DR capsule   No No   Sig: TAKE 1 CAPSULE BY MOUTH TWICE A DAY BEFORE MEALS   ondansetron (ZOFRAN) 4 MG tablet   No No   Sig: TAKE 1 TABLET BY MOUTH EVERY 8 HOURS AS NEEDED FOR NAUSEA   scopolamine (TRANSDERM) 1 MG/3DAYS 72 hr patch   No No   Sig: Place 1 patch onto the skin every 72 hours   traZODone (DESYREL) 50 MG tablet   No No   Sig: TAKE 0.5 TABLETS (25 MG) BY MOUTH AT BEDTIME      Facility-Administered Medications: None        Review of Systems    The 10 point Review of Systems is negative other than noted in the HPI or here.      Physical Exam   Vital Signs: Temp: 97.9  F (36.6  C) Temp src: Temporal BP: (!) 161/79 Pulse: 76   Resp: 17 SpO2: 97 % O2 Device: (S) Nasal cannula (after fentanyl given, MD notified of reapplication of NC) Oxygen Delivery: 2 LPM  Weight: 0 lbs 0 oz    Constitutional: awake, alert, cooperative, no apparent distress, and appears stated age  Respiratory: CTAB  Cardiovascular: RRR no m/g/r  GI: scaphoid abdomen, nontender, no rash/lesion  Musculoskeletal: shoulder/elbow flexion/extension 5/5 bilaterally and symmetric  Neurologic: AOx4, no focal deficits    Medical Decision Making       45 MINUTES SPENT BY ME on the date of service doing chart review, history, exam, documentation & further activities per the note.  MANAGEMENT DISCUSSED with the following over the past 24 hours: patient   NOTE(S)/MEDICAL RECORDS REVIEWED over the past 24 hours: outpatient FM, discharge summary  Tests ORDERED & REVIEWED in the past 24 hours:  - See lab/imaging results included in the data section of the note      Data     I have personally reviewed the following data over the past 24 hrs:    4.2  \   14.0   / 202     138 102 9.7 /  101 (H)   3.5 27 0.53 \     ALT: 11 AST: 19 AP: 89 TBILI: 0.3   ALB: 3.9 TOT PROTEIN: 5.9 (L) LIPASE: 10 (L)     Trop: 25 (H) BNP: N/A     TSH: 0.22 (L) T4: 1.07 A1C: N/A        Imaging results reviewed over the past 24 hrs:   Recent Results (from the past 24 hour(s))   Head CT w/o contrast    Narrative    EXAM: CT HEAD W/O CONTRAST  LOCATION: [facility]  DATE: 10/24/2023    INDICATION: fall, no loc  COMPARISON: Head CT 07/25/2023  TECHNIQUE: Routine CT Head without IV contrast. Multiplanar reformats. Dose reduction techniques were used.    FINDINGS:  INTRACRANIAL CONTENTS: No intracranial hemorrhage, extraaxial collection, or mass effect.  No CT evidence of acute infarct. Normal parenchymal attenuation. Normal ventricles and sulci.     VISUALIZED ORBITS/SINUSES/MASTOIDS: No intraorbital abnormality. Mild mucosal thickening of ethmoid air cells. No middle ear or mastoid effusion.    BONES/SOFT TISSUES: No acute abnormality. Mild degenerative changes of the right temporomandibular joint.      Impression    IMPRESSION:  1.  No intracranial hemorrhage, mass lesions, hydrocephalus or CT evidence for an acute infarct.   XR Pelvis and Hip Right 2 Views    Narrative    EXAM: XR PELVIS AND HIP RIGHT 2 VIEWS  LOCATION: [facility]  DATE: 10/24/2023    INDICATION: Fall. Pain.  COMPARISON: None.      Impression    IMPRESSION: Normal joint spaces and alignment. No fracture. Battery pack projects over the right iliac bone.   CT Abdomen Pelvis w Contrast    Narrative    EXAM: CT ABDOMEN PELVIS W CONTRAST  LOCATION: [facility]  DATE: 10/24/2023    INDICATION: intractable vomiting  COMPARISON: 07/25/2023.  TECHNIQUE: CT scan of the abdomen and pelvis was performed following injection of IV contrast. Multiplanar reformats were obtained. Dose reduction techniques were used.  CONTRAST: ISOVUE 370 50ML    FINDINGS:   LOWER CHEST: Dependent atelectasis at the lung bases. Moderate size hiatal hernia.    HEPATOBILIARY: The gallbladder is absent. Small amount of pneumobilia.    PANCREAS: Borderline dilated pancreatic duct without  significant change.    SPLEEN: Normal.    ADRENAL GLANDS: Normal.    KIDNEYS/BLADDER: There is a single 1 mm stone in the lower pole of the right kidney. Few cortical scars in the right kidney. Left kidney is unremarkable. There is no hydronephrosis.    BOWEL: There is no bowel obstruction. There is a moderate amount of stool throughout the colon. There is no free intraperitoneal gas or fluid.    LYMPH NODES: Normal.    VASCULATURE: Atherosclerotic calcification of the aorta and its branches. No aneurysm.    PELVIC ORGANS: The uterus is absent. There is no adnexal mass.    MUSCULOSKELETAL: Baclofen pump implanted in the right buttock. Degenerative disease in the lumbar spine.      Impression    IMPRESSION:   1.  No acute abnormality. No bowel obstruction.  2.  Moderate size hiatal hernia.  3.  Previous cholecystectomy. Small amount of pneumobilia.   XR Chest Port 1 View    Narrative    EXAM: XR CHEST PORT 1 VIEW  LOCATION: [facility]  DATE: 10/25/2023    INDICATION: chest pain  COMPARISON: 11/25/2019      Impression    IMPRESSION: Lungs are clear. No pleural effusion or pneumothorax. Normal heart size and pulmonary vascularity. Surgical clips project over the medial left upper chest.

## 2023-10-25 NOTE — CONSULTS
"[facility] ACUTE PAIN SERVICE    ([facility], [facility], [facility])   Consult Note    Date of Admission:  10/24/2023  Date of Consult: 10/25/23    Physician requesting consult: Power Parker MD    Reason for consult: Patient has intrathecal pain pump     Assessment/Plan:     Mariah Koehler is a 67 year old female who was admitted on 10/24/2023.   Pain Service is asked to see the patient for evaluation has intrathecal pain pump.  Admitted for evaluation of intractale vomiting, with nausea emesis, unable to eat.  Patient reports simmilar symptoms in the past with 20 pound weight loss over the past few months.  Patient with complaint of headaches, she has been evaluated by Neurology and concern for pump leakage, with no clear etiology to explain headaches.    is concerned that she has cognitive decline as well.   Mariah's medical history otherwise significant for colitis, intractable nausea/vominting, protein malnutrition, HTN< COPD, tobacco dependence.  The patient does smoke, working on reduction, no chemical dependency history..     Per Chart Review: Admitted April 2023, February 2023.  Chronic gastritis seen on EGD. Admitted to this hospital July 2023, CT showed colitis.  Treated symptomatically and discharged.  Beta-blocker restarted due to accelerated hypertension with headache     Patient moaning, complaint of nausea, abdominal pain.  Difficult time providing history, combination of lethargy and pain/nausea.    Identifies chronic back pain, currently having increased abdominal pain and back pain     PLAN:   1) Pain is consistent with acute abdominal pain, workup so far not finding any clear etiology, chronic back pain with intrathecal pain pump.  Follows at [facility].  States has had pump for \"a long time\"   2)Multimodal Medication Therapy  Topical: consider (hold for now)  NSAID'S: avoid  Muscle Relaxants: baclofen 20 mg qid (home medication)  Adjuvants: antiemetics per " Hospital Medicine,   Antidepressants/anxiolytics: none noted  Opioids: agree with IV hydromorphone PRN as currently prescribed   Intrathecal Pain Pump per [facility] orders placed  3)Non-medication interventions  Pharmacy consult- appreciate Pharmacy Medication reconciliation  Acupuncture consult- please offer    Integrative consult - called referral to [phone]   4)Constipation Prophylaxis  Senna docusate, Miralax, and Bisacodyl Supposioty PRN  5) Follow up   -Opioid prescriber has been [facility]  -Discharge Recommendations - We recommend prescribing the following at the time of discharge: TBD          History of Present Illness (HPI):       Mariah Koehler is a 67 year old female.  The pain is reported to be acute abdominal pain with history of  chronic back pain.  Current pain located in the lower abdomen with cramping pain with nausea and vomiting.  Patient is experiencing loss of appetite and weight loss.      Per chart review patient hospitalized July 2023 with similar symptoms CT demonstrated colitis  Also having headache.    GI Consulted with recommendation for outpatient colonoscopy        MN  pulled from system on 10/25/23. Last refill on 10/2/23.   This indicated 2 opioid prescriptions over this past year.    10/2/23 Tylenol #3 12 tablets for 3 days  1/26/23 oxycodone 5 mg tablets 6 for 2 days  She has had two prescriptions for Ambien last were a year ago 10/28/22 and 11/24/22   Ambien 10 mg tablets 30 for 30 days        Medical History  Patient Active Problem List    Diagnosis Date Noted    Colitis 04/04/2023     Priority: Medium    Intractable nausea and vomiting 04/04/2023     Priority: Medium    Presence of intrathecal baclofen pump 04/04/2023     Priority: Medium    Ketonuria 04/04/2023     Priority: Medium    Viral gastroenteritis 02/21/2023     Priority: Medium    Unintentional weight loss 02/21/2023     Priority: Medium    Current mild episode of major depressive disorder, unspecified whether  recurrent (H24) 02/17/2020     Priority: Medium    Fatigue 03/22/2018     Priority: Medium    Nausea and vomiting      Priority: Medium     Created by Conversion        Reflex Sympathetic Dystrophy Of The Left Upper Limb      Priority: Medium     Created by Conversion  Replacement Utility updated for latest IMO load        Osteoporosis      Priority: Medium     Created by Conversion  Replacement Utility updated for latest IMO load        Hypertension      Priority: Medium     Created by Conversion  Replacement Utility updated for latest IMO load        Constipation      Priority: Medium     Created by Conversion  Replacement Utility updated for latest IMO load        Abdominal Pain      Priority: Medium     Created by Conversion  Replacement Utility updated for latest IMO load        Abdominal Pain In The Central Upper Belly (Epigastric)      Priority: Medium     Created by Conversion        Chest Pain      Priority: Medium     Created by Conversion        Opioid Dependence With Continuous Use      Priority: Medium     Created by Conversion        Edema      Priority: Medium     Created by Conversion        Wheezing (Symptom)      Priority: Medium     Created by Conversion        Pain During Urination (Dysuria)      Priority: Medium     Created by Conversion        Frequent, Full-bladder Emptying (Polyuria)      Priority: Medium     Created by Conversion        Other specified chronic obstructive pulmonary disease      Priority: Medium     Created by Conversion        Chronic pain syndrome      Priority: Medium     Created by Conversion        Vitamin B12 Deficiency      Priority: Medium     Created by Conversion        Microscopic Hematuria      Priority: Medium     Created by Conversion        Cough      Priority: Medium     Created by Conversion        Carpal Tunnel Syndrome      Priority: Medium     Created by Conversion            Surgical History  She  has a past surgical history that includes appendectomy;  GYN surgery; IR Cervical Epidural Steroid Injection (1/14/2005); TOTAL ABDOM HYSTERECTOMY; REVISE MEDIAN N/CARPAL TUNNEL SURG; DECOMPRESS FASCIOTOMY FINGR/HAND; Pr Sympathectomy,Cervicothoracic; LAP,CHOLECYSTECTOMY/EXPLORE; Hysterectomy (1984); Oophorectomy (Bilateral, 1985); and Esophagoscopy, gastroscopy, duodenoscopy (EGD), combined (N/A, 2/20/2023).     Past Surgical History:   Procedure Laterality Date    APPENDECTOMY      ESOPHAGOSCOPY, GASTROSCOPY, DUODENOSCOPY (EGD), COMBINED N/A 2/20/2023    Procedure: ESOPHAGOGASTRODUODENOSCOPY with biopsies;  Surgeon: Na Brooks MD;  Location: [facility] OR    GYN SURGERY      HC REVISE MEDIAN N/CARPAL TUNNEL SURG      Description: Neuroplasty Decompression Median Nerve At Carpal Tunnel;  Recorded: 09/19/2011;    HYSTERECTOMY  1984    IR CERVICAL EPIDURAL STEROID INJECTION  1/14/2005    OOPHORECTOMY Bilateral 1985    MI SYMPATHECTOMY,CERVICOTHORACIC      Description: Surgical Sympathectomy Cervicothoracic;  Recorded: 09/19/2011;    [facility] DECOMPRESS FASCIOTOMY FINGR/HAND      Description: Decompression Fasciotomy Of The Hand;  Recorded: 09/19/2011;    [facility] LAP,CHOLECYSTECTOMY/EXPLORE      Description: Cholecystectomy Laparoscopic;  Recorded: 12/09/2011;    [facility] TOTAL ABDOM HYSTERECTOMY      Description: Hysterectomy;  Recorded: 03/23/2011;       Allergies  Allergies   Allergen Reactions    Codeine Nausea and Vomiting    Morphine Nausea and Vomiting    Bacitracin Rash    Methadone Rash       Prior to Admission Medications   (Not in a hospital admission)      Social History  Reviewed, and she  reports that she has been smoking cigarettes. She started smoking about 55 years ago. She has a 38.25 pack-year smoking history. She has never used smokeless tobacco. She reports that she does not currently use alcohol. She reports that she does not use drugs.  Social History     Tobacco Use    Smoking status: Every Day     Packs/day: 0.75     Years: 51.00     Additional  pack years: 0.00     Total pack years: 38.25     Types: Cigarettes     Start date: 1/1/1968    Smokeless tobacco: Never   Substance Use Topics    Alcohol use: Not Currently     Comment: 1 glass a year       Family History  Reviewed, and family history includes Cancer in her father; Cerebrovascular Disease in her mother; Diabetes in her mother and sister; Heart Disease in her father and mother; Hypertension in her brother, mother, and sister; Kidney failure in her father; Pulmonary Hypertension in her father; Rheumatologic Disease in her mother.    Review of Systems  Complete ROS reviewed, unless noted, all other systems reviewed and found to be negative.        Objective:     Physical Exam:  BP (!) 160/73   Pulse 88   Temp 97.6  F (36.4  C) (Oral)   Resp 26   SpO2 95%   Weight:   Weight change:   There is no height or weight on file to calculate BMI.      General Appearance:  Lethargic, moaning, slowed movements, slowed response to questions, ill appearing   Head:  Normocephalic, without obvious abnormality, atraumatic   Eyes:  PERRL, conjunctiva/corneas clear, EOM's intact   ENT/Throat: Lips, mucosa, and tongue normal; teeth and gums normal   Lymph/Neck: Supple, symmetrical, trachea midline   Lungs:   respirations unlabored   Chest Wall:  No tenderness or deformity   Cardiovascular/Heart:  Regular rate and rhythm on tele Edema: none   Abdomen:   Soft, tender, bowel sounds hypoactive, non distended   Musculoskeletal: Spine and extremities normal, deconditioned   Skin:  no rashes or lesions   Neurologic: Sedated appearing, oriented X 3       Psych: Affect is limited range, moaning            Imaging Reviewed   XR Chest Port 1 View    Result Date: 10/25/2023  EXAM: XR CHEST PORT 1 VIEW LOCATION: [facility] DATE: 10/25/2023 INDICATION: chest pain COMPARISON: 11/25/2019     IMPRESSION: Lungs are clear. No pleural effusion or pneumothorax. Normal heart size and pulmonary vascularity. Surgical  clips project over the medial left upper chest.    CT Abdomen Pelvis w Contrast    Result Date: 10/24/2023  EXAM: CT ABDOMEN PELVIS W CONTRAST LOCATION: [facility] DATE: 10/24/2023 INDICATION: intractable vomiting COMPARISON: 07/25/2023. TECHNIQUE: CT scan of the abdomen and pelvis was performed following injection of IV contrast. Multiplanar reformats were obtained. Dose reduction techniques were used. CONTRAST: ISOVUE 370 50ML    IMPRESSION: 1.  No acute abnormality. No bowel obstruction. 2.  Moderate size hiatal hernia. 3.  Previous cholecystectomy. Small amount of pneumobilia.    XR Pelvis and Hip Right 2 Views    Result Date: 10/24/2023  EXAM: XR PELVIS AND HIP RIGHT 2 VIEWS LOCATION: [facility] DATE: 10/24/2023 INDICATION: Fall. Pain. COMPARISON: None.     IMPRESSION: Normal joint spaces and alignment. No fracture. Battery pack projects over the right iliac bone.    Head CT w/o contrast    Result Date: 10/24/2023  EXAM: CT HEAD W/O CONTRAST LOCATION: [facility] DATE: 10/24/2023 INDICATION: fall, no loc COMPARISON: Head CT 07/25/2023     IMPRESSION: 1.  No intracranial hemorrhage, mass lesions, hydrocephalus or CT evidence for an acute infarct.      Labs Reviewed        MANAGEMENT DISCUSSED with the following over the past 24 hours: RN and MD, Acute Inpatient Pain Pharmacist   NOTE(S)/MEDICAL RECORDS REVIEWED over the past 24 hours: ED notes, Hospital Medicine  Medical complexity over the past 24 hours:  - Parenteral (IV) CONTROLLED SUBSTANCES ordered  - Prescription DRUG MANAGEMENT performed          Thank you for this consultation.      Leann Lewis APRN, CNS-BC, DNP  Acute Care Pain Management Program  [facility] ([place], [place], [place])   Preference if for [facility] Paging - Lissag  Click HERE to page Eva

## 2023-10-26 ENCOUNTER — APPOINTMENT (OUTPATIENT)
Dept: CARDIOLOGY | Facility: HOSPITAL | Age: 68
End: 2023-10-26
Attending: HOSPITALIST
Payer: COMMERCIAL

## 2023-10-26 LAB
ANION GAP SERPL CALCULATED.3IONS-SCNC: 11 MMOL/L (ref 7–15)
BUN SERPL-MCNC: 12.5 MG/DL (ref 8–23)
CALCIUM SERPL-MCNC: 8.3 MG/DL (ref 8.8–10.2)
CHLORIDE SERPL-SCNC: 105 MMOL/L (ref 98–107)
CHOLEST SERPL-MCNC: 135 MG/DL
CREAT SERPL-MCNC: 0.66 MG/DL (ref 0.51–0.95)
DEPRECATED HCO3 PLAS-SCNC: 25 MMOL/L (ref 22–29)
EGFRCR SERPLBLD CKD-EPI 2021: >90 ML/MIN/1.73M2
ERYTHROCYTE [DISTWIDTH] IN BLOOD BY AUTOMATED COUNT: 14.6 % (ref 10–15)
GLUCOSE SERPL-MCNC: 103 MG/DL (ref 70–99)
HCT VFR BLD AUTO: 38.1 % (ref 35–47)
HDLC SERPL-MCNC: 58 MG/DL
HGB BLD-MCNC: 12.6 G/DL (ref 11.7–15.7)
HOLD SPECIMEN: NORMAL
LDLC SERPL CALC-MCNC: 62 MG/DL
LVEF ECHO: NORMAL
MAGNESIUM SERPL-MCNC: 1.9 MG/DL (ref 1.7–2.3)
MCH RBC QN AUTO: 29.3 PG (ref 26.5–33)
MCHC RBC AUTO-ENTMCNC: 33.1 G/DL (ref 31.5–36.5)
MCV RBC AUTO: 89 FL (ref 78–100)
NONHDLC SERPL-MCNC: 77 MG/DL
PHOSPHATE SERPL-MCNC: 3.5 MG/DL (ref 2.5–4.5)
PLATELET # BLD AUTO: 181 10E3/UL (ref 150–450)
POTASSIUM SERPL-SCNC: 3.7 MMOL/L (ref 3.4–5.3)
POTASSIUM SERPL-SCNC: 3.7 MMOL/L (ref 3.4–5.3)
RBC # BLD AUTO: 4.3 10E6/UL (ref 3.8–5.2)
SODIUM SERPL-SCNC: 141 MMOL/L (ref 135–145)
TRIGL SERPL-MCNC: 77 MG/DL
TSH SERPL DL<=0.005 MIU/L-ACNC: 0.62 UIU/ML (ref 0.3–4.2)
WBC # BLD AUTO: 6.2 10E3/UL (ref 4–11)

## 2023-10-26 PROCEDURE — 250N000013 HC RX MED GY IP 250 OP 250 PS 637: Performed by: HOSPITALIST

## 2023-10-26 PROCEDURE — 96376 TX/PRO/DX INJ SAME DRUG ADON: CPT | Mod: XU

## 2023-10-26 PROCEDURE — 82306 VITAMIN D 25 HYDROXY: CPT | Performed by: HOSPITALIST

## 2023-10-26 PROCEDURE — 99232 SBSQ HOSP IP/OBS MODERATE 35: CPT | Performed by: HOSPITALIST

## 2023-10-26 PROCEDURE — 250N000011 HC RX IP 250 OP 636: Mod: JZ | Performed by: HOSPITALIST

## 2023-10-26 PROCEDURE — G0378 HOSPITAL OBSERVATION PER HR: HCPCS

## 2023-10-26 PROCEDURE — 80061 LIPID PANEL: CPT | Performed by: HOSPITALIST

## 2023-10-26 PROCEDURE — 99204 OFFICE O/P NEW MOD 45 MIN: CPT | Performed by: SURGERY

## 2023-10-26 PROCEDURE — 36415 COLL VENOUS BLD VENIPUNCTURE: CPT | Performed by: HOSPITALIST

## 2023-10-26 PROCEDURE — 80048 BASIC METABOLIC PNL TOTAL CA: CPT | Performed by: HOSPITALIST

## 2023-10-26 PROCEDURE — 84425 ASSAY OF VITAMIN B-1: CPT | Performed by: HOSPITALIST

## 2023-10-26 PROCEDURE — 82607 VITAMIN B-12: CPT | Performed by: HOSPITALIST

## 2023-10-26 PROCEDURE — 84443 ASSAY THYROID STIM HORMONE: CPT | Performed by: HOSPITALIST

## 2023-10-26 PROCEDURE — 84100 ASSAY OF PHOSPHORUS: CPT | Performed by: HOSPITALIST

## 2023-10-26 PROCEDURE — 93306 TTE W/DOPPLER COMPLETE: CPT

## 2023-10-26 PROCEDURE — 250N000011 HC RX IP 250 OP 636: Mod: JZ | Performed by: STUDENT IN AN ORGANIZED HEALTH CARE EDUCATION/TRAINING PROGRAM

## 2023-10-26 PROCEDURE — 250N000013 HC RX MED GY IP 250 OP 250 PS 637: Performed by: STUDENT IN AN ORGANIZED HEALTH CARE EDUCATION/TRAINING PROGRAM

## 2023-10-26 PROCEDURE — 85027 COMPLETE CBC AUTOMATED: CPT | Performed by: HOSPITALIST

## 2023-10-26 PROCEDURE — 82746 ASSAY OF FOLIC ACID SERUM: CPT | Performed by: HOSPITALIST

## 2023-10-26 PROCEDURE — 96372 THER/PROPH/DIAG INJ SC/IM: CPT | Performed by: HOSPITALIST

## 2023-10-26 PROCEDURE — 93306 TTE W/DOPPLER COMPLETE: CPT | Mod: 26 | Performed by: INTERNAL MEDICINE

## 2023-10-26 PROCEDURE — 83735 ASSAY OF MAGNESIUM: CPT | Performed by: HOSPITALIST

## 2023-10-26 RX ORDER — ASPIRIN 81 MG/1
81 TABLET ORAL DAILY
Status: DISCONTINUED | OUTPATIENT
Start: 2023-10-26 | End: 2023-10-27 | Stop reason: HOSPADM

## 2023-10-26 RX ORDER — POTASSIUM CHLORIDE 750 MG/1
10 TABLET, EXTENDED RELEASE ORAL ONCE
Qty: 1 TABLET | Refills: 0 | Status: COMPLETED | OUTPATIENT
Start: 2023-10-26 | End: 2023-10-26

## 2023-10-26 RX ORDER — ENOXAPARIN SODIUM 100 MG/ML
30 INJECTION SUBCUTANEOUS EVERY 24 HOURS
Status: DISCONTINUED | OUTPATIENT
Start: 2023-10-26 | End: 2023-10-27 | Stop reason: HOSPADM

## 2023-10-26 RX ORDER — ATENOLOL 25 MG/1
12.5 TABLET ORAL DAILY
Status: DISCONTINUED | OUTPATIENT
Start: 2023-10-27 | End: 2023-10-27 | Stop reason: HOSPADM

## 2023-10-26 RX ORDER — MULTIVITAMIN,THERAPEUTIC
1 TABLET ORAL DAILY
Status: DISCONTINUED | OUTPATIENT
Start: 2023-10-26 | End: 2023-10-27 | Stop reason: HOSPADM

## 2023-10-26 RX ADMIN — TRAZODONE HYDROCHLORIDE 25 MG: 50 TABLET ORAL at 21:02

## 2023-10-26 RX ADMIN — PANTOPRAZOLE SODIUM 40 MG: 40 TABLET, DELAYED RELEASE ORAL at 08:45

## 2023-10-26 RX ADMIN — PANTOPRAZOLE SODIUM 40 MG: 40 TABLET, DELAYED RELEASE ORAL at 16:24

## 2023-10-26 RX ADMIN — SENNOSIDES AND DOCUSATE SODIUM 1 TABLET: 8.6; 5 TABLET ORAL at 21:02

## 2023-10-26 RX ADMIN — Medication 81 MG: at 12:31

## 2023-10-26 RX ADMIN — POLYETHYLENE GLYCOL 3350 17 G: 17 POWDER, FOR SOLUTION ORAL at 08:43

## 2023-10-26 RX ADMIN — ACETAMINOPHEN 650 MG: 325 TABLET ORAL at 08:52

## 2023-10-26 RX ADMIN — NICOTINE 1 PATCH: 14 PATCH, EXTENDED RELEASE TRANSDERMAL at 08:44

## 2023-10-26 RX ADMIN — BACLOFEN 20 MG: 10 TABLET ORAL at 08:43

## 2023-10-26 RX ADMIN — BACLOFEN 20 MG: 10 TABLET ORAL at 16:24

## 2023-10-26 RX ADMIN — BACLOFEN 20 MG: 10 TABLET ORAL at 11:46

## 2023-10-26 RX ADMIN — THERA TABS 1 TABLET: TAB at 16:24

## 2023-10-26 RX ADMIN — THIAMINE HCL TAB 100 MG 100 MG: 100 TAB at 16:24

## 2023-10-26 RX ADMIN — BACLOFEN 20 MG: 10 TABLET ORAL at 21:02

## 2023-10-26 RX ADMIN — ENOXAPARIN SODIUM 30 MG: 30 INJECTION SUBCUTANEOUS at 16:24

## 2023-10-26 RX ADMIN — ONDANSETRON 4 MG: 2 INJECTION INTRAMUSCULAR; INTRAVENOUS at 16:36

## 2023-10-26 RX ADMIN — POTASSIUM CHLORIDE 10 MEQ: 750 TABLET, EXTENDED RELEASE ORAL at 08:43

## 2023-10-26 ASSESSMENT — ACTIVITIES OF DAILY LIVING (ADL)
ADLS_ACUITY_SCORE: 25
ADLS_ACUITY_SCORE: 24
ADLS_ACUITY_SCORE: 24
ADLS_ACUITY_SCORE: 25
ADLS_ACUITY_SCORE: 24
DEPENDENT_IADLS:: CLEANING;COOKING;LAUNDRY;SHOPPING
ADLS_ACUITY_SCORE: 24
ADLS_ACUITY_SCORE: 25
ADLS_ACUITY_SCORE: 24
ADLS_ACUITY_SCORE: 25
ADLS_ACUITY_SCORE: 24

## 2023-10-26 NOTE — PROGRESS NOTES
Non-Invasive Heart Care    Received ordered for CCTA from Dr. Ramírez. Tentatively on the schedule for tomorrow 10/27/23 at 1:30pm. Pt to be NPO after breakfast, however may continue to drink water and is encouraged to drink water up until time of test as pt will be receiving IV contrast. Please make sure pt takes her morning dose of atenolol PO prior to test as well as make sure pt has 18g patent IV. Called and discussed plan with pt's RN, Lisa.    Queta Landry, RN

## 2023-10-26 NOTE — PROGRESS NOTES
"PRIMARY DIAGNOSIS: \"GENERIC\" NURSING  OUTPATIENT/OBSERVATION GOALS TO BE MET BEFORE DISCHARGE:  ADLs back to baseline: No    Activity and level of assistance: Up with standby assistance.    Pain status: Improved-controlled with oral pain medications.    Return to near baseline physical activity: Yes     Discharge Planner Nurse   Safe discharge environment identified: Yes  Barriers to discharge: No       Entered by: Lisa Vazquez RN 10/26/2023 2:47 PM     Pt is anticipating discharge today.    Please review provider order for any additional goals.   Nurse to notify provider when observation goals have been met and patient is ready for discharge.  "

## 2023-10-26 NOTE — CONSULTS
General Surgery Consultation  Mariah Koehler MRN# [id number]   Age/Sex: 67 year old female YOB: 1955     Reason for consult: Nausea and vomiting       Requesting physician: Dr. Ramírez                   Assessment and Plan:   Assessment:  1.  Hiatal hernia  2.  Nausea and vomiting    67-year-old female presenting with nausea and vomiting likely secondary to a known hiatal hernia.  The hiatal hernia is moderate in size which could be main cause of the patient's ongoing symptoms.  I did not identify any other findings on the CT scan that would explain for a potential etiology of nausea and vomiting.    Plan:  -The patient would like to proceed with a surgical repair of the hiatal hernia as an outpatient.  -I will have the patient follow-up with me in general surgery clinic to undergo steps for a hernia repair.  We will need to undergo an EGD prior to a hiatal hernia surgery to ensure that there are no gastric abnormalities that would prevent us from completion of the fundoplication    -From my standpoint, patient can be discharged when she is medically stable.  -Patient is to continue on it as tolerated.  Explained to patient that if she starts to feel symptoms of nausea and vomiting, patient needs to undergo bowel rest and then revert back to a liquid diet until her symptoms resolved.          Chief Complaint:     Chief Complaint   Patient presents with    Headache    Urinary Frequency    Nausea & Vomiting        History is obtained from the patient    HPI:   Mariah Koehler is a 67 year old female who presents to the hospital with complaints of intractable nausea and vomiting.  Patient was admitted for further evaluation.  General surgery team was asked to evaluate this patient due to the findings of the hiatal hernia.  On evaluation, the patient states that she has known she has had a hiatal hernia for several years now.  The patient has been having ongoing frequency in terms of nausea and vomiting.   Patient does state that once she returns to a liquid diet, it does appear that her symptoms do improve.  Patient passing flatus having bowel.  Patient has no further complaints at this time.          Past Medical History:     Past Medical History:   Diagnosis Date    Current mild episode of major depressive disorder, unspecified whether recurrent (H) 02/17/2020    Hypertension     Opiate dependence (H)     RSD (reflex sympathetic dystrophy)     Sacral fracture, closed (H)               Past Surgical History:     Past Surgical History:   Procedure Laterality Date    APPENDECTOMY      ESOPHAGOSCOPY, GASTROSCOPY, DUODENOSCOPY (EGD), COMBINED N/A 2/20/2023    Procedure: ESOPHAGOGASTRODUODENOSCOPY with biopsies;  Surgeon: Na Brooks MD;  Location: [facility] OR    GYN SURGERY      HC REVISE MEDIAN N/CARPAL TUNNEL SURG      Description: Neuroplasty Decompression Median Nerve At Carpal Tunnel;  Recorded: 09/19/2011;    HYSTERECTOMY  1984    IR CERVICAL EPIDURAL STEROID INJECTION  1/14/2005    OOPHORECTOMY Bilateral 1985    CT SYMPATHECTOMY,CERVICOTHORACIC      Description: Surgical Sympathectomy Cervicothoracic;  Recorded: 09/19/2011;    [facility] DECOMPRESS FASCIOTOMY FINGR/HAND      Description: Decompression Fasciotomy Of The Hand;  Recorded: 09/19/2011;    [facility] LAP,CHOLECYSTECTOMY/EXPLORE      Description: Cholecystectomy Laparoscopic;  Recorded: 12/09/2011;    [facility] TOTAL ABDOM HYSTERECTOMY      Description: Hysterectomy;  Recorded: 03/23/2011;             Social History:    reports that she has been smoking cigarettes. She started smoking about 55 years ago. She has a 38.25 pack-year smoking history. She has never used smokeless tobacco. She reports that she does not currently use alcohol. She reports that she does not use drugs.           Family History:     Family History   Problem Relation Age of Onset    Cerebrovascular Disease Mother     Diabetes Mother     Heart Disease Mother     Hypertension Mother      Rheumatologic Disease Mother     Heart Disease Father     Pulmonary Hypertension Father     Kidney failure Father     Cancer Father         melanoma    Diabetes Sister     Hypertension Sister     Hypertension Brother               Allergies:     Allergies   Allergen Reactions    Codeine Nausea and Vomiting    Morphine Nausea and Vomiting    Bacitracin Rash    Methadone Rash              Medications:     Prior to Admission medications    Medication Sig Start Date End Date Taking? Authorizing Provider   acetaminophen (TYLENOL) 325 MG tablet Take 1-2 tablets (325-650 mg) by mouth every 6 hours as needed for mild pain or headaches 4/5/23  Yes Kuldeep Mejia MD   atenolol (TENORMIN) 25 MG tablet TAKE 1 TABLET BY MOUTH EVERY DAY 4/24/23  Yes Alex Nolasco MD   baclofen (LIORESAL) 10 MG tablet TAKE 2 TABLETS (20 MG TOTAL) BY MOUTH FOUR TIMES A DAY. 6/19/23  Yes Alex Nolasco MD   medication given by implanted intrathecal pump continuous Drug # 1: Fentanyl (Sublimaze) - Conc: 2000 mcg/mL - Total Dose / 24 hours: 873.8 mcg    Drug # 2: Bupivacaine (Marcaine)  - Conc:17.7 mg/mL - Total Dose / 24 hours: 7.733 mg    Drug # 3: Hydromorphone (Dilaudid)  - Conc: 3.8 mg/mL - Total Dose / 24 hours: 1.66 mg    Diluent: NS    Infusion Rate: 0.4369 mL/24 hrs  Pump Reservoir Volume: 40 mL    Bolus doses: up to 15 bolus doses/24 hours with 1 hour lockout  Each bolus: fentanyl 80.1 mcg, 0.708 mg Bupivacaine, 0.152 mg Dilaudid    Outside Clinic & Provider: [facility] in [place] [phone]  Last Refill Date: 10/02/23  Next Refill Date: 11/06/23  Low [place] Alarm Date:  11/08/23  Pump : syncromed    Deliver Pump Medication to:   For [place]: If pump is within 7 days of depletion, pharmacist will enter a 'Pain Management Adult IP Consult' into the EHR, including 'IT pain pump management' as the reason for the consult.   Yes Unknown, Entered By History   metoclopramide (REGLAN) 10 MG tablet Take 1 tablet  (10 mg) by mouth 3 times daily as needed (Nausea) AS NEEDED FOR NAUSEA 7/28/23  Yes Darrius Foley MBBS   omeprazole (PRILOSEC) 20 MG DR capsule TAKE 1 CAPSULE BY MOUTH TWICE A DAY BEFORE MEALS 10/27/22  Yes Alex Nolasco MD   ondansetron (ZOFRAN) 4 MG tablet TAKE 1 TABLET BY MOUTH EVERY 8 HOURS AS NEEDED FOR NAUSEA 6/8/23  Yes Alex Nolasco MD   scopolamine (TRANSDERM) 1 MG/3DAYS 72 hr patch Place 1 patch onto the skin every 72 hours 7/29/23  Yes Darrius Foley MBBS   traZODone (DESYREL) 50 MG tablet TAKE 0.5 TABLETS (25 MG) BY MOUTH AT BEDTIME 5/18/23  Yes Alex Nolasco MD              Review of Systems:   The Review of Systems is negative other than noted in the HPI            Physical Exam:   Patient Vitals for the past 24 hrs:   BP Temp Temp src Pulse Resp SpO2   10/26/23 1518 106/58 98.8  F (37.1  C) Oral 67 18 94 %   10/26/23 1208 106/51 97.8  F (36.6  C) Oral 60 18 94 %   10/26/23 0900 92/43 98.4  F (36.9  C) Oral 65 18 94 %   10/26/23 0301 99/53 98.2  F (36.8  C) Oral 73 20 92 %   10/25/23 2321 106/59 97.6  F (36.4  C) Oral 77 20 96 %   10/25/23 1930 131/62 97.7  F (36.5  C) Oral 73 20 96 %          Intake/Output Summary (Last 24 hours) at 10/26/2023 1750  Last data filed at 10/26/2023 1216  Gross per 24 hour   Intake 700 ml   Output 525 ml   Net 175 ml      Constitutional:   awake, alert, cooperative, no apparent distress, and appears stated age       Eyes:   PERRL, conjunctiva/corneas clear, EOM's intact; no scleral edema or icterus noted        ENT:   Normocephalic, without obvious abnormality, atraumatic, Lips, mucosa, and tongue normal        Hematologic / Lymphatic:   No lymphadenopathy       Lungs:   Normal respiratory effort, no accessory muscle use       Cardiovascular:   Regular rate and rhythm       Abdomen:   Abdomen is soft, nondistended, nontender to palpation.       Musculoskeletal:   No obvious swelling, bruising or deformity       Skin:   Skin color and texture  normal for patient, no rashes or lesions              Data:        All imaging studies reviewed by me.  I personally reviewed the imagings and agree with endings of the hiatal hernia. No other abnormality identified.       Sage Dover DO  General Surgeon  [facility]  Surgery [address] [address]?  Office: [phone]  Employed by - [facility] [facility] [employer]  Pager: [phone]

## 2023-10-26 NOTE — CARE PLAN
"PRIMARY DIAGNOSIS: \"GENERIC\" NURSING  OUTPATIENT/OBSERVATION GOALS TO BE MET BEFORE DISCHARGE:  ADLs back to baseline: No    Activity and level of assistance: Up with standby assistance.    Pain status: Pain free.    Return to near baseline physical activity: No     Discharge Planner Nurse   Safe discharge environment identified: Yes  Barriers to discharge: Yes       Entered by: Darby Stuart RN 10/26/2023 12:21 AM     Please review provider order for any additional goals.   Nurse to notify provider when observation goals have been met and patient is ready for discharge.  "

## 2023-10-26 NOTE — PROGRESS NOTES
Care Management Discharge Note    Discharge Date: 10/26/2023       Discharge Disposition: Home    Discharge Services: None    Discharge DME: None    Discharge Transportation: family or friend will provide    Private pay costs discussed: Not applicable    Does the patient's insurance plan have a 3 day qualifying hospital stay waiver?  Yes     Which insurance plan 3 day waiver is available? Alternative insurance waiver    Will the waiver be used for post-acute placement? No    PAS Confirmation Code:  NA  Patient/family educated on Medicare website which has current facility and service quality ratings: no    Education Provided on the Discharge Plan: Yes per team  Persons Notified of Discharge Plans: Patient per team  Patient/Family in Agreement with the Plan: yes    Handoff Referral Completed: Yes    Additional Information:  Patient is discharge. No CM discharge planning needs. Family will transport.    Dawn Alvarez RN

## 2023-10-26 NOTE — PLAN OF CARE
"PRIMARY DIAGNOSIS: \"GENERIC\" NURSING  OUTPATIENT/OBSERVATION GOALS TO BE MET BEFORE DISCHARGE:  ADLs back to baseline: No    Activity and level of assistance: Up with standby assistance.    Pain status: Pain free.    Return to near baseline physical activity: No     Discharge Planner Nurse   Safe discharge environment identified: Yes  Barriers to discharge: Yes       Entered by: Darby Stuart RN 10/26/2023 12:19 AM     Please review provider order for any additional goals.   Nurse to notify provider when observation goals have been met and patient is ready for discharge.Goal Outcome Evaluation:                        "

## 2023-10-26 NOTE — PROGRESS NOTES
Full note to follow.      Feeling better and tolerating PO.   Concern that large hiatal hernia is symptomatic,  Discussed with Dr. Dover who will see Ms. Koehler today.     Also with some exertional chest discomfort for last 1-2 years.  Not accelerating.  Chest pain free.  Rapid access referral on discharge.    Likely discharge later today as tolerating PO.    Caty Ramírez MD, [facility]  Internal Medicine Hospitalist  10/26/2023

## 2023-10-26 NOTE — PLAN OF CARE
"Goal Outcome Evaluation:  PRIMARY DIAGNOSIS: \"GENERIC\" NURSING  OUTPATIENT/OBSERVATION GOALS TO BE MET BEFORE DISCHARGE:  ADLs back to baseline: No    Activity and level of assistance: Up with standby assistance.    Pain status: Improved-controlled with oral pain medications.    Return to near baseline physical activity: No     Discharge Planner Nurse   Safe discharge environment identified: No  Barriers to discharge: Yes       Entered by: Lisa Vazquez RN 10/26/2023 10:35 AM     Please review provider order for any additional goals.   Nurse to notify provider when observation goals have been met and patient is ready for discharge.    "

## 2023-10-26 NOTE — CONSULTS
Care Management Initial Consult    General Information  Assessment completed with: Patient, Patient  Type of CM/SW Visit: Initial Assessment    Primary Care Provider verified and updated as needed: Yes   Readmission within the last 30 days: no previous admission in last 30 days      Reason for Consult: discharge planning  Advance Care Planning: Advance Care Planning Reviewed: verified with patient        Communication Assessment  Patient's communication style: spoken language (English or Bilingual)    Hearing Difficulty or Deaf: no   Wear Glasses or Blind: yes    Cognitive  Cognitive/Neuro/Behavioral: WDL  Level of Consciousness: alert  Arousal Level: opens eyes spontaneously  Orientation: disoriented to, time  Mood/Behavior: cooperative, calm     Speech: clear, spontaneous    Living Environment:   People in home: spouse     Current living Arrangements: house      Able to return to prior arrangements: yes       Family/Social Support:  Care provided by: self  Provides care for: spouse  Marital Status:             Description of Support System: Supportive, Involved    Support Assessment: Adequate family and caregiver support    Current Resources:   Patient receiving home care services:  No     Community Resources:  None  Equipment currently used at home: cane, straight, walker, rolling  Supplies currently used at home:  cane, straight, walker, rolling    Employment/Financial:  Employment Status: retired        Financial Concerns:   No     Does the patient's insurance plan have a 3 day qualifying hospital stay waiver?  Yes     Which insurance plan 3 day waiver is available? Alternative insurance waiver    Will the waiver be used for post-acute placement? No    Lifestyle & Psychosocial Needs:  Social Determinants of Health     Food Insecurity: Not on file   Depression: Not at risk (3/17/2023)    PHQ-2     PHQ-2 Score: 0   Housing Stability: Not on file   Tobacco Use: High Risk (7/25/2023)    Patient  History     Smoking Tobacco Use: Every Day     Smokeless Tobacco Use: Never     Passive Exposure: Not on file   Financial Resource Strain: Not on file   Alcohol Use: Not on file   Transportation Needs: Not on file   Physical Activity: Not on file   Interpersonal Safety: Not on file   Stress: Not on file   Social Connections: Not on file       Functional Status:  Prior to admission patient needed assistance:   Dependent ADLs:: Ambulation-cane, Ambulation-walker  Dependent IADLs:: Cleaning, Cooking, Laundry, Shopping     Additional Information:  RNCM met with patient at bedside to review role of care management services, discuss goals of care and assess need for any possible services at discharge. Patient alert, answering questions appropriately and engaged in the conversation.  Discussed and reviewed the observation status billing under Medicare guidelines and provide a copy of the [facility] brochure. Patient has no HCD and declined info. Lives with spouse in a house. Report independent with ADLs, but need assistance with IADLs. Ambulate with cane or walker prn. Still drive. Has no community resources. Goal is to return home. Family will transport.        Dawn Alvarez RN

## 2023-10-26 NOTE — PROGRESS NOTES
Will not see today:  PAIN MANAGEMENT SERVICE CHART CHECK    This patient's chart has been reviewed by the Pain Service. Chart reviewed.  Patient with ongoing nausea.  Intrathecal Pain Pump with continuous settings and is using PTM device for bolus dosing. (Can use up to 15 times/day)  Patient using one to two times/day.   Minimal complaints of pain.  Pain Service with no additional recommendations at this time.  Discussed with patient.  We will sign off.  Please re consult if we can be of additional assistance.      Thank you!    Leann Lewis APRN, CNS-BC, DNP  Acute Care Pain Management Program  [facility] ([place], [place])   Preference if for [facility] Paging - Joshua  Click HERE to page Eva

## 2023-10-26 NOTE — PROGRESS NOTES
Call placed to [facility] and spoke with Physician Assistant regarding concerns that patient may be having some complications from the Intrathecal Pain Pump contributing to her nausea.    Possible trouble shooting suggestions would be to do an MRI to see if there is any fluid around the pump.  [facility] could do a dye study to see if the tube is kinked which can happen with the patient's posture.   The PA did identify that patient depends upon a large amount of bolus doses.  (15/day) could evaluate if patient is having increased symptoms as it correlates with her bolus dosing.    Patient has been having minimal complaints of pain in the hospital, does seem like pain is currently under good control.    It is not clear if the Pain Pump is contributing to patient's symptoms either options would be to have MRI to see if this provides any information on the placement and tissue around the Intrathecal Pain Pump.  And/or follow up with Pain Clinic who could do additional trouble shooting with her bolus dosing or adjustments to her continuous infusions and possible dye study.          Thanks!      Leann DUMONT, CNS-BC, DNP  Acute Care Pain Management Program  [facility] ([facility])   Preference if for [facility] Ami Lewis

## 2023-10-26 NOTE — PROGRESS NOTES
[facility] Hospitalist Progress Note  [facility]  Admission date: 10/24/2023      Pt seen and examined and orders reviewed.  See Dr. Kang H&P from earlier today for details.  Nausea somewhat better today.  Had BM    This is Ms. Koehler's 4th admission this year for nausea, vomiting and malnutrition without clear cause.  Has large hiatal hernia on CT.  Likely will plan for barium swallow tomorrow to evaluate for paraesophageal hernia.    Med related constipation could be contributing as well. Start bowel regimen.    RD for malnutrition.      Caty Ramírez MD, [facility]  Internal Medicine Hospitalist

## 2023-10-26 NOTE — PLAN OF CARE
"     PRIMARY DIAGNOSIS: \"GENERIC\" NURSING  Intractable Nausea/Vomiting, pain   OUTPATIENT/OBSERVATION GOALS TO BE MET BEFORE DISCHARGE:  ADLs back to baseline: No    Activity and level of assistance: Up with standby assistance.    Pain status: Pain free.    Return to near baseline physical activity: No     Discharge Planner Nurse   Safe discharge environment identified: Yes  Barriers to discharge: Yes    Pt complains of nausea/vomiting, resolved on PRN Zofran.          Entered by: Darby Stuart RN 10/26/2023      Please review provider order for any additional goals.   Nurse to notify provider when observation goals have been met and patient is ready for discharge.Goal Outcome Evaluation:                        "

## 2023-10-26 NOTE — PROGRESS NOTES
"[facility] Hospitalist Progress Note  [facility]  Admission date: 10/24/2023    Summary:    67F with 4th admission this year for nausea, vomiting and malnutrition.  Has been ongoing for about 1 year with significant weight loss and frequent symptoms at home.  No clear etiology on prior evaluation.  Has a large hiatal hernia which could be causative.     also notes cognitive decline this year.    Also with report of 1-2 years of exertional chest discomfort and SOB which has not accelerated.    Assessment/Plan    #Intractable nausea, vomiting -   -appreciate Dr. Dover's (surgery) input regarding hiatal hernia as contributor to symptomatology and potential for repair.    - brought up possibility of \"leak\" from pain pump.  Pain medicine has reached out to [facility] pain clinic regarding this.  I see this may have been looked at in April with CT L spine without myelogram.    -would prefer not to use anti-cholinergic chronically (scopolamine) as can lead to multiple side effects including cognitive decline.  Regular use of reglan also with potential for tardive dyskinesia.  Use compazine and zofran for now.  Ideally would be able to treat underlying cause for nausea and vomiting rather than just supportive care.    #Severe malnutrition - RD.  Monitor for refeeding.  Start MVI and thiamine.  #Cognitive decline - OT for cognitive eval.  Check B12, folate, thiamine, TSH and T4, vitamin D.    #exertional chest pain and SOB - ECHO and coronary CTA. Start asa  #HTNive crisis - this seems to be related to discomfort.  BP now on softer side.  Reduce dose atenlol    Checklist:  Code Status: Full Code    Diet: Regular Diet Adult  Snacks/Supplements Adult: Ensure Enlive; With Meals  Snacks/Supplements Adult: Magic Cup; With Meals     DVT px:  Enoxaparin (Lovenox) SQ    Disposition and Discharge Planning  Auto-populated from discharge tab:     Expected Discharge Date: 10/27/2023      Destination: home     " "      System Identified Risk Variables  Auto-populated based on system request - if needs to be addressed in treatment plan they will be addressed above:  \"  Clinically Significant Risk Factors Present on Admission        # Hypokalemia: Lowest K = 3.1 mmol/L in last 2 days, will replace as needed   # Hypocalcemia: Lowest Ca = 8.3 mg/dL in last 2 days, will monitor and replace as appropriate   # Hypomagnesemia: Lowest Mg = 1.6 mg/dL in last 2 days, will replace as needed       # Hypertension: Noted on problem list      # Cachexia: Estimated body mass index is 17.65 kg/m  as calculated from the following:    Height as of this encounter: 1.6 m (5' 3\").    Weight as of this encounter: 45.2 kg (99 lb 10.4 oz).    # Severe Malnutrition: based on nutrition assessment            \"    Interval Events/Subjective/Notable results:    Feeling better and tolerating PO.  BMP and CBC reviewed.    D/w  and additional hx obtained.  Understandably frustrated regarding lack of etiology    Discussed with: Dr. Dover    Objective    Vital signs in last 24 hours  Temp:  [97.6  F (36.4  C)-98.8  F (37.1  C)] 98.8  F (37.1  C)  Pulse:  [60-77] 67  Resp:  [18-20] 18  BP: ()/(43-65) 106/58  SpO2:  [92 %-96 %] 94 % O2 Device: None (Room air)    Weight:   99 lbs 10.37 oz  Body mass index is 17.65 kg/m .    Intake/Output last 3 shifts  I/O last 3 completed shifts:  In: 700 [P.O.:700]  Out: 525 [Urine:525]    Physical Exam  General:  Alert, cooperative, no distress, frail, cachexia    Neurologic:  oriented, facial symmetry preserved, fluent speech.   Psych: calm  HEENT:  Anicteric, MMM  CV: RRR no MRG, normal S1 and S2, no edema  Lungs: CTAB.  Easyrespirations  Abd: soft, NT  Skin: no rashes noted on exposed skin.    Bellamy Catheter: Not present  Lines: None          Medical Decision Making               Caty Ramírez MD, [facility]  Internal Medicine Hospitalist    "

## 2023-10-27 ENCOUNTER — APPOINTMENT (OUTPATIENT)
Dept: CT IMAGING | Facility: HOSPITAL | Age: 68
End: 2023-10-27
Attending: HOSPITALIST
Payer: COMMERCIAL

## 2023-10-27 ENCOUNTER — APPOINTMENT (OUTPATIENT)
Dept: OCCUPATIONAL THERAPY | Facility: HOSPITAL | Age: 68
End: 2023-10-27
Attending: HOSPITALIST
Payer: COMMERCIAL

## 2023-10-27 VITALS
BODY MASS INDEX: 17.66 KG/M2 | HEIGHT: 63 IN | WEIGHT: 99.65 LBS | RESPIRATION RATE: 18 BRPM | TEMPERATURE: 98.5 F | HEART RATE: 78 BPM | DIASTOLIC BLOOD PRESSURE: 66 MMHG | OXYGEN SATURATION: 95 % | SYSTOLIC BLOOD PRESSURE: 151 MMHG

## 2023-10-27 LAB
BSA FOR ECHO PROCEDURE: 0 M2
CV CALCIUM SCORE AGATSTON LM: 3
CV CALCIUM SCORING AGATSON LAD: 0
CV CALCIUM SCORING AGATSTON CX: 0
CV CALCIUM SCORING AGATSTON RCA: 0
CV CALCIUM SCORING AGATSTON TOTAL: 3
FOLATE SERPL-MCNC: 11 NG/ML (ref 4.6–34.8)
HOLD SPECIMEN: NORMAL
MAGNESIUM SERPL-MCNC: 1.8 MG/DL (ref 1.7–2.3)
PHOSPHATE SERPL-MCNC: 3.9 MG/DL (ref 2.5–4.5)
POTASSIUM SERPL-SCNC: 4.1 MMOL/L (ref 3.4–5.3)
VIT B12 SERPL-MCNC: 209 PG/ML (ref 232–1245)
VIT D+METAB SERPL-MCNC: 39 NG/ML (ref 20–50)

## 2023-10-27 PROCEDURE — 250N000009 HC RX 250: Performed by: HOSPITALIST

## 2023-10-27 PROCEDURE — 999N000128 HC STATISTIC PERIPHERAL IV START W/O US GUIDANCE

## 2023-10-27 PROCEDURE — 84100 ASSAY OF PHOSPHORUS: CPT | Performed by: INTERNAL MEDICINE

## 2023-10-27 PROCEDURE — 99239 HOSP IP/OBS DSCHRG MGMT >30: CPT | Performed by: INTERNAL MEDICINE

## 2023-10-27 PROCEDURE — 250N000011 HC RX IP 250 OP 636: Mod: JZ | Performed by: STUDENT IN AN ORGANIZED HEALTH CARE EDUCATION/TRAINING PROGRAM

## 2023-10-27 PROCEDURE — 36415 COLL VENOUS BLD VENIPUNCTURE: CPT | Performed by: INTERNAL MEDICINE

## 2023-10-27 PROCEDURE — 83735 ASSAY OF MAGNESIUM: CPT | Performed by: INTERNAL MEDICINE

## 2023-10-27 PROCEDURE — 99212 OFFICE O/P EST SF 10 MIN: CPT | Performed by: SURGERY

## 2023-10-27 PROCEDURE — 97535 SELF CARE MNGMENT TRAINING: CPT | Mod: GO

## 2023-10-27 PROCEDURE — 75574 CT ANGIO HRT W/3D IMAGE: CPT | Mod: 26 | Performed by: INTERNAL MEDICINE

## 2023-10-27 PROCEDURE — 250N000013 HC RX MED GY IP 250 OP 250 PS 637: Performed by: HOSPITALIST

## 2023-10-27 PROCEDURE — 250N000011 HC RX IP 250 OP 636: Mod: JZ | Performed by: INTERNAL MEDICINE

## 2023-10-27 PROCEDURE — G0378 HOSPITAL OBSERVATION PER HR: HCPCS

## 2023-10-27 PROCEDURE — 250N000013 HC RX MED GY IP 250 OP 250 PS 637: Performed by: STUDENT IN AN ORGANIZED HEALTH CARE EDUCATION/TRAINING PROGRAM

## 2023-10-27 PROCEDURE — 84132 ASSAY OF SERUM POTASSIUM: CPT | Performed by: HOSPITALIST

## 2023-10-27 PROCEDURE — 99232 SBSQ HOSP IP/OBS MODERATE 35: CPT | Performed by: CLINICAL NURSE SPECIALIST

## 2023-10-27 PROCEDURE — 75574 CT ANGIO HRT W/3D IMAGE: CPT

## 2023-10-27 PROCEDURE — 97165 OT EVAL LOW COMPLEX 30 MIN: CPT | Mod: GO

## 2023-10-27 PROCEDURE — 96376 TX/PRO/DX INJ SAME DRUG ADON: CPT | Mod: 59

## 2023-10-27 RX ORDER — LANOLIN ALCOHOL/MO/W.PET/CERES
100 CREAM (GRAM) TOPICAL DAILY
Qty: 30 TABLET | Refills: 3 | Status: SHIPPED | OUTPATIENT
Start: 2023-10-28

## 2023-10-27 RX ORDER — DILTIAZEM HYDROCHLORIDE 5 MG/ML
10 INJECTION INTRAVENOUS
Status: DISCONTINUED | OUTPATIENT
Start: 2023-10-27 | End: 2023-10-27 | Stop reason: HOSPADM

## 2023-10-27 RX ORDER — MULTIVITAMIN,THERAPEUTIC
1 TABLET ORAL DAILY
Qty: 30 TABLET | Refills: 3 | Status: SHIPPED | OUTPATIENT
Start: 2023-10-28 | End: 2024-01-30

## 2023-10-27 RX ORDER — IOPAMIDOL 755 MG/ML
90 INJECTION, SOLUTION INTRAVASCULAR ONCE
Status: COMPLETED | OUTPATIENT
Start: 2023-10-27 | End: 2023-10-27

## 2023-10-27 RX ORDER — DILTIAZEM HYDROCHLORIDE 5 MG/ML
5 INJECTION INTRAVENOUS
Status: DISCONTINUED | OUTPATIENT
Start: 2023-10-27 | End: 2023-10-27 | Stop reason: HOSPADM

## 2023-10-27 RX ORDER — METOPROLOL TARTRATE 1 MG/ML
5 INJECTION, SOLUTION INTRAVENOUS
Status: DISCONTINUED | OUTPATIENT
Start: 2023-10-27 | End: 2023-10-27 | Stop reason: HOSPADM

## 2023-10-27 RX ORDER — NITROGLYCERIN 0.4 MG/1
0.4 TABLET SUBLINGUAL ONCE
Status: COMPLETED | OUTPATIENT
Start: 2023-10-27 | End: 2023-10-27

## 2023-10-27 RX ORDER — LANOLIN ALCOHOL/MO/W.PET/CERES
1000 CREAM (GRAM) TOPICAL DAILY
Qty: 30 TABLET | Refills: 3 | Status: SHIPPED | OUTPATIENT
Start: 2023-10-27 | End: 2024-01-30

## 2023-10-27 RX ORDER — ASPIRIN 81 MG/1
81 TABLET ORAL DAILY
Qty: 90 TABLET | Refills: 3 | Status: SHIPPED | OUTPATIENT
Start: 2023-10-27

## 2023-10-27 RX ORDER — ATENOLOL 25 MG/1
12.5 TABLET ORAL DAILY
Qty: 90 TABLET | Refills: 3 | Status: SHIPPED | OUTPATIENT
Start: 2023-10-27

## 2023-10-27 RX ADMIN — NITROGLYCERIN 0.4 MG: 0.4 TABLET, ORALLY DISINTEGRATING SUBLINGUAL at 13:55

## 2023-10-27 RX ADMIN — ONDANSETRON 4 MG: 2 INJECTION INTRAMUSCULAR; INTRAVENOUS at 14:40

## 2023-10-27 RX ADMIN — POLYETHYLENE GLYCOL 3350 17 G: 17 POWDER, FOR SOLUTION ORAL at 09:00

## 2023-10-27 RX ADMIN — Medication 81 MG: at 08:56

## 2023-10-27 RX ADMIN — BACLOFEN 20 MG: 10 TABLET ORAL at 08:57

## 2023-10-27 RX ADMIN — METOPROLOL TARTRATE 5 MG: 5 INJECTION INTRAVENOUS at 13:50

## 2023-10-27 RX ADMIN — NICOTINE 1 PATCH: 14 PATCH, EXTENDED RELEASE TRANSDERMAL at 08:57

## 2023-10-27 RX ADMIN — IOPAMIDOL 90 ML: 755 INJECTION, SOLUTION INTRAVENOUS at 14:28

## 2023-10-27 RX ADMIN — ATENOLOL 12.5 MG: 25 TABLET ORAL at 08:57

## 2023-10-27 RX ADMIN — PANTOPRAZOLE SODIUM 40 MG: 40 TABLET, DELAYED RELEASE ORAL at 06:43

## 2023-10-27 RX ADMIN — HYDROMORPHONE HYDROCHLORIDE 0.2 MG: 0.2 INJECTION, SOLUTION INTRAMUSCULAR; INTRAVENOUS; SUBCUTANEOUS at 08:57

## 2023-10-27 RX ADMIN — THERA TABS 1 TABLET: TAB at 08:57

## 2023-10-27 RX ADMIN — METOPROLOL TARTRATE 5 MG: 5 INJECTION INTRAVENOUS at 13:56

## 2023-10-27 RX ADMIN — THIAMINE HCL TAB 100 MG 100 MG: 100 TAB at 08:57

## 2023-10-27 ASSESSMENT — ACTIVITIES OF DAILY LIVING (ADL)
ADLS_ACUITY_SCORE: 25

## 2023-10-27 NOTE — CONSULTS
[facility] ACUTE PAIN SERVICE    ([facility], [facility], [facility])   Consult    Assessment/Plan:  Mariah Koehler is a 67 year old female who was admitted on 10/24/2023.    Pain Service is re consulted to see the patient for evaluation of intrathecal pain pump with concern that it is not working properly.   concerned that there could be a leak.    Admitted for evaluation of intractale vomiting, with nausea emesis, unable to eat.  Patient reports simmilar symptoms in the past with 20 pound weight loss over the past few months.  Patient with complaint of headaches, she has been evaluated by Neurology and concern for pump leakage, with no clear etiology to explain headaches.    is concerned that she has cognitive decline as well.   Mariah's medical history otherwise significant for colitis, intractable nausea/vominting, protein malnutrition, HTN< COPD, tobacco dependence.  The patient does smoke, working on reduction, no chemical dependency history..      Per Chart Review: Admitted April 2023, February 2023.  Chronic gastritis seen on EGD. Admitted to this hospital July 2023, CT showed colitis.  Treated symptomatically and discharged.  Beta-blocker restarted due to accelerated hypertension with headache     Surgery has been consulted and they have met with patient with plan for surgical repair of hiatal hernia as an Outpatient.      Phone call to [facility] and spoke with AIDA Hudson regarding concerns over pump possibly malfunctioning or leak.  Suggested that MRI could be helpful to identify if there is any fluid or swelling around the placement of the Pump.    A kink in the line is also a possiblity which [facility] could follow up with by doing a dye study.    Possible trouble shooting suggestions would be to do an MRI to see if there is any fluid around the pump.  [facility] could do a dye study to see if the tube is kinked which can happen with the patient's posture.   The PA did identify  that patient depends upon a large amount of bolus doses.    (15/day) could evaluate if patient is having increased symptoms as it correlates with her bolus dosing.    Patient has been having minimal complaints of pain in the hospital, does seem like pain is currently under good control.    It is not clear if the Pain Pump is contributing to patient's symptoms either options would be to have MRI to see if this provides any information on the placement and tissue around the Intrathecal Pain Pump.  And/or follow up with Pain Clinic who could do additional trouble shooting with her bolus dosing or adjustments to her continuous infusions and possible dye study.      Discussed above with patient.  She states that this has been an ongoing concern for her .  From her point of view she does not feel that she is experiencing any complications from the pain pump.  Tends to use the 15 bolus doses on days that she is more active.  In hospital she has been only needing to have about 3-4 doses/day.  States pain overall is under good control and feels that it has been much better managed since the Intrathecal Pain Pump has been placed.            PLAN:   1) Pain is consistent with acute abdominal pain, workup so far not finding any clear etiology, chronic back pain with intrathecal pain pump. 2)Multimodal Medication Therapy  Topical: consider (hold for now)  NSAID'S: avoid  Muscle Relaxants: baclofen 20 mg qid (home medication)  Adjuvants: antiemetics per Hospital Medicine,   Antidepressants/anxiolytics: none noted  Opioids: agree with IV hydromorphone PRN as currently prescribed   Intrathecal Pain Pump per [facility] orders placed  3)Non-medication interventions  Pharmacy consult- appreciate Pharmacy Medication reconciliation  Acupuncture consult- please offer    Integrative consult - called referral to [phone]   4)Constipation Prophylaxis  Senna docusate, Miralax, and Bisacodyl Supposioty PRN  5) Follow up   -Opioid prescriber  has been [facility]  -Discharge Recommendations - We recommend prescribing the following at the time of discharge: Follow up with Pain Clinic      Principal Problem:    Intractable nausea and vomiting     LOS: 0 days       Subjective:  Patient reports pain is under good control, nausea is improving  Anticipating cardiac test this afternoon hoping to be able to dismiss from hospital later today if test is okay.     aspirin  81 mg Oral Daily    atenolol  12.5 mg Oral Daily    baclofen  20 mg Oral 4x Daily    enoxaparin ANTICOAGULANT  30 mg Subcutaneous Q24H    multivitamin, therapeutic  1 tablet Oral Daily    nicotine  1 patch Transdermal Daily    nicotine   Transdermal Q8H    pantoprazole  40 mg Oral BID AC    polyethylene glycol  17 g Oral Daily    senna-docusate  1 tablet Oral At Bedtime    thiamine  100 mg Oral Daily    traZODone  25 mg Oral At Bedtime       Objective:  Vital signs in last 24 hours:  Temp:  [97.5  F (36.4  C)-98.8  F (37.1  C)] 98.5  F (36.9  C)  Pulse:  [60-85] 85  Resp:  [18] 18  BP: (106-125)/(51-77) 122/77  SpO2:  [93 %-95 %] 95 %  Weight:   Weight change:   Body mass index is 17.65 kg/m .    Intake/Output last 3 shifts:  I/O last 3 completed shifts:  In: 600 [P.O.:600]  Out: 150 [Urine:150]  Intake/Output this shift:  I/O this shift:  In: 240 [P.O.:240]  Out: 50 [Urine:50]    Review of Systems:   As per subjective, all others negative.    Physical Exam:    General Appearance:  Alert, cooperative, more alert and interactive   Neck: Supple, symmetrical, trachea midline   Back:   Symmetric   Lungs:    respirations unlabored   Extremities: Extremities normal, atraumatic, no cyanosis or edema   Skin: Skin color, texture, turgor normal, no rashes or lesions   Neurologic: Alert and oriented X 3,           Imaging:  Reviewed      Lab Results:  Personally Reviewed.   Recent Labs   Lab 10/26/23  1005 10/25/23  0633 10/24/23  2009   WBC 6.2 14.3* 4.2   HGB 12.6 15.2 14.0   HCT 38.1 45.7 43.0   PLT  "181 215 202     Recent Labs   Lab 10/26/23  0606 10/25/23  0633 10/24/23  2009    138 138   CO2 25 25 27   BUN 12.5 9.1 9.7   ALBUMIN  --  4.0 3.9   ALKPHOS  --  95 89   ALT  --  11 11   AST  --  17 19     No results for input(s): \"INR\" in the last 168 hours.      MANAGEMENT DISCUSSED with the following over the past 24 hours: RN, [facility] Provider, MD   NOTE(S)/MEDICAL RECORDS REVIEWED over the past 24 hours: Nursing  SUPPLEMENTAL HISTORY, in addition to the patient's history, over the past 24 hours obtained from:   - [facility]   Medical complexity over the past 24 hours:  - Prescription DRUG MANAGEMENT performed  Intrathecal Pain Pump evaluation         Leann DUMONT, CNS-BC, DNP  Acute Care Pain Management Program  [facility] ([place] [facility], [place])   Preference if for [facility] Paging - Lissag  Click HERE to page Eva                  "

## 2023-10-27 NOTE — PLAN OF CARE
PRIMARY DIAGNOSIS: ACUTE PAIN  OUTPATIENT/OBSERVATION GOALS TO BE MET BEFORE DISCHARGE:  1. Pain Status: Pain controlled with pain pump.     2. Return to near baseline physical activity: Yes    3. Cleared for discharge by consultants (if involved): No    Pt states nausea is still there although mild.  Back pain controlled.  Denies shortness of breath or chest pain.  Plan for coronary CTA today.      Discharge Planner Nurse   Safe discharge environment identified: Yes  Barriers to discharge: Yes       Entered by: Karime Fry RN 10/27/2023 5:44 AM     Please review provider order for any additional goals.   Nurse to notify provider when observation goals have been met and patient is ready for discharge.

## 2023-10-27 NOTE — PROGRESS NOTES
Pt okayed for discharge by MD. Discharge information reviewed. Peripheral IVs removed. Pt wheeled to front by family. Belongings with pt.

## 2023-10-27 NOTE — DISCHARGE SUMMARY
"    [facility]    Discharge Summary  Hospitalist    Date of Admission:  10/24/2023  Date of Discharge:  10/27/2023  Discharging Provider: Sonu Crowe MD  Date of Service (when I saw the patient): 10/27/23    Discharge Diagnoses   Large hiatal hernia  Nausea and vomiting  Malnutrition    History of Present Illness   Mariah Koehler is an 67 year old female who presented with nausea, vomiting and malnutrition    Hospital Course   67F with 4th admission this year for nausea, vomiting and malnutrition.  Has been ongoing for about 1 year with significant weight loss and frequent symptoms at home.  No clear etiology on prior evaluation.  Has a large hiatal hernia which could be causative.      also notes cognitive decline this year.     Also with report of 1-2 years of exertional chest discomfort and SOB which has not accelerated.     #Intractable nausea, vomiting -   -appreciate Dr. Dover's (surgery) input regarding hiatal hernia as contributor to symptomatology and potential for repair.  Plan for outpatient surgery.  - brought up possibility of \"leak\" from pain pump.  Pain medicine has reached out to [facility] pain clinic regarding this.  I see this may have been looked at in April with CT L spine without myelogram.    -would prefer not to use anti-cholinergic chronically (scopolamine) as can lead to multiple side effects including cognitive decline.  Regular use of reglan also with potential for tardive dyskinesia.  Use compazine and zofran for now.  Ideally would be able to treat underlying cause for nausea and vomiting rather than just supportive care.  -pt now feeling better and can tolerate liquid diet  -plan to discharge home and follow up with surgeon as outpt for hiatal hernia sx=urgery.     #Severe malnutrition - RD.  Monitor for refeeding.  Start MVI and thiamine.  #Cognitive decline - OT for cognitive eval.  Check B12, folate, thiamine, TSH and T4, vitamin D.  Vit b12 a " little low otherwise wnl.  #exertional chest pain and SOB - ECHO and coronary CTA are all unremarkable. Start asa  #HTNive crisis - this seems to be related to discomfort.  BP now on softer side.  Reduce dose atenlol    Sonu Crowe MD    Significant Results and Procedures   See below    Pending Results   These results will be followed up by pcp  Unresulted Labs Ordered in the Past 30 Days of this Admission       Date and Time Order Name Status Description    10/26/2023  3:01 PM Vitamin B1 whole blood In process             Code Status   Full Code       Primary Care Physician   Alex Nolasco    General.  Awake alert oriented not in acute distress.  HEENT.  Pupils equal round react to light, anicteric, EOM intact.  Neck supple no JVD.  CVS regular rhythm no murmur gallops.  Lungs.  Clear to auscultation bilateral no wheezing or rales.  Abdomen.  Soft nontender bowel sounds present.  Extremities.  No edema no calf tenderness.  Neurological.  Awake and alert. No focal deficit.  Skin no rash. No pallor.  Psych. Normal mood.      Discharge Disposition   Discharged to home  Condition at discharge: Fair    Consultations This Hospital Stay   PAIN MANAGEMENT ADULT IP CONSULT  NUTRITION SERVICES ADULT IP CONSULT  CARE MANAGEMENT / SOCIAL WORK IP CONSULT  PHYSICAL THERAPY ADULT IP CONSULT  OCCUPATIONAL THERAPY ADULT IP CONSULT  OCCUPATIONAL THERAPY ADULT IP CONSULT    Time Spent on this Encounter   I, Sonu Crowe MD, personally saw the patient today and spent greater than 30 minutes discharging this patient.    Discharge Orders     Discharge Medications   Current Discharge Medication List        START taking these medications    Details   aspirin 81 MG EC tablet Take 1 tablet (81 mg) by mouth daily  Qty: 90 tablet, Refills: 3    Associated Diagnoses: Chest pain, unspecified type      cyanocobalamin (VITAMIN B-12) 1000 MCG tablet Take 1 tablet (1,000 mcg) by mouth daily  Qty: 30 tablet, Refills: 3    Associated  Diagnoses: Vitamin B 12 deficiency      multivitamin, therapeutic (THERA-VIT) TABS tablet Take 1 tablet by mouth daily  Qty: 30 tablet, Refills: 3    Associated Diagnoses: Moderate protein-calorie malnutrition (H24)      thiamine (B-1) 100 MG tablet Take 1 tablet (100 mg) by mouth daily  Qty: 30 tablet, Refills: 3    Associated Diagnoses: Moderate protein-calorie malnutrition (H24)           CONTINUE these medications which have CHANGED    Details   atenolol (TENORMIN) 25 MG tablet Take 0.5 tablets (12.5 mg) by mouth daily  Qty: 90 tablet, Refills: 3    Associated Diagnoses: Essential (primary) hypertension           CONTINUE these medications which have NOT CHANGED    Details   acetaminophen (TYLENOL) 325 MG tablet Take 1-2 tablets (325-650 mg) by mouth every 6 hours as needed for mild pain or headaches    Associated Diagnoses: Other headache syndrome      baclofen (LIORESAL) 10 MG tablet TAKE 2 TABLETS (20 MG TOTAL) BY MOUTH FOUR TIMES A DAY.  Qty: 720 tablet, Refills: 1    Associated Diagnoses: Chronic pain syndrome; Muscle spasm      medication given by implanted intrathecal pump continuous Drug # 1: Fentanyl (Sublimaze) - Conc: 2000 mcg/mL - Total Dose / 24 hours: 873.8 mcg    Drug # 2: Bupivacaine (Marcaine)  - Conc:17.7 mg/mL - Total Dose / 24 hours: 7.733 mg    Drug # 3: Hydromorphone (Dilaudid)  - Conc: 3.8 mg/mL - Total Dose / 24 hours: 1.66 mg    Diluent: NS    Infusion Rate: 0.4369 mL/24 hrs  Pump Reservoir Volume: 40 mL    Bolus doses: up to 15 bolus doses/24 hours with 1 hour lockout  Each bolus: fentanyl 80.1 mcg, 0.708 mg Bupivacaine, 0.152 mg Dilaudid    Outside Clinic & Provider: [facility] in [place] [phone]  Last Refill Date: 10/02/23  Next Refill Date: 11/06/23  [place] Alarm Date:  11/08/23  Pump : RingMD    Deliver Pump Medication to:   For [place]: If pump is within 7 days of depletion, pharmacist will enter a 'Pain Management Adult IP Consult' into the EHR,  including 'IT pain pump management' as the reason for the consult.      metoclopramide (REGLAN) 10 MG tablet Take 1 tablet (10 mg) by mouth 3 times daily as needed (Nausea) AS NEEDED FOR NAUSEA  Qty: 30 tablet, Refills: 1    Associated Diagnoses: Nausea      omeprazole (PRILOSEC) 20 MG DR capsule TAKE 1 CAPSULE BY MOUTH TWICE A DAY BEFORE MEALS  Qty: 180 capsule, Refills: 3    Associated Diagnoses: Gastro-esophageal reflux disease with esophagitis, without bleeding      ondansetron (ZOFRAN) 4 MG tablet TAKE 1 TABLET BY MOUTH EVERY 8 HOURS AS NEEDED FOR NAUSEA  Qty: 90 tablet, Refills: 1    Associated Diagnoses: Nausea      scopolamine (TRANSDERM) 1 MG/3DAYS 72 hr patch Place 1 patch onto the skin every 72 hours  Qty: 10 patch, Refills: 0    Associated Diagnoses: Nausea and vomiting, unspecified vomiting type      traZODone (DESYREL) 50 MG tablet TAKE 0.5 TABLETS (25 MG) BY MOUTH AT BEDTIME  Qty: 45 tablet, Refills: 3    Associated Diagnoses: Insomnia, unspecified type           Allergies   Allergies   Allergen Reactions    Codeine Nausea and Vomiting    Morphine Nausea and Vomiting    Bacitracin Rash    Methadone Rash     Data   Most Recent 3 CBC's:  Recent Labs   Lab Test 10/26/23  1005 10/25/23  0633 10/24/23  2009   WBC 6.2 14.3* 4.2   HGB 12.6 15.2 14.0   MCV 89 86 87    215 202      Most Recent 3 BMP's:  Recent Labs   Lab Test 10/27/23  0559 10/26/23  0606 10/25/23  1624 10/25/23  0633 10/24/23  2009   NA  --  141  --  138 138   POTASSIUM 4.1 3.7  3.7 3.6 3.1* 3.5   CHLORIDE  --  105  --  101 102   CO2  --  25  --  25 27   BUN  --  12.5  --  9.1 9.7   CR  --  0.66  --  0.51 0.53   ANIONGAP  --  11  --  12 9   ESTEFANY  --  8.3*  --  9.0 9.1   GLC  --  103*  --  127* 101*     Most Recent 2 LFT's:  Recent Labs   Lab Test 10/25/23  0633 10/24/23  2009   AST 17 19   ALT 11 11   ALKPHOS 95 89   BILITOTAL 0.4 0.3     Most Recent INR's and Anticoagulation Dosing History:  Anticoagulation Dose History           Latest Ref Rng & Units 1/9/2007 11/25/2019   Recent Dosing and Labs   INR 0.90 - 1.10 0.97  1.01      Most Recent 3 Troponin's:No lab results found.  Most Recent Cholesterol Panel:  Recent Labs   Lab Test 10/26/23  0606   CHOL 135   LDL 62   HDL 58   TRIG 77     Most Recent 6 Bacteria Isolates From Any Culture (See EPIC Reports for Culture Details):No lab results found.  Most Recent TSH, T4 and A1c Labs:  Recent Labs   Lab Test 10/26/23  0606 10/24/23  2009   TSH 0.62 0.22*   T4  --  1.07     Results for orders placed or performed during the hospital encounter of 10/24/23   Head CT w/o contrast    Narrative    EXAM: CT HEAD W/O CONTRAST  LOCATION: [facility]  DATE: 10/24/2023    INDICATION: fall, no loc  COMPARISON: Head CT 07/25/2023  TECHNIQUE: Routine CT Head without IV contrast. Multiplanar reformats. Dose reduction techniques were used.    FINDINGS:  INTRACRANIAL CONTENTS: No intracranial hemorrhage, extraaxial collection, or mass effect.  No CT evidence of acute infarct. Normal parenchymal attenuation. Normal ventricles and sulci.     VISUALIZED ORBITS/SINUSES/MASTOIDS: No intraorbital abnormality. Mild mucosal thickening of ethmoid air cells. No middle ear or mastoid effusion.    BONES/SOFT TISSUES: No acute abnormality. Mild degenerative changes of the right temporomandibular joint.      Impression    IMPRESSION:  1.  No intracranial hemorrhage, mass lesions, hydrocephalus or CT evidence for an acute infarct.   XR Pelvis and Hip Right 2 Views    Narrative    EXAM: XR PELVIS AND HIP RIGHT 2 VIEWS  LOCATION: [facility]  DATE: 10/24/2023    INDICATION: Fall. Pain.  COMPARISON: None.      Impression    IMPRESSION: Normal joint spaces and alignment. No fracture. Battery pack projects over the right iliac bone.   CT Abdomen Pelvis w Contrast    Narrative    EXAM: CT ABDOMEN PELVIS W CONTRAST  LOCATION: [facility]  DATE:  10/24/2023    INDICATION: intractable vomiting  COMPARISON: 07/25/2023.  TECHNIQUE: CT scan of the abdomen and pelvis was performed following injection of IV contrast. Multiplanar reformats were obtained. Dose reduction techniques were used.  CONTRAST: ISOVUE 370 50ML    FINDINGS:   LOWER CHEST: Dependent atelectasis at the lung bases. Moderate size hiatal hernia.    HEPATOBILIARY: The gallbladder is absent. Small amount of pneumobilia.    PANCREAS: Borderline dilated pancreatic duct without significant change.    SPLEEN: Normal.    ADRENAL GLANDS: Normal.    KIDNEYS/BLADDER: There is a single 1 mm stone in the lower pole of the right kidney. Few cortical scars in the right kidney. Left kidney is unremarkable. There is no hydronephrosis.    BOWEL: There is no bowel obstruction. There is a moderate amount of stool throughout the colon. There is no free intraperitoneal gas or fluid.    LYMPH NODES: Normal.    VASCULATURE: Atherosclerotic calcification of the aorta and its branches. No aneurysm.    PELVIC ORGANS: The uterus is absent. There is no adnexal mass.    MUSCULOSKELETAL: Baclofen pump implanted in the right buttock. Degenerative disease in the lumbar spine.      Impression    IMPRESSION:   1.  No acute abnormality. No bowel obstruction.  2.  Moderate size hiatal hernia.  3.  Previous cholecystectomy. Small amount of pneumobilia.   XR Chest Port 1 View    Narrative    EXAM: XR CHEST PORT 1 VIEW  LOCATION: [facility]  DATE: 10/25/2023    INDICATION: chest pain  COMPARISON: 11/25/2019      Impression    IMPRESSION: Lungs are clear. No pleural effusion or pneumothorax. Normal heart size and pulmonary vascularity. Surgical clips project over the medial left upper chest.   Radiologist Consult For Cardiology    Narrative    OVERREAD: DETAILED [place] [facility] EXTRACARDIAC OVERREAD OF CARDIAC CT   LOCATION: [facility]  DATE: 10/27/2023    INDICATION: Chest  pain.  TECHNIQUE: Dose reduction techniques were used.  COMPARISON: CT chest 2023    FINDINGS:    LIMITED CHEST: Left infrahilar subpleural bulla measuring 2 cm is again noted. Subpleural atelectasis in the left lower lobe adjacent to the hernia.  LIMITED MEDIASTINUM: Large hiatal hernia with partially intrathoracic stomach.  LIMITED UPPER ABDOMEN: Pneumobilia again noted.      Impression    IMPRESSION:    1.  No significant incidental extracardiac findings.  2.  Large hiatal hernia with partially intrathoracic stomach.  3.  Pneumobilia.  4.  Please refer to cardiologist's dictation for the cardiac CT report.   Echocardiogram Complete     Value    LVEF  65-70%    Narrative    [id number]  [id number]  [id number]  [id number]^ANNIE^[place] [address]     Name: ADAM WADE  MRN: [id number]  : 1955  Study Date: 10/26/2023 03:21 PM  Age: 67 yrs  Gender: Female  Patient Location: [facility]  Reason For Study: MURRIETA  Ordering Physician: TERRY VALENCIA  Performed By: SHASHANK/COURT     BSA: 1.4 m2  Height: 63 in  Weight: 99 lb  HR: 61  BP: 106/51 mmHg  ______________________________________________________________________________  Procedure  Complete Portable Echo Adult.  ______________________________________________________________________________  Interpretation Summary     The left ventricle is normal in size.  The visual ejection fraction is 65-70%.  No regional wall motion abnormalities noted.  Normal right ventricle size and systolic function.  Sinus rhythm was noted.  No hemodynamically significant valvular abnormalities on 2D or color flow  imaging. There is no comparison study available.  ______________________________________________________________________________  Left Ventricle  The left ventricle is normal in size. There is normal left ventricular wall  thickness. Diastolic Doppler findings (E/E' ratio and/or other parameters)  suggest left ventricular filling  pressures are indeterminate. The visual  ejection fraction is 65-70%. No regional wall motion abnormalities noted.     Right Ventricle  Normal right ventricle size and systolic function. TAPSE is normal, which is  consistent with normal right ventricular systolic function.     Atria  Normal left atrial size. Right atrial size is normal.     Mitral Valve  Mitral valve leaflets appear normal. There is mild (1+) mitral regurgitation.  There is no mitral valve stenosis.     Tricuspid Valve  Tricuspid valve leaflets appear normal. There is mild (1+) tricuspid  regurgitation. The right ventricular systolic pressure is approximated at  14mmHg plus the right atrial pressure. There is no tricuspid stenosis.     Aortic Valve  A bicuspid aortic valve cannot be excluded. No aortic regurgitation is  present. No aortic stenosis is present.     Pulmonic Valve  The pulmonic valve is not well visualized. There is no pulmonic valvular  regurgitation. There is no pulmonic valvular stenosis.     Pericardium  There is no pericardial effusion.     Rhythm  Sinus rhythm was noted.     ______________________________________________________________________________  MMode/2D Measurements & Calculations  RVDd: 3.6 cm  IVSd: 0.62 cm  LVIDd: 4.1 cm  LVIDs: 2.8 cm  LVPWd: 0.72 cm  FS: 31.4 %     LV mass(C)d: 75.7 grams  LV mass(C)dI: 52.8 grams/m2  RWT: 0.35  TAPSE: 2.5 cm     Time Measurements  MM HR: 66.0 BPM     Doppler Measurements & Calculations  MV E max manny: 78.4 cm/sec  MV A max manny: 56.6 cm/sec  MV E/A: 1.4  MV dec time: 0.20 sec  LV V1 max P.6 mmHg  LV V1 max: 118.1 cm/sec  LV V1 VTI: 24.4 cm  PA acc time: 0.12 sec  TR max manny: 187.9 cm/sec  TR max P.1 mmHg  E/E' av.3  Lateral E/e': 6.8  Medial E/e': 7.7     RV S Manny: 11.2 cm/sec     ______________________________________________________________________________  Report approved by: Loyd Birmingham 10/26/2023 04:31 PM         CT Coronary Artery Angio w Calcium Score      Value    Agatston Score of Left Main 3    Agatston Score of the Left Anterior Descending 0    Agatston Score of Circumflex 0    Agatston Score of Right Coronary Artery 0    Total Score 3.00    BSA 0.00    Narrative    A calcium score in this range places the individual in the 25th percentile   when compared to an age and gender matched control group and implies a low   risk of cardiac events in the next ten years (less than 1% per year).  Mild disease in distal left main less than 25%.  Rest coronary arteries   are normal.  Right dominant system.  Normal size of thoracic aorta.  No pericardial effusion or calcified pericardium.  No thrombus in left atrial appendage.

## 2023-10-27 NOTE — PROGRESS NOTES
General Surgery Progress Note:    Hospital Day # 0    ASSESSMENT:   1. Hiatal hernia    2. Intractable nausea and vomiting    3. Chest pain, unspecified type    4. Essential (primary) hypertension        Mariah Koehler is a 67 year old female presenting with a hiatal hernia and nausea and vomiting secondary to hiatal hernia.  Patient symptoms are improved now.  Tolerating diet.-    PLAN:   Patient is cleared for discharge from general surgery standpoint.  Patient is to follow-up with general surgery team after discharge for EGD and work-up for hiatal hernia repair.    Surgery team signing off.  Please feel free to contact us should there be any further questions or concerns.    SUBJECTIVE:   Mariah Koehler was seen on rounds.  Patient tolerating breakfast this morning.  No nausea no vomiting.    Patient Vitals for the past 24 hrs:   BP Temp Temp src Pulse Resp SpO2   10/27/23 0815 122/77 98.5  F (36.9  C) Oral 85 18 95 %   10/27/23 0334 115/67 97.5  F (36.4  C) Oral 60 18 93 %   10/26/23 2319 112/71 98.3  F (36.8  C) Oral 62 18 93 %   10/26/23 1920 125/59 98.4  F (36.9  C) Oral 85 18 95 %   10/26/23 1518 106/58 98.8  F (37.1  C) Oral 67 18 94 %   10/26/23 1208 106/51 97.8  F (36.6  C) Oral 60 18 94 %       Physical Exam:  General: NAD, pleasant  CV:RRR  LUNGS:Normal respiratory effort, no accessory muscle use  ABD: Abdomen is soft, nondistended, nontender to palpation.  EXT:no CCE    Admission on 10/24/2023   Component Date Value    Sodium 10/24/2023 138     Potassium 10/24/2023 3.5     Carbon Dioxide (CO2) 10/24/2023 27     Anion Gap 10/24/2023 9     Urea Nitrogen 10/24/2023 9.7     Creatinine 10/24/2023 0.53     GFR Estimate 10/24/2023 >90     Calcium 10/24/2023 9.1     Chloride 10/24/2023 102     Glucose 10/24/2023 101 (H)     Alkaline Phosphatase 10/24/2023 89     AST 10/24/2023 19     ALT 10/24/2023 11     Protein Total 10/24/2023 5.9 (L)     Albumin 10/24/2023 3.9     Bilirubin Total 10/24/2023 0.3      Lipase 10/24/2023 10 (L)     Color Urine 10/24/2023 Colorless     Appearance Urine 10/24/2023 Clear     Glucose Urine 10/24/2023 Negative     Bilirubin Urine 10/24/2023 Negative     Ketones Urine 10/24/2023 Negative     Specific Gravity Urine 10/24/2023 1.015     Blood Urine 10/24/2023 0.03 mg/dL (A)     pH Urine 10/24/2023 7.5 (H)     Protein Albumin Urine 10/24/2023 Negative     Urobilinogen Urine 10/24/2023 <2.0     Nitrite Urine 10/24/2023 Negative     Leukocyte Esterase Urine 10/24/2023 Negative     Mucus Urine 10/24/2023 Present (A)     RBC Urine 10/24/2023 3 (H)     WBC Urine 10/24/2023 1     TSH 10/24/2023 0.22 (L)     WBC Count 10/24/2023 4.2     RBC Count 10/24/2023 4.94     Hemoglobin 10/24/2023 14.0     Hematocrit 10/24/2023 43.0     MCV 10/24/2023 87     MCH 10/24/2023 28.3     MCHC 10/24/2023 32.6     RDW 10/24/2023 13.6     Platelet Count 10/24/2023 202     % Neutrophils 10/24/2023 68     % Lymphocytes 10/24/2023 25     % Monocytes 10/24/2023 6     % Eosinophils 10/24/2023 0     % Basophils 10/24/2023 1     % Immature Granulocytes 10/24/2023 0     NRBCs per 100 WBC 10/24/2023 0     Absolute Neutrophils 10/24/2023 2.8     Absolute Lymphocytes 10/24/2023 1.1     Absolute Monocytes 10/24/2023 0.3     Absolute Eosinophils 10/24/2023 0.0     Absolute Basophils 10/24/2023 0.0     Absolute Immature Granul* 10/24/2023 0.0     Absolute NRBCs 10/24/2023 0.0     Free T4 10/24/2023 1.07     Troponin T, High Sensiti* 10/24/2023 25 (H)     Magnesium 10/24/2023 1.6 (L)     Phosphorus 10/24/2023 3.3     Sodium 10/25/2023 138     Potassium 10/25/2023 3.1 (L)     Carbon Dioxide (CO2) 10/25/2023 25     Anion Gap 10/25/2023 12     Urea Nitrogen 10/25/2023 9.1     Creatinine 10/25/2023 0.51     GFR Estimate 10/25/2023 >90     Calcium 10/25/2023 9.0     Chloride 10/25/2023 101     Glucose 10/25/2023 127 (H)     Alkaline Phosphatase 10/25/2023 95     AST 10/25/2023 17     ALT 10/25/2023 11     Protein Total 10/25/2023 6.2  (L)     Albumin 10/25/2023 4.0     Bilirubin Total 10/25/2023 0.4     WBC Count 10/25/2023 14.3 (H)     RBC Count 10/25/2023 5.29 (H)     Hemoglobin 10/25/2023 15.2     Hematocrit 10/25/2023 45.7     MCV 10/25/2023 86     MCH 10/25/2023 28.7     MCHC 10/25/2023 33.3     RDW 10/25/2023 13.9     Platelet Count 10/25/2023 215     Potassium 10/25/2023 3.6     Potassium 10/26/2023 3.7     Hold Specimen 10/26/2023 JIC     WBC Count 10/26/2023 6.2     RBC Count 10/26/2023 4.30     Hemoglobin 10/26/2023 12.6     Hematocrit 10/26/2023 38.1     MCV 10/26/2023 89     MCH 10/26/2023 29.3     MCHC 10/26/2023 33.1     RDW 10/26/2023 14.6     Platelet Count 10/26/2023 181     Sodium 10/26/2023 141     Potassium 10/26/2023 3.7     Chloride 10/26/2023 105     Carbon Dioxide (CO2) 10/26/2023 25     Anion Gap 10/26/2023 11     Urea Nitrogen 10/26/2023 12.5     Creatinine 10/26/2023 0.66     GFR Estimate 10/26/2023 >90     Calcium 10/26/2023 8.3 (L)     Glucose 10/26/2023 103 (H)     Cholesterol 10/26/2023 135     Triglycerides 10/26/2023 77     Direct Measure HDL 10/26/2023 58     LDL Cholesterol Calculat* 10/26/2023 62     Non HDL Cholesterol 10/26/2023 77     TSH 10/26/2023 0.62     Vitamin B12 10/26/2023 209 (L)     Folic Acid 10/26/2023 11.0     Phosphorus 10/26/2023 3.5     LVEF  10/26/2023 65-70%     Magnesium 10/26/2023 1.9     Vitamin D, Total (25-Hyd* 10/26/2023 39     Potassium 10/27/2023 4.1     Magnesium 10/27/2023 1.8     Phosphorus 10/27/2023 3.9     Hold Specimen 10/27/2023 DO Sage Rodriguez DO  General Surgeon  [facility]  Surgery [address] [address]?  Office: [phone]  Employed by - [employer]  Pager: [phone]

## 2023-10-27 NOTE — PLAN OF CARE
"PRIMARY DIAGNOSIS: \"GENERIC\" NURSING  OUTPATIENT/OBSERVATION GOALS TO BE MET BEFORE DISCHARGE:  ADLs back to baseline: Yes    Activity and level of assistance: Up with standby assistance.    Pain status: Reports headache, declines need for medication.     Return to near baseline physical activity: Yes     Discharge Planner Nurse   Safe discharge environment identified: Yes  Barriers to discharge: Yes       Entered by: Karime Fry RN 10/27/2023 1:42 AM     Please review provider order for any additional goals.   Nurse to notify provider when observation goals have been met and patient is ready for discharge.  "

## 2023-10-27 NOTE — PROGRESS NOTES
Occupational Therapy Discharge Summary    Reason for therapy discharge:    Discharged to home.    Progress towards therapy goal(s). See goals on Care Plan in [facility] electronic health record for goal details.  Goals not met.  Barriers to achieving goals:   discharge on same date as initial evaluation.    Therapy recommendation(s):    Continued therapy is recommended.  Rationale/Recommendations:  Home OT as needed - daily checks and assist with complex IADLs d/t cognitive deficits.

## 2023-10-27 NOTE — PROGRESS NOTES
"PRIMARY DIAGNOSIS: \"GENERIC\" NURSING  OUTPATIENT/OBSERVATION GOALS TO BE MET BEFORE DISCHARGE:  ADLs back to baseline: No    Activity and level of assistance: Up with standby assistance.    Pain status: Has pain but uses internal pain pump to address this. No alternative pain relief was required.     Return to near baseline physical activity: Yes     Discharge Planner Nurse   Safe discharge environment identified: Yes  Barriers to discharge: Yes       Entered by: Sobeida Lopez RN 10/26/2023 10:33 PM    Pain reported on left side. Patient used internal pain pump to address this and it was effective per patient. PRN zofran given for nausea-See MAR. Patient is AXO but forgetful (patient ordered dinner and 15 minutes forgot she ordered). Echo done this afternoon. CTA scheduled for 1330 tomorrow (10/27). Patient can eat breakfast but NPO afterwards. Encouraged to drink lots of water. No caffeine.    Sobeida Lopez RN   "

## 2023-10-27 NOTE — PROGRESS NOTES
Coronary CT Angiogram:  Patient tolerated procedure well. VSS. Nitroglycerin 0.4mg SL and Metoprolol 10mg IV administered to patient per cardiology policy. Encouraged patient to drink 6-8 glasses of water as diet tolerates due to receiving IV contrast. No further dietary restrictions according to CCTA.     Results pending cardiology interpretation.      Queta Landry RN

## 2023-10-27 NOTE — PROGRESS NOTES
"PRIMARY DIAGNOSIS: \"GENERIC\" NURSING  OUTPATIENT/OBSERVATION GOALS TO BE MET BEFORE DISCHARGE:  ADLs back to baseline: No    Activity and level of assistance: Up with standby assistance.    Pain status: Has pain but uses internal pain pump to address this. No alternative pain relief was required.     Return to near baseline physical activity: Yes     Discharge Planner Nurse   Safe discharge environment identified: Yes  Barriers to discharge: Yes     "

## 2023-10-28 LAB
ATRIAL RATE - MUSE: 75 BPM
DIASTOLIC BLOOD PRESSURE - MUSE: 91 MMHG
INTERPRETATION ECG - MUSE: NORMAL
P AXIS - MUSE: 65 DEGREES
PR INTERVAL - MUSE: 150 MS
QRS DURATION - MUSE: 86 MS
QT - MUSE: 380 MS
QTC - MUSE: 424 MS
R AXIS - MUSE: 75 DEGREES
SYSTOLIC BLOOD PRESSURE - MUSE: 194 MMHG
T AXIS - MUSE: 71 DEGREES
VENTRICULAR RATE- MUSE: 75 BPM

## 2023-10-30 ENCOUNTER — HOSPITAL ENCOUNTER (EMERGENCY)
Facility: HOSPITAL | Age: 68
Discharge: HOME | End: 2023-10-30
Attending: FAMILY MEDICINE | Admitting: FAMILY MEDICINE
Payer: COMMERCIAL

## 2023-10-30 ENCOUNTER — HOSPITAL ENCOUNTER (OUTPATIENT)
Facility: HOSPITAL | Age: 68
Setting detail: OBSERVATION
Discharge: HOME | End: 2023-11-01
Attending: EMERGENCY MEDICINE | Admitting: INTERNAL MEDICINE
Payer: COMMERCIAL

## 2023-10-30 VITALS
RESPIRATION RATE: 20 BRPM | HEART RATE: 74 BPM | DIASTOLIC BLOOD PRESSURE: 70 MMHG | SYSTOLIC BLOOD PRESSURE: 136 MMHG | TEMPERATURE: 98 F | OXYGEN SATURATION: 97 % | BODY MASS INDEX: 17.54 KG/M2 | HEIGHT: 63 IN | WEIGHT: 99 LBS

## 2023-10-30 DIAGNOSIS — R11.2 NAUSEA AND VOMITING, UNSPECIFIED VOMITING TYPE: ICD-10-CM

## 2023-10-30 DIAGNOSIS — N39.0 URINARY TRACT INFECTION WITH HEMATURIA, SITE UNSPECIFIED: ICD-10-CM

## 2023-10-30 DIAGNOSIS — Z97.8 PRESENCE OF INTRATHECAL BACLOFEN PUMP: ICD-10-CM

## 2023-10-30 DIAGNOSIS — K44.9 HIATAL HERNIA: ICD-10-CM

## 2023-10-30 DIAGNOSIS — R11.2 INTRACTABLE NAUSEA AND VOMITING: ICD-10-CM

## 2023-10-30 DIAGNOSIS — R31.9 URINARY TRACT INFECTION WITH HEMATURIA, SITE UNSPECIFIED: ICD-10-CM

## 2023-10-30 DIAGNOSIS — R11.0 NAUSEA: ICD-10-CM

## 2023-10-30 DIAGNOSIS — N30.01 ACUTE CYSTITIS WITH HEMATURIA: Primary | ICD-10-CM

## 2023-10-30 DIAGNOSIS — K59.00 CONSTIPATION, UNSPECIFIED CONSTIPATION TYPE: ICD-10-CM

## 2023-10-30 LAB
ALBUMIN SERPL BCG-MCNC: 4.1 G/DL (ref 3.5–5.2)
ALBUMIN UR-MCNC: 30 MG/DL
ALP SERPL-CCNC: 87 U/L (ref 35–104)
ALT SERPL W P-5'-P-CCNC: 14 U/L (ref 0–50)
ANION GAP SERPL CALCULATED.3IONS-SCNC: 11 MMOL/L (ref 7–15)
ANION GAP SERPL CALCULATED.3IONS-SCNC: 12 MMOL/L (ref 7–15)
APPEARANCE UR: ABNORMAL
AST SERPL W P-5'-P-CCNC: 19 U/L (ref 0–45)
BACTERIA #/AREA URNS HPF: ABNORMAL /HPF
BASOPHILS # BLD AUTO: 0 10E3/UL (ref 0–0.2)
BASOPHILS NFR BLD AUTO: 0 %
BILIRUB DIRECT SERPL-MCNC: <0.2 MG/DL (ref 0–0.3)
BILIRUB SERPL-MCNC: 0.3 MG/DL
BILIRUB UR QL STRIP: NEGATIVE
BUN SERPL-MCNC: 10 MG/DL (ref 8–23)
BUN SERPL-MCNC: 10.9 MG/DL (ref 8–23)
CALCIUM SERPL-MCNC: 9.6 MG/DL (ref 8.8–10.2)
CALCIUM SERPL-MCNC: 9.7 MG/DL (ref 8.8–10.2)
CHLORIDE SERPL-SCNC: 100 MMOL/L (ref 98–107)
CHLORIDE SERPL-SCNC: 103 MMOL/L (ref 98–107)
COLOR UR AUTO: YELLOW
CREAT SERPL-MCNC: 0.58 MG/DL (ref 0.51–0.95)
CREAT SERPL-MCNC: 0.61 MG/DL (ref 0.51–0.95)
DEPRECATED HCO3 PLAS-SCNC: 26 MMOL/L (ref 22–29)
DEPRECATED HCO3 PLAS-SCNC: 27 MMOL/L (ref 22–29)
EGFRCR SERPLBLD CKD-EPI 2021: >90 ML/MIN/1.73M2
EGFRCR SERPLBLD CKD-EPI 2021: >90 ML/MIN/1.73M2
EOSINOPHIL # BLD AUTO: 0 10E3/UL (ref 0–0.7)
EOSINOPHIL NFR BLD AUTO: 0 %
ERYTHROCYTE [DISTWIDTH] IN BLOOD BY AUTOMATED COUNT: 13.9 % (ref 10–15)
ETHANOL SERPL-MCNC: <0.01 G/DL
GLUCOSE SERPL-MCNC: 103 MG/DL (ref 70–99)
GLUCOSE SERPL-MCNC: 104 MG/DL (ref 70–99)
GLUCOSE UR STRIP-MCNC: 200 MG/DL
HCT VFR BLD AUTO: 43.1 % (ref 35–47)
HGB BLD-MCNC: 14.4 G/DL (ref 11.7–15.7)
HGB UR QL STRIP: ABNORMAL
IMM GRANULOCYTES # BLD: 0 10E3/UL
IMM GRANULOCYTES NFR BLD: 0 %
KETONES UR STRIP-MCNC: NEGATIVE MG/DL
LACTATE SERPL-SCNC: 0.9 MMOL/L (ref 0.7–2)
LEUKOCYTE ESTERASE UR QL STRIP: ABNORMAL
LIPASE SERPL-CCNC: 18 U/L (ref 13–60)
LYMPHOCYTES # BLD AUTO: 1.2 10E3/UL (ref 0.8–5.3)
LYMPHOCYTES NFR BLD AUTO: 18 %
MAGNESIUM SERPL-MCNC: 1.9 MG/DL (ref 1.7–2.3)
MAGNESIUM SERPL-MCNC: 2 MG/DL (ref 1.7–2.3)
MCH RBC QN AUTO: 28.9 PG (ref 26.5–33)
MCHC RBC AUTO-ENTMCNC: 33.4 G/DL (ref 31.5–36.5)
MCV RBC AUTO: 86 FL (ref 78–100)
MONOCYTES # BLD AUTO: 0.4 10E3/UL (ref 0–1.3)
MONOCYTES NFR BLD AUTO: 6 %
NEUTROPHILS # BLD AUTO: 5 10E3/UL (ref 1.6–8.3)
NEUTROPHILS NFR BLD AUTO: 76 %
NITRATE UR QL: POSITIVE
NRBC # BLD AUTO: 0 10E3/UL
NRBC BLD AUTO-RTO: 0 /100
PH UR STRIP: 8 [PH] (ref 5–7)
PLATELET # BLD AUTO: 219 10E3/UL (ref 150–450)
POTASSIUM SERPL-SCNC: 4.2 MMOL/L (ref 3.4–5.3)
POTASSIUM SERPL-SCNC: 4.5 MMOL/L (ref 3.4–5.3)
PROT SERPL-MCNC: 6.3 G/DL (ref 6.4–8.3)
RBC # BLD AUTO: 4.99 10E6/UL (ref 3.8–5.2)
RBC URINE: >182 /HPF
SODIUM SERPL-SCNC: 139 MMOL/L (ref 135–145)
SODIUM SERPL-SCNC: 140 MMOL/L (ref 135–145)
SP GR UR STRIP: 1.01 (ref 1–1.03)
UROBILINOGEN UR STRIP-MCNC: <2 MG/DL
WBC # BLD AUTO: 6.7 10E3/UL (ref 4–11)
WBC CLUMPS #/AREA URNS HPF: PRESENT /HPF
WBC URINE: >182 /HPF

## 2023-10-30 PROCEDURE — 96376 TX/PRO/DX INJ SAME DRUG ADON: CPT

## 2023-10-30 PROCEDURE — 250N000011 HC RX IP 250 OP 636: Mod: JZ | Performed by: EMERGENCY MEDICINE

## 2023-10-30 PROCEDURE — 258N000003 HC RX IP 258 OP 636: Performed by: EMERGENCY MEDICINE

## 2023-10-30 PROCEDURE — 96361 HYDRATE IV INFUSION ADD-ON: CPT

## 2023-10-30 PROCEDURE — 96375 TX/PRO/DX INJ NEW DRUG ADDON: CPT

## 2023-10-30 PROCEDURE — 82077 ASSAY SPEC XCP UR&BREATH IA: CPT | Performed by: FAMILY MEDICINE

## 2023-10-30 PROCEDURE — 83690 ASSAY OF LIPASE: CPT | Performed by: FAMILY MEDICINE

## 2023-10-30 PROCEDURE — 250N000013 HC RX MED GY IP 250 OP 250 PS 637: Performed by: STUDENT IN AN ORGANIZED HEALTH CARE EDUCATION/TRAINING PROGRAM

## 2023-10-30 PROCEDURE — 96366 THER/PROPH/DIAG IV INF ADDON: CPT

## 2023-10-30 PROCEDURE — 250N000011 HC RX IP 250 OP 636: Performed by: STUDENT IN AN ORGANIZED HEALTH CARE EDUCATION/TRAINING PROGRAM

## 2023-10-30 PROCEDURE — 82248 BILIRUBIN DIRECT: CPT | Performed by: FAMILY MEDICINE

## 2023-10-30 PROCEDURE — 83605 ASSAY OF LACTIC ACID: CPT | Performed by: FAMILY MEDICINE

## 2023-10-30 PROCEDURE — C9113 INJ PANTOPRAZOLE SODIUM, VIA: HCPCS | Mod: JZ | Performed by: FAMILY MEDICINE

## 2023-10-30 PROCEDURE — 85025 COMPLETE CBC W/AUTO DIFF WBC: CPT | Performed by: FAMILY MEDICINE

## 2023-10-30 PROCEDURE — 87086 URINE CULTURE/COLONY COUNT: CPT | Performed by: FAMILY MEDICINE

## 2023-10-30 PROCEDURE — 250N000009 HC RX 250: Performed by: FAMILY MEDICINE

## 2023-10-30 PROCEDURE — 36415 COLL VENOUS BLD VENIPUNCTURE: CPT | Performed by: EMERGENCY MEDICINE

## 2023-10-30 PROCEDURE — 96365 THER/PROPH/DIAG IV INF INIT: CPT

## 2023-10-30 PROCEDURE — 36415 COLL VENOUS BLD VENIPUNCTURE: CPT | Performed by: FAMILY MEDICINE

## 2023-10-30 PROCEDURE — 250N000011 HC RX IP 250 OP 636: Performed by: FAMILY MEDICINE

## 2023-10-30 PROCEDURE — 99284 EMERGENCY DEPT VISIT MOD MDM: CPT | Mod: 25

## 2023-10-30 PROCEDURE — 99222 1ST HOSP IP/OBS MODERATE 55: CPT | Performed by: STUDENT IN AN ORGANIZED HEALTH CARE EDUCATION/TRAINING PROGRAM

## 2023-10-30 PROCEDURE — 83735 ASSAY OF MAGNESIUM: CPT | Performed by: FAMILY MEDICINE

## 2023-10-30 PROCEDURE — 250N000013 HC RX MED GY IP 250 OP 250 PS 637: Performed by: EMERGENCY MEDICINE

## 2023-10-30 PROCEDURE — 83735 ASSAY OF MAGNESIUM: CPT | Performed by: EMERGENCY MEDICINE

## 2023-10-30 PROCEDURE — 96374 THER/PROPH/DIAG INJ IV PUSH: CPT

## 2023-10-30 PROCEDURE — 258N000003 HC RX IP 258 OP 636: Performed by: FAMILY MEDICINE

## 2023-10-30 PROCEDURE — G0378 HOSPITAL OBSERVATION PER HR: HCPCS

## 2023-10-30 PROCEDURE — 99285 EMERGENCY DEPT VISIT HI MDM: CPT | Mod: 25

## 2023-10-30 PROCEDURE — 81001 URINALYSIS AUTO W/SCOPE: CPT | Performed by: FAMILY MEDICINE

## 2023-10-30 RX ORDER — ONDANSETRON 2 MG/ML
4 INJECTION INTRAMUSCULAR; INTRAVENOUS EVERY 6 HOURS PRN
Status: DISCONTINUED | OUTPATIENT
Start: 2023-10-30 | End: 2023-11-01 | Stop reason: HOSPADM

## 2023-10-30 RX ORDER — HYDRALAZINE HYDROCHLORIDE 20 MG/ML
10 INJECTION INTRAMUSCULAR; INTRAVENOUS EVERY 4 HOURS PRN
Status: DISCONTINUED | OUTPATIENT
Start: 2023-10-30 | End: 2023-11-01 | Stop reason: HOSPADM

## 2023-10-30 RX ORDER — MULTIVITAMIN,THERAPEUTIC
1 TABLET ORAL DAILY
Status: DISCONTINUED | OUTPATIENT
Start: 2023-10-30 | End: 2023-10-31

## 2023-10-30 RX ORDER — ATENOLOL 25 MG/1
12.5 TABLET ORAL DAILY
Status: DISCONTINUED | OUTPATIENT
Start: 2023-10-30 | End: 2023-11-01 | Stop reason: HOSPADM

## 2023-10-30 RX ORDER — SUCRALFATE 1 G/1
1 TABLET ORAL 4 TIMES DAILY
Status: DISCONTINUED | OUTPATIENT
Start: 2023-10-30 | End: 2023-11-01 | Stop reason: HOSPADM

## 2023-10-30 RX ORDER — ONDANSETRON 2 MG/ML
8 INJECTION INTRAMUSCULAR; INTRAVENOUS ONCE
Status: COMPLETED | OUTPATIENT
Start: 2023-10-30 | End: 2023-10-30

## 2023-10-30 RX ORDER — LANOLIN ALCOHOL/MO/W.PET/CERES
1000 CREAM (GRAM) TOPICAL DAILY
Status: DISCONTINUED | OUTPATIENT
Start: 2023-10-30 | End: 2023-11-01 | Stop reason: HOSPADM

## 2023-10-30 RX ORDER — ONDANSETRON 4 MG/1
4-8 TABLET, FILM COATED ORAL EVERY 6 HOURS PRN
Start: 2023-10-30

## 2023-10-30 RX ORDER — CEPHALEXIN 500 MG/1
500 CAPSULE ORAL 4 TIMES DAILY
Qty: 28 CAPSULE | Refills: 0 | Status: ON HOLD | OUTPATIENT
Start: 2023-10-30 | End: 2023-11-01

## 2023-10-30 RX ORDER — ONDANSETRON 4 MG/1
4 TABLET, ORALLY DISINTEGRATING ORAL EVERY 6 HOURS PRN
Status: DISCONTINUED | OUTPATIENT
Start: 2023-10-30 | End: 2023-11-01 | Stop reason: HOSPADM

## 2023-10-30 RX ORDER — AMOXICILLIN 250 MG
1 CAPSULE ORAL 2 TIMES DAILY PRN
Status: DISCONTINUED | OUTPATIENT
Start: 2023-10-30 | End: 2023-10-31

## 2023-10-30 RX ORDER — ACETAMINOPHEN 325 MG/1
325-650 TABLET ORAL EVERY 6 HOURS PRN
Status: DISCONTINUED | OUTPATIENT
Start: 2023-10-30 | End: 2023-10-31

## 2023-10-30 RX ORDER — SUCRALFATE 1 G/1
1 TABLET ORAL 4 TIMES DAILY
Qty: 28 TABLET | Refills: 0 | Status: SHIPPED | OUTPATIENT
Start: 2023-10-30 | End: 2023-11-06

## 2023-10-30 RX ORDER — PROCHLORPERAZINE 25 MG
12.5 SUPPOSITORY, RECTAL RECTAL EVERY 12 HOURS PRN
Status: DISCONTINUED | OUTPATIENT
Start: 2023-10-30 | End: 2023-11-01 | Stop reason: HOSPADM

## 2023-10-30 RX ORDER — AMOXICILLIN 250 MG
2 CAPSULE ORAL 2 TIMES DAILY PRN
Status: DISCONTINUED | OUTPATIENT
Start: 2023-10-30 | End: 2023-11-01 | Stop reason: HOSPADM

## 2023-10-30 RX ORDER — CEPHALEXIN 500 MG/1
500 CAPSULE ORAL ONCE
Status: COMPLETED | OUTPATIENT
Start: 2023-10-30 | End: 2023-10-30

## 2023-10-30 RX ORDER — ACETAMINOPHEN 650 MG/1
650 SUPPOSITORY RECTAL EVERY 6 HOURS PRN
Status: DISCONTINUED | OUTPATIENT
Start: 2023-10-30 | End: 2023-10-31

## 2023-10-30 RX ORDER — PROCHLORPERAZINE MALEATE 5 MG
5 TABLET ORAL EVERY 6 HOURS PRN
Status: DISCONTINUED | OUTPATIENT
Start: 2023-10-30 | End: 2023-11-01 | Stop reason: HOSPADM

## 2023-10-30 RX ORDER — ASPIRIN 81 MG/1
81 TABLET ORAL DAILY
Status: DISCONTINUED | OUTPATIENT
Start: 2023-10-30 | End: 2023-11-01 | Stop reason: HOSPADM

## 2023-10-30 RX ORDER — METOCLOPRAMIDE HYDROCHLORIDE 5 MG/ML
5 INJECTION INTRAMUSCULAR; INTRAVENOUS EVERY 6 HOURS PRN
Status: DISCONTINUED | OUTPATIENT
Start: 2023-10-30 | End: 2023-10-30 | Stop reason: HOSPADM

## 2023-10-30 RX ORDER — BACLOFEN 10 MG/1
20 TABLET ORAL 4 TIMES DAILY
Status: DISCONTINUED | OUTPATIENT
Start: 2023-10-30 | End: 2023-11-01 | Stop reason: HOSPADM

## 2023-10-30 RX ORDER — HYDRALAZINE HYDROCHLORIDE 20 MG/ML
5 INJECTION INTRAMUSCULAR; INTRAVENOUS EVERY 4 HOURS PRN
Status: DISCONTINUED | OUTPATIENT
Start: 2023-10-30 | End: 2023-10-30

## 2023-10-30 RX ORDER — ACETAMINOPHEN 325 MG/1
650 TABLET ORAL EVERY 6 HOURS PRN
Status: DISCONTINUED | OUTPATIENT
Start: 2023-10-30 | End: 2023-11-01 | Stop reason: HOSPADM

## 2023-10-30 RX ORDER — PANTOPRAZOLE SODIUM 40 MG/1
40 TABLET, DELAYED RELEASE ORAL
Status: DISCONTINUED | OUTPATIENT
Start: 2023-10-30 | End: 2023-11-01 | Stop reason: HOSPADM

## 2023-10-30 RX ADMIN — PROCHLORPERAZINE EDISYLATE 5 MG: 5 INJECTION INTRAMUSCULAR; INTRAVENOUS at 20:14

## 2023-10-30 RX ADMIN — THIAMINE HCL TAB 100 MG 100 MG: 100 TAB at 19:39

## 2023-10-30 RX ADMIN — HYDRALAZINE HYDROCHLORIDE 10 MG: 20 INJECTION INTRAMUSCULAR; INTRAVENOUS at 19:01

## 2023-10-30 RX ADMIN — PANTOPRAZOLE SODIUM 40 MG: 40 INJECTION, POWDER, FOR SOLUTION INTRAVENOUS at 01:25

## 2023-10-30 RX ADMIN — FOLIC ACID: 5 INJECTION, SOLUTION INTRAMUSCULAR; INTRAVENOUS; SUBCUTANEOUS at 01:52

## 2023-10-30 RX ADMIN — PANTOPRAZOLE SODIUM 40 MG: 40 TABLET, DELAYED RELEASE ORAL at 19:40

## 2023-10-30 RX ADMIN — CEPHALEXIN 500 MG: 500 CAPSULE ORAL at 05:55

## 2023-10-30 RX ADMIN — ONDANSETRON 8 MG: 2 INJECTION INTRAMUSCULAR; INTRAVENOUS at 01:24

## 2023-10-30 RX ADMIN — ONDANSETRON 8 MG: 2 INJECTION INTRAMUSCULAR; INTRAVENOUS at 15:55

## 2023-10-30 RX ADMIN — METOCLOPRAMIDE 5 MG: 5 INJECTION, SOLUTION INTRAMUSCULAR; INTRAVENOUS at 05:52

## 2023-10-30 RX ADMIN — HYDRALAZINE HYDROCHLORIDE 5 MG: 20 INJECTION INTRAMUSCULAR; INTRAVENOUS at 17:50

## 2023-10-30 RX ADMIN — ACETAMINOPHEN 650 MG: 325 TABLET ORAL at 17:58

## 2023-10-30 RX ADMIN — TRAZODONE HYDROCHLORIDE 25 MG: 50 TABLET ORAL at 22:06

## 2023-10-30 RX ADMIN — ONDANSETRON 4 MG: 2 INJECTION INTRAMUSCULAR; INTRAVENOUS at 17:59

## 2023-10-30 RX ADMIN — SUCRALFATE 1 G: 1 TABLET ORAL at 19:39

## 2023-10-30 RX ADMIN — SODIUM CHLORIDE 1000 ML: 9 INJECTION, SOLUTION INTRAVENOUS at 15:55

## 2023-10-30 RX ADMIN — Medication 81 MG: at 19:39

## 2023-10-30 RX ADMIN — THERA TABS 1 TABLET: TAB at 19:39

## 2023-10-30 ASSESSMENT — ENCOUNTER SYMPTOMS
NAUSEA: 1
VOMITING: 1

## 2023-10-30 ASSESSMENT — ACTIVITIES OF DAILY LIVING (ADL)
ADLS_ACUITY_SCORE: 35
ADLS_ACUITY_SCORE: 33
ADLS_ACUITY_SCORE: 35

## 2023-10-30 NOTE — DISCHARGE INSTRUCTIONS
Your urinalysis shows that you have a urinary tract infection.  Take the prescribed antibiotic as directed to treat the urinary tract infection.  Continue to use the prescribed Zofran and Reglan as needed for any further episodes of nausea or vomiting.    Follow-up with general surgery to discuss outpatient surgery for your hiatal hernia.  Turn back to ED sooner for any worsening pain, vomiting, or any other new or concerning symptoms.

## 2023-10-30 NOTE — ED TRIAGE NOTES
Pt here with  with c/o nausea and vomiting that started today. Pt was discharged from hospital on Friday for a hernia - was told to come back if not improving.      Triage Assessment (Adult)       Row Name 10/30/23 0028          Triage Assessment    Airway WDL WDL        Respiratory WDL    Respiratory WDL WDL        Cardiac WDL    Cardiac WDL WDL

## 2023-10-30 NOTE — ED NOTES
EMERGENCY DEPARTMENT SIGN OUT NOTE        ED COURSE AND MEDICAL DECISION MAKING  67-year-old female who presented to the ED for evaluation of nausea and vomiting was checked out to me by Dr. Patel.  At the time of checkout the patient still had a UA pending.  The patient's nausea and vomiting and abdominal discomfort had reportedly resolved after she was given IV Zofran and Protonix.  She was able to eat and drink here in the ED without any worsening nausea vomiting    Previous laboratory tests were reviewed.  BMP, CBC, hepatic panel, lipase, magnesium, lactic acid, and blood alcohol level were all normal or reassuring.    The patient's urinalysis appears consistent with urinary tract infection.    The patient was reevaluated and informed of the urinalysis results.  The patient was given a dose of Keflex to treat the urinary tract infection here in the ED.  She was also given additional dose of IV Reglan for persistent mild nausea.  The patient was then discharged home with Keflex and a refill of her Zofran.  The patient was instructed to follow-up with her primary care provider for reevaluation or to return back to ED sooner for any worsening vomiting, abdominal pain, fevers, or any other new or concerning symptoms.    Patient was signed out to me by Dr Micha Patel at 4:36 AM.    In brief, Mariah Koehler is a 67 year old female who initially presented presented to the ED for evaluation of nausea and vomiting.    At time of sign out, disposition was pending urinalysis results.    FINAL IMPRESSION    1. Nausea and vomiting, unspecified vomiting type    2. Presence of intrathecal baclofen pump    3. Hiatal hernia    4. Nausea        ED MEDS  Medications   dextrose 5% and 0.9% NaCl 1,000 mL with Infuvite Adult 10 mL, thiamine 100 mg, folic acid 1 mg infusion ( Intravenous $New Bag 10/30/23 6647)   pantoprazole (PROTONIX) IV push injection 40 mg (40 mg Intravenous $Given 10/30/23 0125)   ondansetron (ZOFRAN)  injection 8 mg (8 mg Intravenous $Given 10/30/23 0124)       LAB  Labs Ordered and Resulted from Time of ED Arrival to Time of ED Departure   BASIC METABOLIC PANEL - Abnormal       Result Value    Sodium 139      Potassium 4.2      Chloride 100      Carbon Dioxide (CO2) 27      Anion Gap 12      Urea Nitrogen 10.9      Creatinine 0.61      GFR Estimate >90      Calcium 9.7      Glucose 104 (*)    HEPATIC FUNCTION PANEL - Abnormal    Protein Total 6.3 (*)     Albumin 4.1      Bilirubin Total 0.3      Alkaline Phosphatase 87      AST 19      ALT 14      Bilirubin Direct <0.20     LIPASE - Normal    Lipase 18     LACTIC ACID WHOLE BLOOD - Normal    Lactic Acid 0.9     MAGNESIUM - Normal    Magnesium 1.9     ETHYL ALCOHOL LEVEL - Normal    Alcohol ethyl <0.01     CBC WITH PLATELETS AND DIFFERENTIAL    WBC Count 6.7      RBC Count 4.99      Hemoglobin 14.4      Hematocrit 43.1      MCV 86      MCH 28.9      MCHC 33.4      RDW 13.9      Platelet Count 219      % Neutrophils 76      % Lymphocytes 18      % Monocytes 6      % Eosinophils 0      % Basophils 0      % Immature Granulocytes 0      NRBCs per 100 WBC 0      Absolute Neutrophils 5.0      Absolute Lymphocytes 1.2      Absolute Monocytes 0.4      Absolute Eosinophils 0.0      Absolute Basophils 0.0      Absolute Immature Granulocytes 0.0      Absolute NRBCs 0.0     ROUTINE UA WITH MICROSCOPIC REFLEX TO CULTURE   URINE DRUG SCREEN PANEL         RADIOLOGY    No orders to display       DISCHARGE MEDS  New Prescriptions    SUCRALFATE (CARAFATE) 1 GM TABLET    Take 1 tablet (1 g) by mouth 4 times daily for 7 days       I, Kya Galvan , am serving as a scribe to document services personally performed by Thea Henriquez DO based on my observation and the provider's statements to me. I, Thea Henriquez, attest that Kya Galvan is acting in a scribe capacity, has observed my performance of the services and has documented them in accordance with my direction.    Thea Henriquez DO  M  [facility] EMERGENCY DEPARTMENT  [address] [place] [id number]  [phone]       Thea Henriquez DO  10/30/23 0618

## 2023-10-30 NOTE — MEDICATION SCRIBE - ADMISSION MEDICATION HISTORY
Medication Scribe Admission Medication History    Admission medication history is complete. The information provided in this note is only as accurate as the sources available at the time of the update.    Information Source(s): Patient and CareEverywhere/SureScripts via in-person    Pertinent Information: pt states hasn't started new meds yet  Pt states hasn't taken any meds since discharged because makes her nauseas  Changes made to PTA medication list:  Added: None  Deleted: None  Changed: None    Medication Affordability:  Not including over the counter (OTC) medications, was there a time in the past 3 months when you did not take your medications as prescribed because of cost?: No    Allergies reviewed with patient and updates made in EHR: yes    Medication History Completed By: OBEY NAVAS 10/30/2023 4:30 PM    PTA Med List   Medication Sig Last Dose    acetaminophen (TYLENOL) 325 MG tablet Take 1-2 tablets (325-650 mg) by mouth every 6 hours as needed for mild pain or headaches Past Week at prn    atenolol (TENORMIN) 25 MG tablet Take 0.5 tablets (12.5 mg) by mouth daily Past Week at unknown    baclofen (LIORESAL) 10 MG tablet TAKE 2 TABLETS (20 MG TOTAL) BY MOUTH FOUR TIMES A DAY. Past Week at unknown    cephALEXin (KEFLEX) 500 MG capsule Take 1 capsule (500 mg) by mouth 4 times daily for 7 days Past Week at unknown    metoclopramide (REGLAN) 10 MG tablet Take 1 tablet (10 mg) by mouth 3 times daily as needed (Nausea) AS NEEDED FOR NAUSEA Past Week at prn    omeprazole (PRILOSEC) 20 MG DR capsule TAKE 1 CAPSULE BY MOUTH TWICE A DAY BEFORE MEALS Past Week at am    ondansetron (ZOFRAN) 4 MG tablet Take 1-2 tablets (4-8 mg) by mouth every 6 hours as needed for nausea Past Week at prn    scopolamine (TRANSDERM) 1 MG/3DAYS 72 hr patch Place 1 patch onto the skin every 72 hours Past Week at am    sucralfate (CARAFATE) 1 GM tablet Take 1 tablet (1 g) by mouth 4 times daily for 7 days Past Week at unknown     traZODone (DESYREL) 50 MG tablet TAKE 0.5 TABLETS (25 MG) BY MOUTH AT BEDTIME Past Week at prn

## 2023-10-30 NOTE — H&P
"[facility]    History and Physical - Hospitalist Service       Date of Admission:  10/30/2023    Assessment & Plan      Mariah Koehler is a 67 year old female admitted on 10/30/2023. She has a h/o HTN, hiatal hernia presented with recurrent nausea and vomiting. Recently discharged from [facility] and subsequent ED visit for recurrent nausea and vomiting. She is not able to manage Nausea and vomiting at home. Will admit for management of intractable nausea and vomiting.    Intractable nausea, vomiting  Large hiatal hernia  -- zofran and compazin  --PPI , sucralfate  -- Surgery consult for possible consideration of hiatal hernia repair    Essential hypertension  -- c/w PTA meds  -- Hydralazin iv prn  -- Monitor vital signs per protocol    Intrathecal pain pump for chronic back pain  -- pain management consulted    Severe malnutrition  -- RD consult           Observation Goals: -diagnostic tests and consults completed and resulted, -vital signs normal or at patient baseline, Nurse to notify provider when observation goals have been met and patient is ready for discharge.  Diet: Clear Liquid Diet  DVT Prophylaxis: Pneumatic Compression Devices  Bellamy Catheter: Not present  Lines: None     Cardiac Monitoring: None  Code Status: Full Code    Clinically Significant Risk Factors Present on Admission                # Drug Induced Platelet Defect: home medication list includes an antiplatelet medication   # Hypertension: Noted on problem list      # Cachexia: Estimated body mass index is 17.54 kg/m  as calculated from the following:    Height as of an earlier encounter on 10/30/23: 1.6 m (5' 3\").    Weight as of this encounter: 44.9 kg (99 lb).              Disposition Plan      Expected Discharge Date: 10/31/2023                  Selina Rutledge MD  Hospitalist Service  [facility]  Securely message with [email or web address] (more info)  Text page via [facility] Paging/Directory "     ______________________________________________________________________    Chief Complaint   Persistent nausea and vomiting    History is obtained from the patient    History of Present Illness   Mariah Koehler is a 67 year old female who has a h/o HTN, hiatal hernia presented with recurrent nausea and vomiting. Recently discharged from [facility] and subsequent ED visit for recurrent nausea and vomiting. She is not able to manage Nausea and vomiting at home. Will admit for management of intractable nausea and vomiting.      Past Medical History    Past Medical History:   Diagnosis Date    Current mild episode of major depressive disorder, unspecified whether recurrent (H24) 02/17/2020    Hypertension     Opiate dependence (H)     RSD (reflex sympathetic dystrophy)     Sacral fracture, closed (H)        Past Surgical History   Past Surgical History:   Procedure Laterality Date    APPENDECTOMY      ESOPHAGOSCOPY, GASTROSCOPY, DUODENOSCOPY (EGD), COMBINED N/A 2/20/2023    Procedure: ESOPHAGOGASTRODUODENOSCOPY with biopsies;  Surgeon: Na Brooks MD;  Location: [facility] OR    GYN SURGERY      HC REVISE MEDIAN N/CARPAL TUNNEL SURG      Description: Neuroplasty Decompression Median Nerve At Carpal Tunnel;  Recorded: 09/19/2011;    HYSTERECTOMY  1984    IR CERVICAL EPIDURAL STEROID INJECTION  1/14/2005    OOPHORECTOMY Bilateral 1985    VT SYMPATHECTOMY,CERVICOTHORACIC      Description: Surgical Sympathectomy Cervicothoracic;  Recorded: 09/19/2011;    [facility] DECOMPRESS FASCIOTOMY FINGR/HAND      Description: Decompression Fasciotomy Of The Hand;  Recorded: 09/19/2011;    [facility] LAP,CHOLECYSTECTOMY/EXPLORE      Description: Cholecystectomy Laparoscopic;  Recorded: 12/09/2011;    [facility] TOTAL ABDOM HYSTERECTOMY      Description: Hysterectomy;  Recorded: 03/23/2011;       Prior to Admission Medications   Prior to Admission Medications   Prescriptions Last Dose Informant Patient Reported? Taking?   acetaminophen  (TYLENOL) 325 MG tablet Past Week at prn Self No Yes   Sig: Take 1-2 tablets (325-650 mg) by mouth every 6 hours as needed for mild pain or headaches   aspirin 81 MG EC tablet  at pt states hasnt yet started  No No   Sig: Take 1 tablet (81 mg) by mouth daily   atenolol (TENORMIN) 25 MG tablet Past Week at unknown  No Yes   Sig: Take 0.5 tablets (12.5 mg) by mouth daily   baclofen (LIORESAL) 10 MG tablet Past Week at unknown Self No Yes   Sig: TAKE 2 TABLETS (20 MG TOTAL) BY MOUTH FOUR TIMES A DAY.   cephALEXin (KEFLEX) 500 MG capsule Past Week at unknown  No Yes   Sig: Take 1 capsule (500 mg) by mouth 4 times daily for 7 days   cyanocobalamin (VITAMIN B-12) 1000 MCG tablet  at pt states hasnt yet started  No No   Sig: Take 1 tablet (1,000 mcg) by mouth daily   medication given by implanted intrathecal pump  Self Yes No   Sig: continuous Drug # 1: Fentanyl (Sublimaze) - Conc: 2000 mcg/mL - Total Dose / 24 hours: 873.8 mcg    Drug # 2: Bupivacaine (Marcaine)  - Conc:17.7 mg/mL - Total Dose / 24 hours: 7.733 mg    Drug # 3: Hydromorphone (Dilaudid)  - Conc: 3.8 mg/mL - Total Dose / 24 hours: 1.66 mg    Diluent: NS    Infusion Rate: 0.4369 mL/24 hrs  Pump Reservoir Volume: 40 mL    Bolus doses: up to 15 bolus doses/24 hours with 1 hour lockout  Each bolus: fentanyl 80.1 mcg, 0.708 mg Bupivacaine, 0.152 mg Dilaudid    Outside Clinic & Provider: [facility] in [place] [phone]  Last Refill Date: 10/02/23  Next Refill Date: 11/06/23  [place] Alarm Date:  11/08/23  Pump : [place]    Deliver Pump Medication to:   For [place]: If pump is within 7 days of depletion, pharmacist will enter a 'Pain Management Adult IP Consult' into the EHR, including 'IT pain pump management' as the reason for the consult.   metoclopramide (REGLAN) 10 MG tablet Past Week at prn Self No Yes   Sig: Take 1 tablet (10 mg) by mouth 3 times daily as needed (Nausea) AS NEEDED FOR NAUSEA   multivitamin, therapeutic  (THERA-VIT) TABS tablet  at pt states hasnt yet started  No No   Sig: Take 1 tablet by mouth daily   omeprazole (PRILOSEC) 20 MG DR capsule Past Week at am Self No Yes   Sig: TAKE 1 CAPSULE BY MOUTH TWICE A DAY BEFORE MEALS   ondansetron (ZOFRAN) 4 MG tablet Past Week at prn  No Yes   Sig: Take 1-2 tablets (4-8 mg) by mouth every 6 hours as needed for nausea   scopolamine (TRANSDERM) 1 MG/3DAYS 72 hr patch Past Week at am Self No Yes   Sig: Place 1 patch onto the skin every 72 hours   sucralfate (CARAFATE) 1 GM tablet Past Week at unknown  No Yes   Sig: Take 1 tablet (1 g) by mouth 4 times daily for 7 days   thiamine (B-1) 100 MG tablet  at pt states hasnt yet started  No No   Sig: Take 1 tablet (100 mg) by mouth daily   traZODone (DESYREL) 50 MG tablet Past Week at prn Self No Yes   Sig: TAKE 0.5 TABLETS (25 MG) BY MOUTH AT BEDTIME      Facility-Administered Medications: None        Review of Systems    The 10 point Review of Systems is negative other than noted in the HPI or here.      Physical Exam   Vital Signs: Temp: 97.3  F (36.3  C)   BP: 115/58 Pulse: 54   Resp: 16 SpO2: 96 % O2 Device: None (Room air)    Weight: 99 lbs 0 oz    GEN: Alert and oriented. Not in acute distress.  HEENT: Atraumatic, mucous membrane- moist and pink.  Chest: Bilateral air entry.  CVS: S1S2 regular.   Abdomen: Soft. Non-tender, non-distended. No organomegaly. No guarding or rigidity. Bowel sounds active.   Extremities: No pedal edema.  CNS: No involuntary movements.  Skin: no cyanosis or clubbing.     Medical Decision Making             Data     I have personally reviewed the following data over the past 24 hrs:    6.7  \   14.4   / 219     140 103 10.0 /  103 (H)   4.5 26 0.58 \     ALT: 14 AST: 19 AP: 87 TBILI: 0.3   ALB: 4.1 TOT PROTEIN: 6.3 (L) LIPASE: 18     Procal: N/A CRP: N/A Lactic Acid: 0.9

## 2023-10-30 NOTE — ED PROVIDER NOTES
EMERGENCY DEPARTMENT ENCOUNTER     NAME: Mariah Koehler   AGE: 67 year old female   YOB: 1955   MRN: [id number]   EVALUATION DATE & TIME: No admission date for patient encounter.   PCP: Alex Nolasco     Chief Complaint   Patient presents with    Vomiting   :    FINAL IMPRESSION       1. Intractable nausea and vomiting           ED COURSE & MEDICAL DECISION MAKING      Pertinent Labs & Imaging studies reviewed. (See chart for details)   67 year old female  presents to the Emergency Department for evaluation of continued nausea and vomiting.  On chart review patient was just discharged from the hospital on the 27th after admission for vomiting and malnutrition, but is here again overnight for the same and had about a 6-hour ED visit with multiple agents and was sent home with prescriptions.  She states that she did take the ODT Zofran but cannot hold anything else down and does not feel that it helped. Initial Vitals Reviewed. Initial exam notable for patient who is rocking back and forth in a wheelchair holding an emesis bag.  She has not vomited since have seen her but does complain of intractable nausea and therefore cannot tolerate anything by mouth.  I repeated her labs which do not look changed from last night, but this is now her third visit including her recent admission, and her second visit within 24 hours stating that outpatient medications are not working for her.  I have reinitiated IV fluids and IV Zofran and discussed the case with hospitalist for readmission.        Additional ED course time stamps:  3:30 PM I met with patient for initial interview and encounter. We also discussed plan for treatment and diagnostic interventions.  5:09 PM Spoke to hospitalist, Dr. Rutledge who accepts patient for admission.    At the conclusion of the encounter I discussed the results of all of the tests and the disposition. The questions were answered. The patient or family acknowledged  understanding and was agreeable with the care plan.     0 minutes critical care time, see procedure note below for details if relevant    Medical Decision Making    History:  Supplemental history from: N/A  External Record(s) reviewed: Documented in chart, if applicable.,  ED visit 1029, discharge summary 1027    Work Up:  Chart documentation includes differential considered and any EKGs or imaging interpreted by provider.  In additional to work up documented, I considered the following work up: Documented in chart, if applicable.    External consultation:  Discussion of management with another provider: Hospitalist    Complicating factors:  Care impacted by chronic illness: Chronic Lung Disease, Chronic Pain, Hypertension, and Mental Health  Care affected by social determinants of health: Access to Medical Care    Disposition considerations: Admit.    MEDICATIONS GIVEN IN THE EMERGENCY:   Medications   sodium chloride 0.9% BOLUS 1,000 mL (has no administration in time range)   ondansetron (ZOFRAN) injection 8 mg (has no administration in time range)      NEW PRESCRIPTIONS STARTED AT TODAY'S ER VISIT   New Prescriptions    No medications on file     ================================================================   HISTORY OF PRESENT ILLNESS     Patient information was obtained from: Patient  Use of Intrepreter: N/A    Mariah Koehler is a 67 year old female with history of HTN, chronic pain syndrome, COPD, osteoporosis, s/p appendectomy, s/p cholecystectomy, s/p total abdomen hysterectomy, and oophorectomy who presents to the ED for evaluation of nausea, vomiting.     Per chart review, patient was seen at this ED within the last 24 for evaluation of similar symptoms. Labs notable for normal CBC, reassuring hepatic panel, normal basic metabolic panel, normal lipase, normal lactate. Patient noted to have home Reglan, Prilosec 20 mg tablets, Zofran, instructed to increase frequency of Zofran and add Carafate.  Urinalysis showed infection, discharged in stable condition.     In addition, she was also admitted from October 24 to 27 with nausea and vomiting thought to be related to hiatal hernia, discharged with Zofran and Compazine.      Patient returns to the ED for severe nausea that has been poorly controlled with Zofran. Unfortunately, she has not been able to tolerate any other medications as she has been vomiting off and on.     ================================================================    PAST HISTORY     PAST MEDICAL HISTORY:   Past Medical History:   Diagnosis Date    Current mild episode of major depressive disorder, unspecified whether recurrent (H) 02/17/2020    Hypertension     Opiate dependence (H)     RSD (reflex sympathetic dystrophy)     Sacral fracture, closed (H)       PAST SURGICAL HISTORY:   Past Surgical History:   Procedure Laterality Date    APPENDECTOMY      ESOPHAGOSCOPY, GASTROSCOPY, DUODENOSCOPY (EGD), COMBINED N/A 2/20/2023    Procedure: ESOPHAGOGASTRODUODENOSCOPY with biopsies;  Surgeon: Na Brooks MD;  Location: [facility] OR    GYN SURGERY      HC REVISE MEDIAN N/CARPAL TUNNEL SURG      Description: Neuroplasty Decompression Median Nerve At Carpal Tunnel;  Recorded: 09/19/2011;    HYSTERECTOMY  1984    IR CERVICAL EPIDURAL STEROID INJECTION  1/14/2005    OOPHORECTOMY Bilateral 1985    NE SYMPATHECTOMY,CERVICOTHORACIC      Description: Surgical Sympathectomy Cervicothoracic;  Recorded: 09/19/2011;    [facility] DECOMPRESS FASCIOTOMY FINGR/HAND      Description: Decompression Fasciotomy Of The Hand;  Recorded: 09/19/2011;    [facility] LAP,CHOLECYSTECTOMY/EXPLORE      Description: Cholecystectomy Laparoscopic;  Recorded: 12/09/2011;    [facility] TOTAL ABDOM HYSTERECTOMY      Description: Hysterectomy;  Recorded: 03/23/2011;      CURRENT MEDICATIONS:   acetaminophen (TYLENOL) 325 MG tablet  aspirin 81 MG EC tablet  atenolol (TENORMIN) 25 MG tablet  baclofen (LIORESAL) 10 MG tablet  cephALEXin  (KEFLEX) 500 MG capsule  cyanocobalamin (VITAMIN B-12) 1000 MCG tablet  medication given by implanted intrathecal pump  metoclopramide (REGLAN) 10 MG tablet  multivitamin, therapeutic (THERA-VIT) TABS tablet  omeprazole (PRILOSEC) 20 MG DR capsule  ondansetron (ZOFRAN) 4 MG tablet  scopolamine (TRANSDERM) 1 MG/3DAYS 72 hr patch  sucralfate (CARAFATE) 1 GM tablet  thiamine (B-1) 100 MG tablet  traZODone (DESYREL) 50 MG tablet      ALLERGIES:   Allergies   Allergen Reactions    Codeine Nausea and Vomiting    Morphine Nausea and Vomiting    Bacitracin Rash    Methadone Rash      FAMILY HISTORY:   Family History   Problem Relation Age of Onset    Cerebrovascular Disease Mother     Diabetes Mother     Heart Disease Mother     Hypertension Mother     Rheumatologic Disease Mother     Heart Disease Father     Pulmonary Hypertension Father     Kidney failure Father     Cancer Father         melanoma    Diabetes Sister     Hypertension Sister     Hypertension Brother       SOCIAL HISTORY:   Social History     Socioeconomic History    Marital status:    Tobacco Use    Smoking status: Every Day     Packs/day: 0.75     Years: 51.00     Additional pack years: 0.00     Total pack years: 38.25     Types: Cigarettes     Start date: 1/1/1968    Smokeless tobacco: Never   Vaping Use    Vaping Use: Never used   Substance and Sexual Activity    Alcohol use: Not Currently     Comment: 1 glass a year    Drug use: Never        VITALS  Patient Vitals for the past 24 hrs:   BP Temp Pulse Resp SpO2 Weight   10/30/23 1333 (!) 180/85 97.3  F (36.3  C) 98 16 95 % 44.9 kg (99 lb)        ================================================================    PHYSICAL EXAM     VITAL SIGNS: BP (!) 180/85   Pulse 98   Temp 97.3  F (36.3  C)   Resp 16   Wt 44.9 kg (99 lb)   SpO2 95%   BMI 17.54 kg/m     Constitutional:  Awake, uncomfortable appearing, hunched over in the wheelchair  HENT:  Atraumatic, oropharynx without exudate or erythema,  membranes moist  Lymph:  No adenopathy  Eyes: EOM intact, PERRL, no injection  Neck: Supple  Respiratory:  Clear to auscultation bilaterally, no wheezes or crackles   Cardiovascular:  Regular rate and rhythm, single S1 and S2   GI:  Soft, nontender, nondistended, no rebound or guarding   Musculoskeletal:  Moves all extremities, no lower extremity edema, no deformities    Skin:  Warm, dry  Neurologic:  Alert and oriented x3, no focal deficits noted     ================================================================  LAB     All pertinent labs reviewed and interpreted.   Labs Ordered and Resulted from Time of ED Arrival to Time of ED Departure   BASIC METABOLIC PANEL - Abnormal       Result Value    Sodium 140      Potassium 4.5      Chloride 103      Carbon Dioxide (CO2) 26      Anion Gap 11      Urea Nitrogen 10.0      Creatinine 0.58      GFR Estimate >90      Calcium 9.6      Glucose 103 (*)    MAGNESIUM - Normal    Magnesium 2.0          ===============================================================  RADIOLOGY     Reviewed all pertinent imaging. Please see official radiology report.   No orders to display       ================================================================  EKG     I have independently reviewed and interpreted the EKG(s) documented above.    ================================================================  PROCEDURES       I, Benjamín Pearson, am serving as a scribe to document services personally performed by Dr. Funes based on my observation and the provider's statements to me. I, Soheila Funes MD attest that Benjamín Pearson is acting in a scribe capacity, has observed my performance of the services and has documented them in accordance with my direction.     Soheila Funes M.D.   Emergency Medicine   [facility] EMERGENCY DEPARTMENT  [address] [place] [id number]  [phone]  Dept: [phone]      Soheila Funes MD  10/30/23  [id number]

## 2023-10-30 NOTE — ED PROVIDER NOTES
EMERGENCY DEPARTMENT ENCOUNTER      NAME: Mariah Koehler  AGE: 67 year old female  YOB: 1955  MRN: [id number]  EVALUATION DATE & TIME: 10/30/2023 12:30 AM    PCP: Alex Nolasco    ED PROVIDER: Micha Patel M.D.    Chief Complaint   Patient presents with    Nausea & Vomiting       FINAL IMPRESSION:  1. Nausea and vomiting, unspecified vomiting type    2. Presence of intrathecal baclofen pump    3. Hiatal hernia    4. Nausea        ED COURSE & MEDICAL DECISION MAKING:    Pertinent Labs & Imaging studies independently interpreted by me. (See chart for details)  1:11 AM  Patient seen and examined, reviewed most recent admission from October 24 to 27 with nausea and vomiting thought to be related to hiatal hernia, discharged with Zofran and Compazine.  Patient returns today with uncontrollable nausea and vomiting this afternoon.  On exam, appears uncomfortable.  Mild diffuse abdominal tenderness, mild tachycardia.  Labs ordered along with fluids Protonix, and Zofran.  Depending on labs and clinical course, consider CT scan of the abdomen pelvis  2:31 AM labs independently interpreted by me with normal CBC, reassuring hepatic panel, normal basic metabolic panel, normal lipase, normal lactate.  3:09 AM patient rechecked, she is sleeping on my initial evaluation but wakes easily to voice and says she is feeling better.  Will attempt oral challenge.  It appears patient has Reglan, Prilosec 20 mg tablets, Zofran at home.  Will increase frequency of Zofran and add Carafate.  4:29 AM Update patient.  She is able to tolerate oral intake in the emergency department and is feeling better.  Stable for discharge.  Urinalysis is pending, appears grossly cloudy.    At the conclusion of the encounter I discussed the results of all of the tests and the disposition. The questions were answered. The patient or family acknowledged understanding and was agreeable with the care plan.     Medical Decision  Making    History:  Supplemental history from: Documented in chart, if applicable  External Record(s) reviewed: Documented in chart, if applicable. and Inpatient Record: ED admission to [facility] for colitis on 7/25/2023 - 7/28/2023 and ED admission to [facility] for nausea and vomiting on 10/24/2023 - 10/27/2023    Work Up:  Chart documentation includes differential considered and any EKGs or imaging independently interpreted by provider, where specified.  In additional to work up documented, I considered the following work up: Documented in chart, if applicable.    External consultation:  Discussion of management with another provider: Documented in chart, if applicable    Complicating factors:  Care impacted by chronic illness: Chronic Pain and Hypertension  Care affected by social determinants of health: Medication Noncompliance    Disposition considerations: Admission considered. Patient was signed out to the oncoming physician, disposition pending.        EKG:    None    PROCEDURES:       MEDICATIONS GIVEN IN THE EMERGENCY:  Medications   dextrose 5% and 0.9% NaCl 1,000 mL with Infuvite Adult 10 mL, thiamine 100 mg, folic acid 1 mg infusion ( Intravenous $New Bag 10/30/23 0152)   pantoprazole (PROTONIX) IV push injection 40 mg (40 mg Intravenous $Given 10/30/23 0125)   ondansetron (ZOFRAN) injection 8 mg (8 mg Intravenous $Given 10/30/23 0124)       NEW PRESCRIPTIONS STARTED AT TODAY'S ER VISIT  New Prescriptions    SUCRALFATE (CARAFATE) 1 GM TABLET    Take 1 tablet (1 g) by mouth 4 times daily for 7 days       =================================================================    HPI    Patient information was obtained from: Patient      Mariah Koehler is a 67 year old female with a pertinent history of hypertension, chronic pain syndrome, osteoporosis, appendectomy, laparoscopic cholecystectomy, total abdomen hysterectomy, oophorectomy, EGD, and RSD, who  "presents to this ED via private car for evaluation of nausea and vomiting.    Per chart review, the patient was admitted to [facility] for evaluation of nausea and vomiting on 10/24/2023 - 10/27/2023. Dr. Dover, surgery, noted hiatal hernia as contributor to symptomatology and potential for repair. ECHO and coronary CTA are all unremarkable. Advised to use compazine and zofran. Plan to follow up with surgeon as outpt for hiatal hernia. Was started on 81 mg of aspirin, 1000 mcg of cyanocobalamin, Thera-Vit, and 100 mg of thiamine.     Per chart review, the patient was admitted to [facility] for colitis on 7/25/2023 - 7/28/2023 (3 days). CT of the abdomen showed colitis. Was given IV hydralazin at ED. GI consulted for possible colonoscopy. GI planning outpatient colonoscopy. Was started on 1 MG/3DAYS of scopolamine.    Patient reports she has been feeling nauseous noting she was discharged on Friday (10/27). She vomited \"a little bit\" yesterday and today at 1 am. She did take medications for the nausea, but doesn't remember the name of medication. She has a history of of high blood pressure and takes medications for it, one of which is atenolol and baclofen. She has had surgery on her tonsils, hysterectomy, and had her gallbladder removed. No other reported complaints or concerns at this time.    REVIEW OF SYSTEMS   Review of Systems   Gastrointestinal:  Positive for nausea and vomiting.   All other systems reviewed and are negative.     All other systems reviewed and negative    PAST MEDICAL HISTORY:  Past Medical History:   Diagnosis Date    Current mild episode of major depressive disorder, unspecified whether recurrent (H) 02/17/2020    Hypertension     Opiate dependence (H)     RSD (reflex sympathetic dystrophy)     Sacral fracture, closed (H)        PAST SURGICAL HISTORY:  Past Surgical History:   Procedure Laterality Date    APPENDECTOMY      ESOPHAGOSCOPY, " GASTROSCOPY, DUODENOSCOPY (EGD), COMBINED N/A 2/20/2023    Procedure: ESOPHAGOGASTRODUODENOSCOPY with biopsies;  Surgeon: Na Brooks MD;  Location: [facility] Main OR    GYN SURGERY      HC REVISE MEDIAN N/CARPAL TUNNEL SURG      Description: Neuroplasty Decompression Median Nerve At Carpal Tunnel;  Recorded: 09/19/2011;    HYSTERECTOMY  1984    IR CERVICAL EPIDURAL STEROID INJECTION  1/14/2005    OOPHORECTOMY Bilateral 1985    MO SYMPATHECTOMY,CERVICOTHORACIC      Description: Surgical Sympathectomy Cervicothoracic;  Recorded: 09/19/2011;    [facility] DECOMPRESS FASCIOTOMY FINGR/HAND      Description: Decompression Fasciotomy Of The Hand;  Recorded: 09/19/2011;    [facility] LAP,CHOLECYSTECTOMY/EXPLORE      Description: Cholecystectomy Laparoscopic;  Recorded: 12/09/2011;    [facility] TOTAL ABDOM HYSTERECTOMY      Description: Hysterectomy;  Recorded: 03/23/2011;       CURRENT MEDICATIONS:    No current facility-administered medications for this encounter.     Current Outpatient Medications   Medication    ondansetron (ZOFRAN) 4 MG tablet    sucralfate (CARAFATE) 1 GM tablet    acetaminophen (TYLENOL) 325 MG tablet    aspirin 81 MG EC tablet    atenolol (TENORMIN) 25 MG tablet    baclofen (LIORESAL) 10 MG tablet    cyanocobalamin (VITAMIN B-12) 1000 MCG tablet    medication given by implanted intrathecal pump    metoclopramide (REGLAN) 10 MG tablet    multivitamin, therapeutic (THERA-VIT) TABS tablet    omeprazole (PRILOSEC) 20 MG DR capsule    scopolamine (TRANSDERM) 1 MG/3DAYS 72 hr patch    thiamine (B-1) 100 MG tablet    traZODone (DESYREL) 50 MG tablet       ALLERGIES:  Allergies   Allergen Reactions    Codeine Nausea and Vomiting    Morphine Nausea and Vomiting    Bacitracin Rash    Methadone Rash       FAMILY HISTORY:  Family History   Problem Relation Age of Onset    Cerebrovascular Disease Mother     Diabetes Mother     Heart Disease Mother     Hypertension Mother     Rheumatologic Disease Mother     Heart  "Disease Father     Pulmonary Hypertension Father     Kidney failure Father     Cancer Father         melanoma    Diabetes Sister     Hypertension Sister     Hypertension Brother        SOCIAL HISTORY:   Social History     Socioeconomic History    Marital status:    Tobacco Use    Smoking status: Every Day     Packs/day: 0.75     Years: 51.00     Additional pack years: 0.00     Total pack years: 38.25     Types: Cigarettes     Start date: 1/1/1968    Smokeless tobacco: Never   Vaping Use    Vaping Use: Never used   Substance and Sexual Activity    Alcohol use: Not Currently     Comment: 1 glass a year    Drug use: Never       VITALS:  BP (!) 142/73   Pulse 87   Temp 98  F (36.7  C) (Temporal)   Resp 20   Ht 1.6 m (5' 3\")   Wt 44.9 kg (99 lb)   SpO2 99%   BMI 17.54 kg/m      PHYSICAL EXAM:  Physical Exam  Vitals and nursing note reviewed.   Constitutional:       General: She is not in acute distress.     Appearance: Normal appearance. She is not diaphoretic.      Comments: Uncomfortable   HENT:      Head: Normocephalic and atraumatic.      Right Ear: External ear normal.      Left Ear: External ear normal.      Nose: Nose normal.      Mouth/Throat:      Mouth: Mucous membranes are moist.   Eyes:      General: No scleral icterus.     Extraocular Movements: Extraocular movements intact.      Conjunctiva/sclera: Conjunctivae normal.      Pupils: Pupils are equal, round, and reactive to light.   Cardiovascular:      Rate and Rhythm: Normal rate and regular rhythm.      Heart sounds: Normal heart sounds.   Pulmonary:      Effort: Pulmonary effort is normal. No respiratory distress.      Breath sounds: Normal breath sounds. No wheezing or rales.   Abdominal:      General: Abdomen is flat. There is no distension.      Palpations: Abdomen is soft.      Tenderness: There is no abdominal tenderness. There is no guarding.   Musculoskeletal:         General: Normal range of motion.      Cervical back: Normal range " of motion and neck supple.      Right lower leg: No edema.      Left lower leg: No edema.   Lymphadenopathy:      Cervical: No cervical adenopathy.   Skin:     General: Skin is warm and dry.      Findings: No rash.   Neurological:      General: No focal deficit present.      Mental Status: She is alert and oriented to person, place, and time. Mental status is at baseline.      Comments: No gross focal neurologic deficits   Psychiatric:         Mood and Affect: Mood normal.         Behavior: Behavior normal.         Thought Content: Thought content normal.          LAB:  All pertinent labs reviewed and interpreted.  Results for orders placed or performed during the hospital encounter of 10/30/23   Basic metabolic panel   Result Value Ref Range    Sodium 139 135 - 145 mmol/L    Potassium 4.2 3.4 - 5.3 mmol/L    Chloride 100 98 - 107 mmol/L    Carbon Dioxide (CO2) 27 22 - 29 mmol/L    Anion Gap 12 7 - 15 mmol/L    Urea Nitrogen 10.9 8.0 - 23.0 mg/dL    Creatinine 0.61 0.51 - 0.95 mg/dL    GFR Estimate >90 >60 mL/min/1.73m2    Calcium 9.7 8.8 - 10.2 mg/dL    Glucose 104 (H) 70 - 99 mg/dL   Hepatic function panel   Result Value Ref Range    Protein Total 6.3 (L) 6.4 - 8.3 g/dL    Albumin 4.1 3.5 - 5.2 g/dL    Bilirubin Total 0.3 <=1.2 mg/dL    Alkaline Phosphatase 87 35 - 104 U/L    AST 19 0 - 45 U/L    ALT 14 0 - 50 U/L    Bilirubin Direct <0.20 0.00 - 0.30 mg/dL   Result Value Ref Range    Lipase 18 13 - 60 U/L   Lactic acid whole blood   Result Value Ref Range    Lactic Acid 0.9 0.7 - 2.0 mmol/L   Result Value Ref Range    Magnesium 1.9 1.7 - 2.3 mg/dL   Ethyl Alcohol Level   Result Value Ref Range    Alcohol ethyl <0.01 <=0.01 g/dL   CBC with platelets and differential   Result Value Ref Range    WBC Count 6.7 4.0 - 11.0 10e3/uL    RBC Count 4.99 3.80 - 5.20 10e6/uL    Hemoglobin 14.4 11.7 - 15.7 g/dL    Hematocrit 43.1 35.0 - 47.0 %    MCV 86 78 - 100 fL    MCH 28.9 26.5 - 33.0 pg    MCHC 33.4 31.5 - 36.5 g/dL     RDW 13.9 10.0 - 15.0 %    Platelet Count 219 150 - 450 10e3/uL    % Neutrophils 76 %    % Lymphocytes 18 %    % Monocytes 6 %    % Eosinophils 0 %    % Basophils 0 %    % Immature Granulocytes 0 %    NRBCs per 100 WBC 0 <1 /100    Absolute Neutrophils 5.0 1.6 - 8.3 10e3/uL    Absolute Lymphocytes 1.2 0.8 - 5.3 10e3/uL    Absolute Monocytes 0.4 0.0 - 1.3 10e3/uL    Absolute Eosinophils 0.0 0.0 - 0.7 10e3/uL    Absolute Basophils 0.0 0.0 - 0.2 10e3/uL    Absolute Immature Granulocytes 0.0 <=0.4 10e3/uL    Absolute NRBCs 0.0 10e3/uL       RADIOLOGY:  Reviewed all pertinent imaging. Please see official radiology report.  No orders to display       I, Kya Galvan, am serving as a scribe to document services personally performed by Dr. Patel based on my observation and the provider's statements to me. I, Micha Patel MD attest that Kya Galvan is acting in a scribe capacity, has observed my performance of the services and has documented them in accordance with my direction.    Micha Patel M.D.  Emergency Medicine  [facility] EMERGENCY DEPARTMENT  [address] [facility] [id number] [phone]  Dept: [phone]       Micha Patel MD  10/30/23 0433

## 2023-10-30 NOTE — ED TRIAGE NOTES
Pt was discharged form the ED at 0600 this morning. She  continues to vomit. Was supposed to follow up with surgeon for hiatal hernia repair

## 2023-10-31 LAB
AMPHETAMINES UR QL SCN: ABNORMAL
ANION GAP SERPL CALCULATED.3IONS-SCNC: 8 MMOL/L (ref 7–15)
BARBITURATES UR QL SCN: ABNORMAL
BASOPHILS # BLD AUTO: 0 10E3/UL (ref 0–0.2)
BASOPHILS NFR BLD AUTO: 1 %
BENZODIAZ UR QL SCN: ABNORMAL
BUN SERPL-MCNC: 9.5 MG/DL (ref 8–23)
BZE UR QL SCN: ABNORMAL
CALCIUM SERPL-MCNC: 8.8 MG/DL (ref 8.8–10.2)
CANNABINOIDS UR QL SCN: ABNORMAL
CHLORIDE SERPL-SCNC: 105 MMOL/L (ref 98–107)
CREAT SERPL-MCNC: 0.57 MG/DL (ref 0.51–0.95)
DEPRECATED HCO3 PLAS-SCNC: 26 MMOL/L (ref 22–29)
EGFRCR SERPLBLD CKD-EPI 2021: >90 ML/MIN/1.73M2
EOSINOPHIL # BLD AUTO: 0 10E3/UL (ref 0–0.7)
EOSINOPHIL NFR BLD AUTO: 1 %
ERYTHROCYTE [DISTWIDTH] IN BLOOD BY AUTOMATED COUNT: 14.1 % (ref 10–15)
FENTANYL UR QL: ABNORMAL
GLUCOSE SERPL-MCNC: 98 MG/DL (ref 70–99)
HCT VFR BLD AUTO: 38.7 % (ref 35–47)
HGB BLD-MCNC: 12.6 G/DL (ref 11.7–15.7)
IMM GRANULOCYTES # BLD: 0 10E3/UL
IMM GRANULOCYTES NFR BLD: 0 %
LYMPHOCYTES # BLD AUTO: 1.7 10E3/UL (ref 0.8–5.3)
LYMPHOCYTES NFR BLD AUTO: 25 %
MCH RBC QN AUTO: 29 PG (ref 26.5–33)
MCHC RBC AUTO-ENTMCNC: 32.6 G/DL (ref 31.5–36.5)
MCV RBC AUTO: 89 FL (ref 78–100)
MONOCYTES # BLD AUTO: 0.5 10E3/UL (ref 0–1.3)
MONOCYTES NFR BLD AUTO: 7 %
NEUTROPHILS # BLD AUTO: 4.3 10E3/UL (ref 1.6–8.3)
NEUTROPHILS NFR BLD AUTO: 66 %
NRBC # BLD AUTO: 0 10E3/UL
NRBC BLD AUTO-RTO: 0 /100
OPIATES UR QL SCN: ABNORMAL
PCP QUAL URINE (ROCHE): ABNORMAL
PLATELET # BLD AUTO: 202 10E3/UL (ref 150–450)
POTASSIUM SERPL-SCNC: 3.8 MMOL/L (ref 3.4–5.3)
RBC # BLD AUTO: 4.35 10E6/UL (ref 3.8–5.2)
SODIUM SERPL-SCNC: 139 MMOL/L (ref 135–145)
VIT B1 PYROPHOSHATE BLD-SCNC: 92 NMOL/L
WBC # BLD AUTO: 6.5 10E3/UL (ref 4–11)

## 2023-10-31 PROCEDURE — 2894A VOIDCORRECT: CPT | Mod: FS | Performed by: SURGERY

## 2023-10-31 PROCEDURE — 99232 SBSQ HOSP IP/OBS MODERATE 35: CPT | Performed by: INTERNAL MEDICINE

## 2023-10-31 PROCEDURE — 250N000011 HC RX IP 250 OP 636: Performed by: STUDENT IN AN ORGANIZED HEALTH CARE EDUCATION/TRAINING PROGRAM

## 2023-10-31 PROCEDURE — 250N000013 HC RX MED GY IP 250 OP 250 PS 637: Performed by: PAIN MEDICINE

## 2023-10-31 PROCEDURE — 99222 1ST HOSP IP/OBS MODERATE 55: CPT | Mod: FS | Performed by: NURSE PRACTITIONER

## 2023-10-31 PROCEDURE — 250N000013 HC RX MED GY IP 250 OP 250 PS 637: Performed by: STUDENT IN AN ORGANIZED HEALTH CARE EDUCATION/TRAINING PROGRAM

## 2023-10-31 PROCEDURE — 36415 COLL VENOUS BLD VENIPUNCTURE: CPT | Performed by: STUDENT IN AN ORGANIZED HEALTH CARE EDUCATION/TRAINING PROGRAM

## 2023-10-31 PROCEDURE — 99223 1ST HOSP IP/OBS HIGH 75: CPT | Performed by: PAIN MEDICINE

## 2023-10-31 PROCEDURE — 250N000013 HC RX MED GY IP 250 OP 250 PS 637

## 2023-10-31 PROCEDURE — 250N000013 HC RX MED GY IP 250 OP 250 PS 637: Performed by: INTERNAL MEDICINE

## 2023-10-31 PROCEDURE — 96376 TX/PRO/DX INJ SAME DRUG ADON: CPT

## 2023-10-31 PROCEDURE — 85025 COMPLETE CBC W/AUTO DIFF WBC: CPT | Performed by: STUDENT IN AN ORGANIZED HEALTH CARE EDUCATION/TRAINING PROGRAM

## 2023-10-31 PROCEDURE — G0378 HOSPITAL OBSERVATION PER HR: HCPCS

## 2023-10-31 PROCEDURE — 62367 ANALYZE SPINE INFUS PUMP: CPT | Performed by: PAIN MEDICINE

## 2023-10-31 PROCEDURE — 80048 BASIC METABOLIC PNL TOTAL CA: CPT | Performed by: STUDENT IN AN ORGANIZED HEALTH CARE EDUCATION/TRAINING PROGRAM

## 2023-10-31 PROCEDURE — 82533 TOTAL CORTISOL: CPT | Performed by: INTERNAL MEDICINE

## 2023-10-31 PROCEDURE — 80307 DRUG TEST PRSMV CHEM ANLYZR: CPT | Performed by: PAIN MEDICINE

## 2023-10-31 RX ORDER — CEPHALEXIN 500 MG/1
500 CAPSULE ORAL 4 TIMES DAILY
Status: DISCONTINUED | OUTPATIENT
Start: 2023-10-31 | End: 2023-11-01 | Stop reason: HOSPADM

## 2023-10-31 RX ORDER — NALOXONE HYDROCHLORIDE 0.4 MG/ML
0.2 INJECTION, SOLUTION INTRAMUSCULAR; INTRAVENOUS; SUBCUTANEOUS
Status: DISCONTINUED | OUTPATIENT
Start: 2023-10-31 | End: 2023-11-01 | Stop reason: HOSPADM

## 2023-10-31 RX ORDER — MAGNESIUM HYDROXIDE/ALUMINUM HYDROXICE/SIMETHICONE 120; 1200; 1200 MG/30ML; MG/30ML; MG/30ML
30 SUSPENSION ORAL EVERY 4 HOURS PRN
Status: DISCONTINUED | OUTPATIENT
Start: 2023-10-31 | End: 2023-11-01 | Stop reason: HOSPADM

## 2023-10-31 RX ORDER — SENNOSIDES 8.6 MG
1 TABLET ORAL AT BEDTIME
Status: DISCONTINUED | OUTPATIENT
Start: 2023-10-31 | End: 2023-11-01 | Stop reason: HOSPADM

## 2023-10-31 RX ORDER — NALOXONE HYDROCHLORIDE 0.4 MG/ML
0.4 INJECTION, SOLUTION INTRAMUSCULAR; INTRAVENOUS; SUBCUTANEOUS
Status: DISCONTINUED | OUTPATIENT
Start: 2023-10-31 | End: 2023-11-01 | Stop reason: HOSPADM

## 2023-10-31 RX ADMIN — CEPHALEXIN 500 MG: 500 CAPSULE ORAL at 11:34

## 2023-10-31 RX ADMIN — BACLOFEN 20 MG: 10 TABLET ORAL at 11:34

## 2023-10-31 RX ADMIN — ONDANSETRON 4 MG: 2 INJECTION INTRAMUSCULAR; INTRAVENOUS at 11:34

## 2023-10-31 RX ADMIN — CYANOCOBALAMIN TAB 1000 MCG 1000 MCG: 1000 TAB at 07:55

## 2023-10-31 RX ADMIN — THERA TABS 1 TABLET: TAB at 07:55

## 2023-10-31 RX ADMIN — SENNOSIDES 1 TABLET: 8.6 TABLET, FILM COATED ORAL at 21:17

## 2023-10-31 RX ADMIN — BACLOFEN 20 MG: 10 TABLET ORAL at 20:03

## 2023-10-31 RX ADMIN — Medication 81 MG: at 07:55

## 2023-10-31 RX ADMIN — THIAMINE HCL TAB 100 MG 100 MG: 100 TAB at 07:55

## 2023-10-31 RX ADMIN — ONDANSETRON 4 MG: 2 INJECTION INTRAMUSCULAR; INTRAVENOUS at 18:43

## 2023-10-31 RX ADMIN — ACETAMINOPHEN 650 MG: 325 TABLET ORAL at 20:06

## 2023-10-31 RX ADMIN — SUCRALFATE 1 G: 1 TABLET ORAL at 11:34

## 2023-10-31 RX ADMIN — ACETAMINOPHEN 650 MG: 325 TABLET ORAL at 11:42

## 2023-10-31 RX ADMIN — BACLOFEN 20 MG: 10 TABLET ORAL at 16:20

## 2023-10-31 RX ADMIN — PROCHLORPERAZINE EDISYLATE 5 MG: 5 INJECTION INTRAMUSCULAR; INTRAVENOUS at 07:54

## 2023-10-31 RX ADMIN — ATENOLOL 12.5 MG: 25 TABLET ORAL at 07:55

## 2023-10-31 RX ADMIN — SUCRALFATE 1 G: 1 TABLET ORAL at 07:55

## 2023-10-31 RX ADMIN — BACLOFEN 20 MG: 10 TABLET ORAL at 07:55

## 2023-10-31 RX ADMIN — CEPHALEXIN 500 MG: 500 CAPSULE ORAL at 20:02

## 2023-10-31 RX ADMIN — TRAZODONE HYDROCHLORIDE 25 MG: 50 TABLET ORAL at 21:17

## 2023-10-31 RX ADMIN — CEPHALEXIN 500 MG: 500 CAPSULE ORAL at 16:01

## 2023-10-31 RX ADMIN — PANTOPRAZOLE SODIUM 40 MG: 40 TABLET, DELAYED RELEASE ORAL at 07:55

## 2023-10-31 RX ADMIN — PROCHLORPERAZINE EDISYLATE 5 MG: 5 INJECTION INTRAMUSCULAR; INTRAVENOUS at 16:11

## 2023-10-31 RX ADMIN — ONDANSETRON 4 MG: 2 INJECTION INTRAMUSCULAR; INTRAVENOUS at 02:31

## 2023-10-31 RX ADMIN — PANTOPRAZOLE SODIUM 40 MG: 40 TABLET, DELAYED RELEASE ORAL at 16:01

## 2023-10-31 RX ADMIN — SUCRALFATE 1 G: 1 TABLET ORAL at 16:01

## 2023-10-31 RX ADMIN — ALUMINUM HYDROXIDE, MAGNESIUM HYDROXIDE, AND SIMETHICONE 30 ML: 200; 200; 20 SUSPENSION ORAL at 21:38

## 2023-10-31 RX ADMIN — SUCRALFATE 1 G: 1 TABLET ORAL at 20:02

## 2023-10-31 ASSESSMENT — ACTIVITIES OF DAILY LIVING (ADL)
ADLS_ACUITY_SCORE: 35
ADLS_ACUITY_SCORE: 33
ADLS_ACUITY_SCORE: 35
ADLS_ACUITY_SCORE: 33
ADLS_ACUITY_SCORE: 35

## 2023-10-31 NOTE — CONSULTS
Care Management Initial Consult    General Information  Assessment completed with: Patient,         Primary Care Provider verified and updated as needed:     Readmission within the last 30 days: previous discharge plan unsuccessful   Return Category: Exacerbation of disease  Reason for Consult: discharge planning  Advance Care Planning:            Communication Assessment  Patient's communication style: spoken language (English or Bilingual)             Cognitive  Cognitive/Neuro/Behavioral: WDL  Level of Consciousness: alert  Arousal Level: opens eyes spontaneously  Orientation: oriented x 4  Mood/Behavior: calm, cooperative     Speech: clear, spontaneous, logical    Living Environment:   People in home: spouse     Current living Arrangements: house      Able to return to prior arrangements: yes       Family/Social Support:  Care provided by: self  Provides care for:    Marital Status:             Description of Support System: Supportive, Involved    Support Assessment: Adequate family and caregiver support, Adequate social supports    Current Resources:   Patient receiving home care services: No     Community Resources: None  Equipment currently used at home:    Supplies currently used at home: None    Employment/Financial:  Employment Status: retired        Financial Concerns: none           Does the patient's insurance plan have a 3 day qualifying hospital stay waiver?  No    Lifestyle & Psychosocial Needs:  Social Determinants of Health     Food Insecurity: Not on file   Depression: Not at risk (3/17/2023)    PHQ-2     PHQ-2 Score: 0   Housing Stability: Not on file   Tobacco Use: High Risk (10/30/2023)    Patient History     Smoking Tobacco Use: Every Day     Smokeless Tobacco Use: Never     Passive Exposure: Not on file   Financial Resource Strain: Not on file   Alcohol Use: Not on file   Transportation Needs: Not on file   Physical Activity: Not on file   Interpersonal Safety: Not on file    Stress: Not on file   Social Connections: Not on file               Additional Information:  SW met with pt for initial assessment. Pt reports being normally independent at home. She denies any discharge needs. She reports living with her  in a house. BROWN discussed and pt signed. SW will remain available for any discharge needs.    Shanel Mendoza, TOMMYSW

## 2023-10-31 NOTE — ED NOTES
Spoke with MD regarding concern for continued HTN and Headache despite hydralazine and tylenol given, pt did lisa down to 48, MD aware. Asymptomatic. Hydralazine 10mg given, will reassess. Neurologically patient remains intact.

## 2023-10-31 NOTE — CONSULTS
[facility] ACUTE PAIN SERVICE    ([facility], [facility], [facility], [facility])  Pain consult    Assessment/Plan:  Mariah Koehler is a 67 year old female who was admitted on 10/30/2023.  I was asked to see the patient for evaluation of intrathecal pump. Admitted for persistent nausea and vomiting. History of HTN, chronic pain with intrathecal pump.    shows Tylenol with codeine, 6 tabs of oxycodone, and Ambien back in November. Describes pain as chronically in the neck and left arm, also in the low back.  Has been managed for many years via [facility]..  She denies EtOH use.  Denies marijuana use.    PLAN: Chronic pain, without change.  With likely UTI.  This is in the setting of chronic opioids delivered via intrathecal pump.  Also may be experiencing some opioid-induced constipation chronically.  Would recommend aggressive bowel regimen.  Would prevents withdrawal from baclofen which she has been unable to take due to nausea and vomiting.  If patient remains in the hospital several days would need to fill pump here.  Multimodal Medication Therapy:   Adjuvants: Baclofen 20 mg 4 times a day, antiemetics  Opioids: Per intrathecal pump  Non-medication interventions- Ice   Constipation Prophylaxis-  would add senna   Follow up /Discharge Recommendations - We recommend prescribing the following at the time of discharge: None follow-up with [facility] for pump fill on 11/6          Subjective:    Today I met with the patient at the bedside.  She states her nausea and vomiting are much better.  She states nausea and vomiting have been present for weeks now.  She denies any increase in pain.  She states her chronic pain is in the neck and radiates down the left arm.  She states she had a carpal tunnel surgery and then fell many years ago.  This resulted in her chronic pain.  She states she also has chronic low back pain.  She has been working with [facility] pain clinic for many years.  She states  she has an appointment to have her pump filled on 6 November.  She denies EtOH use.  She denies marijuana use.  She does smoke daily.  She states she has a bowel movement about 1 time per week.           Intractable nausea and vomiting   Patient Active Problem List   Diagnosis    Nausea and vomiting    Chest Pain    Opioid Dependence With Continuous Use    Edema    Wheezing (Symptom)    Pain During Urination (Dysuria)    Frequent, Full-bladder Emptying (Polyuria)    Other specified chronic obstructive pulmonary disease    Chronic pain syndrome    Vitamin B12 Deficiency    Microscopic Hematuria    Cough    Abdominal Pain In The Central Upper Belly (Epigastric)    Reflex Sympathetic Dystrophy Of The Left Upper Limb    Osteoporosis    Carpal Tunnel Syndrome    Hypertension    Constipation    Abdominal Pain    Fatigue    Current mild episode of major depressive disorder, unspecified whether recurrent (H24)    Viral gastroenteritis    Unintentional weight loss    Colitis    Intractable nausea and vomiting    Presence of intrathecal baclofen pump    Ketonuria        History   Drug Use Unknown         Tobacco Use      Smoking status: Every Day        Packs/day: 0.75        Years: 51.00        Additional pack years: 0.00        Total pack years: 38.25        Types: Cigarettes        Start date: 1/1/1968      Smokeless tobacco: Never         aspirin  81 mg Oral Daily    atenolol  12.5 mg Oral Daily    baclofen  20 mg Oral 4x Daily    cyanocobalamin  1,000 mcg Oral Daily    multivitamin, therapeutic  1 tablet Oral Daily    pantoprazole  40 mg Oral BID AC    sucralfate  1 g Oral 4x Daily    thiamine  100 mg Oral Daily    traZODone  25 mg Oral At Bedtime       Objective:  Vital signs in last 24 hours:  B/P: 192/88, T: 98.4, P: 65, R: 16   Blood pressure (!) 192/88, pulse 65, temperature 98.4  F (36.9  C), temperature source Oral, resp. rate 16, weight 44.9 kg (99 lb), SpO2 96%.      Weight:   Wt Readings from Last 2 Encounters:    10/30/23 44.9 kg (99 lb)   10/30/23 44.9 kg (99 lb)           Intake/Output:    Intake/Output Summary (Last 24 hours) at 10/31/2023 0901  Last data filed at 10/30/2023 1712  Gross per 24 hour   Intake 1000 ml   Output --   Net 1000 ml        Review of Systems:   As per subjective, all others negative.    Physical Exam:     General Appearance:  Alert, cooperative, no distress, appears stated age   Patient is sitting up in bed   Head:  Normocephalic, without obvious abnormality, atraumatic   Eyes:  PERRL, conjunctiva/corneas clear, EOM's intact   ENT/Throat: Lips somewhat dry   Lymph/Neck: Supple, symmetrical, trachea midline, no adenopathy, thyroid: not enlarged, symmetric    Lungs:    respirations unlabored   Chest Wall:  No tenderness or deformity   Cardiovascular/Heart:  No shortness of breath   Abdomen:   Soft, non-tender, bowel sounds active all four quadrants,  no masses, no organomegaly   Musculoskeletal: Extremities normal, atraumatic     Skin: Skin is intact   Neurologic: Alert and oriented X 3, Moves all 4 extremities           Imaging:  Personally Reviewed.    No results found for this visit on 10/30/23.     Lab Results:  Personally Reviewed.   Last Comprehensive Metabolic Panel:  Sodium   Date Value Ref Range Status   10/31/2023 139 135 - 145 mmol/L Final     Comment:     Reference intervals for this test were updated on 09/26/2023 to more accurately reflect our healthy population. There may be differences in the flagging of prior results with similar values performed with this method. Interpretation of those prior results can be made in the context of the updated reference intervals.      Potassium   Date Value Ref Range Status   10/31/2023 3.8 3.4 - 5.3 mmol/L Final   02/20/2023 3.6 3.5 - 5.0 mmol/L Final   01/09/2007 4.3 3.4 - 5.3 mmol/L Final     Chloride   Date Value Ref Range Status   10/31/2023 105 98 - 107 mmol/L Final   02/20/2023 110 (H) 98 - 107 mmol/L Final     Carbon Dioxide (CO2)   Date  "Value Ref Range Status   10/31/2023 26 22 - 29 mmol/L Final   02/20/2023 24 22 - 31 mmol/L Final     Anion Gap   Date Value Ref Range Status   10/31/2023 8 7 - 15 mmol/L Final   02/20/2023 7 5 - 18 mmol/L Final     Glucose   Date Value Ref Range Status   10/31/2023 98 70 - 99 mg/dL Final   02/20/2023 87 70 - 125 mg/dL Final     Urea Nitrogen   Date Value Ref Range Status   10/31/2023 9.5 8.0 - 23.0 mg/dL Final   02/20/2023 9 8 - 22 mg/dL Final     Creatinine   Date Value Ref Range Status   10/31/2023 0.57 0.51 - 0.95 mg/dL Final   01/09/2007 0.99 0.60 - 1.30 mg/dL Final     GFR Estimate   Date Value Ref Range Status   10/31/2023 >90 >60 mL/min/1.73m2 Final   04/14/2021 >60 >60 mL/min/1.73m2 Final   01/09/2007 63 >60 mL/min/1.7m2 Final     GFR, ESTIMATED POCT   Date Value Ref Range Status   11/08/2022 >60 >60 mL/min/1.73m2 Final     Calcium   Date Value Ref Range Status   10/31/2023 8.8 8.8 - 10.2 mg/dL Final        UA: No results found for: \"UAMP\", \"UBARB\", \"BENZODIAZEUR\", \"UCANN\", \"UCOC\", \"OPIT\", \"UPCP\"           Please see A&P for additional details of medical decision making.  MANAGEMENT DISCUSSED with the following over the past 24 hours: Nurse and patient   NOTE(S)/MEDICAL RECORDS REVIEWED over the past 24 hours: Reviewed notes from ER and hospital  Tests personally interpreted in the past 24 hours:      Tests ORDERED & REVIEWED in the past 24 hours:  - Added urine drug screen and reviewed UA  SUPPLEMENTAL HISTORY, in addition to the patient's history, over the past 24 hours obtained from:   - Nurse  Medical complexity over the past 24 hours:  -------------------------- MODERATE RISK FOR MORBIDITY --------------------------------------------------  - Prescription DRUG MANAGEMENT performed        Sahnel DUMONT, CNS-BC, CNP, ACHPN  Acute Care Pain Management Program   Hours of pain coverage 7a-1700- after 1700 please call the house officer   [facility] (Nando BERRY, [place], [place], [place])   Page via [employer]- " Click HERE to page Shanel or Matthew text web console

## 2023-10-31 NOTE — PROCEDURES
"Procedure Note - Intrathecal Pump Interrogation       Procedure:  Pump Check     Pre-Operative Diagnosis: chronic pain & presence ofintrathecal pump     Post-Operative Diagnosis: Same     Indication: chronic pain       Procedure Details: The intended procedure, risks, and alternatives were discussed with the patient and informedconsent was obtained. \"Pause for the cause\" was utilized to identify correct patient and intended procedure. The pump was interrogated.      Findings: Her pump is filled with   Dilaudid concentration is 3.8mg/ML and is delivering 1.6603mg/day.  Fentanyl with a concentration of 2,000mcg/ML delivering 873.8mcg/day  bupivacaine concentration is 17.7mg/mL and delivering 7.733mg/day.   15PTMs per day     She/He is not due to have an alarm run until 11/6- 9 days until empty. 12mLs remain in pump    Pain Clinic notified: No        Patient tolerated the procedure well.  Shanel Up, CNS    "

## 2023-10-31 NOTE — PROGRESS NOTES
[facility]    Medicine Progress Note - Hospitalist Service    Date of Admission:  10/30/2023    Assessment & Plan      Mariah Koehler is a 67 year old female admitted on 10/30/2023. She has a h/o HTN, hiatal hernia presented with recurrent nausea and vomiting. Recently discharged from [facility] and subsequent ED visit for recurrent nausea and vomiting. She is not able to manage Nausea and vomiting at home.       Intractable nausea, vomiting  Large hiatal hernia  --Longstanding issue  --Nausea is now controlled with following medications  -- zofran and compazin  --PPI , sucralfate  -- Surgery does not think this is due to hiatal hernia  -- TSH is within normal limits and order serum cortisol level.  May need synacthen test for adrenal insufficiency if s cortisol level is low    Essential hypertension-contolled  -- c/w PTA meds  -- Hydralazin iv prn  -- Monitor vital signs per protocol    Acute cystitis  -- UA positive for leukocyte esterase and nitrites.  Highly suspicious for infection.  -- Ordered Keflex p.o. 5 advair 4 times daily.  Follow-up urine culture    Intrathecal pain pump for chronic back pain  -- Pain team interrogated intrathecal pump on 10/31.  Opioids as per intrathecal pump.  Baclofen 20 mg 4 times daily is continued    Severe protein calorie malnutrition  TSH   Date Value Ref Range Status   10/26/2023 0.62 0.30 - 4.20 uIU/mL Final   06/15/2020 0.49 0.30 - 5.00 uIU/mL Final       -- RD consult  -- Order cortisol level to rule out adrenal insufficiency.           Observation Goals: -diagnostic tests and consults completed and resulted, -vital signs normal or at patient baseline, Nurse to notify provider when observation goals have been met and patient is ready for discharge.  Diet: Clear Liquid Diet  Snacks/Supplements Adult: Ensure Clear; Between Meals  Snacks/Supplements Adult: Gelatein Plus; With Meals    DVT Prophylaxis: Low Risk/Ambulatory with no VTE prophylaxis  "indicated  Bellamy Catheter: Not present  Lines: None     Cardiac Monitoring: None  Code Status: Full Code      Clinically Significant Risk Factors Present on Admission                # Drug Induced Platelet Defect: home medication list includes an antiplatelet medication   # Hypertension: Noted on problem list      # Cachexia: Estimated body mass index is 17.54 kg/m  as calculated from the following:    Height as of an earlier encounter on 10/30/23: 1.6 m (5' 3\").    Weight as of this encounter: 44.9 kg (99 lb).       # Financial/Environmental Concerns: none         Disposition Plan      Expected Discharge Date: 11/01/2023      Destination: home              Jose Figueroa MD  Hospitalist Service  [facility]  Securely message with [employer] (more info)  Text page via [employer] Paging/Directory   ______________________________________________________________________    Interval History   Patient new to me.  Chart reviewed.  Was recently discharged from [facility].  Patient claims that she was okay for 1 day but started developing nausea vomiting.  Does not recall missing her medications.  Denies any dysuria or frequent urination.  She was started on Keflex as outpatient but did not take any medication.  UA is positive for nitrates.  Did have 1 dose of Keflex yesterday.  Admitting physician did not restart Keflex.  Reviewed urine analysis and restarted Keflex today.  Urine culture is pending.  Discussed with nurse practitioner Queta from surgery who does not think her symptoms are due to hiatal hernia.  We will do endocrine work-up for [place]'s disease to see if this will be underlying cause for recurrent nausea.  Patient is advanced to clear diet.  Continue antiemetics.  CT head reviewed and was found to be negative.    Physical Exam   Vital Signs: Temp: 98.5  F (36.9  C) Temp src: Oral BP: 133/58 Pulse: 70   Resp: 18 SpO2: 94 % O2 Device: None (Room air)    Weight: 99 lbs 0 " oz    Malnourished  Lungs are clear to auscultation  Abdomen is soft  Moves all 4 extremities  No leg edema  S1-S2 normal    Medical Decision Making       35 MINUTES SPENT BY ME on the date of service doing chart review, history, exam, documentation & further activities per the note.  MANAGEMENT DISCUSSED with the following over the past 24 hours: Surgery nurse practitioner Queta and patient   NOTE(S)/MEDICAL RECORDS REVIEWED over the past 24 hours: H&P, last discharge summary and ER notes       Data     I have personally reviewed the following data over the past 24 hrs:    6.5  \   12.6   / 202     139 105 9.5 /  98   3.8 26 0.57 \       Imaging results reviewed over the past 24 hrs:   No results found for this or any previous visit (from the past 24 hour(s)).

## 2023-10-31 NOTE — CONSULTS
General Surgery Consultation  Mariah Koehler MRN# [id number]   Age/Sex: 67 year old female YOB: 1955     Reason for consult: 1. Intractable nausea and vomiting            Requesting physician: Dr. Rutledge                   Assessment and Plan:   Assessment:  Moderate sized hiatal hernia in a patient with ongoing nausea and vomiting. Hiatal hernia is not obstructed and patient denies reflux. She reports constant nausea that is not related to oral intake and her vomiting is not directly related to oral intake either. She is malnourished as she has not had anything other than gatorade in weeks. Patient has had longstanding nausea/vomiting history but states that this is worse and she is having a hard time with anything oral. Given the increase in her nausea/vomiting with no change to the hernia on scan and lack of postprandial exacerbation, it is doubtful that her nausea/vomiting is related to this moderately sized hiatal hernia. Would investigate other causes for her current exacerbation of her chronic N/V. Continue with plan for outpatient follow up for surgical repair of hiatal hernia but no acute need to fix at this time.    Plan:  Clear liquid diet; avoid carbonation; jello protein supplement and ensure clear ordered  No surgical intervention planned at this time  We will follow along for now          Chief Complaint:     Chief Complaint   Patient presents with    Vomiting        History is obtained from the patient    HPI:   Mariah Koehler is a 67 year old female who presents with nausea/vomiting. PMH significant for HTN, hiatal hernia and chronic intermittent nausea and vomiting. Patient seen in hospital just over a week ago and was sent home with plans to see surgery outpatient for hernia repair. Patient went home and was drinking clears but has not been eating. Her nausea/vomiting have returned and are worse. She is having Bms and passing flatus. Reports she is vomiting 2-3 times per day.  This is not postprandial. She states her nausea is always present and her vomiting is not related to her oral intake. She continues to void without issue and reports no diarrhea, fever or chills.           Past Medical History:     Past Medical History:   Diagnosis Date    Current mild episode of major depressive disorder, unspecified whether recurrent (H24) 02/17/2020    Hypertension     Opiate dependence (H)     RSD (reflex sympathetic dystrophy)     Sacral fracture, closed (H)               Past Surgical History:     Past Surgical History:   Procedure Laterality Date    APPENDECTOMY      ESOPHAGOSCOPY, GASTROSCOPY, DUODENOSCOPY (EGD), COMBINED N/A 2/20/2023    Procedure: ESOPHAGOGASTRODUODENOSCOPY with biopsies;  Surgeon: Na Brooks MD;  Location: [facility] Main OR    GYN SURGERY      HC REVISE MEDIAN N/CARPAL TUNNEL SURG      Description: Neuroplasty Decompression Median Nerve At Carpal Tunnel;  Recorded: 09/19/2011;    HYSTERECTOMY  1984    IR CERVICAL EPIDURAL STEROID INJECTION  1/14/2005    OOPHORECTOMY Bilateral 1985    [place] SYMPATHECTOMY,CERVICOTHORACIC      Description: Surgical Sympathectomy Cervicothoracic;  Recorded: 09/19/2011;    [facility] DECOMPRESS FASCIOTOMY FINGR/HAND      Description: Decompression Fasciotomy Of The Hand;  Recorded: 09/19/2011;    [facility] LAP,CHOLECYSTECTOMY/EXPLORE      Description: Cholecystectomy Laparoscopic;  Recorded: 12/09/2011;    [facility] TOTAL ABDOM HYSTERECTOMY      Description: Hysterectomy;  Recorded: 03/23/2011;             Social History:    reports that she has been smoking cigarettes. She started smoking about 55 years ago. She has a 38.25 pack-year smoking history. She has never used smokeless tobacco. She reports that she does not currently use alcohol. She reports that she does not use drugs.           Family History:     Family History   Problem Relation Age of Onset    Cerebrovascular Disease Mother     Diabetes Mother     Heart Disease Mother     Hypertension  Mother     Rheumatologic Disease Mother     Heart Disease Father     Pulmonary Hypertension Father     Kidney failure Father     Cancer Father         melanoma    Diabetes Sister     Hypertension Sister     Hypertension Brother               Allergies:     Allergies   Allergen Reactions    Codeine Nausea and Vomiting    Morphine Nausea and Vomiting    Bacitracin Rash    Methadone Rash              Medications:     Prior to Admission medications    Medication Sig Start Date End Date Taking? Authorizing Provider   acetaminophen (TYLENOL) 325 MG tablet Take 1-2 tablets (325-650 mg) by mouth every 6 hours as needed for mild pain or headaches 4/5/23  Yes Kuldeep Mejia MD   atenolol (TENORMIN) 25 MG tablet Take 0.5 tablets (12.5 mg) by mouth daily 10/27/23  Yes Sonu Crowe MD   baclofen (LIORESAL) 10 MG tablet TAKE 2 TABLETS (20 MG TOTAL) BY MOUTH FOUR TIMES A DAY. 6/19/23  Yes Alex Nolasco MD   cephALEXin (KEFLEX) 500 MG capsule Take 1 capsule (500 mg) by mouth 4 times daily for 7 days 10/30/23 11/6/23 Yes Thea Henriquez DO   metoclopramide (REGLAN) 10 MG tablet Take 1 tablet (10 mg) by mouth 3 times daily as needed (Nausea) AS NEEDED FOR NAUSEA 7/28/23  Yes Darrius Foley MBBS   omeprazole (PRILOSEC) 20 MG DR capsule TAKE 1 CAPSULE BY MOUTH TWICE A DAY BEFORE MEALS 10/27/22  Yes Alex Nolasco MD   ondansetron (ZOFRAN) 4 MG tablet Take 1-2 tablets (4-8 mg) by mouth every 6 hours as needed for nausea 10/30/23  Yes Micha Patel MD   scopolamine (TRANSDERM) 1 MG/3DAYS 72 hr patch Place 1 patch onto the skin every 72 hours 7/29/23  Yes Darrius Foley MBBS   sucralfate (CARAFATE) 1 GM tablet Take 1 tablet (1 g) by mouth 4 times daily for 7 days 10/30/23 11/6/23 Yes Micha Patel MD   traZODone (DESYREL) 50 MG tablet TAKE 0.5 TABLETS (25 MG) BY MOUTH AT BEDTIME 5/18/23  Yes Alex Nolasco MD   aspirin 81 MG EC tablet Take 1 tablet (81 mg) by mouth daily 10/27/23   Sonu Crowe,  MD   cyanocobalamin (VITAMIN B-12) 1000 MCG tablet Take 1 tablet (1,000 mcg) by mouth daily 10/27/23   Sonu Crowe MD   medication given by implanted intrathecal pump continuous Drug # 1: Fentanyl (Sublimaze) - Conc: 2000 mcg/mL - Total Dose / 24 hours: 873.8 mcg    Drug # 2: Bupivacaine (Marcaine)  - Conc:17.7 mg/mL - Total Dose / 24 hours: 7.733 mg    Drug # 3: Hydromorphone (Dilaudid)  - Conc: 3.8 mg/mL - Total Dose / 24 hours: 1.66 mg    Diluent: NS    Infusion Rate: 0.4369 mL/24 hrs  Pump Reservoir Volume: 40 mL    Bolus doses: up to 15 bolus doses/24 hours with 1 hour lockout  Each bolus: fentanyl 80.1 mcg, 0.708 mg Bupivacaine, 0.152 mg Dilaudid    Outside Clinic & Provider: [facility] in [place] [phone]  Last Refill Date: 10/02/23  Next Refill Date: 11/06/23  [place] Alarm Date:  11/08/23  Pump : [place]    Deliver Pump Medication to:   For [place]: If pump is within 7 days of depletion, pharmacist will enter a 'Pain Management Adult IP Consult' into the EHR, including 'IT pain pump management' as the reason for the consult.    Unknown, Entered By History   multivitamin, therapeutic (THERA-VIT) TABS tablet Take 1 tablet by mouth daily 10/28/23   Sonu Crowe MD   thiamine (B-1) 100 MG tablet Take 1 tablet (100 mg) by mouth daily 10/28/23   Sonu Crowe MD              Review of Systems:   The Review of Systems is negative other than noted in the HPI            Physical Exam:   Patient Vitals for the past 24 hrs:   BP Temp Temp src Pulse Resp SpO2 Weight   10/31/23 0754 (!) 192/88 98.4  F (36.9  C) Oral 65 -- 96 % --   10/31/23 0630 -- -- -- 79 -- 96 % --   10/31/23 0524 -- -- -- 80 -- 94 % --   10/31/23 0517 -- -- -- 85 -- 94 % --   10/31/23 0409 -- -- -- 64 -- 95 % --   10/31/23 0325 -- -- -- 56 -- 94 % --   10/31/23 0315 -- -- -- 54 -- 95 % --   10/31/23 0222 -- -- -- 66 -- 93 % --   10/31/23 0200 130/60 -- -- 58 -- 94 % --   10/31/23 0000 136/63 -- -- 60 -- 95 %  --   10/30/23 2345 -- -- -- 69 -- 96 % --   10/30/23 2300 138/62 -- -- 65 -- 97 % --   10/30/23 2230 -- -- -- 62 -- 97 % --   10/30/23 2100 115/58 -- -- 54 -- 96 % --   10/30/23 2045 138/63 -- -- 72 -- 95 % --   10/30/23 2030 129/52 -- -- 64 -- 95 % --   10/30/23 2015 (!) 173/74 -- -- 98 -- 96 % --   10/30/23 2001 (!) 172/74 -- -- -- -- -- --   10/30/23 1941 -- -- -- 79 -- 98 % --   10/30/23 1930 (!) 152/69 -- -- 65 -- 97 % --   10/30/23 1915 (!) 142/66 -- -- 74 -- 97 % --   10/30/23 1901 (!) 191/84 -- -- -- -- -- --   10/30/23 1900 (!) 191/84 -- -- 91 -- 97 % --   10/30/23 1845 -- -- -- 80 -- 96 % --   10/30/23 1830 (!) 197/80 -- -- 58 -- 94 % --   10/30/23 1815 (!) 195/81 -- -- (!) 48 -- 95 % --   10/30/23 1800 (!) 183/105 -- -- 93 -- 94 % --   10/30/23 1750 (!) 202/79 -- -- 64 -- 94 % --   10/30/23 1745 (!) 202/79 -- -- 83 -- 97 % --   10/30/23 1715 (!) 180/86 -- -- 67 -- 97 % --   10/30/23 1700 (!) 168/80 -- -- 57 -- 99 % --   10/30/23 1645 (!) 174/81 -- -- 58 -- 99 % --   10/30/23 1632 (!) 184/84 -- -- 88 -- 98 % --   10/30/23 1630 (!) 184/84 -- -- 60 -- 95 % --   10/30/23 1333 (!) 180/85 97.3  F (36.3  C) -- 98 16 95 % 44.9 kg (99 lb)          Intake/Output Summary (Last 24 hours) at 10/31/2023 1138  Last data filed at 10/31/2023 0900  Gross per 24 hour   Intake 1240 ml   Output --   Net 1240 ml      Constitutional:   awake, alert, cooperative, no apparent distress, and appears stated age       Eyes:   PERRL, conjunctiva/corneas clear, EOM's intact; no scleral edema or icterus noted        ENT:   Normocephalic, without obvious abnormality, atraumatic, Lips, mucosa, and tongue normal          Lungs:   Normal respiratory effort, no accessory muscle use       Cardiovascular:   Regular rate and rhythm       Abdomen:   Soft, not tender, not distended       Musculoskeletal:   No obvious swelling, bruising or deformity       Skin:   Skin color and texture normal for patient, no rashes or lesions              Data:          All imaging studies reviewed by me.    Results for orders placed or performed during the hospital encounter of 10/30/23 (from the past 24 hour(s))   Basic metabolic panel   Result Value Ref Range    Sodium 140 135 - 145 mmol/L    Potassium 4.5 3.4 - 5.3 mmol/L    Chloride 103 98 - 107 mmol/L    Carbon Dioxide (CO2) 26 22 - 29 mmol/L    Anion Gap 11 7 - 15 mmol/L    Urea Nitrogen 10.0 8.0 - 23.0 mg/dL    Creatinine 0.58 0.51 - 0.95 mg/dL    GFR Estimate >90 >60 mL/min/1.73m2    Calcium 9.6 8.8 - 10.2 mg/dL    Glucose 103 (H) 70 - 99 mg/dL   Magnesium   Result Value Ref Range    Magnesium 2.0 1.7 - 2.3 mg/dL   CBC with platelets differential    Narrative    The following orders were created for panel order CBC with platelets differential.  Procedure                               Abnormality         Status                     ---------                               -----------         ------                     CBC with platelets and d...[[id number]]                      Final result                 Please view results for these tests on the individual orders.   Basic metabolic panel   Result Value Ref Range    Sodium 139 135 - 145 mmol/L    Potassium 3.8 3.4 - 5.3 mmol/L    Chloride 105 98 - 107 mmol/L    Carbon Dioxide (CO2) 26 22 - 29 mmol/L    Anion Gap 8 7 - 15 mmol/L    Urea Nitrogen 9.5 8.0 - 23.0 mg/dL    Creatinine 0.57 0.51 - 0.95 mg/dL    GFR Estimate >90 >60 mL/min/1.73m2    Calcium 8.8 8.8 - 10.2 mg/dL    Glucose 98 70 - 99 mg/dL   CBC with platelets and differential   Result Value Ref Range    WBC Count 6.5 4.0 - 11.0 10e3/uL    RBC Count 4.35 3.80 - 5.20 10e6/uL    Hemoglobin 12.6 11.7 - 15.7 g/dL    Hematocrit 38.7 35.0 - 47.0 %    MCV 89 78 - 100 fL    MCH 29.0 26.5 - 33.0 pg    MCHC 32.6 31.5 - 36.5 g/dL    RDW 14.1 10.0 - 15.0 %    Platelet Count 202 150 - 450 10e3/uL    % Neutrophils 66 %    % Lymphocytes 25 %    % Monocytes 7 %    % Eosinophils 1 %    % Basophils 1 %    % Immature Granulocytes  0 %    NRBCs per 100 WBC 0 <1 /100    Absolute Neutrophils 4.3 1.6 - 8.3 10e3/uL    Absolute Lymphocytes 1.7 0.8 - 5.3 10e3/uL    Absolute Monocytes 0.5 0.0 - 1.3 10e3/uL    Absolute Eosinophils 0.0 0.0 - 0.7 10e3/uL    Absolute Basophils 0.0 0.0 - 0.2 10e3/uL    Absolute Immature Granulocytes 0.0 <=0.4 10e3/uL    Absolute NRBCs 0.0 10e3/uL   Urine Drug Screen    Narrative    The following orders were created for panel order Urine Drug Screen.  Procedure                               Abnormality         Status                     ---------                               -----------         ------                     Urine Drug Screen Panel[[id number]]      Abnormal            Final result                 Please view results for these tests on the individual orders.   Urine Drug Screen Panel   Result Value Ref Range    Amphetamines Urine Screen Negative Screen Negative    Barbituates Urine Screen Negative Screen Negative    Benzodiazepine Urine Screen Negative Screen Negative    Cannabinoids Urine Screen Negative Screen Negative    Cocaine Urine Screen Negative Screen Negative    Fentanyl Qual Urine Screen Positive (A) Screen Negative    Opiates Urine Screen Negative Screen Negative    PCP Urine Screen Negative Screen Negative        Queta Calderon, APRN CNP

## 2023-10-31 NOTE — ED NOTES
Hospitalist Maty at bedside to assess pt. Writer notified hospitalist that compazine was administered and pt reported feeling a little better. Hospitalist verbalized understanding and to notify him if pt is nauseous prior to next PRN dose.

## 2023-10-31 NOTE — PLAN OF CARE
"  Problem: Adult Inpatient Plan of Care  Goal: Plan of Care Review  Description: The Plan of Care Review/Shift note should be completed every shift.  The Outcome Evaluation is a brief statement about your assessment that the patient is improving, declining, or no change.  This information will be displayed automatically on your shift  note.  Outcome: Progressing  Goal: Patient-Specific Goal (Individualized)  Description: You can add care plan individualizations to a care plan. Examples of Individualization might be:  \"Parent requests to be called daily at 9am for status\", \"I have a hard time hearing out of my right ear\", or \"Do not touch me to wake me up as it startles  me\".  Outcome: Progressing  Goal: Absence of Hospital-Acquired Illness or Injury  Outcome: Progressing  Intervention: Identify and Manage Fall Risk  Recent Flowsheet Documentation  Taken 10/31/2023 1433 by Mayuri Taylor RN  Safety Promotion/Fall Prevention: activity supervised  Intervention: Prevent Skin Injury  Recent Flowsheet Documentation  Taken 10/31/2023 1433 by Mayuri Taylor RN  Body Position: position changed independently  Goal: Optimal Comfort and Wellbeing  Outcome: Progressing  Intervention: Monitor Pain and Promote Comfort  Recent Flowsheet Documentation  Taken 10/31/2023 1431 by Mayuri Taylor RN  Pain Management Interventions: (Pain med given earlier) emotional support  Goal: Readiness for Transition of Care  Outcome: Progressing     Problem: Pain Chronic (Persistent)  Goal: Optimal Pain Control and Function  Outcome: Progressing  Intervention: Develop Pain Management Plan  Recent Flowsheet Documentation  Taken 10/31/2023 1431 by Mayuri Taylor RN  Pain Management Interventions: (Pain med given earlier) emotional support     Problem: Nausea and Vomiting  Goal: Nausea and Vomiting Relief  Outcome: Progressing  Intervention: Prevent and Manage Nausea and Vomiting  Recent Flowsheet Documentation  Taken 10/31/2023 1433 by " Mayuri Taylor, RN  Nausea/Vomiting Interventions: antiemetic   Goal Outcome Evaluation:               Pt came from Ed at around 1430. Pt's v/s is stable. Pt c/o headache . Pt received prn pain med earlier in Ed. Continue to monitor pt.

## 2023-10-31 NOTE — PROGRESS NOTES
"PRIMARY DIAGNOSIS: \"GENERIC\" NURSING  OUTPATIENT/OBSERVATION GOALS TO BE MET BEFORE DISCHARGE:  ADLs back to baseline: Yes    Activity and level of assistance: Up with standby assistance.    Pain status: Improved-controlled with oral pain medications.    Return to near baseline physical activity: No     Discharge Planner Nurse   Safe discharge environment identified: No  Barriers to discharge: Yes       Entered by: Rosy Nichole RN 10/31/2023 6:48 PM     Please review provider order for any additional goals.   Nurse to notify provider when observation goals have been met and patient is ready for discharge.      Pt c/o nausea. PRN compazine given with relief. Tried full liquid diet, ate tomato soup and ice cream. Nauseous afterwards. PRN zofran given. Denies any abdominal pain.   "

## 2023-10-31 NOTE — PLAN OF CARE
Problem: Pain Chronic (Persistent)  Goal: Optimal Pain Control and Function  Outcome: Progressing  Intervention: Develop Pain Management Plan  Recent Flowsheet Documentation  Taken 10/31/2023 1142 by Ayah Shepherd RN  Pain Management Interventions: medication (see MAR)     Problem: Nausea and Vomiting  Goal: Nausea and Vomiting Relief  Outcome: Progressing  Intervention: Prevent and Manage Nausea and Vomiting  Recent Flowsheet Documentation  Taken 10/31/2023 1230 by Ayah Shepherd RN  Nausea/Vomiting Interventions: antiemetic  Taken 10/31/2023 0754 by Ayah Shepherd RN  Nausea/Vomiting Interventions: antiemetic   Compazine effective for nausea. Tolerating clear liquid diet. Up with SBA and walker. Reporting headache, prn tylenol given, helped with pain. Call light within reach, calls appropriately.     Report called to ROBERTO Mustafa on p4.    Goal Outcome Evaluation:

## 2023-10-31 NOTE — ED NOTES
Pt assisted to bathroom via wheelchair by ROBERTO Simon at this time. Pt reports nausea is better.

## 2023-11-01 VITALS
TEMPERATURE: 98.1 F | BODY MASS INDEX: 17.54 KG/M2 | HEART RATE: 75 BPM | OXYGEN SATURATION: 96 % | RESPIRATION RATE: 18 BRPM | WEIGHT: 99 LBS | SYSTOLIC BLOOD PRESSURE: 117 MMHG | DIASTOLIC BLOOD PRESSURE: 59 MMHG

## 2023-11-01 LAB
ANION GAP SERPL CALCULATED.3IONS-SCNC: 12 MMOL/L (ref 7–15)
BACTERIA UR CULT: ABNORMAL
BUN SERPL-MCNC: 10.6 MG/DL (ref 8–23)
CALCIUM SERPL-MCNC: 8.5 MG/DL (ref 8.8–10.2)
CHLORIDE SERPL-SCNC: 106 MMOL/L (ref 98–107)
CORTIS SERPL-MCNC: 5.2 UG/DL
CREAT SERPL-MCNC: 0.67 MG/DL (ref 0.51–0.95)
DEPRECATED HCO3 PLAS-SCNC: 23 MMOL/L (ref 22–29)
EGFRCR SERPLBLD CKD-EPI 2021: >90 ML/MIN/1.73M2
GLUCOSE SERPL-MCNC: 109 MG/DL (ref 70–99)
POTASSIUM SERPL-SCNC: 4 MMOL/L (ref 3.4–5.3)
SODIUM SERPL-SCNC: 141 MMOL/L (ref 135–145)

## 2023-11-01 PROCEDURE — G0378 HOSPITAL OBSERVATION PER HR: HCPCS

## 2023-11-01 PROCEDURE — 250N000013 HC RX MED GY IP 250 OP 250 PS 637: Performed by: STUDENT IN AN ORGANIZED HEALTH CARE EDUCATION/TRAINING PROGRAM

## 2023-11-01 PROCEDURE — 250N000013 HC RX MED GY IP 250 OP 250 PS 637: Performed by: PAIN MEDICINE

## 2023-11-01 PROCEDURE — 96376 TX/PRO/DX INJ SAME DRUG ADON: CPT

## 2023-11-01 PROCEDURE — 99232 SBSQ HOSP IP/OBS MODERATE 35: CPT | Performed by: PAIN MEDICINE

## 2023-11-01 PROCEDURE — 80048 BASIC METABOLIC PNL TOTAL CA: CPT | Performed by: INTERNAL MEDICINE

## 2023-11-01 PROCEDURE — 250N000011 HC RX IP 250 OP 636: Performed by: STUDENT IN AN ORGANIZED HEALTH CARE EDUCATION/TRAINING PROGRAM

## 2023-11-01 PROCEDURE — 250N000013 HC RX MED GY IP 250 OP 250 PS 637: Performed by: INTERNAL MEDICINE

## 2023-11-01 PROCEDURE — 99239 HOSP IP/OBS DSCHRG MGMT >30: CPT | Performed by: INTERNAL MEDICINE

## 2023-11-01 PROCEDURE — 36415 COLL VENOUS BLD VENIPUNCTURE: CPT | Performed by: INTERNAL MEDICINE

## 2023-11-01 RX ORDER — CEPHALEXIN 500 MG/1
500 CAPSULE ORAL 4 TIMES DAILY
Start: 2023-11-01 | End: 2023-11-06

## 2023-11-01 RX ORDER — POLYETHYLENE GLYCOL 3350 17 G/17G
17 POWDER, FOR SOLUTION ORAL DAILY
Status: DISCONTINUED | OUTPATIENT
Start: 2023-11-01 | End: 2023-11-01 | Stop reason: HOSPADM

## 2023-11-01 RX ORDER — AMOXICILLIN 250 MG
1 CAPSULE ORAL 2 TIMES DAILY
Qty: 30 TABLET | Refills: 0 | Status: SHIPPED | OUTPATIENT
Start: 2023-11-01 | End: 2023-11-16

## 2023-11-01 RX ADMIN — CEPHALEXIN 500 MG: 500 CAPSULE ORAL at 09:15

## 2023-11-01 RX ADMIN — ATENOLOL 12.5 MG: 25 TABLET ORAL at 09:17

## 2023-11-01 RX ADMIN — PROCHLORPERAZINE EDISYLATE 5 MG: 5 INJECTION INTRAMUSCULAR; INTRAVENOUS at 08:08

## 2023-11-01 RX ADMIN — Medication 1 TABLET: at 09:16

## 2023-11-01 RX ADMIN — BACLOFEN 20 MG: 10 TABLET ORAL at 12:03

## 2023-11-01 RX ADMIN — Medication 81 MG: at 09:16

## 2023-11-01 RX ADMIN — CEPHALEXIN 500 MG: 500 CAPSULE ORAL at 12:03

## 2023-11-01 RX ADMIN — SUCRALFATE 1 G: 1 TABLET ORAL at 12:03

## 2023-11-01 RX ADMIN — ONDANSETRON 4 MG: 4 TABLET, ORALLY DISINTEGRATING ORAL at 12:02

## 2023-11-01 RX ADMIN — BACLOFEN 20 MG: 10 TABLET ORAL at 09:16

## 2023-11-01 RX ADMIN — PROCHLORPERAZINE EDISYLATE 5 MG: 5 INJECTION INTRAMUSCULAR; INTRAVENOUS at 00:10

## 2023-11-01 RX ADMIN — CYANOCOBALAMIN TAB 1000 MCG 1000 MCG: 1000 TAB at 09:16

## 2023-11-01 RX ADMIN — SUCRALFATE 1 G: 1 TABLET ORAL at 09:16

## 2023-11-01 RX ADMIN — PANTOPRAZOLE SODIUM 40 MG: 40 TABLET, DELAYED RELEASE ORAL at 06:48

## 2023-11-01 RX ADMIN — THIAMINE HCL TAB 100 MG 100 MG: 100 TAB at 09:16

## 2023-11-01 RX ADMIN — POLYETHYLENE GLYCOL 3350 17 G: 17 POWDER, FOR SOLUTION ORAL at 09:18

## 2023-11-01 ASSESSMENT — ACTIVITIES OF DAILY LIVING (ADL)
ADLS_ACUITY_SCORE: 35

## 2023-11-01 NOTE — PROGRESS NOTES
Care Management Discharge Note    Discharge Date: 11/01/2023       Discharge Disposition: Home    Discharge Services: None    Discharge DME: None    Discharge Transportation: family or friend will provide    Private pay costs discussed: Not applicable    Does the patient's insurance plan have a 3 day qualifying hospital stay waiver?  No    PAS Confirmation Code:    Patient/family educated on Medicare website which has current facility and service quality ratings: no    Education Provided on the Discharge Plan:    Persons Notified of Discharge Plans: yes  Patient/Family in Agreement with the Plan: yes    Handoff Referral Completed: Yes    Additional Information:  Pt adequate for discharge. No CM needs identified. CM will sign off.    Lyric Bustos RN

## 2023-11-01 NOTE — PLAN OF CARE
"PRIMARY DIAGNOSIS: \"GENERIC\" NURSING  OUTPATIENT/OBSERVATION GOALS TO BE MET BEFORE DISCHARGE:  ADLs back to baseline: No    Activity and level of assistance: Up with standby assistance.    Pain status: Pain free. Feeling nauseous, IV compazine given with relief.    Return to near baseline physical activity: No     Discharge Planner Nurse   Safe discharge environment identified: No  Barriers to discharge: Yes       Entered by: Mia Corrales RN 11/01/2023 1:20 AM     Please review provider order for any additional goals.   Nurse to notify provider when observation goals have been met and patient is ready for discharge.Goal Outcome Evaluation:                        "

## 2023-11-01 NOTE — PLAN OF CARE
"PRIMARY DIAGNOSIS: \"GENERIC\" NURSING  OUTPATIENT/OBSERVATION GOALS TO BE MET BEFORE DISCHARGE:  ADLs back to baseline: Yes    Activity and level of assistance: Up with standby assistance.    Pain status: Improved-controlled with oral pain medications.    Return to near baseline physical activity: No     Discharge Planner Nurse   Safe discharge environment identified: No  Barriers to discharge: Yes       Entered by: Rosy Nichoel RN 10/31/2023 10:22 PM     Please review provider order for any additional goals.   Nurse to notify provider when observation goals have been met and patient is ready for discharge.    Up to bathroom x2. Ambulating with walker, SBA in room. C/o epigastric pain 5/10. House officer was notified, order for Maalox obtained. Maalox given with relief. Denies nausea.   "

## 2023-11-01 NOTE — PLAN OF CARE
"PRIMARY DIAGNOSIS: \"GENERIC\" NURSING  OUTPATIENT/OBSERVATION GOALS TO BE MET BEFORE DISCHARGE:  ADLs back to baseline: No    Activity and level of assistance: Up with standby assistance.    Pain status: Pain free.    Return to near baseline physical activity: No     Discharge Planner Nurse   Safe discharge environment identified: No  Barriers to discharge: Yes       Entered by: Mia Corrales RN 11/01/2023 5:12 AM     Please review provider order for any additional goals.   Nurse to notify provider when observation goals have been met and patient is ready for discharge.Goal Outcome Evaluation:       Ambulated to bathroom and outside of room to hallway about 150 ft and back to bed. Nauseous improved. On room air, Vital Signs stable. Will continue to monitor.                 "

## 2023-11-01 NOTE — PROGRESS NOTES
[facility] ACUTE PAIN SERVICE    ([facility], [facility], [facility], [facility])  Pain follow-up    Assessment/Plan:  Mariah Koehler is a 67 year old female who was admitted on 10/30/2023.  I was asked to see the patient for evaluation of intrathecal pump. Admitted for persistent nausea and vomiting. History of HTN, chronic pain with intrathecal pump.    shows Tylenol with codeine, 6 tabs of oxycodone, and Ambien back in November. Describes pain as chronically in the neck and left arm, also in the low back.  Has been managed for many years via [facility] pain clinic..       PLAN: Chronic pain, without change, in the presence of intrathecal pain pump.  It is thought that the hiatal hernia is not contributing to her ongoing nausea and vomiting.  Question if opioid-induced constipation is making her nausea and vomiting worse.  Although it would be atypical for intrathecal opioids to cause constipation.  Versus if the current cystitis caused the nausea and vomiting?  Multimodal Medication Therapy:   Adjuvants: Baclofen 20 mg 4 times a day, antiemetics  Opioids: Per intrathecal pump  Non-medication interventions- Ice   Constipation Prophylaxis-   senna at at bedtime and MiraLAX daily  Follow up /Discharge Recommendations - We recommend prescribing the following at the time of discharge: None follow-up with [facility] for pump fill on 11/6              Subjective:    Salud notes that her pain is at baseline.  Nausea and vomiting are somewhat improved.  We did talk about opioid-induced constipation.  She is having about 1 bowel movement per week.  This has been her norm for many many years.  We talked about her refill plans for 11/6.           Intractable nausea and vomiting   Patient Active Problem List   Diagnosis    Nausea and vomiting    Chest Pain    Opioid Dependence With Continuous Use    Edema    Wheezing (Symptom)    Pain During Urination (Dysuria)    Frequent, Full-bladder Emptying (Polyuria)     Other specified chronic obstructive pulmonary disease    Chronic pain syndrome    Vitamin B12 Deficiency    Microscopic Hematuria    Cough    Abdominal Pain In The Central Upper Belly (Epigastric)    Reflex Sympathetic Dystrophy Of The Left Upper Limb    Osteoporosis    Carpal Tunnel Syndrome    Hypertension    Constipation    Abdominal Pain    Fatigue    Current mild episode of major depressive disorder, unspecified whether recurrent (H24)    Viral gastroenteritis    Unintentional weight loss    Colitis    Intractable nausea and vomiting    Presence of intrathecal baclofen pump    Ketonuria        History   Drug Use Unknown         Tobacco Use      Smoking status: Every Day        Packs/day: 0.75        Years: 51.00        Additional pack years: 0.00        Total pack years: 38.25        Types: Cigarettes        Start date: 1/1/1968      Smokeless tobacco: Never         aspirin  81 mg Oral Daily    atenolol  12.5 mg Oral Daily    baclofen  20 mg Oral 4x Daily    cephALEXin  500 mg Oral 4x Daily    childrens multivitamin with iron  1 tablet Oral Daily    cyanocobalamin  1,000 mcg Oral Daily    pantoprazole  40 mg Oral BID AC    sennosides  1 tablet Oral At Bedtime    sucralfate  1 g Oral 4x Daily    thiamine  100 mg Oral Daily    traZODone  25 mg Oral At Bedtime       Objective:  Vital signs in last 24 hours:  B/P: 99/52, T: 98.2, P: 67, R: 18   Blood pressure 99/52, pulse 67, temperature 98.2  F (36.8  C), temperature source Oral, resp. rate 18, weight 44.9 kg (99 lb), SpO2 97%.      Weight:   Wt Readings from Last 2 Encounters:   10/30/23 44.9 kg (99 lb)   10/30/23 44.9 kg (99 lb)           Intake/Output:    Intake/Output Summary (Last 24 hours) at 11/1/2023 0736  Last data filed at 10/31/2023 0900  Gross per 24 hour   Intake 240 ml   Output --   Net 240 ml        Review of Systems:   As per subjective, all others negative.    Physical Exam:     General Appearance:  Alert, cooperative, no distress, appears stated  age   Patient is sitting up in bed   Head:  Normocephalic, without obvious abnormality, atraumatic   Eyes:  PERRL, conjunctiva/corneas clear, EOM's intact   ENT/Throat: Lips somewhat dry   Lymph/Neck: Supple, symmetrical, trachea midline, no adenopathy, thyroid: not enlarged, symmetric    Lungs:     respirations unlabored   Chest Wall:  No tenderness or deformity   Cardiovascular/Heart:  No shortness of breath   Abdomen:   Soft, non-tender,    Musculoskeletal: Extremities normal,     Skin: Skin is intact   Neurologic: Alert and oriented X 3, Moves all 4 extremities           Imaging:  Personally Reviewed.    No results found for this visit on 10/30/23.     Lab Results:  Personally Reviewed.   Last Comprehensive Metabolic Panel:  Sodium   Date Value Ref Range Status   10/31/2023 139 135 - 145 mmol/L Final     Comment:     Reference intervals for this test were updated on 09/26/2023 to more accurately reflect our healthy population. There may be differences in the flagging of prior results with similar values performed with this method. Interpretation of those prior results can be made in the context of the updated reference intervals.      Potassium   Date Value Ref Range Status   10/31/2023 3.8 3.4 - 5.3 mmol/L Final   02/20/2023 3.6 3.5 - 5.0 mmol/L Final   01/09/2007 4.3 3.4 - 5.3 mmol/L Final     Chloride   Date Value Ref Range Status   10/31/2023 105 98 - 107 mmol/L Final   02/20/2023 110 (H) 98 - 107 mmol/L Final     Carbon Dioxide (CO2)   Date Value Ref Range Status   10/31/2023 26 22 - 29 mmol/L Final   02/20/2023 24 22 - 31 mmol/L Final     Anion Gap   Date Value Ref Range Status   10/31/2023 8 7 - 15 mmol/L Final   02/20/2023 7 5 - 18 mmol/L Final     Glucose   Date Value Ref Range Status   10/31/2023 98 70 - 99 mg/dL Final   02/20/2023 87 70 - 125 mg/dL Final     Urea Nitrogen   Date Value Ref Range Status   10/31/2023 9.5 8.0 - 23.0 mg/dL Final   02/20/2023 9 8 - 22 mg/dL Final     Creatinine   Date Value  Ref Range Status   10/31/2023 0.57 0.51 - 0.95 mg/dL Final   01/09/2007 0.99 0.60 - 1.30 mg/dL Final     GFR Estimate   Date Value Ref Range Status   10/31/2023 >90 >60 mL/min/1.73m2 Final   04/14/2021 >60 >60 mL/min/1.73m2 Final   01/09/2007 63 >60 mL/min/1.7m2 Final     GFR, ESTIMATED POCT   Date Value Ref Range Status   11/08/2022 >60 >60 mL/min/1.73m2 Final     Calcium   Date Value Ref Range Status   10/31/2023 8.8 8.8 - 10.2 mg/dL Final        UA:   Amphetamines Urine   Date Value Ref Range Status   10/31/2023 Screen Negative Screen Negative Final     Comment:     Cutoff for a negative amphetamine is less than 500 ng/mL.     Barbituates Urine   Date Value Ref Range Status   10/31/2023 Screen Negative Screen Negative Final     Comment:     Cutoff for a negative barbiturate is less than 200 ng/mL.     Cannabinoids Urine   Date Value Ref Range Status   10/31/2023 Screen Negative Screen Negative Final     Comment:     Cutoff for a negative cannabinoid is less than 50 ng/mL.     Cocaine Urine   Date Value Ref Range Status   10/31/2023 Screen Negative Screen Negative Final     Comment:     Cutoff for a negative cocaine is less than 300 ng/mL.     Opiates Urine   Date Value Ref Range Status   10/31/2023 Screen Negative Screen Negative Final     Comment:     Cutoff for a negative opiate is less than 300 ng/mL.     PCP Urine   Date Value Ref Range Status   10/31/2023 Screen Negative Screen Negative Final     Comment:     Cutoff for a negative PCP is less than 25 ng/mL.              Please see A&P for additional details of medical decision making.  MANAGEMENT DISCUSSED with the following over the past 24 hours: Patient   NOTE(S)/MEDICAL RECORDS REVIEWED over the past 24 hours: Reviewed notes from medicine, nursing, and surgery  Tests personally interpreted in the past 24 hours:  - CT showing moderate stool burden on 10/24  Tests ORDERED & REVIEWED in the past 24 hours:  - Reviewed and ordered urine drug screen  analysis  SUPPLEMENTAL HISTORY, in addition to the patient's history, over the past 24 hours obtained from:   - Patient  Medical complexity over the past 24 hours:  -------------------------- MODERATE RISK FOR MORBIDITY --------------------------------------------------  - Prescription DRUG MANAGEMENT performed        Shanel Handy APRN, CNS-BC, CNP, ACHPN  Acute Care Pain Management Program   Hours of pain coverage 7a-1700- after 1700 please call the house officer    [facility] ([place]STI, [place], [place], [place], [place])   Page via [employer]- Click HERE to page Shanel or Matthew text web console

## 2023-11-01 NOTE — PROGRESS NOTES
Discharge instructions reviewed with patient.   Verbalizes understanding, no questions.  Awaiting transport home at this time ().    Lydia Gill RN

## 2023-11-01 NOTE — DISCHARGE SUMMARY
"[facility]  Hospitalist Discharge Summary      Date of Admission:  10/30/2023  Date of Discharge:  11/1/2023  Discharging Provider: Jose Figueroa MD  Discharge Service: Hospitalist Service    Discharge Diagnoses   Acute on chronic nausea vomiting likely due to acute cystitis  Essential hypertension  Acute cystitis  Chronic pain with intrathecal pump  Severe protein calorie malnutrition    Clinically Significant Risk Factors     # Cachexia: Estimated body mass index is 17.54 kg/m  as calculated from the following:    Height as of an earlier encounter on 10/30/23: 1.6 m (5' 3\").    Weight as of this encounter: 44.9 kg (99 lb).       Follow-ups Needed After Discharge       Unresulted Labs Ordered in the Past 30 Days of this Admission       Date and Time Order Name Status Description    10/30/2023  4:51 AM Urine Culture Preliminary         These results will be followed up by PCP    Discharge Disposition   Discharged to home  Condition at discharge: Stable    Hospital Course   Mariah Koehler is a 67 year old female admitted on 10/30/2023. She has a h/o HTN, hiatal hernia presented with recurrent nausea and vomiting. Recently discharged from [facility] and subsequent ED visit for recurrent nausea and vomiting. She is not able to manage Nausea and vomiting at home.       Intractable nausea, vomiting--acute on chronic likely precipitated by UTI/acute cystitis  Large hiatal hernia  --Longstanding issue  --Nausea is now controlled with following medications  -- zofran and compazin  --PPI , sucralfate  --Diet was slowly advanced and patient reported no nausea or vomiting in the hospital.  Nurse present at bedside.  -- Surgery does not think this is due to hiatal hernia.  Continue PPI and sucralfate.  DC scopolamine due to side effects  -- TSH and serum cortisol are within normal limits.  -- Recommend follow-up with GI for possible colonoscopy and upper endoscopy as outpatient.  Discussed with patient " .  -- Previous CT head have been unremarkable for intracranial pathology.    Essential hypertension-contolled  -- c/w PTA meds  -- Hydralazin iv prn  -- Monitor vital signs per protocol    Acute cystitis  -- UA positive for leukocyte esterase and nitrites.  Highly suspicious for infection.  -- Completed 2 days of Keflex in the hospital and would need 5 more days to complete a 7-day course.  --Urine culture positive for gram-negative bacilli.  Culture sensitivities are still pending at discharge.  Discussed with patient and her .    Intrathecal pain pump for chronic back pain  -- Pain team interrogated intrathecal pump on 10/31.  Opioids as per intrathecal pump.  Baclofen 20 mg 4 times daily is continued    Severe protein calorie malnutrition  TSH   Date Value Ref Range Status   10/26/2023 0.62 0.30 - 4.20 uIU/mL Final   06/15/2020 0.49 0.30 - 5.00 uIU/mL Final   -- TSH and cortisol level within normal limits.    Vitamin B12 deficiency  -- Detected on 10/26 with B12 level of 206  -- Continue vitamin B12 supplementation    Cognitive impairment  -- Chronic issue  -- No reversible cause found so far  -- Defer further work-up to PCP.  Recommend tapering off intrathecal pump      Consultations This Hospital Stay   PAIN MANAGEMENT ADULT IP CONSULT  SURGERY GENERAL IP CONSULT  NUTRITION SERVICES ADULT IP CONSULT  CARE MANAGEMENT / SOCIAL WORK IP CONSULT    Code Status   Full Code    Time Spent on this Encounter   I, Jose Figueroa MD, personally saw the patient today and spent greater than 30 minutes discharging this patient.       Jose Figueroa MD  [address] [address]  Phone: [phone]  Fax: [phone]  ______________________________________________________________________    Physical Exam   Vital Signs: Temp: 98.1  F (36.7  C) Temp src: Oral BP: 117/59 Pulse: 75   Resp: 18 SpO2: 96 % O2 Device: None (Room air)    Weight: 99 lbs 0 oz          Primary Care Physician   Alex Nolasco    Discharge Orders      Reason for your hospital stay    Intractable nausea and vomiting     Follow-up and recommended labs and tests     Follow-up with GI for outpatient colonoscopy and or upper endoscopy.     Activity    Your activity upon discharge: activity as tolerated     Diet    Follow this diet upon discharge: Orders Placed This Encounter      Snacks/Supplements Adult: Ensure Clear; Between Meals      Snacks/Supplements Adult: Gelatein Plus; With Meals      Mechanical/Dental Soft Diet       Significant Results and Procedures   Results for orders placed or performed during the hospital encounter of 10/24/23   Head CT w/o contrast    Narrative    EXAM: CT HEAD W/O CONTRAST  LOCATION: [facility]  DATE: 10/24/2023    INDICATION: fall, no loc  COMPARISON: Head CT 07/25/2023  TECHNIQUE: Routine CT Head without IV contrast. Multiplanar reformats. Dose reduction techniques were used.    FINDINGS:  INTRACRANIAL CONTENTS: No intracranial hemorrhage, extraaxial collection, or mass effect.  No CT evidence of acute infarct. Normal parenchymal attenuation. Normal ventricles and sulci.     VISUALIZED ORBITS/SINUSES/MASTOIDS: No intraorbital abnormality. Mild mucosal thickening of ethmoid air cells. No middle ear or mastoid effusion.    BONES/SOFT TISSUES: No acute abnormality. Mild degenerative changes of the right temporomandibular joint.      Impression    IMPRESSION:  1.  No intracranial hemorrhage, mass lesions, hydrocephalus or CT evidence for an acute infarct.   XR Pelvis and Hip Right 2 Views    Narrative    EXAM: XR PELVIS AND HIP RIGHT 2 VIEWS  LOCATION: [facility]  DATE: 10/24/2023    INDICATION: Fall. Pain.  COMPARISON: None.      Impression    IMPRESSION: Normal joint spaces and alignment. No fracture. Battery pack projects over the right iliac bone.   CT Abdomen Pelvis w Contrast    Narrative    EXAM: CT ABDOMEN  PELVIS W CONTRAST  LOCATION: [facility]  DATE: 10/24/2023    INDICATION: intractable vomiting  COMPARISON: 07/25/2023.  TECHNIQUE: CT scan of the abdomen and pelvis was performed following injection of IV contrast. Multiplanar reformats were obtained. Dose reduction techniques were used.  CONTRAST: ISOVUE 370 50ML    FINDINGS:   LOWER CHEST: Dependent atelectasis at the lung bases. Moderate size hiatal hernia.    HEPATOBILIARY: The gallbladder is absent. Small amount of pneumobilia.    PANCREAS: Borderline dilated pancreatic duct without significant change.    SPLEEN: Normal.    ADRENAL GLANDS: Normal.    KIDNEYS/BLADDER: There is a single 1 mm stone in the lower pole of the right kidney. Few cortical scars in the right kidney. Left kidney is unremarkable. There is no hydronephrosis.    BOWEL: There is no bowel obstruction. There is a moderate amount of stool throughout the colon. There is no free intraperitoneal gas or fluid.    LYMPH NODES: Normal.    VASCULATURE: Atherosclerotic calcification of the aorta and its branches. No aneurysm.    PELVIC ORGANS: The uterus is absent. There is no adnexal mass.    MUSCULOSKELETAL: Baclofen pump implanted in the right buttock. Degenerative disease in the lumbar spine.      Impression    IMPRESSION:   1.  No acute abnormality. No bowel obstruction.  2.  Moderate size hiatal hernia.  3.  Previous cholecystectomy. Small amount of pneumobilia.   XR Chest Port 1 View    Narrative    EXAM: XR CHEST PORT 1 VIEW  LOCATION: [facility]  DATE: 10/25/2023    INDICATION: chest pain  COMPARISON: 11/25/2019      Impression    IMPRESSION: Lungs are clear. No pleural effusion or pneumothorax. Normal heart size and pulmonary vascularity. Surgical clips project over the medial left upper chest.   Radiologist Consult For Cardiology    Narrative    OVERREAD: DETAILED [facility] EXTRACARDIAC OVERREAD OF CARDIAC CT   LOCATION: [facility]  DATE: 10/27/2023    INDICATION: Chest pain.  TECHNIQUE: Dose reduction techniques were used.  COMPARISON: CT chest 2023    FINDINGS:    LIMITED CHEST: Left infrahilar subpleural bulla measuring 2 cm is again noted. Subpleural atelectasis in the left lower lobe adjacent to the hernia.  LIMITED MEDIASTINUM: Large hiatal hernia with partially intrathoracic stomach.  LIMITED UPPER ABDOMEN: Pneumobilia again noted.      Impression    IMPRESSION:    1.  No significant incidental extracardiac findings.  2.  Large hiatal hernia with partially intrathoracic stomach.  3.  Pneumobilia.  4.  Please refer to cardiologist's dictation for the cardiac CT report.   Echocardiogram Complete     Value    LVEF  65-70%    Narrative    [id number]  [id number]  [id number]  [id number]^ANNIE^[place] [address]     Name: ADAM WADE  MRN: [id number]  : 1955  Study Date: 10/26/2023 03:21 PM  Age: 67 yrs  Gender: Female  Patient Location: [facility]  Reason For Study: MURRIETA  Ordering Physician: TERRY VALENCIA  Performed By: SHASHANK/COURT     BSA: 1.4 m2  Height: 63 in  Weight: 99 lb  HR: 61  BP: 106/51 mmHg  ______________________________________________________________________________  Procedure  Complete Portable Echo Adult.  ______________________________________________________________________________  Interpretation Summary     The left ventricle is normal in size.  The visual ejection fraction is 65-70%.  No regional wall motion abnormalities noted.  Normal right ventricle size and systolic function.  Sinus rhythm was noted.  No hemodynamically significant valvular abnormalities on 2D or color flow  imaging. There is no comparison study available.  ______________________________________________________________________________  Left Ventricle  The left ventricle is normal in size. There is normal left ventricular wall  thickness. Diastolic Doppler findings (E/E' ratio  and/or other parameters)  suggest left ventricular filling pressures are indeterminate. The visual  ejection fraction is 65-70%. No regional wall motion abnormalities noted.     Right Ventricle  Normal right ventricle size and systolic function. TAPSE is normal, which is  consistent with normal right ventricular systolic function.     Atria  Normal left atrial size. Right atrial size is normal.     Mitral Valve  Mitral valve leaflets appear normal. There is mild (1+) mitral regurgitation.  There is no mitral valve stenosis.     Tricuspid Valve  Tricuspid valve leaflets appear normal. There is mild (1+) tricuspid  regurgitation. The right ventricular systolic pressure is approximated at  14mmHg plus the right atrial pressure. There is no tricuspid stenosis.     Aortic Valve  A bicuspid aortic valve cannot be excluded. No aortic regurgitation is  present. No aortic stenosis is present.     Pulmonic Valve  The pulmonic valve is not well visualized. There is no pulmonic valvular  regurgitation. There is no pulmonic valvular stenosis.     Pericardium  There is no pericardial effusion.     Rhythm  Sinus rhythm was noted.     ______________________________________________________________________________  MMode/2D Measurements & Calculations  RVDd: 3.6 cm  IVSd: 0.62 cm  LVIDd: 4.1 cm  LVIDs: 2.8 cm  LVPWd: 0.72 cm  FS: 31.4 %     LV mass(C)d: 75.7 grams  LV mass(C)dI: 52.8 grams/m2  RWT: 0.35  TAPSE: 2.5 cm     Time Measurements  MM HR: 66.0 BPM     Doppler Measurements & Calculations  MV E max manny: 78.4 cm/sec  MV A max manny: 56.6 cm/sec  MV E/A: 1.4  MV dec time: 0.20 sec  LV V1 max P.6 mmHg  LV V1 max: 118.1 cm/sec  LV V1 VTI: 24.4 cm  PA acc time: 0.12 sec  TR max manny: 187.9 cm/sec  TR max P.1 mmHg  E/E' av.3  Lateral E/e': 6.8  Medial E/e': 7.7     RV S Manny: 11.2 cm/sec     ______________________________________________________________________________  Report approved by: Loyd Birmingham 10/26/2023  04:31 PM         CT Coronary Artery Angio w Calcium Score     Value    Agatston Score of Left Main 3    Agatston Score of the Left Anterior Descending 0    Agatston Score of Circumflex 0    Agatston Score of Right Coronary Artery 0    Total Score 3.00    BSA 0.00    Narrative    A calcium score in this range places the individual in the 25th percentile   when compared to an age and gender matched control group and implies a low   risk of cardiac events in the next ten years (less than 1% per year).  Mild disease in distal left main less than 25%.  Rest coronary arteries   are normal.  Right dominant system.  Normal size of thoracic aorta.  No pericardial effusion or calcified pericardium.  No thrombus in left atrial appendage.         Discharge Medications   Current Discharge Medication List        START taking these medications    Details   senna-docusate (SENOKOT-S/PERICOLACE) 8.6-50 MG tablet Take 1 tablet by mouth 2 times daily for 15 days  Qty: 30 tablet, Refills: 0    Associated Diagnoses: Constipation, unspecified constipation type           CONTINUE these medications which have CHANGED    Details   cephALEXin (KEFLEX) 500 MG capsule Take 1 capsule (500 mg) by mouth 4 times daily for 5 days    Associated Diagnoses: Acute cystitis with hematuria           CONTINUE these medications which have NOT CHANGED    Details   acetaminophen (TYLENOL) 325 MG tablet Take 1-2 tablets (325-650 mg) by mouth every 6 hours as needed for mild pain or headaches    Associated Diagnoses: Other headache syndrome      atenolol (TENORMIN) 25 MG tablet Take 0.5 tablets (12.5 mg) by mouth daily  Qty: 90 tablet, Refills: 3    Associated Diagnoses: Essential (primary) hypertension      baclofen (LIORESAL) 10 MG tablet TAKE 2 TABLETS (20 MG TOTAL) BY MOUTH FOUR TIMES A DAY.  Qty: 720 tablet, Refills: 1    Associated Diagnoses: Chronic pain syndrome; Muscle spasm      metoclopramide (REGLAN) 10 MG tablet Take 1 tablet (10 mg) by mouth 3  times daily as needed (Nausea) AS NEEDED FOR NAUSEA  Qty: 30 tablet, Refills: 1    Associated Diagnoses: Nausea      omeprazole (PRILOSEC) 20 MG DR capsule TAKE 1 CAPSULE BY MOUTH TWICE A DAY BEFORE MEALS  Qty: 180 capsule, Refills: 3    Associated Diagnoses: Gastro-esophageal reflux disease with esophagitis, without bleeding      ondansetron (ZOFRAN) 4 MG tablet Take 1-2 tablets (4-8 mg) by mouth every 6 hours as needed for nausea    Associated Diagnoses: Nausea      sucralfate (CARAFATE) 1 GM tablet Take 1 tablet (1 g) by mouth 4 times daily for 7 days  Qty: 28 tablet, Refills: 0      traZODone (DESYREL) 50 MG tablet TAKE 0.5 TABLETS (25 MG) BY MOUTH AT BEDTIME  Qty: 45 tablet, Refills: 3    Associated Diagnoses: Insomnia, unspecified type      aspirin 81 MG EC tablet Take 1 tablet (81 mg) by mouth daily  Qty: 90 tablet, Refills: 3    Associated Diagnoses: Chest pain, unspecified type      cyanocobalamin (VITAMIN B-12) 1000 MCG tablet Take 1 tablet (1,000 mcg) by mouth daily  Qty: 30 tablet, Refills: 3    Associated Diagnoses: Vitamin B 12 deficiency      medication given by implanted intrathecal pump continuous Drug # 1: Fentanyl (Sublimaze) - Conc: 2000 mcg/mL - Total Dose / 24 hours: 873.8 mcg    Drug # 2: Bupivacaine (Marcaine)  - Conc:17.7 mg/mL - Total Dose / 24 hours: 7.733 mg    Drug # 3: Hydromorphone (Dilaudid)  - Conc: 3.8 mg/mL - Total Dose / 24 hours: 1.66 mg    Diluent: NS    Infusion Rate: 0.4369 mL/24 hrs  Pump Reservoir Volume: 40 mL    Bolus doses: up to 15 bolus doses/24 hours with 1 hour lockout  Each bolus: fentanyl 80.1 mcg, 0.708 mg Bupivacaine, 0.152 mg Dilaudid    Outside Clinic & Provider: [facility] in [place] [phone]  Last Refill Date: 10/02/23  Next Refill Date: 11/06/23  Low [place] Alarm Date:  11/08/23  Pump : Integrate    Deliver Pump Medication to:   For [place]: If pump is within 7 days of depletion, pharmacist will enter a 'Pain Management Adult IP  Consult' into the EHR, including 'IT pain pump management' as the reason for the consult.      multivitamin, therapeutic (THERA-VIT) TABS tablet Take 1 tablet by mouth daily  Qty: 30 tablet, Refills: 3    Associated Diagnoses: Moderate protein-calorie malnutrition (H24)      thiamine (B-1) 100 MG tablet Take 1 tablet (100 mg) by mouth daily  Qty: 30 tablet, Refills: 3    Associated Diagnoses: Moderate protein-calorie malnutrition (H24)           STOP taking these medications       scopolamine (TRANSDERM) 1 MG/3DAYS 72 hr patch Comments:   Reason for Stopping:             Allergies   Allergies   Allergen Reactions    Codeine Nausea and Vomiting    Morphine Nausea and Vomiting    Bacitracin Rash    Methadone Rash

## 2023-11-01 NOTE — PROGRESS NOTES
"PRIMARY DIAGNOSIS: \"GENERIC\" NURSING  OUTPATIENT/OBSERVATION GOALS TO BE MET BEFORE DISCHARGE:  ADLs back to baseline: Yes    Activity and level of assistance: Up with standby assistance.    Pain status: Improved-controlled with oral pain medications.    Return to near baseline physical activity: Yes     Discharge Planner Nurse   Safe discharge environment identified: Yes  Barriers to discharge: Yes       Entered by: Lydia Gill RN 11/01/2023 1:45 PM     Please review provider order for any additional goals.   Nurse to notify provider when observation goals have been met and patient is ready for discharge.    Reports improvement in nausea.   No emesis.   Tolerating mechanical soft diet.  Anticipate discharge later today.    LYLY Seaman  "

## 2023-11-02 ENCOUNTER — PATIENT OUTREACH (OUTPATIENT)
Dept: CARE COORDINATION | Facility: CLINIC | Age: 68
End: 2023-11-02
Payer: COMMERCIAL

## 2023-11-02 NOTE — PROGRESS NOTES
Clinic Care Coordination Contact  [facility]: Post-Discharge Note  SITUATION                                                      Admission:    Admission Date: 10/30/23   Reason for Admission: Intractable nausea and vomiting  Discharge:   Discharge Date: 11/01/23  Discharge Diagnosis: Acute on chronic nausea vomiting likely due to acute cystitis  Essential hypertension  Acute cystitis  Chronic pain with intrathecal pump  Severe protein calorie malnutrition    BACKGROUND                                                      Per hospital discharge summary and inpatient provider notes:    Mariah Koehler is a 67 year old female admitted on 10/30/2023. She has a h/o HTN, hiatal hernia presented with recurrent nausea and vomiting. Recently discharged from [facility] and subsequent ED visit for recurrent nausea and vomiting. She is not able to manage Nausea and vomiting at home.         Intractable nausea, vomiting--acute on chronic likely precipitated by UTI/acute cystitis  Large hiatal hernia  --Longstanding issue  --Nausea is now controlled with following medications  -- zofran and compazin  --PPI , sucralfate  --Diet was slowly advanced and patient reported no nausea or vomiting in the hospital.  Nurse present at bedside.  -- Surgery does not think this is due to hiatal hernia.  Continue PPI and sucralfate.  DC scopolamine due to side effects  -- TSH and serum cortisol are within normal limits.  -- Recommend follow-up with GI for possible colonoscopy and upper endoscopy as outpatient.  Discussed with patient .  -- Previous CT head have been unremarkable for intracranial pathology.     Essential hypertension-contolled  -- c/w PTA meds  -- Hydralazin iv prn  -- Monitor vital signs per protocol     Acute cystitis  -- UA positive for leukocyte esterase and nitrites.  Highly suspicious for infection.  -- Completed 2 days of Keflex in the hospital and would need 5 more days to complete a 7-day course.  --Urine  culture positive for gram-negative bacilli.  Culture sensitivities are still pending at discharge.  Discussed with patient and her .     Intrathecal pain pump for chronic back pain  -- Pain team interrogated intrathecal pump on 10/31.  Opioids as per intrathecal pump.  Baclofen 20 mg 4 times daily is continued     Severe protein calorie malnutrition        TSH   Date Value Ref Range Status   10/26/2023 0.62 0.30 - 4.20 uIU/mL Final   06/15/2020 0.49 0.30 - 5.00 uIU/mL Final   -- TSH and cortisol level within normal limits.     Vitamin B12 deficiency  -- Detected on 10/26 with B12 level of 206  -- Continue vitamin B12 supplementation     Cognitive impairment  -- Chronic issue  -- No reversible cause found so far  -- Defer further work-up to PCP.  Recommend tapering off intrathecal pump    ASSESSMENT           Discharge Assessment  How are you doing now that you are home?: Patient states she continues to have nausea. Patient states she has had one episode of emesis. Patient states she is taking the Zofran for nausea, and at the time of our call, had only taken 1 tablet, but RN CC reviewed with her the prescription instructions and that she can take another one. Patient verbalized understanding. Patient has been taking her antibiotic, which she says makes her nauseated and isn't helping with that, but understands she needs to complete her antibiotic course. Patient states she has been drinking Gatorade and had a Twinkie earlier today. Patient states she had 2 soft bowel movements this morning and is worried it will turn to diarrhea if she takes the stool softener. RN CC encouraged patient to schedule a hospital follow up appointment so she can discuss her ongoing symptoms and management. RN CC offered assistance in scheduling, but patient stated her  will take care of that. Patient declined Care Coordination enrollment at this time.  How are your symptoms? (Red Flag symptoms escalate to triage hotline per  guidelines): Unchanged  Do you feel your condition is stable enough to be safe at home until your provider visit?: Yes  Does the patient have their discharge instructions? : Yes  Does the patient have questions regarding their discharge instructions? : No  Were you started on any new medications or were there changes to any of your previous medications? : Yes  Does the patient have all of their medications?: Yes  Do you have questions regarding any of your medications? : No  Do you have all of your needed medical supplies or equipment (DME)?  (i.e. oxygen tank, CPAP, cane, etc.): Yes  Discharge follow-up appointment scheduled within 14 calendar days? : No  Is patient agreeable to assistance with scheduling? : No (Patient states  will call.)              Care Management       Care Mgmt General Assessment  Referral  Referral Source: IP Handoff  Health Care Home/Utilization  Preferred Hospital: [facility]  [phone]  Preferred Urgent Care: [facility], [phone]     Resources  Patient receiving home care services:: No  Community Resources: None             PLAN                                                      Outpatient Plan:  Patient to take medications as prescribed. Patient to call nurse triage line should she have any after hours need for nursing. Patient to schedule hospital follow up appointment. Patient declined Care Coordination enrollment at this time.    Future Appointments   Date Time Provider Department Center   11/9/2023  8:00 AM [facility]         For any urgent concerns, please contact our 24 hour nurse triage line: [phone] ([phone])         Ahmet Odonnell RN Care Coordinator  [facility]  (Covering for Lead RN Care Coordinator)

## 2023-11-03 ENCOUNTER — HOSPITAL ENCOUNTER (EMERGENCY)
Facility: HOSPITAL | Age: 68
Discharge: HOME | End: 2023-11-03
Attending: EMERGENCY MEDICINE | Admitting: EMERGENCY MEDICINE
Payer: COMMERCIAL

## 2023-11-03 VITALS
RESPIRATION RATE: 26 BRPM | HEART RATE: 66 BPM | BODY MASS INDEX: 17.54 KG/M2 | HEIGHT: 63 IN | SYSTOLIC BLOOD PRESSURE: 151 MMHG | OXYGEN SATURATION: 95 % | WEIGHT: 99 LBS | DIASTOLIC BLOOD PRESSURE: 70 MMHG | TEMPERATURE: 98.9 F

## 2023-11-03 DIAGNOSIS — R11.2 NAUSEA AND VOMITING, UNSPECIFIED VOMITING TYPE: ICD-10-CM

## 2023-11-03 LAB
ALBUMIN UR-MCNC: 10 MG/DL
AMORPH CRY #/AREA URNS HPF: ABNORMAL /HPF
ANION GAP SERPL CALCULATED.3IONS-SCNC: 11 MMOL/L (ref 7–15)
APPEARANCE UR: ABNORMAL
BACTERIA #/AREA URNS HPF: ABNORMAL /HPF
BASOPHILS # BLD AUTO: 0 10E3/UL (ref 0–0.2)
BASOPHILS NFR BLD AUTO: 0 %
BILIRUB UR QL STRIP: NEGATIVE
BUN SERPL-MCNC: 10.5 MG/DL (ref 8–23)
CALCIUM SERPL-MCNC: 9.9 MG/DL (ref 8.8–10.2)
CHLORIDE SERPL-SCNC: 103 MMOL/L (ref 98–107)
COLOR UR AUTO: YELLOW
CREAT SERPL-MCNC: 0.58 MG/DL (ref 0.51–0.95)
DEPRECATED HCO3 PLAS-SCNC: 27 MMOL/L (ref 22–29)
EGFRCR SERPLBLD CKD-EPI 2021: >90 ML/MIN/1.73M2
EOSINOPHIL # BLD AUTO: 0 10E3/UL (ref 0–0.7)
EOSINOPHIL NFR BLD AUTO: 0 %
ERYTHROCYTE [DISTWIDTH] IN BLOOD BY AUTOMATED COUNT: 13.8 % (ref 10–15)
GLUCOSE SERPL-MCNC: 108 MG/DL (ref 70–99)
GLUCOSE UR STRIP-MCNC: NEGATIVE MG/DL
HCT VFR BLD AUTO: 42.4 % (ref 35–47)
HGB BLD-MCNC: 14.3 G/DL (ref 11.7–15.7)
HGB UR QL STRIP: NEGATIVE
HOLD SPECIMEN: NORMAL
IMM GRANULOCYTES # BLD: 0 10E3/UL
IMM GRANULOCYTES NFR BLD: 0 %
KETONES UR STRIP-MCNC: NEGATIVE MG/DL
LEUKOCYTE ESTERASE UR QL STRIP: ABNORMAL
LYMPHOCYTES # BLD AUTO: 1.3 10E3/UL (ref 0.8–5.3)
LYMPHOCYTES NFR BLD AUTO: 27 %
MAGNESIUM SERPL-MCNC: 1.8 MG/DL (ref 1.7–2.3)
MCH RBC QN AUTO: 29 PG (ref 26.5–33)
MCHC RBC AUTO-ENTMCNC: 33.7 G/DL (ref 31.5–36.5)
MCV RBC AUTO: 86 FL (ref 78–100)
MONOCYTES # BLD AUTO: 0.4 10E3/UL (ref 0–1.3)
MONOCYTES NFR BLD AUTO: 7 %
MUCOUS THREADS #/AREA URNS LPF: PRESENT /LPF
NEUTROPHILS # BLD AUTO: 3.3 10E3/UL (ref 1.6–8.3)
NEUTROPHILS NFR BLD AUTO: 66 %
NITRATE UR QL: NEGATIVE
NRBC # BLD AUTO: 0 10E3/UL
NRBC BLD AUTO-RTO: 0 /100
PH UR STRIP: 7.5 [PH] (ref 5–7)
PLATELET # BLD AUTO: 263 10E3/UL (ref 150–450)
POTASSIUM SERPL-SCNC: 3.9 MMOL/L (ref 3.4–5.3)
RBC # BLD AUTO: 4.93 10E6/UL (ref 3.8–5.2)
RBC URINE: 0 /HPF
SODIUM SERPL-SCNC: 141 MMOL/L (ref 135–145)
SP GR UR STRIP: 1.02 (ref 1–1.03)
SQUAMOUS EPITHELIAL: 1 /HPF
UROBILINOGEN UR STRIP-MCNC: <2 MG/DL
WBC # BLD AUTO: 5 10E3/UL (ref 4–11)
WBC URINE: <1 /HPF

## 2023-11-03 PROCEDURE — 81001 URINALYSIS AUTO W/SCOPE: CPT | Performed by: EMERGENCY MEDICINE

## 2023-11-03 PROCEDURE — 250N000011 HC RX IP 250 OP 636: Mod: JZ | Performed by: EMERGENCY MEDICINE

## 2023-11-03 PROCEDURE — 96361 HYDRATE IV INFUSION ADD-ON: CPT

## 2023-11-03 PROCEDURE — 80048 BASIC METABOLIC PNL TOTAL CA: CPT | Performed by: EMERGENCY MEDICINE

## 2023-11-03 PROCEDURE — 96374 THER/PROPH/DIAG INJ IV PUSH: CPT

## 2023-11-03 PROCEDURE — C9113 INJ PANTOPRAZOLE SODIUM, VIA: HCPCS | Mod: JZ | Performed by: EMERGENCY MEDICINE

## 2023-11-03 PROCEDURE — 99284 EMERGENCY DEPT VISIT MOD MDM: CPT | Mod: 25

## 2023-11-03 PROCEDURE — 85025 COMPLETE CBC W/AUTO DIFF WBC: CPT | Performed by: EMERGENCY MEDICINE

## 2023-11-03 PROCEDURE — 83735 ASSAY OF MAGNESIUM: CPT | Performed by: EMERGENCY MEDICINE

## 2023-11-03 PROCEDURE — 36415 COLL VENOUS BLD VENIPUNCTURE: CPT | Performed by: EMERGENCY MEDICINE

## 2023-11-03 PROCEDURE — 96375 TX/PRO/DX INJ NEW DRUG ADDON: CPT

## 2023-11-03 PROCEDURE — 258N000003 HC RX IP 258 OP 636: Performed by: EMERGENCY MEDICINE

## 2023-11-03 RX ORDER — DIPHENHYDRAMINE HYDROCHLORIDE 50 MG/ML
25 INJECTION INTRAMUSCULAR; INTRAVENOUS ONCE
Status: COMPLETED | OUTPATIENT
Start: 2023-11-03 | End: 2023-11-03

## 2023-11-03 RX ORDER — ONDANSETRON 2 MG/ML
4 INJECTION INTRAMUSCULAR; INTRAVENOUS
Status: COMPLETED | OUTPATIENT
Start: 2023-11-03 | End: 2023-11-03

## 2023-11-03 RX ADMIN — DIPHENHYDRAMINE HYDROCHLORIDE 25 MG: 50 INJECTION, SOLUTION INTRAMUSCULAR; INTRAVENOUS at 12:07

## 2023-11-03 RX ADMIN — PANTOPRAZOLE SODIUM 40 MG: 40 INJECTION, POWDER, FOR SOLUTION INTRAVENOUS at 12:05

## 2023-11-03 RX ADMIN — SODIUM CHLORIDE 1000 ML: 9 INJECTION, SOLUTION INTRAVENOUS at 12:04

## 2023-11-03 RX ADMIN — SODIUM CHLORIDE 500 ML: 9 INJECTION, SOLUTION INTRAVENOUS at 10:19

## 2023-11-03 RX ADMIN — PROCHLORPERAZINE EDISYLATE 5 MG: 5 INJECTION INTRAMUSCULAR; INTRAVENOUS at 12:08

## 2023-11-03 RX ADMIN — ONDANSETRON 4 MG: 2 INJECTION INTRAMUSCULAR; INTRAVENOUS at 10:20

## 2023-11-03 ASSESSMENT — ACTIVITIES OF DAILY LIVING (ADL): ADLS_ACUITY_SCORE: 33

## 2023-11-03 NOTE — ED PROVIDER NOTES
EMERGENCY DEPARTMENT ENCOUNTER      NAME: Mariah Koehler  AGE: 67 year old female  YOB: 1955  MRN: [id number]  EVALUATION DATE & TIME: No admission date for patient encounter.    PCP: Alex Nolasco    ED PROVIDER: Lemuel Claire M.D.      Chief Complaint   Patient presents with    Nausea & Vomiting         FINAL IMPRESSION:  1. Nausea and vomiting, unspecified vomiting type          ED COURSE & MEDICAL DECISION MAKIN year old female presents to the Emergency Department for evaluation of nausea and vomiting.  This is a chronic problem for this patient, with multiple recent ED visits and hospitalizations for the same including discharged just a few days ago for a flareup of intractable vomiting, hospitalized for 2 days.  Has had a few episodes of emesis at home yesterday into today.  Abdominal exam is benign and she reports no current discomfort.  She had good symptom control with Zofran Compazine and Benadryl, which has been effective in the past at alleviating symptoms.  She received some IV fluids.  Metabolic labs all look stable including stable metabolic profile.  Reviewed recent imaging of both her brain and abdomen which has been reassuring and negative within the past 2 weeks, I do not think that based on her stable presentation today any of this would need to be repeated.  She was resting more comfortably on recheck, able to tolerate a p.o. challenge here and reported some symptom improvement.  We discussed a plan for discharge home to continue her previously prescribed Zofran, Reglan, sucralfate and PPI.  Continued clinic/GI follow-up was recommended.  Patient discharged in stable condition.    At the conclusion of the encounter I discussed the results of all of the tests and the disposition. The questions were answered. The patient or family acknowledged understanding and was agreeable with the care plan.       Medical Decision Making    History:  Supplemental history from:  Documented in chart, if applicable  External Record(s) reviewed: Documented in chart, if applicable. and Outpatient Record: [facility] 10/30/23-11/1/23    Work Up:  Chart documentation includes differential considered and any EKGs or imaging independently interpreted by provider, where specified.  In additional to work up documented, I considered the following work up: Documented in chart, if applicable.    External consultation:  Discussion of management with another provider: Documented in chart, if applicable    Complicating factors:  Care impacted by chronic illness: Hypertension and Other: s/p cholecystectomy, s/p appendectomy  Care affected by social determinants of health: Alcohol Abuse and/or Recreational Drug Use    Disposition considerations: Discharge. I recommended the patient continue their current prescription strength medication(s): Zofran, Reglan. I considered admission, but discharged patient after significant clinical improvement.            MEDICATIONS GIVEN IN THE EMERGENCY:  Medications   ondansetron (ZOFRAN) injection 4 mg (4 mg Intravenous $Given 11/3/23 1020)   sodium chloride 0.9% BOLUS 500 mL (0 mLs Intravenous Stopped 11/3/23 1134)   sodium chloride 0.9% BOLUS 1,000 mL (0 mLs Intravenous Stopped 11/3/23 1419)   prochlorperazine (COMPAZINE) injection 5 mg (5 mg Intravenous $Given 11/3/23 1208)   diphenhydrAMINE (BENADRYL) injection 25 mg (25 mg Intravenous $Given 11/3/23 1207)   pantoprazole (PROTONIX) IV push injection 40 mg (40 mg Intravenous $Given 11/3/23 1205)       NEW PRESCRIPTIONS STARTED AT TODAY'S ER VISIT  New Prescriptions    No medications on file          =================================================================    HPI    Patient information was obtained from: patient     Use of : N/A       Mariahjessica Koehler is a 67 year old female with a pertinent history of s/p cholecystectomy, hypertension, opioid dependence, s/p appendectomy who  presents to this ED via personal vehicle for evaluation of nausea and vomiting.     Per chart review patient was admitted to [facility] from 10/30/23-11/1/23 for recurrent nausea and vomiting. She was recently discharged from [facility] for this as well. Nausea is controlled with zofran and compazin. Head CT was negative, abdominal CT showed a moderate size hiatal hernia. Patient was started on senna-docusate and discharged with nausea controlled.     Patient reports persistent nausea and vomiting for multiple days, last emesis was ~730 AM today and was bile. She has used Zofran with no relief. Endorses headache and a loose stool yesterday, but denies abdominal pain or diarrhea.       REVIEW OF SYSTEMS   All systems reviewed and negative except as noted in HPI.    PAST MEDICAL HISTORY:  Past Medical History:   Diagnosis Date    Current mild episode of major depressive disorder, unspecified whether recurrent (H24) 02/17/2020    Hypertension     Opiate dependence (H)     RSD (reflex sympathetic dystrophy)     Sacral fracture, closed (H)        PAST SURGICAL HISTORY:  Past Surgical History:   Procedure Laterality Date    APPENDECTOMY      ESOPHAGOSCOPY, GASTROSCOPY, DUODENOSCOPY (EGD), COMBINED N/A 2/20/2023    Procedure: ESOPHAGOGASTRODUODENOSCOPY with biopsies;  Surgeon: Na Brooks MD;  Location: [facility] Main OR    GYN SURGERY      HC REVISE MEDIAN N/CARPAL TUNNEL SURG      Description: Neuroplasty Decompression Median Nerve At Carpal Tunnel;  Recorded: 09/19/2011;    HYSTERECTOMY  1984    IR CERVICAL EPIDURAL STEROID INJECTION  1/14/2005    OOPHORECTOMY Bilateral 1985    KS SYMPATHECTOMY,CERVICOTHORACIC      Description: Surgical Sympathectomy Cervicothoracic;  Recorded: 09/19/2011;    [facility] DECOMPRESS FASCIOTOMY FINGR/HAND      Description: Decompression Fasciotomy Of The Hand;  Recorded: 09/19/2011;    TU LAP,CHOLECYSTECTOMY/EXPLORE      Description:  Cholecystectomy Laparoscopic;  Recorded: 12/09/2011;    C TOTAL ABDOM HYSTERECTOMY      Description: Hysterectomy;  Recorded: 03/23/2011;           CURRENT MEDICATIONS:    No current facility-administered medications for this encounter.     Current Outpatient Medications   Medication    acetaminophen (TYLENOL) 325 MG tablet    aspirin 81 MG EC tablet    atenolol (TENORMIN) 25 MG tablet    baclofen (LIORESAL) 10 MG tablet    cephALEXin (KEFLEX) 500 MG capsule    cyanocobalamin (VITAMIN B-12) 1000 MCG tablet    medication given by implanted intrathecal pump    metoclopramide (REGLAN) 10 MG tablet    multivitamin, therapeutic (THERA-VIT) TABS tablet    omeprazole (PRILOSEC) 20 MG DR capsule    ondansetron (ZOFRAN) 4 MG tablet    senna-docusate (SENOKOT-S/PERICOLACE) 8.6-50 MG tablet    sucralfate (CARAFATE) 1 GM tablet    thiamine (B-1) 100 MG tablet    traZODone (DESYREL) 50 MG tablet         ALLERGIES:  Allergies   Allergen Reactions    Codeine Nausea and Vomiting    Morphine Nausea and Vomiting    Bacitracin Rash    Methadone Rash       FAMILY HISTORY:  Family History   Problem Relation Age of Onset    Cerebrovascular Disease Mother     Diabetes Mother     Heart Disease Mother     Hypertension Mother     Rheumatologic Disease Mother     Heart Disease Father     Pulmonary Hypertension Father     Kidney failure Father     Cancer Father         melanoma    Diabetes Sister     Hypertension Sister     Hypertension Brother        SOCIAL HISTORY:   Social History     Socioeconomic History    Marital status:    Tobacco Use    Smoking status: Every Day     Packs/day: 0.75     Years: 51.00     Additional pack years: 0.00     Total pack years: 38.25     Types: Cigarettes     Start date: 1/1/1968    Smokeless tobacco: Never   Vaping Use    Vaping Use: Never used   Substance and Sexual Activity    Alcohol use: Not Currently     Comment: 1 glass a year    Drug use: Never       VITALS:  BP (!) 151/70   Pulse 66   Temp  "98.9  F (37.2  C) (Temporal)   Resp 26   Ht 1.6 m (5' 3\")   Wt 44.9 kg (99 lb)   SpO2 95%   BMI 17.54 kg/m      PHYSICAL EXAM    Constitutional: Somewhat chronically ill-appearing elderly female patient, sitting in bed, no acute distress  HENT: Normocephalic, Atraumatic. Neck Supple.  Eyes: EOMI, Conjunctiva normal.  Respiratory: Breathing comfortably on room air. Speaks full sentences easily. Lungs clear to ascultation.  Cardiovascular: Normal heart rate, Regular rhythm. No peripheral edema.  Abdomen: Soft, nontender  Musculoskeletal: Good range of motion in all major joints. No major deformities noted.  Integument: Warm, Dry.  Neurologic: Alert & awake, Normal motor function, Normal sensory function, No focal deficits noted.   Psychiatric: Cooperative. Affect appropriate.     LAB:  All pertinent labs reviewed and interpreted.  Labs Ordered and Resulted from Time of ED Arrival to Time of ED Departure   ROUTINE UA WITH MICROSCOPIC REFLEX TO CULTURE - Abnormal       Result Value    Color Urine Yellow      Appearance Urine Cloudy (*)     Glucose Urine Negative      Bilirubin Urine Negative      Ketones Urine Negative      Specific Gravity Urine 1.018      Blood Urine Negative      pH Urine 7.5 (*)     Protein Albumin Urine 10 (*)     Urobilinogen Urine <2.0      Nitrite Urine Negative      Leukocyte Esterase Urine 75 Meredith/uL (*)     Bacteria Urine Many (*)     Mucus Urine Present (*)     Amorphous Crystals Urine Few (*)     RBC Urine 0      WBC Urine <1      Squamous Epithelials Urine 1     BASIC METABOLIC PANEL - Abnormal    Sodium 141      Potassium 3.9      Chloride 103      Carbon Dioxide (CO2) 27      Anion Gap 11      Urea Nitrogen 10.5      Creatinine 0.58      GFR Estimate >90      Calcium 9.9      Glucose 108 (*)    MAGNESIUM - Normal    Magnesium 1.8     CBC WITH PLATELETS AND DIFFERENTIAL    WBC Count 5.0      RBC Count 4.93      Hemoglobin 14.3      Hematocrit 42.4      MCV 86      MCH 29.0      MCHC " 33.7      RDW 13.8      Platelet Count 263      % Neutrophils 66      % Lymphocytes 27      % Monocytes 7      % Eosinophils 0      % Basophils 0      % Immature Granulocytes 0      NRBCs per 100 WBC 0      Absolute Neutrophils 3.3      Absolute Lymphocytes 1.3      Absolute Monocytes 0.4      Absolute Eosinophils 0.0      Absolute Basophils 0.0      Absolute Immature Granulocytes 0.0      Absolute NRBCs 0.0               I, Priyanka Canseco, am serving as a scribe to document services personally performed by Dr. Lemuel Claire, based on my observation and the provider's statements to me. I, Lemuel Claire MD attest that Priyanka Canseco is acting in a scribe capacity, has observed my performance of the services and has documented them in accordance with my direction.    Lemuel Claire M.D.  Emergency Medicine  [facility] EMERGENCY DEPARTMENT  [address] [place] [id number]  [phone]  Dept: [phone]       Lemuel Claire MD  11/03/23 1428

## 2023-11-03 NOTE — DISCHARGE INSTRUCTIONS
You were seen in the emergency department for a flareup of nausea and vomiting.  You received a few different medicines to help with nausea and also received some IV fluids.  Your lab testing today looks stable.  It looks like a urinary tract infection you were treated for recently has completely resolved.  We are glad that symptoms seem controlled at this time.  We would recommend sticking to a very bland diet try to focus on small but frequent liquid intake and gradually increasing things as tolerated.  You can continue using Zofran, Reglan, and Carafate as previously prescribed.  We would like you to discuss things further with your primary and/or gastroenterologist after this ED visit and recent hospitalization.

## 2023-11-03 NOTE — ED TRIAGE NOTES
Pt presents with nausea and vomiting that began yesterday at 11am. Pt was discharged from hospital on 11/1 for same complaints. Pt has been unable to hold down food or fluids.     Triage Assessment (Adult)       Row Name 11/03/23 0947          Triage Assessment    Airway WDL WDL        Respiratory WDL    Respiratory WDL WDL        Skin Circulation/Temperature WDL    Skin Circulation/Temperature WDL WDL        Cardiac WDL    Cardiac WDL WDL        Peripheral/Neurovascular WDL    Peripheral Neurovascular WDL WDL        Cognitive/Neuro/Behavioral WDL    Cognitive/Neuro/Behavioral WDL WDL

## 2023-11-06 PROBLEM — E53.8 VITAMIN B12 DEFICIENCY (NON ANEMIC): Status: ACTIVE | Noted: 2023-11-06

## 2023-11-06 PROBLEM — R93.5 ABNORMAL CT OF THE ABDOMEN: Status: ACTIVE | Noted: 2023-11-06

## 2023-11-06 PROBLEM — Z90.49 HISTORY OF CHOLECYSTECTOMY: Status: ACTIVE | Noted: 2023-11-06

## 2023-11-06 PROBLEM — G90.529 CRPS (COMPLEX REGIONAL PAIN SYNDROME), LOWER LIMB: Status: ACTIVE | Noted: 2023-11-06

## 2023-11-06 PROBLEM — G43.909 MIGRAINE HEADACHE: Status: ACTIVE | Noted: 2023-11-06

## 2023-11-09 ENCOUNTER — OFFICE VISIT (OUTPATIENT)
Dept: SURGERY | Facility: CLINIC | Age: 68
End: 2023-11-09
Payer: COMMERCIAL

## 2023-11-09 VITALS
WEIGHT: 103.9 LBS | SYSTOLIC BLOOD PRESSURE: 142 MMHG | DIASTOLIC BLOOD PRESSURE: 70 MMHG | BODY MASS INDEX: 18.41 KG/M2 | HEIGHT: 63 IN

## 2023-11-09 DIAGNOSIS — K44.9 HIATAL HERNIA: ICD-10-CM

## 2023-11-09 PROCEDURE — 99213 OFFICE O/P EST LOW 20 MIN: CPT | Performed by: SURGERY

## 2023-11-09 NOTE — PROGRESS NOTES
HPI:  Mariah Koehler is a 67 year old female who was referred to me by Alex Nolasco for evaluation of a hiatal hernia.  The patient has been seen multiple times by our team in the hospital including myself.  She has been struggling with chronic nausea and vomiting and weight loss for the last year.  For the course of all her work-ups and multiple CT scans, she has been noted to have a moderate-sized hiatal hernia.  This was verified on endoscopy that was done in February.  She had some mild chronic gastritis of the antrum but otherwise it was a fairly normal exam.  At that time she was started on some scopolamine patches which proved her symptoms but due to cost she has not refilled these.  She has been hospitalized now a couple of times for intractable nausea.    We talked a little more today about the onset of her symptoms.  She and her  both state that her symptoms began to worsen significantly earlier this year.  She has frequent visits with her primary care for her baclofen pump and pain medication adjustments.  At one of those visits earlier this year she was found to have lost a significant amount of weight unintentionally over short period of time.  They do not remember when the last time her medications were adjusted were but thinks that they may be contributory to her symptoms.  She denies any significant dysphagia.  She denies history of abnormal reflux.  She is on omeprazole and has been for some time.  She has occasional heartburn.     Allergies:Codeine, Morphine, Bacitracin, and Methadone    Past Medical History:   Diagnosis Date    Current mild episode of major depressive disorder, unspecified whether recurrent (H24) 02/17/2020    Hypertension     Opiate dependence (H)     RSD (reflex sympathetic dystrophy)     Sacral fracture, closed (H)        Past Surgical History:   Procedure Laterality Date    APPENDECTOMY      ESOPHAGOSCOPY, GASTROSCOPY, DUODENOSCOPY (EGD), COMBINED N/A 2/20/2023     Procedure: ESOPHAGOGASTRODUODENOSCOPY with biopsies;  Surgeon: Na Brooks MD;  Location: [facility] Main OR    GYN SURGERY      HC REVISE MEDIAN N/CARPAL TUNNEL SURG      Description: Neuroplasty Decompression Median Nerve At Carpal Tunnel;  Recorded: 09/19/2011;    HYSTERECTOMY  1984    IR CERVICAL EPIDURAL STEROID INJECTION  1/14/2005    OOPHORECTOMY Bilateral 1985    UT SYMPATHECTOMY,CERVICOTHORACIC      Description: Surgical Sympathectomy Cervicothoracic;  Recorded: 09/19/2011;    [facility] DECOMPRESS FASCIOTOMY FINGR/HAND      Description: Decompression Fasciotomy Of The Hand;  Recorded: 09/19/2011;    [facility] LAP,CHOLECYSTECTOMY/EXPLORE      Description: Cholecystectomy Laparoscopic;  Recorded: 12/09/2011;    [facility] TOTAL ABDOM HYSTERECTOMY      Description: Hysterectomy;  Recorded: 03/23/2011;   Appendectomy, hysterectomy    Current Outpatient Medications   Medication Sig Dispense Refill    acetaminophen (TYLENOL) 325 MG tablet Take 1-2 tablets (325-650 mg) by mouth every 6 hours as needed for mild pain or headaches      aspirin 81 MG EC tablet Take 1 tablet (81 mg) by mouth daily 90 tablet 3    atenolol (TENORMIN) 25 MG tablet Take 0.5 tablets (12.5 mg) by mouth daily 90 tablet 3    baclofen (LIORESAL) 10 MG tablet TAKE 2 TABLETS (20 MG TOTAL) BY MOUTH FOUR TIMES A DAY. 720 tablet 1    cyanocobalamin (VITAMIN B-12) 1000 MCG tablet Take 1 tablet (1,000 mcg) by mouth daily 30 tablet 3    medication given by implanted intrathecal pump continuous Drug # 1: Fentanyl (Sublimaze) - Conc: 2000 mcg/mL - Total Dose / 24 hours: 873.8 mcg    Drug # 2: Bupivacaine (Marcaine)  - Conc:17.7 mg/mL - Total Dose / 24 hours: 7.733 mg    Drug # 3: Hydromorphone (Dilaudid)  - Conc: 3.8 mg/mL - Total Dose / 24 hours: 1.66 mg    Diluent: NS    Infusion Rate: 0.4369 mL/24 hrs  Pump Reservoir Volume: 40 mL    Bolus doses: up to 15 bolus doses/24 hours with 1 hour lockout  Each bolus: fentanyl 80.1 mcg, 0.708 mg Bupivacaine, 0.152  "mg Dilaudid    Outside Clinic & Provider: [facility] in [place] [phone]  Last Refill Date: 10/02/23  Next Refill Date: 11/06/23  [place] Alarm Date:  11/08/23  Pump : syncromed    Deliver Pump Medication to:   For [place]: If pump is within 7 days of depletion, pharmacist will enter a 'Pain Management Adult IP Consult' into the EHR, including 'IT pain pump management' as the reason for the consult.      multivitamin, therapeutic (THERA-VIT) TABS tablet Take 1 tablet by mouth daily 30 tablet 3    omeprazole (PRILOSEC) 20 MG DR capsule TAKE 1 CAPSULE BY MOUTH TWICE A DAY BEFORE MEALS 180 capsule 3    ondansetron (ZOFRAN) 4 MG tablet Take 1-2 tablets (4-8 mg) by mouth every 6 hours as needed for nausea      senna-docusate (SENOKOT-S/PERICOLACE) 8.6-50 MG tablet Take 1 tablet by mouth 2 times daily for 15 days 30 tablet 0    thiamine (B-1) 100 MG tablet Take 1 tablet (100 mg) by mouth daily 30 tablet 3    metoclopramide (REGLAN) 10 MG tablet Take 1 tablet (10 mg) by mouth 3 times daily as needed (Nausea) AS NEEDED FOR NAUSEA 30 tablet 1    traZODone (DESYREL) 50 MG tablet TAKE 0.5 TABLETS (25 MG) BY MOUTH AT BEDTIME 45 tablet 3       Family History   Problem Relation Age of Onset    Cerebrovascular Disease Mother     Diabetes Mother     Heart Disease Mother     Hypertension Mother     Rheumatologic Disease Mother     Heart Disease Father     Pulmonary Hypertension Father     Kidney failure Father     Cancer Father         melanoma    Diabetes Sister     Hypertension Sister     Hypertension Brother         reports that she has been smoking cigarettes. She started smoking about 55 years ago. She has a 38.25 pack-year smoking history. She has never used smokeless tobacco. She reports that she does not currently use alcohol. She reports that she does not use drugs.      BP (!) 142/70 (BP Location: Right arm, Patient Position: Sitting)   Ht 1.6 m (5' 3\")   Wt 47.1 kg (103 lb 14.4 oz)   BMI " 18.41 kg/m    Body mass index is 18.41 kg/m .    EXAM:  GENERAL: Thin woman in NAD  CV: RRR  PULM: Non-labored breathing on RA  ABDOMEN: soft and nondistended. Non-tender to palpation  NEURO: No obvious deficits noted.  EXT: No edema, no obvious deformities or any other abnormalities    IMAGES: CT scan reviewed with the patient.  Paraesophageal hernia seen.  I reviewed images back to 2020.  The hernia itself has changed very little over the last 3 years.    Assessment/Plan:    Mariah Koehler is a 67 year old female [place] to clinic to discuss her hiatal hernia repair and chronic nausea and vomiting.  I suspect that her chronic nausea is multifactorial.  When I take deeper into her history, it does sound like something acute changed earlier in this year where she lost 20 pounds and has since struggled with ongoing nausea and vomiting.  The hiatal hernia itself is relatively unchanged for the last 3 years.  It is likely contributing somewhat but I doubt that it is the main problem.  It would be fairly straightforward to repair, however without a clear reason for the nausea and vomiting I am concerned that things could worsen or at least that she could blow out the repair with significant nausea and vomiting after surgery.  I think she needs further work-up to identify all the causes for her nausea.  When she has a better picture and better symptom control, we could discuss hernia repair.  She has a follow-up scheduled with [facility] 11/16/2023.  I will try and follow-up the results of that appointment.  No further routine follow-up needed at this time.  Can revisit surgery in the future.      Lucio Claire MD  General Surgeon  [facility]  Surgery [address] [address]  Office: [phone]

## 2023-11-09 NOTE — LETTER
11/9/2023         RE: Mariah Koehler  [address]  [place] [address]        Dear Colleague,    Thank you for referring your patient, Mariah Koehler, to the [facility] [place]. Please see a copy of my visit note below.    HPI:  Mariah Koehler is a 67 year old female who was referred to me by Alex Nolasco for evaluation of a hiatal hernia.  The patient has been seen multiple times by our team in the hospital including myself.  She has been struggling with chronic nausea and vomiting and weight loss for the last year.  For the course of all her work-ups and multiple CT scans, she has been noted to have a moderate-sized hiatal hernia.  This was verified on endoscopy that was done in February.  She had some mild chronic gastritis of the antrum but otherwise it was a fairly normal exam.  At that time she was started on some scopolamine patches which proved her symptoms but due to cost she has not refilled these.  She has been hospitalized now a couple of times for intractable nausea.    We talked a little more today about the onset of her symptoms.  She and her  both state that her symptoms began to worsen significantly earlier this year.  She has frequent visits with her primary care for her baclofen pump and pain medication adjustments.  At one of those visits earlier this year she was found to have lost a significant amount of weight unintentionally over short period of time.  They do not remember when the last time her medications were adjusted were but thinks that they may be contributory to her symptoms.  She denies any significant dysphagia.  She denies history of abnormal reflux.  She is on omeprazole and has been for some time.  She has occasional heartburn.     Allergies:Codeine, Morphine, Bacitracin, and Methadone    Past Medical History:   Diagnosis Date     Current mild episode of major depressive disorder, unspecified whether recurrent  (H24) 02/17/2020     Hypertension      Opiate dependence (H)      RSD (reflex sympathetic dystrophy)      Sacral fracture, closed (H)        Past Surgical History:   Procedure Laterality Date     APPENDECTOMY       ESOPHAGOSCOPY, GASTROSCOPY, DUODENOSCOPY (EGD), COMBINED N/A 2/20/2023    Procedure: ESOPHAGOGASTRODUODENOSCOPY with biopsies;  Surgeon: Na Brooks MD;  Location: [facility] Main OR     GYN SURGERY       HC REVISE MEDIAN N/CARPAL TUNNEL SURG      Description: Neuroplasty Decompression Median Nerve At Carpal Tunnel;  Recorded: 09/19/2011;     HYSTERECTOMY  1984     IR CERVICAL EPIDURAL STEROID INJECTION  1/14/2005     OOPHORECTOMY Bilateral 1985     DE SYMPATHECTOMY,CERVICOTHORACIC      Description: Surgical Sympathectomy Cervicothoracic;  Recorded: 09/19/2011;     [facility] DECOMPRESS FASCIOTOMY FINGR/HAND      Description: Decompression Fasciotomy Of The Hand;  Recorded: 09/19/2011;     [facility] LAP,CHOLECYSTECTOMY/EXPLORE      Description: Cholecystectomy Laparoscopic;  Recorded: 12/09/2011;     [facility] TOTAL ABDOM HYSTERECTOMY      Description: Hysterectomy;  Recorded: 03/23/2011;   Appendectomy, hysterectomy    Current Outpatient Medications   Medication Sig Dispense Refill     acetaminophen (TYLENOL) 325 MG tablet Take 1-2 tablets (325-650 mg) by mouth every 6 hours as needed for mild pain or headaches       aspirin 81 MG EC tablet Take 1 tablet (81 mg) by mouth daily 90 tablet 3     atenolol (TENORMIN) 25 MG tablet Take 0.5 tablets (12.5 mg) by mouth daily 90 tablet 3     baclofen (LIORESAL) 10 MG tablet TAKE 2 TABLETS (20 MG TOTAL) BY MOUTH FOUR TIMES A DAY. 720 tablet 1     cyanocobalamin (VITAMIN B-12) 1000 MCG tablet Take 1 tablet (1,000 mcg) by mouth daily 30 tablet 3     medication given by implanted intrathecal pump continuous Drug # 1: Fentanyl (Sublimaze) - Conc: 2000 mcg/mL - Total Dose / 24 hours: 873.8 mcg    Drug # 2: Bupivacaine (Marcaine)  - Conc:17.7 mg/mL - Total Dose / 24 hours: 7.733  mg    Drug # 3: Hydromorphone (Dilaudid)  - Conc: 3.8 mg/mL - Total Dose / 24 hours: 1.66 mg    Diluent: NS    Infusion Rate: 0.4369 mL/24 hrs  Pump Reservoir Volume: 40 mL    Bolus doses: up to 15 bolus doses/24 hours with 1 hour lockout  Each bolus: fentanyl 80.1 mcg, 0.708 mg Bupivacaine, 0.152 mg Dilaudid    Outside Clinic & Provider: [facility] in [place] [phone]  Last Refill Date: 10/02/23  Next Refill Date: 11/06/23  Low [place] Alarm Date:  11/08/23  Pump : SensGard    Deliver Pump Medication to:   For [place]: If pump is within 7 days of depletion, pharmacist will enter a 'Pain Management Adult IP Consult' into the EHR, including 'IT pain pump management' as the reason for the consult.       multivitamin, therapeutic (THERA-VIT) TABS tablet Take 1 tablet by mouth daily 30 tablet 3     omeprazole (PRILOSEC) 20 MG DR capsule TAKE 1 CAPSULE BY MOUTH TWICE A DAY BEFORE MEALS 180 capsule 3     ondansetron (ZOFRAN) 4 MG tablet Take 1-2 tablets (4-8 mg) by mouth every 6 hours as needed for nausea       senna-docusate (SENOKOT-S/PERICOLACE) 8.6-50 MG tablet Take 1 tablet by mouth 2 times daily for 15 days 30 tablet 0     thiamine (B-1) 100 MG tablet Take 1 tablet (100 mg) by mouth daily 30 tablet 3     metoclopramide (REGLAN) 10 MG tablet Take 1 tablet (10 mg) by mouth 3 times daily as needed (Nausea) AS NEEDED FOR NAUSEA 30 tablet 1     traZODone (DESYREL) 50 MG tablet TAKE 0.5 TABLETS (25 MG) BY MOUTH AT BEDTIME 45 tablet 3       Family History   Problem Relation Age of Onset     Cerebrovascular Disease Mother      Diabetes Mother      Heart Disease Mother      Hypertension Mother      Rheumatologic Disease Mother      Heart Disease Father      Pulmonary Hypertension Father      Kidney failure Father      Cancer Father         melanoma     Diabetes Sister      Hypertension Sister      Hypertension Brother         reports that she has been smoking cigarettes. She started smoking about  "55 years ago. She has a 38.25 pack-year smoking history. She has never used smokeless tobacco. She reports that she does not currently use alcohol. She reports that she does not use drugs.      BP (!) 142/70 (BP Location: Right arm, Patient Position: Sitting)   Ht 1.6 m (5' 3\")   Wt 47.1 kg (103 lb 14.4 oz)   BMI 18.41 kg/m    Body mass index is 18.41 kg/m .    EXAM:  GENERAL: Thin woman in NAD  CV: RRR  PULM: Non-labored breathing on RA  ABDOMEN: soft and nondistended. Non-tender to palpation  NEURO: No obvious deficits noted.  EXT: No edema, no obvious deformities or any other abnormalities    IMAGES: CT scan reviewed with the patient.  Paraesophageal hernia seen.  I reviewed images back to 2020.  The hernia itself has changed very little over the last 3 years.    Assessment/Plan:    Mariah Koehler is a 67 year old female [place] to clinic to discuss her hiatal hernia repair and chronic nausea and vomiting.  I suspect that her chronic nausea is multifactorial.  When I take deeper into her history, it does sound like something acute changed earlier in this year where she lost 20 pounds and has since struggled with ongoing nausea and vomiting.  The hiatal hernia itself is relatively unchanged for the last 3 years.  It is likely contributing somewhat but I doubt that it is the main problem.  It would be fairly straightforward to repair, however without a clear reason for the nausea and vomiting I am concerned that things could worsen or at least that she could blow out the repair with significant nausea and vomiting after surgery.  I think she needs further work-up to identify all the causes for her nausea.  When she has a better picture and better symptom control, we could discuss hernia repair.  She has a follow-up scheduled with [facility] 11/16/2023.  I will try and follow-up the results of that appointment.  No further routine follow-up needed at this time.  Can revisit surgery in the " future.      Lucio Claire MD  General Surgeon  [address]  [place] [address]  Office: [phone]      Again, thank you for allowing me to participate in the care of your patient.        Sincerely,        Lucio Claire MD

## 2023-11-12 ENCOUNTER — HOSPITAL ENCOUNTER (EMERGENCY)
Facility: HOSPITAL | Age: 68
Discharge: HOME | End: 2023-11-12
Attending: EMERGENCY MEDICINE | Admitting: EMERGENCY MEDICINE
Payer: COMMERCIAL

## 2023-11-12 VITALS
HEIGHT: 63 IN | WEIGHT: 102 LBS | DIASTOLIC BLOOD PRESSURE: 73 MMHG | HEART RATE: 58 BPM | TEMPERATURE: 97 F | RESPIRATION RATE: 20 BRPM | BODY MASS INDEX: 18.07 KG/M2 | SYSTOLIC BLOOD PRESSURE: 161 MMHG | OXYGEN SATURATION: 97 %

## 2023-11-12 DIAGNOSIS — R11.2 NAUSEA AND VOMITING, UNSPECIFIED VOMITING TYPE: ICD-10-CM

## 2023-11-12 LAB
ALBUMIN SERPL BCG-MCNC: 4.3 G/DL (ref 3.5–5.2)
ALBUMIN UR-MCNC: 10 MG/DL
ALP SERPL-CCNC: 80 U/L (ref 35–104)
ALT SERPL W P-5'-P-CCNC: 15 U/L (ref 0–50)
AMORPH CRY #/AREA URNS HPF: ABNORMAL /HPF
ANION GAP SERPL CALCULATED.3IONS-SCNC: 11 MMOL/L (ref 7–15)
APPEARANCE UR: ABNORMAL
AST SERPL W P-5'-P-CCNC: 20 U/L (ref 0–45)
BASOPHILS # BLD AUTO: 0 10E3/UL (ref 0–0.2)
BASOPHILS NFR BLD AUTO: 0 %
BILIRUB DIRECT SERPL-MCNC: <0.2 MG/DL (ref 0–0.3)
BILIRUB SERPL-MCNC: 0.4 MG/DL
BILIRUB UR QL STRIP: NEGATIVE
BUN SERPL-MCNC: 8.1 MG/DL (ref 8–23)
CALCIUM SERPL-MCNC: 9.1 MG/DL (ref 8.8–10.2)
CHLORIDE SERPL-SCNC: 101 MMOL/L (ref 98–107)
COLOR UR AUTO: YELLOW
CREAT SERPL-MCNC: 0.58 MG/DL (ref 0.51–0.95)
DEPRECATED HCO3 PLAS-SCNC: 28 MMOL/L (ref 22–29)
EGFRCR SERPLBLD CKD-EPI 2021: >90 ML/MIN/1.73M2
EOSINOPHIL # BLD AUTO: 0 10E3/UL (ref 0–0.7)
EOSINOPHIL NFR BLD AUTO: 1 %
ERYTHROCYTE [DISTWIDTH] IN BLOOD BY AUTOMATED COUNT: 14.2 % (ref 10–15)
GLUCOSE SERPL-MCNC: 109 MG/DL (ref 70–99)
GLUCOSE UR STRIP-MCNC: NEGATIVE MG/DL
HCT VFR BLD AUTO: 44.9 % (ref 35–47)
HGB BLD-MCNC: 14.8 G/DL (ref 11.7–15.7)
HGB UR QL STRIP: NEGATIVE
IMM GRANULOCYTES # BLD: 0 10E3/UL
IMM GRANULOCYTES NFR BLD: 0 %
KETONES UR STRIP-MCNC: NEGATIVE MG/DL
LEUKOCYTE ESTERASE UR QL STRIP: NEGATIVE
LIPASE SERPL-CCNC: 13 U/L (ref 13–60)
LYMPHOCYTES # BLD AUTO: 1.2 10E3/UL (ref 0.8–5.3)
LYMPHOCYTES NFR BLD AUTO: 25 %
MAGNESIUM SERPL-MCNC: 1.8 MG/DL (ref 1.7–2.3)
MCH RBC QN AUTO: 28.8 PG (ref 26.5–33)
MCHC RBC AUTO-ENTMCNC: 33 G/DL (ref 31.5–36.5)
MCV RBC AUTO: 87 FL (ref 78–100)
MONOCYTES # BLD AUTO: 0.3 10E3/UL (ref 0–1.3)
MONOCYTES NFR BLD AUTO: 6 %
MUCOUS THREADS #/AREA URNS LPF: PRESENT /LPF
NEUTROPHILS # BLD AUTO: 3.4 10E3/UL (ref 1.6–8.3)
NEUTROPHILS NFR BLD AUTO: 68 %
NITRATE UR QL: NEGATIVE
NRBC # BLD AUTO: 0 10E3/UL
NRBC BLD AUTO-RTO: 0 /100
PH UR STRIP: 7.5 [PH] (ref 5–7)
PLATELET # BLD AUTO: 239 10E3/UL (ref 150–450)
POTASSIUM SERPL-SCNC: 4.2 MMOL/L (ref 3.4–5.3)
PROT SERPL-MCNC: 6.8 G/DL (ref 6.4–8.3)
RBC # BLD AUTO: 5.14 10E6/UL (ref 3.8–5.2)
RBC URINE: 4 /HPF
SODIUM SERPL-SCNC: 140 MMOL/L (ref 135–145)
SP GR UR STRIP: 1.02 (ref 1–1.03)
UROBILINOGEN UR STRIP-MCNC: <2 MG/DL
WBC # BLD AUTO: 4.9 10E3/UL (ref 4–11)
WBC URINE: 1 /HPF

## 2023-11-12 PROCEDURE — 258N000003 HC RX IP 258 OP 636: Performed by: EMERGENCY MEDICINE

## 2023-11-12 PROCEDURE — 93005 ELECTROCARDIOGRAM TRACING: CPT | Performed by: EMERGENCY MEDICINE

## 2023-11-12 PROCEDURE — 36415 COLL VENOUS BLD VENIPUNCTURE: CPT | Performed by: EMERGENCY MEDICINE

## 2023-11-12 PROCEDURE — 81001 URINALYSIS AUTO W/SCOPE: CPT | Performed by: EMERGENCY MEDICINE

## 2023-11-12 PROCEDURE — 99284 EMERGENCY DEPT VISIT MOD MDM: CPT | Mod: 25

## 2023-11-12 PROCEDURE — 96374 THER/PROPH/DIAG INJ IV PUSH: CPT

## 2023-11-12 PROCEDURE — 83735 ASSAY OF MAGNESIUM: CPT | Performed by: EMERGENCY MEDICINE

## 2023-11-12 PROCEDURE — 82248 BILIRUBIN DIRECT: CPT | Performed by: EMERGENCY MEDICINE

## 2023-11-12 PROCEDURE — 80053 COMPREHEN METABOLIC PANEL: CPT | Performed by: EMERGENCY MEDICINE

## 2023-11-12 PROCEDURE — 250N000011 HC RX IP 250 OP 636: Mod: JZ | Performed by: EMERGENCY MEDICINE

## 2023-11-12 PROCEDURE — 85004 AUTOMATED DIFF WBC COUNT: CPT | Performed by: EMERGENCY MEDICINE

## 2023-11-12 PROCEDURE — 96375 TX/PRO/DX INJ NEW DRUG ADDON: CPT

## 2023-11-12 PROCEDURE — 96361 HYDRATE IV INFUSION ADD-ON: CPT

## 2023-11-12 PROCEDURE — 83690 ASSAY OF LIPASE: CPT | Performed by: EMERGENCY MEDICINE

## 2023-11-12 RX ORDER — KETOROLAC TROMETHAMINE 15 MG/ML
15 INJECTION, SOLUTION INTRAMUSCULAR; INTRAVENOUS ONCE
Status: COMPLETED | OUTPATIENT
Start: 2023-11-12 | End: 2023-11-12

## 2023-11-12 RX ORDER — METOCLOPRAMIDE HYDROCHLORIDE 5 MG/ML
10 INJECTION INTRAMUSCULAR; INTRAVENOUS ONCE
Status: COMPLETED | OUTPATIENT
Start: 2023-11-12 | End: 2023-11-12

## 2023-11-12 RX ORDER — SODIUM CHLORIDE 9 MG/ML
INJECTION, SOLUTION INTRAVENOUS ONCE
Status: COMPLETED | OUTPATIENT
Start: 2023-11-12 | End: 2023-11-12

## 2023-11-12 RX ORDER — PROCHLORPERAZINE MALEATE 5 MG
5 TABLET ORAL EVERY 8 HOURS PRN
Qty: 10 TABLET | Refills: 0 | Status: SHIPPED | OUTPATIENT
Start: 2023-11-12

## 2023-11-12 RX ORDER — LORAZEPAM 2 MG/ML
0.5 INJECTION INTRAMUSCULAR ONCE
Status: COMPLETED | OUTPATIENT
Start: 2023-11-12 | End: 2023-11-12

## 2023-11-12 RX ADMIN — SODIUM CHLORIDE: 9 INJECTION, SOLUTION INTRAVENOUS at 16:16

## 2023-11-12 RX ADMIN — KETOROLAC TROMETHAMINE 15 MG: 15 INJECTION, SOLUTION INTRAMUSCULAR; INTRAVENOUS at 16:45

## 2023-11-12 RX ADMIN — LORAZEPAM 0.5 MG: 2 INJECTION INTRAMUSCULAR; INTRAVENOUS at 16:28

## 2023-11-12 RX ADMIN — METOCLOPRAMIDE 10 MG: 5 INJECTION, SOLUTION INTRAMUSCULAR; INTRAVENOUS at 14:47

## 2023-11-12 ASSESSMENT — ACTIVITIES OF DAILY LIVING (ADL)
ADLS_ACUITY_SCORE: 35
ADLS_ACUITY_SCORE: 35

## 2023-11-12 NOTE — DISCHARGE INSTRUCTIONS
Please follow-up with [facility] on 11/16, as previously scheduled.    Please follow-up with your Primary Care Provider on 11/17, as previously scheduled.    Return to the ER for worsening symptoms, worsening nausea / vomiting, blood in your vomit, abdominal pain, fever or other concerns.    Drink frequent, small sips of fluids, such as Propel water or Pedialyte, to stay hydrated.

## 2023-11-12 NOTE — ED PROVIDER NOTES
Emergency Department Encounter     Evaluation Date & Time:   2023  1:06 PM    CHIEF COMPLAINT:  Abdominal Pain and Nausea, Vomiting, & Diarrhea      Triage Note:The pt was recently discharge from the hospital with the same sx's that she arrives with today. Pt has been dealing with sx;s since 2023, 20 lbs wt loss over the last 6 months. The pt has been vomiting 2 to 4 times of vomiting. Pt took 8 mg of zofran at 0900 am on 2023 and it was not helpful. Pt has a GI apt 2023 and PMD on 2023. Pt dound to have a hiatal hernia but no other factors have been found. Pt family states 5-6 times of Emergency visits lately. The pt hasd diarrhea 2023, pt had mac and cheese and was unable to keep it down. Pt tried jello and was unable to keep that down.      Triage Assessment (Adult)       Row Name [id number]          Triage Assessment    Airway WDL WDL        Respiratory WDL    Respiratory WDL WDL        Skin Circulation/Temperature WDL    Skin Circulation/Temperature WDL WDL        Cardiac WDL    Cardiac WDL WDL        Peripheral/Neurovascular WDL    Peripheral Neurovascular WDL WDL        Cognitive/Neuro/Behavioral WDL    Cognitive/Neuro/Behavioral WDL WDL                       Impression and Plan       FINAL IMPRESSION:    ICD-10-CM    1. Nausea and vomiting, unspecified vomiting type  R11.2             ED COURSE & MEDICAL DECISION MAKIN:13 PM I met the patient and performed my initial interview and exam.   5:45 PM I rechecked and updated the patient   5:59 PM We discussed the plan for discharge and the patient is agreeable. Reviewed supportive cares, symptomatic treatment, outpatient follow up, and reasons to return to the Emergency Department. All questions and concerns were addressed. Patient to be discharged by ED RN.        67 year old female, history of chronic pain with opioid dependence, HTN, colitis, hiatal hernia, s/p cholecystectomy and s/p appendectomy, who presents for  evaluation of nausea and vomiting that has been ongoing for the past several weeks. She had recent hospitalization for the same without determined etiology. Zofran had been helping initially, but not today. She reports mild diarrhea yesterday. No abdominal pain. She reports urinary urgency today, but did not feel that she was able to completely empty her bladder.    She had recent CT head that was unremarkable. CT abdomen / pelvis demonstrated a hiatal hernia, but was otherwise unremarkable.    On exam, abdomen is soft and non-tender to palpation. I do not suspect bowel obstruction, intra-abdominal abscess or other acute surgical process and do not think repeat imaging is indicated.    IV access established, blood sent for labs and IV fluids initiated.  Patient was given IV Reglan without much improvement.  She was then given IV Toradol and IV Ativan with improvement.    Bedside bladder scan demonstrated ~77cc urine in the bladder; patient was straight cathed with return of ~70cc urine.  I do not think Bellamy placement is indicated.    Labs remarkable for no leukocytosis, anemia, electrolyte derangements or renal impairment.  No laboratory evidence of hepatitis, biliary obstruction or pancreatitis.  UA negative for infection.    Patient tolerating po and I do not think admission is indicated.  She was given a prescription for Compazine, which she reports has worked for her when Zofran has not.  She was advised to follow-up with [facility] on 11/16 and her PCP on 11/17, as previously scheduled.  Return precautions provided.  Patient stable throughout ED course.      At the conclusion of the encounter I discussed the results of all the tests and the disposition. The questions were answered. The patient acknowledged understanding and was agreeable with the care plan.    Medical Decision Making    History:  Supplemental history from: Documented in chart, if applicable  External Record(s) reviewed: Documented in chart, if  applicable. Inpatient hospital admission - SEE HPI    Work Up:  Chart documentation includes differential considered and any EKGs or imaging independently interpreted by provider, where specified.  In additional to work up documented, I considered the following work up: Documented in chart, if applicable. CT abdomen / pelvis - SEE ABOVE    External consultation:  Discussion of management with another provider: Documented in chart, if applicable    Complicating factors:  Care impacted by chronic illness: Chronic Pain, Hypertension, and Other: colitis  Care affected by social determinants of health: Alcohol Abuse and/or Recreational Drug Use    Disposition considerations: Discharge. I prescribed additional prescription strength medication(s) as charted. I considered admission, but ultimately discharged patient given reassuring evaluation and clinical improvement.       MEDICATIONS GIVEN IN THE EMERGENCY DEPARTMENT:  Medications   metoclopramide (REGLAN) injection 10 mg (10 mg Intravenous $Given 11/12/23 1447)   sodium chloride 0.9 % infusion (0 mLs Intravenous Stopped 11/12/23 1756)   LORazepam (ATIVAN) injection 0.5 mg (0.5 mg Intravenous $Given 11/12/23 1628)   ketorolac (TORADOL) injection 15 mg (15 mg Intravenous $Given 11/12/23 1645)       NEW PRESCRIPTIONS STARTED AT TODAY'S ED VISIT:  New Prescriptions    PROCHLORPERAZINE (COMPAZINE) 5 MG TABLET    Take 1 tablet (5 mg) by mouth every 8 hours as needed for nausea or vomiting       HPI     HPI     Mariah Koehler is a 67 year old female, history of chronic pain with opioid dependence, HTN, colitis, hiatal hernia, s/p cholecystectomy and s/p appendectomy, who presents to this ED via walk in for evaluation of nausea and vomiting.    Per chart review patient was admitted to [facility] from 10/30/23-11/1/23 for recurrent nausea and vomiting. She was recently discharged from [facility] for this as well. Nausea is controlled with  zofran and compazin. Head CT was negative, abdominal CT showed a moderate size hiatal hernia. Patient was started on senna-docusate and discharged with nausea controlled.     Patient was seen by General surgery Dr. Claire on 11/09/23 for follow up of her hiatal hernia. They have postponed hernia repair for further work up to identify nausea cause. She has a follow-up scheduled with [facility] 11/16/2023.     For the last several weeks, the patient reports ongoing nausea and vomiting. She was just discharged from the hospital on 11/1 for similar symptoms. She reports ~4 episodes of vomiting daily. Initially Zofran was helping, however does not seem to help any more. She reports mild diarrhea yesterday. No hematemesis, hematochezia or melena. She denies associated abdominal pain. She denies dysuria, however today developed urinary urgency and was able to urinate only a small amount. No fevers.     She otherwise denies chest pain, shortness of breath, cough or other concerns.    She had unremarkable head and abdominal / pelvis CT imaging on 10/24.     The patient has [facility] follow up on 11/16 and PCP visit on 11/17.     REVIEW OF SYSTEMS:  All other systems reviewed and are negative.      Medical History     Past Medical History:   Diagnosis Date    Current mild episode of major depressive disorder, unspecified whether recurrent (H24) 02/17/2020    Hypertension     Opiate dependence (H)     RSD (reflex sympathetic dystrophy)     Sacral fracture, closed (H)        Past Surgical History:   Procedure Laterality Date    APPENDECTOMY      ESOPHAGOSCOPY, GASTROSCOPY, DUODENOSCOPY (EGD), COMBINED N/A 2/20/2023    Procedure: ESOPHAGOGASTRODUODENOSCOPY with biopsies;  Surgeon: Na Brooks MD;  Location: [facility] Main OR    GYN SURGERY      HC REVISE MEDIAN N/CARPAL TUNNEL SURG      Description: Neuroplasty Decompression Median Nerve At Carpal Tunnel;  Recorded: 09/19/2011;    HYSTERECTOMY  1984     IR CERVICAL EPIDURAL STEROID INJECTION  1/14/2005    OOPHORECTOMY Bilateral 1985    VT SYMPATHECTOMY,CERVICOTHORACIC      Description: Surgical Sympathectomy Cervicothoracic;  Recorded: 09/19/2011;    [facility] DECOMPRESS FASCIOTOMY FINGR/HAND      Description: Decompression Fasciotomy Of The Hand;  Recorded: 09/19/2011;    [facility] LAP,CHOLECYSTECTOMY/EXPLORE      Description: Cholecystectomy Laparoscopic;  Recorded: 12/09/2011;    [facility] TOTAL ABDOM HYSTERECTOMY      Description: Hysterectomy;  Recorded: 03/23/2011;       Family History   Problem Relation Age of Onset    Cerebrovascular Disease Mother     Diabetes Mother     Heart Disease Mother     Hypertension Mother     Rheumatologic Disease Mother     Heart Disease Father     Pulmonary Hypertension Father     Kidney failure Father     Cancer Father         melanoma    Diabetes Sister     Hypertension Sister     Hypertension Brother        Social History     Tobacco Use    Smoking status: Every Day     Packs/day: 0.75     Years: 51.00     Additional pack years: 0.00     Total pack years: 38.25     Types: Cigarettes     Start date: 1/1/1968    Smokeless tobacco: Never   Vaping Use    Vaping Use: Never used   Substance Use Topics    Alcohol use: Not Currently     Comment: 1 glass a year    Drug use: Never       prochlorperazine (COMPAZINE) 5 MG tablet  acetaminophen (TYLENOL) 325 MG tablet  aspirin 81 MG EC tablet  atenolol (TENORMIN) 25 MG tablet  baclofen (LIORESAL) 10 MG tablet  cyanocobalamin (VITAMIN B-12) 1000 MCG tablet  medication given by implanted intrathecal pump  metoclopramide (REGLAN) 10 MG tablet  multivitamin, therapeutic (THERA-VIT) TABS tablet  omeprazole (PRILOSEC) 20 MG DR capsule  ondansetron (ZOFRAN) 4 MG tablet  senna-docusate (SENOKOT-S/PERICOLACE) 8.6-50 MG tablet  thiamine (B-1) 100 MG tablet  traZODone (DESYREL) 50 MG tablet        Physical Exam     First Vitals:  Patient Vitals for the past 24 hrs:   BP Temp Temp src Pulse Resp SpO2 Height Weight  "  11/12/23 1730 (!) 161/73 -- -- 58 -- 97 % -- --   11/12/23 1703 (!) 198/77 -- -- 83 -- 96 % -- --   11/12/23 1626 (!) 172/78 -- -- 72 -- 95 % -- --   11/12/23 1616 (!) 193/91 -- -- 71 -- 96 % -- --   11/12/23 1259 (!) 145/83 97  F (36.1  C) Temporal 108 20 99 % 1.6 m (5' 3\") 46.3 kg (102 lb)       PHYSICAL EXAM:   Physical Exam    GENERAL: Awake, alert.  In mild acute distress.   HEENT: Normocephalic, atraumatic.   NECK: No stridor.  PULMONARY: Symmetrical breath sounds without distress.  Lungs clear to auscultation bilaterally without wheezes, rhonchi or rales.  CARDIO: Borderline tachycardic rate with regular rhythm.  No significant murmur, rub or gallop.  Radial pulses strong and symmetrical.  ABDOMINAL: Abdomen soft, non-distended and non-tender to palpation.   EXTREMITIES: No lower extremity swelling or edema.      NEURO: Alert and oriented to person, place and time.  Cranial nerves grossly intact.  No focal motor deficit.  PSYCH: Normal mood and affect.      Results     LAB:  All pertinent labs reviewed and interpreted  Labs Ordered and Resulted from Time of ED Arrival to Time of ED Departure   BASIC METABOLIC PANEL - Abnormal       Result Value    Sodium 140      Potassium 4.2      Chloride 101      Carbon Dioxide (CO2) 28      Anion Gap 11      Urea Nitrogen 8.1      Creatinine 0.58      GFR Estimate >90      Calcium 9.1      Glucose 109 (*)    ROUTINE UA WITH MICROSCOPIC REFLEX TO CULTURE - Abnormal    Color Urine Yellow      Appearance Urine Turbid (*)     Glucose Urine Negative      Bilirubin Urine Negative      Ketones Urine Negative      Specific Gravity Urine 1.019      Blood Urine Negative      pH Urine 7.5 (*)     Protein Albumin Urine 10 (*)     Urobilinogen Urine <2.0      Nitrite Urine Negative      Leukocyte Esterase Urine Negative      Mucus Urine Present (*)     Amorphous Crystals Urine Moderate (*)     RBC Urine 4 (*)     WBC Urine 1     HEPATIC FUNCTION PANEL - Normal    Protein Total 6.8   "    Albumin 4.3      Bilirubin Total 0.4      Alkaline Phosphatase 80      AST 20      ALT 15      Bilirubin Direct <0.20     LIPASE - Normal    Lipase 13     MAGNESIUM - Normal    Magnesium 1.8     CBC WITH PLATELETS AND DIFFERENTIAL    WBC Count 4.9      RBC Count 5.14      Hemoglobin 14.8      Hematocrit 44.9      MCV 87      MCH 28.8      MCHC 33.0      RDW 14.2      Platelet Count 239      % Neutrophils 68      % Lymphocytes 25      % Monocytes 6      % Eosinophils 1      % Basophils 0      % Immature Granulocytes 0      NRBCs per 100 WBC 0      Absolute Neutrophils 3.4      Absolute Lymphocytes 1.2      Absolute Monocytes 0.3      Absolute Eosinophils 0.0      Absolute Basophils 0.0      Absolute Immature Granulocytes 0.0      Absolute NRBCs 0.0         EC2023, 14:11; NSR with rate of 78 bpm; normal intervals; normal conduction; septal Q waves with no ST-T wave changes consistent with ACS or pericarditis; compared to previous EKG dated 10/24/2023, there are no significant changes    EKG independently reviewed and interpreted by Shirley Awan MD        I, Nae Thomas, am serving as a scribe to document services personally performed by Shirley Awan MD based on my observation and the provider's statements to me. I, Shirley Awan MD attest that Nae Thomas is acting in a scribe capacity, has observed my performance of the services and has documented them in accordance with my direction.    Shirley Awan MD  Emergency Medicine  [facility] EMERGENCY DEPARTMENT           Shirley Awan MD  [phone]

## 2023-11-12 NOTE — ED TRIAGE NOTES
The pt was recently discharge from the hospital with the same sx's that she arrives with today. Pt has been dealing with sx;s since Feb 2023, 20 lbs wt loss over the last 6 months. The pt has been vomiting 2 to 4 times of vomiting. Pt took 8 mg of zofran at 0900 am on 11/12/2023 and it was not helpful. Pt has a GI apt 11/16/2023 and PMD on 11/17/2023. Pt dound to have a hiatal hernia but no other factors have been found. Pt family states 5-6 times of Emergency visits lately. The pt hasd diarrhea 11/11/2023, pt had mac and cheese and was unable to keep it down. Pt tried jello and was unable to keep that down.      Triage Assessment (Adult)       Row Name 11/12/23 1301          Triage Assessment    Airway WDL WDL        Respiratory WDL    Respiratory WDL WDL        Skin Circulation/Temperature WDL    Skin Circulation/Temperature WDL WDL        Cardiac WDL    Cardiac WDL WDL        Peripheral/Neurovascular WDL    Peripheral Neurovascular WDL WDL        Cognitive/Neuro/Behavioral WDL    Cognitive/Neuro/Behavioral WDL WDL

## 2023-11-16 ENCOUNTER — TRANSFERRED RECORDS (OUTPATIENT)
Dept: HEALTH INFORMATION MANAGEMENT | Facility: CLINIC | Age: 68
End: 2023-11-16
Payer: COMMERCIAL

## 2023-11-16 ASSESSMENT — PATIENT HEALTH QUESTIONNAIRE - PHQ9
10. IF YOU CHECKED OFF ANY PROBLEMS, HOW DIFFICULT HAVE THESE PROBLEMS MADE IT FOR YOU TO DO YOUR WORK, TAKE CARE OF THINGS AT HOME, OR GET ALONG WITH OTHER PEOPLE: NOT DIFFICULT AT ALL
SUM OF ALL RESPONSES TO PHQ QUESTIONS 1-9: 2
SUM OF ALL RESPONSES TO PHQ QUESTIONS 1-9: 2

## 2023-11-17 ENCOUNTER — OFFICE VISIT (OUTPATIENT)
Dept: FAMILY MEDICINE | Facility: CLINIC | Age: 68
End: 2023-11-17
Payer: COMMERCIAL

## 2023-11-17 VITALS
SYSTOLIC BLOOD PRESSURE: 132 MMHG | OXYGEN SATURATION: 96 % | WEIGHT: 100 LBS | BODY MASS INDEX: 17.71 KG/M2 | RESPIRATION RATE: 20 BRPM | DIASTOLIC BLOOD PRESSURE: 63 MMHG | TEMPERATURE: 97.9 F | HEART RATE: 67 BPM

## 2023-11-17 DIAGNOSIS — K31.84 GASTROPARESIS: ICD-10-CM

## 2023-11-17 DIAGNOSIS — G89.4 CHRONIC PAIN SYNDROME: Primary | ICD-10-CM

## 2023-11-17 DIAGNOSIS — M62.838 MUSCLE SPASM: ICD-10-CM

## 2023-11-17 DIAGNOSIS — R41.89 COGNITIVE CHANGES: ICD-10-CM

## 2023-11-17 PROCEDURE — 99214 OFFICE O/P EST MOD 30 MIN: CPT | Mod: 25 | Performed by: FAMILY MEDICINE

## 2023-11-17 PROCEDURE — 90662 IIV NO PRSV INCREASED AG IM: CPT | Performed by: FAMILY MEDICINE

## 2023-11-17 PROCEDURE — G0008 ADMIN INFLUENZA VIRUS VAC: HCPCS | Performed by: FAMILY MEDICINE

## 2023-11-17 RX ORDER — BACLOFEN 10 MG/1
40 TABLET ORAL 3 TIMES DAILY
Start: 2023-11-17 | End: 2023-11-17

## 2023-11-17 RX ORDER — BACLOFEN 10 MG/1
20 TABLET ORAL 3 TIMES DAILY
Start: 2023-11-17 | End: 2024-02-22

## 2023-11-17 NOTE — PROGRESS NOTES
Assessment & Plan     Chronic pain syndrome  Discussed polypharmacy possibly contributing to her gastroparesis  Discussed cutting down on medications were able  Pain clinic is working on cutting down on pain medications  We discussed cutting down on baclofen  - baclofen (LIORESAL) 10 MG tablet; Take 4 tablets (40 mg) by mouth 3 times daily    Muscle spasm  Discussed cutting down on baclofen to 3 times daily plan in the future to continue to titrate down as tolerated  - baclofen (LIORESAL) 10 MG tablet; take 2 tablets TID    Gastroparesis  Reviewed multiple ER visits and hospitalization  Reviewed medications and GI consult  Agree with trying obtaining scopolamine patch as this has been helpful for in the past  Discussed cutting down on medications to cut down and interactions with her polypharmacy    Cognitive changes  Discussed again today medications likely exacerbating some cognitive changes  Glad to hear they are coming down on pain medications with pain clinic but would like to cut down on baclofen as well  Patient is willing to do so-never started donepezil that we previously discussed at this time hold off as we make other medication changes.    Up-to-date with influenza today    Alex Nolasco MD  [facility]    Brad Lemus is a 67 year old, presenting for the following health issues:  Hospital F/U (In the past month has had 2 hospitals stays and multiple ER visits ) and Memory Loss (Worsening memory concerns)  Discussed with patient today multiple ER visits that she is present with her  today  Patient is work with the pain clinic and has recently did made the decision to cut down on pain medications  Discussed working diagnosis of gastroparesis leading to her nausea and vomiting-discussed how this is likely increasing medication interactions and dosing with her weight changes.  We discussed cutting down on baclofen.  We would like to update with flu vaccine  today.  Patient has noted to be more cognitively affected by medications it seems over time we had previously talked about possibly starting donepezil she did not start due to the possibility of side effects.  At this time he like to work on cutting down on her polypharmacy and dosing of pain medications and others that could be contributing to her gastroparesis and hold off on donepezil at this time.      11/17/2023    10:31 AM   Additional Questions   Roomed by Dena PRUITT CMA   Accompanied by [facility] Follow-up Visit:    Hospital/Nursing Home/IP Rehab Facility: [facility]  Date of Admission:  10/24  Date of Discharge: 10/27  Reason(s) for Admission: Nausea and vomiting    Hospital/Nursing Home/IP Rehab Facility: [facility]  Date of Admission:  10/30  Date of Discharge: 11/1  Reason(s) for Admission: Nausea and vomiting     Was your hospitalization related to COVID-19? No   Problems taking medications regularly:  None  Medication changes since discharge: She is cutting down on pain medications with pain clinic and her decreasing baclofen to 3 times daily  Problems adhering to non-medication therapy:  None    Summary of hospitalization:  [facility] discharge summary reviewed  Diagnostic Tests/Treatments reviewed.  Follow up needed: Pain clinic with continued titration down on medication  Other Healthcare Providers Involved in Patient s Care:          GI specialty and pain clinic  Update since discharge: improved.         Plan of care communicated with patient and caregiver                 Objective    /63 (BP Location: Left arm, Patient Position: Sitting, Cuff Size: Adult Regular)   Pulse 67   Temp 97.9  F (36.6  C) (Oral)   Resp 20   Wt 45.4 kg (100 lb)   SpO2 96%   BMI 17.71 kg/m    Body mass index is 17.71 kg/m .  Physical Exam   GENERAL: healthy, alert, and bent forward more than upright in chair  EYES:  Eyes grossly normal to inspection, PERRL and conjunctivae and sclerae normal  RESP: lungs clear to auscultation - no rales, rhonchi or wheezes  CV: regular rate and rhythm, normal S1 S2, no S3 or S4, no murmur, click or rub, no peripheral edema and peripheral pulses strong  MS: no gross musculoskeletal defects noted, no edema  NEURO: Normal strength and tone, mentation intact and speech normal                  Answers submitted by the patient for this visit:  Patient Health Questionnaire (Submitted on 11/16/2023)  If you checked off any problems, how difficult have these problems made it for you to do your work, take care of things at home, or get along with other people?: Not difficult at all  PHQ9 TOTAL SCORE: 2

## 2023-11-20 LAB
ATRIAL RATE - MUSE: 78 BPM
DIASTOLIC BLOOD PRESSURE - MUSE: NORMAL MMHG
INTERPRETATION ECG - MUSE: NORMAL
P AXIS - MUSE: 51 DEGREES
PR INTERVAL - MUSE: 126 MS
QRS DURATION - MUSE: 80 MS
QT - MUSE: 374 MS
QTC - MUSE: 426 MS
R AXIS - MUSE: 74 DEGREES
SYSTOLIC BLOOD PRESSURE - MUSE: NORMAL MMHG
T AXIS - MUSE: 78 DEGREES
VENTRICULAR RATE- MUSE: 78 BPM

## 2024-01-22 ENCOUNTER — APPOINTMENT (OUTPATIENT)
Dept: CT IMAGING | Facility: HOSPITAL | Age: 69
End: 2024-01-22
Attending: EMERGENCY MEDICINE
Payer: COMMERCIAL

## 2024-01-22 ENCOUNTER — HOSPITAL ENCOUNTER (EMERGENCY)
Facility: HOSPITAL | Age: 69
Discharge: HOME | End: 2024-01-22
Attending: EMERGENCY MEDICINE | Admitting: EMERGENCY MEDICINE
Payer: COMMERCIAL

## 2024-01-22 VITALS
TEMPERATURE: 98.7 F | BODY MASS INDEX: 17.36 KG/M2 | HEART RATE: 83 BPM | SYSTOLIC BLOOD PRESSURE: 146 MMHG | WEIGHT: 98 LBS | DIASTOLIC BLOOD PRESSURE: 71 MMHG | RESPIRATION RATE: 18 BRPM | OXYGEN SATURATION: 98 % | HEIGHT: 63 IN

## 2024-01-22 DIAGNOSIS — R11.2 NAUSEA AND VOMITING, UNSPECIFIED VOMITING TYPE: ICD-10-CM

## 2024-01-22 DIAGNOSIS — R10.13 ABDOMINAL PAIN, EPIGASTRIC: ICD-10-CM

## 2024-01-22 LAB
ALBUMIN SERPL BCG-MCNC: 4.8 G/DL (ref 3.5–5.2)
ALP SERPL-CCNC: 94 U/L (ref 40–150)
ALT SERPL W P-5'-P-CCNC: 28 U/L (ref 0–50)
ANION GAP SERPL CALCULATED.3IONS-SCNC: 15 MMOL/L (ref 7–15)
AST SERPL W P-5'-P-CCNC: 23 U/L (ref 0–45)
BASOPHILS # BLD AUTO: 0 10E3/UL (ref 0–0.2)
BASOPHILS NFR BLD AUTO: 0 %
BILIRUB SERPL-MCNC: 0.5 MG/DL
BUN SERPL-MCNC: 12.9 MG/DL (ref 8–23)
CALCIUM SERPL-MCNC: 9.7 MG/DL (ref 8.8–10.2)
CHLORIDE SERPL-SCNC: 102 MMOL/L (ref 98–107)
CREAT SERPL-MCNC: 0.54 MG/DL (ref 0.51–0.95)
DEPRECATED HCO3 PLAS-SCNC: 25 MMOL/L (ref 22–29)
EGFRCR SERPLBLD CKD-EPI 2021: >90 ML/MIN/1.73M2
EOSINOPHIL # BLD AUTO: 0 10E3/UL (ref 0–0.7)
EOSINOPHIL NFR BLD AUTO: 0 %
ERYTHROCYTE [DISTWIDTH] IN BLOOD BY AUTOMATED COUNT: 13.4 % (ref 10–15)
GLUCOSE SERPL-MCNC: 121 MG/DL (ref 70–99)
HCT VFR BLD AUTO: 51.3 % (ref 35–47)
HGB BLD-MCNC: 17.7 G/DL (ref 11.7–15.7)
HOLD SPECIMEN: NORMAL
IMM GRANULOCYTES # BLD: 0 10E3/UL
IMM GRANULOCYTES NFR BLD: 0 %
LACTATE SERPL-SCNC: 1.5 MMOL/L (ref 0.7–2)
LIPASE SERPL-CCNC: 15 U/L (ref 13–60)
LYMPHOCYTES # BLD AUTO: 1.3 10E3/UL (ref 0.8–5.3)
LYMPHOCYTES NFR BLD AUTO: 17 %
MAGNESIUM SERPL-MCNC: 1.8 MG/DL (ref 1.7–2.3)
MCH RBC QN AUTO: 29.5 PG (ref 26.5–33)
MCHC RBC AUTO-ENTMCNC: 34.5 G/DL (ref 31.5–36.5)
MCV RBC AUTO: 86 FL (ref 78–100)
MONOCYTES # BLD AUTO: 0.3 10E3/UL (ref 0–1.3)
MONOCYTES NFR BLD AUTO: 4 %
NEUTROPHILS # BLD AUTO: 6.2 10E3/UL (ref 1.6–8.3)
NEUTROPHILS NFR BLD AUTO: 79 %
NRBC # BLD AUTO: 0 10E3/UL
NRBC BLD AUTO-RTO: 0 /100
PLATELET # BLD AUTO: 232 10E3/UL (ref 150–450)
POTASSIUM SERPL-SCNC: 4 MMOL/L (ref 3.4–5.3)
PROT SERPL-MCNC: 7.8 G/DL (ref 6.4–8.3)
RBC # BLD AUTO: 5.99 10E6/UL (ref 3.8–5.2)
SODIUM SERPL-SCNC: 142 MMOL/L (ref 135–145)
WBC # BLD AUTO: 7.9 10E3/UL (ref 4–11)

## 2024-01-22 PROCEDURE — 250N000011 HC RX IP 250 OP 636: Performed by: EMERGENCY MEDICINE

## 2024-01-22 PROCEDURE — 82040 ASSAY OF SERUM ALBUMIN: CPT | Performed by: EMERGENCY MEDICINE

## 2024-01-22 PROCEDURE — 36415 COLL VENOUS BLD VENIPUNCTURE: CPT | Performed by: EMERGENCY MEDICINE

## 2024-01-22 PROCEDURE — 96375 TX/PRO/DX INJ NEW DRUG ADDON: CPT

## 2024-01-22 PROCEDURE — 74176 CT ABD & PELVIS W/O CONTRAST: CPT

## 2024-01-22 PROCEDURE — 258N000003 HC RX IP 258 OP 636: Performed by: EMERGENCY MEDICINE

## 2024-01-22 PROCEDURE — 99285 EMERGENCY DEPT VISIT HI MDM: CPT | Mod: 25

## 2024-01-22 PROCEDURE — 83605 ASSAY OF LACTIC ACID: CPT | Performed by: EMERGENCY MEDICINE

## 2024-01-22 PROCEDURE — 96361 HYDRATE IV INFUSION ADD-ON: CPT

## 2024-01-22 PROCEDURE — 83690 ASSAY OF LIPASE: CPT | Performed by: EMERGENCY MEDICINE

## 2024-01-22 PROCEDURE — 85025 COMPLETE CBC W/AUTO DIFF WBC: CPT | Performed by: EMERGENCY MEDICINE

## 2024-01-22 PROCEDURE — 83735 ASSAY OF MAGNESIUM: CPT | Performed by: EMERGENCY MEDICINE

## 2024-01-22 PROCEDURE — 96374 THER/PROPH/DIAG INJ IV PUSH: CPT

## 2024-01-22 RX ORDER — METOCLOPRAMIDE 10 MG/1
10 TABLET ORAL 3 TIMES DAILY PRN
Qty: 20 TABLET | Refills: 0 | Status: SHIPPED | OUTPATIENT
Start: 2024-01-22

## 2024-01-22 RX ORDER — LORAZEPAM 2 MG/ML
1 INJECTION INTRAMUSCULAR ONCE
Status: COMPLETED | OUTPATIENT
Start: 2024-01-22 | End: 2024-01-22

## 2024-01-22 RX ORDER — ONDANSETRON 4 MG/1
4 TABLET, ORALLY DISINTEGRATING ORAL EVERY 8 HOURS PRN
Qty: 10 TABLET | Refills: 0 | Status: SHIPPED | OUTPATIENT
Start: 2024-01-22 | End: 2024-01-25

## 2024-01-22 RX ORDER — METOCLOPRAMIDE HYDROCHLORIDE 5 MG/ML
10 INJECTION INTRAMUSCULAR; INTRAVENOUS ONCE
Status: COMPLETED | OUTPATIENT
Start: 2024-01-22 | End: 2024-01-22

## 2024-01-22 RX ORDER — MORPHINE SULFATE 4 MG/ML
4 INJECTION, SOLUTION INTRAMUSCULAR; INTRAVENOUS EVERY 30 MIN PRN
Status: DISCONTINUED | OUTPATIENT
Start: 2024-01-22 | End: 2024-01-22 | Stop reason: HOSPADM

## 2024-01-22 RX ORDER — ONDANSETRON 2 MG/ML
8 INJECTION INTRAMUSCULAR; INTRAVENOUS ONCE
Status: COMPLETED | OUTPATIENT
Start: 2024-01-22 | End: 2024-01-22

## 2024-01-22 RX ORDER — FAMOTIDINE 20 MG/1
20 TABLET, FILM COATED ORAL 2 TIMES DAILY PRN
Qty: 20 TABLET | Refills: 0 | Status: SHIPPED | OUTPATIENT
Start: 2024-01-22

## 2024-01-22 RX ADMIN — FAMOTIDINE 20 MG: 10 INJECTION, SOLUTION INTRAVENOUS at 17:34

## 2024-01-22 RX ADMIN — ONDANSETRON 8 MG: 2 INJECTION INTRAMUSCULAR; INTRAVENOUS at 15:11

## 2024-01-22 RX ADMIN — MORPHINE SULFATE 4 MG: 4 INJECTION, SOLUTION INTRAMUSCULAR; INTRAVENOUS at 16:14

## 2024-01-22 RX ADMIN — SODIUM CHLORIDE 1000 ML: 9 INJECTION, SOLUTION INTRAVENOUS at 15:11

## 2024-01-22 RX ADMIN — SODIUM CHLORIDE 1000 ML: 9 INJECTION, SOLUTION INTRAVENOUS at 17:30

## 2024-01-22 RX ADMIN — METOCLOPRAMIDE 10 MG: 5 INJECTION, SOLUTION INTRAMUSCULAR; INTRAVENOUS at 15:11

## 2024-01-22 RX ADMIN — LORAZEPAM 1 MG: 2 INJECTION INTRAMUSCULAR; INTRAVENOUS at 17:28

## 2024-01-22 ASSESSMENT — ACTIVITIES OF DAILY LIVING (ADL)
ADLS_ACUITY_SCORE: 35
ADLS_ACUITY_SCORE: 35

## 2024-01-22 NOTE — ED PROVIDER NOTES
EMERGENCY DEPARTMENT ENCOUNTER      NAME: Mariah Koehler  AGE: 68 year old female  YOB: 1955  MRN: [id number]  EVALUATION DATE & TIME: No admission date for patient encounter.    PCP: Alex Nolasco    ED PROVIDER: Rajiv Rodriguez M.D.      Chief Complaint   Patient presents with    Nausea & Vomiting         FINAL IMPRESSION:  1.  Chronic pain syndrome.  2.  Acute recurrent nausea and vomiting, gastroparesis.      ED COURSE & MEDICAL DECISION MAKIN:50 PM.  I met with the patient to gather history and to perform my initial exam. We discussed plans for the ED course, including diagnostic testing and treatment. PPE worn: cloth mask.  Patient with a history of recurrent nausea vomiting as well as chronic pain syndrome.  Patient has been diagnosed with gastroparesis and she notes no relief with the Compazine and Zofran at home.  She notes diffuse abdominal pain on examination.  2:48 PM.  Everything still pending.  Medications and fluids still pending.  Tentatively, patient be signed out to the afternoon ED physician around 3 PM to follow-up on studies and discharge versus admission.    Pertinent Labs & Imaging studies reviewed. (See chart for details)  68 year old female presents to the Emergency Department for evaluation of abdominal pain with nausea and vomiting.    At the conclusion of the encounter I discussed the results of all of the tests and the disposition. The questions were answered. The patient or family acknowledged understanding and was agreeable with the care plan.              Medical Decision Making  Obtained supplemental history: Patient and .  Reviewed external records: Both inpatient and [facility] were [facility].  Care impacted by chronic illness: Chronic pain syndrome and gastroparesis  Care significantly affected by social determinants of health:Access to Medical Care  Did you consider but not order tests?: Work up considered but not performed and  documented in chart, if applicable  Did you interpret images independently?: Independent interpretation of ECG and images noted in documentation, when applicable.  Consultation discussion with other provider:Did you involve another provider (consultant, , pharmacy, etc.)?: No   I am hoping patient will be discharged to home after treatment and evaluation.  Unfortunate the last time she presented with similar complaints she required admission.      MEDICATIONS GIVEN IN THE EMERGENCY:  Medications   sodium chloride 0.9% BOLUS 1,000 mL (has no administration in time range)   ondansetron (ZOFRAN) injection 8 mg (has no administration in time range)   metoclopramide (REGLAN) injection 10 mg (has no administration in time range)   morphine (PF) injection 4 mg (has no administration in time range)       NEW PRESCRIPTIONS STARTED AT TODAY'S ER VISIT  New Prescriptions    No medications on file          =================================================================    HPI    Patient information was obtained from: The patient and her .    Use of : N/A         Mariah JOSE Koehler is a 68 year old female with a pertinent history of chronic pain and gastroparesis with recurrent nausea and vomiting who presents to this ED today she otherwise for evaluation of abdominal pain with nausea and vomiting.  Patient notes diffuse abdominal pain as well as nonstop nausea and vomiting since yesterday afternoon.  This happens 4-5 times a year.  Last such presentation she required admission.    She does not identify any waxing or waning symptoms otherwise, exacerbating or alleviating features, associated symptoms except as mentioned.  She otherwise.  Denies any pain related complaints.    REVIEW OF SYSTEMS   Review of Systems patient complaining of abdominal pain with nausea vomiting, no other complaints.    PAST MEDICAL HISTORY:  Past Medical History:   Diagnosis Date    Current mild episode of major depressive disorder,  unspecified whether recurrent (H24) 02/17/2020    Hypertension     Opiate dependence (H)     RSD (reflex sympathetic dystrophy)     Sacral fracture, closed (H)        PAST SURGICAL HISTORY:  Past Surgical History:   Procedure Laterality Date    APPENDECTOMY      ESOPHAGOSCOPY, GASTROSCOPY, DUODENOSCOPY (EGD), COMBINED N/A 2/20/2023    Procedure: ESOPHAGOGASTRODUODENOSCOPY with biopsies;  Surgeon: Na Brooks MD;  Location: [facility] Main OR    GYN SURGERY      HC REVISE MEDIAN N/CARPAL TUNNEL SURG      Description: Neuroplasty Decompression Median Nerve At Carpal Tunnel;  Recorded: 09/19/2011;    HYSTERECTOMY  1984    IR CERVICAL EPIDURAL STEROID INJECTION  1/14/2005    OOPHORECTOMY Bilateral 1985    [place] SYMPATHECTOMY,CERVICOTHORACIC      Description: Surgical Sympathectomy Cervicothoracic;  Recorded: 09/19/2011;    [facility] DECOMPRESS FASCIOTOMY FINGR/HAND      Description: Decompression Fasciotomy Of The Hand;  Recorded: 09/19/2011;    [facility] LAP,CHOLECYSTECTOMY/EXPLORE      Description: Cholecystectomy Laparoscopic;  Recorded: 12/09/2011;    [facility] TOTAL ABDOM HYSTERECTOMY      Description: Hysterectomy;  Recorded: 03/23/2011;           CURRENT MEDICATIONS:    acetaminophen (TYLENOL) 325 MG tablet  aspirin 81 MG EC tablet  atenolol (TENORMIN) 25 MG tablet  baclofen (LIORESAL) 10 MG tablet  cyanocobalamin (VITAMIN B-12) 1000 MCG tablet  medication given by implanted intrathecal pump  multivitamin, therapeutic (THERA-VIT) TABS tablet  omeprazole (PRILOSEC) 20 MG DR capsule  ondansetron (ZOFRAN) 4 MG tablet  prochlorperazine (COMPAZINE) 5 MG tablet  thiamine (B-1) 100 MG tablet        ALLERGIES:  Allergies   Allergen Reactions    Codeine Nausea and Vomiting    Morphine Nausea and Vomiting    Bacitracin Rash    Methadone Rash       FAMILY HISTORY:  Family History   Problem Relation Age of Onset    Cerebrovascular Disease Mother     Diabetes Mother     Heart Disease Mother     Hypertension Mother     Rheumatologic  "Disease Mother     Heart Disease Father     Pulmonary Hypertension Father     Kidney failure Father     Cancer Father         melanoma    Diabetes Sister     Hypertension Sister     Hypertension Brother        SOCIAL HISTORY:   Social History     Socioeconomic History    Marital status:    Tobacco Use    Smoking status: Every Day     Packs/day: 0.75     Years: 51.00     Additional pack years: 0.00     Total pack years: 38.25     Types: Cigarettes     Start date: 1/1/1968    Smokeless tobacco: Never   Vaping Use    Vaping Use: Never used   Substance and Sexual Activity    Alcohol use: Not Currently     Comment: 1 glass a year    Drug use: Never     Social Determinants of Health     Financial Resource Strain: Low Risk  (11/16/2023)    Financial Resource Strain     Within the past 12 months, have you or your family members you live with been unable to get utilities (heat, electricity) when it was really needed?: No   Food Insecurity: Low Risk  (11/16/2023)    Food Insecurity     Within the past 12 months, did you worry that your food would run out before you got money to buy more?: No     Within the past 12 months, did the food you bought just not last and you didn t have money to get more?: No   Transportation Needs: Low Risk  (11/16/2023)    Transportation Needs     Within the past 12 months, has lack of transportation kept you from medical appointments, getting your medicines, non-medical meetings or appointments, work, or from getting things that you need?: No   Housing Stability: Low Risk  (11/16/2023)    Housing Stability     Do you have housing? : Yes     Are you worried about losing your housing?: No   Uses tobacco.  No alcohol.  Opiate dependency.    VITALS:  BP (!) 149/81   Pulse 114   Temp 98.7  F (37.1  C)   Resp 20   Ht 1.6 m (5' 3\")   Wt 44.5 kg (98 lb)   SpO2 97%   BMI 17.36 kg/m      PHYSICAL EXAM    Vital Signs:  BP (!) 149/81   Pulse 114   Temp 98.7  F (37.1  C)   Resp 20   Ht 1.6 m " "(5' 3\")   Wt 44.5 kg (98 lb)   SpO2 97%   BMI 17.36 kg/m    General:  On entering the room she is in moderate pain discomfort and ongoing nausea and vomiting which is recurrent.  Neck:  Neck supple with full range of motion and nontender.    Back:  Back and spine are nontender.  No costovertebral angle tenderness.    HEENT:  Oropharynx clear with moist mucous membranes.  HEENT unremarkable.    Pulmonary:  Chest clear to auscultation without rhonchi rales or wheezing.    Cardiovascular:  Cardiac regular rate and rhythm without murmurs rubs or gallops.    Abdomen:  Abdomen soft and diffusely tender throughout.  There is no rebound or guarding.    Muskuloskeletal:  She moves all 4 without any difficulty and has normal neurovascular exams.  Extremities without clubbing, cyanosis, or edema.  Legs and calves are nontender.    Neuro:  She is alert and oriented ×3 and moves all extremities symmetrically.    Psych:  Normal affect.    Skin:  Unremarkable and warm and dry.       LAB:  All pertinent labs reviewed and interpreted.  Labs Ordered and Resulted from Time of ED Arrival to Time of ED Departure - No data to display    RADIOLOGY:  Reviewed all pertinent imaging. Please see official radiology report.  Abd/pelvis CT no contrast - Stone Protocol    (Results Pending)                  Rajiv Rodriguez M.D.  Emergency Medicine  [facility] EMERGENCY DEPARTMENT  [facility] [facility] [address] [phone]  Dept: [phone]         Rajiv Rodriguez MD  01/22/24 1448    "

## 2024-01-22 NOTE — ED TRIAGE NOTES
Patient developed nausea and vomiting  yesterday. Prior episode of similar last fall, seen by GI diagnosis with gastro paresis, is on medication and has been doing well until yesterday. Did take compazine and Zofran this am without relif. Unable to keep anything down.

## 2024-01-22 NOTE — ED NOTES
EMERGENCY DEPARTMENT SIGN OUT NOTE        ED COURSE AND MEDICAL DECISION MAKING  68-year-old female with history of diabetes, chronic pain, hypertension, and gastroparesis who presented to the ED for evaluation of upper abdominal pain and nausea vomiting that started yesterday was checked out to me by Dr. Rodriguez.  At the time of signout the labs and abdominal CT scan were also pending.    The patient was noted to have an elevated hemoglobin of 17.7.  This likely reflects dehydration.  The remainder of the CBC was unremarkable.  CMP, lipase, and lactic acid were all reassuring.    CT scan of the abdomen shows a large hiatal hernia but there are no other findings noted to explain her symptoms.    The patient was reevaluated and informed of the lab and imaging results.  Patient states that her nausea and abdominal discomfort had improved slightly but were still persistent despite receiving the previous Reglan, Zofran, and morphine.  She denies any associated body aches, chills, or fevers.    The patient was given additional IV fluids, famotidine, and Ativan.       The patient's magnesium level was normal.  Patient was reevaluated.  Patient was noted to be sleeping comfortably at the time of reevaluation.  Patient states that the abdominal pain and nausea had essentially resolved at the time of reevaluation.  The patient was informed that her symptoms may be related to gastroparesis or due to the hiatal hernia.  The patient was sent home with ODT Zofran to use as needed for any further episodes of nausea and vomiting.  She was also sent home with a prescription for Reglan which she is to take after the ODT Zofran to help with both the nausea and the potential gastroparesis.  The patient was also sent home with famotidine to take as needed for any worsening gastritis/GERD that can occur after she has been experiencing the nausea and vomiting.  After educating and reassure the patient she felt comfortable returning home.   The patient was instructed to follow-up with her primary care provider for reevaluation or to return back to ED sooner for any worsening abdominal pain, vomiting, fevers, or any other new or concerning symptoms.      3:32 PM Patient was signed out to me by Dr Rajiv Rodriguez. At time of sign out, disposition was pending CT abdomen.  4:46 PM Reevaluated and updated the patient with findings.   7:00 PM Reevaluated the patient. We discussed the plan for discharge and the patient is agreeable. Reviewed supportive cares, symptomatic treatment, outpatient follow up, and reasons to return to the Emergency Department. Patient to be discharged by ED RN.     FINAL IMPRESSION    No diagnosis found.    ED MEDS  Medications   morphine (PF) injection 4 mg (4 mg Intravenous $Given 1/22/24 1614)   famotidine (PEPCID) injection 20 mg (has no administration in time range)   LORazepam (ATIVAN) injection 1 mg (has no administration in time range)   sodium chloride 0.9% BOLUS 1,000 mL (has no administration in time range)   sodium chloride 0.9% BOLUS 1,000 mL (1,000 mLs Intravenous $New Bag 1/22/24 1511)   ondansetron (ZOFRAN) injection 8 mg (8 mg Intravenous $Given 1/22/24 1511)   metoclopramide (REGLAN) injection 10 mg (10 mg Intravenous $Given 1/22/24 1511)       LAB  Labs Ordered and Resulted from Time of ED Arrival to Time of ED Departure   COMPREHENSIVE METABOLIC PANEL - Abnormal       Result Value    Sodium 142      Potassium 4.0      Carbon Dioxide (CO2) 25      Anion Gap 15      Urea Nitrogen 12.9      Creatinine 0.54      GFR Estimate >90      Calcium 9.7      Chloride 102      Glucose 121 (*)     Alkaline Phosphatase 94      AST 23      ALT 28      Protein Total 7.8      Albumin 4.8      Bilirubin Total 0.5     CBC WITH PLATELETS AND DIFFERENTIAL - Abnormal    WBC Count 7.9      RBC Count 5.99 (*)     Hemoglobin 17.7 (*)     Hematocrit 51.3 (*)     MCV 86      MCH 29.5      MCHC 34.5      RDW 13.4      Platelet Count 232      %  Neutrophils 79      % Lymphocytes 17      % Monocytes 4      % Eosinophils 0      % Basophils 0      % Immature Granulocytes 0      NRBCs per 100 WBC 0      Absolute Neutrophils 6.2      Absolute Lymphocytes 1.3      Absolute Monocytes 0.3      Absolute Eosinophils 0.0      Absolute Basophils 0.0      Absolute Immature Granulocytes 0.0      Absolute NRBCs 0.0     LIPASE - Normal    Lipase 15     LACTIC ACID WHOLE BLOOD - Normal    Lactic Acid 1.5     MAGNESIUM       EKG  None    RADIOLOGY    Abd/pelvis CT no contrast - Stone Protocol   Final Result   IMPRESSION:       1.  No bowel obstruction or other acute abnormality, within the limitations of the noncontrast technique.      2.  Large hiatal hernia.      3.  Nonobstructing 2 mm right renal calculus. No hydronephrosis.          DISCHARGE MEDS  New Prescriptions    No medications on file         I, Faith Bautista, am serving as a scribe to document services personally performed by Thea Henriquez D.O., based on my observations and the provider's statements to me.  I, Thea Henriquez D.O., attest that Faith Bautista is acting in a scribe capacity, has observed my performance of the services and has documented them in accordance with my direction.       Thea Henriquez D.O.  Emergency Medicine  [facility] EMERGENCY DEPARTMENT  [facility] [facility] [id number] [phone]     Thea Henriquez DO  01/22/24 9988

## 2024-01-29 ENCOUNTER — NURSE TRIAGE (OUTPATIENT)
Dept: NURSING | Facility: CLINIC | Age: 69
End: 2024-01-29
Payer: COMMERCIAL

## 2024-01-29 NOTE — TELEPHONE ENCOUNTER
Called patient and relayed message below. Patient did not want to submit E-visit. Assisted patient with setting up appt tomorrow with Meche Medeiros at this location.    Verified with PCP that this disposition is fine as long as patient is aware that she needs to be seen urgently if her swelling or redness worsened significantly today.    Jaziel Arroyo RN     [facility]

## 2024-01-29 NOTE — TELEPHONE ENCOUNTER
Nurse Triage SBAR    Is this a 2nd Level Triage? YES, LICENSED PRACTITIONER REVIEW IS REQUIRED    Situation: patient with swelling to  left lower leg.  Mid calf to ankle to some of her foot.  Pain 4/10 ongoing aches, unless little zingers to the heel, then increases.      Background:     Assessment:   Pt went to Urgent Care today and decided that she could not wait there for the 4 hour wait time.  She is calling with pain to the left lower leg,  aches, ongoing,  Unless she has a zinger to the heel and then it increases.    She is taking Tylenol for pain control, knocks the edge off.  She states that the ankle is tomato red color and then up it is blotchy between that red and p ink color.  Ankle is warmer than the rest of the leg.  When she points her toes toward her head, no pain or discomfort noted per her report.  Edema, that when she presses on it, leaves a slight indent to the skin  Denies fever or chills  States that she does not have any weeping or open sores.  She does state that she has a rough area on the outside of her foot, between the bottom of her foot and her ankle bone.  Denies fever or chills  Denies fall or injury  Denies SOB or chest discomfort  States that she is not Diabetic as far as she knows  She is able to move all body parts on the toes, ankle leg.  She denies excessive walking/activity yesterday.  Denies excessive salt intake.  She does recall having Cellulitis in March of last year.      Protocol Recommended Disposition:   Routing to [facility] for a call back      Routed to provider      Reason for Disposition   Patient wants to be seen    Additional Information   Negative: Difficulty breathing at rest   Negative: Entire foot is cool or blue in comparison to other side   Negative: SEVERE swelling (e.g., swelling extends above knee, entire leg is swollen, weeping fluid)   Negative: Cast on leg or ankle and has increasing pain   Negative: Can't walk or can barely stand (new-onset)   Negative:  "Fever and red area (or area very tender to touch)   Negative: Patient sounds very sick or weak to the triager   Negative: Pregnant 20 or more weeks and sudden weight gain (i.e., > 2 lbs, 1 kg in one week)   Negative: Thigh or calf pain and only 1 side and present > 1 hour   Negative: Thigh, calf, or ankle swelling in only one leg   Negative: Thigh, calf, or ankle swelling in both legs, but one side is definitely more swollen (Exception: Longstanding difference between legs.)   Negative: Swelling of face, arm or hands  (Exception: Slight puffiness of fingers during hot weather.)    Answer Assessment - Initial Assessment Questions  1. ONSET: \"When did the swelling start?\" (e.g., minutes, hours, days)      Last night  2. LOCATION: \"What part of the leg is swollen?\"  \"Are both legs swollen or just one leg?\"      Left leg,  1/2 up the lower leg all the way down to the ankle slightly into the foot.  3. SEVERITY: \"How bad is the swelling?\" (e.g., localized; mild, moderate, severe)    - Localized: Small area of swelling localized to one leg.    - MILD pedal edema: Swelling limited to foot and ankle, pitting edema < 1/4 inch (6 mm) deep, rest and elevation eliminate most or all swelling.    - MODERATE edema: Swelling of lower leg to knee, pitting edema > 1/4 inch (6 mm) deep, rest and elevation only partially reduce swelling.    - SEVERE edema: Swelling extends above knee, facial or hand swelling present.       mild  4. REDNESS: \"Does the swelling look red or infected?\"      Red  , around ankle is tomato red and blotchy to the rest of the leg, tomato red and pinkish.  No weeping, not diabetic  5. PAIN: \"Is the swelling painful to touch?\" If Yes, ask: \"How painful is it?\"   (Scale 1-10; mild, moderate or severe)      4/10  ongoing  aches,  sometimes has some increased zingers to the heel  6. FEVER: \"Do you have a fever?\" If Yes, ask: \"What is it, how was it measured, and when did it start?\"       No fever or chills  7. CAUSE: " "\"What do you think is causing the leg swelling?\"      Denies excessive walking the day prior,  possible cellulitis.  No abnormal salt intake yesterday or today  8. MEDICAL HISTORY: \"Do you have a history of blood clots (e.g., DVT), cancer, heart failure, kidney disease, or liver failure?\"      No DVT, no cancer, no heart failure, no kidney disease, no lliver failure    9. RECURRENT SYMPTOM: \"Have you had leg swelling before?\" If Yes, ask: \"When was the last time?\" \"What happened that time?\"      Last Summer was the last time for cellulitis  10. OTHER SYMPTOMS: \"Do you have any other symptoms?\" (e.g., chest pain, difficulty breathing)        No Chest pain or SOB, denies fall or injury.    11. PREGNANCY: \"Is there any chance you are pregnant?\" \"When was your last menstrual period?\"        N/a    Protocols used: Leg Swelling and Edema-A-[place]    "

## 2024-01-29 NOTE — TELEPHONE ENCOUNTER
Pt should be seen to rule out cellulitis- blood work and exam would be helpful to see if there is infection.  If she can do an e-visit and take pictures - we could order blood work and try to see her that way to avoid urgent care.

## 2024-01-30 ENCOUNTER — OFFICE VISIT (OUTPATIENT)
Dept: FAMILY MEDICINE | Facility: CLINIC | Age: 69
End: 2024-01-30
Payer: COMMERCIAL

## 2024-01-30 VITALS
OXYGEN SATURATION: 96 % | SYSTOLIC BLOOD PRESSURE: 138 MMHG | BODY MASS INDEX: 18.39 KG/M2 | HEIGHT: 63 IN | HEART RATE: 95 BPM | WEIGHT: 103.8 LBS | DIASTOLIC BLOOD PRESSURE: 77 MMHG | RESPIRATION RATE: 16 BRPM

## 2024-01-30 DIAGNOSIS — L03.116 CELLULITIS OF LEFT LOWER EXTREMITY: Primary | ICD-10-CM

## 2024-01-30 DIAGNOSIS — E44.0 MODERATE PROTEIN-CALORIE MALNUTRITION (H): ICD-10-CM

## 2024-01-30 DIAGNOSIS — R73.9 BLOOD GLUCOSE ELEVATED: ICD-10-CM

## 2024-01-30 DIAGNOSIS — E53.8 VITAMIN B 12 DEFICIENCY: ICD-10-CM

## 2024-01-30 DIAGNOSIS — Z11.59 NEED FOR HEPATITIS C SCREENING TEST: ICD-10-CM

## 2024-01-30 DIAGNOSIS — J44.89 OTHER SPECIFIED CHRONIC OBSTRUCTIVE PULMONARY DISEASE (H): ICD-10-CM

## 2024-01-30 DIAGNOSIS — J44.9 CHRONIC OBSTRUCTIVE PULMONARY DISEASE, UNSPECIFIED COPD TYPE (H): ICD-10-CM

## 2024-01-30 DIAGNOSIS — F32.0 CURRENT MILD EPISODE OF MAJOR DEPRESSIVE DISORDER, UNSPECIFIED WHETHER RECURRENT (H): ICD-10-CM

## 2024-01-30 DIAGNOSIS — F11.20 OPIOID TYPE DEPENDENCE, CONTINUOUS (H): ICD-10-CM

## 2024-01-30 LAB
BASOPHILS # BLD AUTO: 0 10E3/UL (ref 0–0.2)
BASOPHILS NFR BLD AUTO: 0 %
EOSINOPHIL # BLD AUTO: 0.1 10E3/UL (ref 0–0.7)
EOSINOPHIL NFR BLD AUTO: 2 %
ERYTHROCYTE [DISTWIDTH] IN BLOOD BY AUTOMATED COUNT: 13.3 % (ref 10–15)
HBA1C MFR BLD: 5.8 % (ref 0–5.6)
HCT VFR BLD AUTO: 41.1 % (ref 35–47)
HGB BLD-MCNC: 13.4 G/DL (ref 11.7–15.7)
IMM GRANULOCYTES # BLD: 0 10E3/UL
IMM GRANULOCYTES NFR BLD: 0 %
LYMPHOCYTES # BLD AUTO: 1.5 10E3/UL (ref 0.8–5.3)
LYMPHOCYTES NFR BLD AUTO: 32 %
MCH RBC QN AUTO: 29.3 PG (ref 26.5–33)
MCHC RBC AUTO-ENTMCNC: 32.6 G/DL (ref 31.5–36.5)
MCV RBC AUTO: 90 FL (ref 78–100)
MONOCYTES # BLD AUTO: 0.5 10E3/UL (ref 0–1.3)
MONOCYTES NFR BLD AUTO: 11 %
NEUTROPHILS # BLD AUTO: 2.5 10E3/UL (ref 1.6–8.3)
NEUTROPHILS NFR BLD AUTO: 54 %
PLATELET # BLD AUTO: 191 10E3/UL (ref 150–450)
RBC # BLD AUTO: 4.58 10E6/UL (ref 3.8–5.2)
WBC # BLD AUTO: 4.6 10E3/UL (ref 4–11)

## 2024-01-30 PROCEDURE — 83036 HEMOGLOBIN GLYCOSYLATED A1C: CPT | Performed by: FAMILY MEDICINE

## 2024-01-30 PROCEDURE — 36415 COLL VENOUS BLD VENIPUNCTURE: CPT | Performed by: FAMILY MEDICINE

## 2024-01-30 PROCEDURE — 86803 HEPATITIS C AB TEST: CPT | Performed by: FAMILY MEDICINE

## 2024-01-30 PROCEDURE — 85025 COMPLETE CBC W/AUTO DIFF WBC: CPT | Performed by: FAMILY MEDICINE

## 2024-01-30 PROCEDURE — 99214 OFFICE O/P EST MOD 30 MIN: CPT | Performed by: FAMILY MEDICINE

## 2024-01-30 RX ORDER — LANOLIN ALCOHOL/MO/W.PET/CERES
1000 CREAM (GRAM) TOPICAL DAILY
Qty: 90 TABLET | Refills: 3 | Status: SHIPPED | OUTPATIENT
Start: 2024-01-30

## 2024-01-30 RX ORDER — CEPHALEXIN 500 MG/1
500 CAPSULE ORAL 2 TIMES DAILY
Qty: 10 CAPSULE | Refills: 0 | Status: SHIPPED | OUTPATIENT
Start: 2024-01-30 | End: 2024-02-22

## 2024-01-30 RX ORDER — MULTIVITAMIN,THERAPEUTIC
1 TABLET ORAL DAILY
Qty: 90 TABLET | Refills: 3 | Status: SHIPPED | OUTPATIENT
Start: 2024-01-30

## 2024-01-30 NOTE — PROGRESS NOTES
Assessment & Plan     ICD-10-CM    1. Cellulitis of left lower extremity  L03.116 CBC with platelets and differential     cephALEXin (KEFLEX) 500 MG capsule     CBC with platelets and differential      2. Vitamin B 12 deficiency  E53.8 cyanocobalamin (VITAMIN B-12) 1000 MCG tablet      3. Other specified chronic obstructive pulmonary disease  J44.89       4. Need for hepatitis C screening test  Z11.59 Hepatitis C Screen Reflex to HCV RNA Quant and Genotype     Hepatitis C Screen Reflex to HCV RNA Quant and Genotype      5. Moderate protein-calorie malnutrition (H24)  E44.0 multivitamin, therapeutic (THERA-VIT) TABS tablet      6. Blood glucose elevated  R73.9 Hemoglobin A1c     Hemoglobin A1c        Patient presents with erythema and pain of the left lower extremity.  She does have a history of cellulitis in the past.  Exam shows erythematous area of the left ankle.  No streaking is noted at this time.  This is warm to touch.  Reviewed previous history of cellulitis where it was treated with Keflex.  Start Keflex again.  Labs as above.  Recheck in clinic in 3 days time.  Appointment scheduled.  To be seen sooner if erythema is worsening and crossing the marked area.  Patient needs refill for her vitamin B 12 deficiency and multivitamin which were given.  She was also noted to have some elevated blood glucose and would like to be checked for diabetes.  A1c done today.  Best practice advisory mentions following  1: COPD: This is a imaging based previous diagnosis.  Patient not requiring inhaler use  2: Opioid dependence continues: Patient following with pain clinic  3: Mild episode of major depression: Patient with history of depression on no current medication and being monitored clinically.    MEDICATIONS:   Orders Placed This Encounter   Medications    cyanocobalamin (VITAMIN B-12) 1000 MCG tablet     Sig: Take 1 tablet (1,000 mcg) by mouth daily     Dispense:  90 tablet     Refill:  3    multivitamin,  therapeutic (THERA-VIT) TABS tablet     Sig: Take 1 tablet by mouth daily     Dispense:  90 tablet     Refill:  3    cephALEXin (KEFLEX) 500 MG capsule     Sig: Take 1 capsule (500 mg) by mouth 2 times daily     Dispense:  10 capsule     Refill:  0          - Continue other medications without change    Subjective   Mariah is a 68 year old, presenting for the following health issues:  Cellulitis (X3-4 days, possible cellulitis, left lower leg/ankle. Pt has been using tylenol to help with the pain. Has had this in the past. ) and Diabetes (Pt mentioned at the last ER visit she had high blood sugar levels. )        1/30/2024     1:01 PM   Additional Questions   Roomed by Verónica Fletcher CMA     History of Present Illness       Reason for visit:  Discomfort and redness of left ankle going up to midcalf  Symptom onset:  1-3 days ago  Symptoms include:  Redness, mild swelling and pain  Symptom intensity:  Mild  Symptom progression:  Staying the same  Had these symptoms before:  Yes  Has tried/received treatment for these symptoms:  Yes  Previous treatment was successful:  Yes  Prior treatment description:  Antibiotics to treat cellulitis  What makes it worse:  Not that I can think of  What makes it better:  Resting    She eats 0-1 servings of fruits and vegetables daily.She consumes 1 sweetened beverage(s) daily.She exercises with enough effort to increase her heart rate 9 or less minutes per day.  She exercises with enough effort to increase her heart rate 3 or less days per week.   She is taking medications regularly.     Patient Active Problem List   Diagnosis    Nausea and vomiting    Chest Pain    Opioid Dependence With Continuous Use    Edema    Wheezing (Symptom)    Pain During Urination (Dysuria)    Frequent, Full-bladder Emptying (Polyuria)    Chronic obstructive pulmonary disease, unspecified COPD type (H)    Chronic pain syndrome    Vitamin B12 Deficiency    Microscopic Hematuria    Cough    Abdominal Pain In The  "Central Upper Belly (Epigastric)    Reflex Sympathetic Dystrophy Of The Left Upper Limb    Osteoporosis    Carpal Tunnel Syndrome    Hypertension    Constipation    Abdominal Pain    Fatigue    Current mild episode of major depressive disorder, unspecified whether recurrent (H24)    Viral gastroenteritis    Unintentional weight loss    Colitis    Intractable nausea and vomiting    Presence of intrathecal baclofen pump    Ketonuria    Vitamin B12 deficiency (non anemic)    Migraine headache    History of cholecystectomy    Gastroesophageal reflux disease    Altered mental status    Abnormal CT of the abdomen    CRPS (complex regional pain syndrome), lower limb     Current Outpatient Medications   Medication    acetaminophen (TYLENOL) 325 MG tablet    aspirin 81 MG EC tablet    atenolol (TENORMIN) 25 MG tablet    baclofen (LIORESAL) 10 MG tablet    cephALEXin (KEFLEX) 500 MG capsule    cyanocobalamin (VITAMIN B-12) 1000 MCG tablet    medication given by implanted intrathecal pump    metoclopramide (REGLAN) 10 MG tablet    multivitamin, therapeutic (THERA-VIT) TABS tablet    omeprazole (PRILOSEC) 20 MG DR capsule    ondansetron (ZOFRAN) 4 MG tablet    prochlorperazine (COMPAZINE) 5 MG tablet    thiamine (B-1) 100 MG tablet    famotidine (PEPCID) 20 MG tablet     No current facility-administered medications for this visit.           Review of Systems  Constitutional, neuro, ENT, endocrine, pulmonary, cardiac, gastrointestinal, genitourinary, musculoskeletal, integument and psychiatric systems are negative, except as otherwise noted.      Objective    /77 (BP Location: Left arm, Patient Position: Sitting, Cuff Size: Adult Small)   Pulse 95   Resp 16   Ht 1.6 m (5' 3\")   Wt 47.1 kg (103 lb 12.8 oz)   SpO2 96%   BMI 18.39 kg/m    Body mass index is 18.39 kg/m .  Physical Exam   GENERAL: alert and no distress  MS: LUE exam shows edema surrounding the left ankle.  No streaking is noted.  There is 1+ edema.  The " area of cellulitis is marked with a marker pen.    Results for orders placed or performed in visit on 01/30/24 (from the past 24 hour(s))   CBC with platelets and differential    Narrative    The following orders were created for panel order CBC with platelets and differential.  Procedure                               Abnormality         Status                     ---------                               -----------         ------                     CBC with platelets and d...[[id number]]                      Final result                 Please view results for these tests on the individual orders.   Hemoglobin A1c   Result Value Ref Range    Hemoglobin A1C 5.8 (H) 0.0 - 5.6 %   CBC with platelets and differential   Result Value Ref Range    WBC Count 4.6 4.0 - 11.0 10e3/uL    RBC Count 4.58 3.80 - 5.20 10e6/uL    Hemoglobin 13.4 11.7 - 15.7 g/dL    Hematocrit 41.1 35.0 - 47.0 %    MCV 90 78 - 100 fL    MCH 29.3 26.5 - 33.0 pg    MCHC 32.6 31.5 - 36.5 g/dL    RDW 13.3 10.0 - 15.0 %    Platelet Count 191 150 - 450 10e3/uL    % Neutrophils 54 %    % Lymphocytes 32 %    % Monocytes 11 %    % Eosinophils 2 %    % Basophils 0 %    % Immature Granulocytes 0 %    Absolute Neutrophils 2.5 1.6 - 8.3 10e3/uL    Absolute Lymphocytes 1.5 0.8 - 5.3 10e3/uL    Absolute Monocytes 0.5 0.0 - 1.3 10e3/uL    Absolute Eosinophils 0.1 0.0 - 0.7 10e3/uL    Absolute Basophils 0.0 0.0 - 0.2 10e3/uL    Absolute Immature Granulocytes 0.0 <=0.4 10e3/uL           Signed Electronically by: Kane Medeiros MD

## 2024-01-31 LAB — HCV AB SERPL QL IA: NONREACTIVE

## 2024-02-02 ENCOUNTER — OFFICE VISIT (OUTPATIENT)
Dept: FAMILY MEDICINE | Facility: CLINIC | Age: 69
End: 2024-02-02
Payer: COMMERCIAL

## 2024-02-02 VITALS
SYSTOLIC BLOOD PRESSURE: 143 MMHG | HEIGHT: 63 IN | WEIGHT: 105 LBS | DIASTOLIC BLOOD PRESSURE: 77 MMHG | RESPIRATION RATE: 20 BRPM | OXYGEN SATURATION: 97 % | HEART RATE: 75 BPM | TEMPERATURE: 97.8 F | BODY MASS INDEX: 18.61 KG/M2

## 2024-02-02 DIAGNOSIS — J44.9 CHRONIC OBSTRUCTIVE PULMONARY DISEASE, UNSPECIFIED COPD TYPE (H): ICD-10-CM

## 2024-02-02 DIAGNOSIS — L03.116 CELLULITIS OF LEFT LOWER EXTREMITY: Primary | ICD-10-CM

## 2024-02-02 DIAGNOSIS — R73.03 PREDIABETES: ICD-10-CM

## 2024-02-02 DIAGNOSIS — E44.0 MODERATE PROTEIN-CALORIE MALNUTRITION (H): ICD-10-CM

## 2024-02-02 DIAGNOSIS — F32.0 CURRENT MILD EPISODE OF MAJOR DEPRESSIVE DISORDER, UNSPECIFIED WHETHER RECURRENT (H): ICD-10-CM

## 2024-02-02 DIAGNOSIS — F11.20 OPIOID TYPE DEPENDENCE, CONTINUOUS (H): ICD-10-CM

## 2024-02-02 PROCEDURE — 91320 SARSCV2 VAC 30MCG TRS-SUC IM: CPT | Performed by: FAMILY MEDICINE

## 2024-02-02 PROCEDURE — 99214 OFFICE O/P EST MOD 30 MIN: CPT | Performed by: FAMILY MEDICINE

## 2024-02-02 PROCEDURE — 90480 ADMN SARSCOV2 VAC 1/ONLY CMP: CPT | Performed by: FAMILY MEDICINE

## 2024-02-02 NOTE — PROGRESS NOTES
"  Assessment & Plan     Cellulitis of left lower extremity  Improving. Encouraged pt to finish antibiotics.     Chronic obstructive pulmonary disease, unspecified COPD type (H)  stable    Moderate protein-calorie malnutrition (H24)  Discussed increasing protein to help with wound healing and leg swelling    Opioid type dependence, continuous (H)  noted    Current mild episode of major depressive disorder, unspecified whether recurrent (H24)  noted    Prediabetes  Discussed A1c result, prediabetes range. No need for meds. Discussed lower sugar/lower carbs diet.                 Brad Lemus is a 68 year old, presenting for the following health issues:  Cellulitis (Follow up left lower leg)        2/2/2024    12:38 PM   Additional Questions   Roomed by Estrella MADISON LPN     HPI   Improving. Has 2 more days of antibiotics keflex.  Concerned about her higher A1c and wound healing.      Review of Systems  Constitutional, HEENT, cardiovascular, pulmonary, gi and gu systems are negative, except as otherwise noted.      Objective    BP (!) 143/77   Pulse 75   Temp 97.8  F (36.6  C) (Oral)   Resp 20   Ht 1.6 m (5' 3\")   Wt 47.6 kg (105 lb)   SpO2 97%   BMI 18.60 kg/m    Body mass index is 18.6 kg/m .  Physical Exam   GENERAL: alert and no distress  SKIN: LLE significant improvement. Erythema has receded from the marked border, mild erythema and swelling just around the left ankle/heel now. 1+ edema.   NEURO: Normal strength and tone, mentation intact and speech normal  PSYCH: mentation appears normal, affect normal/bright    Office Visit on 01/30/2024   Component Date Value Ref Range Status    Hemoglobin A1C 01/30/2024 5.8 (H)  0.0 - 5.6 % Final    Normal <5.7%   Prediabetes 5.7-6.4%    Diabetes 6.5% or higher     Note: Adopted from ADA consensus guidelines.    Hepatitis C Antibody 01/30/2024 Nonreactive  Nonreactive Final    A nonreactive screening test result does not exclude the possibility of exposure to or " infection with HCV. Nonreactive screening test results in individuals with prior exposure to HCV may be due to antibody levels below the limit of detection of this assay or lack of reactivity to the HCV antigens used in this assay. Patients with recent HCV infections (<3 months from time of exposure) may have false-negative HCV antibody results due to the time needed for seroconversion (average of 8 to 9 weeks).    WBC Count 01/30/2024 4.6  4.0 - 11.0 10e3/uL Final    RBC Count 01/30/2024 4.58  3.80 - 5.20 10e6/uL Final    Hemoglobin 01/30/2024 13.4  11.7 - 15.7 g/dL Final    Hematocrit 01/30/2024 41.1  35.0 - 47.0 % Final    MCV 01/30/2024 90  78 - 100 fL Final    MCH 01/30/2024 29.3  26.5 - 33.0 pg Final    MCHC 01/30/2024 32.6  31.5 - 36.5 g/dL Final    RDW 01/30/2024 13.3  10.0 - 15.0 % Final    Platelet Count 01/30/2024 191  150 - 450 10e3/uL Final    % Neutrophils 01/30/2024 54  % Final    % Lymphocytes 01/30/2024 32  % Final    % Monocytes 01/30/2024 11  % Final    % Eosinophils 01/30/2024 2  % Final    % Basophils 01/30/2024 0  % Final    % Immature Granulocytes 01/30/2024 0  % Final    Absolute Neutrophils 01/30/2024 2.5  1.6 - 8.3 10e3/uL Final    Absolute Lymphocytes 01/30/2024 1.5  0.8 - 5.3 10e3/uL Final    Absolute Monocytes 01/30/2024 0.5  0.0 - 1.3 10e3/uL Final    Absolute Eosinophils 01/30/2024 0.1  0.0 - 0.7 10e3/uL Final    Absolute Basophils 01/30/2024 0.0  0.0 - 0.2 10e3/uL Final    Absolute Immature Granulocytes 01/30/2024 0.0  <=0.4 10e3/uL Final           Signed Electronically by: Nita Dupont MD

## 2024-02-12 ENCOUNTER — MYC REFILL (OUTPATIENT)
Dept: FAMILY MEDICINE | Facility: CLINIC | Age: 69
End: 2024-02-12
Payer: COMMERCIAL

## 2024-02-12 DIAGNOSIS — K21.00 GASTRO-ESOPHAGEAL REFLUX DISEASE WITH ESOPHAGITIS, WITHOUT BLEEDING: ICD-10-CM

## 2024-02-16 ENCOUNTER — PATIENT OUTREACH (OUTPATIENT)
Dept: CARE COORDINATION | Facility: CLINIC | Age: 69
End: 2024-02-16
Payer: COMMERCIAL

## 2024-02-21 ENCOUNTER — APPOINTMENT (OUTPATIENT)
Dept: CT IMAGING | Facility: HOSPITAL | Age: 69
DRG: 327 | End: 2024-02-21
Attending: EMERGENCY MEDICINE
Payer: COMMERCIAL

## 2024-02-21 ENCOUNTER — HOSPITAL ENCOUNTER (INPATIENT)
Facility: HOSPITAL | Age: 69
LOS: 4 days | Discharge: HOME HEALTH | DRG: 327 | End: 2024-02-26
Attending: EMERGENCY MEDICINE | Admitting: FAMILY MEDICINE
Payer: COMMERCIAL

## 2024-02-21 DIAGNOSIS — K31.84 GASTROPARESIS: ICD-10-CM

## 2024-02-21 DIAGNOSIS — G89.4 CHRONIC PAIN SYNDROME: ICD-10-CM

## 2024-02-21 DIAGNOSIS — K44.9 HIATAL HERNIA: ICD-10-CM

## 2024-02-21 DIAGNOSIS — F11.20 OPIOID TYPE DEPENDENCE, CONTINUOUS (H): Primary | ICD-10-CM

## 2024-02-21 DIAGNOSIS — R11.2 NAUSEA AND VOMITING, UNSPECIFIED VOMITING TYPE: ICD-10-CM

## 2024-02-21 LAB
ALBUMIN SERPL BCG-MCNC: 4.3 G/DL (ref 3.5–5.2)
ALP SERPL-CCNC: 87 U/L (ref 40–150)
ALT SERPL W P-5'-P-CCNC: 29 U/L (ref 0–50)
ANION GAP SERPL CALCULATED.3IONS-SCNC: 12 MMOL/L (ref 7–15)
AST SERPL W P-5'-P-CCNC: 31 U/L (ref 0–45)
BASOPHILS # BLD AUTO: 0 10E3/UL (ref 0–0.2)
BASOPHILS NFR BLD AUTO: 0 %
BILIRUB SERPL-MCNC: 0.4 MG/DL
BUN SERPL-MCNC: 10 MG/DL (ref 8–23)
CALCIUM SERPL-MCNC: 9.2 MG/DL (ref 8.8–10.2)
CHLORIDE SERPL-SCNC: 103 MMOL/L (ref 98–107)
CREAT SERPL-MCNC: 0.52 MG/DL (ref 0.51–0.95)
DEPRECATED HCO3 PLAS-SCNC: 27 MMOL/L (ref 22–29)
EGFRCR SERPLBLD CKD-EPI 2021: >90 ML/MIN/1.73M2
EOSINOPHIL # BLD AUTO: 0 10E3/UL (ref 0–0.7)
EOSINOPHIL NFR BLD AUTO: 0 %
ERYTHROCYTE [DISTWIDTH] IN BLOOD BY AUTOMATED COUNT: 13.7 % (ref 10–15)
GLUCOSE SERPL-MCNC: 123 MG/DL (ref 70–99)
HCT VFR BLD AUTO: 46.9 % (ref 35–47)
HGB BLD-MCNC: 15.5 G/DL (ref 11.7–15.7)
HOLD SPECIMEN: NORMAL
IMM GRANULOCYTES # BLD: 0 10E3/UL
IMM GRANULOCYTES NFR BLD: 0 %
LIPASE SERPL-CCNC: 23 U/L (ref 13–60)
LYMPHOCYTES # BLD AUTO: 1.1 10E3/UL (ref 0.8–5.3)
LYMPHOCYTES NFR BLD AUTO: 16 %
MCH RBC QN AUTO: 29 PG (ref 26.5–33)
MCHC RBC AUTO-ENTMCNC: 33 G/DL (ref 31.5–36.5)
MCV RBC AUTO: 88 FL (ref 78–100)
MONOCYTES # BLD AUTO: 0.4 10E3/UL (ref 0–1.3)
MONOCYTES NFR BLD AUTO: 6 %
NEUTROPHILS # BLD AUTO: 5.3 10E3/UL (ref 1.6–8.3)
NEUTROPHILS NFR BLD AUTO: 78 %
NRBC # BLD AUTO: 0 10E3/UL
NRBC BLD AUTO-RTO: 0 /100
PLATELET # BLD AUTO: 216 10E3/UL (ref 150–450)
POTASSIUM SERPL-SCNC: 4.1 MMOL/L (ref 3.4–5.3)
PROT SERPL-MCNC: 6.9 G/DL (ref 6.4–8.3)
RBC # BLD AUTO: 5.34 10E6/UL (ref 3.8–5.2)
SODIUM SERPL-SCNC: 142 MMOL/L (ref 135–145)
WBC # BLD AUTO: 6.9 10E3/UL (ref 4–11)

## 2024-02-21 PROCEDURE — 83690 ASSAY OF LIPASE: CPT | Performed by: EMERGENCY MEDICINE

## 2024-02-21 PROCEDURE — 96374 THER/PROPH/DIAG INJ IV PUSH: CPT

## 2024-02-21 PROCEDURE — 36415 COLL VENOUS BLD VENIPUNCTURE: CPT | Performed by: EMERGENCY MEDICINE

## 2024-02-21 PROCEDURE — 74177 CT ABD & PELVIS W/CONTRAST: CPT

## 2024-02-21 PROCEDURE — 250N000011 HC RX IP 250 OP 636: Performed by: EMERGENCY MEDICINE

## 2024-02-21 PROCEDURE — 258N000003 HC RX IP 258 OP 636: Performed by: EMERGENCY MEDICINE

## 2024-02-21 PROCEDURE — 36415 COLL VENOUS BLD VENIPUNCTURE: CPT | Performed by: STUDENT IN AN ORGANIZED HEALTH CARE EDUCATION/TRAINING PROGRAM

## 2024-02-21 PROCEDURE — 99285 EMERGENCY DEPT VISIT HI MDM: CPT | Mod: 25

## 2024-02-21 PROCEDURE — 82247 BILIRUBIN TOTAL: CPT | Performed by: EMERGENCY MEDICINE

## 2024-02-21 PROCEDURE — 96375 TX/PRO/DX INJ NEW DRUG ADDON: CPT

## 2024-02-21 PROCEDURE — 85025 COMPLETE CBC W/AUTO DIFF WBC: CPT | Performed by: EMERGENCY MEDICINE

## 2024-02-21 PROCEDURE — 96361 HYDRATE IV INFUSION ADD-ON: CPT

## 2024-02-21 PROCEDURE — 250N000011 HC RX IP 250 OP 636: Performed by: STUDENT IN AN ORGANIZED HEALTH CARE EDUCATION/TRAINING PROGRAM

## 2024-02-21 RX ORDER — ONDANSETRON 2 MG/ML
4 INJECTION INTRAMUSCULAR; INTRAVENOUS ONCE
Status: COMPLETED | OUTPATIENT
Start: 2024-02-21 | End: 2024-02-21

## 2024-02-21 RX ORDER — ONDANSETRON 4 MG/1
4 TABLET, ORALLY DISINTEGRATING ORAL ONCE
Status: COMPLETED | OUTPATIENT
Start: 2024-02-21 | End: 2024-02-21

## 2024-02-21 RX ORDER — IOPAMIDOL 755 MG/ML
55 INJECTION, SOLUTION INTRAVASCULAR ONCE
Status: COMPLETED | OUTPATIENT
Start: 2024-02-22 | End: 2024-02-22

## 2024-02-21 RX ADMIN — ONDANSETRON 4 MG: 2 INJECTION INTRAMUSCULAR; INTRAVENOUS at 23:44

## 2024-02-21 RX ADMIN — SODIUM CHLORIDE 1000 ML: 9 INJECTION, SOLUTION INTRAVENOUS at 23:40

## 2024-02-21 RX ADMIN — ONDANSETRON 4 MG: 4 TABLET, ORALLY DISINTEGRATING ORAL at 20:48

## 2024-02-21 RX ADMIN — FAMOTIDINE 20 MG: 10 INJECTION, SOLUTION INTRAVENOUS at 23:49

## 2024-02-22 ENCOUNTER — ANESTHESIA EVENT (OUTPATIENT)
Dept: SURGERY | Facility: HOSPITAL | Age: 69
DRG: 327 | End: 2024-02-22
Payer: COMMERCIAL

## 2024-02-22 PROBLEM — R11.2 NAUSEA AND VOMITING, UNSPECIFIED VOMITING TYPE: Status: ACTIVE | Noted: 2024-02-22

## 2024-02-22 PROBLEM — K44.9 HIATAL HERNIA: Status: ACTIVE | Noted: 2024-02-22

## 2024-02-22 PROBLEM — K31.84 GASTROPARESIS: Status: ACTIVE | Noted: 2024-02-22

## 2024-02-22 LAB
ALBUMIN UR-MCNC: NEGATIVE MG/DL
ANION GAP SERPL CALCULATED.3IONS-SCNC: 9 MMOL/L (ref 7–15)
APPEARANCE UR: CLEAR
BASOPHILS # BLD AUTO: NORMAL 10*3/UL
BASOPHILS # BLD MANUAL: 0 10E3/UL (ref 0–0.2)
BASOPHILS NFR BLD AUTO: NORMAL %
BASOPHILS NFR BLD MANUAL: 1 %
BILIRUB UR QL STRIP: NEGATIVE
BUN SERPL-MCNC: 10.1 MG/DL (ref 8–23)
CALCIUM SERPL-MCNC: 7.6 MG/DL (ref 8.8–10.2)
CHLORIDE SERPL-SCNC: 109 MMOL/L (ref 98–107)
COLOR UR AUTO: ABNORMAL
CREAT SERPL-MCNC: 0.46 MG/DL (ref 0.51–0.95)
DEPRECATED HCO3 PLAS-SCNC: 23 MMOL/L (ref 22–29)
EGFRCR SERPLBLD CKD-EPI 2021: >90 ML/MIN/1.73M2
EOSINOPHIL # BLD AUTO: NORMAL 10*3/UL
EOSINOPHIL # BLD MANUAL: 0 10E3/UL (ref 0–0.7)
EOSINOPHIL NFR BLD AUTO: NORMAL %
EOSINOPHIL NFR BLD MANUAL: 0 %
ERYTHROCYTE [DISTWIDTH] IN BLOOD BY AUTOMATED COUNT: 13.6 % (ref 10–15)
GLUCOSE SERPL-MCNC: 90 MG/DL (ref 70–99)
GLUCOSE UR STRIP-MCNC: NEGATIVE MG/DL
HCT VFR BLD AUTO: 39.5 % (ref 35–47)
HGB BLD-MCNC: 13.2 G/DL (ref 11.7–15.7)
HGB UR QL STRIP: ABNORMAL
IMM GRANULOCYTES # BLD: NORMAL 10*3/UL
IMM GRANULOCYTES NFR BLD: NORMAL %
KETONES UR STRIP-MCNC: 20 MG/DL
LEUKOCYTE ESTERASE UR QL STRIP: NEGATIVE
LYMPHOCYTES # BLD AUTO: NORMAL 10*3/UL
LYMPHOCYTES # BLD MANUAL: 1.1 10E3/UL (ref 0.8–5.3)
LYMPHOCYTES NFR BLD AUTO: NORMAL %
LYMPHOCYTES NFR BLD MANUAL: 22 %
MCH RBC QN AUTO: 29.5 PG (ref 26.5–33)
MCHC RBC AUTO-ENTMCNC: 33.4 G/DL (ref 31.5–36.5)
MCV RBC AUTO: 88 FL (ref 78–100)
MONOCYTES # BLD AUTO: NORMAL 10*3/UL
MONOCYTES # BLD MANUAL: 0.1 10E3/UL (ref 0–1.3)
MONOCYTES NFR BLD AUTO: NORMAL %
MONOCYTES NFR BLD MANUAL: 3 %
MUCOUS THREADS #/AREA URNS LPF: PRESENT /LPF
NEUTROPHILS # BLD AUTO: NORMAL 10*3/UL
NEUTROPHILS # BLD MANUAL: 3.6 10E3/UL (ref 1.6–8.3)
NEUTROPHILS NFR BLD AUTO: NORMAL %
NEUTROPHILS NFR BLD MANUAL: 74 %
NITRATE UR QL: NEGATIVE
NRBC # BLD AUTO: 0 10E3/UL
NRBC BLD AUTO-RTO: 0 /100
PH UR STRIP: 7 [PH] (ref 5–7)
PLAT MORPH BLD: ABNORMAL
PLATELET # BLD AUTO: NORMAL 10*3/UL
POTASSIUM SERPL-SCNC: 3.5 MMOL/L (ref 3.4–5.3)
RBC # BLD AUTO: 4.47 10E6/UL (ref 3.8–5.2)
RBC MORPH BLD: ABNORMAL
RBC URINE: 25 /HPF
SODIUM SERPL-SCNC: 141 MMOL/L (ref 135–145)
SP GR UR STRIP: 1.01 (ref 1–1.03)
UROBILINOGEN UR STRIP-MCNC: <2 MG/DL
WBC # BLD AUTO: 4.8 10E3/UL (ref 4–11)
WBC URINE: 1 /HPF

## 2024-02-22 PROCEDURE — 99222 1ST HOSP IP/OBS MODERATE 55: CPT | Performed by: FAMILY MEDICINE

## 2024-02-22 PROCEDURE — 81001 URINALYSIS AUTO W/SCOPE: CPT | Performed by: EMERGENCY MEDICINE

## 2024-02-22 PROCEDURE — 93005 ELECTROCARDIOGRAM TRACING: CPT | Performed by: EMERGENCY MEDICINE

## 2024-02-22 PROCEDURE — 250N000013 HC RX MED GY IP 250 OP 250 PS 637: Performed by: FAMILY MEDICINE

## 2024-02-22 PROCEDURE — 250N000011 HC RX IP 250 OP 636: Performed by: EMERGENCY MEDICINE

## 2024-02-22 PROCEDURE — 99222 1ST HOSP IP/OBS MODERATE 55: CPT | Mod: 57 | Performed by: SURGERY

## 2024-02-22 PROCEDURE — 36415 COLL VENOUS BLD VENIPUNCTURE: CPT | Performed by: FAMILY MEDICINE

## 2024-02-22 PROCEDURE — 96375 TX/PRO/DX INJ NEW DRUG ADDON: CPT

## 2024-02-22 PROCEDURE — 120N000001 HC R&B MED SURG/OB

## 2024-02-22 PROCEDURE — 250N000011 HC RX IP 250 OP 636: Performed by: INTERNAL MEDICINE

## 2024-02-22 PROCEDURE — 80048 BASIC METABOLIC PNL TOTAL CA: CPT | Performed by: FAMILY MEDICINE

## 2024-02-22 PROCEDURE — 250N000013 HC RX MED GY IP 250 OP 250 PS 637: Performed by: INTERNAL MEDICINE

## 2024-02-22 PROCEDURE — S2900 ROBOTIC SURGICAL SYSTEM: HCPCS | Performed by: SURGERY

## 2024-02-22 PROCEDURE — 258N000003 HC RX IP 258 OP 636: Performed by: FAMILY MEDICINE

## 2024-02-22 PROCEDURE — C9113 INJ PANTOPRAZOLE SODIUM, VIA: HCPCS | Performed by: FAMILY MEDICINE

## 2024-02-22 PROCEDURE — 85007 BL SMEAR W/DIFF WBC COUNT: CPT | Performed by: FAMILY MEDICINE

## 2024-02-22 PROCEDURE — 250N000011 HC RX IP 250 OP 636: Performed by: FAMILY MEDICINE

## 2024-02-22 PROCEDURE — 85048 AUTOMATED LEUKOCYTE COUNT: CPT | Performed by: FAMILY MEDICINE

## 2024-02-22 PROCEDURE — 250N000011 HC RX IP 250 OP 636: Mod: JZ | Performed by: EMERGENCY MEDICINE

## 2024-02-22 PROCEDURE — 258N000003 HC RX IP 258 OP 636: Performed by: INTERNAL MEDICINE

## 2024-02-22 RX ORDER — NALOXONE HYDROCHLORIDE 0.4 MG/ML
0.4 INJECTION, SOLUTION INTRAMUSCULAR; INTRAVENOUS; SUBCUTANEOUS
Status: DISCONTINUED | OUTPATIENT
Start: 2024-02-22 | End: 2024-02-26 | Stop reason: HOSPADM

## 2024-02-22 RX ORDER — BACLOFEN 10 MG/1
10 TABLET ORAL 3 TIMES DAILY
COMMUNITY
End: 2024-06-18

## 2024-02-22 RX ORDER — MORPHINE SULFATE 2 MG/ML
2 INJECTION, SOLUTION INTRAMUSCULAR; INTRAVENOUS
Status: DISCONTINUED | OUTPATIENT
Start: 2024-02-22 | End: 2024-02-23

## 2024-02-22 RX ORDER — ACETAMINOPHEN 325 MG/1
650 TABLET ORAL EVERY 4 HOURS PRN
Status: DISCONTINUED | OUTPATIENT
Start: 2024-02-22 | End: 2024-02-26 | Stop reason: HOSPADM

## 2024-02-22 RX ORDER — SODIUM CHLORIDE 9 MG/ML
INJECTION, SOLUTION INTRAVENOUS CONTINUOUS
Status: DISCONTINUED | OUTPATIENT
Start: 2024-02-22 | End: 2024-02-22

## 2024-02-22 RX ORDER — ONDANSETRON 2 MG/ML
4 INJECTION INTRAMUSCULAR; INTRAVENOUS EVERY 6 HOURS PRN
Status: DISCONTINUED | OUTPATIENT
Start: 2024-02-22 | End: 2024-02-26 | Stop reason: HOSPADM

## 2024-02-22 RX ORDER — NICOTINE 21 MG/24HR
1 PATCH, TRANSDERMAL 24 HOURS TRANSDERMAL DAILY
Status: DISCONTINUED | OUTPATIENT
Start: 2024-02-22 | End: 2024-02-26 | Stop reason: HOSPADM

## 2024-02-22 RX ORDER — NALOXONE HYDROCHLORIDE 0.4 MG/ML
0.2 INJECTION, SOLUTION INTRAMUSCULAR; INTRAVENOUS; SUBCUTANEOUS
Status: DISCONTINUED | OUTPATIENT
Start: 2024-02-22 | End: 2024-02-26 | Stop reason: HOSPADM

## 2024-02-22 RX ORDER — OXYCODONE HYDROCHLORIDE 5 MG/1
5 TABLET ORAL EVERY 4 HOURS PRN
Status: DISCONTINUED | OUTPATIENT
Start: 2024-02-22 | End: 2024-02-22

## 2024-02-22 RX ORDER — HYDRALAZINE HYDROCHLORIDE 20 MG/ML
10 INJECTION INTRAMUSCULAR; INTRAVENOUS EVERY 6 HOURS
Status: DISCONTINUED | OUTPATIENT
Start: 2024-02-22 | End: 2024-02-23

## 2024-02-22 RX ORDER — NALOXONE HYDROCHLORIDE 0.4 MG/ML
0.1 INJECTION, SOLUTION INTRAMUSCULAR; INTRAVENOUS; SUBCUTANEOUS
Status: DISCONTINUED | OUTPATIENT
Start: 2024-02-22 | End: 2024-02-26 | Stop reason: HOSPADM

## 2024-02-22 RX ORDER — METOCLOPRAMIDE HYDROCHLORIDE 5 MG/ML
10 INJECTION INTRAMUSCULAR; INTRAVENOUS ONCE
Status: COMPLETED | OUTPATIENT
Start: 2024-02-22 | End: 2024-02-22

## 2024-02-22 RX ORDER — MORPHINE SULFATE 2 MG/ML
1 INJECTION, SOLUTION INTRAMUSCULAR; INTRAVENOUS EVERY 4 HOURS PRN
Status: DISCONTINUED | OUTPATIENT
Start: 2024-02-22 | End: 2024-02-23

## 2024-02-22 RX ORDER — HYDRALAZINE HYDROCHLORIDE 20 MG/ML
5 INJECTION INTRAMUSCULAR; INTRAVENOUS EVERY 6 HOURS PRN
Status: DISCONTINUED | OUTPATIENT
Start: 2024-02-22 | End: 2024-02-23

## 2024-02-22 RX ADMIN — SODIUM CHLORIDE: 9 INJECTION, SOLUTION INTRAVENOUS at 03:19

## 2024-02-22 RX ADMIN — PROCHLORPERAZINE EDISYLATE 5 MG: 5 INJECTION, SOLUTION INTRAMUSCULAR; INTRAVENOUS at 12:27

## 2024-02-22 RX ADMIN — SODIUM CHLORIDE: 9 INJECTION, SOLUTION INTRAVENOUS at 13:00

## 2024-02-22 RX ADMIN — MORPHINE SULFATE 2 MG: 2 INJECTION, SOLUTION INTRAMUSCULAR; INTRAVENOUS at 18:14

## 2024-02-22 RX ADMIN — NICOTINE 1 PATCH: 14 PATCH, EXTENDED RELEASE TRANSDERMAL at 09:22

## 2024-02-22 RX ADMIN — PANTOPRAZOLE SODIUM 40 MG: 40 INJECTION, POWDER, FOR SOLUTION INTRAVENOUS at 06:10

## 2024-02-22 RX ADMIN — METOCLOPRAMIDE 10 MG: 5 INJECTION, SOLUTION INTRAMUSCULAR; INTRAVENOUS at 00:27

## 2024-02-22 RX ADMIN — MORPHINE SULFATE 2 MG: 2 INJECTION, SOLUTION INTRAMUSCULAR; INTRAVENOUS at 21:27

## 2024-02-22 RX ADMIN — ACETAMINOPHEN 650 MG: 325 TABLET ORAL at 10:39

## 2024-02-22 RX ADMIN — ONDANSETRON 4 MG: 2 INJECTION INTRAMUSCULAR; INTRAVENOUS at 18:31

## 2024-02-22 RX ADMIN — HYDRALAZINE HYDROCHLORIDE 10 MG: 20 INJECTION INTRAMUSCULAR; INTRAVENOUS at 18:23

## 2024-02-22 RX ADMIN — MORPHINE SULFATE 2 MG: 2 INJECTION, SOLUTION INTRAMUSCULAR; INTRAVENOUS at 12:31

## 2024-02-22 RX ADMIN — IOPAMIDOL 55 ML: 755 INJECTION, SOLUTION INTRAVENOUS at 00:21

## 2024-02-22 RX ADMIN — PROCHLORPERAZINE EDISYLATE 5 MG: 5 INJECTION, SOLUTION INTRAMUSCULAR; INTRAVENOUS at 06:34

## 2024-02-22 RX ADMIN — DEXTROSE AND SODIUM CHLORIDE: 5; 900 INJECTION, SOLUTION INTRAVENOUS at 18:32

## 2024-02-22 NOTE — CONSULTS
"Care Management Initial Consult    General Information  Assessment completed with: PatientMariah  Type of CM/SW Visit: Initial Assessment    Primary Care Provider verified and updated as needed: Yes   Readmission within the last 30 days: no previous admission in last 30 days      Reason for Consult: discharge planning  Advance Care Planning: Advance Care Planning Reviewed: no concerns identified          Communication Assessment  Patient's communication style: spoken language (English or Bilingual)             Cognitive  Cognitive/Neuro/Behavioral: WDL                      Living Environment:   People in home: spouse  Mariah and  Alfred  Current living Arrangements: house (\"I can mostly live on the main level. There are steps to the lower level and I have to go to the laundry down there\".)      Able to return to prior arrangements: yes       Family/Social Support:  Care provided by: self  Provides care for: no one  Marital Status:     Alfred       Description of Support System: Supportive, Involved    Support Assessment: Adequate family and caregiver support, Adequate social supports, Patient communicates needs well met    Current Resources:   Patient receiving home care services: No     Community Resources: DME, Other (see comment) (Pain Clinic and Intrathecal pain pump supplies.)  Equipment currently used at home: walker, rolling, cane, straight (4WW, \"I use my walker most of the time\")  Supplies currently used at home: Other (\"glasses, Intrathecal pain pump supplies\")    Employment/Financial:  Employment Status: retired     Employment/ Comments: \"no  history\"  Financial Concerns:     Referral to Financial Worker: No       Does the patient's insurance plan have a 3 day qualifying hospital stay waiver?  Yes     Which insurance plan 3 day waiver is available? Alternative insurance waiver    Will the waiver be used for post-acute placement? Undetermined at this time      Functional " "Status:  Prior to admission patient needed assistance:   Dependent ADLs:: Ambulation-walker, Independent  Dependent IADLs:: Cleaning, Cooking, Laundry, Shopping (\"my  helps\")  Assesssment of Functional Status: At functional baseline    Mental Health Status:          Chemical Dependency Status:                Values/Beliefs:  Spiritual, Cultural Beliefs, Jainism Practices, Values that affect care:                 Additional Information:  Mariah lives in a house with her . \"I can mostly live on the main level. There are steps to the lower level and I have to go to the laundry down there\".    She is independent with ADLs and most IADLs. Both she and her  still drive. She uses a 4WW and a cane for mobility, \"mostly uses the 4WW.\" She has a Intrathecal pain pump and follows up with the pain clinic.     Unknown discharge needs at this time, but likely able to return home.      Family to transport at discharge.     CM to follow for medical progression of care, discharge recommendations, and final discharge plan.    Elena Olsen RN    "

## 2024-02-22 NOTE — ED TRIAGE NOTES
Pt comes to ED for evaluation of nausea and vomiting. Started this am. Six to seven episodes of emesis today.      Triage Assessment (Adult)       Row Name 02/21/24 2045          Triage Assessment    Airway WDL WDL        Respiratory WDL    Respiratory WDL WDL        Skin Circulation/Temperature WDL    Skin Circulation/Temperature WDL WDL        Cardiac WDL    Cardiac WDL WDL        Peripheral/Neurovascular WDL    Peripheral Neurovascular WDL WDL        Cognitive/Neuro/Behavioral WDL    Cognitive/Neuro/Behavioral WDL WDL

## 2024-02-22 NOTE — PROGRESS NOTES
[facility]    Medicine Progress Note - Hospitalist Service    Date of Admission:  2/21/2024    Assessment & Plan      Mariah Koehler is a 68 year old female with PMH significant for known Large Hiatal Hernia, Gastroparesis and chronic pain who is admitted on 2/22/2024 with intractable nausea and vomiting. Found to have large hiatal hernia, causing gastric outlet obstruction.     Large hiatal hernia, gastric outlet obstruction:  - General surgery input appreciated  - NPO and IVF  - NG tube for decompression  - PPI  - Antiemetics  - Pain control: IV morphine  - Plan surgical repair tomorrow.   - Patient is medically cleared for the procedure.    Essential hypertension:    - Holding PTA atenolol for NPO  - IV Hydralazine while NPO. Monitor vitals closely.    No Suicidal Ideation: Patient was listed as moderate suicide risk per triage survey. Patient adamantly denies suicidal ideation. All suicidal risk questions were negative when screened by RN. The chart was flagged because patient may have had that ideation in the past, but it is documented none within the past 3 months. Patient wants moderate suicide risk label removed from her chart.          Diet: NPO for Medical/Clinical Reasons Except for: No Exceptions    DVT Prophylaxis: Pneumatic Compression Devices  Bellamy Catheter: Not present  Lines: None     Cardiac Monitoring: None  Code Status: Full Code      Clinically Significant Risk Factors Present on Admission          # Hypocalcemia: Lowest Ca = 7.6 mg/dL in last 2 days, will monitor and replace as appropriate       # Drug Induced Platelet Defect: home medication list includes an antiplatelet medication   # Hypertension: Noted on problem list          # Financial/Environmental Concerns:           Disposition Plan      Expected Discharge Date: 02/24/2024      Destination: home with family              Irwin Viramontes MD  Hospitalist Service  [facility]  Securely  message with Matthew (more info)  Text page via [employer] Paging/Directory   ______________________________________________________________________    Interval History   Patient reports that she continues to have abdominal pain and feels nausea.     Physical Exam   Vital Signs: Temp: 98.4  F (36.9  C) Temp src: Oral BP: (!) 165/78 Pulse: 93   Resp: 17 SpO2: 98 % O2 Device: None (Room air)    Weight: 105 lbs 0 oz    General appearance: not in acute distress  HEENT: PERRL, EOMI  Lungs: Clear breath sounds in bilateral lung fields  Cardiovascular: Regular rate and rhythm, normal S1-S2  Abdomen: Soft, tenderness to palpation of mid abdomen, no distension  Musculoskeletal: No joint swelling  Skin: No rash and no edema  Neurology: AAO ×3.  Cranial nerves II - XII normal.  Normal muscle strength in all four extremities.     Medical Decision Making       45 MINUTES SPENT BY ME on the date of service doing chart review, history, exam, documentation & further activities per the note.      Data     I have personally reviewed the following data over the past 24 hrs:    4.8  \   13.2   / 216     141 109 (H) 10.1 /  90   3.5 23 0.46 (L) \     ALT: 29 AST: 31 AP: 87 TBILI: 0.4   ALB: 4.3 TOT PROTEIN: 6.9 LIPASE: 23       Imaging results reviewed over the past 24 hrs:   Recent Results (from the past 24 hour(s))   CT Abdomen Pelvis w Contrast    Narrative    EXAM: CT ABDOMEN PELVIS W CONTRAST  LOCATION: [facility]  DATE: 2/22/2024    INDICATION: Nausea, vomiting.  COMPARISON: 1/22/2024 and 10/24/2023.  TECHNIQUE: CT scan of the abdomen and pelvis was performed following injection of IV contrast. Multiplanar reformats were obtained. Dose reduction techniques were used.  CONTRAST: 55 ml Isovue 370.    FINDINGS:   LOWER CHEST: Large hiatal hernia.    HEPATOBILIARY: Liver is negative. Gallbladder is absent. No biliary dilatation. Trace amount of pneumobilia.    PANCREAS: Normal.    SPLEEN: Normal.    ADRENAL GLANDS:  Normal.    KIDNEYS/BLADDER: 2 mm nonobstructing stone lower pole right kidney. There are a couple small benign renal cysts with no specific follow-up needed. Mild atrophy and cortical thinning/scarring in the right kidney is stable in appearance. No hydronephrosis.   Bladder is unremarkable.    BOWEL: Bowel is normal in caliber with no evidence for obstruction. Appendix not clearly seen but no inflammatory changes in the right lower quadrant. No definite acute bowel findings.    LYMPH NODES: Normal.    VASCULATURE: Aortic calcification without aneurysm.    PELVIC ORGANS: Hysterectomy.    MUSCULOSKELETAL: Moderate degenerative changes lower lumbar spine. Low-grade anterolisthesis of L4 on L5. Intrathecal catheter device subcutaneous fat right buttock.      Impression    IMPRESSION:   1.  Large hiatal hernia. This is incompletely visualized but has been present previously. Moderately prominent air-fluid level in the visualized stomach, nonspecific. Gastric outlet obstruction as a result of the hernia accounting for the patient's   symptoms not excluded. Clinical correlation.    2.  No definite acute intra-abdominal or intrapelvic findings.    3.  Cholecystectomy. No biliary dilatation.    4.  2 mm nonobstructing stone right kidney. Stable atrophy and scarring in the right kidney. No hydronephrosis.    5.  Hysterectomy.

## 2024-02-22 NOTE — UTILIZATION REVIEW
Admission Status; Secondary Review Determination   Under the authority of the Utilization Management Committee, the utilization review process indicated a secondary review on Mariah Koehler. The review outcome is based on review of the medical records, discussions with staff, and applying clinical experience noted on the date of the review.   (x) Inpatient Status Appropriate - This patient's medical care is consistent with medical management for inpatient care and reasonable inpatient medical practice.     RATIONALE FOR DETERMINATION   Mariah Koehler is a 68 yr old female with N/V/D.  She has large paraesophageal hernia and multiple admissions for N/V but recurrence again.  Very uncomfortable with gastric outlet obstruction.  Surgery recommending NG for decompression and plan for robotic repair in the AM..  Patient unable to tolerate any PO at this time and needs urgent surgery..      At the time of admission with the information available to the attending physician more than 2 nights Hospital complex care was anticipated, based on patient risk of adverse outcome if treated as outpatient and complex care required. Inpatient admission is appropriate based on the Medicare guidelines.   The information on this document is developed by the utilization review team in order for the business office to ensure compliance. This only denotes the appropriateness of proper admission status and does not reflect the quality of care rendered.   The definitions of Inpatient Status and Observation Status used in making the determination above are those provided in the CMS Coverage Manual, Chapter 1 and Chapter 6, section 70.4.   Sincerely,   Leonie Dc MD  Utilization Review  Physician Advisor  [facility]

## 2024-02-22 NOTE — MEDICATION SCRIBE - ADMISSION MEDICATION HISTORY
Medication Scribe Admission Medication History    Admission medication history is complete. The information provided in this note is only as accurate as the sources available at the time of the update.    Information Source(s): Patient via in-person    Pertinent Information:     Changes made to PTA medication list:  Added: None  Deleted: None  Changed: None    Allergies reviewed with patient and updates made in EHR: yes    Medication History Completed By: Michael Olson 2/22/2024 1:29 AM    PTA Med List   Medication Sig Last Dose    acetaminophen (TYLENOL) 325 MG tablet Take 1-2 tablets (325-650 mg) by mouth every 6 hours as needed for mild pain or headaches Unknown at prn    aspirin 81 MG EC tablet Take 1 tablet (81 mg) by mouth daily 2/21/2024 at am    atenolol (TENORMIN) 25 MG tablet Take 0.5 tablets (12.5 mg) by mouth daily 2/21/2024 at am    baclofen (LIORESAL) 10 MG tablet Take 10 mg by mouth 3 times daily 2/21/2024 at am    cyanocobalamin (VITAMIN B-12) 1000 MCG tablet Take 1 tablet (1,000 mcg) by mouth daily 2/21/2024 at am    famotidine (PEPCID) 20 MG tablet Take 1 tablet (20 mg) by mouth 2 times daily as needed (acid reflux) Unknown at prn    metoclopramide (REGLAN) 10 MG tablet Take 1 tablet (10 mg) by mouth 3 times daily as needed (nausea and vomiting) Unknown at prn    multivitamin, therapeutic (THERA-VIT) TABS tablet Take 1 tablet by mouth daily 2/21/2024 at am    omeprazole (PRILOSEC) 20 MG DR capsule Take 20 mg by mouth daily 2/21/2024 at am    ondansetron (ZOFRAN) 4 MG tablet Take 1-2 tablets (4-8 mg) by mouth every 6 hours as needed for nausea Unknown at prn    prochlorperazine (COMPAZINE) 5 MG tablet Take 1 tablet (5 mg) by mouth every 8 hours as needed for nausea or vomiting Unknown at prn    thiamine (B-1) 100 MG tablet Take 1 tablet (100 mg) by mouth daily 2/21/2024 at am

## 2024-02-22 NOTE — H&P
[facility]    History and Physical - Hospitalist Service       Date of Admission:  2/22/2024    Assessment & Plan      Mariah Koehler is a 68 year old female with PMH significant for known Large Hiatal Hernia, Gastroparesis and chronic pain who is admitted on 2/22/2024 with Intractable Nausea and vomiting.    # Intractable Nausea and vomiting- Admit patient for observation. CT abd/epl shows known large hiatal hernia but gastric outlet obstruction not excluded. Patient also has a history of gastroparesis. Strict NPO. Give IV fluids. PRN Zofran and Compazine. Consult General Surgeon. Monitor electrolytes.    # Large Hiatal Hernia- Patient aware for the past few months. Gastric outlet obstruction not excluded. Keep NPO. Give PPI. Follow up General Surgery recommendations.    # Uncontrolled HTN- PRN IV Hydralazine while NPO. Monitor vitals closely.    # No Suicidal Ideation- Patient was listed as moderate suicide risk per triage survey. Patient adamantly denies suicidal ideation to me. I spoke with RN and all suicidal risk questions were negative when screened by RN. I called ED Physician and chart was flagged because patient may have had that ideation in the past, but it is documented none within the past 3 months. Patient explained to me that she is to be Full Code and wants moderate suicide risk label removed from her chart.        Diet: Strict NPO  DVT Prophylaxis: Pneumatic Compression Devices  Bellamy Catheter: Not present  Lines: None     Cardiac Monitoring: None  Code Status:  Full Code per patient.    Clinically Significant Risk Factors Present on Admission                # Drug Induced Platelet Defect: home medication list includes an antiplatelet medication   # Hypertension: Noted on problem list          # Financial/Environmental Concerns:           Disposition Plan  Anticipate less than 2 midnight hospital stay.         Alma Jane MD  Hospitalist Service  [facility]  [facility]  Securely message with [employer] (more info)  Text page via [employer] Paging/Directory     ______________________________________________________________________    Chief Complaint   Nausea and vomiting    History is obtained from the patient, ED Physician and chart review.    History of Present Illness   Mariah Koehler is a 68 year old female with PMH significant for known Large Hiatal Hernia, Gastroparesis and chronic pain who Status post Lasix 40 mg IV x 1 dose in the ED CT headPresents to ED due to intractable nausea and vomiting.  Patient says the vomiting began yesterday morning.  She denies any abdominal pain, diarrhea, fever, chills, chest pain, palpitations or shortness of breath.  She denies any recent travel and no sick contacts.  CT abdomen/pelvis reveals large hiatal hernia, possible gastric outlet obstruction cannot be excluded.  Patient appears tired, but otherwise in no acute distress.      Past Medical History    Past Medical History:   Diagnosis Date    Current mild episode of major depressive disorder, unspecified whether recurrent (H24) 02/17/2020    Hypertension     Migraine headache 11/06/2023    Nausea and vomiting     Opiate dependence (H)     RSD (reflex sympathetic dystrophy)     Sacral fracture, closed (H)        Past Surgical History   Past Surgical History:   Procedure Laterality Date    APPENDECTOMY      ESOPHAGOSCOPY, GASTROSCOPY, DUODENOSCOPY (EGD), COMBINED N/A 2/20/2023    Procedure: ESOPHAGOGASTRODUODENOSCOPY with biopsies;  Surgeon: Na Brooks MD;  Location: [facility] Main OR    GYN SURGERY      HC REVISE MEDIAN N/CARPAL TUNNEL SURG      Description: Neuroplasty Decompression Median Nerve At Carpal Tunnel;  Recorded: 09/19/2011;    HYSTERECTOMY  1984    IR CERVICAL EPIDURAL STEROID INJECTION  1/14/2005    OOPHORECTOMY Bilateral 1985    CO SYMPATHECTOMY,CERVICOTHORACIC      Description: Surgical Sympathectomy Cervicothoracic;  Recorded: 09/19/2011;    TU  DECOMPRESS FASCIOTOMY FINGR/HAND      Description: Decompression Fasciotomy Of The Hand;  Recorded: 09/19/2011;    [facility] LAP,CHOLECYSTECTOMY/EXPLORE      Description: Cholecystectomy Laparoscopic;  Recorded: 12/09/2011;    [facility] TOTAL ABDOM HYSTERECTOMY      Description: Hysterectomy;  Recorded: 03/23/2011;       Prior to Admission Medications   Prior to Admission Medications   Prescriptions Last Dose Informant Patient Reported? Taking?   acetaminophen (TYLENOL) 325 MG tablet Unknown at prn Self No Yes   Sig: Take 1-2 tablets (325-650 mg) by mouth every 6 hours as needed for mild pain or headaches   aspirin 81 MG EC tablet 2/21/2024 at am Self No Yes   Sig: Take 1 tablet (81 mg) by mouth daily   atenolol (TENORMIN) 25 MG tablet 2/21/2024 at am Self No Yes   Sig: Take 0.5 tablets (12.5 mg) by mouth daily   baclofen (LIORESAL) 10 MG tablet 2/21/2024 at am Self Yes Yes   Sig: Take 10 mg by mouth 3 times daily   cyanocobalamin (VITAMIN B-12) 1000 MCG tablet 2/21/2024 at am Self No Yes   Sig: Take 1 tablet (1,000 mcg) by mouth daily   famotidine (PEPCID) 20 MG tablet Unknown at prn Self No Yes   Sig: Take 1 tablet (20 mg) by mouth 2 times daily as needed (acid reflux)   medication given by implanted intrathecal pump  Self Yes No   Sig: continuous Drug # 1: Fentanyl (Sublimaze) - Conc: 2000 mcg/mL - Total Dose / 24 hours: 873.8 mcg    Drug # 2: Bupivacaine (Marcaine)  - Conc:17.7 mg/mL - Total Dose / 24 hours: 7.733 mg    Drug # 3: Hydromorphone (Dilaudid)  - Conc: 3.8 mg/mL - Total Dose / 24 hours: 1.66 mg    Diluent: NS    Infusion Rate: 0.4369 mL/24 hrs  Pump Reservoir Volume: 40 mL    Bolus doses: up to 15 bolus doses/24 hours with 1 hour lockout  Each bolus: fentanyl 80.1 mcg, 0.708 mg Bupivacaine, 0.152 mg Dilaudid    Outside Clinic & Provider: [facility] in [place] [phone]  Last Refill Date: 10/02/23  Next Refill Date: 11/06/23  Low [place] Alarm Date:  11/08/23  Pump : Travefy    Deliver Pump  Medication to:   For [place]: If pump is within 7 days of depletion, pharmacist will enter a 'Pain Management Adult IP Consult' into the EHR, including 'IT pain pump management' as the reason for the consult.   metoclopramide (REGLAN) 10 MG tablet Unknown at prn Self No Yes   Sig: Take 1 tablet (10 mg) by mouth 3 times daily as needed (nausea and vomiting)   multivitamin, therapeutic (THERA-VIT) TABS tablet 2/21/2024 at am Self No Yes   Sig: Take 1 tablet by mouth daily   omeprazole (PRILOSEC) 20 MG DR capsule 2/21/2024 at am Self Yes Yes   Sig: Take 20 mg by mouth daily   ondansetron (ZOFRAN) 4 MG tablet Unknown at prn Self No Yes   Sig: Take 1-2 tablets (4-8 mg) by mouth every 6 hours as needed for nausea   prochlorperazine (COMPAZINE) 5 MG tablet Unknown at prn Self No Yes   Sig: Take 1 tablet (5 mg) by mouth every 8 hours as needed for nausea or vomiting   thiamine (B-1) 100 MG tablet 2/21/2024 at am Self No Yes   Sig: Take 1 tablet (100 mg) by mouth daily      Facility-Administered Medications: None        Review of Systems    The 10 point Review of Systems is negative other than noted in the HPI.    Social History   I have reviewed this patient's social history and updated it with pertinent information if needed.  Social History     Tobacco Use    Smoking status: Every Day     Packs/day: 0.75     Years: 51.00     Additional pack years: 0.00     Total pack years: 38.25     Types: Cigarettes     Start date: 1/1/1968    Smokeless tobacco: Never   Vaping Use    Vaping Use: Never used   Substance Use Topics    Alcohol use: Not Currently     Comment: 1 glass a year    Drug use: Never         Family History   I have reviewed this patient's family history and updated it with pertinent information if needed.  Family History   Problem Relation Age of Onset    Cerebrovascular Disease Mother     Diabetes Mother     Heart Disease Mother     Hypertension Mother     Rheumatologic Disease Mother     Heart Disease Father      Pulmonary Hypertension Father     Kidney failure Father     Cancer Father         melanoma    Diabetes Sister     Hypertension Sister     Hypertension Brother         Physical Exam   Vital Signs: Temp: 97.7  F (36.5  C) Temp src: Oral BP: (!) 163/69 Pulse: 77   Resp: 16 SpO2: 97 % O2 Device: None (Room air)    Weight: 105 lbs 0 oz    General Appearance: Lethargic, but easily arousable, Oriented x 3  Respiratory: Poor inspiratory effort, clear  Cardiovascular: Regular rate, S1S2  GI: +BS in thorax, abdomen soft, NT, ND  Skin: Poor skin turgor. Dry mucous membranes. Chapped lips.  Other: No pedal edema     Medical Decision Making       55 MINUTES SPENT BY ME on the date of service doing chart review, history, exam, documentation & further activities per the note.      Data     I have personally reviewed the following data over the past 24 hrs:    6.9  \   15.5   / 216     142 103 10.0 /  123 (H)   4.1 27 0.52 \     ALT: 29 AST: 31 AP: 87 TBILI: 0.4   ALB: 4.3 TOT PROTEIN: 6.9 LIPASE: 23       Imaging results reviewed over the past 24 hrs:   Recent Results (from the past 24 hour(s))   CT Abdomen Pelvis w Contrast    Narrative    EXAM: CT ABDOMEN PELVIS W CONTRAST  LOCATION: [facility]  DATE: 2/22/2024    INDICATION: Nausea, vomiting.  COMPARISON: 1/22/2024 and 10/24/2023.  TECHNIQUE: CT scan of the abdomen and pelvis was performed following injection of IV contrast. Multiplanar reformats were obtained. Dose reduction techniques were used.  CONTRAST: 55 ml Isovue 370.    FINDINGS:   LOWER CHEST: Large hiatal hernia.    HEPATOBILIARY: Liver is negative. Gallbladder is absent. No biliary dilatation. Trace amount of pneumobilia.    PANCREAS: Normal.    SPLEEN: Normal.    ADRENAL GLANDS: Normal.    KIDNEYS/BLADDER: 2 mm nonobstructing stone lower pole right kidney. There are a couple small benign renal cysts with no specific follow-up needed. Mild atrophy and cortical thinning/scarring in the  right kidney is stable in appearance. No hydronephrosis.   Bladder is unremarkable.    BOWEL: Bowel is normal in caliber with no evidence for obstruction. Appendix not clearly seen but no inflammatory changes in the right lower quadrant. No definite acute bowel findings.    LYMPH NODES: Normal.    VASCULATURE: Aortic calcification without aneurysm.    PELVIC ORGANS: Hysterectomy.    MUSCULOSKELETAL: Moderate degenerative changes lower lumbar spine. Low-grade anterolisthesis of L4 on L5. Intrathecal catheter device subcutaneous fat right buttock.      Impression    IMPRESSION:   1.  Large hiatal hernia. This is incompletely visualized but has been present previously. Moderately prominent air-fluid level in the visualized stomach, nonspecific. Gastric outlet obstruction as a result of the hernia accounting for the patient's   symptoms not excluded. Clinical correlation.    2.  No definite acute intra-abdominal or intrapelvic findings.    3.  Cholecystectomy. No biliary dilatation.    4.  2 mm nonobstructing stone right kidney. Stable atrophy and scarring in the right kidney. No hydronephrosis.    5.  Hysterectomy.

## 2024-02-22 NOTE — ED PROVIDER NOTES
EMERGENCY DEPARTMENT ENCOUNTER      NAME: Mariah Koehler  YOB: 1955  MRN: [id number]    FINAL IMPRESSION  1. Gastroparesis    2. Nausea and vomiting, unspecified vomiting type    3. Hiatal hernia        MEDICAL DECISION MAKING   Pertinent Labs & Imaging studies reviewed. (See chart for details)    Mariah Koehler is a 68 year old female who presents for evaluation of nausea and vomiting. Outside records reviewed.  Patient has a history of chronic pain and gastroparesis and has been seen here in the ED numerous times for related complaints, most recently on 1/22/2024.  At that time, she had a reassuring workup including labs and imaging.  She had improvement in symptoms with Pepcid, fluids, multiple doses of IV antiemetic and analgesic followed by Ativan.  Ultimately, she was able to discharge home with prescriptions for Reglan, Zofran, and Pepcid.  Patient presents again this evening with complaints of recurrent symptoms that began this morning.  She reports that she has had about 6-7 episodes of nonbloody emesis.  She tried taking her home antiemetics without relief.  She denies associated fever, chest pain, abdominal pain, diarrhea, or urinary symptoms.  No recent travel or sick contacts. Remainder of history and exam, as below.     I considered a broad differential including a limited to gastritis, gastroenteritis, gastroparesis, cyclical vomiting, GERD, peptic ulcer disease, obstruction, ileus, ischemia, AAA, electrolyte derangement, acute kidney injury.  Discussed options for workup and management of the patient.  We have agreed on plan for labs, CT abdomen/pelvis, and management of symptoms with IV fluids and IV analgesic/antiemetic.    Patient did not have relief with initial interventions and was given additional fluids and antiemetic.  Following 8 mg of Zofran and Reglan, she did have some improvement and was able to lay down on the bed.  CT abdomen/pelvis showed evidence of a large hiatal  hernia with air-fluid level in the stomach.  She does have a known hernia but it appears to have increased in size and the air level is new as compared to previous.  Given improvement in nausea and vomiting, will hold on placing an NG tube.    Laboratory workup was reassuring. CBC reassuring. No evidence of leukocytosis to suggest systemic infectious/inflammatory process. No acute anemia. PLTs wnl. CMP reassuring. No evidence of JESSA, acidosis, or significant electrolyte derangement. No acute elevation of bilirubin or transaminates to suggest acute hepatobiliary process. Lipase within normal limits, symptoms less likely related to acute pancreatitis. Lactate within normal limits, less likely end-organ ischemia or systemic infectious process.     EKG showed no evidence of ischemia or arrhythmia.  I rechecked the patient multiple times and reviewed results.  Given persistence of symptoms and CT findings, we did agree on plan for admission.  I discussed the case with hospital medicine team who agreed to facilitate admission.  Also discussed case with Dr. Bedolla of general surgery who agreed with workup and conservative management.    Of note, patient did flag for suicidal risk but is denying any thoughts of wanting to harm herself at this time.  Will hold on placing consult for psych.  I do not believe patient needs a one-to-one or any observation or further workup for mental health concerns. Dr. Cowan agrees.       Medical Decision Making  Obtained supplemental history:Supplemental history obtained?: No  Reviewed external records: External records reviewed?: Documented in chart  Care impacted by chronic illness:Chronic Pain, Hypertension, and Other: COPD  Care significantly affected by social determinants of health:Access to Medical Care  Did you consider but not order tests?: Work up considered but not performed and documented in chart, if applicable  Did you interpret images independently?: Independent  "interpretation of ECG and images noted in documentation, when applicable.  Consultation discussion with other provider:Did you involve another provider (consultant, , pharmacy, etc.)?: I discussed the care with another health care provider, see documentation for details.  Admit.      ED COURSE  11:27 PM I met with the patient, obtained history, performed an initial exam, and discussed options and plan for diagnostics and treatment here in the ED.  12:32 AM I rechecked and updated patient.   1:16 AM I rechecked patient and updated on next steps.   1:34 AM I spoke with the hospitalist, Dr. Cowan. Made plans for admission.   2:12 AM I spoke with Dr. Bedolla from general surgery.   3:18 AM I spoke with Dr. Cowan.       MEDICATIONS GIVEN IN THE ED  sodium chloride 0.9% BOLUS 1,000 mL (0 mLs Intravenous Stopped 2/22/24 0110)   ondansetron (ZOFRAN) injection 4 mg (4 mg Intravenous $Given 2/21/24 2344)   famotidine (PEPCID) injection 20 mg (20 mg Intravenous $Given 2/21/24 2349)   iopamidol (ISOVUE-370) solution 55 mL (55 mLs Intravenous $Given 2/22/24 0021)   metoclopramide (REGLAN) injection 10 mg (10 mg Intravenous $Given 2/22/24 0027)       NEW PRESCRIPTIONS STARTED AT TODAY'S VISIT  New Prescriptions    No medications on file          =================================================================    Chief Complaint   Patient presents with    Nausea, Vomiting, & Diarrhea         HPI:    Patient information was obtained from: Patient    Use of : N/A     Mariah Koehler is a 68 year old female who presents for nausea and vomiting.     The patient reports that her nausea and vomiting started this morning, and she reports vomiting 6-7 times.  She said she has a headache, but denies pain elsewhere. Patient noted that she felt \"okay\" yesterday, and reports no food eaten today. She took anti nausea medication today.     Patient denies fevers, diarrhea, or urinary symptoms. No sick contacts or recent travel. " "    Per chart review, patient presented to [facility] on 1/22/[facility] for nausea and vomiting. Labs and CT scan reassuring. Treated with zofran and reglan. Patient discharged home with with prescriptions for famotidine, metoclopramide, and ondansetron.      RELEVANT HISTORY, MEDICATIONS, & ALLERGIES   Past medical history, surgical history, family history, medications, and allergies reviewed and pertinent noted in HPI.    REVIEW OF SYSTEMS:  A complete review of systems was performed with pertinent positives and negatives noted in the HPI. All other systems negative.     PHYSICAL EXAM:    Vitals: BP (!) 163/69   Pulse 77   Temp 97.7  F (36.5  C) (Oral)   Resp 16   Ht 1.6 m (5' 3\")   Wt 47.6 kg (105 lb)   SpO2 97%   BMI 18.60 kg/m     General: Alert and interactive.  Sitting up in wheelchair actively vomiting into emesis bag.  Appears uncomfortable but in no acute distress.  HENT: Atraumatic. Full AROM of neck. Conjunctiva clear.   Cardiovascular: Regular rate and rhythm.   Chest/Pulmonary: Normal work of breathing.  Abdomen: Soft, nondistended. Nontender without guarding or rebound.  Extremities: Normal AROM of all major joints.  Skin: Warm and dry. Normal skin color.   Neuro: Speech clear. CNs grossly intact. Moves all extremities spontaneously.   Psych: Normal affect/mood, cooperative, memory appropriate.      LAB  Labs Ordered and Resulted from Time of ED Arrival to Time of ED Departure   COMPREHENSIVE METABOLIC PANEL - Abnormal       Result Value    Sodium 142      Potassium 4.1      Carbon Dioxide (CO2) 27      Anion Gap 12      Urea Nitrogen 10.0      Creatinine 0.52      GFR Estimate >90      Calcium 9.2      Chloride 103      Glucose 123 (*)     Alkaline Phosphatase 87      AST 31      ALT 29      Protein Total 6.9      Albumin 4.3      Bilirubin Total 0.4     ROUTINE UA WITH MICROSCOPIC - Abnormal    Color Urine Light Yellow      Appearance Urine Clear      Glucose Urine Negative      Bilirubin " Urine Negative      Ketones Urine 20 (*)     Specific Gravity Urine 1.010      Blood Urine 0.1 mg/dL (*)     pH Urine 7.0      Protein Albumin Urine Negative      Urobilinogen Urine <2.0      Nitrite Urine Negative      Leukocyte Esterase Urine Negative      Mucus Urine Present (*)     RBC Urine 25 (*)     WBC Urine 1     CBC WITH PLATELETS AND DIFFERENTIAL - Abnormal    WBC Count 6.9      RBC Count 5.34 (*)     Hemoglobin 15.5      Hematocrit 46.9      MCV 88      MCH 29.0      MCHC 33.0      RDW 13.7      Platelet Count 216      % Neutrophils 78      % Lymphocytes 16      % Monocytes 6      % Eosinophils 0      % Basophils 0      % Immature Granulocytes 0      NRBCs per 100 WBC 0      Absolute Neutrophils 5.3      Absolute Lymphocytes 1.1      Absolute Monocytes 0.4      Absolute Eosinophils 0.0      Absolute Basophils 0.0      Absolute Immature Granulocytes 0.0      Absolute NRBCs 0.0     LIPASE - Normal    Lipase 23     GLUCOSE MONITOR NURSING POCT   BASIC METABOLIC PANEL       RADIOLOGY  CT Abdomen Pelvis w Contrast   Final Result   IMPRESSION:    1.  Large hiatal hernia. This is incompletely visualized but has been present previously. Moderately prominent air-fluid level in the visualized stomach, nonspecific. Gastric outlet obstruction as a result of the hernia accounting for the patient's    symptoms not excluded. Clinical correlation.      2.  No definite acute intra-abdominal or intrapelvic findings.      3.  Cholecystectomy. No biliary dilatation.      4.  2 mm nonobstructing stone right kidney. Stable atrophy and scarring in the right kidney. No hydronephrosis.      5.  Hysterectomy.          EKG  Performed at: 00:34:18, 22-FEB-2024  Impression: No acute ischemic changes. Normal intervals. No significant change from previous.   Rate: 63  Rhythm: Sinus  QRS Interval: 84  QTc Interval: 390  Comparison: 12-NOV-2023    All laboratory and imaging results and EKG's were personally reviewed and interpreted by  myself prior to disposition decision.         I, Ivelisse Sparrow, am serving as a scribe to document services personally performed by Dr. Ayah Mcduffie based on my observation and the provider's statements to me. I, Ayah Mcduffie MD attest that Ivelisse Sparrow is acting in a scribe capacity, has observed my performance of the services and has documented them in accordance with my direction.    Ayah Mcduffie M.D.  Emergency Medicine  [facility] EMERGENCY DEPARTMENT  [address] [place] [id number]  [phone]  Dept: [phone]       Ayah Mcduffie MD  02/22/24 0318

## 2024-02-22 NOTE — CONSULTS
HPI  68 year old year old female who I have been consulted by No ref. provider found for evaluation of Nausea, Vomiting, & Diarrhea  68-year-old female with a history of chronic back pain and a large paraesophageal hernia and multiple recurrent admissions for nausea and vomiting who presents with worsening epigastric abdominal discomfort with nausea and vomiting.  She states the pain is significant and similar to what she has had in the past.  This appears to be a recurring theme.  She has been followed by GI and has undergone evaluation including endoscopy last year which demonstrated a large paraesophageal hernia.  She continues to have follow-up but is not undergone surgery for reasons unclear.  Currently she is sitting in bed and feels fairly uncomfortable.      Allergies:Codeine, Hydromorphone, Morphine, Bacitracin, and Methadone    Past Medical History:   Diagnosis Date    Current mild episode of major depressive disorder, unspecified whether recurrent (H24) 02/17/2020    Hypertension     Migraine headache 11/06/2023    Nausea and vomiting     Opiate dependence (H)     RSD (reflex sympathetic dystrophy)     Sacral fracture, closed (H)        Past Surgical History:   Procedure Laterality Date    APPENDECTOMY      ESOPHAGOSCOPY, GASTROSCOPY, DUODENOSCOPY (EGD), COMBINED N/A 2/20/2023    Procedure: ESOPHAGOGASTRODUODENOSCOPY with biopsies;  Surgeon: Na Brooks MD;  Location: [facility] Main OR    GYN SURGERY      HC REVISE MEDIAN N/CARPAL TUNNEL SURG      Description: Neuroplasty Decompression Median Nerve At Carpal Tunnel;  Recorded: 09/19/2011;    HYSTERECTOMY  1984    IR CERVICAL EPIDURAL STEROID INJECTION  1/14/2005    OOPHORECTOMY Bilateral 1985    ND SYMPATHECTOMY,CERVICOTHORACIC      Description: Surgical Sympathectomy Cervicothoracic;  Recorded: 09/19/2011;    [facility] DECOMPRESS FASCIOTOMY FINGR/HAND      Description: Decompression Fasciotomy Of The Hand;  Recorded: 09/19/2011;    RIKY  LAP,CHOLECYSTECTOMY/EXPLORE      Description: Cholecystectomy Laparoscopic;  Recorded: 12/09/2011;    ZZC TOTAL ABDOM HYSTERECTOMY      Description: Hysterectomy;  Recorded: 03/23/2011;         CURRENT MEDS:    Current Facility-Administered Medications:     acetaminophen (TYLENOL) tablet 650 mg, 650 mg, Oral, Q4H PRN, Irwin Viramontes MD, 650 mg at 02/22/24 1039    hydrALAZINE (APRESOLINE) injection 5 mg, 5 mg, Intravenous, Q6H PRN, Alma Cowan MD    morphine (PF) injection 1 mg, 1 mg, Intravenous, Q4H PRN, Irwin Viramontes MD    morphine (PF) injection 2 mg, 2 mg, Intravenous, Q3H PRN, Irwin Viramontes MD, 2 mg at 02/22/24 1231    naloxone (NARCAN) injection 0.2 mg, 0.2 mg, Intravenous, Q2 Min PRN **OR** naloxone (NARCAN) injection 0.4 mg, 0.4 mg, Intravenous, Q2 Min PRN **OR** naloxone (NARCAN) injection 0.2 mg, 0.2 mg, Intramuscular, Q2 Min PRN **OR** naloxone (NARCAN) injection 0.4 mg, 0.4 mg, Intramuscular, Q2 Min PRN, Irwin Viramontes MD    nicotine (NICODERM CQ) 14 MG/24HR 24 hr patch 1 patch, 1 patch, Transdermal, Daily, Alma Cowan MD, 1 patch at 02/22/24 0922    ondansetron (ZOFRAN) injection 4 mg, 4 mg, Intravenous, Q6H PRN, Alma Cowan MD    pantoprazole (PROTONIX) IV push injection 40 mg, 40 mg, Intravenous, Q24H, Alma Cowan MD, 40 mg at 02/22/24 0610    prochlorperazine (COMPAZINE) injection 5 mg, 5 mg, Intravenous, Q6H PRN, Alma Cowan MD, 5 mg at 02/22/24 1227    sodium chloride 0.9 % infusion, , Intravenous, Continuous, Alma Cowan MD, Last Rate: 100 mL/hr at 02/22/24 1300, New Bag at 02/22/24 1300    Current Outpatient Medications:     acetaminophen (TYLENOL) 325 MG tablet, Take 1-2 tablets (325-650 mg) by mouth every 6 hours as needed for mild pain or headaches, Disp: , Rfl:     aspirin 81 MG EC tablet, Take 1 tablet (81 mg) by mouth daily, Disp: 90 tablet, Rfl: 3    atenolol (TENORMIN) 25 MG tablet, Take 0.5 tablets (12.5 mg) by mouth daily, Disp: 90 tablet, Rfl: 3     baclofen (LIORESAL) 10 MG tablet, Take 10 mg by mouth 3 times daily, Disp: , Rfl:     cyanocobalamin (VITAMIN B-12) 1000 MCG tablet, Take 1 tablet (1,000 mcg) by mouth daily, Disp: 90 tablet, Rfl: 3    famotidine (PEPCID) 20 MG tablet, Take 1 tablet (20 mg) by mouth 2 times daily as needed (acid reflux), Disp: 20 tablet, Rfl: 0    metoclopramide (REGLAN) 10 MG tablet, Take 1 tablet (10 mg) by mouth 3 times daily as needed (nausea and vomiting), Disp: 20 tablet, Rfl: 0    multivitamin, therapeutic (THERA-VIT) TABS tablet, Take 1 tablet by mouth daily, Disp: 90 tablet, Rfl: 3    omeprazole (PRILOSEC) 20 MG DR capsule, Take 20 mg by mouth daily, Disp: , Rfl:     ondansetron (ZOFRAN) 4 MG tablet, Take 1-2 tablets (4-8 mg) by mouth every 6 hours as needed for nausea, Disp: , Rfl:     prochlorperazine (COMPAZINE) 5 MG tablet, Take 1 tablet (5 mg) by mouth every 8 hours as needed for nausea or vomiting, Disp: 10 tablet, Rfl: 0    thiamine (B-1) 100 MG tablet, Take 1 tablet (100 mg) by mouth daily, Disp: 30 tablet, Rfl: 3    medication given by implanted intrathecal pump, continuous Drug # 1: Fentanyl (Sublimaze) - Conc: 2000 mcg/mL - Total Dose / 24 hours: 873.8 mcg  Drug # 2: Bupivacaine (Marcaine)  - Conc:17.7 mg/mL - Total Dose / 24 hours: 7.733 mg  Drug # 3: Hydromorphone (Dilaudid)  - Conc: 3.8 mg/mL - Total Dose / 24 hours: 1.66 mg  Diluent: NS  Infusion Rate: 0.4369 mL/24 hrs Pump Reservoir Volume: 40 mL  Bolus doses: up to 15 bolus doses/24 hours with 1 hour lockout Each bolus: fentanyl 80.1 mcg, 0.708 mg Bupivacaine, 0.152 mg Dilaudid  Outside Clinic & Provider: [facility] in [place] [phone] Last Refill Date: 10/02/23 Next Refill Date: 11/06/23 [place] Alarm Date:  11/08/23 Pump : [email or web address]  Deliver Pump Medication to:  For [place]: If pump is within 7 days of depletion, pharmacist will enter a 'Pain Management Adult IP Consult' into the EHR, including 'IT pain pump management' as  "the reason for the consult., Disp: , Rfl:     FAMHX-reviewed and noncontributory     reports that she has been smoking cigarettes. She started smoking about 56 years ago. She has a 38.3 pack-year smoking history. She has never used smokeless tobacco. She reports that she does not currently use alcohol. She reports that she does not use drugs.    Review of Systems:  The 12 point review of systems  is within normal limits except for as mentioned above in the HPI.  General ROS: No complaints or constitutional symptoms  Ophthalmic ROS: No complaints of visual changes  Skin: No complaints or symptoms   Endocrine: No complaints or symptoms  Hematologic/Lymphatic: No symptoms or complaints  Psychiatric: No symptoms or complaints  Respiratory ROS: no cough, shortness of breath, or wheezing  Cardiovascular ROS: no chest pain or dyspnea on exertion  Gastrointestinal ROS: As per HPI  Genito-Urinary ROS: no dysuria, trouble voiding, or hematuria  Musculoskeletal ROS: no joint or muscle pain  Neurological ROS: no TIA or stroke symptoms      EXAM:  BP (!) 167/81   Pulse 87   Temp 98.2  F (36.8  C) (Oral)   Resp 12   Ht 1.6 m (5' 3\")   Wt 47.6 kg (105 lb)   SpO2 95%   BMI 18.60 kg/m    GENERAL: Well developed female, No acute distress, pleasant and conversant   EYES: Pupils equal, round and reactive, no scleral icterus  ABDOMEN: Soft, tender to palpation in the epigastric region with focal guarding  SKIN: Pink, warm and dry, no obvious rashes or lesions   NEURO:No focal deficits, ambulatory  MUSCULOSKELETAL:No obvious deformities, no swelling, normal appearing      LABS:  Lab Results   Component Value Date    WBC 4.8 02/22/2024    WBC 7.4 01/09/2007    HGB 13.2 02/22/2024    HGB 14.0 01/09/2007    HCT 39.5 02/22/2024    HCT 41.4 01/09/2007    MCV 88 02/22/2024    MCV 88 01/09/2007    PLT  02/22/2024      Comment:      Platelets Clumped-Platelet Count Not Available     01/09/2007     INR/Prothrombin Time  Recent Labs "   Lab 02/22/24  0621      CO2 23   BUN 10.1     Lab Results   Component Value Date    ALT 29 02/21/2024    AST 31 02/21/2024    ALKPHOS 87 02/21/2024       IMAGES:   Relevant images were reviewed and discussed with the patient.  Notable findings were: CT images reviewed demonstrates a large paraesophageal hernia    Assessment/Plan:   Mariah Koehler is a 68 year old female with chronic nausea and vomiting with a large paraesophageal hernia.  After reviewing her chart, the fact that she has a symptomatic paraesophageal hernia with what appears to be early gastric outlet obstruction is likely the cause of her chronic nausea and vomiting.  Additionally, she has had pain in the past in the same location she has today which is likely secondary to large paraesophageal hernia.  She states she has never discussed surgery.  However, there is a note from our group last year.  However, surgery was deferred for reasons unclear at that time.  Currently she is not not very comfortable and has what appears to be a gastric outlet obstruction.  This paraesophageal hernia needs to be corrected.  We will have the ER placed an NG tube for decompression and we will add her onto the OR schedule for robotic repair.  There was mention of delayed gastric emptying but there is no nuclear medicine studies that I can find.  If there is a component of gastroparesis is likely secondary to the anatomic derangement of the stomach.  -Please clear the patient for surgery for tomorrow  -N.p.o.  -NG tube decompression        Romain Ornelas D.O. Located within [facility]  [phone]

## 2024-02-23 ENCOUNTER — ANESTHESIA (OUTPATIENT)
Dept: SURGERY | Facility: HOSPITAL | Age: 69
DRG: 327 | End: 2024-02-23
Payer: COMMERCIAL

## 2024-02-23 LAB
ATRIAL RATE - MUSE: 63 BPM
CREAT SERPL-MCNC: 0.5 MG/DL (ref 0.51–0.95)
DIASTOLIC BLOOD PRESSURE - MUSE: NORMAL MMHG
EGFRCR SERPLBLD CKD-EPI 2021: >90 ML/MIN/1.73M2
INTERPRETATION ECG - MUSE: NORMAL
P AXIS - MUSE: 39 DEGREES
PR INTERVAL - MUSE: 128 MS
QRS DURATION - MUSE: 84 MS
QT - MUSE: 382 MS
QTC - MUSE: 390 MS
R AXIS - MUSE: 81 DEGREES
SYSTOLIC BLOOD PRESSURE - MUSE: NORMAL MMHG
T AXIS - MUSE: 74 DEGREES
VENTRICULAR RATE- MUSE: 63 BPM

## 2024-02-23 PROCEDURE — 250N000011 HC RX IP 250 OP 636: Performed by: ANESTHESIOLOGY

## 2024-02-23 PROCEDURE — 250N000011 HC RX IP 250 OP 636: Performed by: FAMILY MEDICINE

## 2024-02-23 PROCEDURE — 250N000011 HC RX IP 250 OP 636: Performed by: SURGERY

## 2024-02-23 PROCEDURE — 360N000080 HC SURGERY LEVEL 7, PER MIN: Performed by: SURGERY

## 2024-02-23 PROCEDURE — 250N000009 HC RX 250: Performed by: NURSE ANESTHETIST, CERTIFIED REGISTERED

## 2024-02-23 PROCEDURE — 99232 SBSQ HOSP IP/OBS MODERATE 35: CPT | Performed by: INTERNAL MEDICINE

## 2024-02-23 PROCEDURE — 250N000013 HC RX MED GY IP 250 OP 250 PS 637: Performed by: INTERNAL MEDICINE

## 2024-02-23 PROCEDURE — 43282 LAP PARAESOPH HER RPR W/MESH: CPT | Performed by: SURGERY

## 2024-02-23 PROCEDURE — 258N000003 HC RX IP 258 OP 636: Performed by: ANESTHESIOLOGY

## 2024-02-23 PROCEDURE — 999N000141 HC STATISTIC PRE-PROCEDURE NURSING ASSESSMENT: Performed by: SURGERY

## 2024-02-23 PROCEDURE — 0WUF4JZ SUPPLEMENT ABDOMINAL WALL WITH SYNTHETIC SUBSTITUTE, PERCUTANEOUS ENDOSCOPIC APPROACH: ICD-10-PCS | Performed by: SURGERY

## 2024-02-23 PROCEDURE — C9113 INJ PANTOPRAZOLE SODIUM, VIA: HCPCS | Performed by: FAMILY MEDICINE

## 2024-02-23 PROCEDURE — 82565 ASSAY OF CREATININE: CPT | Performed by: SURGERY

## 2024-02-23 PROCEDURE — 710N000009 HC RECOVERY PHASE 1, LEVEL 1, PER MIN: Performed by: SURGERY

## 2024-02-23 PROCEDURE — 272N000001 HC OR GENERAL SUPPLY STERILE: Performed by: SURGERY

## 2024-02-23 PROCEDURE — 120N000001 HC R&B MED SURG/OB

## 2024-02-23 PROCEDURE — 258N000003 HC RX IP 258 OP 636: Performed by: NURSE ANESTHETIST, CERTIFIED REGISTERED

## 2024-02-23 PROCEDURE — 250N000011 HC RX IP 250 OP 636: Performed by: INTERNAL MEDICINE

## 2024-02-23 PROCEDURE — 250N000025 HC SEVOFLURANE, PER MIN: Performed by: SURGERY

## 2024-02-23 PROCEDURE — 36415 COLL VENOUS BLD VENIPUNCTURE: CPT | Performed by: SURGERY

## 2024-02-23 PROCEDURE — 250N000013 HC RX MED GY IP 250 OP 250 PS 637: Performed by: FAMILY MEDICINE

## 2024-02-23 PROCEDURE — 250N000011 HC RX IP 250 OP 636: Performed by: NURSE ANESTHETIST, CERTIFIED REGISTERED

## 2024-02-23 PROCEDURE — 370N000017 HC ANESTHESIA TECHNICAL FEE, PER MIN: Performed by: SURGERY

## 2024-02-23 PROCEDURE — 258N000003 HC RX IP 258 OP 636: Performed by: INTERNAL MEDICINE

## 2024-02-23 PROCEDURE — 0DV44ZZ RESTRICTION OF ESOPHAGOGASTRIC JUNCTION, PERCUTANEOUS ENDOSCOPIC APPROACH: ICD-10-PCS | Performed by: SURGERY

## 2024-02-23 PROCEDURE — 8E0W4CZ ROBOTIC ASSISTED PROCEDURE OF TRUNK REGION, PERCUTANEOUS ENDOSCOPIC APPROACH: ICD-10-PCS | Performed by: SURGERY

## 2024-02-23 PROCEDURE — C1781 MESH (IMPLANTABLE): HCPCS | Performed by: SURGERY

## 2024-02-23 PROCEDURE — 250N000013 HC RX MED GY IP 250 OP 250 PS 637: Performed by: SURGERY

## 2024-02-23 DEVICE — ENFORM PREPERITONEAL 10CMX10CM BIOMATERIAL
Type: IMPLANTABLE DEVICE | Site: ABDOMEN | Status: FUNCTIONAL
Brand: GORE ENFORM PREPERITONEAL BIOMATERIAL

## 2024-02-23 RX ORDER — LIDOCAINE 40 MG/G
CREAM TOPICAL
Status: DISCONTINUED | OUTPATIENT
Start: 2024-02-23 | End: 2024-02-26 | Stop reason: HOSPADM

## 2024-02-23 RX ORDER — AMOXICILLIN 250 MG
1 CAPSULE ORAL 2 TIMES DAILY
Status: DISCONTINUED | OUTPATIENT
Start: 2024-02-23 | End: 2024-02-26 | Stop reason: HOSPADM

## 2024-02-23 RX ORDER — SODIUM CHLORIDE, SODIUM LACTATE, POTASSIUM CHLORIDE, CALCIUM CHLORIDE 600; 310; 30; 20 MG/100ML; MG/100ML; MG/100ML; MG/100ML
INJECTION, SOLUTION INTRAVENOUS CONTINUOUS
Status: DISCONTINUED | OUTPATIENT
Start: 2024-02-23 | End: 2024-02-23 | Stop reason: HOSPADM

## 2024-02-23 RX ORDER — METOPROLOL TARTRATE 1 MG/ML
INJECTION, SOLUTION INTRAVENOUS PRN
Status: DISCONTINUED | OUTPATIENT
Start: 2024-02-23 | End: 2024-02-23

## 2024-02-23 RX ORDER — HYDROMORPHONE HCL IN WATER/PF 6 MG/30 ML
0.2 PATIENT CONTROLLED ANALGESIA SYRINGE INTRAVENOUS
Status: DISCONTINUED | OUTPATIENT
Start: 2024-02-23 | End: 2024-02-23

## 2024-02-23 RX ORDER — OXYCODONE HYDROCHLORIDE 5 MG/1
5 TABLET ORAL
Status: DISCONTINUED | OUTPATIENT
Start: 2024-02-23 | End: 2024-02-23 | Stop reason: HOSPADM

## 2024-02-23 RX ORDER — ONDANSETRON 4 MG/1
4 TABLET, ORALLY DISINTEGRATING ORAL EVERY 30 MIN PRN
Status: DISCONTINUED | OUTPATIENT
Start: 2024-02-23 | End: 2024-02-23 | Stop reason: HOSPADM

## 2024-02-23 RX ORDER — ENOXAPARIN SODIUM 100 MG/ML
40 INJECTION SUBCUTANEOUS EVERY 24 HOURS
Status: DISCONTINUED | OUTPATIENT
Start: 2024-02-24 | End: 2024-02-26

## 2024-02-23 RX ORDER — MAGNESIUM SULFATE 4 G/50ML
4 INJECTION INTRAVENOUS ONCE
Status: DISCONTINUED | OUTPATIENT
Start: 2024-02-23 | End: 2024-02-23

## 2024-02-23 RX ORDER — MAGNESIUM SULFATE 4 G/50ML
4 INJECTION INTRAVENOUS ONCE
Status: COMPLETED | OUTPATIENT
Start: 2024-02-23 | End: 2024-02-23

## 2024-02-23 RX ORDER — PROPOFOL 10 MG/ML
INJECTION, EMULSION INTRAVENOUS PRN
Status: DISCONTINUED | OUTPATIENT
Start: 2024-02-23 | End: 2024-02-23

## 2024-02-23 RX ORDER — SODIUM CHLORIDE, SODIUM LACTATE, POTASSIUM CHLORIDE, CALCIUM CHLORIDE 600; 310; 30; 20 MG/100ML; MG/100ML; MG/100ML; MG/100ML
INJECTION, SOLUTION INTRAVENOUS CONTINUOUS PRN
Status: DISCONTINUED | OUTPATIENT
Start: 2024-02-23 | End: 2024-02-23

## 2024-02-23 RX ORDER — HYDROMORPHONE HYDROCHLORIDE 1 MG/ML
0.2 INJECTION, SOLUTION INTRAMUSCULAR; INTRAVENOUS; SUBCUTANEOUS EVERY 5 MIN PRN
Status: DISCONTINUED | OUTPATIENT
Start: 2024-02-23 | End: 2024-02-23 | Stop reason: HOSPADM

## 2024-02-23 RX ORDER — KETAMINE HYDROCHLORIDE 10 MG/ML
INJECTION INTRAMUSCULAR; INTRAVENOUS PRN
Status: DISCONTINUED | OUTPATIENT
Start: 2024-02-23 | End: 2024-02-23

## 2024-02-23 RX ORDER — ONDANSETRON 2 MG/ML
4 INJECTION INTRAMUSCULAR; INTRAVENOUS EVERY 30 MIN PRN
Status: DISCONTINUED | OUTPATIENT
Start: 2024-02-23 | End: 2024-02-23 | Stop reason: HOSPADM

## 2024-02-23 RX ORDER — ACETAMINOPHEN 325 MG/1
975 TABLET ORAL ONCE
Status: DISCONTINUED | OUTPATIENT
Start: 2024-02-23 | End: 2024-02-23 | Stop reason: HOSPADM

## 2024-02-23 RX ORDER — OXYCODONE HYDROCHLORIDE 5 MG/1
10 TABLET ORAL
Status: DISCONTINUED | OUTPATIENT
Start: 2024-02-23 | End: 2024-02-23 | Stop reason: HOSPADM

## 2024-02-23 RX ORDER — TRAMADOL HYDROCHLORIDE 50 MG/1
50 TABLET ORAL EVERY 6 HOURS PRN
Status: DISCONTINUED | OUTPATIENT
Start: 2024-02-23 | End: 2024-02-25

## 2024-02-23 RX ORDER — DEXAMETHASONE SODIUM PHOSPHATE 10 MG/ML
INJECTION, SOLUTION INTRAMUSCULAR; INTRAVENOUS PRN
Status: DISCONTINUED | OUTPATIENT
Start: 2024-02-23 | End: 2024-02-23

## 2024-02-23 RX ORDER — PROCHLORPERAZINE MALEATE 5 MG
5 TABLET ORAL EVERY 6 HOURS PRN
Status: DISCONTINUED | OUTPATIENT
Start: 2024-02-23 | End: 2024-02-26 | Stop reason: HOSPADM

## 2024-02-23 RX ORDER — BACLOFEN 10 MG/1
10 TABLET ORAL 3 TIMES DAILY
Status: DISCONTINUED | OUTPATIENT
Start: 2024-02-23 | End: 2024-02-26 | Stop reason: HOSPADM

## 2024-02-23 RX ORDER — POLYETHYLENE GLYCOL 3350 17 G/17G
17 POWDER, FOR SOLUTION ORAL DAILY
Status: DISCONTINUED | OUTPATIENT
Start: 2024-02-24 | End: 2024-02-26 | Stop reason: HOSPADM

## 2024-02-23 RX ORDER — LIDOCAINE HYDROCHLORIDE 10 MG/ML
INJECTION, SOLUTION INFILTRATION; PERINEURAL PRN
Status: DISCONTINUED | OUTPATIENT
Start: 2024-02-23 | End: 2024-02-23

## 2024-02-23 RX ORDER — NALOXONE HYDROCHLORIDE 0.4 MG/ML
0.1 INJECTION, SOLUTION INTRAMUSCULAR; INTRAVENOUS; SUBCUTANEOUS
Status: DISCONTINUED | OUTPATIENT
Start: 2024-02-23 | End: 2024-02-23 | Stop reason: HOSPADM

## 2024-02-23 RX ORDER — LIDOCAINE 40 MG/G
CREAM TOPICAL
Status: DISCONTINUED | OUTPATIENT
Start: 2024-02-23 | End: 2024-02-23 | Stop reason: HOSPADM

## 2024-02-23 RX ORDER — FENTANYL CITRATE 50 UG/ML
50 INJECTION, SOLUTION INTRAMUSCULAR; INTRAVENOUS EVERY 5 MIN PRN
Status: DISCONTINUED | OUTPATIENT
Start: 2024-02-23 | End: 2024-02-23 | Stop reason: HOSPADM

## 2024-02-23 RX ORDER — HYDROMORPHONE HYDROCHLORIDE 1 MG/ML
0.5 INJECTION, SOLUTION INTRAMUSCULAR; INTRAVENOUS; SUBCUTANEOUS
Status: DISCONTINUED | OUTPATIENT
Start: 2024-02-23 | End: 2024-02-26

## 2024-02-23 RX ORDER — ONDANSETRON 4 MG/1
4 TABLET, ORALLY DISINTEGRATING ORAL EVERY 6 HOURS PRN
Status: DISCONTINUED | OUTPATIENT
Start: 2024-02-23 | End: 2024-02-26 | Stop reason: HOSPADM

## 2024-02-23 RX ORDER — ATENOLOL 25 MG/1
12.5 TABLET ORAL DAILY
Status: DISCONTINUED | OUTPATIENT
Start: 2024-02-23 | End: 2024-02-26 | Stop reason: HOSPADM

## 2024-02-23 RX ORDER — FENTANYL CITRATE 50 UG/ML
INJECTION, SOLUTION INTRAMUSCULAR; INTRAVENOUS PRN
Status: DISCONTINUED | OUTPATIENT
Start: 2024-02-23 | End: 2024-02-23

## 2024-02-23 RX ORDER — BISACODYL 10 MG
10 SUPPOSITORY, RECTAL RECTAL DAILY PRN
Status: DISCONTINUED | OUTPATIENT
Start: 2024-02-26 | End: 2024-02-26 | Stop reason: HOSPADM

## 2024-02-23 RX ORDER — ONDANSETRON 2 MG/ML
4 INJECTION INTRAMUSCULAR; INTRAVENOUS EVERY 6 HOURS PRN
Status: DISCONTINUED | OUTPATIENT
Start: 2024-02-23 | End: 2024-02-26 | Stop reason: HOSPADM

## 2024-02-23 RX ORDER — HYDRALAZINE HYDROCHLORIDE 20 MG/ML
5 INJECTION INTRAMUSCULAR; INTRAVENOUS EVERY 6 HOURS PRN
Status: COMPLETED | OUTPATIENT
Start: 2024-02-23 | End: 2024-02-24

## 2024-02-23 RX ORDER — HYDROMORPHONE HCL IN WATER/PF 6 MG/30 ML
0.4 PATIENT CONTROLLED ANALGESIA SYRINGE INTRAVENOUS
Status: DISCONTINUED | OUTPATIENT
Start: 2024-02-23 | End: 2024-02-23

## 2024-02-23 RX ORDER — ACETAMINOPHEN 325 MG/1
650 TABLET ORAL EVERY 4 HOURS PRN
Status: DISCONTINUED | OUTPATIENT
Start: 2024-02-26 | End: 2024-02-26 | Stop reason: HOSPADM

## 2024-02-23 RX ORDER — FENTANYL CITRATE 50 UG/ML
25 INJECTION, SOLUTION INTRAMUSCULAR; INTRAVENOUS EVERY 5 MIN PRN
Status: DISCONTINUED | OUTPATIENT
Start: 2024-02-23 | End: 2024-02-23 | Stop reason: HOSPADM

## 2024-02-23 RX ORDER — ACETAMINOPHEN 325 MG/1
975 TABLET ORAL EVERY 8 HOURS
Status: COMPLETED | OUTPATIENT
Start: 2024-02-23 | End: 2024-02-26

## 2024-02-23 RX ORDER — HYDROMORPHONE HYDROCHLORIDE 1 MG/ML
0.4 INJECTION, SOLUTION INTRAMUSCULAR; INTRAVENOUS; SUBCUTANEOUS EVERY 5 MIN PRN
Status: DISCONTINUED | OUTPATIENT
Start: 2024-02-23 | End: 2024-02-23 | Stop reason: HOSPADM

## 2024-02-23 RX ORDER — HYDROMORPHONE HCL IN WATER/PF 6 MG/30 ML
0.2 PATIENT CONTROLLED ANALGESIA SYRINGE INTRAVENOUS
Status: DISCONTINUED | OUTPATIENT
Start: 2024-02-23 | End: 2024-02-26

## 2024-02-23 RX ORDER — GABAPENTIN 100 MG/1
100 CAPSULE ORAL AT BEDTIME
Status: DISCONTINUED | OUTPATIENT
Start: 2024-02-23 | End: 2024-02-26 | Stop reason: HOSPADM

## 2024-02-23 RX ADMIN — ATENOLOL 12.5 MG: 25 TABLET ORAL at 17:33

## 2024-02-23 RX ADMIN — ACETAMINOPHEN 650 MG: 325 TABLET ORAL at 16:05

## 2024-02-23 RX ADMIN — DEXAMETHASONE SODIUM PHOSPHATE 6 MG: 10 INJECTION, SOLUTION INTRAMUSCULAR; INTRAVENOUS at 07:39

## 2024-02-23 RX ADMIN — HYDRALAZINE HYDROCHLORIDE 10 MG: 20 INJECTION INTRAMUSCULAR; INTRAVENOUS at 00:59

## 2024-02-23 RX ADMIN — KETAMINE HYDROCHLORIDE 50 MG: 10 INJECTION INTRAMUSCULAR; INTRAVENOUS at 07:30

## 2024-02-23 RX ADMIN — PROCHLORPERAZINE EDISYLATE 5 MG: 5 INJECTION, SOLUTION INTRAMUSCULAR; INTRAVENOUS at 02:24

## 2024-02-23 RX ADMIN — SUGAMMADEX 200 MG: 100 INJECTION, SOLUTION INTRAVENOUS at 08:55

## 2024-02-23 RX ADMIN — PANTOPRAZOLE SODIUM 40 MG: 40 INJECTION, POWDER, FOR SOLUTION INTRAVENOUS at 05:29

## 2024-02-23 RX ADMIN — HYDRALAZINE HYDROCHLORIDE 10 MG: 20 INJECTION INTRAMUSCULAR; INTRAVENOUS at 11:50

## 2024-02-23 RX ADMIN — PHENYLEPHRINE HYDROCHLORIDE 0.5 MCG/KG/MIN: 10 INJECTION INTRAVENOUS at 08:38

## 2024-02-23 RX ADMIN — PHENYLEPHRINE HYDROCHLORIDE 100 MCG: 10 INJECTION INTRAVENOUS at 08:28

## 2024-02-23 RX ADMIN — HYDRALAZINE HYDROCHLORIDE 10 MG: 20 INJECTION INTRAMUSCULAR; INTRAVENOUS at 05:29

## 2024-02-23 RX ADMIN — ROCURONIUM BROMIDE 50 MG: 50 INJECTION, SOLUTION INTRAVENOUS at 07:30

## 2024-02-23 RX ADMIN — HYDROMORPHONE HYDROCHLORIDE 0.2 MG: 0.2 INJECTION, SOLUTION INTRAMUSCULAR; INTRAVENOUS; SUBCUTANEOUS at 12:15

## 2024-02-23 RX ADMIN — LIDOCAINE HYDROCHLORIDE 5 ML: 10 INJECTION, SOLUTION INFILTRATION; PERINEURAL at 07:30

## 2024-02-23 RX ADMIN — ONDANSETRON 4 MG: 2 INJECTION INTRAMUSCULAR; INTRAVENOUS at 06:40

## 2024-02-23 RX ADMIN — HYDROMORPHONE HYDROCHLORIDE 0.5 MG: 1 INJECTION, SOLUTION INTRAMUSCULAR; INTRAVENOUS; SUBCUTANEOUS at 14:24

## 2024-02-23 RX ADMIN — BACLOFEN 10 MG: 10 TABLET ORAL at 20:02

## 2024-02-23 RX ADMIN — FENTANYL CITRATE 25 MCG: 50 INJECTION, SOLUTION INTRAMUSCULAR; INTRAVENOUS at 09:29

## 2024-02-23 RX ADMIN — HYDRALAZINE HYDROCHLORIDE 5 MG: 20 INJECTION INTRAMUSCULAR; INTRAVENOUS at 19:55

## 2024-02-23 RX ADMIN — Medication 5 MG: at 23:19

## 2024-02-23 RX ADMIN — HYDROMORPHONE HYDROCHLORIDE 0.2 MG: 0.2 INJECTION, SOLUTION INTRAMUSCULAR; INTRAVENOUS; SUBCUTANEOUS at 11:48

## 2024-02-23 RX ADMIN — HYDROMORPHONE HYDROCHLORIDE 0.5 MG: 1 INJECTION, SOLUTION INTRAMUSCULAR; INTRAVENOUS; SUBCUTANEOUS at 23:19

## 2024-02-23 RX ADMIN — FENTANYL CITRATE 50 MCG: 50 INJECTION, SOLUTION INTRAMUSCULAR; INTRAVENOUS at 09:19

## 2024-02-23 RX ADMIN — SODIUM CHLORIDE, POTASSIUM CHLORIDE, SODIUM LACTATE AND CALCIUM CHLORIDE: 600; 310; 30; 20 INJECTION, SOLUTION INTRAVENOUS at 07:17

## 2024-02-23 RX ADMIN — PHENYLEPHRINE HYDROCHLORIDE 100 MCG: 10 INJECTION INTRAVENOUS at 08:25

## 2024-02-23 RX ADMIN — GABAPENTIN 100 MG: 100 CAPSULE ORAL at 20:02

## 2024-02-23 RX ADMIN — PROPOFOL 30 MG: 10 INJECTION, EMULSION INTRAVENOUS at 07:30

## 2024-02-23 RX ADMIN — FENTANYL CITRATE 25 MCG: 50 INJECTION, SOLUTION INTRAMUSCULAR; INTRAVENOUS at 09:43

## 2024-02-23 RX ADMIN — FENTANYL CITRATE 50 MCG: 50 INJECTION INTRAMUSCULAR; INTRAVENOUS at 07:59

## 2024-02-23 RX ADMIN — PHENYLEPHRINE HYDROCHLORIDE 100 MCG: 10 INJECTION INTRAVENOUS at 08:36

## 2024-02-23 RX ADMIN — ONDANSETRON 4 MG: 2 INJECTION INTRAMUSCULAR; INTRAVENOUS at 09:42

## 2024-02-23 RX ADMIN — SODIUM CHLORIDE, POTASSIUM CHLORIDE, SODIUM LACTATE AND CALCIUM CHLORIDE: 600; 310; 30; 20 INJECTION, SOLUTION INTRAVENOUS at 06:43

## 2024-02-23 RX ADMIN — METOPROLOL TARTRATE 5 MG: 5 INJECTION INTRAVENOUS at 07:35

## 2024-02-23 RX ADMIN — DEXTROSE AND SODIUM CHLORIDE: 5; 900 INJECTION, SOLUTION INTRAVENOUS at 03:52

## 2024-02-23 RX ADMIN — SENNOSIDES AND DOCUSATE SODIUM 1 TABLET: 8.6; 5 TABLET ORAL at 20:02

## 2024-02-23 RX ADMIN — ACETAMINOPHEN 975 MG: 325 TABLET ORAL at 19:57

## 2024-02-23 RX ADMIN — FENTANYL CITRATE 50 MCG: 50 INJECTION INTRAMUSCULAR; INTRAVENOUS at 07:50

## 2024-02-23 RX ADMIN — PROCHLORPERAZINE EDISYLATE 5 MG: 5 INJECTION, SOLUTION INTRAMUSCULAR; INTRAVENOUS at 11:19

## 2024-02-23 RX ADMIN — MORPHINE SULFATE 2 MG: 2 INJECTION, SOLUTION INTRAMUSCULAR; INTRAVENOUS at 03:48

## 2024-02-23 RX ADMIN — MAGNESIUM SULFATE HEPTAHYDRATE 4 G: 80 INJECTION, SOLUTION INTRAVENOUS at 06:43

## 2024-02-23 RX ADMIN — Medication 5 MG: at 00:59

## 2024-02-23 RX ADMIN — MORPHINE SULFATE 2 MG: 2 INJECTION, SOLUTION INTRAMUSCULAR; INTRAVENOUS at 01:08

## 2024-02-23 RX ADMIN — NICOTINE 1 PATCH: 14 PATCH, EXTENDED RELEASE TRANSDERMAL at 14:30

## 2024-02-23 RX ADMIN — DEXTROSE AND SODIUM CHLORIDE: 5; 900 INJECTION, SOLUTION INTRAVENOUS at 10:39

## 2024-02-23 RX ADMIN — ONDANSETRON 4 MG: 2 INJECTION INTRAMUSCULAR; INTRAVENOUS at 01:08

## 2024-02-23 RX ADMIN — HYDROMORPHONE HYDROCHLORIDE 0.5 MG: 1 INJECTION, SOLUTION INTRAMUSCULAR; INTRAVENOUS; SUBCUTANEOUS at 16:39

## 2024-02-23 RX ADMIN — DEXTROSE AND SODIUM CHLORIDE: 5; 900 INJECTION, SOLUTION INTRAVENOUS at 18:45

## 2024-02-23 ASSESSMENT — COPD QUESTIONNAIRES: COPD: 1

## 2024-02-23 NOTE — PLAN OF CARE
Problem: Nausea and Vomiting  Goal: Nausea and Vomiting Relief  Outcome: Progressing     Problem: Pain Acute  Goal: Optimal Pain Control and Function  Outcome: Progressing   Goal Outcome Evaluation:    Patient awake most of the shift due to abdominal and back pain. Prn morphine given x2. Received zofran and compazine for nausea/emesis. Iv fluids running continuous. BP managed with scheduled hydralazine. NPO for Hiatal hernia repair today.     Report called to REJI receiving RN at 0600. Patient taken out of the unit for procedure.

## 2024-02-23 NOTE — OP NOTE
Name:  Mariah Koehler  PCP:  Alex Nolasco  Procedure Date:  2/21/2024 - 2/23/2024      Procedure(s):  Paraesophageal hiatal hernia repair with mesh, ROBOT-ASSISTED, LAPAROSCOPIC, USING DA ROSMERY XI    Pre-Procedure Diagnosis:  Hiatal hernia [K44.9]     Post-Procedure Diagnosis:    Paraesophageal hernia    Surgeon(s):  Lemuel Ornelas DO    Circulator: Tomasa Morris RN; Maura Duval RN  Relief Scrub: Na Gonzalez  Scrub Person: Tomasa Sheriff    Anesthesia Type:  GET      Findings:  Large intrathoracic stomach    Operative Report:    The Patient was taken back to the operating room and placed in a supine position.  Endotracheal intubation was performed.  The abdomen was prepped and draped in a sterile fashion.  I  made an 8 mm incision.  I then establish pneumoperitoneum using a Veress needle technique.  Once this was complete I placed an 8 mm trocar with a 5 mm 30 degree camera into the abdomen.  I scrutinized the surrounding structures for any injuries upon entry none were found.  Next, I placed a Emanuel liver retractor via a 5 mm subxiphoid incision.  This was secured to the bed in the standard fashion.  I then placed 3 additional 8 mm trochars in the abdomen.  One in the right upper quadrant and 2 in the left upper quadrant. Enform absorbable mesh was placed into the abdomen.   The robot was docked in the standard fashion once the patient was placed in reverse Trendelenburg.    I then proceeded to address the hiatus.  Approximately 3/4ths of the stomach was in the chest.  I took down the pars lucidum and spared the vessels and gastrohepatic ligament.  I then entered the avascular plane between the hernia sac and the crura and circumferentially dissected out the hernia sac using vessel sealing device.  Once I came to the left jeffery I then turned my attention to the greater curvature of the stomach.  I took down the short gastrics with the vessel sealing device all the way up to the angle  of His.  I then created a retroesophageal window and placed 1/4 inch Penrose through this for esophageal retraction.  The posterior sac was also dissected free as was the small lipoma in the retroesophageal space.  I ensured a wide and deep mediastinal dissection of the periesophageal tissue taking care as to preserve the vagus nerves.  The hernia sac was reduced and transected. Once this was complete I then reapproximated the left and right crura with a 0 nonabsorbable V lock suture in a running fashion.  I then placed the previously fashioned  Enform mesh into the retroesophageal window and sutured this in place for extra added support with additional 2-0 Vicryl sutures.  The mesh was wrapped around the esophagus without stricturing or narrowing in a key hole fashion.  Once this was in place I then created a posterior fundoplication.  This was done with adequate abdominal esophageal length and no evidence of gastric or esophageal retraction back into the chest cavity.  The fundoplication was completed with 0 Ethibond sutures in interrupted fashion.  Once this was complete all nonabsorbable material was removed.  The robot was undocked and Exparel local anesthetic was injected into all port sites.  The liver tractor was removed. I then removed all trochars and reapproximated skin edges with 4-0 Monocryl suture.  The abdomen was cleaned and all wounds were cleaned and covered with Steri-Strips and Band-Aids.  The  patient was extubated sent to the PACU to undergo recovery.  All lap counts and needle counts were correct at the end of the procedure.    Estimated Blood Loss:   5cc    Specimens:    * No specimens in log *       Drains:   External Urinary Catheter (Active)   Skin no breakdown;no redness 02/23/24 0059   Urine Color Elisabet 02/22/24 2255   Urine Appearance Clear 02/22/24 2255   Output (mL) 200 mL 02/23/24 0556   Collection Container Standard 02/23/24 0059   Securement Method Other (Comment) 02/23/24 0059    External Catheter changed Done 02/22/24 1984       Complications:    None    Lemuel Ornelas DO

## 2024-02-23 NOTE — PROGRESS NOTES
[facility]    Medicine Progress Note - Hospitalist Service    Date of Admission:  2/21/2024    Assessment & Plan      Mariah Koehler is a 68 year old female with PMH significant for known Large Hiatal Hernia, Gastroparesis and chronic pain who is admitted on 2/22/2024 with intractable nausea and vomiting. Found to have large hiatal hernia, causing gastric outlet obstruction.     Large hiatal hernia, gastric outlet obstruction:  Received laparoscopic paraesophageal hiatal hernia repair with mesh today  - General surgery input appreciated  - Advance diet as tolerated. Clear liquid diet today.   - PPI  - Antiemetics prn  - Pain control: IV dilaudid    Essential hypertension:    - Resume PTA atenolol. IV Hydralazine prn    Chronic back pain: has intrathecal pump. Continue PTA baclofen.     No Suicidal Ideation: Patient was listed as moderate suicide risk per triage survey. Patient adamantly denies suicidal ideation. All suicidal risk questions were negative when screened by RN. The chart was flagged because patient may have had that ideation in the past, but it is documented none within the past 3 months. Patient wants moderate suicide risk label removed from her chart.          Diet: Advance Diet as Tolerated: Clear Liquid Diet    DVT Prophylaxis: Pneumatic Compression Devices  Bellamy Catheter: Not present  Lines: None     Cardiac Monitoring: None  Code Status: Full Code      Clinically Significant Risk Factors          # Hypocalcemia: Lowest Ca = 7.6 mg/dL in last 2 days, will monitor and replace as appropriate         # Hypertension: Noted on problem list              # Financial/Environmental Concerns:           Disposition Plan      Expected Discharge Date: 02/23/2024      Destination: home with family              Irwin Viramontes MD  Hospitalist Service  [facility]  Securely message with [employer] (more info)  Text page via [employer]    ______________________________________________________________________    Interval History   Patient was seen after he returned from OR. She reports having pain from her surgical wound.     Physical Exam   Vital Signs: Temp: 97.8  F (36.6  C) Temp src: Oral BP: (!) 170/79 Pulse: 100   Resp: 26 SpO2: 96 % O2 Device: Nasal cannula Oxygen Delivery: 2 LPM  Weight: 105 lbs 0 oz    General appearance: not in acute distress  HEENT: PERRL, EOMI  Lungs: Clear breath sounds in bilateral lung fields  Cardiovascular: Regular rate and rhythm, normal S1-S2  Abdomen: Soft, tenderness to palpation of mid abdomen, no distension  Musculoskeletal: No joint swelling  Skin: No rash and no edema  Neurology: AAO ×3.  Cranial nerves II - XII normal.  Normal muscle strength in all four extremities.     Medical Decision Making       45 MINUTES SPENT BY ME on the date of service doing chart review, history, exam, documentation & further activities per the note.      Data     I have personally reviewed the following data over the past 24 hrs:    N/A  \   N/A   / N/A     N/A N/A N/A /  N/A   N/A N/A 0.50 (L) \       Imaging results reviewed over the past 24 hrs:   No results found for this or any previous visit (from the past 24 hour(s)).

## 2024-02-23 NOTE — PROGRESS NOTES
Report given to Dell on P2.   Her spouse updated on transfer and surgery time tomorrow.   Her  concerned about her going home with him having Covid.     Charlotte Lucero RN

## 2024-02-23 NOTE — ANESTHESIA CARE TRANSFER NOTE
Patient: Mariah Koehler    Procedure: Procedure(s):  Paraesophageal hiatal hernia repair with mesh, ROBOT-ASSISTED, LAPAROSCOPIC, USING DA ROSMERY XI       Diagnosis: Hiatal hernia [K44.9]  Diagnosis Additional Information: No value filed.    Anesthesia Type:   General     Note:    Oropharynx: oropharynx clear of all foreign objects and spontaneously breathing  Level of Consciousness: drowsy  Oxygen Supplementation: face mask  Level of Supplemental Oxygen (L/min / FiO2): 8  Independent Airway: airway patency satisfactory and stable  Dentition: dentition unchanged  Vital Signs Stable: post-procedure vital signs reviewed and stable  Report to RN Given: handoff report given  Patient transferred to: PACU    Handoff Report: Identifed the Patient, Identified the Reponsible Provider, Reviewed the pertinent medical history, Discussed the surgical course, Reviewed Intra-OP anesthesia mangement and issues during anesthesia, Set expectations for post-procedure period and Allowed opportunity for questions and acknowledgement of understanding      Vitals:  Vitals Value Taken Time   /78 02/23/24 0907   Temp     Pulse 127 02/23/24 0910   Resp 29 02/23/24 0910   SpO2 97 % 02/23/24 0910   Vitals shown include unfiled device data.    Electronically Signed By: TIM Singer CRNA  February 23, 2024  9:11 AM

## 2024-02-23 NOTE — PLAN OF CARE
Problem: Nausea and Vomiting  Goal: Nausea and Vomiting Relief  Outcome: Progressing     Problem: Pain Acute  Goal: Optimal Pain Control and Function  Outcome: Progressing     Problem: Adult Inpatient Plan of Care  Goal: Readiness for Transition of Care  Intervention: Mutually Develop Transition Plan  Recent Flowsheet Documentation  Taken 2/22/2024 2100 by Dell Bacon, RN  Equipment Currently Used at Home: walker, rolling   Goal Outcome Evaluation:       Pain is managed with prn morphine IV with relief, patient has an implanted pain pump to the R buttock, denies nausea at this time, continues to be NPO and is tolerating well    Dell Bacon RN

## 2024-02-23 NOTE — ANESTHESIA POSTPROCEDURE EVALUATION
Patient: Mariah Koehler    Procedure: Procedure(s):  Paraesophageal hiatal hernia repair with mesh, ROBOT-ASSISTED, LAPAROSCOPIC, USING DA ROSMERY XI       Anesthesia Type:  General    Note:  Disposition: Outpatient   Postop Pain Control: Uneventful            Sign Out: Well controlled pain   PONV: No   Neuro/Psych: Uneventful            Sign Out: Acceptable/Baseline neuro status   Airway/Respiratory: Uneventful            Sign Out: Acceptable/Baseline resp. status   CV/Hemodynamics: Uneventful            Sign Out: Acceptable CV status; No obvious hypovolemia; No obvious fluid overload   Other NRE: NONE   DID A NON-ROUTINE EVENT OCCUR? No           Last vitals:  Vitals Value Taken Time   /71 02/23/24 1015   Temp 36.7  C (98.1  F) 02/23/24 1015   Pulse 99 02/23/24 1021   Resp 24 02/23/24 1021   SpO2 95 % 02/23/24 1021   Vitals shown include unfiled device data.    Electronically Signed By: Rach Tamayo MD  February 23, 2024  12:16 PM

## 2024-02-23 NOTE — ANESTHESIA PROCEDURE NOTES
Airway       Patient location during procedure: OR       Procedure Start/Stop Times: 2/23/2024 7:32 AM  Staff -        CRNA: Jarrod Cueva APRN CRNA       Performed By: CRNA  Consent for Airway        Urgency: elective  Indications and Patient Condition       Indications for airway management: brianda-procedural       Induction type:intravenous       Mask difficulty assessment: 1 - vent by mask    Final Airway Details       Final airway type: endotracheal airway       Successful airway: ETT - single  Endotracheal Airway Details        ETT size (mm): 7.0       Cuffed: yes       Successful intubation technique: direct laryngoscopy       DL Blade Type: Garcia 2       Grade View of Cords: 1       Adjucts: stylet       Position: Right       Measured from: lips       Secured at (cm): 20       Bite block used: None    Post intubation assessment        Placement verified by: capnometry, equal breath sounds and chest rise        Number of attempts at approach: 1       Number of other approaches attempted: 0       Secured with: silk tape       Ease of procedure: easy       Dentition: Intact    Medication(s) Administered   Medication Administration Time: 2/23/2024 7:32 AM

## 2024-02-23 NOTE — PLAN OF CARE
Goal Outcome Evaluation:    Problem: Nausea and Vomiting  Goal: Nausea and Vomiting Relief  Outcome: Progressing  Intervention: Prevent and Manage Nausea and Vomiting    Problem: Pain Acute  Goal: Optimal Pain Control and Function  Outcome: Progressing  Intervention: Develop Pain Management Plan     Patient got back from surgery about 1040. Patient has been drowsy most of the shift. On IV fluids per orders and 2 L oxygen. Patient having about 6/10 pain since coming back from surgery, gave IV diluadid which is somewhat effective. MD Viramontes increased the dilaudid dose to 0.5 mg. Had some nausea so IV compazine given. Did not want any of her oral meds. Barely taking sips yet. Purewick in place and patient voiding well. Has not gotten out of bed yet due to still being somewhat drowsy. Nicotine patch changed. Will continue to monitor.     Manasa Meyers RN 2/23/2024 2:55 PM

## 2024-02-23 NOTE — ANESTHESIA PREPROCEDURE EVALUATION
Anesthesia Pre-Procedure Evaluation    Patient: Mariah Koehler   MRN: [id number] : 1955        Procedure : Procedure(s):  hiatal hernia repair with mesh, ROBOT-ASSISTED, LAPAROSCOPIC, USING DA ROSMERY XI          Past Medical History:   Diagnosis Date    Current mild episode of major depressive disorder, unspecified whether recurrent (H24) 2020    Hypertension     Migraine headache 2023    Nausea and vomiting     Opiate dependence (H)     RSD (reflex sympathetic dystrophy)     Sacral fracture, closed (H)       Past Surgical History:   Procedure Laterality Date    APPENDECTOMY      ESOPHAGOSCOPY, GASTROSCOPY, DUODENOSCOPY (EGD), COMBINED N/A 2023    Procedure: ESOPHAGOGASTRODUODENOSCOPY with biopsies;  Surgeon: Na Brooks MD;  Location: [facility] Main OR    GYN SURGERY      HC REVISE MEDIAN N/CARPAL TUNNEL SURG      Description: Neuroplasty Decompression Median Nerve At Carpal Tunnel;  Recorded: 2011;    HYSTERECTOMY  1984    IR CERVICAL EPIDURAL STEROID INJECTION  2005    OOPHORECTOMY Bilateral     AK SYMPATHECTOMY,CERVICOTHORACIC      Description: Surgical Sympathectomy Cervicothoracic;  Recorded: 2011;    ZC DECOMPRESS FASCIOTOMY FINGR/HAND      Description: Decompression Fasciotomy Of The Hand;  Recorded: 2011;    ZZC LAP,CHOLECYSTECTOMY/EXPLORE      Description: Cholecystectomy Laparoscopic;  Recorded: 2011;    ZZC TOTAL ABDOM HYSTERECTOMY      Description: Hysterectomy;  Recorded: 2011;      Allergies   Allergen Reactions    Codeine Nausea and Vomiting    Hydromorphone Headache and Nausea and Vomiting    Morphine Nausea and Vomiting    Bacitracin Rash    Methadone Rash      Social History     Tobacco Use    Smoking status: Every Day     Packs/day: 0.75     Years: 51.00     Additional pack years: 0.00     Total pack years: 38.25     Types: Cigarettes     Start date: 1968    Smokeless tobacco: Never   Substance Use Topics    Alcohol  "use: Not Currently     Comment: 1 glass a year      Wt Readings from Last 1 Encounters:   02/21/24 47.6 kg (105 lb)        Anesthesia Evaluation   Pt has had prior anesthetic.     No history of anesthetic complications       ROS/MED HX  ENT/Pulmonary:     (+)                          COPD,              Neurologic:       Cardiovascular:     (+)  hypertension- -   -  - -                                      METS/Exercise Tolerance:     Hematologic:       Musculoskeletal:       GI/Hepatic:     (+) GERD,     hiatal hernia,              Renal/Genitourinary:       Endo:       Psychiatric/Substance Use:       Infectious Disease:       Malignancy:       Other:            Physical Exam    Airway        Mallampati: II    Neck ROM: full     Respiratory Devices and Support         Dental       (+) Minor Abnormalities - some fillings, tiny chips      Cardiovascular   cardiovascular exam normal          Pulmonary   pulmonary exam normal                OUTSIDE LABS:  CBC:   Lab Results   Component Value Date    WBC 4.8 02/22/2024    WBC 6.9 02/21/2024    HGB 13.2 02/22/2024    HGB 15.5 02/21/2024    HCT 39.5 02/22/2024    HCT 46.9 02/21/2024    PLT  02/22/2024      Comment:      Platelets Clumped-Platelet Count Not Available     02/21/2024     BMP:   Lab Results   Component Value Date     02/22/2024     02/21/2024    POTASSIUM 3.5 02/22/2024    POTASSIUM 4.1 02/21/2024    CHLORIDE 109 (H) 02/22/2024    CHLORIDE 103 02/21/2024    CO2 23 02/22/2024    CO2 27 02/21/2024    BUN 10.1 02/22/2024    BUN 10.0 02/21/2024    CR 0.46 (L) 02/22/2024    CR 0.52 02/21/2024    GLC 90 02/22/2024     (H) 02/21/2024     COAGS:   Lab Results   Component Value Date    PTT 29 11/25/2019    INR 1.01 11/25/2019     POC: No results found for: \"BGM\", \"HCG\", \"HCGS\"  HEPATIC:   Lab Results   Component Value Date    ALBUMIN 4.3 02/21/2024    PROTTOTAL 6.9 02/21/2024    ALT 29 02/21/2024    AST 31 02/21/2024    ALKPHOS 87 02/21/2024 "    BILITOTAL 0.4 02/21/2024     OTHER:   Lab Results   Component Value Date    LACT 1.5 01/22/2024    A1C 5.8 (H) 01/30/2024    ESTEFANY 7.6 (L) 02/22/2024    PHOS 3.9 10/27/2023    MAG 1.8 01/22/2024    LIPASE 23 02/21/2024    TSH 0.62 10/26/2023    T4 1.07 10/24/2023    CRP <0.1 02/18/2023    SED 4 09/30/2022       Anesthesia Plan    ASA Status:  2       Anesthesia Type: General.     - Airway: ETT              Consents    Anesthesia Plan(s) and associated risks, benefits, and realistic alternatives discussed. Questions answered and patient/representative(s) expressed understanding.     - Discussed: Risks, Benefits and Alternatives for the PROCEDURE were discussed     - Discussed with:  Patient      - Extended Intubation/Ventilatory Support Discussed: No.      - Patient is DNR/DNI Status: No     Use of blood products discussed: No .     Postoperative Care    Pain management: Multi-modal analgesia.   PONV prophylaxis: Ondansetron (or other 5HT-3), Dexamethasone or Solumedrol, Scopolamine patch     Comments:               Rach Tamayo MD    I have reviewed the pertinent notes and labs in the chart from the past 30 days and (re)examined the patient.  Any updates or changes from those notes are reflected in this note.

## 2024-02-23 NOTE — PLAN OF CARE
Goal Outcome Evaluation:      Plan of Care Reviewed With: patient        Problem: Pain Acute  Goal: Optimal Pain Control and Function  Outcome: Progressing  Intervention: Develop Pain Management Plan  Recent Flowsheet Documentation  Taken 2/22/2024 1814 by Charlotte Lucero RN  Pain Management Interventions: medication (see MAR)  Taken 2/22/2024 1347 by Charlotte Lucero RN  Pain Management Interventions: distraction   As needed IV morphine given for lower abdominal pain that radiates to her back. Pt. Nauseous after morphine, zofran given.   IV fluids running.   Scheduled hydralazine given for high blood pressure.     Dr. Lilly notified regarding pt. Refusing NG tube placement.  Per previous nurse, Pt. Not doing well with first placement, so it was removed and pt. Refused to have anyone try to place another NG. No new orders.     Pt. States that she notified her  of her surgery planned for tomorrow.     Charlotte Lucero RN

## 2024-02-24 LAB — GLUCOSE BLDC GLUCOMTR-MCNC: 102 MG/DL (ref 70–99)

## 2024-02-24 PROCEDURE — 99232 SBSQ HOSP IP/OBS MODERATE 35: CPT | Performed by: INTERNAL MEDICINE

## 2024-02-24 PROCEDURE — 120N000001 HC R&B MED SURG/OB

## 2024-02-24 PROCEDURE — 250N000011 HC RX IP 250 OP 636: Performed by: FAMILY MEDICINE

## 2024-02-24 PROCEDURE — 258N000003 HC RX IP 258 OP 636: Performed by: INTERNAL MEDICINE

## 2024-02-24 PROCEDURE — 250N000011 HC RX IP 250 OP 636: Performed by: SURGERY

## 2024-02-24 PROCEDURE — 250N000013 HC RX MED GY IP 250 OP 250 PS 637: Performed by: SURGERY

## 2024-02-24 PROCEDURE — C9113 INJ PANTOPRAZOLE SODIUM, VIA: HCPCS | Performed by: FAMILY MEDICINE

## 2024-02-24 PROCEDURE — 250N000013 HC RX MED GY IP 250 OP 250 PS 637: Performed by: INTERNAL MEDICINE

## 2024-02-24 PROCEDURE — 250N000011 HC RX IP 250 OP 636: Performed by: INTERNAL MEDICINE

## 2024-02-24 PROCEDURE — 250N000013 HC RX MED GY IP 250 OP 250 PS 637: Performed by: FAMILY MEDICINE

## 2024-02-24 RX ADMIN — HYDROMORPHONE HYDROCHLORIDE 0.2 MG: 0.2 INJECTION, SOLUTION INTRAMUSCULAR; INTRAVENOUS; SUBCUTANEOUS at 03:16

## 2024-02-24 RX ADMIN — ENOXAPARIN SODIUM 40 MG: 40 INJECTION SUBCUTANEOUS at 09:05

## 2024-02-24 RX ADMIN — ACETAMINOPHEN 975 MG: 325 TABLET ORAL at 12:22

## 2024-02-24 RX ADMIN — BACLOFEN 10 MG: 10 TABLET ORAL at 20:43

## 2024-02-24 RX ADMIN — TRAMADOL HYDROCHLORIDE 50 MG: 50 TABLET, COATED ORAL at 15:34

## 2024-02-24 RX ADMIN — BACLOFEN 10 MG: 10 TABLET ORAL at 08:48

## 2024-02-24 RX ADMIN — HYDROMORPHONE HYDROCHLORIDE 0.5 MG: 1 INJECTION, SOLUTION INTRAMUSCULAR; INTRAVENOUS; SUBCUTANEOUS at 08:43

## 2024-02-24 RX ADMIN — NICOTINE 1 PATCH: 14 PATCH, EXTENDED RELEASE TRANSDERMAL at 08:48

## 2024-02-24 RX ADMIN — BACLOFEN 10 MG: 10 TABLET ORAL at 14:04

## 2024-02-24 RX ADMIN — HYDRALAZINE HYDROCHLORIDE 5 MG: 20 INJECTION INTRAMUSCULAR; INTRAVENOUS at 19:07

## 2024-02-24 RX ADMIN — DEXTROSE AND SODIUM CHLORIDE: 5; 900 INJECTION, SOLUTION INTRAVENOUS at 03:16

## 2024-02-24 RX ADMIN — PANTOPRAZOLE SODIUM 40 MG: 40 INJECTION, POWDER, FOR SOLUTION INTRAVENOUS at 06:18

## 2024-02-24 RX ADMIN — DEXTROSE AND SODIUM CHLORIDE: 5; 900 INJECTION, SOLUTION INTRAVENOUS at 22:25

## 2024-02-24 RX ADMIN — HYDROMORPHONE HYDROCHLORIDE 0.5 MG: 1 INJECTION, SOLUTION INTRAMUSCULAR; INTRAVENOUS; SUBCUTANEOUS at 23:38

## 2024-02-24 RX ADMIN — HYDROMORPHONE HYDROCHLORIDE 0.5 MG: 1 INJECTION, SOLUTION INTRAMUSCULAR; INTRAVENOUS; SUBCUTANEOUS at 12:21

## 2024-02-24 RX ADMIN — ACETAMINOPHEN 975 MG: 325 TABLET ORAL at 19:45

## 2024-02-24 RX ADMIN — Medication 5 MG: at 23:46

## 2024-02-24 RX ADMIN — SENNOSIDES AND DOCUSATE SODIUM 1 TABLET: 8.6; 5 TABLET ORAL at 08:47

## 2024-02-24 RX ADMIN — ACETAMINOPHEN 975 MG: 325 TABLET ORAL at 02:29

## 2024-02-24 RX ADMIN — HYDROMORPHONE HYDROCHLORIDE 0.5 MG: 1 INJECTION, SOLUTION INTRAMUSCULAR; INTRAVENOUS; SUBCUTANEOUS at 06:18

## 2024-02-24 RX ADMIN — SENNOSIDES AND DOCUSATE SODIUM 1 TABLET: 8.6; 5 TABLET ORAL at 20:43

## 2024-02-24 RX ADMIN — GABAPENTIN 100 MG: 100 CAPSULE ORAL at 20:43

## 2024-02-24 RX ADMIN — HYDROMORPHONE HYDROCHLORIDE 0.2 MG: 0.2 INJECTION, SOLUTION INTRAMUSCULAR; INTRAVENOUS; SUBCUTANEOUS at 02:29

## 2024-02-24 RX ADMIN — POLYETHYLENE GLYCOL 3350 17 G: 17 POWDER, FOR SOLUTION ORAL at 08:48

## 2024-02-24 RX ADMIN — ATENOLOL 12.5 MG: 25 TABLET ORAL at 08:48

## 2024-02-24 RX ADMIN — HYDROMORPHONE HYDROCHLORIDE 0.5 MG: 1 INJECTION, SOLUTION INTRAMUSCULAR; INTRAVENOUS; SUBCUTANEOUS at 17:26

## 2024-02-24 RX ADMIN — HYDROMORPHONE HYDROCHLORIDE 0.5 MG: 1 INJECTION, SOLUTION INTRAMUSCULAR; INTRAVENOUS; SUBCUTANEOUS at 19:46

## 2024-02-24 RX ADMIN — DEXTROSE AND SODIUM CHLORIDE: 5; 900 INJECTION, SOLUTION INTRAVENOUS at 12:28

## 2024-02-24 NOTE — PROGRESS NOTES
[facility]    Medicine Progress Note - Hospitalist Service    Date of Admission:  2/21/2024    Assessment & Plan      Mariah Koehler is a 68 year old female with PMH significant for known Large Hiatal Hernia, Gastroparesis and chronic pain who is admitted on 2/22/2024 with intractable nausea and vomiting. Found to have large hiatal hernia, causing gastric outlet obstruction.     Large hiatal hernia, gastric outlet obstruction:  Received laparoscopic paraesophageal hiatal hernia repair with mesh 2/23/24.  - General surgery input appreciated  - Advance diet as tolerated. Clear liquid diet today.   - PPI  - Antiemetics prn  - Pain control: IV dilaudid    Essential hypertension:    - Resume PTA atenolol. IV Hydralazine prn    Chronic back pain: has intrathecal pump. Continue PTA baclofen.     No Suicidal Ideation: Patient was listed as moderate suicide risk per triage survey. Patient adamantly denies suicidal ideation. All suicidal risk questions were negative when screened by RN. The chart was flagged because patient may have had that ideation in the past, but it is documented none within the past 3 months. Patient wants moderate suicide risk label removed from her chart.          Diet: Full Liquid Diet    DVT Prophylaxis: Pneumatic Compression Devices. Lovenox  Bellamy Catheter: Not present  Lines: None     Cardiac Monitoring: None  Code Status: Full Code      Clinically Significant Risk Factors                  # Hypertension: Noted on problem list              # Financial/Environmental Concerns:           Disposition Plan      Expected Discharge Date: 02/25/2024      Destination: home with family              Irwin Viramontes MD  Hospitalist Service  [facility]  Securely message with [employer] (more info)  Text page via [employer] Paging/Directory   ______________________________________________________________________    Interval History   Patient reports that her abdominal  pain has imprvoed today. She is able to tolerate clear liquid diet. No nausea and no vomiting.     Physical Exam   Vital Signs: Temp: 98  F (36.7  C) Temp src: Oral BP: (!) 166/77 Pulse: 90   Resp: 20 SpO2: 99 % O2 Device: Nasal cannula Oxygen Delivery: 2 LPM  Weight: 105 lbs 0 oz    General appearance: not in acute distress  HEENT: PERRL, EOMI  Lungs: Clear breath sounds in bilateral lung fields  Cardiovascular: Regular rate and rhythm, normal S1-S2  Abdomen: Soft, some tenderness to palpation of mid abdomen, no distension  Musculoskeletal: No joint swelling  Skin: No rash and no edema  Neurology: AAO ×3.  Cranial nerves II - XII normal.  Normal muscle strength in all four extremities.     Medical Decision Making       45 MINUTES SPENT BY ME on the date of service doing chart review, history, exam, documentation & further activities per the note.      Data         Imaging results reviewed over the past 24 hrs:   No results found for this or any previous visit (from the past 24 hour(s)).

## 2024-02-24 NOTE — PROGRESS NOTES
ASSESSMENT:  1. Gastroparesis    2. Nausea and vomiting, unspecified vomiting type    3. Hiatal hernia        Mariah Koehler is a 68 year old female who is s/p robotic paraesophageal hernia repair with posterior fundoplication on 2/23/2024.  Is overall doing about as well as I would expect given her baseline health issues and chronic pain.    PLAN:  -Clear liquid diet for now, likely advancing to full tomorrow    SUBJECTIVE:   She is doing all right this morning.  Moderate abdominal pain.  Tolerating sips of water currently.  Denies any nausea dry heaves or emesis      Patient Vitals for the past 24 hrs:   BP Temp Temp src Pulse Resp SpO2   02/24/24 0725 (!) 166/77 98  F (36.7  C) Oral 90 20 99 %   02/24/24 0032 (!) 141/65 97.5  F (36.4  C) Oral 88 20 96 %   02/23/24 2030 139/65 -- -- -- -- --   02/23/24 2000 (!) 180/80 -- -- -- -- --   02/23/24 1726 (!) 180/81 98  F (36.7  C) Oral 102 26 98 %   02/23/24 1320 (!) 170/79 -- -- 100 -- 96 %   02/23/24 1218 (!) 166/72 -- -- 100 26 95 %   02/23/24 1115 (!) 157/73 97.8  F (36.6  C) Oral 94 22 94 %   02/23/24 1040 (!) 163/75 97.7  F (36.5  C) Oral 100 24 96 %         PHYSICAL EXAM:  GEN: No acute distress, comfortable  LUNGS: CTA bilaterally  CV:RRR  ABD: Left, dressings in place and not taken down during exam  EXT:No cyanosis, edema or obvious abnormalities    02/23 0700 - 02/24 0659  In: 1595 [I.V.:1595]  Out: 925 [Urine:925]    Admission on 02/21/2024   Component Date Value    Hold Specimen 02/21/2024 JIC     Hold Specimen 02/21/2024 JIC     Hold Specimen 02/21/2024 JIC     Hold Specimen 02/21/2024 JIC     Sodium 02/21/2024 142     Potassium 02/21/2024 4.1     Carbon Dioxide (CO2) 02/21/2024 27     Anion Gap 02/21/2024 12     Urea Nitrogen 02/21/2024 10.0     Creatinine 02/21/2024 0.52     GFR Estimate 02/21/2024 >90     Calcium 02/21/2024 9.2     Chloride 02/21/2024 103     Glucose 02/21/2024 123 (H)     Alkaline Phosphatase 02/21/2024 87     AST 02/21/2024 31     ALT  02/21/2024 29     Protein Total 02/21/2024 6.9     Albumin 02/21/2024 4.3     Bilirubin Total 02/21/2024 0.4     Lipase 02/21/2024 23     Color Urine 02/22/2024 Light Yellow     Appearance Urine 02/22/2024 Clear     Glucose Urine 02/22/2024 Negative     Bilirubin Urine 02/22/2024 Negative     Ketones Urine 02/22/2024 20 (A)     Specific Gravity Urine 02/22/2024 1.010     Blood Urine 02/22/2024 0.1 mg/dL (A)     pH Urine 02/22/2024 7.0     Protein Albumin Urine 02/22/2024 Negative     Urobilinogen Urine 02/22/2024 <2.0     Nitrite Urine 02/22/2024 Negative     Leukocyte Esterase Urine 02/22/2024 Negative     Mucus Urine 02/22/2024 Present (A)     RBC Urine 02/22/2024 25 (H)     WBC Urine 02/22/2024 1     WBC Count 02/21/2024 6.9     RBC Count 02/21/2024 5.34 (H)     Hemoglobin 02/21/2024 15.5     Hematocrit 02/21/2024 46.9     MCV 02/21/2024 88     MCH 02/21/2024 29.0     MCHC 02/21/2024 33.0     RDW 02/21/2024 13.7     Platelet Count 02/21/2024 216     % Neutrophils 02/21/2024 78     % Lymphocytes 02/21/2024 16     % Monocytes 02/21/2024 6     % Eosinophils 02/21/2024 0     % Basophils 02/21/2024 0     % Immature Granulocytes 02/21/2024 0     NRBCs per 100 WBC 02/21/2024 0     Absolute Neutrophils 02/21/2024 5.3     Absolute Lymphocytes 02/21/2024 1.1     Absolute Monocytes 02/21/2024 0.4     Absolute Eosinophils 02/21/2024 0.0     Absolute Basophils 02/21/2024 0.0     Absolute Immature Granul* 02/21/2024 0.0     Absolute NRBCs 02/21/2024 0.0     Ventricular Rate 02/22/2024 63     Atrial Rate 02/22/2024 63     SD Interval 02/22/2024 128     QRS Duration 02/22/2024 84     QT 02/22/2024 382     QTc 02/22/2024 390     P Axis 02/22/2024 39     R AXIS 02/22/2024 81     T Axis 02/22/2024 74     Interpretation ECG 02/22/2024                      Value:Sinus rhythm with marked sinus arrhythmia  Anteroseptal infarct (cited on or before 24-OCT-2023)  Abnormal ECG  When compared with ECG of 12-NOV-2023 14:11,  Questionable  change in initial forces of Anterior leads  Confirmed by SEE ED PROVIDER NOTE FOR, ECG INTERPRETATION (4000),  EVANGELIST DONOVAN ([id number]) on 2/23/2024 2:31:47 AM      Sodium 02/22/2024 141     Potassium 02/22/2024 3.5     Chloride 02/22/2024 109 (H)     Carbon Dioxide (CO2) 02/22/2024 23     Anion Gap 02/22/2024 9     Urea Nitrogen 02/22/2024 10.1     Creatinine 02/22/2024 0.46 (L)     GFR Estimate 02/22/2024 >90     Calcium 02/22/2024 7.6 (L)     Glucose 02/22/2024 90     WBC Count 02/22/2024 4.8     RBC Count 02/22/2024 4.47     Hemoglobin 02/22/2024 13.2     Hematocrit 02/22/2024 39.5     MCV 02/22/2024 88     MCH 02/22/2024 29.5     MCHC 02/22/2024 33.4     RDW 02/22/2024 13.6     Platelet Count 02/22/2024      NRBCs per 100 WBC 02/22/2024 0     Absolute NRBCs 02/22/2024 0.0     % Neutrophils 02/22/2024 74     % Lymphocytes 02/22/2024 22     % Monocytes 02/22/2024 3     % Eosinophils 02/22/2024 0     % Basophils 02/22/2024 1     Absolute Neutrophils 02/22/2024 3.6     Absolute Lymphocytes 02/22/2024 1.1     Absolute Monocytes 02/22/2024 0.1     Absolute Eosinophils 02/22/2024 0.0     Absolute Basophils 02/22/2024 0.0     RBC Morphology 02/22/2024 Confirmed RBC Indices     Platelet Assessment 02/22/2024 Platelets Clumped (A)     Creatinine 02/23/2024 0.50 (L)     GFR Estimate 02/23/2024 >90     GLUCOSE BY METER POCT 02/24/2024 102 (H)           Vaughn Ann MD

## 2024-02-24 NOTE — PLAN OF CARE
Problem: Adult Inpatient Plan of Care  Goal: Optimal Comfort and Wellbeing  Outcome: Progressing     Problem: Nausea and Vomiting  Goal: Nausea and Vomiting Relief  Outcome: Progressing   Goal Outcome Evaluation:    Patient reporting pain in abdomen, denies nausea. Lap sites x5 clean dry and intact covered with bandaids. Pain managed with PRNs and ice packs. Fluids running per order. Capnography on. Slept on and off between cares.     Joanne Benjamin RN

## 2024-02-25 ENCOUNTER — APPOINTMENT (OUTPATIENT)
Dept: OCCUPATIONAL THERAPY | Facility: HOSPITAL | Age: 69
DRG: 327 | End: 2024-02-25
Attending: INTERNAL MEDICINE
Payer: COMMERCIAL

## 2024-02-25 LAB — GLUCOSE BLDC GLUCOMTR-MCNC: 98 MG/DL (ref 70–99)

## 2024-02-25 PROCEDURE — 250N000013 HC RX MED GY IP 250 OP 250 PS 637: Performed by: NURSE PRACTITIONER

## 2024-02-25 PROCEDURE — 250N000011 HC RX IP 250 OP 636: Performed by: SURGERY

## 2024-02-25 PROCEDURE — 120N000001 HC R&B MED SURG/OB

## 2024-02-25 PROCEDURE — 250N000013 HC RX MED GY IP 250 OP 250 PS 637: Performed by: INTERNAL MEDICINE

## 2024-02-25 PROCEDURE — C9113 INJ PANTOPRAZOLE SODIUM, VIA: HCPCS | Performed by: FAMILY MEDICINE

## 2024-02-25 PROCEDURE — 250N000013 HC RX MED GY IP 250 OP 250 PS 637: Performed by: FAMILY MEDICINE

## 2024-02-25 PROCEDURE — 258N000003 HC RX IP 258 OP 636: Performed by: INTERNAL MEDICINE

## 2024-02-25 PROCEDURE — 250N000011 HC RX IP 250 OP 636: Performed by: FAMILY MEDICINE

## 2024-02-25 PROCEDURE — 99232 SBSQ HOSP IP/OBS MODERATE 35: CPT | Performed by: INTERNAL MEDICINE

## 2024-02-25 PROCEDURE — 97530 THERAPEUTIC ACTIVITIES: CPT | Mod: GO

## 2024-02-25 PROCEDURE — 97165 OT EVAL LOW COMPLEX 30 MIN: CPT | Mod: GO

## 2024-02-25 PROCEDURE — 250N000011 HC RX IP 250 OP 636: Performed by: INTERNAL MEDICINE

## 2024-02-25 PROCEDURE — 250N000013 HC RX MED GY IP 250 OP 250 PS 637: Performed by: SURGERY

## 2024-02-25 RX ORDER — HYDRALAZINE HYDROCHLORIDE 20 MG/ML
10 INJECTION INTRAMUSCULAR; INTRAVENOUS EVERY 6 HOURS PRN
Status: DISCONTINUED | OUTPATIENT
Start: 2024-02-25 | End: 2024-02-26 | Stop reason: HOSPADM

## 2024-02-25 RX ORDER — OXYCODONE HYDROCHLORIDE 5 MG/1
5 TABLET ORAL EVERY 4 HOURS PRN
Status: DISCONTINUED | OUTPATIENT
Start: 2024-02-25 | End: 2024-02-26 | Stop reason: HOSPADM

## 2024-02-25 RX ADMIN — ENOXAPARIN SODIUM 40 MG: 40 INJECTION SUBCUTANEOUS at 08:19

## 2024-02-25 RX ADMIN — ATENOLOL 12.5 MG: 25 TABLET ORAL at 08:19

## 2024-02-25 RX ADMIN — BACLOFEN 10 MG: 10 TABLET ORAL at 13:09

## 2024-02-25 RX ADMIN — ONDANSETRON 4 MG: 2 INJECTION INTRAMUSCULAR; INTRAVENOUS at 06:32

## 2024-02-25 RX ADMIN — BACLOFEN 10 MG: 10 TABLET ORAL at 08:19

## 2024-02-25 RX ADMIN — TRAMADOL HYDROCHLORIDE 50 MG: 50 TABLET, COATED ORAL at 10:13

## 2024-02-25 RX ADMIN — NICOTINE 1 PATCH: 14 PATCH, EXTENDED RELEASE TRANSDERMAL at 08:19

## 2024-02-25 RX ADMIN — GABAPENTIN 100 MG: 100 CAPSULE ORAL at 20:24

## 2024-02-25 RX ADMIN — ACETAMINOPHEN 975 MG: 325 TABLET ORAL at 03:31

## 2024-02-25 RX ADMIN — HYDRALAZINE HYDROCHLORIDE 10 MG: 20 INJECTION INTRAMUSCULAR; INTRAVENOUS at 12:32

## 2024-02-25 RX ADMIN — HYDROMORPHONE HYDROCHLORIDE 0.5 MG: 1 INJECTION, SOLUTION INTRAMUSCULAR; INTRAVENOUS; SUBCUTANEOUS at 11:13

## 2024-02-25 RX ADMIN — PANTOPRAZOLE SODIUM 40 MG: 40 INJECTION, POWDER, FOR SOLUTION INTRAVENOUS at 03:31

## 2024-02-25 RX ADMIN — SENNOSIDES AND DOCUSATE SODIUM 1 TABLET: 8.6; 5 TABLET ORAL at 20:24

## 2024-02-25 RX ADMIN — SENNOSIDES AND DOCUSATE SODIUM 1 TABLET: 8.6; 5 TABLET ORAL at 08:19

## 2024-02-25 RX ADMIN — OXYCODONE HYDROCHLORIDE 5 MG: 5 TABLET ORAL at 13:09

## 2024-02-25 RX ADMIN — HYDROMORPHONE HYDROCHLORIDE 0.5 MG: 1 INJECTION, SOLUTION INTRAMUSCULAR; INTRAVENOUS; SUBCUTANEOUS at 19:35

## 2024-02-25 RX ADMIN — Medication 5 MG: at 21:46

## 2024-02-25 RX ADMIN — HYDROMORPHONE HYDROCHLORIDE 0.5 MG: 1 INJECTION, SOLUTION INTRAMUSCULAR; INTRAVENOUS; SUBCUTANEOUS at 03:31

## 2024-02-25 RX ADMIN — HYDRALAZINE HYDROCHLORIDE 10 MG: 20 INJECTION INTRAMUSCULAR; INTRAVENOUS at 21:46

## 2024-02-25 RX ADMIN — DEXTROSE AND SODIUM CHLORIDE: 5; 900 INJECTION, SOLUTION INTRAVENOUS at 12:33

## 2024-02-25 RX ADMIN — POLYETHYLENE GLYCOL 3350 17 G: 17 POWDER, FOR SOLUTION ORAL at 08:19

## 2024-02-25 RX ADMIN — BACLOFEN 10 MG: 10 TABLET ORAL at 20:24

## 2024-02-25 RX ADMIN — OXYCODONE HYDROCHLORIDE 5 MG: 5 TABLET ORAL at 16:35

## 2024-02-25 RX ADMIN — ACETAMINOPHEN 975 MG: 325 TABLET ORAL at 10:14

## 2024-02-25 RX ADMIN — HYDROMORPHONE HYDROCHLORIDE 0.5 MG: 1 INJECTION, SOLUTION INTRAMUSCULAR; INTRAVENOUS; SUBCUTANEOUS at 08:26

## 2024-02-25 RX ADMIN — ONDANSETRON 4 MG: 2 INJECTION INTRAMUSCULAR; INTRAVENOUS at 19:33

## 2024-02-25 RX ADMIN — ACETAMINOPHEN 975 MG: 325 TABLET ORAL at 20:18

## 2024-02-25 NOTE — PROGRESS NOTES
02/25/24 1030   Appointment Info   Signing Clinician's Name / Credentials (OT) Layne Akbar OTR/L   Living Environment   People in Home spouse   Current Living Arrangements house   Home Accessibility stairs within home;stairs to enter home   Number of Stairs, Main Entrance 2   Number of Stairs, Within Home, Primary other (see comments);greater than 10 stairs  (to basement to laundry and shower-shower does not work upstairs-could use tub)   Self-Care   Usual Activity Tolerance good   Equipment Currently Used at Home walker, rolling;cane, quad;other (see comments)  (does not use assistive in home-only outside, has comfort height on one toilet, no shower chair , grab bars in walk in shower)   Fall history within last six months yes   Number of times patient has fallen within last six months 1  (tripped at [facility])   Activity/Exercise/Self-Care Comment independent self cares   Instrumental Activities of Daily Living (IADL)   Previous Responsibilities meal prep;laundry;driving;medication management   General Information   Onset of Illness/Injury or Date of Surgery 02/21/24   Referring Physician Irwin Viramontes MD   Existing Precautions/Restrictions abdominal   Cognitive Status Examination   Orientation Status time;place   Visual Perception   Visual Impairment/Limitations corrective lenses for reading;other (see comments)  (driving)   Pain Assessment   Patient Currently in Pain Yes, see Vital Sign flowsheet  (6-6 just had meds administered)   Posture   Posture forward head position   Range of Motion Comprehensive   General Range of Motion bilateral lower extremity ROM WNL   Comment, General Range of Motion stating stiff with difficulty lifting arms above 90 degrees   Bed Mobility   Bed Mobility supine-sit   Supine-Sit [place] (Bed Mobility) minimum assist (75% patient effort)   Assistive Device (Bed Mobility) other (see comments)  (HOB elevated)   Transfers   Transfers sit-stand transfer   Sit-Stand Transfer    Sit-Stand [place] (Transfers) minimum assist (75% patient effort)   Assistive Device (Sit-Stand Transfers) walker, front-wheeled   Balance   Balance Assessment standing static balance;standing dynamic balance   Standing Balance: Static minimal assist   Standing Balance: Dynamic minimal assist   Position/Device Used, Standing Balance walker, front-wheeled   Activities of Daily Living   BADL Assessment/Intervention grooming;lower body dressing;toileting   Lower Body Dressing Assessment/Training   Position (Lower Body Dressing) edge of bed sitting   [place] Level (Lower Body Dressing) maximum assist (25% patient effort)   Grooming Assessment/Training   Position (Grooming) unsupported sitting;other (see comments)  (chair)   [place] Level (Grooming) wash face, hands;oral care regimen;set up   Toileting   Position (Toileting) supine;unsupported sitting   Assistive Devices (Toileting) other (see comments)  (purewick)   [place] Level (Toileting) not tested;other (see comments)  (using purewick in bed and chair, encouraged to ask nsg. to take to bathroom during day to increase endurance)   Clinical Impression   Criteria for Skilled Therapeutic Interventions Met (OT) Yes, treatment indicated   OT Diagnosis decreased ADL's due to hiatal hernia repair   Influenced by the following impairments pain, decreased flexibility, weakness, abdominal incision   OT Problem List-Impairments impacting ADL activity tolerance impaired;balance;mobility;flexibility;strength;pain;post-surgical precautions   Assessment of Occupational Performance 3-5 Performance Deficits   Identified Performance Deficits transfers, toileting, L/E dressing,   Planned Therapy Interventions (OT) ADL retraining;balance training;bed mobility training;strengthening;transfer training;progressive activity/exercise   Clinical Decision Making Complexity (OT) problem focused assessment/low complexity   Risk & Benefits of therapy have been explained  evaluation/treatment results reviewed;patient   OT Total Evaluation Time   OT Eval, Low Complexity Minutes (17145) 25   OT Goals   Therapy Frequency (OT) Daily   OT Predicted Duration/Target Date for Goal Attainment 03/03/24   OT Goals Transfers;Toilet Transfer/Toileting;Lower Body Dressing   OT: Lower Body Dressing Minimal assist   OT: Transfer Supervision/stand-by assist   OT: Toilet Transfer/Toileting Minimal assist   Interventions   Interventions Quick Adds Self-Care/Home Management;Therapeutic Activity;Therapeutic Procedures/Exercise   Therapeutic Activities   Therapeutic Activity Minutes (33551) 10   Symptoms noted during/after treatment fatigue;increased pain;other (see comments)  (weakness)   Treatment Detail/Skilled Intervention Pt. agreeable to attempt further functional mobility with FWW after initial eval in room. Pt. required min assist with FWW to walk 40 ft. cues for safe walker advancement and posture. Weakness/pain noted ,needing to stop several times to return to room.   OT Discharge Planning   OT Plan bed mobility, transfers, amb. in room/bathroom with FWW, exercises, toileting in bathroom vs. use purewick   OT Discharge Recommendation (DC Rec) (S)  Transitional Care Facility   OT Rationale for DC Rec Requires assist of 1 with all functional mobility and ADL's   OT Brief overview of current status 40 ft. amb. min assist with FWW weakness, wants to use purewick, encouraged use bathroom with nsg. limited by pain   Total Session Time   Timed Code Treatment Minutes 10   Total Session Time (sum of timed and untimed services) 35

## 2024-02-25 NOTE — PROGRESS NOTES
[facility]    Medicine Progress Note - Hospitalist Service    Date of Admission:  2/21/2024    Assessment & Plan      Mariah Koehler is a 68 year old female with PMH significant for known Large Hiatal Hernia, Gastroparesis and chronic pain who is admitted on 2/22/2024 with intractable nausea and vomiting. Found to have large hiatal hernia, causing gastric outlet obstruction.     Large hiatal hernia, gastric outlet obstruction:  Received laparoscopic paraesophageal hiatal hernia repair with mesh 2/23/24.  - General surgery input appreciated  - Advance diet as tolerated. On clear liquid diet currently.   - PPI  - Antiemetics prn  - Pain control: scheduled tylenol. Oxycodone and IV dilaudid prn  - PT/OT  - Encourage ambulation and increase activity    Essential hypertension:    - Resume PTA atenolol. IV Hydralazine prn    Chronic pain, cervical radiculopathy:   Has intrathecal pump for her cervical radiculopathy. She has chronic severe left arm pain. She has a remote control that can self bolus as needed. She reports lately, there are a few times that she forgot how many times she has done bolus. Due to safety concern, her pain clinic has been decreasing the doses. She currently has the remote in her purse. But she has not been using it since admission.  - Pain management consult  - Continue PTA baclofen.     No Suicidal Ideation: Patient was listed as moderate suicide risk per triage survey. Patient adamantly denies suicidal ideation. All suicidal risk questions were negative when screened by RN. The chart was flagged because patient may have had that ideation in the past, but it is documented none within the past 3 months. Patient wants moderate suicide risk label removed from her chart.      I called her  today and gave him an update. Plan of care was discussed. He reports that he was tested positive for COVID on 2/22/24. He is feeling better now and hopes patient can come home.              Diet: Clear Liquid Diet (no carbonation or caffeine)    DVT Prophylaxis: Pneumatic Compression Devices. Lovenox  Bellamy Catheter: Not present  Lines: None     Cardiac Monitoring: None  Code Status: Full Code      Clinically Significant Risk Factors                  # Hypertension: Noted on problem list              # Financial/Environmental Concerns:           Disposition Plan      Expected Discharge Date: 02/26/2024      Destination: home with family              Irwin Viramontes MD  Hospitalist Service  [facility]  Securely message with [email or web address] (more info)  Text page via [employer] Paging/Directory   ______________________________________________________________________    Interval History   Patient reports that she has passed a little gas. No bowel movement yet. She continues to have mid to lower abdominal pain. She is able to tolerate clear liquid diet.      Physical Exam   Vital Signs: Temp: 98.9  F (37.2  C) Temp src: Oral BP: 139/66 Pulse: 81   Resp: 18 SpO2: 90 % O2 Device: None (Room air) Oxygen Delivery: 2 LPM  Weight: 105 lbs 0 oz    General appearance: not in acute distress  HEENT: PERRL, EOMI  Lungs: Clear breath sounds in bilateral lung fields  Cardiovascular: Regular rate and rhythm, normal S1-S2  Abdomen: Soft, some tenderness to palpation of mid to lower abdomen, no distension  Musculoskeletal: No joint swelling  Skin: No rash and no edema  Neurology: AAO ×3.  Cranial nerves II - XII normal.  Normal muscle strength in all four extremities.     Medical Decision Making       45 MINUTES SPENT BY ME on the date of service doing chart review, history, exam, documentation & further activities per the note.      Data         Imaging results reviewed over the past 24 hrs:   No results found for this or any previous visit (from the past 24 hour(s)).

## 2024-02-25 NOTE — PLAN OF CARE
Problem: Adult Inpatient Plan of Care  Goal: Absence of Hospital-Acquired Illness or Injury  Outcome: Progressing  Goal: Optimal Comfort and Wellbeing  Outcome: Progressing     Problem: Nausea and Vomiting  Goal: Nausea and Vomiting Relief  Outcome: Progressing     Problem: Pain Acute  Goal: Optimal Pain Control and Function  Outcome: Progressing   Goal Outcome Evaluation:    VSS Pain effectively managed with prn and scheduled analgesia. Tolerating IV fluids without adverse effect. Up with assist of 1. Utilizes call light appropriately and is able to verbalize needs effectively.

## 2024-02-25 NOTE — PLAN OF CARE
Problem: Pain Acute  Goal: Optimal Pain Control and Function  Intervention: Develop Pain Management Plan  Recent Flowsheet Documentation  Taken 2/25/2024 1113 by Marlene Molina RN  Pain Management Interventions: medication (see MAR)  Taken 2/25/2024 1013 by Marlene Molina RN  Pain Management Interventions: medication (see MAR)  Taken 2/25/2024 0826 by Marlene Molina RN  Pain Management Interventions: medication (see MAR)     Problem: Adult Inpatient Plan of Care  Goal: Optimal Comfort and Wellbeing  Intervention: Monitor Pain and Promote Comfort  Recent Flowsheet Documentation  Taken 2/25/2024 1113 by Marlene Molina RN  Pain Management Interventions: medication (see MAR)  Taken 2/25/2024 1013 by Marlene Molina RN  Pain Management Interventions: medication (see MAR)  Taken 2/25/2024 0826 by Marlene Molina RN  Pain Management Interventions: medication (see MAR)   Pt ambulated with OT and nursing assistant today.  Pt uses rolling walker and gait belt and assist of one to two for ambulation.  Rating pain 6/10 after ambulation. Had dilaudid and tramadol for pain management that did not control pain.   Oxycodone ordered and given.  BP elevated today hydralazine given.

## 2024-02-25 NOTE — PLAN OF CARE
"Goal Outcome Evaluation:      Problem: Adult Inpatient Plan of Care  Goal: Patient-Specific Goal (Individualized)  Description: You can add care plan individualizations to a care plan. Examples of Individualization might be:  \"Parent requests to be called daily at 9am for status\", \"I have a hard time hearing out of my right ear\", or \"Do not touch me to wake me up as it startles  me\".  Outcome: Progressing     Problem: Adult Inpatient Plan of Care  Goal: Optimal Comfort and Wellbeing  Outcome: Progressing     Problem: Nausea and Vomiting  Goal: Nausea and Vomiting Relief  Outcome: Progressing     Problem: Pain Acute  Goal: Optimal Pain Control and Function  Outcome: Progressing    A/O x3. VSS. Sat 95% on 1L oxygen per NC. Tolerated full liquid diet. Denied any nausea. Poor oral intake. Hypoactive bowel sounds. Stated passing gas x1. Up to chair for about 3 hours. Assist of one with transfers. Dressing on surgical incision sites are dry and intact.                            "

## 2024-02-25 NOTE — PROGRESS NOTES
ASSESSMENT:  1. Gastroparesis    2. Nausea and vomiting, unspecified vomiting type    3. Hiatal hernia        Mariah Koehler is a 68 year old female who is s/p robotic paraesophageal hernia repair with posterior fundoplication on 2/23/2024. Increased lower and mid abdominal pain with decreased flatus and some nausea. She has no increased pain with oral intake, so low suspicion that she is having any issue with her surgical repair. Would favor possible mild ileus at this time. Will back off to clear liquids and increase activity.     PLAN:  Clear liquid diet  Increase activity; ambulate at least 3-4 times per day  TAKE OFF PUREWICK; patient needs to be up to the bathroom or a commode as she is independent at baseline  Antiemetics as needed    SUBJECTIVE:   She is having increased abdominal pain that is crampy and lower in her abdomen. This is accompanied with nausea that is not related to oral intake. Patient reports an increase in discomfort and intermittent nausea after breakfast but that the pain has been crampy up on her all night. She reports no epigastric or upper abdominal pain, not even when eating or drinking. Minimal appetite. No fever or chills.      Patient Vitals for the past 24 hrs:   BP Temp Temp src Pulse Resp SpO2   02/25/24 0814 (!) 178/79 97.9  F (36.6  C) Oral 83 20 98 %   02/25/24 0045 139/77 -- -- -- -- --   02/25/24 0015 (!) 179/80 97.9  F (36.6  C) Temporal 83 22 93 %   02/24/24 2032 (!) 155/72 98.6  F (37  C) Oral 76 20 96 %   02/24/24 1907 (!) 175/77 -- -- 77 -- --         PHYSICAL EXAM:  GEN: No acute distress, comfortable  LUNGS: CTA bilaterally  CV:RRR  ABD:mildly distended, tender but not peritoneal, incisions CDI  EXT:No cyanosis, edema or obvious abnormalities    02/24 0700 - 02/25 0659  In: 3224 [P.O.:680; I.V.:2544]  Out: 1100 [Urine:1100]    Admission on 02/21/2024   Component Date Value    Hold Specimen 02/21/2024 JIC     Hold Specimen 02/21/2024 JI     Hold Specimen 02/21/2024  JIC     Hold Specimen 02/21/2024 JI     Sodium 02/21/2024 142     Potassium 02/21/2024 4.1     Carbon Dioxide (CO2) 02/21/2024 27     Anion Gap 02/21/2024 12     Urea Nitrogen 02/21/2024 10.0     Creatinine 02/21/2024 0.52     GFR Estimate 02/21/2024 >90     Calcium 02/21/2024 9.2     Chloride 02/21/2024 103     Glucose 02/21/2024 123 (H)     Alkaline Phosphatase 02/21/2024 87     AST 02/21/2024 31     ALT 02/21/2024 29     Protein Total 02/21/2024 6.9     Albumin 02/21/2024 4.3     Bilirubin Total 02/21/2024 0.4     Lipase 02/21/2024 23     Color Urine 02/22/2024 Light Yellow     Appearance Urine 02/22/2024 Clear     Glucose Urine 02/22/2024 Negative     Bilirubin Urine 02/22/2024 Negative     Ketones Urine 02/22/2024 20 (A)     Specific Gravity Urine 02/22/2024 1.010     Blood Urine 02/22/2024 0.1 mg/dL (A)     pH Urine 02/22/2024 7.0     Protein Albumin Urine 02/22/2024 Negative     Urobilinogen Urine 02/22/2024 <2.0     Nitrite Urine 02/22/2024 Negative     Leukocyte Esterase Urine 02/22/2024 Negative     Mucus Urine 02/22/2024 Present (A)     RBC Urine 02/22/2024 25 (H)     WBC Urine 02/22/2024 1     WBC Count 02/21/2024 6.9     RBC Count 02/21/2024 5.34 (H)     Hemoglobin 02/21/2024 15.5     Hematocrit 02/21/2024 46.9     MCV 02/21/2024 88     MCH 02/21/2024 29.0     MCHC 02/21/2024 33.0     RDW 02/21/2024 13.7     Platelet Count 02/21/2024 216     % Neutrophils 02/21/2024 78     % Lymphocytes 02/21/2024 16     % Monocytes 02/21/2024 6     % Eosinophils 02/21/2024 0     % Basophils 02/21/2024 0     % Immature Granulocytes 02/21/2024 0     NRBCs per 100 WBC 02/21/2024 0     Absolute Neutrophils 02/21/2024 5.3     Absolute Lymphocytes 02/21/2024 1.1     Absolute Monocytes 02/21/2024 0.4     Absolute Eosinophils 02/21/2024 0.0     Absolute Basophils 02/21/2024 0.0     Absolute Immature Granul* 02/21/2024 0.0     Absolute NRBCs 02/21/2024 0.0     Ventricular Rate 02/22/2024 63     Atrial Rate 02/22/2024 63     CA  Interval 02/22/2024 128     QRS Duration 02/22/2024 84     QT 02/22/2024 382     QTc 02/22/2024 390     P Axis 02/22/2024 39     R AXIS 02/22/2024 81     T Axis 02/22/2024 74     Interpretation ECG 02/22/2024                      Value:Sinus rhythm with marked sinus arrhythmia  Anteroseptal infarct (cited on or before 24-OCT-2023)  Abnormal ECG  When compared with ECG of 12-NOV-2023 14:11,  Questionable change in initial forces of Anterior leads  Confirmed by SEE ED PROVIDER NOTE FOR, ECG INTERPRETATION (4000),  EVANGELIST DONOVAN ([id number]) on 2/23/2024 2:31:47 AM      Sodium 02/22/2024 141     Potassium 02/22/2024 3.5     Chloride 02/22/2024 109 (H)     Carbon Dioxide (CO2) 02/22/2024 23     Anion Gap 02/22/2024 9     Urea Nitrogen 02/22/2024 10.1     Creatinine 02/22/2024 0.46 (L)     GFR Estimate 02/22/2024 >90     Calcium 02/22/2024 7.6 (L)     Glucose 02/22/2024 90     WBC Count 02/22/2024 4.8     RBC Count 02/22/2024 4.47     Hemoglobin 02/22/2024 13.2     Hematocrit 02/22/2024 39.5     MCV 02/22/2024 88     MCH 02/22/2024 29.5     MCHC 02/22/2024 33.4     RDW 02/22/2024 13.6     Platelet Count 02/22/2024      NRBCs per 100 WBC 02/22/2024 0     Absolute NRBCs 02/22/2024 0.0     % Neutrophils 02/22/2024 74     % Lymphocytes 02/22/2024 22     % Monocytes 02/22/2024 3     % Eosinophils 02/22/2024 0     % Basophils 02/22/2024 1     Absolute Neutrophils 02/22/2024 3.6     Absolute Lymphocytes 02/22/2024 1.1     Absolute Monocytes 02/22/2024 0.1     Absolute Eosinophils 02/22/2024 0.0     Absolute Basophils 02/22/2024 0.0     RBC Morphology 02/22/2024 Confirmed RBC Indices     Platelet Assessment 02/22/2024 Platelets Clumped (A)     Creatinine 02/23/2024 0.50 (L)     GFR Estimate 02/23/2024 >90     GLUCOSE BY METER POCT 02/24/2024 102 (H)     GLUCOSE BY METER POCT 02/25/2024 98           Queta Calderon, APRN CNP

## 2024-02-26 ENCOUNTER — APPOINTMENT (OUTPATIENT)
Dept: PHYSICAL THERAPY | Facility: HOSPITAL | Age: 69
DRG: 327 | End: 2024-02-26
Attending: INTERNAL MEDICINE
Payer: COMMERCIAL

## 2024-02-26 VITALS
DIASTOLIC BLOOD PRESSURE: 56 MMHG | HEIGHT: 63 IN | TEMPERATURE: 97.7 F | RESPIRATION RATE: 20 BRPM | BODY MASS INDEX: 18.61 KG/M2 | OXYGEN SATURATION: 94 % | WEIGHT: 105 LBS | SYSTOLIC BLOOD PRESSURE: 123 MMHG | HEART RATE: 74 BPM

## 2024-02-26 LAB
ACANTHOCYTES BLD QL SMEAR: NORMAL
AUER BODIES BLD QL SMEAR: NORMAL
BASO STIPL BLD QL SMEAR: NORMAL
BITE CELLS BLD QL SMEAR: NORMAL
BLISTER CELLS BLD QL SMEAR: NORMAL
BURR CELLS BLD QL SMEAR: NORMAL
DACRYOCYTES BLD QL SMEAR: NORMAL
ELLIPTOCYTES BLD QL SMEAR: NORMAL
ERYTHROCYTE [DISTWIDTH] IN BLOOD BY AUTOMATED COUNT: 14.1 % (ref 10–15)
FRAGMENTS BLD QL SMEAR: NORMAL
HCT VFR BLD AUTO: 39.9 % (ref 35–47)
HGB BLD-MCNC: 13.5 G/DL (ref 11.7–15.7)
HGB C CRYSTALS: NORMAL
HOLD SPECIMEN: NORMAL
HOWELL-JOLLY BOD BLD QL SMEAR: NORMAL
MCH RBC QN AUTO: 29.5 PG (ref 26.5–33)
MCHC RBC AUTO-ENTMCNC: 33.8 G/DL (ref 31.5–36.5)
MCV RBC AUTO: 87 FL (ref 78–100)
NEUTS HYPERSEG BLD QL SMEAR: NORMAL
PLAT MORPH BLD: NORMAL
PLATELET # BLD AUTO: 130 10E3/UL (ref 150–450)
PLATELET # BLD AUTO: 130 10E3/UL (ref 150–450)
POLYCHROMASIA BLD QL SMEAR: NORMAL
RBC # BLD AUTO: 4.58 10E6/UL (ref 3.8–5.2)
RBC AGGLUT BLD QL: NORMAL
RBC MORPH BLD: NORMAL
ROULEAUX BLD QL SMEAR: NORMAL
SICKLE CELLS BLD QL SMEAR: NORMAL
SMUDGE CELLS BLD QL SMEAR: NORMAL
SPHEROCYTES BLD QL SMEAR: NORMAL
STOMATOCYTES BLD QL SMEAR: NORMAL
TARGETS BLD QL SMEAR: NORMAL
TOXIC GRANULES BLD QL SMEAR: NORMAL
VARIANT LYMPHS BLD QL SMEAR: NORMAL
WBC # BLD AUTO: 4.4 10E3/UL (ref 4–11)

## 2024-02-26 PROCEDURE — 250N000013 HC RX MED GY IP 250 OP 250 PS 637: Performed by: INTERNAL MEDICINE

## 2024-02-26 PROCEDURE — 250N000013 HC RX MED GY IP 250 OP 250 PS 637: Performed by: SURGERY

## 2024-02-26 PROCEDURE — 258N000003 HC RX IP 258 OP 636: Performed by: INTERNAL MEDICINE

## 2024-02-26 PROCEDURE — 250N000011 HC RX IP 250 OP 636: Performed by: INTERNAL MEDICINE

## 2024-02-26 PROCEDURE — 97530 THERAPEUTIC ACTIVITIES: CPT | Mod: GP

## 2024-02-26 PROCEDURE — 97116 GAIT TRAINING THERAPY: CPT | Mod: GP

## 2024-02-26 PROCEDURE — 85027 COMPLETE CBC AUTOMATED: CPT | Performed by: STUDENT IN AN ORGANIZED HEALTH CARE EDUCATION/TRAINING PROGRAM

## 2024-02-26 PROCEDURE — 36415 COLL VENOUS BLD VENIPUNCTURE: CPT | Performed by: SURGERY

## 2024-02-26 PROCEDURE — 250N000013 HC RX MED GY IP 250 OP 250 PS 637: Performed by: FAMILY MEDICINE

## 2024-02-26 PROCEDURE — 85049 AUTOMATED PLATELET COUNT: CPT | Performed by: SURGERY

## 2024-02-26 PROCEDURE — 250N000013 HC RX MED GY IP 250 OP 250 PS 637: Performed by: NURSE PRACTITIONER

## 2024-02-26 PROCEDURE — 250N000011 HC RX IP 250 OP 636: Performed by: FAMILY MEDICINE

## 2024-02-26 PROCEDURE — 250N000011 HC RX IP 250 OP 636: Performed by: SURGERY

## 2024-02-26 PROCEDURE — 97161 PT EVAL LOW COMPLEX 20 MIN: CPT | Mod: GP

## 2024-02-26 PROCEDURE — 99239 HOSP IP/OBS DSCHRG MGMT >30: CPT | Performed by: STUDENT IN AN ORGANIZED HEALTH CARE EDUCATION/TRAINING PROGRAM

## 2024-02-26 PROCEDURE — 99024 POSTOP FOLLOW-UP VISIT: CPT | Performed by: PHYSICIAN ASSISTANT

## 2024-02-26 PROCEDURE — C9113 INJ PANTOPRAZOLE SODIUM, VIA: HCPCS | Performed by: FAMILY MEDICINE

## 2024-02-26 RX ORDER — OXYCODONE HYDROCHLORIDE 5 MG/1
2.5-5 TABLET ORAL EVERY 6 HOURS PRN
Qty: 12 TABLET | Refills: 0 | Status: SHIPPED | OUTPATIENT
Start: 2024-02-26 | End: 2024-02-29

## 2024-02-26 RX ADMIN — SENNOSIDES AND DOCUSATE SODIUM 1 TABLET: 8.6; 5 TABLET ORAL at 08:41

## 2024-02-26 RX ADMIN — OXYCODONE HYDROCHLORIDE 5 MG: 5 TABLET ORAL at 13:03

## 2024-02-26 RX ADMIN — OXYCODONE HYDROCHLORIDE 5 MG: 5 TABLET ORAL at 08:42

## 2024-02-26 RX ADMIN — ATENOLOL 12.5 MG: 25 TABLET ORAL at 08:59

## 2024-02-26 RX ADMIN — BACLOFEN 10 MG: 10 TABLET ORAL at 08:42

## 2024-02-26 RX ADMIN — PANTOPRAZOLE SODIUM 40 MG: 40 INJECTION, POWDER, FOR SOLUTION INTRAVENOUS at 06:22

## 2024-02-26 RX ADMIN — ACETAMINOPHEN 650 MG: 325 TABLET ORAL at 01:41

## 2024-02-26 RX ADMIN — ACETAMINOPHEN 650 MG: 325 TABLET ORAL at 10:07

## 2024-02-26 RX ADMIN — HYDROMORPHONE HYDROCHLORIDE 0.5 MG: 1 INJECTION, SOLUTION INTRAMUSCULAR; INTRAVENOUS; SUBCUTANEOUS at 01:42

## 2024-02-26 RX ADMIN — NICOTINE 1 PATCH: 14 PATCH, EXTENDED RELEASE TRANSDERMAL at 08:44

## 2024-02-26 RX ADMIN — POLYETHYLENE GLYCOL 3350 17 G: 17 POWDER, FOR SOLUTION ORAL at 08:41

## 2024-02-26 RX ADMIN — ONDANSETRON 4 MG: 2 INJECTION INTRAMUSCULAR; INTRAVENOUS at 01:42

## 2024-02-26 RX ADMIN — HYDROMORPHONE HYDROCHLORIDE 0.5 MG: 1 INJECTION, SOLUTION INTRAMUSCULAR; INTRAVENOUS; SUBCUTANEOUS at 04:45

## 2024-02-26 RX ADMIN — HYDRALAZINE HYDROCHLORIDE 10 MG: 20 INJECTION INTRAMUSCULAR; INTRAVENOUS at 10:15

## 2024-02-26 RX ADMIN — BACLOFEN 10 MG: 10 TABLET ORAL at 13:03

## 2024-02-26 RX ADMIN — DEXTROSE AND SODIUM CHLORIDE: 5; 900 INJECTION, SOLUTION INTRAVENOUS at 01:55

## 2024-02-26 NOTE — PROGRESS NOTES
[facility] ACUTE INPATIENT PAIN SERVICE    [facility], [facility], [facility], [facility], [place]   PAIN Progress Note    Assessment/Plan:  Mariah Koehler is a 68 year old female who was admitted on 2/21/2024.   Acute Pain Management Team  was asked to see the patient for IT pain pump evaluation. Admitted for intractable nausea and vomiting, found to have large hiatal hernia, received laparoscopic hiatal hernia repair with mesh on 2/23, now on clear liquids. Patient is with chronic pain, per report neck and arm, also chronic low back pain.  Patient has not been using PTM, is in her purse.  History including HTN, IT pain pump,  .    shows minimal oral pain medication use. Patient is on IT pain pump. . Describes pain as 3/10 and sore, aching across abdomen, reports chronic pain at baseline.  Patient has utilized 3 doses of oxycodone 5 mg in the last 24 hours. I have reached out to [facility] for pump settings, not entered yet, but patient reports she just had it filled about a week ago.      No changes recommended-agree with not using PTM at this time.    PLAN:  Acute pain secondary to post-op pain, and has chronic pain, in setting of IT pain pump.     Multimodal Medication Therapy:   Adjuvants: Tylenol is prn(was scheduled), gabapentin 100 mg q hs  Muscle relaxant: baclofen 10 mg tid  Opioids: oxycodone 5 mg q 4 h prn  Non-medication interventions- Ice?   Constipation Prophylaxis- Miralax daily, senna/docusate bid  Follow up /Discharge Recommendations - We recommend prescribing the following at the time of discharge:  Per surgery      [place]  reviewed on 2/26/24. Only opioid Rx filled 10/02/23 for Tylenol #3.  Patient is with IT pain pump.      Hiatal hernia   Patient Active Problem List   Diagnosis    Nausea and vomiting    Chest Pain    Opioid Dependence With Continuous Use    Edema    Wheezing (Symptom)    Pain During Urination (Dysuria)    Frequent, Full-bladder Emptying (Polyuria)    Chronic  obstructive pulmonary disease, unspecified COPD type (H)    Chronic pain syndrome    Vitamin B12 Deficiency    Microscopic Hematuria    Cough    Abdominal Pain In The Central Upper Belly (Epigastric)    Reflex Sympathetic Dystrophy Of The Left Upper Limb    Osteoporosis    Carpal Tunnel Syndrome    Hypertension    Constipation    Abdominal Pain    Fatigue    Current mild episode of major depressive disorder, unspecified whether recurrent (H24)    Viral gastroenteritis    Unintentional weight loss    Colitis    Intractable nausea and vomiting    Presence of intrathecal baclofen pump    Ketonuria    Vitamin B12 deficiency (non anemic)    Migraine headache    History of cholecystectomy    Gastroesophageal reflux disease    Altered mental status    Abnormal CT of the abdomen    CRPS (complex regional pain syndrome), lower limb    Moderate protein-calorie malnutrition (H24)    Gastroparesis    Hiatal hernia    Nausea and vomiting, unspecified vomiting type        History   Drug Use Unknown         Tobacco Use      Smoking status: Every Day        Packs/day: 0.75        Years: 51.00        Additional pack years: 0.00        Total pack years: 38.25        Types: Cigarettes        Start date: 1/1/1968      Smokeless tobacco: Never         atenolol  12.5 mg Oral Daily    baclofen  10 mg Oral TID    gabapentin  100 mg Oral At Bedtime    nicotine  1 patch Transdermal Daily    pantoprazole  40 mg Intravenous Q24H    polyethylene glycol  17 g Oral Daily    senna-docusate  1 tablet Oral BID    sodium chloride (PF)  3 mL Intracatheter Q8H           [facility]  Acute Care Pain Management Program   Hours of pain coverage Mon-Fri 8-1600, afterhours please call the house officer   [facility] (KRISTI, [place], [place], RH)   Matthew

## 2024-02-26 NOTE — PLAN OF CARE
Problem: Surgery Nonspecified  Goal: Optimal Pain Control and Function  Outcome: Progressing     Problem: Surgery Nonspecified  Goal: Nausea and Vomiting Relief  Outcome: Progressing     Problem: Pain Acute  Goal: Optimal Pain Control and Function  Intervention: Prevent or Manage Pain  Recent Flowsheet Documentation  Medication Review/Management: medications reviewed   Goal Outcome Evaluation:    Patient reported aching/burning incisional pain.  Oxycodone and then Dilaudid administered.  Mild pain 3/10 reported on re-assessment.    Patient tolerated but at bedtime c/o feeling nauseated.  Zofran admin and larry essential oil offered.  Mild nausea remains.     Up to bathroom and voiding in adequate amounts.  Positive bowel sounds.  Blood-pressure elevated at  172/79.  Hydralzine admin.  Continue to monitor.

## 2024-02-26 NOTE — PLAN OF CARE
Problem: Pain Acute  Goal: Optimal Pain Control and Function  Intervention: Prevent or Manage Pain  Recent Flowsheet Documentation  Taken 2/26/2024 0129 by Hal Liu RN  Medication Review/Management: medications reviewed   Goal Outcome Evaluation:       Pt is A/Ox4, makes needs known, on a room air, assist of one with gait belt and walker, c/o constant pain, and PRN pain meds administered (please see MAR). IV fluid running @75ml/hr. Voiding okay. Has 5 lop sites. C/o nausea and zofran administered. On clear liquid. New PIV started. Pain is main issue with the pt.

## 2024-02-26 NOTE — PLAN OF CARE
Occupational Therapy Discharge Summary    Reason for therapy discharge:    Discharged to home with home therapy.    Progress towards therapy goal(s). See goals on Care Plan in [facility] electronic health record for goal details.  Goals partially met.  Barriers to achieving goals:   discharge from facility.    Therapy recommendation(s):    Continued therapy is recommended.  Rationale/Recommendations:  Home OT to progress ADL's and determine safety/adaptive equipment needs.

## 2024-02-26 NOTE — DISCHARGE SUMMARY
[facility]  Hospitalist Discharge Summary      Date of Admission:  2/21/2024  Date of Discharge:  2/26/2024  Discharging Provider: Saad J. Gondal, MD  Discharge Service: Hospitalist Service    Discharge Diagnoses   Large hiatal hernia, gastric outlet obstruction    Clinically Significant Risk Factors          Follow-ups Needed After Discharge   Follow-up Appointments     Follow-up and recommended labs and tests       Follow up with primary care provider, Alex Nolasco, within 7 days   for hospital follow- up and to follow up on results.  The following   labs/tests are recommended: CBC, BMP, magnesium, phosphorus done within 1   week s/p discharge at the PCP office.        {Additional follow-up instructions/to-do's for PCP:    Unresulted Labs Ordered in the Past 30 Days of this Admission       No orders found from 1/22/2024 to 2/22/2024.        These results will be followed up by     Discharge Disposition   Discharged to home  Condition at discharge: Stable    Hospital Course   Assessment:  Mariah Koehler is a 68 year old female with PMH significant for known Large Hiatal Hernia, Gastroparesis and chronic pain who is admitted on 2/22/2024 with intractable nausea and vomiting. Found to have large hiatal hernia, causing gastric outlet obstruction.     Problem list and plan:  Large hiatal hernia, gastric outlet obstruction  Patient was admitted on 2/22/2024 with intractable nausea and vomiting and was found to have large hiatal hernia causing gastric outlet obstruction  Received laparoscopic paraesophageal hiatal hernia repair with mesh 2/23/24.  Was initially on clear liquid diet now switched to full liquids  Continue with PPI  Continue with antiemetics as appropriate  Was not IV pain medication now switched to p.o. pain meds on discharge  PT and OT evaluated and patient is going to have home care with PT and OT  Encourage ambulation and increase activity  Patient passing gas but no  bowel movement as of yet, now switched to full liquid diet which she would continue for the next 2 weeks as per surgery post which she can be advanced to soft solid diet, surgery recommending ambulation and increase activity to promote bowel function, p.o. pain management, surgery cleared the patient for discharge from surgical standpoint  Patient clinically and hemodynamically stable and is appropriate for discharge    Mild thrombocytopenia most likely consumptive in the setting of recent surgery  Would advise repeating platelet count within 1 week s/p discharge at the PCP office    Essential hypertension   Resume PTA atenolol. IV Hydralazine prn    Chronic pain, cervical radiculopathy  Has intrathecal pump for her cervical radiculopathy. She has chronic severe left arm pain. She has a remote control that can self bolus as needed. She reports lately, there are a few times that she forgot how many times she has done bolus. Due to safety concern, her pain clinic has been decreasing the doses. She currently has the remote in her purse. But she has not been using it since admission.  Pain management consulted and recommending pain control as per surgery  Continue PTA baclofen.     No Suicidal Ideation  Patient was listed as moderate suicide risk per triage survey. Patient denies suicidal ideation. All suicidal risk questions were negative when screened by RN. The chart was flagged because patient may have had that ideation in the past, but it is documented none within the past 3 months. Patient wants moderate suicide risk label removed from her chart.    Consultations This Hospital Stay   SURGERY GENERAL IP CONSULT  CARE MANAGEMENT / SOCIAL WORK IP CONSULT  PHYSICAL THERAPY ADULT IP CONSULT  OCCUPATIONAL THERAPY ADULT IP CONSULT  PAIN MANAGEMENT ADULT IP CONSULT    Code Status   Full Code    Time Spent on this Encounter   I, Saad J. Gondal, MD, personally saw the patient today and spent greater than 30 minutes  discharging this patient.       Saad J. Gondal, MD  [place] [address]  [address] [facility] [address]  Phone: [phone]  Fax: [phone]  ______________________________________________________________________    Physical Exam   Vital Signs: Temp: 97.7  F (36.5  C) Temp src: Oral BP: 123/56 Pulse: 74   Resp: 20 SpO2: 94 % O2 Device: None (Room air)    Weight: 105 lbs 0 oz    GENERAL: Patient was seen and examined at bedside with no acute distress  HENT:  Head is normocephalic, atraumatic.   EYES:  Eyes have anicteric sclerae without conjunctival injection   LUNGS: Bilateral air entry, clear to auscultation bilaterally  CARDIOVASCULAR:  Regular rate and rhythm with no murmurs, gallops or rubs.  ABDOMEN:  Normal bowel sounds. Soft; nontender, minimal incisional site tenderness  EXT: no edema    SKIN:  No acute rashes.   NEUROLOGIC:  Grossly nonfocal.         Primary Care Physician   Alex Nolasco    Discharge Orders      Home Care Referral      Reason for your hospital stay    Large hiatal hernia, gastric outlet obstruction     Follow-up and recommended labs and tests     Follow up with primary care provider, Alex Nolasco, within 7 days for hospital follow- up and to follow up on results.  The following labs/tests are recommended: CBC, BMP, magnesium, phosphorus done within 1 week s/p discharge at the PCP office.     Activity    Your activity upon discharge: activity as tolerated     Diet    Follow this diet upon discharge: Orders Placed This Encounter      Full Liquid Diet       Significant Results and Procedures   Most Recent 3 CBC's:  Recent Labs   Lab Test 02/26/24  0539 02/22/24  0621 02/21/24  2151 01/30/24  1344   WBC 4.4 4.8 6.9 4.6   HGB 13.5 13.2 15.5 13.4   MCV 87 88 88 90   *  130*  --  216 191     Most Recent 3 BMP's:  Recent Labs   Lab Test 02/25/24  0635 02/24/24  0633 02/23/24  1105 02/22/24  0621 02/21/24  2201 01/22/24  1508   NA  --   --   --   141 142 142   POTASSIUM  --   --   --  3.5 4.1 4.0   CHLORIDE  --   --   --  109* 103 102   CO2  --   --   --  23 27 25   BUN  --   --   --  10.1 10.0 12.9   CR  --   --  0.50* 0.46* 0.52 0.54   ANIONGAP  --   --   --  9 12 15   ESTEFANY  --   --   --  7.6* 9.2 9.7   GLC 98 102*  --  90 123* 121*   ,   Results for orders placed or performed during the hospital encounter of 02/21/24   CT Abdomen Pelvis w Contrast    Narrative    EXAM: CT ABDOMEN PELVIS W CONTRAST  LOCATION: [facility]  DATE: 2/22/2024    INDICATION: Nausea, vomiting.  COMPARISON: 1/22/2024 and 10/24/2023.  TECHNIQUE: CT scan of the abdomen and pelvis was performed following injection of IV contrast. Multiplanar reformats were obtained. Dose reduction techniques were used.  CONTRAST: 55 ml Isovue 370.    FINDINGS:   LOWER CHEST: Large hiatal hernia.    HEPATOBILIARY: Liver is negative. Gallbladder is absent. No biliary dilatation. Trace amount of pneumobilia.    PANCREAS: Normal.    SPLEEN: Normal.    ADRENAL GLANDS: Normal.    KIDNEYS/BLADDER: 2 mm nonobstructing stone lower pole right kidney. There are a couple small benign renal cysts with no specific follow-up needed. Mild atrophy and cortical thinning/scarring in the right kidney is stable in appearance. No hydronephrosis.   Bladder is unremarkable.    BOWEL: Bowel is normal in caliber with no evidence for obstruction. Appendix not clearly seen but no inflammatory changes in the right lower quadrant. No definite acute bowel findings.    LYMPH NODES: Normal.    VASCULATURE: Aortic calcification without aneurysm.    PELVIC ORGANS: Hysterectomy.    MUSCULOSKELETAL: Moderate degenerative changes lower lumbar spine. Low-grade anterolisthesis of L4 on L5. Intrathecal catheter device subcutaneous fat right buttock.      Impression    IMPRESSION:   1.  Large hiatal hernia. This is incompletely visualized but has been present previously. Moderately prominent air-fluid level in the  visualized stomach, nonspecific. Gastric outlet obstruction as a result of the hernia accounting for the patient's   symptoms not excluded. Clinical correlation.    2.  No definite acute intra-abdominal or intrapelvic findings.    3.  Cholecystectomy. No biliary dilatation.    4.  2 mm nonobstructing stone right kidney. Stable atrophy and scarring in the right kidney. No hydronephrosis.    5.  Hysterectomy.       Discharge Medications   Current Discharge Medication List        CONTINUE these medications which have NOT CHANGED    Details   acetaminophen (TYLENOL) 325 MG tablet Take 1-2 tablets (325-650 mg) by mouth every 6 hours as needed for mild pain or headaches    Associated Diagnoses: Other headache syndrome      aspirin 81 MG EC tablet Take 1 tablet (81 mg) by mouth daily  Qty: 90 tablet, Refills: 3    Associated Diagnoses: Chest pain, unspecified type      atenolol (TENORMIN) 25 MG tablet Take 0.5 tablets (12.5 mg) by mouth daily  Qty: 90 tablet, Refills: 3    Associated Diagnoses: Essential (primary) hypertension      baclofen (LIORESAL) 10 MG tablet Take 10 mg by mouth 3 times daily      cyanocobalamin (VITAMIN B-12) 1000 MCG tablet Take 1 tablet (1,000 mcg) by mouth daily  Qty: 90 tablet, Refills: 3    Associated Diagnoses: Vitamin B 12 deficiency      famotidine (PEPCID) 20 MG tablet Take 1 tablet (20 mg) by mouth 2 times daily as needed (acid reflux)  Qty: 20 tablet, Refills: 0      metoclopramide (REGLAN) 10 MG tablet Take 1 tablet (10 mg) by mouth 3 times daily as needed (nausea and vomiting)  Qty: 20 tablet, Refills: 0      multivitamin, therapeutic (THERA-VIT) TABS tablet Take 1 tablet by mouth daily  Qty: 90 tablet, Refills: 3    Associated Diagnoses: Moderate protein-calorie malnutrition (H24)      omeprazole (PRILOSEC) 20 MG DR capsule Take 20 mg by mouth daily      ondansetron (ZOFRAN) 4 MG tablet Take 1-2 tablets (4-8 mg) by mouth every 6 hours as needed for nausea    Associated Diagnoses: Nausea       prochlorperazine (COMPAZINE) 5 MG tablet Take 1 tablet (5 mg) by mouth every 8 hours as needed for nausea or vomiting  Qty: 10 tablet, Refills: 0      thiamine (B-1) 100 MG tablet Take 1 tablet (100 mg) by mouth daily  Qty: 30 tablet, Refills: 3    Associated Diagnoses: Moderate protein-calorie malnutrition (H24)      medication given by implanted intrathecal pump continuous Drug # 1: Fentanyl (Sublimaze) - Conc: 2000 mcg/mL - Total Dose / 24 hours: 873.8 mcg    Drug # 2: Bupivacaine (Marcaine)  - Conc:17.7 mg/mL - Total Dose / 24 hours: 7.733 mg    Drug # 3: Hydromorphone (Dilaudid)  - Conc: 3.8 mg/mL - Total Dose / 24 hours: 1.66 mg    Diluent: NS    Infusion Rate: 0.4369 mL/24 hrs  Pump Reservoir Volume: 40 mL    Bolus doses: up to 15 bolus doses/24 hours with 1 hour lockout  Each bolus: fentanyl 80.1 mcg, 0.708 mg Bupivacaine, 0.152 mg Dilaudid    Outside Clinic & Provider: [facility] in [place] [phone]  Last Refill Date: 10/02/23  Next Refill Date: 11/06/23  [place] Alarm Date:  11/08/23  Pump : [employer]    Deliver Pump Medication to:   For [place]: If pump is within 7 days of depletion, pharmacist will enter a 'Pain Management Adult IP Consult' into the EHR, including 'IT pain pump management' as the reason for the consult.           Allergies   Allergies   Allergen Reactions    Codeine Nausea and Vomiting    Hydromorphone Headache and Nausea and Vomiting    Morphine Nausea and Vomiting    Bacitracin Rash    Methadone Rash

## 2024-02-26 NOTE — PLAN OF CARE
"Goal Outcome Evaluation:               Problem: Adult Inpatient Plan of Care  Goal: Plan of Care Review  Description: The Plan of Care Review/Shift note should be completed every shift.  The Outcome Evaluation is a brief statement about your assessment that the patient is improving, declining, or no change.  This information will be displayed automatically on your shift  note.  2/26/2024 1449 by Johanne Willoughby RN  Outcome: Met  2/26/2024 1057 by Johanne Willoughby RN  Outcome: Progressing     Problem: Adult Inpatient Plan of Care  Goal: Patient-Specific Goal (Individualized)  Description: You can add care plan individualizations to a care plan. Examples of Individualization might be:  \"Parent requests to be called daily at 9am for status\", \"I have a hard time hearing out of my right ear\", or \"Do not touch me to wake me up as it startles  me\".  2/26/2024 1449 by Johanne Willoughby RN  Outcome: Met  2/26/2024 1057 by Johanne Willoughby RN  Outcome: Progressing     Problem: Adult Inpatient Plan of Care  Goal: Optimal Comfort and Wellbeing  Intervention: Monitor Pain and Promote Comfort  Recent Flowsheet Documentation  Taken 2/26/2024 1015 by Johanne Willoughby RN  Pain Management Interventions: medication (see MAR)     Patient is ok to discharge today, went over discharge instructions and pt verbalized understanding. Oxycodone prescription to be picked up in her pharmacy. Patient to be taken to the entrance on wheel chair and to be picked up by her .         "

## 2024-02-26 NOTE — DISCHARGE INSTRUCTIONS
From your Surgeon:    Follow up: Please expect a phone call from the general surgery clinic to schedule a follow-up appointment in 1-2 weeks. If you have any questions or concerns, please do not hesitate to call us at [phone].    Diet: Full liquid/pureed diet. Continue this diet for one week after surgery then okay to slowly advance to a soft solid diet. Patients can have difficulty with constipation following surgery, due in part to the administration of narcotic medications.  If you are suffering with constipation, you should avoid foods such as hard cheeses or red meat.       Activity: You should continue to be active at home, including ambulating frequently.  If possible try to limit the amount of time spent in bed.    Restrictions: You should not lift greater than 20 pounds for 2 weeks, and will want to avoid strenuous physical activity for 1-2 weeks.  You should limit your physical activity if it causes you discomfort; however, this should resolve within 1-2 weeks.   Walking does not count as strenuous physical activity.  You are safe to walk up and down stairs.  Following 2 weeks you may resume all normal physical activity.    Wound care: The small white strips on the incisions act like artificial scabs, and will begin to peel at the edges at around 5-7 days. These can then be removed.  It is normal to have a small rim of red present around the incisions. This should not, however, extend beyond 1/4 inch from the incision.  If your incisions become increasingly tender, red, or draining, please contact us.       Bathing: You may shower after 24 hours from surgery.  It is ok to get your incisions wet, but avoid rubbing them.  Avoid soaking in bath tubs, or swimming in lakes, pools, or streams for 2 weeks following surgery.     Medicine physician recommendations:  Patient is advised to have follow-up with PCP, surgery within 2 weeks s/p discharge, advised to have a follow-up CBC, BMP, magnesium and  phosphorus done at the PCPs office within 1 week s/p discharge, is advised to have increased activity and ambulation as tolerated, advised if she develops any significant worsening of symptoms that initially led to hospitalization if she develop any significant chest pain, shortness of breath, palpitation, weakness, fever or chills associated with fatigue or bleeding from any source to immediately come back to the ER, continue current medication as prescribed until follow-up with PCP for further medication recommendations, reconciliation and refills.

## 2024-02-26 NOTE — PROGRESS NOTES
Care Management Discharge Note    Discharge Date: 02/27/2024       Discharge Disposition: Home    Discharge Services: Home Care; PT/OT    Discharge DME: None    Discharge Transportation: family or friend will provide    Private pay costs discussed: Not applicable    Does the patient's insurance plan have a 3 day qualifying hospital stay waiver?  Yes     Which insurance plan 3 day waiver is available? Alternative insurance waiver    Will the waiver be used for post-acute placement? No    Education Provided on the Discharge Plan: No  Persons Notified of Discharge Plans: patient, nursing, hospitalist   Patient/Family in Agreement with the Plan: yes    Handoff Referral Completed: Yes    Additional Information:  Patient is discharging home with family transport. Home care referral sent for home care PT/OT.     DEVANTE Goldstein

## 2024-02-26 NOTE — PLAN OF CARE
Physical Therapy Discharge Summary    Reason for therapy discharge:    Discharged to home.    Progress towards therapy goal(s). See goals on Care Plan in [facility] electronic health record for goal details.  Goals not met.  Barriers to achieving goals:   discharge on same date as initial evaluation.    Therapy recommendation(s):    Continued therapy is recommended.  Rationale/Recommendations:  recommending home with home care PT.

## 2024-02-26 NOTE — PLAN OF CARE
Problem: Adult Inpatient Plan of Care  Goal: Plan of Care Review  Description: The Plan of Care Review/Shift note should be completed every shift.  The Outcome Evaluation is a brief statement about your assessment that the patient is improving, declining, or no change.  This information will be displayed automatically on your shift  note.  Outcome: Progressing     Problem: Adult Inpatient Plan of Care  Goal: Absence of Hospital-Acquired Illness or Injury  Intervention: Prevent Skin Injury  Recent Flowsheet Documentation  Taken 2/26/2024 1015 by Johanne Willoughby RN  Body Position: position changed independently     Problem: Adult Inpatient Plan of Care  Goal: Optimal Comfort and Wellbeing  Outcome: Progressing  Intervention: Monitor Pain and Promote Comfort  Recent Flowsheet Documentation  Taken 2/26/2024 1015 by Johanne Willoughby RN  Pain Management Interventions: medication (see MAR)     Problem: Adult Inpatient Plan of Care  Goal: Optimal Comfort and Wellbeing  Intervention: Monitor Pain and Promote Comfort  Recent Flowsheet Documentation  Taken 2/26/2024 1015 by Johanne Willoughby RN  Pain Management Interventions: medication (see MAR)   Goal Outcome Evaluation:         Pt os O/a X 4, has able to passed gas, IV 5% dextrose was discontinued, pain was managed with oxy 5ml and pain level reduced from 5 to 4 on the scale of 10. OT came around and walked hallaway. Pt Bp has been high at 0800 it was 192/88, morning med was given and Bp was rechecked 186/80 and med was given(see MAR). Rechecked BP was 126/56.

## 2024-02-26 NOTE — PROGRESS NOTES
General Surgery Progress Note:    Hospital Day # 4    ASSESSMENT:  1. Gastroparesis    2. Nausea and vomiting, unspecified vomiting type    3. Hiatal hernia        Mariah Koehler is a 68 year old female who is s/p robot-assisted, laparoscopic paraesophageal hernia repair with posterior fundoplication on 2/23/24. Patient tolerating a clear liquid diet without nausea, vomiting and passing flatus. Reports improvement in her abdominal pain. Okay to advance to a full liquid diet.    PLAN:  - Okay for full liquid diet. Patient to stay on full liquid/pureed diet for the next week then okay to advance to a soft solid diet  - Encourage ambulation and increase activity to continue to promote bowel function  - Transition to PO pain medication  - Okay for discharge home from a surgical standpoint  - Discharge recommendations and follow-up instructions placed in AVS    SUBJECTIVE:   She is feeling better today. Endorses mild abdominal soreness. Tolerating a clear liquid diet without nausea, vomiting. Passing flatus, no BM yet. Ambulating and voiding.      Patient Vitals for the past 24 hrs:   BP Temp Temp src Pulse Resp SpO2   02/26/24 1015 (!) 186/80 -- -- 73 -- --   02/26/24 0752 (!) 192/88 -- -- 79 17 92 %   02/26/24 0010 137/60 98.6  F (37  C) Oral 82 18 93 %   02/25/24 2137 (!) 172/79 -- -- 80 -- --   02/25/24 1949 (!) 178/77 -- -- 84 18 94 %   02/25/24 1528 139/66 98.9  F (37.2  C) Oral 81 18 90 %   02/25/24 1142 (!) 169/79 97.6  F (36.4  C) Oral 101 18 93 %         PHYSICAL EXAM:  General: patient seen resting in bed, no acute distress  Resp: no respiratory distress, breathing comfortably on room air  Abdomen: Soft, non-tender, non-distended. Laparoscopic incisions clean/dry/intact with steri strips in place.  Extremities: warm and well perfused    02/25 0700 - 02/26 0659  In: 120 [P.O.:120]  Out: -     Admission on 02/21/2024   Component Date Value    Hold Specimen 02/21/2024 [facility]     Hold Specimen 02/21/2024 [facility]      Hold Specimen 02/21/2024 [facility]     Hold Specimen 02/21/2024 [facility]     Sodium 02/21/2024 142     Potassium 02/21/2024 4.1     Carbon Dioxide (CO2) 02/21/2024 27     Anion Gap 02/21/2024 12     Urea Nitrogen 02/21/2024 10.0     Creatinine 02/21/2024 0.52     GFR Estimate 02/21/2024 >90     Calcium 02/21/2024 9.2     Chloride 02/21/2024 103     Glucose 02/21/2024 123 (H)     Alkaline Phosphatase 02/21/2024 87     AST 02/21/2024 31     ALT 02/21/2024 29     Protein Total 02/21/2024 6.9     Albumin 02/21/2024 4.3     Bilirubin Total 02/21/2024 0.4     Lipase 02/21/2024 23     Color Urine 02/22/2024 Light Yellow     Appearance Urine 02/22/2024 Clear     Glucose Urine 02/22/2024 Negative     Bilirubin Urine 02/22/2024 Negative     Ketones Urine 02/22/2024 20 (A)     Specific Gravity Urine 02/22/2024 1.010     Blood Urine 02/22/2024 0.1 mg/dL (A)     pH Urine 02/22/2024 7.0     Protein Albumin Urine 02/22/2024 Negative     Urobilinogen Urine 02/22/2024 <2.0     Nitrite Urine 02/22/2024 Negative     Leukocyte Esterase Urine 02/22/2024 Negative     Mucus Urine 02/22/2024 Present (A)     RBC Urine 02/22/2024 25 (H)     WBC Urine 02/22/2024 1     WBC Count 02/21/2024 6.9     RBC Count 02/21/2024 5.34 (H)     Hemoglobin 02/21/2024 15.5     Hematocrit 02/21/2024 46.9     MCV 02/21/2024 88     MCH 02/21/2024 29.0     MCHC 02/21/2024 33.0     RDW 02/21/2024 13.7     Platelet Count 02/21/2024 216     % Neutrophils 02/21/2024 78     % Lymphocytes 02/21/2024 16     % Monocytes 02/21/2024 6     % Eosinophils 02/21/2024 0     % Basophils 02/21/2024 0     % Immature Granulocytes 02/21/2024 0     NRBCs per 100 WBC 02/21/2024 0     Absolute Neutrophils 02/21/2024 5.3     Absolute Lymphocytes 02/21/2024 1.1     Absolute Monocytes 02/21/2024 0.4     Absolute Eosinophils 02/21/2024 0.0     Absolute Basophils 02/21/2024 0.0     Absolute Immature Granul* 02/21/2024 0.0     Absolute NRBCs 02/21/2024 0.0     Ventricular Rate 02/22/2024 63     Atrial  Rate 02/22/2024 63     UT Interval 02/22/2024 128     QRS Duration 02/22/2024 84     QT 02/22/2024 382     QTc 02/22/2024 390     P Axis 02/22/2024 39     R AXIS 02/22/2024 81     T Axis 02/22/2024 74     Interpretation ECG 02/22/2024                      Value:Sinus rhythm with marked sinus arrhythmia  Anteroseptal infarct (cited on or before 24-OCT-2023)  Abnormal ECG  When compared with ECG of 12-NOV-2023 14:11,  Questionable change in initial forces of Anterior leads  Confirmed by SEE ED PROVIDER NOTE FOR, ECG INTERPRETATION ([id number]),  EVANGELIST DONOVAN ([id number]) on 2/23/2024 2:31:47 AM      Sodium 02/22/2024 141     Potassium 02/22/2024 3.5     Chloride 02/22/2024 109 (H)     Carbon Dioxide (CO2) 02/22/2024 23     Anion Gap 02/22/2024 9     Urea Nitrogen 02/22/2024 10.1     Creatinine 02/22/2024 0.46 (L)     GFR Estimate 02/22/2024 >90     Calcium 02/22/2024 7.6 (L)     Glucose 02/22/2024 90     WBC Count 02/22/2024 4.8     RBC Count 02/22/2024 4.47     Hemoglobin 02/22/2024 13.2     Hematocrit 02/22/2024 39.5     MCV 02/22/2024 88     MCH 02/22/2024 29.5     MCHC 02/22/2024 33.4     RDW 02/22/2024 13.6     Platelet Count 02/22/2024      NRBCs per 100 WBC 02/22/2024 0     Absolute NRBCs 02/22/2024 0.0     % Neutrophils 02/22/2024 74     % Lymphocytes 02/22/2024 22     % Monocytes 02/22/2024 3     % Eosinophils 02/22/2024 0     % Basophils 02/22/2024 1     Absolute Neutrophils 02/22/2024 3.6     Absolute Lymphocytes 02/22/2024 1.1     Absolute Monocytes 02/22/2024 0.1     Absolute Eosinophils 02/22/2024 0.0     Absolute Basophils 02/22/2024 0.0     RBC Morphology 02/22/2024 Confirmed RBC Indices     Platelet Assessment 02/22/2024 Platelets Clumped (A)     Creatinine 02/23/2024 0.50 (L)     GFR Estimate 02/23/2024 >90     GLUCOSE BY METER POCT 02/24/2024 102 (H)     GLUCOSE BY METER POCT 02/25/2024 98     Platelet Count 02/26/2024 130 (L)     Platelet Assessment 02/26/2024 Automated Count Confirmed. Platelet  morphology is normal.     RBC Morphology 02/26/2024 Confirmed RBC Indices     WBC Count 02/26/2024 4.4     RBC Count 02/26/2024 4.58     Hemoglobin 02/26/2024 13.5     Hematocrit 02/26/2024 39.9     MCV 02/26/2024 87     MCH 02/26/2024 29.5     MCHC 02/26/2024 33.8     RDW 02/26/2024 14.1     Platelet Count 02/26/2024 130 (L)         Genevieve Jauregui PA-C  [facility]  [facility]  [address] [place] [id number]

## 2024-02-26 NOTE — PROGRESS NOTES
02/26/24 1045   Appointment Info   Signing Clinician's Name / Credentials (PT) Amanda Watt DPT   Living Environment   People in Home spouse   Current Living Arrangements house   Home Accessibility stairs to enter home;stairs within home   Number of Stairs, Main Entrance 2   Stair Railings, Main Entrance none   Number of Stairs, Within Home, Primary other (see comments)  (to basement for laundry and shower.)   Stair Railings, Within Home, Primary railings safe and in good condition   Self-Care   Usual Activity Tolerance good   Current Activity Tolerance moderate   Equipment Currently Used at Home walker, rolling;cane, straight  (4WW - will bring into house to use)   Fall history within last six months yes   Number of times patient has fallen within last six months 1   Activity/Exercise/Self-Care Comment ind w/ self cares   General Information   Onset of Illness/Injury or Date of Surgery 02/21/24   Referring Physician Irwin Viramontes MD   Patient/Family Therapy Goals Statement (PT) wanting to go home today   Pertinent History of Current Problem (include personal factors and/or comorbidities that impact the POC) Mariah Koehler is a 68 year old female with PMH significant for known Large Hiatal Hernia, Gastroparesis and chronic pain who is admitted on 2/22/2024 with intractable nausea and vomiting. Found to have large hiatal hernia, causing gastric outlet obstruction.   Existing Precautions/Restrictions abdominal   Pain Assessment   Patient Currently in Pain Yes, see Vital Sign flowsheet  (abdominal)   Range of Motion (ROM)   Range of Motion ROM is WFL   Strength (Manual Muscle Testing)   Strength (Manual Muscle Testing) Deficits observed during functional mobility   Bed Mobility   Bed Mobility supine-sit   Supine-Sit [place] (Bed Mobility) contact guard   Assistive Device (Bed Mobility) bed rails   Comment, (Bed Mobility) up in chair following PT   Transfers   Transfers sit-stand transfer   Sit-Stand  Transfer   Sit-Stand [place] (Transfers) supervision;contact guard   Assistive Device (Sit-Stand Transfers) walker, 4-wheeled   Comment, (Sit-Stand Transfer) pulls to stand with walker   Gait/Stairs (Locomotion)   [place] Level (Gait) contact guard   Assistive Device (Gait) walker, 4-wheeled   Distance in Feet (Gait) 50'   Pattern (Gait) step-through   Comment, (Gait/Stairs) narrow CYNTHIA, small steps bilaterally. needing rest break, unable to do stairs   Clinical Impression   Criteria for Skilled Therapeutic Intervention Yes, treatment indicated   PT Diagnosis (PT) impaired functional mobility, gait abnormality   Influenced by the following impairments decreased strength, decreased endurance, pain   Functional limitations due to impairments gait, transfers, bed mob, stairs   Clinical Presentation (PT Evaluation Complexity) stable   Clinical Presentation Rationale pt presents as medically diagnosed   Clinical Decision Making (Complexity) low complexity   Planned Therapy Interventions (PT) balance training;bed mobility training;gait training;home exercise program;neuromuscular re-education;patient/family education;strengthening;transfer training;stair training   Risk & Benefits of therapy have been explained evaluation/treatment results reviewed;patient   PT Total Evaluation Time   PT Eval, Low Complexity Minutes (25263) 12   Physical Therapy Goals   PT Frequency Daily   PT Predicted Duration/Target Date for Goal Attainment 03/04/24   PT Goals Bed Mobility;Transfers;Gait;Stairs   PT: Bed Mobility Independent;Supine to/from sit   PT: Transfers Supervision/stand-by assist;Sit to/from stand;Bed to/from chair;Assistive device   PT: Gait Supervision/stand-by assist;Assistive device;Rolling walker;Greater than 200 feet   PT: Stairs Supervision/stand-by assist;Greater than 10 stairs;Rail on right   Interventions   Interventions Quick Adds Gait Training;Therapeutic Activity   Therapeutic Activity   Therapeutic  Activities: dynamic activities to improve functional performance Minutes (76449) 13   Symptoms Noted During/After Treatment Fatigue   Treatment Detail/Skilled Intervention additional 2 sit <> stand with SBA with FWW, cues for hand placement and safety. No LOB noted, slow mobility, flexed posture. Static standing x 4 minutes x 1 rep holding onto sink, flexed posutre, SBA x 1.   Gait Training   Gait Training Minutes (60147) 10   Symptoms Noted During/After Treatment (Gait Training) fatigue   Treatment Detail/Skilled Intervention additional 100' with 4WW with CGA x 1, inc v/c for upright and breathing techniques, reporting minimal inc in pain with mobility. Shuffling steps, very slow pace.  Needing seated rest break x 3 minutes, then amb from room x 10' x 2 to stairs. 2 stairs with B rails, CGA x 1, non reciprocal, fearful descending, somewhat unsteady wit hdescending.   Distance in Feet 100', 10' x 2   [place] Level (Gait Training) contact guard   Physical Assistance Level (Gait Training) supervision;verbal cues   Weight Bearing (Gait Training) weight-bearing as tolerated   Assistive Device (Gait Training) other (see comments)  (4WW)   Pattern Analysis (Gait Training) swing-through gait   Gait Analysis Deviations decreased juancho;decreased step length;decreased toe-to-floor clearance   Stair Railings present at both sides   Physical Assist/Nonphysical Assist (Stairs) supervision;verbal cues   Level of [place] (Stairs) contact guard   PT Discharge Planning   PT Plan amb w/ 4WW, stairs, bed mob   PT Discharge Recommendation (DC Rec) home with assist;home with home care physical therapy   PT Rationale for DC Rec moving well,  able to assist as needed per pt report. Recommending pt use 4WW at all times   PT Brief overview of current status amb x 170' with 4WW CGA, stairs x 2 with CGA, bed mob SBA-CGA, sit <> stand SBA-CGA   PT Equipment Needed at Discharge walker, rolling  (4WW owns at home)   Total  Session Time   Timed Code Treatment Minutes 23   Total Session Time (sum of timed and untimed services) 35

## 2024-02-27 ENCOUNTER — PATIENT OUTREACH (OUTPATIENT)
Dept: CARE COORDINATION | Facility: CLINIC | Age: 69
End: 2024-02-27
Payer: COMMERCIAL

## 2024-02-27 ENCOUNTER — TELEPHONE (OUTPATIENT)
Dept: CARE COORDINATION | Facility: CLINIC | Age: 69
End: 2024-02-27
Payer: COMMERCIAL

## 2024-02-27 NOTE — PROGRESS NOTES
Clinic Care Coordination Contact  [facility]: Post-Discharge Note  SITUATION                                                      Admission:    Admission Date: 02/21/24   Reason for Admission: 1. Gastroparesis    2. Nausea and vomiting, unspecified vomiting type   3. Hiatal hernia  Discharge:   Discharge Date: 02/26/24  Discharge Diagnosis: Large hiatal hernia, gastric outlet obstruction    BACKGROUND                                                      Per hospital discharge summary and inpatient provider notes:  Assessment:  Mariah Koehler is a 68 year old female with PMH significant for known Large Hiatal Hernia, Gastroparesis and chronic pain who is admitted on 2/22/2024 with intractable nausea and vomiting. Found to have large hiatal hernia, causing gastric outlet obstruction.      Problem list and plan:  Large hiatal hernia, gastric outlet obstruction  Patient was admitted on 2/22/2024 with intractable nausea and vomiting and was found to have large hiatal hernia causing gastric outlet obstruction  Received laparoscopic paraesophageal hiatal hernia repair with mesh 2/23/24.  Was initially on clear liquid diet now switched to full liquids  Continue with PPI  Continue with antiemetics as appropriate  Was not IV pain medication now switched to p.o. pain meds on discharge  PT and OT evaluated and patient is going to have home care with PT and OT  Encourage ambulation and increase activity  Patient passing gas but no bowel movement as of yet, now switched to full liquid diet which she would continue for the next 2 weeks as per surgery post which she can be advanced to soft solid diet, surgery recommending ambulation and increase activity to promote bowel function, p.o. pain management, surgery cleared the patient for discharge from surgical standpoint  Patient clinically and hemodynamically stable and is appropriate for discharge     Mild thrombocytopenia most likely consumptive in the setting of recent  surgery  Would advise repeating platelet count within 1 week s/p discharge at the PCP office     Essential hypertension   Resume PTA atenolol. IV Hydralazine prn     Chronic pain, cervical radiculopathy  Has intrathecal pump for her cervical radiculopathy. She has chronic severe left arm pain. She has a remote control that can self bolus as needed. She reports lately, there are a few times that she forgot how many times she has done bolus. Due to safety concern, her pain clinic has been decreasing the doses. She currently has the remote in her purse. But she has not been using it since admission.  Pain management consulted and recommending pain control as per surgery  Continue PTA baclofen.      No Suicidal Ideation  Patient was listed as moderate suicide risk per triage survey. Patient denies suicidal ideation. All suicidal risk questions were negative when screened by RN. The chart was flagged because patient may have had that ideation in the past, but it is documented none within the past 3 months. Patient wants moderate suicide risk label removed from her chart.       ASSESSMENT           Discharge Assessment  How are you doing now that you are home?: Spoke with Mariah.  She is still experiencing some pain.  She has added Tylenol along with her oxycodone.  She is taking this every 6 hours.  [place] from home care but hasn't called them back yet.  Writer encouraged her to call them back today.  Also instructed her that the phone number is on page 2 of her discharge paperwork if she lost the VM for some reason.  She stated an understanding.  How are your symptoms? (Red Flag symptoms escalate to triage hotline per guidelines): Improved  Do you feel your condition is stable enough to be safe at home until your provider visit?: Yes  Does the patient have their discharge instructions? : Yes  Does the patient have questions regarding their discharge instructions? : No  Were you started on any new medications or were there  changes to any of your previous medications? : Yes  Does the patient have all of their medications?: Yes  Do you have questions regarding any of your medications? : No  Do you have all of your needed medical supplies or equipment (DME)?  (i.e. oxygen tank, CPAP, cane, etc.): Yes  Discharge follow-up appointment scheduled within 14 calendar days? : No  Is patient agreeable to assistance with scheduling? : Yes ([facility] RN will send a high priority telephone note to PCP's team to assist with scheduling)    Post-op ([facility] Only)  If the patient had a surgery or procedure, do they have any questions for a nurse?: No    Post-op (Clinicians Only)  Did the patient have surgery or a procedure: Yes  Incision: closed;healing (steri-strips/bandaids over the areas.  She is unable to view them but denies any difficulty)  Drainage: No  Bleeding: none  Fever: No  Chills: No  Redness: No  Warmth: No  Swelling: No  Incision site pain: Yes  Closure: suture  Eating & Drinking:  (AVS states a full liquid diet.  She is mostly eating a clear liquid diet.  (jello, etc)  Last night she did eat mashed potato's.  Decreased appetite at this time.)  PO Intake: full liquids  Additional Symptoms: decreased appetite  Date of last BM:  (hospital aware that she had not had a BM while inpatient.  Last night and today she stated she was passing gas and had some leakage.  She stated she is usually continent of her bowels.  She denies any additional abdominal pain aside from her incision.)  Urinary Status: voiding without complaint/concerns    Spoke with Mariah.  See note above.  She declined any CCC needs as she has assistance of her .  Primary concern was her pain and this was discussed to add Tylenol along with her oxycodone.      PLAN                                                      Outpatient Plan:  Will send a high priority telephone note to PCP's team to assist with a follow up appointment.    No future appointments.      For any urgent  concerns, please contact our 24 hour nurse triage line: [phone] ([phone])         Minda Carreon RN

## 2024-02-27 NOTE — TELEPHONE ENCOUNTER
If I have any open reserved slots please help schedule with me- otherwise please offer with partner in clinic.

## 2024-02-27 NOTE — TELEPHONE ENCOUNTER
Clinic Care Coordination Contact    Spoke with Mariah today for a Post Hospital discharge call.  See Patient Outreach dated 2/27/24 for additional details.  Hospital paperwork recommending PCP appointment in 7 days along with labs.    Could your team please reach out to Mariah or her spouse directly to assist with scheduling?

## 2024-02-28 ENCOUNTER — NURSE TRIAGE (OUTPATIENT)
Dept: FAMILY MEDICINE | Facility: CLINIC | Age: 69
End: 2024-02-28

## 2024-02-28 ENCOUNTER — VIRTUAL VISIT (OUTPATIENT)
Dept: FAMILY MEDICINE | Facility: CLINIC | Age: 69
End: 2024-02-28
Payer: COMMERCIAL

## 2024-02-28 DIAGNOSIS — U07.1 INFECTION DUE TO 2019 NOVEL CORONAVIRUS: ICD-10-CM

## 2024-02-28 DIAGNOSIS — J44.9 CHRONIC OBSTRUCTIVE PULMONARY DISEASE, UNSPECIFIED COPD TYPE (H): ICD-10-CM

## 2024-02-28 DIAGNOSIS — Z09 HOSPITAL DISCHARGE FOLLOW-UP: Primary | ICD-10-CM

## 2024-02-28 PROCEDURE — 99214 OFFICE O/P EST MOD 30 MIN: CPT | Mod: 95 | Performed by: FAMILY MEDICINE

## 2024-02-28 NOTE — TELEPHONE ENCOUNTER
Patient tested positive for covid 2/28/24. Scheduled for virtual covid visit today at 4:40pm due to long complications of medications and recent hernia repair, did not feel comfortable following RN protocol for medication that could p[potentially cause diarrhea.    Routing to PCP as ELENO as he is familiar with patient and is seeing her for hospital follow up on 3/6/24, will be 7 days out from testing at that time.    Reason for Disposition   HIGH RISK patient (e.g., weak immune system, age > 64 years, obesity with BMI of 30 or higher, pregnant, chronic lung disease or other chronic medical condition) and COVID symptoms (e.g., cough, fever)  (Exceptions: Already seen by doctor or NP/PA and no new or worsening symptoms.)    Protocols used: Coronavirus (COVID-19) Diagnosed or [id number]

## 2024-02-28 NOTE — PROGRESS NOTES
Mariah is a 68 year old who is being evaluated via a billable video visit.      How would you like to obtain your AVS? MyChart  If the video visit is dropped, the invitation should be resent by: Cell phone to cell phone.  Will anyone else be joining your video visit? No          Assessment & Plan   Patient is a 68 yr old female here for covid positive test. She was recently discharged from the hospital for hiatal hernia surgery. Patient was cautioned about being on pain medication and paxlovid and she voiced understanding of the plan.  Chronic obstructive pulmonary disease, unspecified COPD type (H)  Stable., no new symptoms    Infection due to 2019 novel coronavirus  Medication faxed.  - nirmatrelvir and ritonavir (PAXLOVID) 300 mg/100 mg therapy pack  Dispense: 30 tablet; Refill: 0            MED REC REQUIRED{  Post Medication Reconciliation Status: discharge medications reconciled, continue medications without change  Nicotine/Tobacco Cessation  She reports that she has been smoking cigarettes. She started smoking about 56 years ago. She has a 38.3 pack-year smoking history. She has never used smokeless tobacco.  Nicotine/Tobacco Cessation Plan  Self help information given to patient        FUTURE APPOINTMENTS:       - Follow-up visit as needed    Subjective   Mariah is a 68 year old, presenting for the following health issues:  Covid treatment (Wanting to discuss about Covid treatment options.  Tested positive on 2-28-24.  Symptoms started today with the cough and nasal drainage. )      2/28/2024     4:19 PM   Additional Questions   Roomed by Summer Puente CMA     HPI       COVID-19 Symptom Review  How many days ago did these symptoms start? Started today that she is aware of.  More aware of the cough and symptoms today.    Are any of the following symptoms significant for you?  New or worsening difficulty breathing? Yes  Please describe what kind of difficulty you are having breathing:Mild dyspnea (able to do  ADLs without difficulty, mild shortness of breath with activities such as climbing one or two flights of stairs or walking briskly)  Worsening cough? Yes, I am coughing up mucus at times, clear with a tinge of yellow.  Fever or chills? No  Headache: YES-took some Tylenol.  Sore throat: No  Chest pain: No  Diarrhea: YES- she feels this is from the Miralax from in the hospital.   Body aches? YES- mild.    What treatments has patient tried? Acetaminophen   Does patient live in a nursing home, group home, or shelter? No  Does patient have a way to get food/medications during quarantined? Yes, I have a friend or family member who can help me.                2/27/2024     1:22 PM   Post Discharge Outreach   Admission Date 2/21/2024   Reason for Admission 1. Gastroparesis    2. Nausea and vomiting, unspecified vomiting type   3. Hiatal hernia   Discharge Date 2/26/2024   Discharge Diagnosis Large hiatal hernia, gastric outlet obstruction   How are you doing now that you are home? Spoke with Mariah.  She is still experiencing some pain.  She has added Tylenol along with her oxycodone.  She is taking this every 6 hours.  [place] from home care but hasn't called them back yet.  Writer encouraged her to call them back today.  Also instructed her that the phone number is on page 2 of her discharge paperwork if she lost the VM for some reason.  She stated an understanding.   How are your symptoms? (Red Flag symptoms escalate to triage hotline per guidelines) Improved   Do you feel your condition is stable enough to be safe at home until your provider visit? Yes   Does the patient have their discharge instructions?  Yes   Does the patient have questions regarding their discharge instructions?  No   Were you started on any new medications or were there changes to any of your previous medications?  Yes   Does the patient have all of their medications? Yes   Do you have questions regarding any of your medications?  No   Do you have all  of your needed medical supplies or equipment (DME)?  (i.e. oxygen tank, CPAP, cane, etc.) Yes   Discharge follow-up appointment scheduled within 14 calendar days?  No     Hospital Follow-up Visit:    Hospital/Nursing Home/IP Rehab Facility: [facility]  Date of Admission: 2-  Date of Discharge: 2-  Reason(s) for Admission: Large hiatal hernia, gastric outlet obstruction.    Was your hospitalization related to COVID-19? No   Problems taking medications regularly:  None  Medication changes since discharge: Oxycodone pain medication.   Problems adhering to non-medication therapy:  None    Summary of hospitalization:  [facility] discharge summary reviewed  Diagnostic Tests/Treatments reviewed.  Follow up needed: none  Other Healthcare Providers Involved in Patient s Care:         None  Update since discharge: stable.     Plan of care communicated with patient             Review of Systems  CONSTITUTIONAL: NEGATIVE for fever, chills, change in weight  INTEGUMENTARY/SKIN: NEGATIVE for worrisome rashes, moles or lesions  ENT/MOUTH: NEGATIVE for ear, mouth and throat problems  RESP:POSITIVE for  and SOB/dyspnea  CV: NEGATIVE for chest pain, palpitations or peripheral edema  GI: NEGATIVE for nausea, abdominal pain, heartburn, or change in bowel habits  MUSCULOSKELETAL: NEGATIVE for significant arthralgias or myalgia  NEURO: NEGATIVE for weakness, dizziness or paresthesias  ENDOCRINE: NEGATIVE for temperature intolerance, skin/hair changes  HEME/ALLERGY/IMMUNE: NEGATIVE for bleeding problems  PSYCHIATRIC: NEGATIVE for changes in mood or affect      Objective    Vitals - Patient Reported  Systolic (Patient Reported): 137  Diastolic (Patient Reported): 80 (On 2-27-24 at home.)  Temperature (Patient Reported): 97.7  F (36.5  C)  Pain Score: Moderate Pain (4)  Pain Loc: Abdomen (From her surgery.)        Physical Exam   GENERAL: alert and no distress  EYES: Eyes grossly  normal to inspection.  No discharge or erythema, or obvious scleral/conjunctival abnormalities.  RESP: No audible wheeze, cough, or visible cyanosis.    SKIN: Visible skin clear. No significant rash, abnormal pigmentation or lesions.  NEURO: Cranial nerves grossly intact.  Mentation and speech appropriate for age.  PSYCH: Appropriate affect, tone, and pace of words          Video-Visit Details    Type of service:  Video Visit   Video Start Time:  4:40 PM  Video End Time:     4:45PM    Originating Location (pt. Location): Home    Distant Location (provider location):  Off-site  Platform used for Video Visit: Roxie  Signed Electronically by: Sandip Araya MD

## 2024-02-29 ENCOUNTER — NURSE TRIAGE (OUTPATIENT)
Dept: NURSING | Facility: CLINIC | Age: 69
End: 2024-02-29
Payer: COMMERCIAL

## 2024-02-29 NOTE — TELEPHONE ENCOUNTER
Nurse Triage SBAR    Is this a 2nd Level Triage? YES, LICENSED PRACTITIONER REVIEW IS REQUIRED    Situation: Mariah and  Alfred calling asking if she can take Paxlovid while on her pain pump.     Background: She tested positive for COVID yesterday and has RX for Paxlovid from a virtual visit .She is unsure if she should take, pharmacist told her the Paxlovid will increase the effects of the narcotics.  Had COVID booster 2/2/2024    Assessment: States symptoms are the same as yesterday. Denies difficulty breathing.  Pain pump contains:   implanted intrathecal pump continuous Drug # 1: Fentanyl (Sublimaze) - Conc: 2000 mcg/mL - Total Dose / 24 hours: 873.8 mcg     Drug # 2: Bupivacaine (Marcaine)  - Conc:17.7 mg/mL - Total Dose / 24 hours: 7.733 mg     Drug # 3: Hydromorphone (Dilaudid)  - Conc: 3.8 mg/mL - Total Dose / 24 hours: 1.66 mg     Protocol Recommended Disposition:   Callback by PCP Today    Recommendation: Can she take Paxlovid     Routed to provider  Call back Alfred at [phone]  Does the patient meet one of the following criteria for ADS visit consideration? 16+ years old, with an [facility] PCP   Queta Rodrigues RN on 2/29/2024 at 3:44 PM    TIP  Providers, please consider if this condition is appropriate for management at one of our Acute and Diagnostic Services sites.     If patient is a good candidate, please use dotphrase <dot>triageresponse and select Refer to ADS to document.      Reason for Disposition   Caller has NON-URGENT medicine question about med that PCP or specialist prescribed and triager unable to answer question    Protocols used: Medication Question Call-A-[place]

## 2024-03-01 ENCOUNTER — PATIENT OUTREACH (OUTPATIENT)
Dept: CARE COORDINATION | Facility: CLINIC | Age: 69
End: 2024-03-01
Payer: COMMERCIAL

## 2024-03-06 ENCOUNTER — OFFICE VISIT (OUTPATIENT)
Dept: FAMILY MEDICINE | Facility: CLINIC | Age: 69
End: 2024-03-06
Payer: COMMERCIAL

## 2024-03-06 VITALS
HEART RATE: 81 BPM | BODY MASS INDEX: 18.42 KG/M2 | WEIGHT: 104 LBS | OXYGEN SATURATION: 95 % | RESPIRATION RATE: 24 BRPM | DIASTOLIC BLOOD PRESSURE: 52 MMHG | SYSTOLIC BLOOD PRESSURE: 128 MMHG | TEMPERATURE: 98 F

## 2024-03-06 DIAGNOSIS — K44.9 HIATAL HERNIA: ICD-10-CM

## 2024-03-06 DIAGNOSIS — Z00.00 HEALTH CARE MAINTENANCE: Primary | ICD-10-CM

## 2024-03-06 LAB
ERYTHROCYTE [DISTWIDTH] IN BLOOD BY AUTOMATED COUNT: 13.9 % (ref 10–15)
HCT VFR BLD AUTO: 40 % (ref 35–47)
HGB BLD-MCNC: 13 G/DL (ref 11.7–15.7)
MCH RBC QN AUTO: 29.3 PG (ref 26.5–33)
MCHC RBC AUTO-ENTMCNC: 32.5 G/DL (ref 31.5–36.5)
MCV RBC AUTO: 90 FL (ref 78–100)
PLATELET # BLD AUTO: 353 10E3/UL (ref 150–450)
RBC # BLD AUTO: 4.44 10E6/UL (ref 3.8–5.2)
WBC # BLD AUTO: 8.3 10E3/UL (ref 4–11)

## 2024-03-06 PROCEDURE — 85027 COMPLETE CBC AUTOMATED: CPT | Performed by: FAMILY MEDICINE

## 2024-03-06 PROCEDURE — 36415 COLL VENOUS BLD VENIPUNCTURE: CPT | Performed by: FAMILY MEDICINE

## 2024-03-06 PROCEDURE — 80048 BASIC METABOLIC PNL TOTAL CA: CPT | Performed by: FAMILY MEDICINE

## 2024-03-06 PROCEDURE — 99495 TRANSJ CARE MGMT MOD F2F 14D: CPT | Performed by: FAMILY MEDICINE

## 2024-03-06 PROCEDURE — 84100 ASSAY OF PHOSPHORUS: CPT | Performed by: FAMILY MEDICINE

## 2024-03-06 PROCEDURE — 83735 ASSAY OF MAGNESIUM: CPT | Performed by: FAMILY MEDICINE

## 2024-03-06 ASSESSMENT — ANXIETY QUESTIONNAIRES
IF YOU CHECKED OFF ANY PROBLEMS ON THIS QUESTIONNAIRE, HOW DIFFICULT HAVE THESE PROBLEMS MADE IT FOR YOU TO DO YOUR WORK, TAKE CARE OF THINGS AT HOME, OR GET ALONG WITH OTHER PEOPLE: NOT DIFFICULT AT ALL
7. FEELING AFRAID AS IF SOMETHING AWFUL MIGHT HAPPEN: NOT AT ALL
1. FEELING NERVOUS, ANXIOUS, OR ON EDGE: NOT AT ALL
7. FEELING AFRAID AS IF SOMETHING AWFUL MIGHT HAPPEN: NOT AT ALL
5. BEING SO RESTLESS THAT IT IS HARD TO SIT STILL: NOT AT ALL
4. TROUBLE RELAXING: NOT AT ALL
GAD7 TOTAL SCORE: 0
3. WORRYING TOO MUCH ABOUT DIFFERENT THINGS: NOT AT ALL
8. IF YOU CHECKED OFF ANY PROBLEMS, HOW DIFFICULT HAVE THESE MADE IT FOR YOU TO DO YOUR WORK, TAKE CARE OF THINGS AT HOME, OR GET ALONG WITH OTHER PEOPLE?: NOT DIFFICULT AT ALL
GAD7 TOTAL SCORE: 0
6. BECOMING EASILY ANNOYED OR IRRITABLE: NOT AT ALL
2. NOT BEING ABLE TO STOP OR CONTROL WORRYING: NOT AT ALL
GAD7 TOTAL SCORE: 0

## 2024-03-06 ASSESSMENT — PATIENT HEALTH QUESTIONNAIRE - PHQ9
SUM OF ALL RESPONSES TO PHQ QUESTIONS 1-9: 4
10. IF YOU CHECKED OFF ANY PROBLEMS, HOW DIFFICULT HAVE THESE PROBLEMS MADE IT FOR YOU TO DO YOUR WORK, TAKE CARE OF THINGS AT HOME, OR GET ALONG WITH OTHER PEOPLE: NOT DIFFICULT AT ALL
SUM OF ALL RESPONSES TO PHQ QUESTIONS 1-9: 4

## 2024-03-06 NOTE — PROGRESS NOTES
Assessment & Plan     Healthcare maintenance  Reviewed labs with patient today will obtain blood work follow-up  2.   hiatal hernia patient here for hospital follow-up   Reviewed hospital records and labs with her today  Recovering well surgery sites dry and clean  Has follow-up with surgery scheduled  Lab work to be obtained today she is slowly advancing diet            MED REC REQUIRED  Post Medication Reconciliation Status:  Discharge medications reconciled, continue medications without change        Subjective   Mariah is a 68 year old, presenting for the following health issues:  Hospital F/U ([facility] 2/21-2/26 )  Patient is here for hospital follow-up  Hospitalized with intractable vomiting which has been a recurring problem in the last year  Found to have gastric outlet obstruction secondary to large hiatal hernia.  Surgery and fundoplication.  Patient tolerated surgery well had some problems some ileus afterwards has discharged home and advancing diet.  Back on current and previous pain regimen with pain pump.  Is on soft for food.  Has follow-up with surgery in the upcoming weeks.  Surgery sites are clean and dry.  Reviewed records labs and imaging with her today.  Due for some follow-up lab work to assure stability with oral intake.  Reports after returning home patient also tested positive for COVID her  had been home during her hospitalization with COVID-patient is feeling better this week she completed a course of Paxlovid to have a lot of side effects with the medication-last couple days has had some looser stools which she thinks is secondary to the medication which we will be monitoring.      3/6/2024     1:47 PM   Additional Questions   Roomed by Dena PRUITT CMA     HPI         2/27/2024     1:22 PM   Post Discharge Outreach   Admission Date 2/21/2024   Reason for Admission 1. Gastroparesis    2. Nausea and vomiting, unspecified vomiting type   3. Hiatal hernia   Discharge Date 2/26/2024    Discharge Diagnosis Large hiatal hernia, gastric outlet obstruction   How are you doing now that you are home? Spoke with Mariah.  She is still experiencing some pain.  She has added Tylenol along with her oxycodone.  She is taking this every 6 hours.  [place] from home care but hasn't called them back yet.  Writer encouraged her to call them back today.  Also instructed her that the phone number is on page 2 of her discharge paperwork if she lost the VM for some reason.  She stated an understanding.   How are your symptoms? (Red Flag symptoms escalate to triage hotline per guidelines) Improved   Do you feel your condition is stable enough to be safe at home until your provider visit? Yes   Does the patient have their discharge instructions?  Yes   Does the patient have questions regarding their discharge instructions?  No   Were you started on any new medications or were there changes to any of your previous medications?  Yes   Does the patient have all of their medications? Yes   Do you have questions regarding any of your medications?  No   Do you have all of your needed medical supplies or equipment (DME)?  (i.e. oxygen tank, CPAP, cane, etc.) Yes   Discharge follow-up appointment scheduled within 14 calendar days?  No     Hospital Follow-up Visit:    Hospital/Nursing Home/IP Rehab Facility: [facility]  Date of Admission: 2/21/24  Date of Discharge: 2/26/2024  Reason(s) for Admission: Nausea/vomiting, hiatal hernia     Was your hospitalization related to COVID-19? No   Problems taking medications regularly:  None  Medication changes since discharge: None  Problems adhering to non-medication therapy:  None    Summary of hospitalization:  [facility] discharge summary reviewed  Diagnostic Tests/Treatments reviewed.  Follow up needed: Surgery  Other Healthcare Providers Involved in Patient s Care:          General surgery  Update since discharge: improved.         Plan of  care communicated with patient                     Objective    /52 (BP Location: Left arm, Patient Position: Sitting, Cuff Size: Adult Regular)   Pulse 81   Temp 98  F (36.7  C) (Oral)   Resp 24   Wt 47.2 kg (104 lb)   SpO2 95%   BMI 18.42 kg/m    Body mass index is 18.42 kg/m .  Physical Exam   GENERAL: alert and no distress  EYES: Eyes grossly normal to inspection, PERRL and conjunctivae and sclerae normal  RESP: lungs clear to auscultation - no rales, rhonchi or wheezes  CV: regular rate and rhythm, normal S1 S2, no S3 or S4, no murmur, click or rub, no peripheral edema  MS: no gross musculoskeletal defects noted, no edema  NEURO: Normal strength and tone, mentation intact and speech normal  PSYCH: mentation appears normal, affect normal/bright            Signed Electronically by: Alex Nolasco MD

## 2024-03-07 ENCOUNTER — OFFICE VISIT (OUTPATIENT)
Dept: SURGERY | Facility: CLINIC | Age: 69
End: 2024-03-07
Payer: COMMERCIAL

## 2024-03-07 VITALS
SYSTOLIC BLOOD PRESSURE: 126 MMHG | HEIGHT: 63 IN | DIASTOLIC BLOOD PRESSURE: 56 MMHG | WEIGHT: 104 LBS | BODY MASS INDEX: 18.43 KG/M2

## 2024-03-07 DIAGNOSIS — Z98.890 POST-OPERATIVE STATE: Primary | ICD-10-CM

## 2024-03-07 LAB
ANION GAP SERPL CALCULATED.3IONS-SCNC: 10 MMOL/L (ref 7–15)
BUN SERPL-MCNC: 16.2 MG/DL (ref 8–23)
CALCIUM SERPL-MCNC: 8.9 MG/DL (ref 8.8–10.2)
CHLORIDE SERPL-SCNC: 105 MMOL/L (ref 98–107)
CREAT SERPL-MCNC: 0.6 MG/DL (ref 0.51–0.95)
DEPRECATED HCO3 PLAS-SCNC: 25 MMOL/L (ref 22–29)
EGFRCR SERPLBLD CKD-EPI 2021: >90 ML/MIN/1.73M2
GLUCOSE SERPL-MCNC: 136 MG/DL (ref 70–99)
MAGNESIUM SERPL-MCNC: 2.3 MG/DL (ref 1.7–2.3)
PHOSPHATE SERPL-MCNC: 4.1 MG/DL (ref 2.5–4.5)
POTASSIUM SERPL-SCNC: 4.6 MMOL/L (ref 3.4–5.3)
SODIUM SERPL-SCNC: 140 MMOL/L (ref 135–145)

## 2024-03-07 PROCEDURE — 99024 POSTOP FOLLOW-UP VISIT: CPT | Performed by: SURGERY

## 2024-03-07 NOTE — LETTER
"    3/7/2024         RE: Mariah Koehler  [address]  [place] [address]        Dear Colleague,    Thank you for referring your patient, Mariah Koehler, to the [facility] [place]. Please see a copy of my visit note below.     HPI: Mariah Koehler is here for follow up paraesophageal hernia repair from the hospital.  She is doing relatively well and able to tolerate mashed potatoes.    Allergies, Medications, Social History, Past Medical History and Past Surgical History were reviewed and are noted in the chart.    /56   Ht 1.6 m (5' 3\")   Wt 47.2 kg (104 lb)   BMI 18.42 kg/m    Body mass index is 18.42 kg/m .      EXAM:   GENERAL: Appears well  Abdomen-well-healed incisions    Incision/Surgical Site 02/23/24 Upper;Midline Abdomen (Active)       Incision/Surgical Site 02/23/24 Upper;Midline Abdomen (Active)       Incision/Surgical Site 02/23/24 Right;Upper Abdomen (Active)       Incision/Surgical Site 02/23/24 Left;Upper Abdomen (Active)       Incision/Surgical Site 02/23/24 Left;Upper Abdomen (Active)       Assessment/Plan: Mariah Koehler continues to do well. Her wound is healing and overall progressing well.  At this point we will continue with advancing her diet as tolerated.  I will have her follow-up with me in 1 month for ongoing evaluation.        Romain Ornelas DO, FACS  [facility]  [phone]      Again, thank you for allowing me to participate in the care of your patient.        Sincerely,        Lemuel Ornelas, DO  "

## 2024-03-07 NOTE — PROGRESS NOTES
" HPI: Mariah Koehler is here for follow up paraesophageal hernia repair from the hospital.  She is doing relatively well and able to tolerate mashed potatoes.    Allergies, Medications, Social History, Past Medical History and Past Surgical History were reviewed and are noted in the chart.    /56   Ht 1.6 m (5' 3\")   Wt 47.2 kg (104 lb)   BMI 18.42 kg/m    Body mass index is 18.42 kg/m .      EXAM:   GENERAL: Appears well  Abdomen-well-healed incisions    Incision/Surgical Site 02/23/24 Upper;Midline Abdomen (Active)       Incision/Surgical Site 02/23/24 Upper;Midline Abdomen (Active)       Incision/Surgical Site 02/23/24 Right;Upper Abdomen (Active)       Incision/Surgical Site 02/23/24 Left;Upper Abdomen (Active)       Incision/Surgical Site 02/23/24 Left;Upper Abdomen (Active)       Assessment/Plan: Mariah Koehler continues to do well. Her wound is healing and overall progressing well.  At this point we will continue with advancing her diet as tolerated.  I will have her follow-up with me in 1 month for ongoing evaluation.        Romain Ornelas DO [facility]  [phone]  "

## 2024-04-04 ENCOUNTER — VIRTUAL VISIT (OUTPATIENT)
Dept: SURGERY | Facility: CLINIC | Age: 69
End: 2024-04-04
Payer: COMMERCIAL

## 2024-04-04 DIAGNOSIS — Z98.890 POST-OPERATIVE STATE: Primary | ICD-10-CM

## 2024-04-04 NOTE — PROGRESS NOTES
"  The patient has been notified of following:     \"This telephone visit will be conducted via a call between you and your physician/provider. We have found that certain health care needs can be provided without the need for a physical exam.  This service lets us provide the care you need with a short phone conversation.  If a prescription is necessary we can send it directly to your pharmacy.  If lab work is needed we can place an order for that and you can then stop by our lab to have the test done at a later time.    Telephone visits are billed at different rates depending on your insurance coverage. During this emergency period, for some insurers they may be billed the same as an in-person visit.  Please reach out to your insurance provider with any questions.    If during the course of the call the physician/provider feels a telephone visit is not appropriate, you will not be charged for this service.\"    Patient has given verbal consent to a Telephone visit? Yes    What phone number would you like to be contacted at? [phone]     Patient would like to receive their AVS by [employer]        Telephone Start Time: 11:01 AM    Telephone End Time (time telephone stopped): 11:01 AM    Originating Patient Location (pt. Location): Home      Distant Location (provider location):  On-site    Distant Location (provider location):  [facility] [place]     I called and spoke with Mariah regarding her postoperative recovery.  She is doing relatively well and is able to eat pizza albeit fairly slowly.  She does have intermittent nausea and flatulence.  Overall she is doing relatively well.  I will have her follow-up with me in 2 to 3 months over the phone.      Romain Ornelas DO ROZ  [facility]  [phone]          "

## 2024-04-04 NOTE — LETTER
"    4/4/2024         RE: Mariah Koehler  [address]  [place] [facility] [place] [address]        Dear Colleague,    Thank you for referring your patient, Mariah Koehler, to the [facility] BAR[facility]ATR[facility]. Please see a copy of my visit note below.      The patient has been notified of following:     \"This telephone visit will be conducted via a call between you and your physician/provider. We have found that certain health care needs can be provided without the need for a physical exam.  This service lets us provide the care you need with a short phone conversation.  If a prescription is necessary we can send it directly to your pharmacy.  If lab work is needed we can place an order for that and you can then stop by our lab to have the test done at a later time.    Telephone visits are billed at different rates depending on your insurance coverage. During this emergency period, for some insurers they may be billed the same as an in-person visit.  Please reach out to your insurance provider with any questions.    If during the course of the call the physician/provider feels a telephone visit is not appropriate, you will not be charged for this service.\"    Patient has given verbal consent to a Telephone visit? Yes    What phone number would you like to be contacted at? [phone]     Patient would like to receive their AVS by [employer]        Telephone Start Time: 11:01 AM    Telephone End Time (time telephone stopped): 11:01 AM    Originating Patient Location (pt. Location): Home      Distant Location (provider location):  On-site    Distant Location (provider location):  [facility]     I called and spoke with Mariah regarding her postoperative recovery.  She is doing relatively well and is able to eat pizza albeit fairly slowly.  She does have intermittent nausea and flatulence.  Overall she is doing relatively well.  I will have her " follow-up with me in 2 to 3 months over the phone.      Romain Ornelas DO [facility]  [phone]            Again, thank you for allowing me to participate in the care of your patient.        Sincerely,        Lemuel Ornelas, DO

## 2024-04-16 ENCOUNTER — HOSPITAL ENCOUNTER (EMERGENCY)
Facility: HOSPITAL | Age: 69
Discharge: HOME | End: 2024-04-17
Attending: EMERGENCY MEDICINE | Admitting: EMERGENCY MEDICINE
Payer: COMMERCIAL

## 2024-04-16 ENCOUNTER — APPOINTMENT (OUTPATIENT)
Dept: CT IMAGING | Facility: HOSPITAL | Age: 69
End: 2024-04-16
Attending: PHYSICIAN ASSISTANT
Payer: COMMERCIAL

## 2024-04-16 DIAGNOSIS — I10 HYPERTENSION: ICD-10-CM

## 2024-04-16 DIAGNOSIS — R11.2 NAUSEA AND VOMITING: ICD-10-CM

## 2024-04-16 DIAGNOSIS — E86.0 DEHYDRATION: ICD-10-CM

## 2024-04-16 LAB
ALBUMIN SERPL BCG-MCNC: 5.2 G/DL (ref 3.5–5.2)
ALP SERPL-CCNC: 104 U/L (ref 40–150)
ALT SERPL W P-5'-P-CCNC: 31 U/L (ref 0–50)
ANION GAP SERPL CALCULATED.3IONS-SCNC: 16 MMOL/L (ref 7–15)
AST SERPL W P-5'-P-CCNC: 29 U/L (ref 0–45)
BASOPHILS # BLD AUTO: 0 10E3/UL (ref 0–0.2)
BASOPHILS NFR BLD AUTO: 0 %
BILIRUB SERPL-MCNC: 0.5 MG/DL
BUN SERPL-MCNC: 10.9 MG/DL (ref 8–23)
CALCIUM SERPL-MCNC: 9.9 MG/DL (ref 8.8–10.2)
CHLORIDE SERPL-SCNC: 99 MMOL/L (ref 98–107)
CREAT SERPL-MCNC: 0.56 MG/DL (ref 0.51–0.95)
DEPRECATED HCO3 PLAS-SCNC: 25 MMOL/L (ref 22–29)
EGFRCR SERPLBLD CKD-EPI 2021: >90 ML/MIN/1.73M2
EOSINOPHIL # BLD AUTO: 0 10E3/UL (ref 0–0.7)
EOSINOPHIL NFR BLD AUTO: 1 %
ERYTHROCYTE [DISTWIDTH] IN BLOOD BY AUTOMATED COUNT: 13.1 % (ref 10–15)
FLUAV RNA SPEC QL NAA+PROBE: NEGATIVE
FLUBV RNA RESP QL NAA+PROBE: NEGATIVE
GLUCOSE SERPL-MCNC: 105 MG/DL (ref 70–99)
HCT VFR BLD AUTO: 49.3 % (ref 35–47)
HGB BLD-MCNC: 16.4 G/DL (ref 11.7–15.7)
HOLD SPECIMEN: NORMAL
IMM GRANULOCYTES # BLD: 0 10E3/UL
IMM GRANULOCYTES NFR BLD: 0 %
LACTATE SERPL-SCNC: 1.7 MMOL/L (ref 0.7–2)
LIPASE SERPL-CCNC: 20 U/L (ref 13–60)
LYMPHOCYTES # BLD AUTO: 1.7 10E3/UL (ref 0.8–5.3)
LYMPHOCYTES NFR BLD AUTO: 26 %
MAGNESIUM SERPL-MCNC: 1.8 MG/DL (ref 1.7–2.3)
MCH RBC QN AUTO: 29.2 PG (ref 26.5–33)
MCHC RBC AUTO-ENTMCNC: 33.3 G/DL (ref 31.5–36.5)
MCV RBC AUTO: 88 FL (ref 78–100)
MONOCYTES # BLD AUTO: 0.5 10E3/UL (ref 0–1.3)
MONOCYTES NFR BLD AUTO: 7 %
NEUTROPHILS # BLD AUTO: 4.4 10E3/UL (ref 1.6–8.3)
NEUTROPHILS NFR BLD AUTO: 66 %
NRBC # BLD AUTO: 0 10E3/UL
NRBC BLD AUTO-RTO: 0 /100
PLATELET # BLD AUTO: 248 10E3/UL (ref 150–450)
POTASSIUM SERPL-SCNC: 3.9 MMOL/L (ref 3.4–5.3)
PROT SERPL-MCNC: 8.3 G/DL (ref 6.4–8.3)
RBC # BLD AUTO: 5.62 10E6/UL (ref 3.8–5.2)
RSV RNA SPEC NAA+PROBE: NEGATIVE
SARS-COV-2 RNA RESP QL NAA+PROBE: POSITIVE
SODIUM SERPL-SCNC: 140 MMOL/L (ref 135–145)
WBC # BLD AUTO: 6.6 10E3/UL (ref 4–11)

## 2024-04-16 PROCEDURE — 83735 ASSAY OF MAGNESIUM: CPT | Performed by: PHYSICIAN ASSISTANT

## 2024-04-16 PROCEDURE — 258N000003 HC RX IP 258 OP 636: Performed by: PHYSICIAN ASSISTANT

## 2024-04-16 PROCEDURE — 74177 CT ABD & PELVIS W/CONTRAST: CPT

## 2024-04-16 PROCEDURE — 85025 COMPLETE CBC W/AUTO DIFF WBC: CPT | Performed by: STUDENT IN AN ORGANIZED HEALTH CARE EDUCATION/TRAINING PROGRAM

## 2024-04-16 PROCEDURE — 82077 ASSAY SPEC XCP UR&BREATH IA: CPT | Performed by: PHYSICIAN ASSISTANT

## 2024-04-16 PROCEDURE — 83690 ASSAY OF LIPASE: CPT | Performed by: PHYSICIAN ASSISTANT

## 2024-04-16 PROCEDURE — 83605 ASSAY OF LACTIC ACID: CPT | Performed by: PHYSICIAN ASSISTANT

## 2024-04-16 PROCEDURE — 70450 CT HEAD/BRAIN W/O DYE: CPT

## 2024-04-16 PROCEDURE — 93005 ELECTROCARDIOGRAM TRACING: CPT | Performed by: PHYSICIAN ASSISTANT

## 2024-04-16 PROCEDURE — 96374 THER/PROPH/DIAG INJ IV PUSH: CPT

## 2024-04-16 PROCEDURE — 250N000011 HC RX IP 250 OP 636: Performed by: EMERGENCY MEDICINE

## 2024-04-16 PROCEDURE — 87637 SARSCOV2&INF A&B&RSV AMP PRB: CPT | Performed by: PHYSICIAN ASSISTANT

## 2024-04-16 PROCEDURE — 36415 COLL VENOUS BLD VENIPUNCTURE: CPT | Performed by: PHYSICIAN ASSISTANT

## 2024-04-16 PROCEDURE — 250N000011 HC RX IP 250 OP 636: Mod: JZ | Performed by: PHYSICIAN ASSISTANT

## 2024-04-16 PROCEDURE — 96375 TX/PRO/DX INJ NEW DRUG ADDON: CPT

## 2024-04-16 PROCEDURE — 51798 US URINE CAPACITY MEASURE: CPT

## 2024-04-16 PROCEDURE — 80053 COMPREHEN METABOLIC PANEL: CPT | Performed by: EMERGENCY MEDICINE

## 2024-04-16 PROCEDURE — 99285 EMERGENCY DEPT VISIT HI MDM: CPT | Mod: 25

## 2024-04-16 PROCEDURE — 85025 COMPLETE CBC W/AUTO DIFF WBC: CPT | Performed by: EMERGENCY MEDICINE

## 2024-04-16 PROCEDURE — 96361 HYDRATE IV INFUSION ADD-ON: CPT

## 2024-04-16 RX ORDER — GABAPENTIN 100 MG/1
200 CAPSULE ORAL ONCE
Status: COMPLETED | OUTPATIENT
Start: 2024-04-16 | End: 2024-04-17

## 2024-04-16 RX ORDER — CLONIDINE HYDROCHLORIDE 0.1 MG/1
0.1 TABLET ORAL ONCE
Status: COMPLETED | OUTPATIENT
Start: 2024-04-16 | End: 2024-04-17

## 2024-04-16 RX ORDER — DIPHENHYDRAMINE HYDROCHLORIDE 50 MG/ML
12.5 INJECTION INTRAMUSCULAR; INTRAVENOUS ONCE
Status: COMPLETED | OUTPATIENT
Start: 2024-04-16 | End: 2024-04-16

## 2024-04-16 RX ORDER — IOPAMIDOL 755 MG/ML
55 INJECTION, SOLUTION INTRAVASCULAR ONCE
Status: COMPLETED | OUTPATIENT
Start: 2024-04-16 | End: 2024-04-16

## 2024-04-16 RX ORDER — OLANZAPINE 10 MG/1
2.5 INJECTION, POWDER, LYOPHILIZED, FOR SOLUTION INTRAMUSCULAR ONCE
Status: COMPLETED | OUTPATIENT
Start: 2024-04-16 | End: 2024-04-17

## 2024-04-16 RX ORDER — ONDANSETRON 2 MG/ML
4 INJECTION INTRAMUSCULAR; INTRAVENOUS ONCE
Status: COMPLETED | OUTPATIENT
Start: 2024-04-16 | End: 2024-04-16

## 2024-04-16 RX ORDER — HYDROXYZINE HYDROCHLORIDE 25 MG/1
25 TABLET, FILM COATED ORAL ONCE
Status: COMPLETED | OUTPATIENT
Start: 2024-04-16 | End: 2024-04-17

## 2024-04-16 RX ORDER — METOCLOPRAMIDE HYDROCHLORIDE 5 MG/ML
10 INJECTION INTRAMUSCULAR; INTRAVENOUS ONCE
Status: COMPLETED | OUTPATIENT
Start: 2024-04-16 | End: 2024-04-16

## 2024-04-16 RX ADMIN — ONDANSETRON 4 MG: 2 INJECTION INTRAMUSCULAR; INTRAVENOUS at 22:17

## 2024-04-16 RX ADMIN — DIPHENHYDRAMINE HYDROCHLORIDE 12.5 MG: 50 INJECTION, SOLUTION INTRAMUSCULAR; INTRAVENOUS at 23:16

## 2024-04-16 RX ADMIN — SODIUM CHLORIDE 1000 ML: 9 INJECTION, SOLUTION INTRAVENOUS at 22:21

## 2024-04-16 RX ADMIN — IOPAMIDOL 55 ML: 755 INJECTION, SOLUTION INTRAVENOUS at 23:26

## 2024-04-16 RX ADMIN — METOCLOPRAMIDE 10 MG: 5 INJECTION, SOLUTION INTRAMUSCULAR; INTRAVENOUS at 23:16

## 2024-04-16 ASSESSMENT — COLUMBIA-SUICIDE SEVERITY RATING SCALE - C-SSRS
6. HAVE YOU EVER DONE ANYTHING, STARTED TO DO ANYTHING, OR PREPARED TO DO ANYTHING TO END YOUR LIFE?: NO
1. IN THE PAST MONTH, HAVE YOU WISHED YOU WERE DEAD OR WISHED YOU COULD GO TO SLEEP AND NOT WAKE UP?: NO
2. HAVE YOU ACTUALLY HAD ANY THOUGHTS OF KILLING YOURSELF IN THE PAST MONTH?: NO

## 2024-04-16 ASSESSMENT — ACTIVITIES OF DAILY LIVING (ADL)
ADLS_ACUITY_SCORE: 38
ADLS_ACUITY_SCORE: 36

## 2024-04-17 ENCOUNTER — APPOINTMENT (OUTPATIENT)
Dept: RADIOLOGY | Facility: HOSPITAL | Age: 69
End: 2024-04-17
Attending: EMERGENCY MEDICINE
Payer: COMMERCIAL

## 2024-04-17 VITALS
OXYGEN SATURATION: 95 % | DIASTOLIC BLOOD PRESSURE: 56 MMHG | WEIGHT: 104 LBS | HEART RATE: 74 BPM | SYSTOLIC BLOOD PRESSURE: 109 MMHG | HEIGHT: 63 IN | BODY MASS INDEX: 18.43 KG/M2 | RESPIRATION RATE: 14 BRPM | TEMPERATURE: 97.1 F

## 2024-04-17 LAB
ALBUMIN UR-MCNC: NEGATIVE MG/DL
AMPHETAMINES UR QL SCN: ABNORMAL
APPEARANCE UR: CLEAR
ATRIAL RATE - MUSE: 60 BPM
BARBITURATES UR QL SCN: ABNORMAL
BENZODIAZ UR QL SCN: ABNORMAL
BILIRUB UR QL STRIP: NEGATIVE
BZE UR QL SCN: ABNORMAL
CANNABINOIDS UR QL SCN: ABNORMAL
COLOR UR AUTO: ABNORMAL
DIASTOLIC BLOOD PRESSURE - MUSE: 102 MMHG
ETHANOL SERPL-MCNC: <0.01 G/DL
FENTANYL UR QL: ABNORMAL
GLUCOSE UR STRIP-MCNC: NEGATIVE MG/DL
HGB UR QL STRIP: ABNORMAL
INTERPRETATION ECG - MUSE: NORMAL
KETONES UR STRIP-MCNC: 20 MG/DL
LEUKOCYTE ESTERASE UR QL STRIP: NEGATIVE
MUCOUS THREADS #/AREA URNS LPF: PRESENT /LPF
NITRATE UR QL: NEGATIVE
OPIATES UR QL SCN: ABNORMAL
P AXIS - MUSE: 40 DEGREES
PCP QUAL URINE (ROCHE): ABNORMAL
PH UR STRIP: 6.5 [PH] (ref 5–7)
PR INTERVAL - MUSE: 130 MS
QRS DURATION - MUSE: 84 MS
QT - MUSE: 402 MS
QTC - MUSE: 402 MS
R AXIS - MUSE: -6 DEGREES
RBC URINE: 9 /HPF
SP GR UR STRIP: 1.01 (ref 1–1.03)
SQUAMOUS EPITHELIAL: <1 /HPF
SYSTOLIC BLOOD PRESSURE - MUSE: 221 MMHG
T AXIS - MUSE: 71 DEGREES
UROBILINOGEN UR STRIP-MCNC: <2 MG/DL
VENTRICULAR RATE- MUSE: 60 BPM
WBC URINE: 0 /HPF

## 2024-04-17 PROCEDURE — 96375 TX/PRO/DX INJ NEW DRUG ADDON: CPT

## 2024-04-17 PROCEDURE — 81003 URINALYSIS AUTO W/O SCOPE: CPT | Performed by: PHYSICIAN ASSISTANT

## 2024-04-17 PROCEDURE — 258N000003 HC RX IP 258 OP 636: Performed by: EMERGENCY MEDICINE

## 2024-04-17 PROCEDURE — 80307 DRUG TEST PRSMV CHEM ANLYZR: CPT | Performed by: PHYSICIAN ASSISTANT

## 2024-04-17 PROCEDURE — 71045 X-RAY EXAM CHEST 1 VIEW: CPT

## 2024-04-17 PROCEDURE — 250N000011 HC RX IP 250 OP 636: Performed by: EMERGENCY MEDICINE

## 2024-04-17 PROCEDURE — 250N000013 HC RX MED GY IP 250 OP 250 PS 637: Performed by: EMERGENCY MEDICINE

## 2024-04-17 PROCEDURE — 96361 HYDRATE IV INFUSION ADD-ON: CPT

## 2024-04-17 RX ORDER — SODIUM CHLORIDE 9 MG/ML
INJECTION, SOLUTION INTRAVENOUS ONCE
Status: DISCONTINUED | OUTPATIENT
Start: 2024-04-17 | End: 2024-04-17 | Stop reason: HOSPADM

## 2024-04-17 RX ORDER — ONDANSETRON 4 MG/1
4 TABLET, ORALLY DISINTEGRATING ORAL EVERY 8 HOURS PRN
Qty: 10 TABLET | Refills: 0 | Status: SHIPPED | OUTPATIENT
Start: 2024-04-17 | End: 2024-04-20

## 2024-04-17 RX ADMIN — CLONIDINE HYDROCHLORIDE 0.1 MG: 0.1 TABLET ORAL at 00:53

## 2024-04-17 RX ADMIN — HYDROXYZINE HYDROCHLORIDE 25 MG: 25 TABLET, FILM COATED ORAL at 00:52

## 2024-04-17 RX ADMIN — SODIUM CHLORIDE 1000 ML: 9 INJECTION, SOLUTION INTRAVENOUS at 02:30

## 2024-04-17 RX ADMIN — OLANZAPINE 2.5 MG: 10 INJECTION, POWDER, FOR SOLUTION INTRAMUSCULAR at 00:14

## 2024-04-17 RX ADMIN — GABAPENTIN 200 MG: 100 CAPSULE ORAL at 00:52

## 2024-04-17 ASSESSMENT — ACTIVITIES OF DAILY LIVING (ADL)
ADLS_ACUITY_SCORE: 38

## 2024-04-17 NOTE — ED NOTES
Pt is tolerating orange juice and saltine crackers. Pt willing to try oral meds now that she is feeling better.

## 2024-04-17 NOTE — DISCHARGE INSTRUCTIONS
As we discussed, I have sent a prescription for Zofran to your pharmacy to help with your nausea.  Please advance your diet slowly, start with clear liquids, then attempt bland foods.  I would like you to have a close recheck in your primary care clinic within the next several days to make sure symptoms are improving.  If it anytime you have return of uncontrolled vomiting, concerns for dehydration, fever, abdominal pain, black or bloody stools, coffee-ground appearance to your vomit, severe headache please return to the ER for further evaluation.    Your blood pressure was elevated in the emergencydepartment today and requires recheck and close follow-up in your primary care clinic. Untreated blood pressure can cause serious complications including, but not limited to stroke, heart attack/failure, and kidney disease.  Please make a close follow-up appointment to have this recheck performed. Please return to the emergency department immediately if you develop a severe headache, vision changes, chest pain, shortness of breath, orabdominal pain.

## 2024-04-17 NOTE — ED PROVIDER NOTES
Emergency Department Encounter   NAME: Mariah Koehler ; AGE: 68 year old female ; YOB: 1955 ; MRN: [id number] ; PCP: Alex Nolasco   ED PROVIDER: Hina Phillips PA-C    Evaluation Date & Time:   No admission date for patient encounter.    CHIEF COMPLAINT:  Nausea & Vomiting and Post-op Problem      Impression and Plan   MDM: Mariah Koehler is a 68 year old female with a pertinent history of chronic anticoagulation, hypertension, edema, chronic obstructive pulmonary disease, chronic pain, GERD, gastroparesis, hernia, s/p cholecystectomy, s/p hysterectomy, s/p appendectomy, and opiate dependence who presents to the ED by walk-in for evaluation of post-operative nausea and vomiting.  The patient presents to the emergency department in the setting of persistent nausea and vomiting that started around 1 PM this evening.  She does have a history of gastroparesis, and was admitted on 2/21/2024 with large hiatal hernia causing gastric outlet obstruction which was repaired.  She has been doing well since discharge until this afternoon.  Upon arrival, she is in mild distress due to persistent nausea and dry heaving.  Currently spitting up secretions.  She was initially quite hypertensive to the 190s systolic.  She is currently reporting a mild headache and minimal abdominal discomfort.  She is neurologically intact, at baseline, and only has some mild tenderness to her epigastrium.  Considered a broad differential including significant dehydration, metabolic derangements, JESSA, gastroparesis, gastritis, PUD, pancreatitis, withdrawal, SBO, ICH, and others.  We discussed plan for head CT, CT of abdomen pelvis with contrast, labs, and EKG to further evaluate.  IV fluids and Zofran ordered for symptomatic relief.    Patient denies any recent alcohol or illicit drug use.  Alcohol level negative.  Urine drug screen pending.  She does have a history of chronic opioid use, however has an intrathecal pump.   Labs are overall reassuring.  No leukocytosis, fever or elevated lactate to suggest sepsis.  She does have some elevation in her blood counts which I suspect is due to hemoconcentration from mild dehydration.  No coffee-ground emesis to suggest GI bleed.  No concerning metabolic derangements and renal function within normal limits.  LFTs reassuring and lipase within normal limits -low suspicion for hepatobiliary etiology.  CT of her head negative.  CT of her abdomen pelvis showed no evidence of acute obstructive process.  She does have questionable mild wall thickening involving a modest segment of her sigmoid colon raising suspicion of a mild colitis, though is otherwise unremarkable.  Her COVID-19 test did return positive, however after further discussion with patient both her and her  had COVID-19 about 4 to 5 weeks ago and she was treated with an antiviral medicine through her clinic.  She has not had any fever, cough, myalgias this week, lower suspicion that this is an acute COVID infection and I suspect test is still positive from her recent infection.  Given this, we will not reinitiate antivirals today.  Patient feeling overall improved after IV fluids and antiemetics.  Plan at this time is for p.o. challenge, if she is able to tolerate p.o. and antiemetics, do feel that she can discharge safely to home.  Prescription for Zofran sent to her pharmacy.  Urine is pending at time of signout along with p.o. challenge.  Patient appears more clinically comfortable, blood pressure down to the 140s after symptom control.  Plan to have her blood pressure rechecked in clinic.    Medical Decision Making  Obtained supplemental history:Supplemental history obtained?: No  Reviewed external records: External records reviewed?: Documented in chart  Care impacted by chronic illness:Anticoagulated State, Chronic Lung Disease, Chronic Pain, and Hypertension  Care significantly affected by social determinants of  health:Access to Medical Care  Did you consider but not order tests?: Work up considered but not performed and documented in chart, if applicable  Did you interpret images independently?: Independent interpretation of ECG and images noted in documentation, when applicable.  Consultation discussion with other provider:Did you involve another provider (consultant, , pharmacy, etc.)?: No  Admission considered. Patient was signed out to the oncoming physician, disposition pending.    ED COURSE:  9:57 PM I met and introduced myself to the patient. I gathered initial history and performed my physical exam. We discussed plan for initial workup.   11:00 PM I rechecked and updated the patient, whose nausea is now at a 4/10 severity.  11:03 PM I have staffed the patient with Dr. Mtz, ED MD, who has evaluated the patient and agrees with all aspects of today's care.   12:56 AM I rechecked the patient. She is feeling improved. Planning for PO challenge.     At the conclusion of the encounter I discussed the results of all the tests and the disposition. The questions were answered. The patient or family acknowledged understanding and was agreeable with the care plan.    FINAL IMPRESSION:    ICD-10-CM    1. Nausea and vomiting  R11.2       2. Hypertension  I10             MEDICATIONS GIVEN IN THE EMERGENCY DEPARTMENT:  Medications   ondansetron (ZOFRAN) injection 4 mg (4 mg Intravenous $Given 4/16/24 2217)   sodium chloride 0.9% BOLUS 1,000 mL (0 mLs Intravenous Stopped 4/16/24 2329)   metoclopramide (REGLAN) injection 10 mg (10 mg Intravenous $Given 4/16/24 2316)   diphenhydrAMINE (BENADRYL) injection 12.5 mg (12.5 mg Intravenous $Given 4/16/24 2316)   iopamidol (ISOVUE-370) solution 55 mL (55 mLs Intravenous $Given 4/16/24 2326)   cloNIDine (CATAPRES) tablet 0.1 mg (0.1 mg Oral $Given 4/17/24 0053)   hydrOXYzine HCl (ATARAX) tablet 25 mg (25 mg Oral $Given 4/17/24 0052)   gabapentin (NEURONTIN) capsule 200 mg (200 mg  Oral $Given 4/17/24 0052)   OLANZapine (zyPREXA) IV injection 2.5 mg (2.5 mg Intravenous $Given 4/17/24 0014)         NEW PRESCRIPTIONS STARTED AT TODAY'S ED VISIT:  New Prescriptions    ONDANSETRON (ZOFRAN ODT) 4 MG ODT TAB    Take 1 tablet (4 mg) by mouth every 8 hours as needed for nausea         HPI   Use of Intrepreter: N/A     Mariah Koehler is a 68 year old female with a pertinent history of chronic anticoagulation, hypertension, edema, chronic obstructive pulmonary disease, chronic pain, GERD, gastroparesis, hernia, s/p cholecystectomy, s/p hysterectomy, s/p appendectomy, and opiate dependence who presents to the ED by walk-in for evaluation of post-operative nausea and vomiting.     Per chart review:  2/23/2024: Patient presented to [facility] for a laparoscopic paraesophageal hiatal hernia repair with mesh.      The patient presents with nausea and vomiting since around 1300 today that feels similar to when she had the hernia. She also endorses a headache at a 5/10 severity that appeared just PTA. Patient has a history of migraines, but her current headache appeared after the onset of nausea, which is unusual for her. Patient reports that her last bowel movement was this morning and was diarrhea, and notes that her stools have been diarrhea since the surgery. Patient has been passing gas OK the past couple of days.     Patient denies fever, chills, shortness of breath, chest pain, urinary symptoms, tingling or numbness anywhere, and any other symptoms or complaints at this time.       REVIEW OF SYSTEMS:  Pertinent positive and negative symptoms per HPI.       Medical History     Past Medical History:   Diagnosis Date    Current mild episode of major depressive disorder, unspecified whether recurrent (H24) 02/17/2020    Hypertension     Migraine headache 11/06/2023    Nausea and vomiting     Opiate dependence (H)     RSD (reflex sympathetic dystrophy)     Sacral fracture, closed (H)        Past  Surgical History:   Procedure Laterality Date    APPENDECTOMY      ESOPHAGOSCOPY, GASTROSCOPY, DUODENOSCOPY (EGD), COMBINED N/A 2/20/2023    Procedure: ESOPHAGOGASTRODUODENOSCOPY with biopsies;  Surgeon: Na Brooks MD;  Location: [facility] Main OR    GYN SURGERY      HC REVISE MEDIAN N/CARPAL TUNNEL SURG      Description: Neuroplasty Decompression Median Nerve At Carpal Tunnel;  Recorded: 09/19/2011;    HYSTERECTOMY  1984    IR CERVICAL EPIDURAL STEROID INJECTION  1/14/2005    LAPAROSCOPIC NISSEN FUNDOPLICATION N/A 2/23/2024    Procedure: Paraesophageal hiatal hernia repair with mesh, ROBOT-ASSISTED, LAPAROSCOPIC, USING DA ROSMERY XI;  Surgeon: Lemuel Ornelas DO;  Location: [facility] Main OR    OOPHORECTOMY Bilateral 1985    SC SYMPATHECTOMY,CERVICOTHORACIC      Description: Surgical Sympathectomy Cervicothoracic;  Recorded: 09/19/2011;    ZZC DECOMPRESS FASCIOTOMY FINGR/HAND      Description: Decompression Fasciotomy Of The Hand;  Recorded: 09/19/2011;    ZZC LAP,CHOLECYSTECTOMY/EXPLORE      Description: Cholecystectomy Laparoscopic;  Recorded: 12/09/2011;    ZZC TOTAL ABDOM HYSTERECTOMY      Description: Hysterectomy;  Recorded: 03/23/2011;       Family History   Problem Relation Age of Onset    Cerebrovascular Disease Mother     Diabetes Mother     Heart Disease Mother     Hypertension Mother     Rheumatologic Disease Mother     Heart Disease Father     Pulmonary Hypertension Father     Kidney failure Father     Cancer Father         melanoma    Diabetes Sister     Hypertension Sister     Hypertension Brother        Social History     Tobacco Use    Smoking status: Every Day     Current packs/day: 0.75     Average packs/day: 0.8 packs/day for 56.3 years (42.2 ttl pk-yrs)     Types: Cigarettes     Start date: 1/1/1968    Smokeless tobacco: Never   Vaping Use    Vaping status: Never Used   Substance Use Topics    Alcohol use: Not Currently     Comment: 1 glass a year    Drug use: Never  "      ondansetron (ZOFRAN ODT) 4 MG ODT tab  acetaminophen (TYLENOL) 325 MG tablet  aspirin 81 MG EC tablet  atenolol (TENORMIN) 25 MG tablet  baclofen (LIORESAL) 10 MG tablet  cyanocobalamin (VITAMIN B-12) 1000 MCG tablet  famotidine (PEPCID) 20 MG tablet  medication given by implanted intrathecal pump  metoclopramide (REGLAN) 10 MG tablet  multivitamin, therapeutic (THERA-VIT) TABS tablet  omeprazole (PRILOSEC) 20 MG DR capsule  ondansetron (ZOFRAN) 4 MG tablet  prochlorperazine (COMPAZINE) 5 MG tablet  thiamine (B-1) 100 MG tablet          Physical Exam     First Vitals:  Patient Vitals for the past 24 hrs:   BP Temp Temp src Pulse Resp SpO2 Height Weight   04/17/24 0030 (!) 148/84 -- -- 88 18 98 % -- --   04/17/24 0000 (!) 167/79 -- -- 70 14 98 % -- --   04/16/24 2355 (!) 187/95 -- -- 84 16 97 % -- --   04/16/24 2300 (!) 235/105 -- -- 87 25 99 % -- --   04/16/24 2245 (!) 207/94 -- -- 72 18 97 % -- --   04/16/24 2230 (!) 220/102 -- -- 66 27 95 % -- --   04/16/24 2215 (!) 221/102 -- -- 70 29 95 % -- --   04/16/24 2200 (!) 234/114 -- -- -- -- -- -- --   04/16/24 2057 (!) 191/100 97.1  F (36.2  C) Temporal 86 26 95 % 1.6 m (5' 3\") 47.2 kg (104 lb)         PHYSICAL EXAM:   Physical Exam  Vitals reviewed.   Constitutional:       General: She is in acute distress.      Comments: Actively dry heaving and spitting up secretions.    HENT:      Mouth/Throat:      Mouth: Mucous membranes are dry.   Eyes:      Extraocular Movements: Extraocular movements intact.      Conjunctiva/sclera: Conjunctivae normal.      Pupils: Pupils are equal, round, and reactive to light.   Cardiovascular:      Rate and Rhythm: Normal rate and regular rhythm.      Heart sounds: Normal heart sounds.   Pulmonary:      Effort: Pulmonary effort is normal.      Breath sounds: Normal breath sounds.   Abdominal:      General: Abdomen is flat. Bowel sounds are normal. There is no distension.      Palpations: Abdomen is soft.      Tenderness: There is " abdominal tenderness (mild epigastric tenderness). There is no right CVA tenderness, left CVA tenderness, guarding or rebound.   Musculoskeletal:      Cervical back: Normal range of motion and neck supple.   Neurological:      Mental Status: She is alert and oriented to person, place, and time.      Comments: CN 3-12 intact.  Answering questions appropriately with normal speech.  No facial asymmetry.  Negative pronator drift.  4 out of 5 strength with , flexion extension at elbow joint, flexion at shoulder and hip joint, dorsiflexion and plantarflexion bilaterally.  Sensation to light touch intact to face and all extremities.             Results     LAB:  All pertinent labs reviewed and interpreted  Labs Ordered and Resulted from Time of ED Arrival to Time of ED Departure   COMPREHENSIVE METABOLIC PANEL - Abnormal       Result Value    Sodium 140      Potassium 3.9      Carbon Dioxide (CO2) 25      Anion Gap 16 (*)     Urea Nitrogen 10.9      Creatinine 0.56      GFR Estimate >90      Calcium 9.9      Chloride 99      Glucose 105 (*)     Alkaline Phosphatase 104      AST 29      ALT 31      Protein Total 8.3      Albumin 5.2      Bilirubin Total 0.5     CBC WITH PLATELETS AND DIFFERENTIAL - Abnormal    WBC Count 6.6      RBC Count 5.62 (*)     Hemoglobin 16.4 (*)     Hematocrit 49.3 (*)     MCV 88      MCH 29.2      MCHC 33.3      RDW 13.1      Platelet Count 248      % Neutrophils 66      % Lymphocytes 26      % Monocytes 7      % Eosinophils 1      % Basophils 0      % Immature Granulocytes 0      NRBCs per 100 WBC 0      Absolute Neutrophils 4.4      Absolute Lymphocytes 1.7      Absolute Monocytes 0.5      Absolute Eosinophils 0.0      Absolute Basophils 0.0      Absolute Immature Granulocytes 0.0      Absolute NRBCs 0.0     INFLUENZA A/B, RSV, & SARS-COV2 PCR - Abnormal    Influenza A PCR Negative      Influenza B PCR Negative      RSV PCR Negative      SARS CoV2 PCR Positive (*)    LIPASE - Normal     Lipase 20     MAGNESIUM - Normal    Magnesium 1.8     LACTIC ACID WHOLE BLOOD - Normal    Lactic Acid 1.7     ETHYL ALCOHOL LEVEL - Normal    Alcohol ethyl <0.01     ROUTINE UA WITH MICROSCOPIC REFLEX TO CULTURE       RADIOLOGY:  CT Abdomen Pelvis w Contrast   Final Result   IMPRESSION:    1.  Postoperative appearances of GE junction within normal limits.   2.  There is a question of mild wall thickening involving a modest segment of sigmoid colon raising question of mild colitis.   3.  Small nonobstructing right kidney stone unchanged.      Head CT w/o contrast   Final Result   IMPRESSION:   1.  No acute intracranial abnormality or significant change compared to the prior study.            ECG:    Performed at: 22:18:15    Impression: Cannot rule out Anteroseptal infarct (cited on or before 24-OCT-2023).    Rate: 60  Rhythm: Sinus  Axis: -6  CO Interval: 130  QRS Interval: 84  QTc Interval: 402  ST Changes: None  Comparison: When compared with ECG of 22-FEB-2024 00:34, No significant change was found.    EKG results reviewed and interpreted by Dr. Michael ED MD.         IBrii, am serving as a scribe to document services personally performed by Hina Phillips PA-C, based on my observation and the provider's statements to me. I, Hina Phillips PA-C attest that Brii Keith is acting in a scribe capacity, has observed my performance of the services and has documented them in accordance with my direction.       Hina Phillips PA-C   Emergency Medicine   [facility] EMERGENCY DEPARTMENT      Hina Phillips PA-C  04/17/24 0105

## 2024-04-17 NOTE — ED PROVIDER NOTES
Emergency Department Midlevel Supervisory Note     I had a face to face encounter with this patient seen by the Advanced Practice Provider ([facility]). I personally made/approved the management plan and take responsibility for the patient management. I personally saw patient and performed a substantive portion of the visit including all aspects of the medical decision making.     ED Course:  10:54 PM Becca Phillips PA-C staffed patient with me. I agree with their assessment and plan of management, and I will see the patient.  11:12 PM I met with the patient to introduce myself, gather additional history, perform my initial exam, and discuss the plan.   3:21 AM Made aware by RN that the patient still hypotensive after fluid bolus. I re-assessed the patient. Patient was laying in the left lateral decubitus position with blood pressure cuff on her right upper extremity. With repositioning the patient on her back and placing a better fitting cuff, blood pressure normalized. Patient no longer hypotensive. Will plan for q15min blood pressures to see if blood pressure stabilizes.   4:21 AM Chest x-ray with findings concerning for pulmonary obstructive pattern. Patient with lungs CTA and no shortness of breath. This more likely secondary to deep inspiration. Patient received 2 L IVF, drank four cups of orange juice and on bladder scan only had 300 mL in the bladder. Straight cath was performed and UA is negative for infection. Patient with improved blood pressure which has been stable since placement of a more appropriate sized blood pressure cuff. I suspect that with the patient's nausea and vomiting, she was significantly dehydrated. She is mildly nauseated but now able to tolerate oral intake. Plan to discharge home with continued encouragement of oral hydration and supportive management of viral illness. Patient is comfortable with this plan.     Patient tested positive for covid; however, she reports recent covid infection  "with paxlovid. In reviewing the patient's records, patient was COVID positive on 2/28/24. Today's positive could be residual from recent COVID infection. Will not plan to treat as this is unlikely an acute infection.     Brief HPI:     Mariah Koehler is a 68 year old female who presents for evaluation of nausea, vomiting, and post-op problem.    The patient has felt nauseous with frequent vomiting episodes since 1300 today and had similar symptoms with her hernia in the past. She developed a headache afterwards and has a history of migraines. She had an episode of diarrhea this morning. She is currently dry heaving with return of nausea. No complaints of abdominal pain and any other medical complaints or concerns at this time.    I, Nakul Davis, am serving as a scribe to document services personally performed by Dr. Lizbeth Mtz, based on my observations and the provider's statements to me.   I, Dr. Lizbeth Mtz attest that Nakul Davis was acting in a scribe capacity, has observed my performance of the services and has documented them in accordance with my direction.    Brief Physical Exam: /56   Pulse 74   Temp 97.1  F (36.2  C) (Temporal)   Resp 14   Ht 1.6 m (5' 3\")   Wt 47.2 kg (104 lb)   SpO2 95%   BMI 18.42 kg/m    Constitutional:  Alert, in no acute distress  EYES: Conjunctivae clear  HENT:  Atraumatic  Respiratory:  Respirations even, unlabored, in no acute respiratory distress  Cardiovascular:  Regular rate and rhythm, good peripheral perfusion  GI: Soft, non-distended, non-tender  Musculoskeletal:  Moves all 4 extremities equally, grossly symmetrical strength  Integument: Warm & dry. No appreciable rash, erythema.  Neurologic:  Alert & oriented, speech clear and fluent, no focal deficits noted  Psych: Normal mood and affect       MDM:  Patient with a history of chronic opioid use presents with nausea, vomiting that started around 1:00 today.  She subsequently developed a headache " afterwards.  She denies any abdominal pain.  On my exam, the patient with active dry heaving.  Abdomen is nontender.  Plan for CT head, CT abdomen pelvis.  Patient also hypertensive.  Will plan to initiate medications for possible opioid withdrawal causing her symptoms as well.  Will also provide for supportive management.  I do not suspect ACS.  ECG with no acute ischemia.       1. Nausea and vomiting    2. Hypertension    3. Dehydration          Labs and Imaging:  Results for orders placed or performed during the hospital encounter of 04/16/24   Head CT w/o contrast    Impression    IMPRESSION:  1.  No acute intracranial abnormality or significant change compared to the prior study.   CT Abdomen Pelvis w Contrast    Impression    IMPRESSION:   1.  Postoperative appearances of GE junction within normal limits.  2.  There is a question of mild wall thickening involving a modest segment of sigmoid colon raising question of mild colitis.  3.  Small nonobstructing right kidney stone unchanged.   XR Chest Port 1 View    Impression    IMPRESSION:   Hyperinflation versus deep inspiration changes. Clinical correlation for air trapping. Heart size and pulmonary vascularity within normal limits. Stable postoperative changes projecting over the left anterior upper chest. No focal lung   infiltrates. Osseous structures grossly intact. Visualized upper abdomen unremarkable.   Comprehensive metabolic panel   Result Value Ref Range    Sodium 140 135 - 145 mmol/L    Potassium 3.9 3.4 - 5.3 mmol/L    Carbon Dioxide (CO2) 25 22 - 29 mmol/L    Anion Gap 16 (H) 7 - 15 mmol/L    Urea Nitrogen 10.9 8.0 - 23.0 mg/dL    Creatinine 0.56 0.51 - 0.95 mg/dL    GFR Estimate >90 >60 mL/min/1.73m2    Calcium 9.9 8.8 - 10.2 mg/dL    Chloride 99 98 - 107 mmol/L    Glucose 105 (H) 70 - 99 mg/dL    Alkaline Phosphatase 104 40 - 150 U/L    AST 29 0 - 45 U/L    ALT 31 0 - 50 U/L    Protein Total 8.3 6.4 - 8.3 g/dL    Albumin 5.2 3.5 - 5.2 g/dL     Bilirubin Total 0.5 <=1.2 mg/dL   CBC with platelets and differential   Result Value Ref Range    WBC Count 6.6 4.0 - 11.0 10e3/uL    RBC Count 5.62 (H) 3.80 - 5.20 10e6/uL    Hemoglobin 16.4 (H) 11.7 - 15.7 g/dL    Hematocrit 49.3 (H) 35.0 - 47.0 %    MCV 88 78 - 100 fL    MCH 29.2 26.5 - 33.0 pg    MCHC 33.3 31.5 - 36.5 g/dL    RDW 13.1 10.0 - 15.0 %    Platelet Count 248 150 - 450 10e3/uL    % Neutrophils 66 %    % Lymphocytes 26 %    % Monocytes 7 %    % Eosinophils 1 %    % Basophils 0 %    % Immature Granulocytes 0 %    NRBCs per 100 WBC 0 <1 /100    Absolute Neutrophils 4.4 1.6 - 8.3 10e3/uL    Absolute Lymphocytes 1.7 0.8 - 5.3 10e3/uL    Absolute Monocytes 0.5 0.0 - 1.3 10e3/uL    Absolute Eosinophils 0.0 0.0 - 0.7 10e3/uL    Absolute Basophils 0.0 0.0 - 0.2 10e3/uL    Absolute Immature Granulocytes 0.0 <=0.4 10e3/uL    Absolute NRBCs 0.0 10e3/uL   Extra Blue Top Tube   Result Value Ref Range    Hold Specimen JIC    Extra Red Top Tube   Result Value Ref Range    Hold Specimen JIC    Extra Purple Top Tube   Result Value Ref Range    Hold Specimen JIC    Result Value Ref Range    Lipase 20 13 - 60 U/L   Result Value Ref Range    Magnesium 1.8 1.7 - 2.3 mg/dL   UA with Microscopic reflex to Culture    Specimen: Urine, Catheter   Result Value Ref Range    Color Urine Light Yellow Colorless, Straw, Light Yellow, Yellow    Appearance Urine Clear Clear    Glucose Urine Negative Negative mg/dL    Bilirubin Urine Negative Negative    Ketones Urine 20 (A) Negative mg/dL    Specific Gravity Urine 1.010 1.003 - 1.035    Blood Urine 0.03 mg/dL (A) Negative    pH Urine 6.5 5.0 - 7.0    Protein Albumin Urine Negative Negative mg/dL    Urobilinogen Urine <2.0 <2.0 mg/dL    Nitrite Urine Negative Negative    Leukocyte Esterase Urine Negative Negative    Mucus Urine Present (A) None Seen /LPF    RBC Urine 9 (H) <=2 /HPF    WBC Urine 0 <=5 /HPF    Squamous Epithelials Urine <1 <=1 /HPF   Symptomatic Influenza A/B, RSV, &  SARS-CoV2 PCR (COVID-19) Nasopharyngeal    Specimen: Nasopharyngeal; Swab   Result Value Ref Range    Influenza A PCR Negative Negative    Influenza B PCR Negative Negative    RSV PCR Negative Negative    SARS CoV2 PCR Positive (A) Negative   Lactic acid whole blood   Result Value Ref Range    Lactic Acid 1.7 0.7 - 2.0 mmol/L   Alcohol level blood   Result Value Ref Range    Alcohol ethyl <0.01 <=0.01 g/dL   Urine Drug Screen Panel   Result Value Ref Range    Amphetamines Urine Screen Negative Screen Negative    Barbituates Urine Screen Negative Screen Negative    Benzodiazepine Urine Screen Negative Screen Negative    Cannabinoids Urine Screen Negative Screen Negative    Cocaine Urine Screen Negative Screen Negative    Fentanyl Qual Urine Screen Positive (A) Screen Negative    Opiates Urine Screen Negative Screen Negative    PCP Urine Screen Negative Screen Negative   ECG 12-LEAD WITH MUSE (LHE)   Result Value Ref Range    Systolic Blood Pressure 221 mmHg    Diastolic Blood Pressure 102 mmHg    Ventricular Rate 60 BPM    Atrial Rate 60 BPM    NE Interval 130 ms    QRS Duration 84 ms     ms    QTc 402 ms    P Axis 40 degrees    R AXIS -6 degrees    T Axis 71 degrees    Interpretation ECG       Sinus rhythm  Cannot rule out Anteroseptal infarct (cited on or before 24-OCT-2023)  Abnormal ECG  When compared with ECG of 22-FEB-2024 00:34,  No significant change was found  Confirmed by SEE ED PROVIDER NOTE FOR, ECG INTERPRETATION ([id number]),  EVANGELIST DONOVAN ([id number]) on 4/17/2024 12:37:29 AM         I have reviewed the relevant laboratory studies above.    I independently interpreted the following imaging study(s):   XR Chest Port 1 View    Result Date: 4/17/2024  EXAM: XR CHEST PORT 1 VIEW LOCATION: [facility] DATE: 4/17/2024 INDICATION: cough COMPARISON: 10/25/2023     IMPRESSION:   Hyperinflation versus deep inspiration changes. Clinical correlation for air trapping. Heart size and  pulmonary vascularity within normal limits. Stable postoperative changes projecting over the left anterior upper chest. No focal lung infiltrates. Osseous structures grossly intact. Visualized upper abdomen unremarkable.    CT Abdomen Pelvis w Contrast    Result Date: 4/16/2024  EXAM: CT ABDOMEN PELVIS W CONTRAST LOCATION: [facility] DATE: 4/16/2024 INDICATION: nausea and vomiting; history of hiatal hernia repair one month ago COMPARISON: 02/22/2024 TECHNIQUE: CT scan of the abdomen and pelvis was performed following injection of IV contrast. Multiplanar reformats were obtained. Dose reduction techniques were used. CONTRAST: ISOVUE [id number] 55ML FINDINGS: LOWER CHEST: Resolution of prior hiatal hernia consistent with surgical history. Mild apparent wall thickening near GE junction likely relates to postsurgical changes. HEPATOBILIARY: Prior cholecystectomy. Increase in pneumobilia, no bile duct dilatation. PANCREAS: Normal. SPLEEN: Normal. ADRENAL GLANDS: Normal. KIDNEYS/BLADDER: 3 mm right lower pole stone unchanged, no hydronephrosis. Tiny right renal cyst needs no follow-up. BOWEL: Postoperative changes at GE junction. Stomach otherwise appears normal. There is no bowel obstruction. Loop of sigmoid colon demonstrates mild apparent diffuse wall thickening raising question of segmental colitis.. LYMPH NODES: No significant adenopathy. VASCULATURE: Normal. PELVIC ORGANS: No pelvic mass. No free fluid. MUSCULOSKELETAL: Implantable baclofen pump unchanged. Scoliosis and degenerative changes.     IMPRESSION: 1.  Postoperative appearances of GE junction within normal limits. 2.  There is a question of mild wall thickening involving a modest segment of sigmoid colon raising question of mild colitis. 3.  Small nonobstructing right kidney stone unchanged.    Head CT w/o contrast    Result Date: 4/16/2024  EXAM: CT HEAD W/O CONTRAST LOCATION: [facility] DATE: 4/16/2024  INDICATION: headache COMPARISON: 10/24/2023 TECHNIQUE: Routine CT Head without IV contrast. Multiplanar reformats. Dose reduction techniques were used. FINDINGS: INTRACRANIAL CONTENTS: No intracranial hemorrhage, extraaxial collection, or mass effect.  No CT evidence of acute infarct. Normal parenchymal attenuation. Mild volume loss is stable. VISUALIZED ORBITS/SINUSES/MASTOIDS: No intraorbital abnormality. No paranasal sinus mucosal disease. No middle ear or mastoid effusion. BONES/SOFT TISSUES: No acute abnormality.     IMPRESSION: 1.  No acute intracranial abnormality or significant change compared to the prior study.       EKG: I reviewed and independently interpreted the patient's EKG, with comments made as listed below. Please see scanned EKG for full report.       Procedures:  I was present for the key portions of procedures documented in [place]/midlevel note, see midlevel note for further details.    Lizbeth Mtz MD  [facility] EMERGENCY DEPARTMENT  [address] [place] [id number]  [phone]     Lizbeth Mtz MD  04/17/24 0424

## 2024-04-17 NOTE — ED TRIAGE NOTES
Pt arrives to triage for nausea and vomiting post op hernia repair. Pt had surgery to repair the hernia on 2/23. She developed nausea and vomiting at 1pm today. She has been taking the PRN compazine and PRN zofran as prescribed without relief. Denies abdominal pain. Last dose of medication was 4 hours prior to arrival, she took both zofran and compazine.

## 2024-05-07 ENCOUNTER — HOSPITAL ENCOUNTER (EMERGENCY)
Facility: HOSPITAL | Age: 69
Discharge: HOME | End: 2024-05-07
Attending: EMERGENCY MEDICINE | Admitting: EMERGENCY MEDICINE
Payer: COMMERCIAL

## 2024-05-07 VITALS
SYSTOLIC BLOOD PRESSURE: 130 MMHG | HEART RATE: 76 BPM | WEIGHT: 104 LBS | DIASTOLIC BLOOD PRESSURE: 61 MMHG | BODY MASS INDEX: 18.42 KG/M2 | RESPIRATION RATE: 24 BRPM | TEMPERATURE: 97.8 F | OXYGEN SATURATION: 94 %

## 2024-05-07 DIAGNOSIS — R19.7 DIARRHEA, UNSPECIFIED TYPE: ICD-10-CM

## 2024-05-07 DIAGNOSIS — E86.0 DEHYDRATION: ICD-10-CM

## 2024-05-07 DIAGNOSIS — R11.2 NAUSEA AND VOMITING, UNSPECIFIED VOMITING TYPE: ICD-10-CM

## 2024-05-07 LAB
ANION GAP SERPL CALCULATED.3IONS-SCNC: 11 MMOL/L (ref 7–15)
BUN SERPL-MCNC: 9.9 MG/DL (ref 8–23)
CALCIUM SERPL-MCNC: 9.6 MG/DL (ref 8.8–10.2)
CHLORIDE SERPL-SCNC: 104 MMOL/L (ref 98–107)
CREAT SERPL-MCNC: 0.49 MG/DL (ref 0.51–0.95)
DEPRECATED HCO3 PLAS-SCNC: 24 MMOL/L (ref 22–29)
EGFRCR SERPLBLD CKD-EPI 2021: >90 ML/MIN/1.73M2
ERYTHROCYTE [DISTWIDTH] IN BLOOD BY AUTOMATED COUNT: 12.8 % (ref 10–15)
GLUCOSE SERPL-MCNC: 123 MG/DL (ref 70–99)
HCT VFR BLD AUTO: 46.9 % (ref 35–47)
HGB BLD-MCNC: 15.8 G/DL (ref 11.7–15.7)
HOLD SPECIMEN: NORMAL
MCH RBC QN AUTO: 29.8 PG (ref 26.5–33)
MCHC RBC AUTO-ENTMCNC: 33.7 G/DL (ref 31.5–36.5)
MCV RBC AUTO: 88 FL (ref 78–100)
PLATELET # BLD AUTO: 220 10E3/UL (ref 150–450)
POTASSIUM SERPL-SCNC: 4.2 MMOL/L (ref 3.4–5.3)
RBC # BLD AUTO: 5.31 10E6/UL (ref 3.8–5.2)
SODIUM SERPL-SCNC: 139 MMOL/L (ref 135–145)
WBC # BLD AUTO: 6.7 10E3/UL (ref 4–11)

## 2024-05-07 PROCEDURE — 258N000003 HC RX IP 258 OP 636: Performed by: EMERGENCY MEDICINE

## 2024-05-07 PROCEDURE — 85027 COMPLETE CBC AUTOMATED: CPT | Performed by: EMERGENCY MEDICINE

## 2024-05-07 PROCEDURE — 250N000011 HC RX IP 250 OP 636: Performed by: EMERGENCY MEDICINE

## 2024-05-07 PROCEDURE — 80048 BASIC METABOLIC PNL TOTAL CA: CPT | Performed by: EMERGENCY MEDICINE

## 2024-05-07 PROCEDURE — 99284 EMERGENCY DEPT VISIT MOD MDM: CPT | Mod: 25

## 2024-05-07 PROCEDURE — 96361 HYDRATE IV INFUSION ADD-ON: CPT

## 2024-05-07 PROCEDURE — 96375 TX/PRO/DX INJ NEW DRUG ADDON: CPT

## 2024-05-07 PROCEDURE — 85027 COMPLETE CBC AUTOMATED: CPT | Performed by: STUDENT IN AN ORGANIZED HEALTH CARE EDUCATION/TRAINING PROGRAM

## 2024-05-07 PROCEDURE — 96374 THER/PROPH/DIAG INJ IV PUSH: CPT

## 2024-05-07 PROCEDURE — 82374 ASSAY BLOOD CARBON DIOXIDE: CPT | Performed by: STUDENT IN AN ORGANIZED HEALTH CARE EDUCATION/TRAINING PROGRAM

## 2024-05-07 PROCEDURE — 250N000011 HC RX IP 250 OP 636: Mod: JZ | Performed by: EMERGENCY MEDICINE

## 2024-05-07 PROCEDURE — 36415 COLL VENOUS BLD VENIPUNCTURE: CPT | Performed by: STUDENT IN AN ORGANIZED HEALTH CARE EDUCATION/TRAINING PROGRAM

## 2024-05-07 RX ORDER — ONDANSETRON 2 MG/ML
4 INJECTION INTRAMUSCULAR; INTRAVENOUS ONCE
Status: COMPLETED | OUTPATIENT
Start: 2024-05-07 | End: 2024-05-07

## 2024-05-07 RX ORDER — PROMETHAZINE HYDROCHLORIDE 25 MG/1
25 TABLET ORAL EVERY 6 HOURS PRN
Qty: 30 TABLET | Refills: 0 | Status: SHIPPED | OUTPATIENT
Start: 2024-05-07

## 2024-05-07 RX ADMIN — SODIUM CHLORIDE 1000 ML: 9 INJECTION, SOLUTION INTRAVENOUS at 19:03

## 2024-05-07 RX ADMIN — ONDANSETRON 4 MG: 2 INJECTION INTRAMUSCULAR; INTRAVENOUS at 16:14

## 2024-05-07 RX ADMIN — PROMETHAZINE HYDROCHLORIDE 25 MG: 25 INJECTION INTRAMUSCULAR; INTRAVENOUS at 19:03

## 2024-05-07 ASSESSMENT — ACTIVITIES OF DAILY LIVING (ADL)
ADLS_ACUITY_SCORE: 38
ADLS_ACUITY_SCORE: 36
ADLS_ACUITY_SCORE: 38

## 2024-05-07 ASSESSMENT — COLUMBIA-SUICIDE SEVERITY RATING SCALE - C-SSRS
6. HAVE YOU EVER DONE ANYTHING, STARTED TO DO ANYTHING, OR PREPARED TO DO ANYTHING TO END YOUR LIFE?: NO
2. HAVE YOU ACTUALLY HAD ANY THOUGHTS OF KILLING YOURSELF IN THE PAST MONTH?: NO
1. IN THE PAST MONTH, HAVE YOU WISHED YOU WERE DEAD OR WISHED YOU COULD GO TO SLEEP AND NOT WAKE UP?: NO

## 2024-05-07 NOTE — ED PROVIDER NOTES
Continue statin EMERGENCY DEPARTMENT ENCOUNTER      NAME: Mariah Koehler  AGE: 68 year old female  YOB: 1955  MRN: [id number]  EVALUATION DATE & TIME: 5/7/2024  5:26 PM    PCP: Alex Nolasco    ED PROVIDER: Meka Randolph M.D.      Chief Complaint   Patient presents with    Nausea, Vomiting, & Diarrhea     FINAL IMPRESSION:  1. Nausea and vomiting, unspecified vomiting type    2. Diarrhea, unspecified type    3. Dehydration      ED COURSE & MEDICAL DECISION MAKING:    Pertinent Labs & Imaging studies reviewed. (See chart for details)  ED Course as of 05/07/24 2237 Tue May 07, 2024   1822 Patient is a pleasant 68-year-old female who comes in today for evaluation of nausea and vomiting and diarrhea that started around 3 AM.  She has had similar episodes in the past.  She denies any abdominal pain.  She does report a headache and did not take her blood pressure medications today.  Blood pressure was little bit high here.  She says she is on chronic narcotics and a pump and has been taking those as she is supposed to.  She appears uncomfortable and a little tachycardic here.  I will give her some IV fluids and will try some Phenergan.  She got Zofran earlier.  Lab work was obtained and so far has been pretty unremarkable.  Her abdomen is benign so I do not think we need to jump to imaging right now.  Hopefully get her feeling little better.  She thinks she may build to go home later today.  I discussed the plan with her and she was in agreement.   2002 I checked back in on the patient.  She is doing a lot better after the Phenergan.  She like a prescription for Phenergan to go home with but does feel comfortable going home.  Heart rate has improved significantly.  Blood pressure looks better.  She is comfortable with discharge home.     6:15 PM I introduced myself to the patient, obtained patient history, performed a physical exam, and discussed plan for ED workup including potential diagnostic  laboratory/imaging studies and interventions.      Medical Decision Making    History:  Supplemental history from: None  External Record(s) reviewed: I reviewed the note from 2/26/2024.  Patient had been admitted to the hospital for intractable nausea and vomiting and was found to have a large hiatal hernia with gastric outlet obstruction.  She had laparoscopic paraesophageal hiatal hernia repair at that time.    Work Up:  Emergent/Severe conditions considered and evaluated for: Electrolyte abnormality, renal failure  I independently reviewed and interpreted none  In additional to work up documented, I considered the following work up: Considered abdominal CT but patient had no tenderness on my exam.  Medications given that require intensive monitoring for toxicity: None    External consultation:  Discussion of management with another provider: None    Complicating factors:  Care impacted by chronic illness: Hypertension, opiate dependence, COPD  Care affected by social determinants of health: Access to care    Disposition considerations: Considered admission but we were able to get the patient's symptoms under good control and she was comfortable with discharge home.  Prescriptions considered/prescribed: Phenergan    At the conclusion of the encounter I discussed  the results of all of the tests and the disposition with patient.   All questions were answered.  The patient acknowledged understanding and was involved in the decision making regarding the overall care plan.      I discussed with patient the utility, limitations and findings of the exam/interventions/studies done during this visit as well as the list of differential diagnosis and symptoms to monitor/return to ER for.  Additional verbal discharge instructions were provided.     MEDICATIONS GIVEN IN THE EMERGENCY:  Medications   ondansetron (ZOFRAN) injection 4 mg (4 mg Intravenous $Given 5/7/24 1137)   promethazine (PHENERGAN) 25 mg in sodium chloride  "0.9 % 55 mL intermittent infusion (25 mg Intravenous $Given 5/7/24 1903)   sodium chloride 0.9% BOLUS 1,000 mL (0 mLs Intravenous Stopped 5/7/24 2034)       NEW PRESCRIPTIONS STARTED AT TODAY'S ER VISIT  Discharge Medication List as of 5/7/2024  8:35 PM        START taking these medications    Details   promethazine (PHENERGAN) 25 MG tablet Take 1 tablet (25 mg) by mouth every 6 hours as needed for nausea, Disp-30 tablet, R-0, E-Prescribe                =================================================================    HPI    Triage Note: Pt is leaning over in w/c due to \"hx back pain\" she reports N/V/D that started at 0300. Unable to keep BP meds down /101    Patient information was obtained from: Patient     Use of : N/A       Mariah Koehler is a 68 year old female who presents with nausea, vomiting, diarrhea.    Patient reports nausea, vomiting, diarrhea starting at 3am. She reports she has had this symptoms in the past. She has a Hx of HTN and says she hasn't been able to keep her blood pressure medications down, now she endorses a headache. She is on chronic narcotics and a pump. She denies cough, shortness of breath, fever, leg swelling, abdominal pain.     PAST MEDICAL HISTORY:  Past Medical History:   Diagnosis Date    Current mild episode of major depressive disorder, unspecified whether recurrent (H24) 02/17/2020    Hypertension     Migraine headache 11/06/2023    Nausea and vomiting     Opiate dependence (H)     RSD (reflex sympathetic dystrophy)     Sacral fracture, closed (H)        PAST SURGICAL HISTORY:  Past Surgical History:   Procedure Laterality Date    APPENDECTOMY      ESOPHAGOSCOPY, GASTROSCOPY, DUODENOSCOPY (EGD), COMBINED N/A 2/20/2023    Procedure: ESOPHAGOGASTRODUODENOSCOPY with biopsies;  Surgeon: Na Brooks MD;  Location: [facility] Main OR    GYN SURGERY      HC REVISE MEDIAN N/CARPAL TUNNEL SURG      Description: Neuroplasty Decompression Median Nerve At " Carpal Tunnel;  Recorded: 09/19/2011;    HYSTERECTOMY  1984    IR CERVICAL EPIDURAL STEROID INJECTION  1/14/2005    LAPAROSCOPIC NISSEN FUNDOPLICATION N/A 2/23/2024    Procedure: Paraesophageal hiatal hernia repair with mesh, ROBOT-ASSISTED, LAPAROSCOPIC, USING DA ROSMERY XI;  Surgeon: Lemuel Ornelas DO;  Location: [facility] Main OR    OOPHORECTOMY Bilateral 1985    CO SYMPATHECTOMY,CERVICOTHORACIC      Description: Surgical Sympathectomy Cervicothoracic;  Recorded: 09/19/2011;    [facility] DECOMPRESS FASCIOTOMY FINGR/HAND      Description: Decompression Fasciotomy Of The Hand;  Recorded: 09/19/2011;    [facility] LAP,CHOLECYSTECTOMY/EXPLORE      Description: Cholecystectomy Laparoscopic;  Recorded: 12/09/2011;    [facility] TOTAL ABDOM HYSTERECTOMY      Description: Hysterectomy;  Recorded: 03/23/2011;       CURRENT MEDICATIONS:    No current facility-administered medications for this encounter.    Current Outpatient Medications:     promethazine (PHENERGAN) 25 MG tablet, Take 1 tablet (25 mg) by mouth every 6 hours as needed for nausea, Disp: 30 tablet, Rfl: 0    acetaminophen (TYLENOL) 325 MG tablet, Take 1-2 tablets (325-650 mg) by mouth every 6 hours as needed for mild pain or headaches, Disp: , Rfl:     aspirin 81 MG EC tablet, Take 1 tablet (81 mg) by mouth daily, Disp: 90 tablet, Rfl: 3    atenolol (TENORMIN) 25 MG tablet, Take 0.5 tablets (12.5 mg) by mouth daily, Disp: 90 tablet, Rfl: 3    baclofen (LIORESAL) 10 MG tablet, Take 10 mg by mouth 3 times daily, Disp: , Rfl:     cyanocobalamin (VITAMIN B-12) 1000 MCG tablet, Take 1 tablet (1,000 mcg) by mouth daily, Disp: 90 tablet, Rfl: 3    famotidine (PEPCID) 20 MG tablet, Take 1 tablet (20 mg) by mouth 2 times daily as needed (acid reflux), Disp: 20 tablet, Rfl: 0    medication given by implanted intrathecal pump, continuous Drug # 1: Fentanyl (Sublimaze) - Conc: 2000 mcg/mL - Total Dose / 24 hours: 873.8 mcg  Drug # 2: Bupivacaine (Marcaine)  - Conc:17.7 mg/mL - Total  Dose / 24 hours: 7.733 mg  Drug # 3: Hydromorphone (Dilaudid)  - Conc: 3.8 mg/mL - Total Dose / 24 hours: 1.66 mg  Diluent: NS  Infusion Rate: 0.4369 mL/24 hrs Pump Reservoir Volume: 40 mL  Bolus doses: up to 15 bolus doses/24 hours with 1 hour lockout Each bolus: fentanyl 80.1 mcg, 0.708 mg Bupivacaine, 0.152 mg Dilaudid  Outside Clinic & Provider: [facility] in [place] [phone] Last Refill Date: 10/02/23 Next Refill Date: 11/06/23 [place] Alarm Date:  11/08/23 Pump : Nevro  Deliver Pump Medication to:  For [place]: If pump is within 7 days of depletion, pharmacist will enter a 'Pain Management Adult IP Consult' into the EHR, including 'IT pain pump management' as the reason for the consult., Disp: , Rfl:     metoclopramide (REGLAN) 10 MG tablet, Take 1 tablet (10 mg) by mouth 3 times daily as needed (nausea and vomiting), Disp: 20 tablet, Rfl: 0    multivitamin, therapeutic (THERA-VIT) TABS tablet, Take 1 tablet by mouth daily, Disp: 90 tablet, Rfl: 3    omeprazole (PRILOSEC) 20 MG DR capsule, Take 20 mg by mouth daily, Disp: , Rfl:     ondansetron (ZOFRAN) 4 MG tablet, Take 1-2 tablets (4-8 mg) by mouth every 6 hours as needed for nausea, Disp: , Rfl:     prochlorperazine (COMPAZINE) 5 MG tablet, Take 1 tablet (5 mg) by mouth every 8 hours as needed for nausea or vomiting, Disp: 10 tablet, Rfl: 0    thiamine (B-1) 100 MG tablet, Take 1 tablet (100 mg) by mouth daily, Disp: 30 tablet, Rfl: 3    ALLERGIES:  Allergies   Allergen Reactions    Codeine Nausea and Vomiting    Hydromorphone Headache and Nausea and Vomiting    Morphine Nausea and Vomiting    Bacitracin Rash    Methadone Rash       FAMILY HISTORY:  Family History   Problem Relation Age of Onset    Cerebrovascular Disease Mother     Diabetes Mother     Heart Disease Mother     Hypertension Mother     Rheumatologic Disease Mother     Heart Disease Father     Pulmonary Hypertension Father     Kidney failure Father     Cancer  Father         melanoma    Diabetes Sister     Hypertension Sister     Hypertension Brother        SOCIAL HISTORY:   Social History     Socioeconomic History    Marital status:    Tobacco Use    Smoking status: Every Day     Current packs/day: 0.75     Average packs/day: 0.8 packs/day for 56.3 years (42.3 ttl pk-yrs)     Types: Cigarettes     Start date: 1/1/1968    Smokeless tobacco: Never   Vaping Use    Vaping status: Never Used   Substance and Sexual Activity    Alcohol use: Not Currently     Comment: 1 glass a year    Drug use: Never     Social Determinants of Health     Financial Resource Strain: Low Risk  (1/29/2024)    Financial Resource Strain     Within the past 12 months, have you or your family members you live with been unable to get utilities (heat, electricity) when it was really needed?: No   Food Insecurity: Low Risk  (1/29/2024)    Food Insecurity     Within the past 12 months, did you worry that your food would run out before you got money to buy more?: No     Within the past 12 months, did the food you bought just not last and you didn t have money to get more?: No   Transportation Needs: Low Risk  (1/29/2024)    Transportation Needs     Within the past 12 months, has lack of transportation kept you from medical appointments, getting your medicines, non-medical meetings or appointments, work, or from getting things that you need?: No   Interpersonal Safety: Low Risk  (3/6/2024)    Interpersonal Safety     Do you feel physically and emotionally safe where you currently live?: Yes     Within the past 12 months, have you been hit, slapped, kicked or otherwise physically hurt by someone?: No     Within the past 12 months, have you been humiliated or emotionally abused in other ways by your partner or ex-partner?: No   Housing Stability: Low Risk  (1/29/2024)    Housing Stability     Do you have housing? : Yes     Are you worried about losing your housing?: No       PHYSICAL EXAM    VITAL  SIGNS: /61   Pulse 76   Temp 97.8  F (36.6  C)   Resp 24   Wt 47.2 kg (104 lb)   SpO2 94%   BMI 18.42 kg/m     GENERAL: Awake, alert, answering questions appropriately, Appears uncomfortable.   SPEECH:  Easy to understand speech, Normal volume and juancho  PULMONARY: No respiratory distress, Lungs clear to auscultation bilaterally  CARDIOVASCULAR: Tachycardic, Regular rhythm, Distal pulses present and normal.  ABDOMINAL: Soft, Nondistended, Nontender, No rebound or guarding, No palpable masses  EXTREMITIES: No lower extremity edema.  PSYCH: Normal mood and affect     LAB:  All pertinent labs reviewed and interpreted.  Results for orders placed or performed during the hospital encounter of 05/07/24   CBC (+ platelets, no diff)   Result Value Ref Range    WBC Count 6.7 4.0 - 11.0 10e3/uL    RBC Count 5.31 (H) 3.80 - 5.20 10e6/uL    Hemoglobin 15.8 (H) 11.7 - 15.7 g/dL    Hematocrit 46.9 35.0 - 47.0 %    MCV 88 78 - 100 fL    MCH 29.8 26.5 - 33.0 pg    MCHC 33.7 31.5 - 36.5 g/dL    RDW 12.8 10.0 - 15.0 %    Platelet Count 220 150 - 450 10e3/uL   Basic metabolic panel   Result Value Ref Range    Sodium 139 135 - 145 mmol/L    Potassium 4.2 3.4 - 5.3 mmol/L    Chloride 104 98 - 107 mmol/L    Carbon Dioxide (CO2) 24 22 - 29 mmol/L    Anion Gap 11 7 - 15 mmol/L    Urea Nitrogen 9.9 8.0 - 23.0 mg/dL    Creatinine 0.49 (L) 0.51 - 0.95 mg/dL    GFR Estimate >90 >60 mL/min/1.73m2    Calcium 9.6 8.8 - 10.2 mg/dL    Glucose 123 (H) 70 - 99 mg/dL   Extra Blue Top Tube   Result Value Ref Range    Hold Specimen JIC    Extra Red Top Tube   Result Value Ref Range    Hold Specimen JIC    Extra Green Top (Lithium Heparin) ON ICE   Result Value Ref Range    Hold Specimen JIC        I, Brennon Avendaño, am serving as a scribe to document services personally performed by Dr. Randolph based on my observation and the provider's statements to me. I, Meka Randolph MD attest that Brennon Avendaño is acting in a scribe capacity, has  observed my performance of the services and has documented them in accordance with my direction.    Meka Randolph M.D.  Emergency Medicine  [facility] EMERGENCY DEPARTMENT  [address] [place] [id number] [phone]  Dept: [phone]       Meka Randolph MD  05/07/24 9069

## 2024-05-07 NOTE — ED TRIAGE NOTES
"Pt is leaning over in w/c due to \"hx back pain\" she reports N/V/D that started at 0300. Unable to keep BP meds down /101        "

## 2024-05-08 NOTE — DISCHARGE INSTRUCTIONS
You were seen in the Emergency Department today for evaluation of nausea and vomiting diarrhea.  Your lab work showed no cause of your symptoms. You were prescribed Phenergan needed for nausea. You should take all medications as prescribed.  Follow up with your primary care physician to ensure resolution of symptoms. Return if you have new or worsening symptoms.

## 2024-05-13 ENCOUNTER — VIRTUAL VISIT (OUTPATIENT)
Dept: FAMILY MEDICINE | Facility: CLINIC | Age: 69
End: 2024-05-13
Payer: COMMERCIAL

## 2024-05-13 DIAGNOSIS — R11.2 NAUSEA AND VOMITING, UNSPECIFIED VOMITING TYPE: Primary | ICD-10-CM

## 2024-05-13 PROCEDURE — 99441 PR PHYSICIAN TELEPHONE EVALUATION 5-10 MIN: CPT | Mod: 93 | Performed by: FAMILY MEDICINE

## 2024-05-13 NOTE — PROGRESS NOTES
Mariah is a 68 year old who is being evaluated via a billable telephone visit.    What phone number would you like to be contacted at? [phone]  How would you like to obtain your AVS? Jac  Originating Location (pt. Location): Home    Distant Location (provider location):  On-site    Assessment & Plan     Nausea and vomiting, unspecified vomiting type  Patient visited via phone visit for ER follow-up  Recurrent problems with nausea and vomiting in the past  Discussed with her the ER visits  Discussed trying to find triggers to these events  Suspicion for possible dehydration contributing to triggering these episodes  She is going to try to increase water consumption as she has been poor with this in the past  Continue with nausea medications as needed          Subjective   Mariah is a 68 year old, presenting for the following health issues:  Hospital F/U (Nausea and vomiting)        5/13/2024     4:58 PM   Additional Questions   Roomed by Dena PRUITT CMA     HPI   Patient here to follow-up on the ER visit via phone.  Recurrent episodes of nausea and vomiting have been recurrent for the patient in years past she had gastric procedure with fundoplication in February and was good for couple months but had episode of nausea and emesis in April again here on May 7.  In discussion with her trying to figure out triggers leading these events we discussed the possibility of dehydration and poor water consumption leading to some gastroparesis and may be backing up into these episodes of vomiting and nausea.  She is otherwise not been able to think of any triggers that would be consistent as there is been no medication changes made.  She will work on water consumption.  She currently has been feeling well recently we will continue to monitor.            Objective           Vitals:  No vitals were obtained today due to virtual visit.    Physical Exam   General: Alert and no distress //Respiratory: No audible wheeze, cough, or  shortness of breath // Psychiatric:  Appropriate affect, tone, and pace of words            Phone call duration: 9 minutes  Signed Electronically by: Alex Nolasco MD

## 2024-06-01 ENCOUNTER — HEALTH MAINTENANCE LETTER (OUTPATIENT)
Age: 69
End: 2024-06-01

## 2024-06-18 ENCOUNTER — MYC REFILL (OUTPATIENT)
Dept: FAMILY MEDICINE | Facility: CLINIC | Age: 69
End: 2024-06-18
Payer: COMMERCIAL

## 2024-06-18 DIAGNOSIS — M62.838 MUSCLE SPASM: Primary | ICD-10-CM

## 2024-06-19 RX ORDER — BACLOFEN 10 MG/1
10 TABLET ORAL 3 TIMES DAILY
Qty: 90 TABLET | Refills: 1 | Status: SHIPPED | OUTPATIENT
Start: 2024-06-19 | End: 2024-08-22

## 2024-06-26 ENCOUNTER — TELEPHONE (OUTPATIENT)
Dept: FAMILY MEDICINE | Facility: CLINIC | Age: 69
End: 2024-06-26

## 2024-06-26 NOTE — TELEPHONE ENCOUNTER
Form received from [employer] - regarding workers comp.   Dr Nolasco needs to know if Baclofen is being used for the occupational injury?

## 2024-06-28 ENCOUNTER — HOSPITAL ENCOUNTER (EMERGENCY)
Facility: HOSPITAL | Age: 69
Discharge: HOME | End: 2024-06-28
Attending: EMERGENCY MEDICINE | Admitting: EMERGENCY MEDICINE
Payer: COMMERCIAL

## 2024-06-28 VITALS
HEART RATE: 66 BPM | RESPIRATION RATE: 16 BRPM | TEMPERATURE: 97.6 F | DIASTOLIC BLOOD PRESSURE: 58 MMHG | OXYGEN SATURATION: 98 % | WEIGHT: 104 LBS | BODY MASS INDEX: 18.43 KG/M2 | SYSTOLIC BLOOD PRESSURE: 125 MMHG | HEIGHT: 63 IN

## 2024-06-28 DIAGNOSIS — R11.2 NAUSEA AND VOMITING, UNSPECIFIED VOMITING TYPE: ICD-10-CM

## 2024-06-28 LAB
ANION GAP SERPL CALCULATED.3IONS-SCNC: 13 MMOL/L (ref 7–15)
BUN SERPL-MCNC: 13.6 MG/DL (ref 8–23)
CALCIUM SERPL-MCNC: 9.1 MG/DL (ref 8.8–10.2)
CHLORIDE SERPL-SCNC: 103 MMOL/L (ref 98–107)
CREAT SERPL-MCNC: 0.6 MG/DL (ref 0.51–0.95)
DEPRECATED HCO3 PLAS-SCNC: 24 MMOL/L (ref 22–29)
EGFRCR SERPLBLD CKD-EPI 2021: >90 ML/MIN/1.73M2
ERYTHROCYTE [DISTWIDTH] IN BLOOD BY AUTOMATED COUNT: 13.2 % (ref 10–15)
GLUCOSE SERPL-MCNC: 100 MG/DL (ref 70–99)
HCT VFR BLD AUTO: 44.4 % (ref 35–47)
HGB BLD-MCNC: 14.8 G/DL (ref 11.7–15.7)
MCH RBC QN AUTO: 29.1 PG (ref 26.5–33)
MCHC RBC AUTO-ENTMCNC: 33.3 G/DL (ref 31.5–36.5)
MCV RBC AUTO: 87 FL (ref 78–100)
PLATELET # BLD AUTO: 235 10E3/UL (ref 150–450)
POTASSIUM SERPL-SCNC: 3.9 MMOL/L (ref 3.4–5.3)
RBC # BLD AUTO: 5.08 10E6/UL (ref 3.8–5.2)
SODIUM SERPL-SCNC: 140 MMOL/L (ref 135–145)
WBC # BLD AUTO: 5.6 10E3/UL (ref 4–11)

## 2024-06-28 PROCEDURE — 36415 COLL VENOUS BLD VENIPUNCTURE: CPT | Performed by: EMERGENCY MEDICINE

## 2024-06-28 PROCEDURE — 96361 HYDRATE IV INFUSION ADD-ON: CPT

## 2024-06-28 PROCEDURE — 85027 COMPLETE CBC AUTOMATED: CPT | Performed by: EMERGENCY MEDICINE

## 2024-06-28 PROCEDURE — 258N000003 HC RX IP 258 OP 636: Performed by: EMERGENCY MEDICINE

## 2024-06-28 PROCEDURE — 96375 TX/PRO/DX INJ NEW DRUG ADDON: CPT

## 2024-06-28 PROCEDURE — 250N000011 HC RX IP 250 OP 636: Performed by: EMERGENCY MEDICINE

## 2024-06-28 PROCEDURE — 80048 BASIC METABOLIC PNL TOTAL CA: CPT | Performed by: EMERGENCY MEDICINE

## 2024-06-28 PROCEDURE — 96374 THER/PROPH/DIAG INJ IV PUSH: CPT

## 2024-06-28 PROCEDURE — 99284 EMERGENCY DEPT VISIT MOD MDM: CPT | Mod: 25

## 2024-06-28 RX ORDER — KETOROLAC TROMETHAMINE 15 MG/ML
15 INJECTION, SOLUTION INTRAMUSCULAR; INTRAVENOUS ONCE
Status: COMPLETED | OUTPATIENT
Start: 2024-06-28 | End: 2024-06-28

## 2024-06-28 RX ORDER — ONDANSETRON 2 MG/ML
4 INJECTION INTRAMUSCULAR; INTRAVENOUS ONCE
Status: COMPLETED | OUTPATIENT
Start: 2024-06-28 | End: 2024-06-28

## 2024-06-28 RX ADMIN — SODIUM CHLORIDE 1000 ML: 9 INJECTION, SOLUTION INTRAVENOUS at 19:37

## 2024-06-28 RX ADMIN — KETOROLAC TROMETHAMINE 15 MG: 15 INJECTION, SOLUTION INTRAMUSCULAR; INTRAVENOUS at 19:42

## 2024-06-28 RX ADMIN — ONDANSETRON 4 MG: 2 INJECTION INTRAMUSCULAR; INTRAVENOUS at 19:42

## 2024-06-28 ASSESSMENT — COLUMBIA-SUICIDE SEVERITY RATING SCALE - C-SSRS
2. HAVE YOU ACTUALLY HAD ANY THOUGHTS OF KILLING YOURSELF IN THE PAST MONTH?: NO
6. HAVE YOU EVER DONE ANYTHING, STARTED TO DO ANYTHING, OR PREPARED TO DO ANYTHING TO END YOUR LIFE?: NO
1. IN THE PAST MONTH, HAVE YOU WISHED YOU WERE DEAD OR WISHED YOU COULD GO TO SLEEP AND NOT WAKE UP?: NO

## 2024-06-28 ASSESSMENT — ACTIVITIES OF DAILY LIVING (ADL)
ADLS_ACUITY_SCORE: 38
ADLS_ACUITY_SCORE: 38

## 2024-06-28 NOTE — TELEPHONE ENCOUNTER
Incoming call from patient who is returing call she received from the clinic. Patient states that she has been on baclofen for quite and it does help with the injury she sustained

## 2024-06-29 NOTE — ED NOTES
"PO challenge done, pt states when asked how is she \"only a little bit of nausea, but I feel better now\". Dr. Rubio made aware.  "

## 2024-06-29 NOTE — ED PROVIDER NOTES
EMERGENCY DEPARTMENT ENCOUnter      NAME: Mariah Koehler  AGE: 68 year old female  YOB: 1955  MRN: [id number]  EVALUATION DATE & TIME: 2024  7:10 PM    PCP: Alex Nolasco    ED PROVIDER: Rajiv Rubio DO      Chief Complaint   Patient presents with    Nausea, Vomiting, & Diarrhea         FINAL IMPRESSION:  1. Nausea and vomiting, unspecified vomiting type          ED COURSE & MEDICAL DECISION MAKIN:27 PM I met with the patient to obtain patient history and performed a physical exam. Discussed plan for ED work up including potential diagnostic studies and interventions.  9:20 PM Reevaluated and updated the patient with findings. We discussed the plan for discharge and the patient is agreeable. Reviewed supportive cares, symptomatic treatment, outpatient follow up, and reasons to return to the Emergency Department. Patient to be discharged by ED RN.       The patient presented to the emergency department today complaining of nausea and vomiting.  She denied any pain with this.  No concerning findings on physical exam.  Laboratory testing is unremarkable.  The patient is feeling much better after IV fluids and Zofran.  We discussed disposition options and she is comfortable going home.  She has been encouraged to return right away for any worsening symptoms or other concerns.        Medical Decision Making  Obtained supplemental history:Supplemental history obtained?: No  Reviewed external records: External records reviewed?: Inpatient Record: 2024, 2024, at [facility] ED.  Care impacted by chronic illness:Hypertension and Other: Migraines  Care significantly affected by social determinants of health:Access to Medical Care  Did you consider but not order tests?: Work up considered but not performed and documented in chart, if applicable  Did you interpret images independently?: Independent interpretation of ECG and images noted in documentation, when  applicable.  Consultation discussion with other provider:Did you involve another provider (consultant, , pharmacy, etc.)?: No  Discharge. No recommendations on prescription strength medication(s). See documentation for any additional details.    At the conclusion of the encounter I discussed the results of all of the tests and the disposition. The questions were answered. The patient or family acknowledged understanding and was agreeable with the care plan.         MEDICATIONS GIVEN IN THE EMERGENCY:  Medications   sodium chloride 0.9% BOLUS 1,000 mL (0 mLs Intravenous Stopped 6/28/24 2127)   ondansetron (ZOFRAN) injection 4 mg (4 mg Intravenous $Given 6/28/24 1942)   ketorolac (TORADOL) injection 15 mg (15 mg Intravenous $Given 6/28/24 1942)         =================================================================    HPI        Mariah Koehler is a 68 year old female with a pertinent history of migraines, HTN, opioid dependence with continuous use, gastroparesis, CRPS, who presents to this ED via private vehicle with family for evaluation of nausea and headache.    Patient reports since last night, she's had persistent severe nausea, vomiting, and headache. She did try taking compazine and another nausea medication without any relief. She's had this nausea before and has been evaluated for this but no etiology was found. She notes when she was seen in the ED previously, after receiving medications and IV fluids, she was sent home feeling better. She's never been admitted for this nausea before.     She otherwise denies associating abdominal pain, chest pain, and shortness of breath. There were no other concerns/complaints at this time.    Per chart review:   05/07/2024, [facility] ED: Patient complains of nausea, vomiting, and diarrhea. Bp was a little high, uncomfortable and tachycardic. She was given IV fluids, Zofran, and Phenergan. The Phenergan made the patient feel better as well as improved heart rate and  blood pressure. She was sent home with phenergan.  04/16/2024, [facility] ED: Patient complains of nausea and vomiting. She has history of gastroparesis and was admitted on 2/21/2024. This was repaired and she was doing well since then.There were no complaints of abdominal pain. She was treated with IV fluids and Zofran with relief of symptoms. Labs and imaging were negative. The patient was prescribed Zofran.       PAST MEDICAL HISTORY:  Past Medical History:   Diagnosis Date    Current mild episode of major depressive disorder, unspecified whether recurrent (H24) 02/17/2020    Hypertension     Migraine headache 11/06/2023    Nausea and vomiting     Opiate dependence (H)     RSD (reflex sympathetic dystrophy)     Sacral fracture, closed (H)        PAST SURGICAL HISTORY:  Past Surgical History:   Procedure Laterality Date    APPENDECTOMY      ESOPHAGOSCOPY, GASTROSCOPY, DUODENOSCOPY (EGD), COMBINED N/A 2/20/2023    Procedure: ESOPHAGOGASTRODUODENOSCOPY with biopsies;  Surgeon: Na Brooks MD;  Location: [facility] OR    GYN SURGERY      HC REVISE MEDIAN N/CARPAL TUNNEL SURG      Description: Neuroplasty Decompression Median Nerve At Carpal Tunnel;  Recorded: 09/19/2011;    HYSTERECTOMY  1984    IR CERVICAL EPIDURAL STEROID INJECTION  1/14/2005    LAPAROSCOPIC NISSEN FUNDOPLICATION N/A 2/23/2024    Procedure: Paraesophageal hiatal hernia repair with mesh, ROBOT-ASSISTED, LAPAROSCOPIC, USING DA ROSMERY XI;  Surgeon: Lemuel Ornelas DO;  Location: [facility] OR    OOPHORECTOMY Bilateral 1985    AL SYMPATHECTOMY,CERVICOTHORACIC      Description: Surgical Sympathectomy Cervicothoracic;  Recorded: 09/19/2011;    [facility] DECOMPRESS FASCIOTOMY FINGR/HAND      Description: Decompression Fasciotomy Of The Hand;  Recorded: 09/19/2011;    ZZC LAP,CHOLECYSTECTOMY/EXPLORE      Description: Cholecystectomy Laparoscopic;  Recorded: 12/09/2011;    ZZC TOTAL ABDOM HYSTERECTOMY      Description: Hysterectomy;   Recorded: 03/23/2011;           CURRENT MEDICATIONS:    acetaminophen (TYLENOL) 325 MG tablet  aspirin 81 MG EC tablet  atenolol (TENORMIN) 25 MG tablet  baclofen (LIORESAL) 10 MG tablet  cyanocobalamin (VITAMIN B-12) 1000 MCG tablet  famotidine (PEPCID) 20 MG tablet  medication given by implanted intrathecal pump  metoclopramide (REGLAN) 10 MG tablet  multivitamin, therapeutic (THERA-VIT) TABS tablet  omeprazole (PRILOSEC) 20 MG DR capsule  ondansetron (ZOFRAN) 4 MG tablet  prochlorperazine (COMPAZINE) 5 MG tablet  promethazine (PHENERGAN) 25 MG tablet  thiamine (B-1) 100 MG tablet        ALLERGIES:  Allergies   Allergen Reactions    Codeine Nausea and Vomiting    Hydromorphone Headache and Nausea and Vomiting    Morphine Nausea and Vomiting    Bacitracin Rash    Methadone Rash       FAMILY HISTORY:  Family History   Problem Relation Age of Onset    Cerebrovascular Disease Mother     Diabetes Mother     Heart Disease Mother     Hypertension Mother     Rheumatologic Disease Mother     Heart Disease Father     Pulmonary Hypertension Father     Kidney failure Father     Cancer Father         melanoma    Diabetes Sister     Hypertension Sister     Hypertension Brother        SOCIAL HISTORY:   Social History     Socioeconomic History    Marital status:      Spouse name: None    Number of children: None    Years of education: None    Highest education level: None   Tobacco Use    Smoking status: Every Day     Current packs/day: 0.75     Average packs/day: 0.8 packs/day for 56.5 years (42.4 ttl pk-yrs)     Types: Cigarettes     Start date: 1/1/1968    Smokeless tobacco: Never   Vaping Use    Vaping status: Never Used   Substance and Sexual Activity    Alcohol use: Not Currently     Comment: 1 glass a year    Drug use: Never     Social Determinants of Health     Financial Resource Strain: Low Risk  (1/29/2024)    Financial Resource Strain     Within the past 12 months, have you or your family members you live with  "been unable to get utilities (heat, electricity) when it was really needed?: No   Food Insecurity: Low Risk  (1/29/2024)    Food Insecurity     Within the past 12 months, did you worry that your food would run out before you got money to buy more?: No     Within the past 12 months, did the food you bought just not last and you didn t have money to get more?: No   Transportation Needs: Low Risk  (1/29/2024)    Transportation Needs     Within the past 12 months, has lack of transportation kept you from medical appointments, getting your medicines, non-medical meetings or appointments, work, or from getting things that you need?: No   Interpersonal Safety: Low Risk  (3/6/2024)    Interpersonal Safety     Do you feel physically and emotionally safe where you currently live?: Yes     Within the past 12 months, have you been hit, slapped, kicked or otherwise physically hurt by someone?: No     Within the past 12 months, have you been humiliated or emotionally abused in other ways by your partner or ex-partner?: No   Housing Stability: Low Risk  (1/29/2024)    Housing Stability     Do you have housing? : Yes     Are you worried about losing your housing?: No       VITALS:  Patient Vitals for the past 24 hrs:   BP Temp Temp src Pulse Resp SpO2 Height Weight   06/28/24 2119 -- -- -- 66 -- 98 % -- --   06/28/24 2116 -- -- -- 66 -- 97 % -- --   06/28/24 2100 125/58 -- -- 68 -- 97 % -- --   06/28/24 2015 123/57 -- -- 62 -- 96 % -- --   06/28/24 1908 (!) 143/66 97.6  F (36.4  C) Temporal 86 16 99 % 1.6 m (5' 3\") 47.2 kg (104 lb)       PHYSICAL EXAM    Constitutional:  Well developed, Well nourished,  HENT:  Normocephalic, Atraumatic, Oropharynx moist, Nose normal.   Eyes:  EOMI, Conjunctiva normal, No discharge.   Respiratory:  Normal breath sounds, No respiratory distress, No wheezing, No chest tenderness.   Cardiovascular:  Normal heart rate, Normal rhythm, No murmurs  GI:  Soft, No tenderness, No guarding, No CVA tenderness. "   Musculoskeletal:  No tenderness to palpation or major deformities noted.   Extremities: No lower extremity edema.  Neurologic:  Alert & oriented x 3, No focal deficits noted.   Psychiatric:  Affect normal, Judgment normal, Mood normal.        LAB:  All pertinent labs reviewed and interpreted.  Results for orders placed or performed during the hospital encounter of 06/28/24   CBC with platelets   Result Value Ref Range    WBC Count 5.6 4.0 - 11.0 10e3/uL    RBC Count 5.08 3.80 - 5.20 10e6/uL    Hemoglobin 14.8 11.7 - 15.7 g/dL    Hematocrit 44.4 35.0 - 47.0 %    MCV 87 78 - 100 fL    MCH 29.1 26.5 - 33.0 pg    MCHC 33.3 31.5 - 36.5 g/dL    RDW 13.2 10.0 - 15.0 %    Platelet Count 235 150 - 450 10e3/uL   Basic metabolic panel   Result Value Ref Range    Sodium 140 135 - 145 mmol/L    Potassium 3.9 3.4 - 5.3 mmol/L    Chloride 103 98 - 107 mmol/L    Carbon Dioxide (CO2) 24 22 - 29 mmol/L    Anion Gap 13 7 - 15 mmol/L    Urea Nitrogen 13.6 8.0 - 23.0 mg/dL    Creatinine 0.60 0.51 - 0.95 mg/dL    GFR Estimate >90 >60 mL/min/1.73m2    Calcium 9.1 8.8 - 10.2 mg/dL    Glucose 100 (H) 70 - 99 mg/dL         I, Faith Bautista, am serving as a scribe to document services personally performed by Dr. Rubio based on my observation and the provider's statements to me. IRajiv DO attest that Faith Bautista is acting in a scribe capacity, has observed my performance of the services and has documented them in accordance with my direction.    Rajiv Rubio DO  Emergency Medicine  [facility] EMERGENCY DEPARTMENT  [address] [facility] [address] [phone]  Dept: [phone]     Rajiv Rubio DO  06/28/24 8725

## 2024-06-29 NOTE — DISCHARGE INSTRUCTIONS
Fortunately your lab testing today was normal.  Follow-up closely with your primary care doctor and return to the ER for any worsening symptoms or other concerns.

## 2024-08-08 DIAGNOSIS — K21.00 GASTRO-ESOPHAGEAL REFLUX DISEASE WITH ESOPHAGITIS, WITHOUT BLEEDING: ICD-10-CM

## 2024-08-22 ENCOUNTER — TRANSFERRED RECORDS (OUTPATIENT)
Dept: HEALTH INFORMATION MANAGEMENT | Facility: CLINIC | Age: 69
End: 2024-08-22
Payer: COMMERCIAL

## 2024-08-22 DIAGNOSIS — M62.838 MUSCLE SPASM: ICD-10-CM

## 2024-08-22 RX ORDER — BACLOFEN 10 MG/1
10 TABLET ORAL 3 TIMES DAILY
Qty: 90 TABLET | Refills: 1 | Status: SHIPPED | OUTPATIENT
Start: 2024-08-22

## 2024-09-13 ENCOUNTER — PATIENT OUTREACH (OUTPATIENT)
Dept: CARE COORDINATION | Facility: CLINIC | Age: 69
End: 2024-09-13
Payer: COMMERCIAL

## 2024-10-20 ENCOUNTER — HOSPITAL ENCOUNTER (EMERGENCY)
Facility: HOSPITAL | Age: 69
Discharge: HOME | End: 2024-10-20
Attending: FAMILY MEDICINE | Admitting: FAMILY MEDICINE
Payer: COMMERCIAL

## 2024-10-20 ENCOUNTER — APPOINTMENT (OUTPATIENT)
Dept: CT IMAGING | Facility: HOSPITAL | Age: 69
End: 2024-10-20
Attending: FAMILY MEDICINE
Payer: COMMERCIAL

## 2024-10-20 ENCOUNTER — HOSPITAL ENCOUNTER (OUTPATIENT)
Facility: HOSPITAL | Age: 69
Setting detail: OBSERVATION
Discharge: HOME | End: 2024-10-22
Attending: EMERGENCY MEDICINE | Admitting: PHYSICIAN ASSISTANT
Payer: COMMERCIAL

## 2024-10-20 VITALS
WEIGHT: 106 LBS | SYSTOLIC BLOOD PRESSURE: 117 MMHG | TEMPERATURE: 98.3 F | HEART RATE: 68 BPM | DIASTOLIC BLOOD PRESSURE: 58 MMHG | HEIGHT: 63 IN | BODY MASS INDEX: 18.78 KG/M2 | RESPIRATION RATE: 14 BRPM | OXYGEN SATURATION: 96 %

## 2024-10-20 DIAGNOSIS — R11.0 NAUSEA: ICD-10-CM

## 2024-10-20 DIAGNOSIS — R19.7 DIARRHEA, UNSPECIFIED TYPE: ICD-10-CM

## 2024-10-20 DIAGNOSIS — R19.7 NAUSEA VOMITING AND DIARRHEA: ICD-10-CM

## 2024-10-20 DIAGNOSIS — R11.2 NAUSEA AND VOMITING, UNSPECIFIED VOMITING TYPE: ICD-10-CM

## 2024-10-20 DIAGNOSIS — K52.9 GASTROENTERITIS: Primary | ICD-10-CM

## 2024-10-20 DIAGNOSIS — R11.2 NAUSEA VOMITING AND DIARRHEA: ICD-10-CM

## 2024-10-20 DIAGNOSIS — E83.42 HYPOMAGNESEMIA: ICD-10-CM

## 2024-10-20 DIAGNOSIS — K86.89 DILATION OF PANCREATIC DUCT: ICD-10-CM

## 2024-10-20 LAB
ALBUMIN SERPL BCG-MCNC: 4.3 G/DL (ref 3.5–5.2)
ALBUMIN SERPL BCG-MCNC: 4.4 G/DL (ref 3.5–5.2)
ALBUMIN UR-MCNC: 30 MG/DL
ALP SERPL-CCNC: 100 U/L (ref 40–150)
ALP SERPL-CCNC: 97 U/L (ref 40–150)
ALT SERPL W P-5'-P-CCNC: 23 U/L (ref 0–50)
ALT SERPL W P-5'-P-CCNC: 24 U/L (ref 0–50)
ANION GAP SERPL CALCULATED.3IONS-SCNC: 15 MMOL/L (ref 7–15)
ANION GAP SERPL CALCULATED.3IONS-SCNC: 15 MMOL/L (ref 7–15)
APPEARANCE UR: CLEAR
AST SERPL W P-5'-P-CCNC: 23 U/L (ref 0–45)
AST SERPL W P-5'-P-CCNC: 31 U/L (ref 0–45)
BASOPHILS # BLD AUTO: 0 10E3/UL (ref 0–0.2)
BASOPHILS NFR BLD AUTO: 0 %
BILIRUB DIRECT SERPL-MCNC: <0.2 MG/DL (ref 0–0.3)
BILIRUB SERPL-MCNC: 0.3 MG/DL
BILIRUB SERPL-MCNC: 0.4 MG/DL
BILIRUB UR QL STRIP: NEGATIVE
BUN SERPL-MCNC: 11.9 MG/DL (ref 8–23)
BUN SERPL-MCNC: 12.8 MG/DL (ref 8–23)
CALCIUM SERPL-MCNC: 8.4 MG/DL (ref 8.8–10.4)
CALCIUM SERPL-MCNC: 8.8 MG/DL (ref 8.8–10.4)
CHLORIDE SERPL-SCNC: 101 MMOL/L (ref 98–107)
CHLORIDE SERPL-SCNC: 104 MMOL/L (ref 98–107)
COLOR UR AUTO: YELLOW
CREAT SERPL-MCNC: 0.48 MG/DL (ref 0.51–0.95)
CREAT SERPL-MCNC: 0.55 MG/DL (ref 0.51–0.95)
EGFRCR SERPLBLD CKD-EPI 2021: >90 ML/MIN/1.73M2
EGFRCR SERPLBLD CKD-EPI 2021: >90 ML/MIN/1.73M2
EOSINOPHIL # BLD AUTO: 0 10E3/UL (ref 0–0.7)
EOSINOPHIL NFR BLD AUTO: 0 %
ERYTHROCYTE [DISTWIDTH] IN BLOOD BY AUTOMATED COUNT: 12.8 % (ref 10–15)
ERYTHROCYTE [DISTWIDTH] IN BLOOD BY AUTOMATED COUNT: 12.9 % (ref 10–15)
GLUCOSE SERPL-MCNC: 111 MG/DL (ref 70–99)
GLUCOSE SERPL-MCNC: 146 MG/DL (ref 70–99)
GLUCOSE UR STRIP-MCNC: NEGATIVE MG/DL
HCO3 SERPL-SCNC: 21 MMOL/L (ref 22–29)
HCO3 SERPL-SCNC: 22 MMOL/L (ref 22–29)
HCT VFR BLD AUTO: 42.3 % (ref 35–47)
HCT VFR BLD AUTO: 45.3 % (ref 35–47)
HGB BLD-MCNC: 14.1 G/DL (ref 11.7–15.7)
HGB BLD-MCNC: 15 G/DL (ref 11.7–15.7)
HGB UR QL STRIP: ABNORMAL
HOLD SPECIMEN: NORMAL
HOLD SPECIMEN: NORMAL
IMM GRANULOCYTES # BLD: 0 10E3/UL
IMM GRANULOCYTES NFR BLD: 0 %
KETONES UR STRIP-MCNC: 80 MG/DL
LACTATE SERPL-SCNC: 1.4 MMOL/L (ref 0.7–2)
LEUKOCYTE ESTERASE UR QL STRIP: NEGATIVE
LIPASE SERPL-CCNC: 15 U/L (ref 13–60)
LYMPHOCYTES # BLD AUTO: 1 10E3/UL (ref 0.8–5.3)
LYMPHOCYTES NFR BLD AUTO: 12 %
MAGNESIUM SERPL-MCNC: 1.6 MG/DL (ref 1.7–2.3)
MAGNESIUM SERPL-MCNC: 2.1 MG/DL (ref 1.7–2.3)
MCH RBC QN AUTO: 29.5 PG (ref 26.5–33)
MCH RBC QN AUTO: 29.5 PG (ref 26.5–33)
MCHC RBC AUTO-ENTMCNC: 33.1 G/DL (ref 31.5–36.5)
MCHC RBC AUTO-ENTMCNC: 33.3 G/DL (ref 31.5–36.5)
MCV RBC AUTO: 89 FL (ref 78–100)
MCV RBC AUTO: 89 FL (ref 78–100)
MONOCYTES # BLD AUTO: 0.4 10E3/UL (ref 0–1.3)
MONOCYTES NFR BLD AUTO: 4 %
MUCOUS THREADS #/AREA URNS LPF: PRESENT /LPF
NEUTROPHILS # BLD AUTO: 7.3 10E3/UL (ref 1.6–8.3)
NEUTROPHILS NFR BLD AUTO: 84 %
NITRATE UR QL: NEGATIVE
NRBC # BLD AUTO: 0 10E3/UL
NRBC BLD AUTO-RTO: 0 /100
PH UR STRIP: 6 [PH] (ref 5–7)
PLATELET # BLD AUTO: 198 10E3/UL (ref 150–450)
PLATELET # BLD AUTO: 251 10E3/UL (ref 150–450)
POTASSIUM SERPL-SCNC: 3.5 MMOL/L (ref 3.4–5.3)
POTASSIUM SERPL-SCNC: 4.2 MMOL/L (ref 3.4–5.3)
PROT SERPL-MCNC: 6.6 G/DL (ref 6.4–8.3)
PROT SERPL-MCNC: 6.7 G/DL (ref 6.4–8.3)
RBC # BLD AUTO: 4.78 10E6/UL (ref 3.8–5.2)
RBC # BLD AUTO: 5.08 10E6/UL (ref 3.8–5.2)
RBC URINE: 52 /HPF
SODIUM SERPL-SCNC: 138 MMOL/L (ref 135–145)
SODIUM SERPL-SCNC: 140 MMOL/L (ref 135–145)
SP GR UR STRIP: 1.03 (ref 1–1.03)
SQUAMOUS EPITHELIAL: 1 /HPF
UROBILINOGEN UR STRIP-MCNC: <2 MG/DL
WBC # BLD AUTO: 8.7 10E3/UL (ref 4–11)
WBC # BLD AUTO: 8.8 10E3/UL (ref 4–11)
WBC URINE: 2 /HPF

## 2024-10-20 PROCEDURE — 96376 TX/PRO/DX INJ SAME DRUG ADON: CPT

## 2024-10-20 PROCEDURE — 36415 COLL VENOUS BLD VENIPUNCTURE: CPT | Performed by: FAMILY MEDICINE

## 2024-10-20 PROCEDURE — 36415 COLL VENOUS BLD VENIPUNCTURE: CPT | Performed by: STUDENT IN AN ORGANIZED HEALTH CARE EDUCATION/TRAINING PROGRAM

## 2024-10-20 PROCEDURE — 81001 URINALYSIS AUTO W/SCOPE: CPT | Performed by: FAMILY MEDICINE

## 2024-10-20 PROCEDURE — 85025 COMPLETE CBC W/AUTO DIFF WBC: CPT | Performed by: FAMILY MEDICINE

## 2024-10-20 PROCEDURE — 83690 ASSAY OF LIPASE: CPT | Performed by: FAMILY MEDICINE

## 2024-10-20 PROCEDURE — 99222 1ST HOSP IP/OBS MODERATE 55: CPT | Mod: AI | Performed by: INTERNAL MEDICINE

## 2024-10-20 PROCEDURE — 96375 TX/PRO/DX INJ NEW DRUG ADDON: CPT

## 2024-10-20 PROCEDURE — 96374 THER/PROPH/DIAG INJ IV PUSH: CPT

## 2024-10-20 PROCEDURE — 93005 ELECTROCARDIOGRAM TRACING: CPT | Performed by: STUDENT IN AN ORGANIZED HEALTH CARE EDUCATION/TRAINING PROGRAM

## 2024-10-20 PROCEDURE — 83735 ASSAY OF MAGNESIUM: CPT | Performed by: FAMILY MEDICINE

## 2024-10-20 PROCEDURE — 99285 EMERGENCY DEPT VISIT HI MDM: CPT | Mod: 25

## 2024-10-20 PROCEDURE — 82248 BILIRUBIN DIRECT: CPT | Performed by: FAMILY MEDICINE

## 2024-10-20 PROCEDURE — 83605 ASSAY OF LACTIC ACID: CPT | Performed by: EMERGENCY MEDICINE

## 2024-10-20 PROCEDURE — 85027 COMPLETE CBC AUTOMATED: CPT | Performed by: EMERGENCY MEDICINE

## 2024-10-20 PROCEDURE — 36415 COLL VENOUS BLD VENIPUNCTURE: CPT | Performed by: EMERGENCY MEDICINE

## 2024-10-20 PROCEDURE — 83735 ASSAY OF MAGNESIUM: CPT | Performed by: EMERGENCY MEDICINE

## 2024-10-20 PROCEDURE — 96365 THER/PROPH/DIAG IV INF INIT: CPT

## 2024-10-20 PROCEDURE — 258N000003 HC RX IP 258 OP 636: Performed by: EMERGENCY MEDICINE

## 2024-10-20 PROCEDURE — 80048 BASIC METABOLIC PNL TOTAL CA: CPT | Performed by: EMERGENCY MEDICINE

## 2024-10-20 PROCEDURE — 96361 HYDRATE IV INFUSION ADD-ON: CPT

## 2024-10-20 PROCEDURE — 250N000011 HC RX IP 250 OP 636: Performed by: EMERGENCY MEDICINE

## 2024-10-20 PROCEDURE — 250N000011 HC RX IP 250 OP 636: Performed by: FAMILY MEDICINE

## 2024-10-20 PROCEDURE — 74176 CT ABD & PELVIS W/O CONTRAST: CPT

## 2024-10-20 RX ORDER — ONDANSETRON 2 MG/ML
4 INJECTION INTRAMUSCULAR; INTRAVENOUS ONCE
Status: COMPLETED | OUTPATIENT
Start: 2024-10-20 | End: 2024-10-20

## 2024-10-20 RX ORDER — ONDANSETRON 4 MG/1
4 TABLET, FILM COATED ORAL EVERY 6 HOURS PRN
Qty: 20 TABLET | Refills: 0 | Status: SHIPPED | OUTPATIENT
Start: 2024-10-20

## 2024-10-20 RX ORDER — METOCLOPRAMIDE HYDROCHLORIDE 5 MG/ML
5 INJECTION INTRAMUSCULAR; INTRAVENOUS ONCE
Status: COMPLETED | OUTPATIENT
Start: 2024-10-20 | End: 2024-10-20

## 2024-10-20 RX ORDER — MAGNESIUM SULFATE HEPTAHYDRATE 40 MG/ML
2 INJECTION, SOLUTION INTRAVENOUS ONCE
Status: COMPLETED | OUTPATIENT
Start: 2024-10-20 | End: 2024-10-20

## 2024-10-20 RX ADMIN — ONDANSETRON 4 MG: 2 INJECTION INTRAMUSCULAR; INTRAVENOUS at 15:00

## 2024-10-20 RX ADMIN — MAGNESIUM SULFATE HEPTAHYDRATE 2 G: 40 INJECTION, SOLUTION INTRAVENOUS at 13:27

## 2024-10-20 RX ADMIN — ONDANSETRON 4 MG: 2 INJECTION INTRAMUSCULAR; INTRAVENOUS at 12:35

## 2024-10-20 RX ADMIN — ONDANSETRON 4 MG: 2 INJECTION INTRAMUSCULAR; INTRAVENOUS at 22:33

## 2024-10-20 RX ADMIN — FAMOTIDINE 20 MG: 10 INJECTION, SOLUTION INTRAVENOUS at 16:03

## 2024-10-20 RX ADMIN — SODIUM CHLORIDE 1000 ML: 9 INJECTION, SOLUTION INTRAVENOUS at 22:16

## 2024-10-20 RX ADMIN — METOCLOPRAMIDE 5 MG: 5 INJECTION, SOLUTION INTRAMUSCULAR; INTRAVENOUS at 16:03

## 2024-10-20 ASSESSMENT — ACTIVITIES OF DAILY LIVING (ADL)
ADLS_ACUITY_SCORE: 38

## 2024-10-20 ASSESSMENT — ENCOUNTER SYMPTOMS
WEAKNESS: 1
NAUSEA: 1
DIARRHEA: 1
VOMITING: 1

## 2024-10-20 NOTE — ED NOTES
Bed: JNED-07  Expected date:   Expected time:   Means of arrival:   Comments:  WBL- 67yo F N/V/D Weakness

## 2024-10-20 NOTE — DISCHARGE INSTRUCTIONS
The tube between the liver and the pancreas going into the small intestine is slightly dilated.  Follow-up with your regular doctor for further evaluation of this.

## 2024-10-20 NOTE — ED TRIAGE NOTES
Arrives via EMS from home with c/o nausea and vomiting with extreme weakness. Per pt, this has been going on several days. Her  attempted to get her into the car to drive her in, but pt was too weak so EMS was called     Triage Assessment (Adult)       Row Name 10/20/24 1141          Triage Assessment    Airway WDL WDL        Respiratory WDL    Respiratory WDL WDL        Skin Circulation/Temperature WDL    Skin Circulation/Temperature WDL WDL        Cardiac WDL    Cardiac WDL X  htn        Peripheral/Neurovascular WDL    Peripheral Neurovascular WDL WDL        Cognitive/Neuro/Behavioral WDL    Cognitive/Neuro/Behavioral WDL WDL

## 2024-10-20 NOTE — ED PROVIDER NOTES
EMERGENCY DEPARTMENT ENCOUNTER      NAME: Mariah Koehler  AGE: 68 year old female  YOB: 1955  MRN: [id number]  EVALUATION DATE & TIME: 10/20/2024 11:25 AM    PCP: Alex Nolasco    ED PROVIDER: Micha Patel M.D.    Chief Complaint   Patient presents with    Nausea, Vomiting, & Diarrhea    Generalized Weakness       FINAL IMPRESSION:  1. Nausea vomiting and diarrhea    2. Hypomagnesemia    3. Nausea    4. Dilation of pancreatic duct        ED COURSE & MEDICAL DECISION MAKING:    Pertinent Labs & Imaging studies independently interpreted by me. (See chart for details)  Reviewed most recent emergency department visit from June 2024 when patient was seen with nausea and vomiting, reassuring lab evaluation and patient was treated with fluids and Zofran, discharged.    ED Course as of 10/20/24 1700   Sun Oct 20, 2024   1137 EKG:    Independently reviewed and interpreted by me  Performed at: 11:27 AM  Impression: Sinus bradycardia  Rate: 50  Rhythm: Sinus  Axis: Normal  [place] Interval: 126  QRS Interval: 90  QTc Interval: 393  ST Changes: No acute ischemic changes  Comparison: Prior August 2024, no change   1155 I met patient and performed my initial exam   1200 Patient seen and examined, presents with generalized weakness, nausea, vomiting, and diarrhea.  This has been going on a couple of days.  No abdominal pain, no chest pain, denies lightheadedness.  On exam here, patient is vitally stable, no abdominal tenderness.  Initial EKG is reassuring.  Labs and Zofran are ordered and will continue to monitor.   1233 Labs ordered and independently interpreted by me with normal hepatic panel, normal basic panel, normal CBC, slightly low magnesium which will be replaced intravenously.high    1358 Patient rechecked, we reviewed lab work results.  She has been drinking fluids in the department and is stable for discharge.  Prescription for Zofran will be sent.   1500 Urinalysis independently interpreted by  me with red cells, patient has no flank pain or abdominal pain and no history of kidney stones.  Reviewed prior CT scan from April 2024 which did show a 3 mm stone in the right kidney.  Given nausea and vomiting as well as hematuria, CT scan will be performed.   1525 CT scan of the abdomen and pelvis independently interpreted by me redemonstrates stone in the right kidney, no acute hydronephrosis, hydroureter, no ureteral stones.   1528 Patient rechecked, discussed CT results.  Patient is still nauseated but is tolerating oral intake and stable for discharge.       At the conclusion of the encounter I discussed the results of all of the tests and the disposition. The questions were answered. The patient or family acknowledged understanding and was agreeable with the care plan.     Medical Decision Making  Obtained supplemental history:Supplemental history obtained?: Documented in chart  Reviewed external records: External records reviewed?: Documented in chart and Inpatient Record: Patient was seen at [facility] ED on 06/28/24  Care impacted by chronic illness:Hypertension and Other: COPD, Asthma, migraines and gastroparesis  Did you consider but not order tests?: Work up considered but not performed and documented in chart, if applicable  Did you interpret images independently?: Independent interpretation of ECG and images noted in documentation, when applicable.  Consultation discussion with other provider:Did you involve another provider (consultant, , pharmacy, etc.)?: No  Discharge. I prescribed additional prescription strength medication(s) as charted. I considered admission, but discharged patient after significant clinical improvement.    MIPS: Not Applicable    MEDICATIONS GIVEN IN THE EMERGENCY:  Medications   ondansetron (ZOFRAN) injection 4 mg (4 mg Intravenous $Given 10/20/24 1235)   magnesium sulfate 2 g in 50 mL sterile water intermittent infusion (0 g Intravenous Stopped 10/20/24 8051)    ondansetron (ZOFRAN) injection 4 mg (4 mg Intravenous $Given 10/20/24 1500)   famotidine (PEPCID) injection 20 mg (20 mg Intravenous $Given 10/20/24 1603)   metoclopramide (REGLAN) injection 5 mg (5 mg Intravenous $Given 10/20/24 1603)       NEW PRESCRIPTIONS STARTED AT TODAY'S ER VISIT  Current Discharge Medication List          =================================================================    HPI    Patient information was obtained from: Patient       Mariah Koehler is a 68 year old female with a pertinent history of Hypertension and COPD, Asthma, migraines and gastroparesis who presents to this ED via ambulance for evaluation of nausea, vomiting, diarrhea and generalized weakness.     Chart review: Patient was seen at [facility] ED on 06/2/24 for nausea,vomiting and diarrhea. Laboratory test was unremarkable. Patient was given IV fluids and Zofran with relief.     The patient reports ongoing nausea, vomiting and diarrhea for the past couple of days. She also endorses generalized weakness, lightheadedness and dizziness.Patient has asthma and smokes cigarette.She denies any fevers at home. Patient's  called EMS due to her ongoing symptoms today.  No history of diabetes. No other complaints at this time.     REVIEW OF SYSTEMS   Review of Systems   Gastrointestinal:  Positive for diarrhea, nausea and vomiting.   Neurological:  Positive for weakness.   All other systems reviewed and are negative.     All other systems reviewed and negative    PAST MEDICAL HISTORY:  Past Medical History:   Diagnosis Date    Current mild episode of major depressive disorder, unspecified whether recurrent (H) 02/17/2020    Hypertension     Migraine headache 11/06/2023    Nausea and vomiting     Opiate dependence (H)     RSD (reflex sympathetic dystrophy)     Sacral fracture, closed (H)        PAST SURGICAL HISTORY:  Past Surgical History:   Procedure Laterality Date    APPENDECTOMY      ESOPHAGOSCOPY, GASTROSCOPY,  DUODENOSCOPY (EGD), COMBINED N/A 2/20/2023    Procedure: ESOPHAGOGASTRODUODENOSCOPY with biopsies;  Surgeon: Na Brooks MD;  Location: [facility] Main OR    GYN SURGERY      HC REVISE MEDIAN N/CARPAL TUNNEL SURG      Description: Neuroplasty Decompression Median Nerve At Carpal Tunnel;  Recorded: 09/19/2011;    HYSTERECTOMY  1984    IR CERVICAL EPIDURAL STEROID INJECTION  1/14/2005    LAPAROSCOPIC NISSEN FUNDOPLICATION N/A 2/23/2024    Procedure: Paraesophageal hiatal hernia repair with mesh, ROBOT-ASSISTED, LAPAROSCOPIC, USING DA ROSMERY XI;  Surgeon: Lemuel Ornelas DO;  Location: [facility] Main OR    OOPHORECTOMY Bilateral 1985    VA SYMPATHECTOMY,CERVICOTHORACIC      Description: Surgical Sympathectomy Cervicothoracic;  Recorded: 09/19/2011;    [facility] DECOMPRESS FASCIOTOMY FINGR/HAND      Description: Decompression Fasciotomy Of The Hand;  Recorded: 09/19/2011;    Z LAP,CHOLECYSTECTOMY/EXPLORE      Description: Cholecystectomy Laparoscopic;  Recorded: 12/09/2011;    [facility] TOTAL ABDOM HYSTERECTOMY      Description: Hysterectomy;  Recorded: 03/23/2011;       CURRENT MEDICATIONS:    No current facility-administered medications for this encounter.     Current Outpatient Medications   Medication Sig Dispense Refill    ondansetron (ZOFRAN) 4 MG tablet Take 1 tablet (4 mg) by mouth every 6 hours as needed for nausea. 20 tablet 0    acetaminophen (TYLENOL) 325 MG tablet Take 1-2 tablets (325-650 mg) by mouth every 6 hours as needed for mild pain or headaches      aspirin 81 MG EC tablet Take 1 tablet (81 mg) by mouth daily 90 tablet 3    atenolol (TENORMIN) 25 MG tablet Take 0.5 tablets (12.5 mg) by mouth daily 90 tablet 3    baclofen (LIORESAL) 10 MG tablet TAKE 1 TABLET BY MOUTH 3 TIMES DAILY 90 tablet 1    cyanocobalamin (VITAMIN B-12) 1000 MCG tablet Take 1 tablet (1,000 mcg) by mouth daily 90 tablet 3    famotidine (PEPCID) 20 MG tablet Take 1 tablet (20 mg) by mouth 2 times daily as needed (acid  reflux) 20 tablet 0    medication given by implanted intrathecal pump continuous Drug # 1: Fentanyl (Sublimaze) - Conc: 2000 mcg/mL - Total Dose / 24 hours: 873.8 mcg    Drug # 2: Bupivacaine (Marcaine)  - Conc:17.7 mg/mL - Total Dose / 24 hours: 7.733 mg    Drug # 3: Hydromorphone (Dilaudid)  - Conc: 3.8 mg/mL - Total Dose / 24 hours: 1.66 mg    Diluent: NS    Infusion Rate: 0.4369 mL/24 hrs  Pump Reservoir Volume: 40 mL    Bolus doses: up to 15 bolus doses/24 hours with 1 hour lockout  Each bolus: fentanyl 80.1 mcg, 0.708 mg Bupivacaine, 0.152 mg Dilaudid    Outside Clinic & Provider: [facility] in [place] [phone]  Last Refill Date: 10/02/23  Next Refill Date: 11/06/23  [place] Alarm Date:  11/08/23  Pump : Tellyo    Deliver Pump Medication to:   For [place]: If pump is within 7 days of depletion, pharmacist will enter a 'Pain Management Adult IP Consult' into the EHR, including 'IT pain pump management' as the reason for the consult.      metoclopramide (REGLAN) 10 MG tablet Take 1 tablet (10 mg) by mouth 3 times daily as needed (nausea and vomiting) 20 tablet 0    multivitamin, therapeutic (THERA-VIT) TABS tablet Take 1 tablet by mouth daily 90 tablet 3    omeprazole (PRILOSEC) 20 MG DR capsule TAKE 1 CAPSULE BY MOUTH TWICE A DAY BEFORE MEALS 180 capsule 1    prochlorperazine (COMPAZINE) 5 MG tablet Take 1 tablet (5 mg) by mouth every 8 hours as needed for nausea or vomiting 10 tablet 0    promethazine (PHENERGAN) 25 MG tablet Take 1 tablet (25 mg) by mouth every 6 hours as needed for nausea 30 tablet 0    thiamine (B-1) 100 MG tablet Take 1 tablet (100 mg) by mouth daily 30 tablet 3       ALLERGIES:  Allergies   Allergen Reactions    Codeine Nausea and Vomiting    Hydromorphone Headache and Nausea and Vomiting    Morphine Nausea and Vomiting    Bacitracin Rash    Methadone Rash       FAMILY HISTORY:  Family History   Problem Relation Age of Onset    Cerebrovascular Disease  Mother     Diabetes Mother     Heart Disease Mother     Hypertension Mother     Rheumatologic Disease Mother     Heart Disease Father     Pulmonary Hypertension Father     Kidney failure Father     Cancer Father         melanoma    Diabetes Sister     Hypertension Sister     Hypertension Brother        SOCIAL HISTORY:   Social History     Socioeconomic History    Marital status:    Tobacco Use    Smoking status: Every Day     Current packs/day: 0.75     Average packs/day: 0.8 packs/day for 56.8 years (42.6 ttl pk-yrs)     Types: Cigarettes     Start date: 1/1/1968    Smokeless tobacco: Never   Vaping Use    Vaping status: Never Used   Substance and Sexual Activity    Alcohol use: Not Currently     Comment: 1 glass a year    Drug use: Never     Social Determinants of Health     Financial Resource Strain: Low Risk  (1/29/2024)    Financial Resource Strain     Within the past 12 months, have you or your family members you live with been unable to get utilities (heat, electricity) when it was really needed?: No   Food Insecurity: Low Risk  (1/29/2024)    Food Insecurity     Within the past 12 months, did you worry that your food would run out before you got money to buy more?: No     Within the past 12 months, did the food you bought just not last and you didn t have money to get more?: No   Transportation Needs: Low Risk  (1/29/2024)    Transportation Needs     Within the past 12 months, has lack of transportation kept you from medical appointments, getting your medicines, non-medical meetings or appointments, work, or from getting things that you need?: No   Interpersonal Safety: Low Risk  (3/6/2024)    Interpersonal Safety     Do you feel physically and emotionally safe where you currently live?: Yes     Within the past 12 months, have you been hit, slapped, kicked or otherwise physically hurt by someone?: No     Within the past 12 months, have you been humiliated or emotionally abused in other ways by your  "partner or ex-partner?: No   Housing Stability: Low Risk  (1/29/2024)    Housing Stability     Do you have housing? : Yes     Are you worried about losing your housing?: No       VITALS:  BP (!) 142/62   Pulse 71   Temp 98.3  F (36.8  C) (Oral)   Resp 17   Ht 1.6 m (5' 3\")   Wt 48.1 kg (106 lb)   SpO2 95%   BMI 18.78 kg/m      PHYSICAL EXAM:  Physical Exam  Vitals and nursing note reviewed.   Constitutional:       Appearance: Normal appearance.      Comments: Appears weak   HENT:      Head: Normocephalic and atraumatic.      Right Ear: External ear normal.      Left Ear: External ear normal.      Nose: Nose normal.      Mouth/Throat:      Mouth: Mucous membranes are moist.   Eyes:      Extraocular Movements: Extraocular movements intact.      Conjunctiva/sclera: Conjunctivae normal.      Pupils: Pupils are equal, round, and reactive to light.   Cardiovascular:      Rate and Rhythm: Normal rate and regular rhythm.   Pulmonary:      Effort: Pulmonary effort is normal.      Breath sounds: Normal breath sounds. No wheezing or rales.   Abdominal:      General: Abdomen is flat. There is no distension.      Palpations: Abdomen is soft.      Tenderness: There is no abdominal tenderness. There is no guarding.   Musculoskeletal:         General: Normal range of motion.      Cervical back: Normal range of motion and neck supple.      Right lower leg: No edema.      Left lower leg: No edema.   Lymphadenopathy:      Cervical: No cervical adenopathy.   Skin:     General: Skin is warm and dry.   Neurological:      General: No focal deficit present.      Mental Status: She is alert and oriented to person, place, and time. Mental status is at baseline.      Comments: No gross focal neurologic deficits   Psychiatric:         Mood and Affect: Mood normal.         Behavior: Behavior normal.         Thought Content: Thought content normal.          LAB:  All pertinent labs reviewed and interpreted.  Results for orders placed or " performed during the hospital encounter of 10/20/24   CT Abdomen Pelvis w/o Contrast    Impression    IMPRESSION:   1.  Unchanged nonobstructing right renal calculi without ureterolithiasis or hydronephrosis.  2.  Increased pancreatic ductal dilation without definite evidence of acute pancreatitis, could consider further evaluation with MRCP on a nonemergent/outpatient basis if clinically indicated.     Extra Green Top (Lithium Heparin) Tube   Result Value Ref Range    Hold Specimen JIC    Extra Purple Top Tube   Result Value Ref Range    Hold Specimen JIC    Basic metabolic panel   Result Value Ref Range    Sodium 140 135 - 145 mmol/L    Potassium 4.2 3.4 - 5.3 mmol/L    Chloride 104 98 - 107 mmol/L    Carbon Dioxide (CO2) 21 (L) 22 - 29 mmol/L    Anion Gap 15 7 - 15 mmol/L    Urea Nitrogen 11.9 8.0 - 23.0 mg/dL    Creatinine 0.48 (L) 0.51 - 0.95 mg/dL    GFR Estimate >90 >60 mL/min/1.73m2    Calcium 8.4 (L) 8.8 - 10.4 mg/dL    Glucose 146 (H) 70 - 99 mg/dL   Result Value Ref Range    Magnesium 1.6 (L) 1.7 - 2.3 mg/dL   Hepatic function panel   Result Value Ref Range    Protein Total 6.7 6.4 - 8.3 g/dL    Albumin 4.3 3.5 - 5.2 g/dL    Bilirubin Total 0.3 <=1.2 mg/dL    Alkaline Phosphatase 97 40 - 150 U/L    AST 31 0 - 45 U/L    ALT 24 0 - 50 U/L    Bilirubin Direct <0.20 0.00 - 0.30 mg/dL   Result Value Ref Range    Lipase 15 13 - 60 U/L   CBC with platelets and differential   Result Value Ref Range    WBC Count 8.7 4.0 - 11.0 10e3/uL    RBC Count 5.08 3.80 - 5.20 10e6/uL    Hemoglobin 15.0 11.7 - 15.7 g/dL    Hematocrit 45.3 35.0 - 47.0 %    MCV 89 78 - 100 fL    MCH 29.5 26.5 - 33.0 pg    MCHC 33.1 31.5 - 36.5 g/dL    RDW 12.8 10.0 - 15.0 %    Platelet Count 198 150 - 450 10e3/uL    % Neutrophils 84 %    % Lymphocytes 12 %    % Monocytes 4 %    % Eosinophils 0 %    % Basophils 0 %    % Immature Granulocytes 0 %    NRBCs per 100 WBC 0 <1 /100    Absolute Neutrophils 7.3 1.6 - 8.3 10e3/uL    Absolute Lymphocytes  1.0 0.8 - 5.3 10e3/uL    Absolute Monocytes 0.4 0.0 - 1.3 10e3/uL    Absolute Eosinophils 0.0 0.0 - 0.7 10e3/uL    Absolute Basophils 0.0 0.0 - 0.2 10e3/uL    Absolute Immature Granulocytes 0.0 <=0.4 10e3/uL    Absolute NRBCs 0.0 10e3/uL   UA with Microscopic reflex to Culture    Specimen: Urine, Clean Catch   Result Value Ref Range    Color Urine Yellow Colorless, Straw, Light Yellow, Yellow    Appearance Urine Clear Clear    Glucose Urine Negative Negative mg/dL    Bilirubin Urine Negative Negative    Ketones Urine 80 (A) Negative mg/dL    Specific Gravity Urine 1.026 1.001 - 1.030    Blood Urine 0.1 mg/dL (A) Negative    pH Urine 6.0 5.0 - 7.0    Protein Albumin Urine 30 (A) Negative mg/dL    Urobilinogen Urine <2.0 <2.0 mg/dL    Nitrite Urine Negative Negative    Leukocyte Esterase Urine Negative Negative    Mucus Urine Present (A) None Seen /LPF    RBC Urine 52 (H) <=2 /HPF    WBC Urine 2 <=5 /HPF    Squamous Epithelials Urine 1 <=1 /HPF       RADIOLOGY:  Reviewed all pertinent imaging. Please see official radiology report.  CT Abdomen Pelvis w/o Contrast   Final Result   IMPRESSION:    1.  Unchanged nonobstructing right renal calculi without ureterolithiasis or hydronephrosis.   2.  Increased pancreatic ductal dilation without definite evidence of acute pancreatitis, could consider further evaluation with MRCP on a nonemergent/outpatient basis if clinically indicated.             I, Trudi Brush, am serving as a scribe to document services personally performed by Dr. Patel based on my observation and the provider's statements to me. I, Micha Patel MD attest that Trudi Brush is acting in a scribe capacity, has observed my performance of the services and has documented them in accordance with my direction.    Micha Patel M.D.  Emergency Medicine  [facility] EMERGENCY DEPARTMENT  [facility] [facility] [id number] [phone]  Dept:  [phone]       Micha Patel MD  10/20/24 9649

## 2024-10-21 ENCOUNTER — APPOINTMENT (OUTPATIENT)
Dept: RADIOLOGY | Facility: HOSPITAL | Age: 69
End: 2024-10-21
Attending: FAMILY MEDICINE
Payer: COMMERCIAL

## 2024-10-21 PROBLEM — R19.7 DIARRHEA, UNSPECIFIED TYPE: Status: ACTIVE | Noted: 2024-10-21

## 2024-10-21 PROBLEM — R11.10 VOMITING: Status: ACTIVE | Noted: 2024-10-21

## 2024-10-21 PROBLEM — K52.9 GASTROENTERITIS: Status: ACTIVE | Noted: 2024-10-21

## 2024-10-21 LAB
ALBUMIN SERPL BCG-MCNC: 4.3 G/DL (ref 3.5–5.2)
ALP SERPL-CCNC: 93 U/L (ref 40–150)
ALT SERPL W P-5'-P-CCNC: 21 U/L (ref 0–50)
ANION GAP SERPL CALCULATED.3IONS-SCNC: 14 MMOL/L (ref 7–15)
AST SERPL W P-5'-P-CCNC: 19 U/L (ref 0–45)
BILIRUB SERPL-MCNC: 0.3 MG/DL
BUN SERPL-MCNC: 11.5 MG/DL (ref 8–23)
CALCIUM SERPL-MCNC: 8.3 MG/DL (ref 8.8–10.4)
CHLORIDE SERPL-SCNC: 102 MMOL/L (ref 98–107)
CREAT SERPL-MCNC: 0.54 MG/DL (ref 0.51–0.95)
EGFRCR SERPLBLD CKD-EPI 2021: >90 ML/MIN/1.73M2
ERYTHROCYTE [DISTWIDTH] IN BLOOD BY AUTOMATED COUNT: 13.2 % (ref 10–15)
GLUCOSE SERPL-MCNC: 104 MG/DL (ref 70–99)
HCO3 SERPL-SCNC: 22 MMOL/L (ref 22–29)
HCT VFR BLD AUTO: 41.2 % (ref 35–47)
HGB BLD-MCNC: 13.4 G/DL (ref 11.7–15.7)
INR PPP: 1.09 (ref 0.85–1.15)
MAGNESIUM SERPL-MCNC: 1.9 MG/DL (ref 1.7–2.3)
MCH RBC QN AUTO: 29 PG (ref 26.5–33)
MCHC RBC AUTO-ENTMCNC: 32.5 G/DL (ref 31.5–36.5)
MCV RBC AUTO: 89 FL (ref 78–100)
NT-PROBNP SERPL-MCNC: 1045 PG/ML (ref 0–900)
PLATELET # BLD AUTO: 197 10E3/UL (ref 150–450)
POTASSIUM SERPL-SCNC: 3.6 MMOL/L (ref 3.4–5.3)
PROT SERPL-MCNC: 6.4 G/DL (ref 6.4–8.3)
RBC # BLD AUTO: 4.62 10E6/UL (ref 3.8–5.2)
SODIUM SERPL-SCNC: 138 MMOL/L (ref 135–145)
WBC # BLD AUTO: 9.3 10E3/UL (ref 4–11)

## 2024-10-21 PROCEDURE — 250N000011 HC RX IP 250 OP 636: Performed by: INTERNAL MEDICINE

## 2024-10-21 PROCEDURE — 85610 PROTHROMBIN TIME: CPT | Performed by: INTERNAL MEDICINE

## 2024-10-21 PROCEDURE — 83735 ASSAY OF MAGNESIUM: CPT | Performed by: INTERNAL MEDICINE

## 2024-10-21 PROCEDURE — 250N000013 HC RX MED GY IP 250 OP 250 PS 637: Performed by: INTERNAL MEDICINE

## 2024-10-21 PROCEDURE — 96375 TX/PRO/DX INJ NEW DRUG ADDON: CPT

## 2024-10-21 PROCEDURE — 85014 HEMATOCRIT: CPT | Performed by: INTERNAL MEDICINE

## 2024-10-21 PROCEDURE — 74018 RADEX ABDOMEN 1 VIEW: CPT

## 2024-10-21 PROCEDURE — 96376 TX/PRO/DX INJ SAME DRUG ADON: CPT

## 2024-10-21 PROCEDURE — G0378 HOSPITAL OBSERVATION PER HR: HCPCS

## 2024-10-21 PROCEDURE — 62367 ANALYZE SPINE INFUS PUMP: CPT | Performed by: PHYSICIAN ASSISTANT

## 2024-10-21 PROCEDURE — 250N000013 HC RX MED GY IP 250 OP 250 PS 637: Performed by: FAMILY MEDICINE

## 2024-10-21 PROCEDURE — 99214 OFFICE O/P EST MOD 30 MIN: CPT | Mod: 25 | Performed by: PHYSICIAN ASSISTANT

## 2024-10-21 PROCEDURE — 62367 ANALYZE SPINE INFUS PUMP: CPT

## 2024-10-21 PROCEDURE — 99232 SBSQ HOSP IP/OBS MODERATE 35: CPT | Performed by: FAMILY MEDICINE

## 2024-10-21 PROCEDURE — 36415 COLL VENOUS BLD VENIPUNCTURE: CPT | Performed by: INTERNAL MEDICINE

## 2024-10-21 PROCEDURE — 80053 COMPREHEN METABOLIC PANEL: CPT | Performed by: INTERNAL MEDICINE

## 2024-10-21 PROCEDURE — 250N000011 HC RX IP 250 OP 636: Performed by: EMERGENCY MEDICINE

## 2024-10-21 PROCEDURE — 258N000003 HC RX IP 258 OP 636: Performed by: INTERNAL MEDICINE

## 2024-10-21 PROCEDURE — 83880 ASSAY OF NATRIURETIC PEPTIDE: CPT | Performed by: HOSPITALIST

## 2024-10-21 RX ORDER — HYDRALAZINE HYDROCHLORIDE 20 MG/ML
10 INJECTION INTRAMUSCULAR; INTRAVENOUS EVERY 6 HOURS PRN
Status: DISCONTINUED | OUTPATIENT
Start: 2024-10-21 | End: 2024-10-21

## 2024-10-21 RX ORDER — FAMOTIDINE 20 MG/1
20 TABLET, FILM COATED ORAL 2 TIMES DAILY PRN
Status: DISCONTINUED | OUTPATIENT
Start: 2024-10-21 | End: 2024-10-22

## 2024-10-21 RX ORDER — AMOXICILLIN 250 MG
2 CAPSULE ORAL 2 TIMES DAILY PRN
Status: DISCONTINUED | OUTPATIENT
Start: 2024-10-21 | End: 2024-10-22 | Stop reason: HOSPADM

## 2024-10-21 RX ORDER — DROPERIDOL 2.5 MG/ML
1.25 INJECTION, SOLUTION INTRAMUSCULAR; INTRAVENOUS ONCE
Status: COMPLETED | OUTPATIENT
Start: 2024-10-21 | End: 2024-10-21

## 2024-10-21 RX ORDER — AMOXICILLIN 250 MG
1 CAPSULE ORAL 2 TIMES DAILY PRN
Status: DISCONTINUED | OUTPATIENT
Start: 2024-10-21 | End: 2024-10-22 | Stop reason: HOSPADM

## 2024-10-21 RX ORDER — METOCLOPRAMIDE 10 MG/1
10 TABLET ORAL 3 TIMES DAILY PRN
Status: DISCONTINUED | OUTPATIENT
Start: 2024-10-21 | End: 2024-10-22 | Stop reason: HOSPADM

## 2024-10-21 RX ORDER — LABETALOL HYDROCHLORIDE 5 MG/ML
10 INJECTION, SOLUTION INTRAVENOUS EVERY 6 HOURS PRN
Status: DISCONTINUED | OUTPATIENT
Start: 2024-10-21 | End: 2024-10-21

## 2024-10-21 RX ORDER — BACLOFEN 10 MG/1
10 TABLET ORAL 3 TIMES DAILY
Status: DISCONTINUED | OUTPATIENT
Start: 2024-10-21 | End: 2024-10-22 | Stop reason: HOSPADM

## 2024-10-21 RX ORDER — PROCHLORPERAZINE MALEATE 5 MG/1
5 TABLET ORAL EVERY 6 HOURS PRN
Status: DISCONTINUED | OUTPATIENT
Start: 2024-10-21 | End: 2024-10-22 | Stop reason: HOSPADM

## 2024-10-21 RX ORDER — ONDANSETRON 2 MG/ML
4 INJECTION INTRAMUSCULAR; INTRAVENOUS EVERY 6 HOURS PRN
Status: DISCONTINUED | OUTPATIENT
Start: 2024-10-21 | End: 2024-10-22 | Stop reason: HOSPADM

## 2024-10-21 RX ORDER — ATENOLOL 25 MG/1
12.5 TABLET ORAL DAILY
Status: DISCONTINUED | OUTPATIENT
Start: 2024-10-21 | End: 2024-10-22 | Stop reason: HOSPADM

## 2024-10-21 RX ORDER — ACETAMINOPHEN 650 MG/1
650 SUPPOSITORY RECTAL EVERY 4 HOURS PRN
Status: DISCONTINUED | OUTPATIENT
Start: 2024-10-21 | End: 2024-10-22 | Stop reason: HOSPADM

## 2024-10-21 RX ORDER — PROCHLORPERAZINE 25 MG
12.5 SUPPOSITORY, RECTAL RECTAL EVERY 12 HOURS PRN
Status: DISCONTINUED | OUTPATIENT
Start: 2024-10-21 | End: 2024-10-22 | Stop reason: HOSPADM

## 2024-10-21 RX ORDER — ONDANSETRON 4 MG/1
4 TABLET, ORALLY DISINTEGRATING ORAL EVERY 6 HOURS PRN
Status: DISCONTINUED | OUTPATIENT
Start: 2024-10-21 | End: 2024-10-22 | Stop reason: HOSPADM

## 2024-10-21 RX ORDER — NICOTINE 21 MG/24HR
1 PATCH, TRANSDERMAL 24 HOURS TRANSDERMAL DAILY
Status: DISCONTINUED | OUTPATIENT
Start: 2024-10-21 | End: 2024-10-22 | Stop reason: HOSPADM

## 2024-10-21 RX ORDER — ACETAMINOPHEN 325 MG/1
650 TABLET ORAL EVERY 4 HOURS PRN
Status: DISCONTINUED | OUTPATIENT
Start: 2024-10-21 | End: 2024-10-22 | Stop reason: HOSPADM

## 2024-10-21 RX ORDER — PROMETHAZINE HYDROCHLORIDE 12.5 MG/1
12.5 TABLET ORAL EVERY 6 HOURS PRN
Status: DISCONTINUED | OUTPATIENT
Start: 2024-10-21 | End: 2024-10-21

## 2024-10-21 RX ORDER — ASPIRIN 81 MG/1
81 TABLET ORAL DAILY
Status: DISCONTINUED | OUTPATIENT
Start: 2024-10-21 | End: 2024-10-22 | Stop reason: HOSPADM

## 2024-10-21 RX ORDER — PROMETHAZINE HYDROCHLORIDE 12.5 MG/1
12.5 TABLET ORAL EVERY 6 HOURS
Status: COMPLETED | OUTPATIENT
Start: 2024-10-21 | End: 2024-10-22

## 2024-10-21 RX ORDER — KETOROLAC TROMETHAMINE 15 MG/ML
15 INJECTION, SOLUTION INTRAMUSCULAR; INTRAVENOUS
Status: COMPLETED | OUTPATIENT
Start: 2024-10-21 | End: 2024-10-21

## 2024-10-21 RX ORDER — HYDRALAZINE HYDROCHLORIDE 20 MG/ML
10 INJECTION INTRAMUSCULAR; INTRAVENOUS EVERY 6 HOURS PRN
Status: DISCONTINUED | OUTPATIENT
Start: 2024-10-21 | End: 2024-10-22 | Stop reason: HOSPADM

## 2024-10-21 RX ORDER — PROMETHAZINE HYDROCHLORIDE 12.5 MG/1
12.5 TABLET ORAL EVERY 6 HOURS
Status: DISCONTINUED | OUTPATIENT
Start: 2024-10-21 | End: 2024-10-21

## 2024-10-21 RX ORDER — PANTOPRAZOLE SODIUM 40 MG/1
40 TABLET, DELAYED RELEASE ORAL
Status: DISCONTINUED | OUTPATIENT
Start: 2024-10-21 | End: 2024-10-22 | Stop reason: HOSPADM

## 2024-10-21 RX ADMIN — FAMOTIDINE 20 MG: 10 INJECTION, SOLUTION INTRAVENOUS at 07:45

## 2024-10-21 RX ADMIN — KETOROLAC TROMETHAMINE 15 MG: 15 INJECTION, SOLUTION INTRAMUSCULAR; INTRAVENOUS at 06:11

## 2024-10-21 RX ADMIN — ONDANSETRON 4 MG: 2 INJECTION INTRAMUSCULAR; INTRAVENOUS at 02:17

## 2024-10-21 RX ADMIN — FAMOTIDINE 20 MG: 10 INJECTION, SOLUTION INTRAVENOUS at 19:44

## 2024-10-21 RX ADMIN — ACETAMINOPHEN 650 MG: 325 TABLET ORAL at 16:15

## 2024-10-21 RX ADMIN — ONDANSETRON 4 MG: 4 TABLET, ORALLY DISINTEGRATING ORAL at 11:08

## 2024-10-21 RX ADMIN — PROMETHAZINE HYDROCHLORIDE 12.5 MG: 12.5 TABLET ORAL at 19:45

## 2024-10-21 RX ADMIN — ASPIRIN 81 MG: 81 TABLET, COATED ORAL at 16:09

## 2024-10-21 RX ADMIN — BACLOFEN 10 MG: 10 TABLET ORAL at 19:45

## 2024-10-21 RX ADMIN — PROCHLORPERAZINE EDISYLATE 5 MG: 5 INJECTION INTRAMUSCULAR; INTRAVENOUS at 07:44

## 2024-10-21 RX ADMIN — DROPERIDOL 1.25 MG: 2.5 INJECTION, SOLUTION INTRAMUSCULAR; INTRAVENOUS at 00:34

## 2024-10-21 RX ADMIN — BACLOFEN 10 MG: 10 TABLET ORAL at 11:46

## 2024-10-21 RX ADMIN — SODIUM CHLORIDE 1000 ML: 9 INJECTION, SOLUTION INTRAVENOUS at 02:20

## 2024-10-21 RX ADMIN — ATENOLOL 12.5 MG: 25 TABLET ORAL at 16:14

## 2024-10-21 RX ADMIN — PROMETHAZINE HYDROCHLORIDE 12.5 MG: 12.5 TABLET ORAL at 14:02

## 2024-10-21 RX ADMIN — PANTOPRAZOLE SODIUM 40 MG: 40 TABLET, DELAYED RELEASE ORAL at 16:09

## 2024-10-21 RX ADMIN — NICOTINE 1 PATCH: 21 PATCH, EXTENDED RELEASE TRANSDERMAL at 07:46

## 2024-10-21 RX ADMIN — PROCHLORPERAZINE EDISYLATE 5 MG: 5 INJECTION INTRAMUSCULAR; INTRAVENOUS at 01:21

## 2024-10-21 RX ADMIN — LABETALOL HYDROCHLORIDE 10 MG: 5 INJECTION, SOLUTION INTRAVENOUS at 02:17

## 2024-10-21 ASSESSMENT — ACTIVITIES OF DAILY LIVING (ADL)
ADLS_ACUITY_SCORE: 36
ADLS_ACUITY_SCORE: 37
ADLS_ACUITY_SCORE: 36
ADLS_ACUITY_SCORE: 41
ADLS_ACUITY_SCORE: 41
ADLS_ACUITY_SCORE: 37
ADLS_ACUITY_SCORE: 38
ADLS_ACUITY_SCORE: 36
ADLS_ACUITY_SCORE: 41
ADLS_ACUITY_SCORE: 37
ADLS_ACUITY_SCORE: 36
ADLS_ACUITY_SCORE: 38
ADLS_ACUITY_SCORE: 37
DEPENDENT_IADLS:: CLEANING;COOKING;LAUNDRY;SHOPPING
ADLS_ACUITY_SCORE: 37
ADLS_ACUITY_SCORE: 36
ADLS_ACUITY_SCORE: 37
ADLS_ACUITY_SCORE: 36
ADLS_ACUITY_SCORE: 36
ADLS_ACUITY_SCORE: 34
ADLS_ACUITY_SCORE: 41
ADLS_ACUITY_SCORE: 36
ADLS_ACUITY_SCORE: 37
ADLS_ACUITY_SCORE: 36

## 2024-10-21 NOTE — H&P
[facility]    History and Physical - Hospitalist Service       Date of Admission:  10/20/2024    Assessment & Plan      Mariah Koehler is a 68 year old female admitted on 10/20/2024. She has PMH most notable for remote left arm injury complicated by reflex sympathetic dystrophy for which an intrathecal pump (bupivacaine/Dilaudid) was implanted, opioid dependence, migraines, HTN, MDD that presents with nonbilious nonbloody vomiting and diarrhea for 3 days.    #NBNB emesis  #Nonbloody diarrhea  #Intramural pancreatic duct dilatation  DDx: Gastro enteritis v narcotic withdrawal > pancreatic lesion  The patient presents with nonbilious nonbloody vomiting and diarrhea for 3 days.  She denies abdominal pain.  On exam she is yawning and appears dehydrated.  No sick contacts.  She denies chest pain and there are no new ischemic findings on EKG.  No dizziness/vertigo.  No focal neurologic signs on exam.  Her abdomen benign.  CT abdomen pelvis with nonobstructing right renal calculi and intramural pancreatic duct dilatation.  No pancreatitis on CTAP and her lipase is normal.  Given that the patient has no abdominal pain will hold on MRCP for now.  She reports her pain is at baseline in her left arm therefore it is unclear if the diagnosis is narcotic withdrawal.  She states she last had her intrathecal pump filled 4 weeks ago and it gets refilled every 4 to 5 weeks.  Discussed with Pharm and they are unsure of what is in this pump or how to check it to make sure it is still functional.  -Pharm will need to reach out to [facility] in [place] [phone] in the a.m.  I have already discussed this with pharmacy tonight and they are planning to do this.  -intrathecal pump management and pain management consult to evaluate for dysfunctional pump  -S/p Zofran and droperidol 1.25 mg in the ED, will continue with Zofran and Compazine as needed   -S/p 1 L NS in the ED, the Patient appears dry, will  continue MIVF with NS at 100 mL/HR for 1 L  -Enteric panel  -Enteric precautions    #Intra pancreatic ductal dilatation noted on CTAP  -Will need outpatient MRCP if not obtained this admission    #Chronic left arm pain complicated by reflux sympathetic dystrophy  -The patient has an intrathecal pump as noted above, the patient states her pain is at baseline  -Pharmacy will need to inquire in the morning about the contents of the pump and find out if it is still operational in the a.m.  -Continue PTA baclofen    #Microscopic hematuria  -Likely  due to nonobstructing right renal calculi noted on CTAP.  The patient denies any urinary complaints.    #GERD  -switch p.o. famotidine to IV famotidine    #HTN  -Hold atenolol for now    #Migraines  -Abortive medications as needed    #MDD  -Not on any medications PTA    #Tobacco use  -Patient smokes 1 pack/day, have ordered nicotine patch        Diet:  N.p.o. for now, s/p 1 L IVF in ED, will continue MIVF with 1 L NS at 100 mL/hour for 1 L  DVT Prophylaxis: SCDs  Bellamy Catheter: Not present  Lines: None     Cardiac Monitoring: None  Code Status:  Full code, discussed on admission    Clinically Significant Risk Factors Present on Admission           # Hypocalcemia: Lowest Ca = 8.4 mg/dL in last 2 days, will monitor and replace as appropriate   # Hypomagnesemia: Lowest Mg = 1.6 mg/dL in last 2 days, will replace as needed     # Drug Induced Platelet Defect: home medication list includes an antiplatelet medication   # Hypertension: Noted on problem list             # Financial/Environmental Concerns:           Disposition Plan     Medically Ready for Discharge: Anticipated Tomorrow           Jimmy Schaffer MD  Hospitalist Service  [facility]  Securely message with Matthew (more info)  Text page via [facility] Paging/Directory     ______________________________________________________________________    Chief Complaint   Nausea vomiting and  diarrhea    History is obtained from the patient    History of Present Illness   Mariah Koehler is a 68 year old female who has  PMH most notable for remote left arm injury complicated by reflex sympathetic dystrophy for which an intrathecal pump (bupivacaine/Dilaudid) was implanted, opioid dependence, migraines, HTN, MDD that presents with vomiting and diarrhea for 3 days.    The patient was in her normal state of health up until Friday 10/18.  She started to become nauseous and had nonbilious nonbloody emesis.  She is also had nonbloody diarrhea.  She has had about 4 episodes of vomiting over the last 3 days.  She does not have any abdominal pain.  No sick contacts.  No fevers.  She has a mild headache but this is consistent with her previous headaches.  She does not have any chest pain, dyspnea, urinary complaints.  She last had her intrathecal pump reloaded about 4 weeks ago, she states she gets her baclofen pump reloaded about every 4 weeks.  She states her chronic pain is at baseline.    ER course:  -VS normal/stable except for hypertension to the 180s/90s  -K3.5, CR 0.55, LFTs normal, lipase 15, lactic acid 1.4  -WBC 8.8  -UA with 52 RBCs  -UA with nonobstructing right renal calculi and intramural pancreatic duct dilatation  -EKG with sinus bradycardia with ventricular rate of 50, no contiguous ST segment elevation/depressions or diffuse T wave inversions  -Given 1 L NS, Zofran and droperidol    Past Medical History    Past Medical History:   Diagnosis Date    Current mild episode of major depressive disorder, unspecified whether recurrent (H) 02/17/2020    Hypertension     Migraine headache 11/06/2023    Nausea and vomiting     Opiate dependence (H)     RSD (reflex sympathetic dystrophy)     Sacral fracture, closed (H)        Past Surgical History   Past Surgical History:   Procedure Laterality Date    APPENDECTOMY      ESOPHAGOSCOPY, GASTROSCOPY, DUODENOSCOPY (EGD), COMBINED N/A 2/20/2023    Procedure:  ESOPHAGOGASTRODUODENOSCOPY with biopsies;  Surgeon: Na Brooks MD;  Location: [facility] Main OR    GYN SURGERY      HC REVISE MEDIAN N/CARPAL TUNNEL SURG      Description: Neuroplasty Decompression Median Nerve At Carpal Tunnel;  Recorded: 09/19/2011;    HYSTERECTOMY  1984    IR CERVICAL EPIDURAL STEROID INJECTION  1/14/2005    LAPAROSCOPIC NISSEN FUNDOPLICATION N/A 2/23/2024    Procedure: Paraesophageal hiatal hernia repair with mesh, ROBOT-ASSISTED, LAPAROSCOPIC, USING DA ROSMERY XI;  Surgeon: Lemuel Ornelas DO;  Location: [facility] Main OR    OOPHORECTOMY Bilateral 1985    HI SYMPATHECTOMY,CERVICOTHORACIC      Description: Surgical Sympathectomy Cervicothoracic;  Recorded: 09/19/2011;    [facility] DECOMPRESS FASCIOTOMY FINGR/HAND      Description: Decompression Fasciotomy Of The Hand;  Recorded: 09/19/2011;    ZC LAP,CHOLECYSTECTOMY/EXPLORE      Description: Cholecystectomy Laparoscopic;  Recorded: 12/09/2011;    ZZC TOTAL ABDOM HYSTERECTOMY      Description: Hysterectomy;  Recorded: 03/23/2011;       Prior to Admission Medications   Prior to Admission Medications   Prescriptions Last Dose Informant Patient Reported? Taking?   acetaminophen (TYLENOL) 325 MG tablet Past Month at prn Self No Yes   Sig: Take 1-2 tablets (325-650 mg) by mouth every 6 hours as needed for mild pain or headaches   aspirin 81 MG EC tablet 10/19/2024 at am Self No Yes   Sig: Take 1 tablet (81 mg) by mouth daily   atenolol (TENORMIN) 25 MG tablet 10/19/2024 at am Self No Yes   Sig: Take 0.5 tablets (12.5 mg) by mouth daily   baclofen (LIORESAL) 10 MG tablet 10/19/2024 at pm  No Yes   Sig: TAKE 1 TABLET BY MOUTH 3 TIMES DAILY   famotidine (PEPCID) 20 MG tablet Past Month at prn Self No Yes   Sig: Take 1 tablet (20 mg) by mouth 2 times daily as needed (acid reflux)   medication given by implanted intrathecal pump 10/20/2024 at need to call [facility] to verify Self Yes Yes   Sig: continuous Drug # 1: Fentanyl (Sublimaze) -  Conc: 2000 mcg/mL - Total Dose / 24 hours: 873.8 mcg    Drug # 2: Bupivacaine (Marcaine)  - Conc:17.7 mg/mL - Total Dose / 24 hours: 7.733 mg    Drug # 3: Hydromorphone (Dilaudid)  - Conc: 3.8 mg/mL - Total Dose / 24 hours: 1.66 mg    Diluent: NS    Infusion Rate: 0.4369 mL/24 hrs  Pump Reservoir Volume: 40 mL    Bolus doses: up to 15 bolus doses/24 hours with 1 hour lockout  Each bolus: fentanyl 80.1 mcg, 0.708 mg Bupivacaine, 0.152 mg Dilaudid    Outside Clinic & Provider: [facility] in [place] [phone]  Last Refill Date: 10/02/23  Next Refill Date: 11/06/23  [place] Alarm Date:  11/08/23  Pump : [employer]    Deliver Pump Medication to:   For [place]: If pump is within 7 days of depletion, pharmacist will enter a 'Pain Management Adult IP Consult' into the EHR, including 'IT pain pump management' as the reason for the consult.   metoclopramide (REGLAN) 10 MG tablet 10/20/2024 at dose given in ED Self No Yes   Sig: Take 1 tablet (10 mg) by mouth 3 times daily as needed (nausea and vomiting)   multivitamin, therapeutic (THERA-VIT) TABS tablet 10/19/2024 at am Self No Yes   Sig: Take 1 tablet by mouth daily   omeprazole (PRILOSEC) 20 MG DR capsule 10/19/2024 at pm  No Yes   Sig: TAKE 1 CAPSULE BY MOUTH TWICE A DAY BEFORE MEALS   ondansetron (ZOFRAN) 4 MG tablet 10/20/2024 at 3 doses given in ED  No Yes   Sig: Take 1 tablet (4 mg) by mouth every 6 hours as needed for nausea.   prochlorperazine (COMPAZINE) 5 MG tablet More than a month at prn Self No Yes   Sig: Take 1 tablet (5 mg) by mouth every 8 hours as needed for nausea or vomiting   promethazine (PHENERGAN) 25 MG tablet More than a month at prn  No Yes   Sig: Take 1 tablet (25 mg) by mouth every 6 hours as needed for nausea      Facility-Administered Medications: None        Review of Systems    The 10 point Review of Systems is negative other than noted in the HPI or here.     Social History   I have reviewed this patient's social  history and updated it with pertinent information if needed.  Social History     Tobacco Use    Smoking status: Every Day     Current packs/day: 0.75     Average packs/day: 0.8 packs/day for 56.8 years (42.6 ttl pk-yrs)     Types: Cigarettes     Start date: 1/1/1968    Smokeless tobacco: Never   Vaping Use    Vaping status: Never Used   Substance Use Topics    Alcohol use: Not Currently     Comment: 1 glass a year    Drug use: Never         Family History   I have reviewed this patient's family history and updated it with pertinent information if needed.  Family History   Problem Relation Age of Onset    Cerebrovascular Disease Mother     Diabetes Mother     Heart Disease Mother     Hypertension Mother     Rheumatologic Disease Mother     Heart Disease Father     Pulmonary Hypertension Father     Kidney failure Father     Cancer Father         melanoma    Diabetes Sister     Hypertension Sister     Hypertension Brother          Allergies   Allergies   Allergen Reactions    Codeine Nausea and Vomiting    Hydromorphone Headache and Nausea and Vomiting    Morphine Nausea and Vomiting    Bacitracin Rash    Methadone Rash        Physical Exam   Vital Signs: Temp: 97  F (36.1  C) Temp src: Temporal BP: (!) 184/81 Pulse: 99   Resp: 24 SpO2: 96 % O2 Device: None (Room air)    Weight: 106 lbs 0 oz    GENERAL: Lying in bed, looks nauseated, had an episode of nonbilious nonbloody emesis, frequently yawning  HEENT: NC/AT, sclera anicteric   CV: RRR, no M/R/G, CR < 2 s   PULM: CTAB, no wheezes, rales, rhonchi   GI: Abd soft, NT, ND, no involuntary or voluntary guarding, no pain when I bumped the bed, not an acute abdomen   MSK: WWP, no LE edema   NEURO: Awake, alert, oriented to 10/21/2024, CN II-XII grossly intact, GEE, appears nonfocal  SKIN: no rash       Medical Decision Making       70 MINUTES SPENT BY ME on the date of service doing chart review, history, exam, documentation & further activities per the note.      Data      I have personally reviewed the following data over the past 24 hrs:    8.8  \   14.1   / 251     138 101 12.8 /  111 (H)   3.5 22 0.55 \     ALT: 23 AST: 23 AP: 100 TBILI: 0.4   ALB: 4.4 TOT PROTEIN: 6.6 LIPASE: 15     Procal: N/A CRP: N/A Lactic Acid: 1.4         Imaging results reviewed over the past 24 hrs:   Recent Results (from the past 24 hour(s))   CT Abdomen Pelvis w/o Contrast    Narrative    EXAM: CT ABDOMEN PELVIS W/O CONTRAST  LOCATION: [facility]  DATE: 10/20/2024    INDICATION: Nausea and vomiting, flank pain, known renal stone  COMPARISON: CT 4/16/2024  TECHNIQUE: CT scan of the abdomen and pelvis was performed without IV contrast. Multiplanar reformats were obtained. Dose reduction techniques were used.  CONTRAST: None.    FINDINGS:   LOWER CHEST: Unremarkable.    HEPATOBILIARY: Cholecystectomy. No evidence of biliary obstruction. Small volume pneumobilia, as seen on prior exam.    PANCREAS: Increased pancreatic ductal dilation in comparison to prior CT measuring up to 6 mm at the level of the neck (series 3 image 64). No surrounding fat stranding.    SPLEEN: Normal.    ADRENAL GLANDS: Normal.    KIDNEYS/BLADDER: Unchanged nonobstructing calculi in the lower pole of the right kidney. No ureterolithiasis or hydronephrosis. Urinary bladder is unremarkable.    BOWEL: Likely prior fundoplication at the gastroesophageal junction. Diverticulosis of the colon. No acute inflammatory change. No obstruction.     LYMPH NODES: No suspicious lymphadenopathy.    VASCULATURE: Moderate calcified atherosclerosis.    PELVIC ORGANS: Hysterectomy.    MUSCULOSKELETAL: No acute bony abnormality. Stable position of likely pain pump and intrathecal catheter.      Impression    IMPRESSION:   1.  Unchanged nonobstructing right renal calculi without ureterolithiasis or hydronephrosis.  2.  Increased pancreatic ductal dilation without definite evidence of acute pancreatitis, could consider further  evaluation with MRCP on a nonemergent/outpatient basis if clinically indicated.

## 2024-10-21 NOTE — PLAN OF CARE
Problem: Adult Inpatient Plan of Care  Goal: Plan of Care Review  Description: The Plan of Care Review/Shift note should be completed every shift.  The Outcome Evaluation is a brief statement about your assessment that the patient is improving, declining, or no change.  This information will be displayed automatically on your shift  note.  Flowsheets (Taken 10/21/2024 0352)  Plan of Care Reviewed With: patient  Overall Patient Progress: no change  Goal: Absence of Hospital-Acquired Illness or Injury  Intervention: Identify and Manage Fall Risk  Recent Flowsheet Documentation  Taken 10/21/2024 0338 by Josie Heath RN  Safety Promotion/Fall Prevention: activity supervised   Goal Outcome Evaluation:      Plan of Care Reviewed With: patient    Overall Patient Progress: no changeOverall Patient Progress: no change

## 2024-10-21 NOTE — PLAN OF CARE
PRIMARY DIAGNOSIS: GASTROENTERITIS    OUTPATIENT/OBSERVATION GOALS TO BE MET BEFORE DISCHARGE  1. Orthostatic performed: N/A    2. Tolerating PO fluid and/or antibiotics (if applicable): Yes    3. Nausea/Vomiting/Diarrhea symptoms improved: Yes    4. Pain status: Improved with use of alternative comfort measures i.e.: Intrathecal pain pump    5. Return to near baseline physical activity: Yes    Discharge Planner Nurse   Safe discharge environment identified: Yes  Barriers to discharge: Yes       Entered by: Jolynn Claire RN 10/21/2024 2:04 PM     Please review provider order for any additional goals.   Nurse to notify provider when observation goals have been met and patient is ready for discharge.Goal Outcome Evaluation: Nausea improved but not resolved. Continuing to monitor.

## 2024-10-21 NOTE — PROGRESS NOTES
[facility]    Medicine Progress Note - Hospitalist Service    Date of Admission:  10/20/2024    Assessment & Plan      Mariah Koehler is a 68 year old female with PMH most notable for remote left arm injury complicated by reflex sympathetic dystrophy with intrathecal pump (bupivacaine/Dilaudid), recurrent chronic nausea on daily Compazine and Zofran, who presented with acute on chronic nausea with vomiting and diarrhea for 3 days    # Intractable nausea, vomiting, diarrhea   DDx: Acute on chronic problem, possible versus possibly related to pancreas issues with ductal dilation noted.    --- After pain pump interrogated appears less likely narcotic withdrawal  ---no abdominal pain, chest pain and there are no new ischemic findings on EKG.    ---CT abdomen pelvis with nonobstructing right renal calculi and intramural pancreatic duct dilatation.  No pancreatitis on CTAP and her lipase is normal.    ---no clear inpatient indication for MRCP  --- Appreciate pain team inpatient consult and pump interrogation to ensure proper functioning.  She is okay until 10/27 with current amount   ---Note, per pharmacy review she gets almost monthly prescriptions for Zofran and Compazine from [employer] and she tells me she uses them daily.  ---Has also been on scopolamine patch, promethazine, and Reglan  ---Slow symptom improvement  ---Try scheduled promethazine for 3 doses.  -S/p Zofran and droperidol 1.25 mg in the ED,   --- Status post 2 L IV fluid.  No further vomiting so we will stop Ivs  ---Check for possibility of obstipation with abdominal x-ray although no constipation seen on CT  -Enteric panel ordered but no further diarrhea  -Discontinue enteric precautions    #Intra pancreatic ductal dilatation   ---noted on CTAP  ---Normal LFTs  --- No abdominal pain  --- Unclear that this would be the cause of acute on chronic nausea with vomiting above  ---Follows with [facility]  ---If symptoms resolve  rapidly would not consult but plan outpatient GI follow-up/outpatient MRCP  ---If remains here with intractable symptoms consider GI referral/MRCP      #Chronic left arm pain    reflex sympathetic dystrophy  -The patient has an intrathecal pump as noted above, the patient states her pain is at baseline  -Appreciate pain team interrogation.  Appears pump is functioning and has enough medication until 10/27.  -Continue PTA baclofen    #Microscopic hematuria  -Likely  due to nonobstructing right renal calculi noted on CTAP.  The patient denies any urinary complaints.    #GERD  -continue home famotidine and ppi    #HTN  -Blood pressures trending up   --- Resume atenolol for now    #Migraines  -Abortive medications as needed    #MDD  -Not on any medications PTA    #Tobacco use  -Patient smokes 1 pack/day,   -Continue nicotine patch       Observation Goals: -diagnostic tests and consults completed and resulted, -vital signs normal or at patient baseline, -tolerating oral intake to maintain hydration, -Nausea is improving , Nurse to notify provider when observation goals have been met and patient is ready for discharge.  Diet: NPO for Medical/Clinical Reasons Except for: Ice Chips, Meds    DVT Prophylaxis: Low Risk/Ambulatory with no VTE prophylaxis indicated  Bellamy Catheter: Not present  Lines: None     Cardiac Monitoring: ACTIVE order. Indication: QTc prolonging medication (48 hours)  Code Status: Full Code      Clinically Significant Risk Factors Present on Admission           # Hypocalcemia: Lowest Ca = 8.3 mg/dL in last 2 days, will monitor and replace as appropriate   # Hypomagnesemia: Lowest Mg = 1.6 mg/dL in last 2 days, will replace as needed     # Drug Induced Platelet Defect: home medication list includes an antiplatelet medication   # Hypertension: Noted on problem list             # Financial/Environmental Concerns:           Disposition Plan     Medically Ready for Discharge: Anticipated Tomorrow              Hawa Chandler MD  Hospitalist Service  [facility]  Securely message with [employer] (more info)  Text page via [employer] Paging/Directory   ______________________________________________________________________    Interval History   --- Seen and chart extensively reviewed.  She actually has chronic nausea.  She has been in the hospital for this, was in a year ago with acute on chronic nausea, and in February had paraesophageal hernia repaired as they were concerns that her intractable nausea was from gastric outlet obstruction from a large hiatal hernia.    No ab pain  Thinks stool pattern similar to previous and endorses loose stools recently with previous hx constipation    Physical Exam   Vital Signs: Temp: 98.2  F (36.8  C) Temp src: Oral BP: (!) 158/75 Pulse: 109   Resp: 20 SpO2: 95 % O2 Device: None (Room air)    Weight: 108 lbs .41 oz    General Appearance: Pleasant female  Respiratory: Clear to auscultation bilaterally  Cardiovascular: Regular rate and rhythm  GI: Overall soft and nontender  Skin: No significant lower extremity edema  Other: Neuro grossly intact without focal deficits appreciated    Medical Decision Making             Data     I have personally reviewed the following data over the past 24 hrs:    9.3  \   13.4   / 197     138 102 11.5 /  104 (H)   3.6 22 0.54 \     ALT: 21 AST: 19 AP: 93 TBILI: 0.3   ALB: 4.3 TOT PROTEIN: 6.4 LIPASE: N/A     Trop: N/A BNP: 1,045 (H)     Procal: N/A CRP: N/A Lactic Acid: 1.4       INR:  1.09 PTT:  N/A   D-dimer:  N/A Fibrinogen:  N/A       Imaging results reviewed over the past 24 hrs:   Recent Results (from the past 24 hour(s))   CT Abdomen Pelvis w/o Contrast    Narrative    EXAM: CT ABDOMEN PELVIS W/O CONTRAST  LOCATION: [facility]  DATE: 10/20/2024    INDICATION: Nausea and vomiting, flank pain, known renal stone  COMPARISON: CT 4/16/2024  TECHNIQUE: CT scan of the abdomen and pelvis was performed without IV  contrast. Multiplanar reformats were obtained. Dose reduction techniques were used.  CONTRAST: None.    FINDINGS:   LOWER CHEST: Unremarkable.    HEPATOBILIARY: Cholecystectomy. No evidence of biliary obstruction. Small volume pneumobilia, as seen on prior exam.    PANCREAS: Increased pancreatic ductal dilation in comparison to prior CT measuring up to 6 mm at the level of the neck (series 3 image 64). No surrounding fat stranding.    SPLEEN: Normal.    ADRENAL GLANDS: Normal.    KIDNEYS/BLADDER: Unchanged nonobstructing calculi in the lower pole of the right kidney. No ureterolithiasis or hydronephrosis. Urinary bladder is unremarkable.    BOWEL: Likely prior fundoplication at the gastroesophageal junction. Diverticulosis of the colon. No acute inflammatory change. No obstruction.     LYMPH NODES: No suspicious lymphadenopathy.    VASCULATURE: Moderate calcified atherosclerosis.    PELVIC ORGANS: Hysterectomy.    MUSCULOSKELETAL: No acute bony abnormality. Stable position of likely pain pump and intrathecal catheter.      Impression    IMPRESSION:   1.  Unchanged nonobstructing right renal calculi without ureterolithiasis or hydronephrosis.  2.  Increased pancreatic ductal dilation without definite evidence of acute pancreatitis, could consider further evaluation with MRCP on a nonemergent/outpatient basis if clinically indicated.

## 2024-10-21 NOTE — CONSULTS
Care Management Initial Consult    General Information  Assessment completed with: Patient,    Type of CM/SW Visit: Initial Assessment    Primary Care Provider verified and updated as needed:     Readmission within the last 30 days: no previous admission in last 30 days         Advance Care Planning: Advance Care Planning Reviewed: present on chart          Communication Assessment  Patient's communication style: spoken language (English or Bilingual)    Hearing Difficulty or Deaf: no        Cognitive  Cognitive/Neuro/Behavioral: WDL                      Living Environment:   People in home: spouse     Current living Arrangements: apartment      Able to return to prior arrangements: yes       Family/Social Support:  Care provided by:    Provides care for: no one  Marital Status:   Support system:           Description of Support System: Supportive    Support Assessment: Adequate family and caregiver support    Current Resources:   Patient receiving home care services: No        Community Resources: DME, Other (see comment)  Equipment currently used at home: none  Supplies currently used at home:      Employment/Financial:  Employment Status:          Financial Concerns: none           Does the patient's insurance plan have a 3 day qualifying hospital stay waiver?  No    Lifestyle & Psychosocial Needs:  Social Determinants of Health     Food Insecurity: Low Risk  (10/21/2024)    Food Insecurity     Within the past 12 months, did you worry that your food would run out before you got money to buy more?: No     Within the past 12 months, did the food you bought just not last and you didn t have money to get more?: No   Depression: Not at risk (3/6/2024)    PHQ-2     PHQ-2 Score: 0   Housing Stability: Low Risk  (10/21/2024)    Housing Stability     Do you have housing? : Yes     Are you worried about losing your housing?: No   Tobacco Use: High Risk (6/28/2024)    Patient History     Smoking Tobacco Use:  Every Day     Smokeless Tobacco Use: Never     Passive Exposure: Not on file   Financial Resource Strain: Low Risk  (10/21/2024)    Financial Resource Strain     Within the past 12 months, have you or your family members you live with been unable to get utilities (heat, electricity) when it was really needed?: No   Alcohol Use: Not on file   Transportation Needs: Low Risk  (10/21/2024)    Transportation Needs     Within the past 12 months, has lack of transportation kept you from medical appointments, getting your medicines, non-medical meetings or appointments, work, or from getting things that you need?: No   Physical Activity: Not on file   Interpersonal Safety: Low Risk  (3/6/2024)    Interpersonal Safety     Do you feel physically and emotionally safe where you currently live?: Yes     Within the past 12 months, have you been hit, slapped, kicked or otherwise physically hurt by someone?: No     Within the past 12 months, have you been humiliated or emotionally abused in other ways by your partner or ex-partner?: No   Stress: Not on file   Social Connections: Not on file   Health Literacy: Not on file       Functional Status:  Prior to admission patient needed assistance:   Dependent ADLs:: Ambulation-walker, Independent  Dependent IADLs:: Cleaning, Cooking, Laundry, Shopping  Assesssment of Functional Status: At functional baseline    Mental Health Status:  Mental Health Status: No Current Concerns       Chemical Dependency Status:  Chemical Dependency Status: No Current Concerns             Values/Beliefs:  Spiritual, Cultural Beliefs, Orthodoxy Practices, Values that affect care:                 Discussed  Partnership in Safe Discharge Planning  document with patient/family: Yes: pt     Additional Information:  SW met with pt to introduce role of CM, complete  initial assessment, and to discuss needs at time of d/c. Pt comes from home; lives with spouse, and noted to be independent at baseline. Pt feels that  there will be no needs from CM at discharge, and will follow with PCP if needs arise post discharge.No further care management intervention anticipated at this time.  Please re-consult if further needs arise.  Care management signing off.        Brenna Kjellberg, BSW LSW  10/21/2024

## 2024-10-21 NOTE — CONSULTS
[facility] ACUTE INPATIENT PAIN SERVICE    [facility], [facility], [facility], [facility], [place]   PAIN CONSULT      Assessment/Plan:  Mariah Koehler is a 68 year old female who was admitted on 10/20/2024.  I was asked by Dr. Jimmy Schaffer to see the patient for management of her chronic pain syndrome. Has IT pain pump implanted which is managed by Ash. She has been using her boluses for her breakthrough pain. She is allowed up to 15 a day. History of gastroparesis, chronic pain and opioid dependency, migraine, hypertension, CRPS .  Describes pain as 4/10.     [place] reviewed: She is not on any controlled medications. Small Rx of #12 tablets oxycodone 5mg given on 02/25/24.     IT pain pump interrogated: Pump is functioning appropriately. Settings as followed:  *Primary Drug: Fentanyl delivering 824.2mcg/day  *Dilaudid 7.294mg/day  *Bupivacaine 7.294mg/day    PTM dose- 80.1mcg Fentanyl with a maximum of 15 boluses per day    Alarm date: 10/27/24. If hospitalization is prolonged, may reduce PTMs to extend alarm date.      PLAN:    1.) Chronic pain syndrome. Presence of intrathecal pain pump which is managed by Ash.   Multimodal Medication Therapy:   Adjuvants:   - Baclofen 10mg tid  - APAP 650mg prn  - Ketorolac 15mg prn  Opioids: None. IT pump implant.  Non-medication interventions- Ice  Constipation Prophylaxis- Senna-S  Follow up /Discharge Recommendations - We recommend prescribing the following at the time of discharge:  None.           Subjective:  Mariah is seen today in her bed alert and oriented in no acute distress. Reports her pain is currently a 4/10. She is able to use her PTMs while in the hospital. No additional pain concerns.     Endorses nausea with vomiting.     Reviewed continuing with current plan, all questions answered.            History   Drug Use Unknown         Tobacco Use      Smoking status: Every Day        Packs/day: 0.75        Years: 0.8 packs/day for 56.8 years (42.6 ttl  "pk-yrs)        Types: Cigarettes        Start date: 1/1/1968      Smokeless tobacco: Never        Objective:  Vital signs in last 24 hours:  B/P: 158/75, T: 98.2, P: 109, R: 20   Blood pressure (!) 158/75, pulse 109, temperature 98.2  F (36.8  C), temperature source Oral, resp. rate 20, height 1.6 m (5' 3\"), weight 49 kg (108 lb 0.4 oz), SpO2 95%.        Review of Systems:   As per subjective, all others negative.    Physical Exam    General: in no apparent distress and non-toxic   HEENT: Head normocephalic atraumatic, oral mucosa moist. Sclerae anicteric  CV: Regular rhythm, normal rate, no murmurs  Resp: No wheezes, no rales or rhonchi, no focal consolidations  GI: Hyperactive bowel sounds  Skin: No rashes or lesions  Extremities: No peripheral edema  Psych: Normal affect, mood euthymic  Neuro: Grossly normal          Imaging:  Personally Reviewed.    No results found for this visit on 10/20/24.     Lab Results:  Personally Reviewed.   Last Comprehensive Metabolic Panel:  Sodium   Date Value Ref Range Status   10/21/2024 138 135 - 145 mmol/L Final     Potassium   Date Value Ref Range Status   10/21/2024 3.6 3.4 - 5.3 mmol/L Final   02/20/2023 3.6 3.5 - 5.0 mmol/L Final   01/09/2007 4.3 3.4 - 5.3 mmol/L Final     Chloride   Date Value Ref Range Status   10/21/2024 102 98 - 107 mmol/L Final   02/20/2023 110 (H) 98 - 107 mmol/L Final     Carbon Dioxide (CO2)   Date Value Ref Range Status   10/21/2024 22 22 - 29 mmol/L Final   02/20/2023 24 22 - 31 mmol/L Final     Anion Gap   Date Value Ref Range Status   10/21/2024 14 7 - 15 mmol/L Final   02/20/2023 7 5 - 18 mmol/L Final     Glucose   Date Value Ref Range Status   10/21/2024 104 (H) 70 - 99 mg/dL Final   02/20/2023 87 70 - 125 mg/dL Final     GLUCOSE BY METER POCT   Date Value Ref Range Status   02/25/2024 98 70 - 99 mg/dL Final     Urea Nitrogen   Date Value Ref Range Status   10/21/2024 11.5 8.0 - 23.0 mg/dL Final   02/20/2023 9 8 - 22 mg/dL Final     Creatinine "   Date Value Ref Range Status   10/21/2024 0.54 0.51 - 0.95 mg/dL Final   01/09/2007 0.99 0.60 - 1.30 mg/dL Final     GFR Estimate   Date Value Ref Range Status   10/21/2024 >90 >60 mL/min/1.73m2 Final     Comment:     eGFR calculated using 2021 CKD-EPI equation.   04/14/2021 >60 >60 mL/min/1.73m2 Final   01/09/2007 63 >60 mL/min/1.7m2 Final     GFR, ESTIMATED POCT   Date Value Ref Range Status   11/08/2022 >60 >60 mL/min/1.73m2 Final     Calcium   Date Value Ref Range Status   10/21/2024 8.3 (L) 8.8 - 10.4 mg/dL Final     Comment:     Reference intervals for this test were updated on 7/16/2024 to reflect our healthy population more accurately. There may be differences in the flagging of prior results with similar values performed with this method. Those prior results can be interpreted in the context of the updated reference intervals.        UA:   Amphetamines Urine   Date Value Ref Range Status   04/17/2024 Screen Negative Screen Negative Final     Comment:     Cutoff for a negative amphetamine is less than 500 ng/mL.     Barbituates Urine   Date Value Ref Range Status   04/17/2024 Screen Negative Screen Negative Final     Comment:     Cutoff for a negative barbiturate is less than 200 ng/mL.     Cannabinoids Urine   Date Value Ref Range Status   04/17/2024 Screen Negative Screen Negative Final     Comment:     Cutoff for a negative cannabinoid is less than 50 ng/mL.     Cocaine Urine   Date Value Ref Range Status   04/17/2024 Screen Negative Screen Negative Final     Comment:     Cutoff for a negative cocaine is less than 300 ng/mL.     Opiates Urine   Date Value Ref Range Status   04/17/2024 Screen Negative Screen Negative Final     Comment:     Cutoff for a negative opiate is less than 300 ng/mL.     PCP Urine   Date Value Ref Range Status   04/17/2024 Screen Negative Screen Negative Final     Comment:     Cutoff for a negative PCP is less than 25 ng/mL.              Please see A&P for additional details of  medical decision making.         Armando Neely PA-C  Acute Care Pain Management  Team  Hours of pain coverage Mon-Fri 8-1600, afterhours please call the house officer    [employer] [employer] (KRISTI, [place], [place], [place])   Page via Vocera text web console -Click for JLGOV

## 2024-10-21 NOTE — PLAN OF CARE
Problem: Adult Inpatient Plan of Care  Goal: Absence of Hospital-Acquired Illness or Injury  10/21/2024 1030 by Jolynn Claire RN  Outcome: Progressing  10/21/2024 1029 by Jolynn Claire RN  Outcome: Progressing  Intervention: Identify and Manage Fall Risk  Recent Flowsheet Documentation  Taken 10/21/2024 0801 by Jolynn Claire RN  Safety Promotion/Fall Prevention:   activity supervised   clutter free environment maintained   nonskid shoes/slippers when out of bed   room organization consistent   safety round/check completed   supervised activity  Intervention: Prevent Skin Injury  Recent Flowsheet Documentation  Taken 10/21/2024 0800 by Jolynn Claire RN  Body Position: position changed independently  Goal: Optimal Comfort and Wellbeing  10/21/2024 1030 by Jolynn Claire RN  Outcome: Progressing  10/21/2024 1029 by Jolynn Claire RN  Outcome: Progressing     Problem: Nausea and Vomiting  Goal: Nausea and Vomiting Relief  Outcome: Progressing   PRIMARY DIAGNOSIS: GASTROENTERITIS    OUTPATIENT/OBSERVATION GOALS TO BE MET BEFORE DISCHARGE  1. Orthostatic performed: N/A    2. Tolerating PO fluid and/or antibiotics (if applicable):  N/A-patient is NPO    3. Nausea/Vomiting/Diarrhea symptoms improved: Yes    4. Pain status: Improved-controlled with oral pain medications.    5. Return to near baseline physical activity: Yes    Discharge Planner Nurse   Safe discharge environment identified: Yes  Barriers to discharge: Yes       Entered by: Jolynn Claire RN 10/21/2024 10:30 AM     Please review provider order for any additional goals.   Nurse to notify provider when observation goals have been met and patient is ready for discharge.Goal Outcome Evaluation: Nausea continues, but improved after IV compazine. Patient is NPO. Continuing to monitor.

## 2024-10-21 NOTE — PROGRESS NOTES
"PRIMARY DIAGNOSIS: \"GENERIC\" NURSING  OUTPATIENT/OBSERVATION GOALS TO BE MET BEFORE DISCHARGE:  ADLs back to baseline: No    Activity and level of assistance: Up with standby assistance.    Pain status: Improving but still requiring IV meds    Return to near baseline physical activity: No     Discharge Planner Nurse   Safe discharge environment identified: No  Barriers to discharge: Yes       Entered by: Josie Heath RN 10/21/2024 5:25 AM     Please review provider order for any additional goals.   Nurse to notify provider when observation goals have been met and patient is ready for discharge.      Pt A&Ox4. On room air. Toradol given for pain. BP was 192/94. Labetalol given. Zofran and compazine given for nausea and vomiting. Tele: NSR. NS 1000 ml bolus infusing 100 ml/hr. Unable to collect a stool sample. Pt haven't had a BM.   " Called in Imipramine 50 mg 2 bid per Dr. Villareal

## 2024-10-21 NOTE — PROGRESS NOTES
Patient admitted to room 15 at approximately 0110 via cart from emergency room.  Reason for Admission: Gastroenteritis   Report received from: Uvaldo Contreras, ROBERTO   Patient was accompanied by Self.  Patient ambulated/transferred:  with stand-by assist. self.  Patient is alert and orientated x 4.  Outpatient Observation education provided to: patient,  Safety risks were identified during admission:  fall.   Yellow risk/fall band applied:  Yes  Detailed Belongings: cell phone/electronics; clothing; purse/wallet; vision aids

## 2024-10-21 NOTE — PROVIDER NOTIFICATION
Just verifying intrathecal pump program management order? Did you want a pain clinic consult to assess malfunction of the pain pump? If so, than i think a pain management IP consult needs to be placed. Let me know what you think. Thanks    Karime text paged at [phone] via Szl    Addendum: Will place pain management IP consult.

## 2024-10-21 NOTE — PLAN OF CARE
"PRIMARY DIAGNOSIS: \"GENERIC\" NURSING  OUTPATIENT/OBSERVATION GOALS TO BE MET BEFORE DISCHARGE:  ADLs back to baseline: Yes    Activity and level of assistance: Up with standby assistance.    Pain status: Improved-controlled with oral pain medications.    Return to near baseline physical activity: Yes     Discharge Planner Nurse   Safe discharge environment identified: Yes  Barriers to discharge: No       Entered by: Elvira Morse RN 10/21/2024 6:48 PM     Please review provider order for any additional goals.   Nurse to notify provider when observation goals have been met and patient is ready for discharge.Goal Outcome Evaluation:                        "

## 2024-10-21 NOTE — ED NOTES
"[facility] ED Handoff Report    ED Chief Complaint: N/V    ED Diagnosis:  (R11.2) Nausea and vomiting, unspecified vomiting type  Comment: n/v  Plan: fluyids, meds    (R19.7) Diarrhea, unspecified type  Comment: na  Plan: fluids, meds    (K52.9) Gastroenteritis  Comment: na  Plan: fluids, meds         PMH:    Past Medical History:   Diagnosis Date    Current mild episode of major depressive disorder, unspecified whether recurrent (H) 02/17/2020    Hypertension     Migraine headache 11/06/2023    Nausea and vomiting     Opiate dependence (H)     RSD (reflex sympathetic dystrophy)     Sacral fracture, closed (H)         Code Status:  Prior     Falls Risk: Yes Band: Applied    Current Living Situation/Residence: lives with a significant other     Elimination Status: Continent: Yes     Activity Level: SBA w/ walker    Patients Preferred Language:  English     Needed: No    Vital Signs:  BP (!) 179/89   Pulse 79   Temp 98  F (36.7  C) (Oral)   Resp 20   Ht 1.6 m (5' 3\")   Wt 48.1 kg (106 lb)   SpO2 94%   BMI 18.78 kg/m       Cardiac Rhythm: SR/SB      Pain Score: 0/10    Is the Patient Confused:  No    Last Food or Drink: 10/20/24 at 1500      Focused Assessment:  N/V    Tests Performed: Done: Labs and Imaging    Treatments Provided:  fluids/meds    Family Dynamics/Concerns: No    Family Updated On Visitor Policy: No    Plan of Care Communicated to Family: No    Who Was Updated about Plan of Care: patient    Belongings Checklist Done and Signed by Patient: Yes    Belongings Sent with Patient: yes, clothes      Medications sent with patient: no      Covid: asymptomatic , na      Additional Information: 20g left hand, received 1 liter bolus, andanti nausea meds. A&O, VSS, ambulates with walker and SBA.      RN: Uvaldo Contreras RN    10/21/2024 12:26 AM       "

## 2024-10-21 NOTE — PLAN OF CARE
Problem: Adult Inpatient Plan of Care  Goal: Plan of Care Review  Description: The Plan of Care Review/Shift note should be completed every shift.  The Outcome Evaluation is a brief statement about your assessment that the patient is improving, declining, or no change.  This information will be displayed automatically on your shift  note.  Outcome: Met  Flowsheets (Taken 10/21/2024 1140)  Outcome Evaluation:   home no needs   indpt at baseline.  Plan of Care Reviewed With: patient  Overall Patient Progress: improving

## 2024-10-21 NOTE — PHARMACY-ADMISSION MEDICATION HISTORY
Pharmacist Admission Medication History    Admission medication history is complete. The information provided in this note is only as accurate as the sources available at the time of the update.    Information Source(s): Patient via in-person    Pertinent Information: Patient has had no home medications today. Follow up required on IT pain pump Monday with [facility] [place].     Changes made to PTA medication list:  Added: None  Deleted: Vitamin B12, Vitamin B1  Changed: None    Allergies reviewed with patient and updates made in EHR: yes    Medication History Completed By: DIANA JONES RPH 10/20/2024 11:27 PM    PTA Med List   Medication Sig Last Dose    acetaminophen (TYLENOL) 325 MG tablet Take 1-2 tablets (325-650 mg) by mouth every 6 hours as needed for mild pain or headaches Past Month at prn    aspirin 81 MG EC tablet Take 1 tablet (81 mg) by mouth daily 10/19/2024 at am    atenolol (TENORMIN) 25 MG tablet Take 0.5 tablets (12.5 mg) by mouth daily 10/19/2024 at am    baclofen (LIORESAL) 10 MG tablet TAKE 1 TABLET BY MOUTH 3 TIMES DAILY 10/19/2024 at pm    famotidine (PEPCID) 20 MG tablet Take 1 tablet (20 mg) by mouth 2 times daily as needed (acid reflux) Past Month at prn    medication given by implanted intrathecal pump continuous Drug # 1: Fentanyl (Sublimaze) - Conc: 2000 mcg/mL - Total Dose / 24 hours: 873.8 mcg    Drug # 2: Bupivacaine (Marcaine)  - Conc:17.7 mg/mL - Total Dose / 24 hours: 7.733 mg    Drug # 3: Hydromorphone (Dilaudid)  - Conc: 3.8 mg/mL - Total Dose / 24 hours: 1.66 mg    Diluent: NS    Infusion Rate: 0.4369 mL/24 hrs  Pump Reservoir Volume: 40 mL    Bolus doses: up to 15 bolus doses/24 hours with 1 hour lockout  Each bolus: fentanyl 80.1 mcg, 0.708 mg Bupivacaine, 0.152 mg Dilaudid    Outside Clinic & Provider: [facility] in [place] [phone]  Last Refill Date: 10/02/23  Next Refill Date: 11/06/23  [place] Alarm Date:  11/08/23  Pump :  syncromed    Deliver Pump Medication to:   For [facility]: If pump is within 7 days of depletion, pharmacist will enter a 'Pain Management Adult IP Consult' into the EHR, including 'IT pain pump management' as the reason for the consult. 10/20/2024 at need to call [facility] to verify    metoclopramide (REGLAN) 10 MG tablet Take 1 tablet (10 mg) by mouth 3 times daily as needed (nausea and vomiting) 10/20/2024 at dose given in ED    multivitamin, therapeutic (THERA-VIT) TABS tablet Take 1 tablet by mouth daily 10/19/2024 at am    omeprazole (PRILOSEC) 20 MG DR capsule TAKE 1 CAPSULE BY MOUTH TWICE A DAY BEFORE MEALS 10/19/2024 at pm    ondansetron (ZOFRAN) 4 MG tablet Take 1 tablet (4 mg) by mouth every 6 hours as needed for nausea. 10/20/2024 at 3 doses given in ED    prochlorperazine (COMPAZINE) 5 MG tablet Take 1 tablet (5 mg) by mouth every 8 hours as needed for nausea or vomiting More than a month at prn    promethazine (PHENERGAN) 25 MG tablet Take 1 tablet (25 mg) by mouth every 6 hours as needed for nausea More than a month at prn

## 2024-10-21 NOTE — ED PROVIDER NOTES
EMERGENCY DEPARTMENT ENCOUNTER      NAME: Mariah Koehler  AGE: 68 year old female  YOB: 1955  MRN: [id number]  EVALUATION DATE & TIME: 10/20/2024  9:22 PM    ED PROVIDER: Ayah Mcduffie MD    Chief Complaint   Patient presents with    Nausea, Vomiting, & Diarrhea       FINAL IMPRESSION  1. Nausea and vomiting, unspecified vomiting type    2. Diarrhea, unspecified type    3. Gastroenteritis        MEDICAL DECISION MAKING   Mariah Koehler is a 68 year old female who presents with  for evaluation of nausea, vomiting, and diarrhea.  Records reviewed.  Patient has a long and well-documented history of gastroparesis, chronic pain and opioid dependency, migraine, hypertension, CRPS.  She has had numerous ED visits and hospitalizations related to nausea, vomiting, and diarrhea.  Most recently, she was seen in the ED earlier today.  At that time, she had a reassuring workup including labs, EKG,   UA, CT abdomen/pelvis.  She had improvement in symptoms with Zofran 4 mg x 2, Pepcid, and Reglan.  At time of discharge, she was able to tolerate p.o.  Appears that she was most recently seen in clinic for virtual visit in May after an ED visit for nausea/vomiting.  Patient presents to the ED again today with her significant other for evaluation of recurrent symptoms.  She reports that she felt well for only a few hours then this evening, had recurrence of nausea and vomiting as well as diarrhea.  She tried taking her Zofran at home but still was not able to keep anything down.  She continues to complain of generalized abdominal discomfort, mostly associated with episodes of emesis.  She has also continued to have diarrhea but no fever, chest pain, difficulty breathing, cough, or urinary symptoms.  Vitals on arrival notable for tachycardia but otherwise stable.    Considered differential COVID not limited to gastroenteritis, cyclical vomiting syndrome, gastritis, gastroparesis, IBS, IBD, substance  intoxication/withdrawal, electrolyte derangement, acute kidney injury, pancreatitis, hepatobiliary disease, anxiety/psychiatric.  Given CT scan earlier today did not show evidence of obstruction, perforation, diverticulitis, or other acute intra-abdominal/surgical process I do feel this less likely and see no indication to repeat imaging at this time.  Will plan to recheck basic labs and focus on management of symptoms with IV fluids and IV antiemetic.  At this point, patient has essentially failed outpatient management so also plan for admission, which she and her significant other were agreeable to.    CBC reassuring. No evidence of leukocytosis to suggest systemic infectious/inflammatory process. No acute anemia. PLTs wnl. CMP reassuring. No evidence of JESSA, acidosis, or significant electrolyte derangement. No acute elevation of bilirubin or transaminates to suggest acute hepatobiliary process. Lipase within normal limits, symptoms less likely related to acute pancreatitis. Lactate within normal limits, less likely end-organ ischemia or systemic infectious process.     I rechecked the patient after initial interventions.  She had transient improvement in symptoms but then had some recurrence and with this, I did order a dose of droperidol as a trial.  If this does not help alleviate her symptoms, may need to consider withdrawal from opiates and trial fentanyl which is what is in her pain pump.  I discussed the case with hospitalist who agreed to facilitate admission to observation.        Considerations in Medical Decision Making  History:  Obtained supplemental history: Supplemental history obtained?: Family Member/Significant Other  Reviewed external records: External records reviewed?: Documented in chart  Care impacted by chronic illness: Chronic Pain, Hypertension, and Other: COPD, hiatal hernia, colitis    Work Up:  In additional to work up documented, I considered the following work up: Documented in  chart, if applicable.  Chart documentation includes differential considered and any EKGs or imaging independently interpreted by provider, where specified.    External consultation:  Discussion of management with another provider: Documented in chart, if applicable    Disposition considerations: Admit.    MIPS Documentation: Not Applicable       ED COURSE  10:13 PM I met with the patient, obtained history, performed an initial exam, and discussed options and plan for diagnostics and treatment here in the ED.  11:08 PM Spoke with Dr. Schaffer, hospitalist. Accepts patient for admission.  11:40 PM I rechecked the patient.     MEDICATIONS GIVEN IN THE ED  sodium chloride 0.9% BOLUS 1,000 mL (0 mLs Intravenous Stopped 10/21/24 0007)   ondansetron (ZOFRAN) injection 4 mg (4 mg Intravenous $Given 10/20/24 2233)   droPERidol (INAPSINE) injection 1.25 mg (1.25 mg Intravenous $Given 10/21/24 0034)       NEW PRESCRIPTIONS STARTED AT TODAY'S VISIT  Current Discharge Medication List             =================================================================    HPI:    Use of : N/A      Mariahjessica Koehler is a 68 year old female who presents with persistent nausea and vomiting.    Per chart review, patient was seen at [facility] ED earlier today( ~7 hours ago) for nausea, vomiting, and diarrhea. Symptoms going on for the past couple of days. Given Zofran and Reglan. UA with red cells. CT scan showed stone in right kidney. Patient discharged in stable condition.    Patient returns to the ED with continued nausea and vomiting. She took a Zofran and was able to fall asleep. When she woke up, she felt nauseous again and vomited. She had couple episodes of vomiting. She endorses abdominal pain and diarrhea. Denies fevers. No recent antibiotics use.     Per chart review,  Patient was seen at [facility] ED on 06/2/24 for nausea,vomiting and diarrhea. Laboratory test was unremarkable. Patient was given IV fluids and Zofran  "with relief.        RELEVANT HISTORY, MEDICATIONS, & ALLERGIES   Past medical history, surgical history, family history, medications, and allergies reviewed and pertinent noted in HPI.    REVIEW OF SYSTEMS:  A complete review of systems was performed with pertinent positives and negatives noted in the HPI. All other systems negative.     PHYSICAL EXAM:    Vitals: BP (!) 192/94 (BP Location: Left arm)   Pulse 100   Temp 98.7  F (37.1  C) (Oral)   Resp 24   Ht 1.6 m (5' 3\")   Wt 49 kg (108 lb 0.4 oz)   SpO2 95%   BMI 19.14 kg/m     General: Alert and interactive.  Appears uncomfortable but in no acute distress.  HENT: Atraumatic. Full AROM of neck. MMM.  Cardiovascular: Tachycardic, regular.  Chest/Pulmonary: Normal work of breathing. Speaking in complete sentences. Lungs CTAB. No chest wall tenderness or deformities.  Abdomen: Soft, nondistended. Nontender without guarding or rebound.  Extremities: Normal AROM of all major joints.  Skin: Warm and dry. Normal skin color.   Neuro: Speech clear. CNs grossly intact. Moves all extremities spontaneously.   Psych: Normal affect/mood, cooperative, memory appropriate.      LAB  Labs Ordered and Resulted from Time of ED Arrival to Time of ED Departure   COMPREHENSIVE METABOLIC PANEL - Abnormal       Result Value    Sodium 138      Potassium 3.5      Carbon Dioxide (CO2) 22      Anion Gap 15      Urea Nitrogen 12.8      Creatinine 0.55      GFR Estimate >90      Calcium 8.8      Chloride 101      Glucose 111 (*)     Alkaline Phosphatase 100      AST 23      ALT 23      Protein Total 6.6      Albumin 4.4      Bilirubin Total 0.4     MAGNESIUM - Normal    Magnesium 2.1     CBC WITH PLATELETS - Normal    WBC Count 8.8      RBC Count 4.78      Hemoglobin 14.1      Hematocrit 42.3      MCV 89      MCH 29.5      MCHC 33.3      RDW 12.9      Platelet Count 251     LACTIC ACID WHOLE BLOOD WITH 1X REPEAT IN 2 HR WHEN >2 - Normal    Lactic Acid, Initial 1.4           I, Ya Juan " Yovanny, am serving as a scribe to document services personally performed by Dr. Ayah Mcduffie based on my observation and the provider's statements to me. I, Ayah Mcduffie MD attest that Lizett Juan Yovanny is acting in a scribe capacity, has observed my performance of the services and has documented them in accordance with my direction.    Ayah Mcduffie M.D.  Emergency Medicine  [facility] EMERGENCY DEPARTMENT  [address] [place] [id number] [phone]  Dept: [phone]       Ayah Mcduffie MD  10/21/24 0435

## 2024-10-21 NOTE — PROCEDURES
Procedure Note - Intrathecal Pump Interrogation       Procedure:  Pump Check     Pre-Operative Diagnosis: chronic pain & presence of intrathecal pain pump     Post-Operative Diagnosis: Same     Indication: chronic pain      Findings: The patient's pump is filled with   Fentanyl concentration is 2000mcg/ML and is delivering 824.2mcg/day  Dilaudid with a concentration of 4.6mg/ML delivering 7.294mg/day  bupivacaine concentration is 17.7mg/mL and delivering 7.294mg/day    PTM dose: 80.1mcg Fentanyl with a maximum of 15 boluses per day    The patient is not due to have an alarm run off until 10/27/24        Patient tolerated the procedure well.    Rajiv Neely PA-C  Acute Care Pain Management Program   Hours of pain coverage Mon-Fri 8-1600, afterhours please call the house officer   [facility] ([place], [place], [place], [place])   Page via hopTo web console -Click for [place]

## 2024-10-22 VITALS
TEMPERATURE: 98 F | BODY MASS INDEX: 19.14 KG/M2 | OXYGEN SATURATION: 100 % | HEIGHT: 63 IN | DIASTOLIC BLOOD PRESSURE: 70 MMHG | SYSTOLIC BLOOD PRESSURE: 141 MMHG | WEIGHT: 108.03 LBS | RESPIRATION RATE: 15 BRPM | HEART RATE: 59 BPM

## 2024-10-22 PROCEDURE — G0378 HOSPITAL OBSERVATION PER HR: HCPCS

## 2024-10-22 PROCEDURE — 99239 HOSP IP/OBS DSCHRG MGMT >30: CPT | Performed by: HOSPITALIST

## 2024-10-22 PROCEDURE — 250N000011 HC RX IP 250 OP 636: Performed by: INTERNAL MEDICINE

## 2024-10-22 PROCEDURE — 250N000011 HC RX IP 250 OP 636: Performed by: FAMILY MEDICINE

## 2024-10-22 PROCEDURE — 250N000013 HC RX MED GY IP 250 OP 250 PS 637: Performed by: INTERNAL MEDICINE

## 2024-10-22 PROCEDURE — 90662 IIV NO PRSV INCREASED AG IM: CPT | Performed by: FAMILY MEDICINE

## 2024-10-22 PROCEDURE — G0008 ADMIN INFLUENZA VIRUS VAC: HCPCS | Performed by: FAMILY MEDICINE

## 2024-10-22 PROCEDURE — 96376 TX/PRO/DX INJ SAME DRUG ADON: CPT

## 2024-10-22 PROCEDURE — 250N000013 HC RX MED GY IP 250 OP 250 PS 637: Performed by: FAMILY MEDICINE

## 2024-10-22 RX ADMIN — FAMOTIDINE 20 MG: 10 INJECTION, SOLUTION INTRAVENOUS at 08:32

## 2024-10-22 RX ADMIN — ACETAMINOPHEN 650 MG: 325 TABLET ORAL at 00:27

## 2024-10-22 RX ADMIN — INFLUENZA A VIRUS A/VICTORIA/4897/2022 IVR-238 (H1N1) ANTIGEN (FORMALDEHYDE INACTIVATED), INFLUENZA A VIRUS A/CALIFORNIA/122/2022 SAN-022 (H3N2) ANTIGEN (FORMALDEHYDE INACTIVATED), AND INFLUENZA B VIRUS B/MICHIGAN/01/2021 ANTIGEN (FORMALDEHYDE INACTIVATED) 0.5 ML: 60; 60; 60 INJECTION, SUSPENSION INTRAMUSCULAR at 11:16

## 2024-10-22 RX ADMIN — ASPIRIN 81 MG: 81 TABLET, COATED ORAL at 08:32

## 2024-10-22 RX ADMIN — ATENOLOL 12.5 MG: 25 TABLET ORAL at 08:32

## 2024-10-22 RX ADMIN — PROMETHAZINE HYDROCHLORIDE 12.5 MG: 12.5 TABLET ORAL at 03:30

## 2024-10-22 RX ADMIN — BACLOFEN 10 MG: 10 TABLET ORAL at 08:33

## 2024-10-22 RX ADMIN — NICOTINE 1 PATCH: 21 PATCH, EXTENDED RELEASE TRANSDERMAL at 08:35

## 2024-10-22 RX ADMIN — ONDANSETRON 4 MG: 4 TABLET, ORALLY DISINTEGRATING ORAL at 10:39

## 2024-10-22 RX ADMIN — PANTOPRAZOLE SODIUM 40 MG: 40 TABLET, DELAYED RELEASE ORAL at 06:49

## 2024-10-22 ASSESSMENT — ACTIVITIES OF DAILY LIVING (ADL)
ADLS_ACUITY_SCORE: 37

## 2024-10-22 NOTE — PLAN OF CARE
Patient discharged to home at 1233 via Private Car.  Accompanied by spouse and staff.  Discharge instructions were reviewed with patient, opportunity offered to ask questions.    Prescriptions printed and given to patient/family.  Access discontinued: Yes  Care plan and education discontinued: Yes  Home meds retrieved from pharmacy: No  Belongings were sent home with patient/family:  Cell phone/electronics: 1, Clothing: Shirt(s): 1, Pants: 1, Underclothes: 1, and Outerwear: 1, Purse/Wallet: 1, and Shoes: 2 .

## 2024-10-22 NOTE — PLAN OF CARE
"Goal Outcome Evaluation:                    PRIMARY DIAGNOSIS: \"GENERIC\" NURSING  OUTPATIENT/OBSERVATION GOALS TO BE MET BEFORE DISCHARGE:  ADLs back to baseline: Yes    Activity and level of assistance: Up with standby assistance.    Pain status: Improved-controlled with oral pain medications.    Return to near baseline physical activity: Yes     Discharge Planner Nurse   Safe discharge environment identified: Yes  Barriers to discharge: No       Entered by: Elvira Morse RN 10/21/2024 10:34 PM     Please review provider order for any additional goals.   Nurse to notify provider when observation goals have been met and patient is ready for discharge.    "

## 2024-10-22 NOTE — DISCHARGE SUMMARY
[facility]  Hospitalist Discharge Summary      Date of Admission:  10/20/2024  Date of Discharge:  10/22/2024  Discharging Provider: Jody Guajardo MD  Discharge Service: Hospitalist Service    Discharge Diagnoses   Acute on chronic nausea and vomiting     Clinically Significant Risk Factors          Follow-ups Needed After Discharge   Follow-up Appointments     Follow-up and recommended labs and tests       Follow up with primary care provider, Alex Nolasco, within 7 days   for hospital follow- up.  No follow up labs or test are needed.    Follow up with [facility] at your next scheduled follow up for hospital follow-   up. No follow up labs or test are needed.            Unresulted Labs Ordered in the Past 30 Days of this Admission       No orders found from 9/20/2024 to 10/21/2024.             Discharge Disposition   Discharged to home  Condition at discharge: Stable    Hospital Course   Mariah Koehler is a 68 year old female with PMH most notable for remote left arm injury complicated by reflex sympathetic dystrophy with intrathecal pump (bupivacaine/Dilaudid), recurrent chronic nausea on daily Compazine and Zofran, who presented with acute on chronic nausea with vomiting and diarrhea for 3 days.CT abdomen pelvis with nonobstructing right renal calculi and intramural pancreatic duct dilatation.  No pancreatitis on CTAP and her lipase and LFTs is normal. No indicated for MRCP. Patient follows with GI outpatient. Diarrhea not present during admission. Patient seen by pain management team to interrogate pain pump. Pain pump functional. Patient treated symptomatically with improvement in symptoms. Patient tolerated regular diet and is stable for discharge.       Consultations This Hospital Stay   INTRATHECAL PUMP PROGRAM MANAGEMENT IP CONSULT  PAIN MANAGEMENT ADULT IP CONSULT  CARE MANAGEMENT / SOCIAL WORK IP CONSULT  PAIN MANAGEMENT ADULT IP CONSULT    Code Status   Full Code    Time  Spent on this Encounter   I, Jody Guajardo MD, personally saw the patient today and spent greater than 30 minutes discharging this patient.       Jody Guajardo MD  [facility] EXTENDED RECOVERY AND SHORT STAY  [address] [place] [id number]  Phone: [phone]  Fax: [phone]  ______________________________________________________________________    Physical Exam   Vital Signs: Temp: 98  F (36.7  C) Temp src: Oral BP: (!) 141/70 Pulse: 59   Resp: 15 SpO2: 100 % O2 Device: None (Room air)    Weight: 108 lbs .41 oz  Constitutional: awake, alert, cooperative, no apparent distress, and appears stated age  Eyes: pupils equal, round and reactive to light and sclera clear  ENT: atraumatic, oral pharynx with moist mucus membranes  Respiratory: No increased work of breathing, good air exchange, clear to auscultation bilaterally, no crackles or wheezing  Cardiovascular: regular rate and rhythm and normal S1 and S2  GI: normal bowel sounds, soft, non-distended, and non-tender  Musculoskeletal: stooped posture, no lower extremity pitting edema present, full range of motion noted       Primary Care Physician   Alex Nolasco    Discharge Orders      Primary Care - Care Coordination Referral      Reason for your hospital stay    Acute on chronic nausea. You were treated symptomatically with symptom resolution. Please follow up on your scheduled GI visits on discharge.     Follow-up and recommended labs and tests     Follow up with primary care provider, Alex Nolasco, within 7 days for hospital follow- up.  No follow up labs or test are needed.    Follow up with [facility] at your next scheduled follow up for hospital follow- up. No follow up labs or test are needed.     Activity    Your activity upon discharge: activity as tolerated     Diet    Follow this diet upon discharge: Current Diet:Orders Placed This Encounter      Regular Diet Adult       Significant Results and Procedures    Most Recent 3 CBC's:  Recent Labs   Lab Test 10/21/24  0558 10/20/24  2208 10/20/24  1138   WBC 9.3 8.8 8.7   HGB 13.4 14.1 15.0   MCV 89 89 89    251 198     Most Recent 3 BMP's:  Recent Labs   Lab Test 10/21/24  0558 10/20/24  2208 10/20/24  1138    138 140   POTASSIUM 3.6 3.5 4.2   CHLORIDE 102 101 104   CO2 22 22 21*   BUN 11.5 12.8 11.9   CR 0.54 0.55 0.48*   ANIONGAP 14 15 15   ESTEFANY 8.3* 8.8 8.4*   * 111* 146*   ,   Results for orders placed or performed during the hospital encounter of 10/20/24   XR Abdomen Upright Only    Narrative    EXAM: XR ABDOMEN UPRIGHT ONLY  LOCATION: [facility]  DATE: 10/21/2024    INDICATION: nausea on chronic pain meds   assess for constipation  COMPARISON: CT AP 10/20/2024      Impression    IMPRESSION: Lung bases are clear.    No evidence of free air on the upright view. Bowel gas pattern is notable for a paucity of stool as noted on yesterday's study. No abnormally dilated loops of bowel.    There is a right gluteal pain pump with an epidural catheter, better seen on the CT. Thoracolumbar scoliosis with the curve convex to the right centered at L1-2.       Discharge Medications   Current Discharge Medication List        CONTINUE these medications which have NOT CHANGED    Details   acetaminophen (TYLENOL) 325 MG tablet Take 1-2 tablets (325-650 mg) by mouth every 6 hours as needed for mild pain or headaches    Associated Diagnoses: Other headache syndrome      aspirin 81 MG EC tablet Take 1 tablet (81 mg) by mouth daily  Qty: 90 tablet, Refills: 3    Associated Diagnoses: Chest pain, unspecified type      atenolol (TENORMIN) 25 MG tablet Take 0.5 tablets (12.5 mg) by mouth daily  Qty: 90 tablet, Refills: 3    Associated Diagnoses: Essential (primary) hypertension      baclofen (LIORESAL) 10 MG tablet TAKE 1 TABLET BY MOUTH 3 TIMES DAILY  Qty: 90 tablet, Refills: 1    Associated Diagnoses: Muscle spasm      famotidine (PEPCID) 20 MG  tablet Take 1 tablet (20 mg) by mouth 2 times daily as needed (acid reflux)  Qty: 20 tablet, Refills: 0      medication given by implanted intrathecal pump continuously. Drug # 1: Fentanyl (Sublimaze) - Conc: 2000 mcg/mL - Total Dose / 24 hours: 824.2 mcg    Drug # 2: Bupivacaine (Marcaine)  - Conc:17.7 mg/mL - Total Dose / 24 hours: 7.294 mg    Drug # 3: Hydromorphone (Dilaudid)  - Conc: 4.6 mg/mL - Total Dose / 24 hours: 1.8956 mg    Diluent: NS    Infusion Rate: 0.4121 mL/24 hrs  Pump Reservoir Volume: 40 mL    Bolus doses: up to 15 bolus doses/24 hours with 1 hour lockout  Each bolus: fentanyl 80.1 mcg, 0.708 mg Bupivacaine, 0.152 mg Dilaudid    Outside Clinic & Provider: [facility] in [place] [phone]  Last Refill Date: 09/19/24  Next Refill Date: 10/24/24  [place] Alarm Volume: 2.0 mL  Pump : [employer]igoCAT    Deliver Pump Medication to:   For [place]: If pump is within 7 days of depletion, pharmacist will enter a 'Pain Management Adult IP Consult' into the EHR, including 'IT pain pump management' as the reason for the consult.      metoclopramide (REGLAN) 10 MG tablet Take 1 tablet (10 mg) by mouth 3 times daily as needed (nausea and vomiting)  Qty: 20 tablet, Refills: 0      multivitamin, therapeutic (THERA-VIT) TABS tablet Take 1 tablet by mouth daily  Qty: 90 tablet, Refills: 3    Associated Diagnoses: Moderate protein-calorie malnutrition (H)      omeprazole (PRILOSEC) 20 MG DR capsule TAKE 1 CAPSULE BY MOUTH TWICE A DAY BEFORE MEALS  Qty: 180 capsule, Refills: 1    Associated Diagnoses: Gastro-esophageal reflux disease with esophagitis, without bleeding      ondansetron (ZOFRAN) 4 MG tablet Take 1 tablet (4 mg) by mouth every 6 hours as needed for nausea.  Qty: 20 tablet, Refills: 0    Associated Diagnoses: Nausea      prochlorperazine (COMPAZINE) 5 MG tablet Take 1 tablet (5 mg) by mouth every 8 hours as needed for nausea or vomiting  Qty: 10 tablet, Refills: 0      promethazine  (PHENERGAN) 25 MG tablet Take 1 tablet (25 mg) by mouth every 6 hours as needed for nausea  Qty: 30 tablet, Refills: 0           Allergies   Allergies   Allergen Reactions    Codeine Nausea and Vomiting    Hydromorphone Headache and Nausea and Vomiting    Morphine Nausea and Vomiting    Bacitracin Rash    Methadone Rash

## 2024-10-22 NOTE — PLAN OF CARE
"PRIMARY DIAGNOSIS: \"GENERIC\" NURSING  OUTPATIENT/OBSERVATION GOALS TO BE MET BEFORE DISCHARGE:  ADLs back to baseline: Yes    Activity and level of assistance: Up with standby assistance with walker    Pain status: Improved-controlled with oral pain medications, tylenol    Return to near baseline physical activity: Yes     Discharge Planner Nurse   Safe discharge environment identified: No  Barriers to discharge: Yes       Entered by: Ute Brooks RN 10/22/2024 7:37 AM     Please review provider order for any additional goals.   Nurse to notify provider when observation goals have been met and patient is ready for discharge.  "

## 2024-10-22 NOTE — PLAN OF CARE
L PIV removed. Discharge instructions reviewed with pt. Spouse to transport home. All belongings retrieved. Done.

## 2024-10-22 NOTE — PLAN OF CARE
"PRIMARY DIAGNOSIS: \"GENERIC\" NURSING  OUTPATIENT/OBSERVATION GOALS TO BE MET BEFORE DISCHARGE:  ADLs back to baseline: Yes    Activity and level of assistance: Up with SBA with walker    Pain status: Improved-controlled with oral pain medications. Tylenol    Return to near baseline physical activity: Yes     Discharge Planner Nurse   Safe discharge environment identified: No  Barriers to discharge: Yes       Entered by: Ute Brooks RN 10/22/2024 7:33 AM     Please review provider order for any additional goals.   Nurse to notify provider when observation goals have been met and patient is ready for discharge.      Pt denies nausea. Slept somewhat in between cares. Pt was up in the chair for some time in the night and didn't want to sleep. No BM this shift - still need stool sample. On tele - NSR. Pt hopes to discharge today.  "

## 2024-10-22 NOTE — PLAN OF CARE
"PRIMARY DIAGNOSIS: \"GENERIC\" NURSING  OUTPATIENT/OBSERVATION GOALS TO BE MET BEFORE DISCHARGE:  ADLs back to baseline: Yes    Activity and level of assistance: Up with SBA with walker    Pain status: Improved-controlled with oral pain medications.     Return to near baseline physical activity: Yes     Discharge Planner Nurse   Safe discharge environment identified: Yes  Barriers to discharge: No       Entered by: Braeden Neely RN 10/22/2024 8:02 AM     Please review provider order for any additional goals.   Nurse to notify provider when observation goals have been met and patient is ready for discharge.  "

## 2024-10-23 ENCOUNTER — PATIENT OUTREACH (OUTPATIENT)
Dept: CARE COORDINATION | Facility: CLINIC | Age: 69
End: 2024-10-23
Payer: COMMERCIAL

## 2024-10-23 NOTE — LETTER
[facility] CARE COORDINATION  [address]  [facility] [place] [address]    October 23, 2024    Mariah Koehler  [address]  [place] [address]      Dear Mariah,    I am a clinic care coordinator who works with Alex Nolasco MD with the [facility]. I have been trying to reach you recently to introduce Clinic Care Coordination. Below is a description of clinic care coordination and how I can further assist you.       The clinic care coordination team is made up of a registered nurse, , financial resource worker and community health worker who understand the health care system. The goal of clinic care coordination is to help you manage your health and improve access to the health care system. Our team works alongside your provider to assist you in determining your health and social needs. We can help you obtain health care and community resources, providing you with necessary information and education. We can work with you through any barriers and develop a care plan that helps coordinate and strengthen the communication between you and your care team.  Our services are voluntary and are offered without charge to you personally.    Please feel free to contact me with any questions or concerns regarding care coordination and what we can offer.      We are focused on providing you with the highest-quality healthcare experience possible.    Sincerely,     Minda Carreon RN  Clinic Care Coordination  [phone]

## 2024-10-23 NOTE — PROGRESS NOTES
Clinic Care Coordination Contact  [facility]/Voicemail    Clinical Data: Care Coordinator Outreach    Outreach Documentation Number of Outreach Attempt   10/23/2024  11:30 AM 1       Left message on patient's voicemail with call back information and requested return call.      Plan: Care Coordinator will try to reach patient again  later today or tomorrow.    TCM Episode created  [facility] x 1

## 2024-10-23 NOTE — PROGRESS NOTES
Clinic Care Coordination Contact  [facility]/Voicemail    Clinical Data: Care Coordinator Outreach    Outreach Documentation Number of Outreach Attempt   10/23/2024  11:30 AM 1   10/23/2024   2:08 PM 2       Left message on patient's voicemail with call back information and requested return call.      Plan: Care Coordinator sent care coordination introduction letter on 10/23/24 via [employer]. Care Coordinator will do no further outreaches at this time.    TCM Episode created  UTC x 2

## 2024-10-26 DIAGNOSIS — M62.838 MUSCLE SPASM: ICD-10-CM

## 2024-10-28 RX ORDER — BACLOFEN 10 MG/1
10 TABLET ORAL 3 TIMES DAILY
Qty: 90 TABLET | Refills: 1 | Status: SHIPPED | OUTPATIENT
Start: 2024-10-28

## 2024-11-02 ENCOUNTER — APPOINTMENT (OUTPATIENT)
Dept: CT IMAGING | Facility: HOSPITAL | Age: 69
End: 2024-11-02
Payer: COMMERCIAL

## 2024-11-02 ENCOUNTER — HOSPITAL ENCOUNTER (OUTPATIENT)
Facility: HOSPITAL | Age: 69
Setting detail: OBSERVATION
Discharge: HOME | End: 2024-11-04
Attending: EMERGENCY MEDICINE | Admitting: EMERGENCY MEDICINE
Payer: COMMERCIAL

## 2024-11-02 DIAGNOSIS — R11.2 NAUSEA AND VOMITING, UNSPECIFIED VOMITING TYPE: ICD-10-CM

## 2024-11-02 DIAGNOSIS — N13.2 HYDRONEPHROSIS WITH URINARY OBSTRUCTION DUE TO URETERAL CALCULUS: ICD-10-CM

## 2024-11-02 DIAGNOSIS — N20.0 KIDNEY STONE: ICD-10-CM

## 2024-11-02 DIAGNOSIS — N20.1 CALCULUS OF URETER: Primary | ICD-10-CM

## 2024-11-02 DIAGNOSIS — G89.4 CHRONIC PAIN SYNDROME: ICD-10-CM

## 2024-11-02 PROBLEM — G90.529 CRPS (COMPLEX REGIONAL PAIN SYNDROME), LOWER LIMB: Status: ACTIVE | Noted: 2023-11-06

## 2024-11-02 LAB
ALBUMIN SERPL BCG-MCNC: 4.4 G/DL (ref 3.5–5.2)
ALBUMIN UR-MCNC: 10 MG/DL
ALP SERPL-CCNC: 102 U/L (ref 40–150)
ALT SERPL W P-5'-P-CCNC: 23 U/L (ref 0–50)
ANION GAP SERPL CALCULATED.3IONS-SCNC: 9 MMOL/L (ref 7–15)
APPEARANCE UR: ABNORMAL
AST SERPL W P-5'-P-CCNC: 20 U/L (ref 0–45)
BASOPHILS # BLD AUTO: 0 10E3/UL (ref 0–0.2)
BASOPHILS NFR BLD AUTO: 0 %
BILIRUB DIRECT SERPL-MCNC: <0.2 MG/DL (ref 0–0.3)
BILIRUB SERPL-MCNC: <0.2 MG/DL
BILIRUB UR QL STRIP: NEGATIVE
BUN SERPL-MCNC: 10.9 MG/DL (ref 8–23)
CALCIUM SERPL-MCNC: 8.6 MG/DL (ref 8.8–10.4)
CHLORIDE SERPL-SCNC: 104 MMOL/L (ref 98–107)
COLOR UR AUTO: YELLOW
CREAT SERPL-MCNC: 0.66 MG/DL (ref 0.51–0.95)
EGFRCR SERPLBLD CKD-EPI 2021: >90 ML/MIN/1.73M2
EOSINOPHIL # BLD AUTO: 0.1 10E3/UL (ref 0–0.7)
EOSINOPHIL NFR BLD AUTO: 1 %
ERYTHROCYTE [DISTWIDTH] IN BLOOD BY AUTOMATED COUNT: 13 % (ref 10–15)
GLUCOSE SERPL-MCNC: 134 MG/DL (ref 70–99)
GLUCOSE UR STRIP-MCNC: NEGATIVE MG/DL
HCO3 SERPL-SCNC: 28 MMOL/L (ref 22–29)
HCT VFR BLD AUTO: 39.4 % (ref 35–47)
HGB BLD-MCNC: 13.1 G/DL (ref 11.7–15.7)
HGB UR QL STRIP: ABNORMAL
IMM GRANULOCYTES # BLD: 0.1 10E3/UL
IMM GRANULOCYTES NFR BLD: 2 %
KETONES UR STRIP-MCNC: NEGATIVE MG/DL
LEUKOCYTE ESTERASE UR QL STRIP: NEGATIVE
LIPASE SERPL-CCNC: 23 U/L (ref 13–60)
LYMPHOCYTES # BLD AUTO: 1.2 10E3/UL (ref 0.8–5.3)
LYMPHOCYTES NFR BLD AUTO: 19 %
MCH RBC QN AUTO: 29.9 PG (ref 26.5–33)
MCHC RBC AUTO-ENTMCNC: 33.2 G/DL (ref 31.5–36.5)
MCV RBC AUTO: 90 FL (ref 78–100)
MONOCYTES # BLD AUTO: 0.5 10E3/UL (ref 0–1.3)
MONOCYTES NFR BLD AUTO: 7 %
MUCOUS THREADS #/AREA URNS LPF: PRESENT /LPF
NEUTROPHILS # BLD AUTO: 4.4 10E3/UL (ref 1.6–8.3)
NEUTROPHILS NFR BLD AUTO: 70 %
NITRATE UR QL: NEGATIVE
NRBC # BLD AUTO: 0 10E3/UL
NRBC BLD AUTO-RTO: 0 /100
PH UR STRIP: 6 [PH] (ref 5–7)
PLATELET # BLD AUTO: 237 10E3/UL (ref 150–450)
POTASSIUM SERPL-SCNC: 3.8 MMOL/L (ref 3.4–5.3)
PROT SERPL-MCNC: 6.6 G/DL (ref 6.4–8.3)
RBC # BLD AUTO: 4.38 10E6/UL (ref 3.8–5.2)
RBC URINE: >182 /HPF
SODIUM SERPL-SCNC: 141 MMOL/L (ref 135–145)
SP GR UR STRIP: 1.02 (ref 1–1.03)
URATE CRY #/AREA URNS HPF: ABNORMAL /HPF
UROBILINOGEN UR STRIP-MCNC: <2 MG/DL
WBC # BLD AUTO: 6.2 10E3/UL (ref 4–11)
WBC URINE: 0 /HPF

## 2024-11-02 PROCEDURE — 81001 URINALYSIS AUTO W/SCOPE: CPT

## 2024-11-02 PROCEDURE — 80048 BASIC METABOLIC PNL TOTAL CA: CPT

## 2024-11-02 PROCEDURE — 74177 CT ABD & PELVIS W/CONTRAST: CPT

## 2024-11-02 PROCEDURE — 93005 ELECTROCARDIOGRAM TRACING: CPT

## 2024-11-02 PROCEDURE — 258N000003 HC RX IP 258 OP 636

## 2024-11-02 PROCEDURE — 82947 ASSAY GLUCOSE BLOOD QUANT: CPT

## 2024-11-02 PROCEDURE — 250N000013 HC RX MED GY IP 250 OP 250 PS 637: Performed by: HOSPITALIST

## 2024-11-02 PROCEDURE — 96376 TX/PRO/DX INJ SAME DRUG ADON: CPT

## 2024-11-02 PROCEDURE — 83690 ASSAY OF LIPASE: CPT

## 2024-11-02 PROCEDURE — G0378 HOSPITAL OBSERVATION PER HR: HCPCS

## 2024-11-02 PROCEDURE — 82248 BILIRUBIN DIRECT: CPT

## 2024-11-02 PROCEDURE — 250N000011 HC RX IP 250 OP 636

## 2024-11-02 PROCEDURE — 96361 HYDRATE IV INFUSION ADD-ON: CPT

## 2024-11-02 PROCEDURE — 99223 1ST HOSP IP/OBS HIGH 75: CPT | Performed by: HOSPITALIST

## 2024-11-02 PROCEDURE — 96375 TX/PRO/DX INJ NEW DRUG ADDON: CPT

## 2024-11-02 PROCEDURE — 36415 COLL VENOUS BLD VENIPUNCTURE: CPT

## 2024-11-02 PROCEDURE — 85004 AUTOMATED DIFF WBC COUNT: CPT

## 2024-11-02 PROCEDURE — 99285 EMERGENCY DEPT VISIT HI MDM: CPT | Mod: 25

## 2024-11-02 PROCEDURE — 96374 THER/PROPH/DIAG INJ IV PUSH: CPT

## 2024-11-02 RX ORDER — ACETAMINOPHEN 325 MG/1
650 TABLET ORAL EVERY 4 HOURS PRN
Status: DISCONTINUED | OUTPATIENT
Start: 2024-11-02 | End: 2024-11-04 | Stop reason: HOSPADM

## 2024-11-02 RX ORDER — ACETAMINOPHEN 325 MG/1
325-650 TABLET ORAL EVERY 6 HOURS PRN
Status: DISCONTINUED | OUTPATIENT
Start: 2024-11-02 | End: 2024-11-02

## 2024-11-02 RX ORDER — PROCHLORPERAZINE 25 MG
12.5 SUPPOSITORY, RECTAL RECTAL EVERY 12 HOURS PRN
Status: DISCONTINUED | OUTPATIENT
Start: 2024-11-02 | End: 2024-11-04 | Stop reason: HOSPADM

## 2024-11-02 RX ORDER — AMOXICILLIN 250 MG
1 CAPSULE ORAL 2 TIMES DAILY PRN
Status: DISCONTINUED | OUTPATIENT
Start: 2024-11-02 | End: 2024-11-04 | Stop reason: HOSPADM

## 2024-11-02 RX ORDER — OXYCODONE HYDROCHLORIDE 5 MG/1
5 TABLET ORAL EVERY 4 HOURS PRN
Status: DISCONTINUED | OUTPATIENT
Start: 2024-11-02 | End: 2024-11-04 | Stop reason: HOSPADM

## 2024-11-02 RX ORDER — HYDROMORPHONE HCL IN WATER/PF 6 MG/30 ML
0.2 PATIENT CONTROLLED ANALGESIA SYRINGE INTRAVENOUS
Status: DISCONTINUED | OUTPATIENT
Start: 2024-11-02 | End: 2024-11-04 | Stop reason: HOSPADM

## 2024-11-02 RX ORDER — PROCHLORPERAZINE MALEATE 5 MG/1
5 TABLET ORAL EVERY 6 HOURS PRN
Status: DISCONTINUED | OUTPATIENT
Start: 2024-11-02 | End: 2024-11-04 | Stop reason: HOSPADM

## 2024-11-02 RX ORDER — ONDANSETRON 4 MG/1
4 TABLET, ORALLY DISINTEGRATING ORAL EVERY 6 HOURS PRN
Status: DISCONTINUED | OUTPATIENT
Start: 2024-11-02 | End: 2024-11-04 | Stop reason: HOSPADM

## 2024-11-02 RX ORDER — ONDANSETRON 2 MG/ML
4 INJECTION INTRAMUSCULAR; INTRAVENOUS EVERY 6 HOURS PRN
Status: DISCONTINUED | OUTPATIENT
Start: 2024-11-02 | End: 2024-11-04 | Stop reason: HOSPADM

## 2024-11-02 RX ORDER — ATENOLOL 25 MG/1
12.5 TABLET ORAL DAILY
Status: DISCONTINUED | OUTPATIENT
Start: 2024-11-03 | End: 2024-11-04 | Stop reason: HOSPADM

## 2024-11-02 RX ORDER — HYDROMORPHONE HCL IN WATER/PF 6 MG/30 ML
0.3 PATIENT CONTROLLED ANALGESIA SYRINGE INTRAVENOUS
Status: DISCONTINUED | OUTPATIENT
Start: 2024-11-02 | End: 2024-11-04 | Stop reason: HOSPADM

## 2024-11-02 RX ORDER — METOCLOPRAMIDE HYDROCHLORIDE 5 MG/ML
5 INJECTION INTRAMUSCULAR; INTRAVENOUS ONCE
Status: COMPLETED | OUTPATIENT
Start: 2024-11-02 | End: 2024-11-02

## 2024-11-02 RX ORDER — MULTIVITAMIN,THERAPEUTIC
1 TABLET ORAL DAILY
Status: DISCONTINUED | OUTPATIENT
Start: 2024-11-03 | End: 2024-11-04 | Stop reason: HOSPADM

## 2024-11-02 RX ORDER — TAMSULOSIN HYDROCHLORIDE 0.4 MG/1
0.4 CAPSULE ORAL DAILY
Status: DISCONTINUED | OUTPATIENT
Start: 2024-11-02 | End: 2024-11-04 | Stop reason: HOSPADM

## 2024-11-02 RX ORDER — IOPAMIDOL 755 MG/ML
90 INJECTION, SOLUTION INTRAVASCULAR ONCE
Status: COMPLETED | OUTPATIENT
Start: 2024-11-02 | End: 2024-11-02

## 2024-11-02 RX ORDER — AMOXICILLIN 250 MG
2 CAPSULE ORAL AT BEDTIME
Status: DISCONTINUED | OUTPATIENT
Start: 2024-11-03 | End: 2024-11-04 | Stop reason: HOSPADM

## 2024-11-02 RX ORDER — AMOXICILLIN 250 MG
2 CAPSULE ORAL 2 TIMES DAILY PRN
Status: DISCONTINUED | OUTPATIENT
Start: 2024-11-02 | End: 2024-11-04 | Stop reason: HOSPADM

## 2024-11-02 RX ORDER — FAMOTIDINE 20 MG/1
20 TABLET, FILM COATED ORAL 2 TIMES DAILY PRN
Status: DISCONTINUED | OUTPATIENT
Start: 2024-11-02 | End: 2024-11-04 | Stop reason: HOSPADM

## 2024-11-02 RX ORDER — OXYCODONE HYDROCHLORIDE 5 MG/1
10 TABLET ORAL EVERY 4 HOURS PRN
Status: DISCONTINUED | OUTPATIENT
Start: 2024-11-02 | End: 2024-11-04 | Stop reason: HOSPADM

## 2024-11-02 RX ORDER — ACETAMINOPHEN 650 MG/1
650 SUPPOSITORY RECTAL EVERY 4 HOURS PRN
Status: DISCONTINUED | OUTPATIENT
Start: 2024-11-02 | End: 2024-11-04 | Stop reason: HOSPADM

## 2024-11-02 RX ORDER — LACTULOSE 10 G/15ML
20 SOLUTION ORAL DAILY
Status: DISCONTINUED | OUTPATIENT
Start: 2024-11-03 | End: 2024-11-03

## 2024-11-02 RX ORDER — BACLOFEN 10 MG/1
10 TABLET ORAL 3 TIMES DAILY
Status: DISCONTINUED | OUTPATIENT
Start: 2024-11-03 | End: 2024-11-04 | Stop reason: HOSPADM

## 2024-11-02 RX ORDER — KETOROLAC TROMETHAMINE 15 MG/ML
15 INJECTION, SOLUTION INTRAMUSCULAR; INTRAVENOUS ONCE
Status: COMPLETED | OUTPATIENT
Start: 2024-11-02 | End: 2024-11-02

## 2024-11-02 RX ADMIN — TAMSULOSIN HYDROCHLORIDE 0.4 MG: 0.4 CAPSULE ORAL at 23:16

## 2024-11-02 RX ADMIN — METOCLOPRAMIDE 5 MG: 5 INJECTION, SOLUTION INTRAMUSCULAR; INTRAVENOUS at 22:22

## 2024-11-02 RX ADMIN — METOCLOPRAMIDE 5 MG: 5 INJECTION, SOLUTION INTRAMUSCULAR; INTRAVENOUS at 23:16

## 2024-11-02 RX ADMIN — IOPAMIDOL 90 ML: 755 INJECTION, SOLUTION INTRAVENOUS at 21:51

## 2024-11-02 RX ADMIN — SODIUM CHLORIDE 1000 ML: 9 INJECTION, SOLUTION INTRAVENOUS at 22:50

## 2024-11-02 RX ADMIN — KETOROLAC TROMETHAMINE 15 MG: 15 INJECTION, SOLUTION INTRAMUSCULAR; INTRAVENOUS at 22:50

## 2024-11-02 ASSESSMENT — COLUMBIA-SUICIDE SEVERITY RATING SCALE - C-SSRS
1. IN THE PAST MONTH, HAVE YOU WISHED YOU WERE DEAD OR WISHED YOU COULD GO TO SLEEP AND NOT WAKE UP?: NO
2. HAVE YOU ACTUALLY HAD ANY THOUGHTS OF KILLING YOURSELF IN THE PAST MONTH?: NO
6. HAVE YOU EVER DONE ANYTHING, STARTED TO DO ANYTHING, OR PREPARED TO DO ANYTHING TO END YOUR LIFE?: NO

## 2024-11-02 ASSESSMENT — ACTIVITIES OF DAILY LIVING (ADL)
ADLS_ACUITY_SCORE: 0

## 2024-11-03 ENCOUNTER — ANESTHESIA EVENT (OUTPATIENT)
Dept: SURGERY | Facility: HOSPITAL | Age: 69
End: 2024-11-03
Payer: COMMERCIAL

## 2024-11-03 ENCOUNTER — APPOINTMENT (OUTPATIENT)
Dept: RADIOLOGY | Facility: HOSPITAL | Age: 69
End: 2024-11-03
Attending: UROLOGY
Payer: COMMERCIAL

## 2024-11-03 ENCOUNTER — ANESTHESIA (OUTPATIENT)
Dept: SURGERY | Facility: HOSPITAL | Age: 69
End: 2024-11-03
Payer: COMMERCIAL

## 2024-11-03 LAB
ANION GAP SERPL CALCULATED.3IONS-SCNC: 10 MMOL/L (ref 7–15)
BUN SERPL-MCNC: 8.4 MG/DL (ref 8–23)
CALCIUM SERPL-MCNC: 8 MG/DL (ref 8.8–10.4)
CHLORIDE SERPL-SCNC: 104 MMOL/L (ref 98–107)
CREAT SERPL-MCNC: 0.51 MG/DL (ref 0.51–0.95)
EGFRCR SERPLBLD CKD-EPI 2021: >90 ML/MIN/1.73M2
ERYTHROCYTE [DISTWIDTH] IN BLOOD BY AUTOMATED COUNT: 13 % (ref 10–15)
GLUCOSE SERPL-MCNC: 102 MG/DL (ref 70–99)
HCO3 SERPL-SCNC: 26 MMOL/L (ref 22–29)
HCT VFR BLD AUTO: 36.2 % (ref 35–47)
HGB BLD-MCNC: 11.8 G/DL (ref 11.7–15.7)
MCH RBC QN AUTO: 29.1 PG (ref 26.5–33)
MCHC RBC AUTO-ENTMCNC: 32.6 G/DL (ref 31.5–36.5)
MCV RBC AUTO: 89 FL (ref 78–100)
PLATELET # BLD AUTO: 202 10E3/UL (ref 150–450)
POTASSIUM SERPL-SCNC: 3.4 MMOL/L (ref 3.4–5.3)
RBC # BLD AUTO: 4.05 10E6/UL (ref 3.8–5.2)
SODIUM SERPL-SCNC: 140 MMOL/L (ref 135–145)
WBC # BLD AUTO: 6.2 10E3/UL (ref 4–11)

## 2024-11-03 PROCEDURE — 258N000003 HC RX IP 258 OP 636: Performed by: ANESTHESIOLOGY

## 2024-11-03 PROCEDURE — 250N000013 HC RX MED GY IP 250 OP 250 PS 637: Performed by: HOSPITALIST

## 2024-11-03 PROCEDURE — 99207 PR NO BILLABLE SERVICE THIS VISIT: CPT | Performed by: STUDENT IN AN ORGANIZED HEALTH CARE EDUCATION/TRAINING PROGRAM

## 2024-11-03 PROCEDURE — 250N000011 HC RX IP 250 OP 636: Performed by: UROLOGY

## 2024-11-03 PROCEDURE — 82365 CALCULUS SPECTROSCOPY: CPT | Performed by: UROLOGY

## 2024-11-03 PROCEDURE — 96376 TX/PRO/DX INJ SAME DRUG ADON: CPT

## 2024-11-03 PROCEDURE — 999N000141 HC STATISTIC PRE-PROCEDURE NURSING ASSESSMENT: Performed by: UROLOGY

## 2024-11-03 PROCEDURE — 258N000003 HC RX IP 258 OP 636: Performed by: HOSPITALIST

## 2024-11-03 PROCEDURE — 80048 BASIC METABOLIC PNL TOTAL CA: CPT | Performed by: HOSPITALIST

## 2024-11-03 PROCEDURE — C1894 INTRO/SHEATH, NON-LASER: HCPCS | Performed by: UROLOGY

## 2024-11-03 PROCEDURE — C2617 STENT, NON-COR, TEM W/O DEL: HCPCS | Performed by: UROLOGY

## 2024-11-03 PROCEDURE — 36415 COLL VENOUS BLD VENIPUNCTURE: CPT | Performed by: HOSPITALIST

## 2024-11-03 PROCEDURE — 272N000001 HC OR GENERAL SUPPLY STERILE: Performed by: UROLOGY

## 2024-11-03 PROCEDURE — 370N000017 HC ANESTHESIA TECHNICAL FEE, PER MIN: Performed by: UROLOGY

## 2024-11-03 PROCEDURE — C1769 GUIDE WIRE: HCPCS | Performed by: UROLOGY

## 2024-11-03 PROCEDURE — 85014 HEMATOCRIT: CPT | Performed by: HOSPITALIST

## 2024-11-03 PROCEDURE — 360N000084 HC SURGERY LEVEL 4 W/ FLUORO, PER MIN: Performed by: UROLOGY

## 2024-11-03 PROCEDURE — 52332 CYSTOSCOPY AND TREATMENT: CPT | Performed by: STUDENT IN AN ORGANIZED HEALTH CARE EDUCATION/TRAINING PROGRAM

## 2024-11-03 PROCEDURE — 250N000011 HC RX IP 250 OP 636: Performed by: ANESTHESIOLOGY

## 2024-11-03 PROCEDURE — 255N000002 HC RX 255 OP 636: Performed by: UROLOGY

## 2024-11-03 PROCEDURE — 96375 TX/PRO/DX INJ NEW DRUG ADDON: CPT

## 2024-11-03 PROCEDURE — 710N000010 HC RECOVERY PHASE 1, LEVEL 2, PER MIN: Performed by: UROLOGY

## 2024-11-03 PROCEDURE — 52332 CYSTOSCOPY AND TREATMENT: CPT | Performed by: ANESTHESIOLOGY

## 2024-11-03 PROCEDURE — G0378 HOSPITAL OBSERVATION PER HR: HCPCS

## 2024-11-03 PROCEDURE — 250N000025 HC SEVOFLURANE, PER MIN: Performed by: UROLOGY

## 2024-11-03 PROCEDURE — 99207 PR NO BILLABLE SERVICE THIS VISIT: CPT

## 2024-11-03 PROCEDURE — 250N000011 HC RX IP 250 OP 636

## 2024-11-03 PROCEDURE — 82947 ASSAY GLUCOSE BLOOD QUANT: CPT | Performed by: HOSPITALIST

## 2024-11-03 PROCEDURE — 999N000180 XR SURGERY CARM FLUORO LESS THAN 5 MIN: Mod: TC

## 2024-11-03 PROCEDURE — 250N000009 HC RX 250: Performed by: ANESTHESIOLOGY

## 2024-11-03 PROCEDURE — 250N000011 HC RX IP 250 OP 636: Performed by: HOSPITALIST

## 2024-11-03 DEVICE — URETERAL STENT
Type: IMPLANTABLE DEVICE | Status: FUNCTIONAL
Brand: PERCUFLEX™ PLUS

## 2024-11-03 RX ORDER — DEXAMETHASONE SODIUM PHOSPHATE 10 MG/ML
4 INJECTION, SOLUTION INTRAMUSCULAR; INTRAVENOUS
Status: DISCONTINUED | OUTPATIENT
Start: 2024-11-03 | End: 2024-11-03 | Stop reason: HOSPADM

## 2024-11-03 RX ORDER — PROPOFOL 10 MG/ML
INJECTION, EMULSION INTRAVENOUS PRN
Status: DISCONTINUED | OUTPATIENT
Start: 2024-11-03 | End: 2024-11-03

## 2024-11-03 RX ORDER — ONDANSETRON 4 MG/1
4 TABLET, ORALLY DISINTEGRATING ORAL EVERY 30 MIN PRN
Status: DISCONTINUED | OUTPATIENT
Start: 2024-11-03 | End: 2024-11-03 | Stop reason: HOSPADM

## 2024-11-03 RX ORDER — OMEPRAZOLE 20 MG/1
20 TABLET, DELAYED RELEASE ORAL 2 TIMES DAILY
COMMUNITY

## 2024-11-03 RX ORDER — NALOXONE HYDROCHLORIDE 0.4 MG/ML
0.2 INJECTION, SOLUTION INTRAMUSCULAR; INTRAVENOUS; SUBCUTANEOUS
Status: DISCONTINUED | OUTPATIENT
Start: 2024-11-03 | End: 2024-11-04 | Stop reason: HOSPADM

## 2024-11-03 RX ORDER — OXYCODONE HYDROCHLORIDE 5 MG/1
5 TABLET ORAL
Status: DISCONTINUED | OUTPATIENT
Start: 2024-11-03 | End: 2024-11-03

## 2024-11-03 RX ORDER — ACETAMINOPHEN 325 MG/1
975 TABLET ORAL ONCE
Status: DISCONTINUED | OUTPATIENT
Start: 2024-11-03 | End: 2024-11-03

## 2024-11-03 RX ORDER — SODIUM CHLORIDE 9 MG/ML
INJECTION, SOLUTION INTRAVENOUS CONTINUOUS PRN
Status: DISCONTINUED | OUTPATIENT
Start: 2024-11-03 | End: 2024-11-03

## 2024-11-03 RX ORDER — ONDANSETRON 4 MG/1
4 TABLET, ORALLY DISINTEGRATING ORAL EVERY 30 MIN PRN
Status: DISCONTINUED | OUTPATIENT
Start: 2024-11-03 | End: 2024-11-03

## 2024-11-03 RX ORDER — TAMSULOSIN HYDROCHLORIDE 0.4 MG/1
0.4 CAPSULE ORAL DAILY
Qty: 7 CAPSULE | Refills: 0 | Status: SHIPPED | OUTPATIENT
Start: 2024-11-04 | End: 2024-11-05

## 2024-11-03 RX ORDER — ACETAMINOPHEN 650 MG/1
650 SUPPOSITORY RECTAL ONCE
Status: DISCONTINUED | OUTPATIENT
Start: 2024-11-03 | End: 2024-11-03

## 2024-11-03 RX ORDER — LIDOCAINE HYDROCHLORIDE 10 MG/ML
INJECTION, SOLUTION INFILTRATION; PERINEURAL PRN
Status: DISCONTINUED | OUTPATIENT
Start: 2024-11-03 | End: 2024-11-03

## 2024-11-03 RX ORDER — ONDANSETRON 2 MG/ML
4 INJECTION INTRAMUSCULAR; INTRAVENOUS EVERY 30 MIN PRN
Status: DISCONTINUED | OUTPATIENT
Start: 2024-11-03 | End: 2024-11-03

## 2024-11-03 RX ORDER — FENTANYL CITRATE 50 UG/ML
50 INJECTION, SOLUTION INTRAMUSCULAR; INTRAVENOUS EVERY 5 MIN PRN
Status: DISCONTINUED | OUTPATIENT
Start: 2024-11-03 | End: 2024-11-03 | Stop reason: HOSPADM

## 2024-11-03 RX ORDER — DEXAMETHASONE SODIUM PHOSPHATE 4 MG/ML
INJECTION, SOLUTION INTRA-ARTICULAR; INTRALESIONAL; INTRAMUSCULAR; INTRAVENOUS; SOFT TISSUE PRN
Status: DISCONTINUED | OUTPATIENT
Start: 2024-11-03 | End: 2024-11-03

## 2024-11-03 RX ORDER — PANTOPRAZOLE SODIUM 40 MG/1
40 TABLET, DELAYED RELEASE ORAL
Status: DISCONTINUED | OUTPATIENT
Start: 2024-11-03 | End: 2024-11-04 | Stop reason: HOSPADM

## 2024-11-03 RX ORDER — HYDROMORPHONE HCL IN WATER/PF 6 MG/30 ML
0.2 PATIENT CONTROLLED ANALGESIA SYRINGE INTRAVENOUS EVERY 5 MIN PRN
Status: DISCONTINUED | OUTPATIENT
Start: 2024-11-03 | End: 2024-11-03 | Stop reason: HOSPADM

## 2024-11-03 RX ORDER — ONDANSETRON 2 MG/ML
4 INJECTION INTRAMUSCULAR; INTRAVENOUS EVERY 30 MIN PRN
Status: DISCONTINUED | OUTPATIENT
Start: 2024-11-03 | End: 2024-11-03 | Stop reason: HOSPADM

## 2024-11-03 RX ORDER — HYDROMORPHONE HCL IN WATER/PF 6 MG/30 ML
0.4 PATIENT CONTROLLED ANALGESIA SYRINGE INTRAVENOUS EVERY 5 MIN PRN
Status: DISCONTINUED | OUTPATIENT
Start: 2024-11-03 | End: 2024-11-03 | Stop reason: HOSPADM

## 2024-11-03 RX ORDER — EPHEDRINE SULFATE 50 MG/ML
INJECTION, SOLUTION INTRAMUSCULAR; INTRAVENOUS; SUBCUTANEOUS PRN
Status: DISCONTINUED | OUTPATIENT
Start: 2024-11-03 | End: 2024-11-03

## 2024-11-03 RX ORDER — SODIUM CHLORIDE, SODIUM LACTATE, POTASSIUM CHLORIDE, CALCIUM CHLORIDE 600; 310; 30; 20 MG/100ML; MG/100ML; MG/100ML; MG/100ML
INJECTION, SOLUTION INTRAVENOUS CONTINUOUS
Status: DISCONTINUED | OUTPATIENT
Start: 2024-11-03 | End: 2024-11-03 | Stop reason: HOSPADM

## 2024-11-03 RX ORDER — FENTANYL CITRATE 50 UG/ML
25 INJECTION, SOLUTION INTRAMUSCULAR; INTRAVENOUS EVERY 5 MIN PRN
Status: DISCONTINUED | OUTPATIENT
Start: 2024-11-03 | End: 2024-11-03 | Stop reason: HOSPADM

## 2024-11-03 RX ORDER — PROPOFOL 10 MG/ML
INJECTION, EMULSION INTRAVENOUS CONTINUOUS PRN
Status: DISCONTINUED | OUTPATIENT
Start: 2024-11-03 | End: 2024-11-03

## 2024-11-03 RX ORDER — LIDOCAINE 40 MG/G
CREAM TOPICAL
Status: DISCONTINUED | OUTPATIENT
Start: 2024-11-03 | End: 2024-11-03 | Stop reason: HOSPADM

## 2024-11-03 RX ORDER — OXYCODONE HYDROCHLORIDE 5 MG/1
10 TABLET ORAL
Status: DISCONTINUED | OUTPATIENT
Start: 2024-11-03 | End: 2024-11-03

## 2024-11-03 RX ORDER — DEXAMETHASONE SODIUM PHOSPHATE 4 MG/ML
4 INJECTION, SOLUTION INTRA-ARTICULAR; INTRALESIONAL; INTRAMUSCULAR; INTRAVENOUS; SOFT TISSUE
Status: DISCONTINUED | OUTPATIENT
Start: 2024-11-03 | End: 2024-11-03

## 2024-11-03 RX ORDER — FENTANYL CITRATE 50 UG/ML
INJECTION, SOLUTION INTRAMUSCULAR; INTRAVENOUS PRN
Status: DISCONTINUED | OUTPATIENT
Start: 2024-11-03 | End: 2024-11-03

## 2024-11-03 RX ORDER — HYDRALAZINE HYDROCHLORIDE 20 MG/ML
10 INJECTION INTRAMUSCULAR; INTRAVENOUS EVERY 6 HOURS PRN
Status: DISCONTINUED | OUTPATIENT
Start: 2024-11-03 | End: 2024-11-04 | Stop reason: HOSPADM

## 2024-11-03 RX ORDER — CEFAZOLIN SODIUM/WATER 2 G/20 ML
2 SYRINGE (ML) INTRAVENOUS
Status: COMPLETED | OUTPATIENT
Start: 2024-11-03 | End: 2024-11-03

## 2024-11-03 RX ORDER — NALOXONE HYDROCHLORIDE 0.4 MG/ML
0.4 INJECTION, SOLUTION INTRAMUSCULAR; INTRAVENOUS; SUBCUTANEOUS
Status: DISCONTINUED | OUTPATIENT
Start: 2024-11-03 | End: 2024-11-04 | Stop reason: HOSPADM

## 2024-11-03 RX ORDER — ONDANSETRON 2 MG/ML
INJECTION INTRAMUSCULAR; INTRAVENOUS PRN
Status: DISCONTINUED | OUTPATIENT
Start: 2024-11-03 | End: 2024-11-03

## 2024-11-03 RX ORDER — CEFAZOLIN SODIUM/WATER 2 G/20 ML
2 SYRINGE (ML) INTRAVENOUS SEE ADMIN INSTRUCTIONS
Status: DISCONTINUED | OUTPATIENT
Start: 2024-11-03 | End: 2024-11-03 | Stop reason: HOSPADM

## 2024-11-03 RX ORDER — NALOXONE HYDROCHLORIDE 0.4 MG/ML
0.1 INJECTION, SOLUTION INTRAMUSCULAR; INTRAVENOUS; SUBCUTANEOUS
Status: DISCONTINUED | OUTPATIENT
Start: 2024-11-03 | End: 2024-11-03 | Stop reason: HOSPADM

## 2024-11-03 RX ORDER — NALOXONE HYDROCHLORIDE 0.4 MG/ML
0.1 INJECTION, SOLUTION INTRAMUSCULAR; INTRAVENOUS; SUBCUTANEOUS
Status: DISCONTINUED | OUTPATIENT
Start: 2024-11-03 | End: 2024-11-03

## 2024-11-03 RX ADMIN — SODIUM CHLORIDE 1000 ML: 9 INJECTION, SOLUTION INTRAVENOUS at 01:22

## 2024-11-03 RX ADMIN — PANTOPRAZOLE SODIUM 40 MG: 40 TABLET, DELAYED RELEASE ORAL at 08:20

## 2024-11-03 RX ADMIN — PROCHLORPERAZINE EDISYLATE 5 MG: 5 INJECTION INTRAMUSCULAR; INTRAVENOUS at 09:39

## 2024-11-03 RX ADMIN — ACETAMINOPHEN 650 MG: 325 TABLET ORAL at 17:14

## 2024-11-03 RX ADMIN — ONDANSETRON 4 MG: 2 INJECTION INTRAMUSCULAR; INTRAVENOUS at 10:50

## 2024-11-03 RX ADMIN — SENNOSIDES AND DOCUSATE SODIUM 2 TABLET: 8.6; 5 TABLET ORAL at 01:34

## 2024-11-03 RX ADMIN — ONDANSETRON 4 MG: 2 INJECTION INTRAMUSCULAR; INTRAVENOUS at 14:09

## 2024-11-03 RX ADMIN — HYDROMORPHONE HYDROCHLORIDE 0.2 MG: 0.2 INJECTION, SOLUTION INTRAMUSCULAR; INTRAVENOUS; SUBCUTANEOUS at 00:43

## 2024-11-03 RX ADMIN — ONDANSETRON 4 MG: 4 TABLET, ORALLY DISINTEGRATING ORAL at 04:14

## 2024-11-03 RX ADMIN — DEXMEDETOMIDINE HYDROCHLORIDE 8 MCG: 100 INJECTION, SOLUTION INTRAVENOUS at 11:19

## 2024-11-03 RX ADMIN — HYDROMORPHONE HYDROCHLORIDE 0.2 MG: 0.2 INJECTION, SOLUTION INTRAMUSCULAR; INTRAVENOUS; SUBCUTANEOUS at 03:00

## 2024-11-03 RX ADMIN — PANTOPRAZOLE SODIUM 40 MG: 40 TABLET, DELAYED RELEASE ORAL at 16:07

## 2024-11-03 RX ADMIN — ATENOLOL 12.5 MG: 25 TABLET ORAL at 10:14

## 2024-11-03 RX ADMIN — DEXMEDETOMIDINE HYDROCHLORIDE 8 MCG: 100 INJECTION, SOLUTION INTRAVENOUS at 10:38

## 2024-11-03 RX ADMIN — HYDROMORPHONE HYDROCHLORIDE 0.2 MG: 0.2 INJECTION, SOLUTION INTRAMUSCULAR; INTRAVENOUS; SUBCUTANEOUS at 18:54

## 2024-11-03 RX ADMIN — FENTANYL CITRATE 50 MCG: 50 INJECTION, SOLUTION INTRAMUSCULAR; INTRAVENOUS at 10:40

## 2024-11-03 RX ADMIN — Medication 10 MG: at 11:01

## 2024-11-03 RX ADMIN — Medication 2 G: at 10:38

## 2024-11-03 RX ADMIN — PROPOFOL 75 MCG/KG/MIN: 10 INJECTION, EMULSION INTRAVENOUS at 10:46

## 2024-11-03 RX ADMIN — OXYCODONE HYDROCHLORIDE 5 MG: 5 TABLET ORAL at 16:24

## 2024-11-03 RX ADMIN — LACTULOSE 20 G: 10 SOLUTION ORAL at 08:23

## 2024-11-03 RX ADMIN — TAMSULOSIN HYDROCHLORIDE 0.4 MG: 0.4 CAPSULE ORAL at 08:19

## 2024-11-03 RX ADMIN — FENTANYL CITRATE 25 MCG: 50 INJECTION, SOLUTION INTRAMUSCULAR; INTRAVENOUS at 12:33

## 2024-11-03 RX ADMIN — HYDROMORPHONE HYDROCHLORIDE 0.2 MG: 0.2 INJECTION, SOLUTION INTRAMUSCULAR; INTRAVENOUS; SUBCUTANEOUS at 13:55

## 2024-11-03 RX ADMIN — FENTANYL CITRATE 50 MCG: 50 INJECTION, SOLUTION INTRAMUSCULAR; INTRAVENOUS at 10:46

## 2024-11-03 RX ADMIN — PHENYLEPHRINE HYDROCHLORIDE 150 MCG: 10 INJECTION INTRAVENOUS at 11:04

## 2024-11-03 RX ADMIN — SODIUM CHLORIDE: 9 INJECTION, SOLUTION INTRAVENOUS at 10:37

## 2024-11-03 RX ADMIN — ACETAMINOPHEN 650 MG: 325 TABLET ORAL at 08:19

## 2024-11-03 RX ADMIN — BACLOFEN 10 MG: 10 TABLET ORAL at 13:56

## 2024-11-03 RX ADMIN — FENTANYL CITRATE 25 MCG: 50 INJECTION, SOLUTION INTRAMUSCULAR; INTRAVENOUS at 12:45

## 2024-11-03 RX ADMIN — FENTANYL CITRATE 25 MCG: 50 INJECTION, SOLUTION INTRAMUSCULAR; INTRAVENOUS at 12:40

## 2024-11-03 RX ADMIN — SENNOSIDES AND DOCUSATE SODIUM 2 TABLET: 8.6; 5 TABLET ORAL at 21:30

## 2024-11-03 RX ADMIN — PROPOFOL 100 MG: 10 INJECTION, EMULSION INTRAVENOUS at 10:46

## 2024-11-03 RX ADMIN — DEXAMETHASONE SODIUM PHOSPHATE 4 MG: 4 INJECTION, SOLUTION INTRA-ARTICULAR; INTRALESIONAL; INTRAMUSCULAR; INTRAVENOUS; SOFT TISSUE at 10:52

## 2024-11-03 RX ADMIN — THERA TABS 1 TABLET: TAB at 08:19

## 2024-11-03 RX ADMIN — LIDOCAINE HYDROCHLORIDE 5 ML: 10 INJECTION, SOLUTION INFILTRATION; PERINEURAL at 10:46

## 2024-11-03 RX ADMIN — FENTANYL CITRATE 25 MCG: 50 INJECTION, SOLUTION INTRAMUSCULAR; INTRAVENOUS at 12:50

## 2024-11-03 RX ADMIN — HYDROMORPHONE HYDROCHLORIDE 0.2 MG: 0.2 INJECTION, SOLUTION INTRAMUSCULAR; INTRAVENOUS; SUBCUTANEOUS at 08:12

## 2024-11-03 RX ADMIN — HYDRALAZINE HYDROCHLORIDE 10 MG: 20 INJECTION INTRAMUSCULAR; INTRAVENOUS at 13:55

## 2024-11-03 RX ADMIN — BACLOFEN 10 MG: 10 TABLET ORAL at 21:30

## 2024-11-03 ASSESSMENT — ACTIVITIES OF DAILY LIVING (ADL)
ADLS_ACUITY_SCORE: 0
DEPENDENT_IADLS:: CLEANING;COOKING;LAUNDRY;SHOPPING
ADLS_ACUITY_SCORE: 0

## 2024-11-03 ASSESSMENT — LIFESTYLE VARIABLES: TOBACCO_USE: 1

## 2024-11-03 ASSESSMENT — COPD QUESTIONNAIRES
COPD: 1
CAT_SEVERITY: MODERATE

## 2024-11-03 NOTE — PLAN OF CARE
"  Problem: Adult Inpatient Plan of Care  Goal: Patient-Specific Goal (Individualized)  Description: You can add care plan individualizations to a care plan. Examples of Individualization might be:  \"Parent requests to be called daily at 9am for status\", \"I have a hard time hearing out of my right ear\", or \"Do not touch me to wake me up as it startles  me\".  Outcome: Adequate for Care Transition     Problem: Adult Inpatient Plan of Care  Goal: Optimal Comfort and Wellbeing  Outcome: Adequate for Care Transition  Intervention: Monitor Pain and Promote Comfort  Recent Flowsheet Documentation  Taken 11/3/2024 4796 by Stephanie Buchanan, RN  Pain Management Interventions: medication (see MAR)   Goal Outcome Evaluation:      Plan of Care Reviewed With: patient      Blood pressure improved after IV hydralazine. Patient still painful, medicated once with IV dilaudid. Plan to try oral pain medication when nausea is better. IV Zofran given for mild nausea. Continue to monitor           "

## 2024-11-03 NOTE — ED PROVIDER NOTES
EMERGENCY DEPARTMENT ENCOUNTER      NAME: Mariah Koehler  AGE: 68 year old female  YOB: 1955  MRN: [id number]  EVALUATION DATE & TIME: 11/2/2024  8:02 PM    PCP: Alex Nolasco    ED PROVIDER: Shanell Borges PA-C      Chief Complaint   Patient presents with    Nausea & Vomiting    Hematuria     FINAL IMPRESSION:  1. Kidney stone    2. Nausea and vomiting, unspecified vomiting type      ED COURSE & MEDICAL DECISION MAKING:    Pertinent Labs & Imaging studies reviewed. (See chart for details)  68 year old female presents to the Emergency Department for evaluation of nausea and vomiting.  Per chart review on 11/20 patient was admitted to the hospital for nausea vomiting and diarrhea.  CT of the abdomen showed a nonobstructing right renal calculi and hematuria.  Patient was received droperidol, Zofran, promethazine and IV fluids and improved significantly.  Patient was seen by emergency room yesterday with hematuria and was discharged home with Keflex and referred to urology.  Patient comes in today with worsening nausea and vomiting.  Patient reports that she has not been able to keep any p.o. intake for several days.  Patient also complains of urinary frequency and hematuria. No Fevers or chills.  Vital signs reviewed patient is hypertensive most likely secondary due to symptoms.  On exam, dry mucous membranes.  Patient is alert and answering questions appropriately.  Pupils are equal round and reactive.  Patient moves all 4 extremities without difficulty.  Bilateral flanks are nontender to palpation.  Abdomen is soft and nontender.  No rash.  Normal rate.  Lungs are clear to auscultation bilaterally.    Diagnosis includes kidney stone, UTI, pyelonephritis, hydronephrosis, cholecystitis, appendicitis, gastroenteritis.  UA shows blood.  No nitrates or leukocytes.  White Blood count is 6.2.  Hemoglobin is 13.1.  Sodium and potassium is within normal limits.  Anion gap is 9.  Creatinine 0.66.   Hepatic is within normal limits.  CT of the abdomen shows a 3 mm stone in the proximal right ureter with associated mild hydroureteronephrosis.  No other radiodensity kidney or ureter stones.  No left hydronephrosis.  Moderate amount of stool in the colon including large stool ball in the rectum which can be seen with constipation.  Patient received fluids, Reglan and Toradol.  Patient continued to have nausea and vomiting.  Plan will be to admit the patient for nausea and vomiting.   Patient was educated on results and agrees with plan.  All questions answered.  I spoke with the hospitalist who agrees to the patient.  I spoke with urology who will see the patient who recommended keeping the patient NPO at at midnight.       ED COURSE:   8:08 PM I saw the patient.   9:00 PM Staffed with Dr. Rubio.   10:53 PM Patient continues to be nauseous and is having difficulty keeping oral intake down.  Patient be admitted.   11:10 PM Spoke to hospitalist who agrees to admit the patient.  11:17 PM Spoke with Urology who will see the patient.        At the conclusion of the encounter I discussed the results of all of the tests and the disposition. The questions were answered. The patient or family acknowledged understanding and was agreeable with the care plan.     0 minutes of critical care time       Medical Decision Making  Obtained supplemental history:Supplemental history obtained?: Family Member  Reviewed external records: External records reviewed?: Documented in chart  Care impacted by chronic illness: Chronic pain, Chronic nausea and vomiting  Care significantly affected by social determinants of health:N/A  Did you consider but not order tests?: Work up considered but not performed and documented in chart, if applicable  Did you interpret images independently?: Independent interpretation of ECG and images noted in documentation, when applicable.  Consultation discussion with other provider:Did you involve another  provider (consultant, , pharmacy, etc.)?: I discussed the care with another health care provider, see documentation for details.  Admit.    Not Applicable    MEDICATIONS GIVEN IN THE EMERGENCY:  Medications   atenolol (TENORMIN) half-tab 12.5 mg (has no administration in time range)   baclofen (LIORESAL) tablet 10 mg (has no administration in time range)   famotidine (PEPCID) tablet 20 mg (has no administration in time range)   multivitamin, therapeutic (THERA-VIT) tablet 1 tablet (has no administration in time range)   omeprazole (PriLOSEC) CR capsule 20 mg (has no administration in time range)   senna-docusate (SENOKOT-S/PERICOLACE) 8.6-50 MG per tablet 1 tablet (has no administration in time range)     Or   senna-docusate (SENOKOT-S/PERICOLACE) 8.6-50 MG per tablet 2 tablet (has no administration in time range)   ondansetron (ZOFRAN ODT) ODT tab 4 mg (has no administration in time range)     Or   ondansetron (ZOFRAN) injection 4 mg (has no administration in time range)   acetaminophen (TYLENOL) tablet 650 mg (has no administration in time range)     Or   acetaminophen (TYLENOL) Suppository 650 mg (has no administration in time range)   oxyCODONE (ROXICODONE) tablet 5 mg (has no administration in time range)   oxyCODONE (ROXICODONE) tablet 10 mg (has no administration in time range)   melatonin tablet 1 mg (has no administration in time range)   HYDROmorphone (DILAUDID) injection 0.3 mg (has no administration in time range)   HYDROmorphone (DILAUDID) injection 0.2 mg (has no administration in time range)   prochlorperazine (COMPAZINE) injection 5 mg (has no administration in time range)     Or   prochlorperazine (COMPAZINE) tablet 5 mg (has no administration in time range)     Or   prochlorperazine (COMPAZINE) suppository 12.5 mg (has no administration in time range)   tamsulosin (FLOMAX) capsule 0.4 mg (0.4 mg Oral $Given 11/2/24 3096)   sodium chloride 0.9% BOLUS 1,000 mL (has no administration in time range)    lactulose (CHRONULAC) solution 20 g (has no administration in time range)   senna-docusate (SENOKOT-S/PERICOLACE) 8.6-50 MG per tablet 2 tablet (has no administration in time range)   metoclopramide (REGLAN) injection 5 mg (5 mg Intravenous $Given 11/2/24 2222)   iopamidol (ISOVUE-370) solution 90 mL (90 mLs Intravenous $Given 11/2/24 2151)   ketorolac (TORADOL) injection 15 mg (15 mg Intravenous $Given 11/2/24 2250)   sodium chloride 0.9% BOLUS 1,000 mL (0 mLs Intravenous Stopped 11/3/24 0002)   metoclopramide (REGLAN) injection 5 mg (5 mg Intravenous $Given 11/2/24 2316)       NEW PRESCRIPTIONS STARTED AT TODAY'S ER VISIT  New Prescriptions    No medications on file          =================================================================    HPI    Patient information was obtained from: patient    Use of : N/A         Mariah Koehler is a 68 year old female with a pertinent history of HTN, chronic pain disorder with opioid dependence, remote left arm injury complicated by reflex sympathetic dystrophy with intrathecal pump (Bupivacaine/Dilaudid), recurrent chronic nausea on daily Compazine and Zofran, gastroparesis, large hiatal hernia s/p repair with mesh February 2024, intra pancreatic ductal dilatation, GERD, DANO, appendectomy, cholecystectomy, migraines, and tobacco use disorder, who presents to this ED via private vehicle with family member for evaluation of nausea and vomiting.    Per chart review, patient was admitted to [facility] from 10/20-10/22/2024 with a three day history of nausea, vomiting, and diarrhea. CT A/P demonstrated nonobstructing right renal calculi and intramural pancreatic duct dilatation. She was also found to have microscopic hematuria thought to be due to right renal calculi; patient denied any urinary complaints at that time. Pain Management was consulted and pain pump was found to be functional. She was treated with IVF, Promethazine, Droperidol, and Zofran with  gradual symptomatic improvement. Patient tolerated regular diet and was discharged home in stable condition. She subsequently presented to [facility] on 11/1/2024 (1 day ago) with sudden onset of hematuria. UA was obtained with protein, occult blood, >100 RBCs, and calcium oxalate crystals. Urine culture was obtained and patient was prescribed a seven day course of Keflex pending urine culture results. She was discharged in stable condition with recommendation for Urology follow up for cystoscopy.     Since discharge, patient reports ongoing nausea and vomiting with an associated headache and inability to keep down po intake. She also has persistent hematuria and urinary frequency with associated right flank and right lower quadrant abdominal pain. Patient otherwise denies dysuria, hematemesis, constipation, diarrhea, chest pain, dyspnea, fever, chills or any other symptoms or concerns at this time.        REVIEW OF SYSTEMS   As per HPI    PAST MEDICAL HISTORY:  Past Medical History:   Diagnosis Date    Current mild episode of major depressive disorder, unspecified whether recurrent (H) 02/17/2020    Hypertension     Migraine headache 11/06/2023    Nausea and vomiting     Opiate dependence (H)     RSD (reflex sympathetic dystrophy)     Sacral fracture, closed (H)        PAST SURGICAL HISTORY:  Past Surgical History:   Procedure Laterality Date    APPENDECTOMY      ESOPHAGOSCOPY, GASTROSCOPY, DUODENOSCOPY (EGD), COMBINED N/A 2/20/2023    Procedure: ESOPHAGOGASTRODUODENOSCOPY with biopsies;  Surgeon: Na Brooks MD;  Location: [facility] Main OR    GYN SURGERY      HC REVISE MEDIAN N/CARPAL TUNNEL SURG      Description: Neuroplasty Decompression Median Nerve At Carpal Tunnel;  Recorded: 09/19/2011;    HYSTERECTOMY  1984    IR CERVICAL EPIDURAL STEROID INJECTION  1/14/2005    LAPAROSCOPIC NISSEN FUNDOPLICATION N/A 2/23/2024    Procedure: Paraesophageal hiatal hernia repair with mesh,  ROBOT-ASSISTED, LAPAROSCOPIC, USING DA ROSMERY XI;  Surgeon: Lemuel Ornelas DO;  Location: [facility] Main OR    OOPHORECTOMY Bilateral 1985    CT SYMPATHECTOMY,CERVICOTHORACIC      Description: Surgical Sympathectomy Cervicothoracic;  Recorded: 09/19/2011;    [facility] DECOMPRESS FASCIOTOMY FINGR/HAND      Description: Decompression Fasciotomy Of The Hand;  Recorded: 09/19/2011;    Z LAP,CHOLECYSTECTOMY/EXPLORE      Description: Cholecystectomy Laparoscopic;  Recorded: 12/09/2011;    [facility] TOTAL ABDOM HYSTERECTOMY      Description: Hysterectomy;  Recorded: 03/23/2011;           CURRENT MEDICATIONS:    aspirin 81 MG EC tablet  atenolol (TENORMIN) 25 MG tablet  baclofen (LIORESAL) 10 MG tablet  medication given by implanted intrathecal pump  metoclopramide (REGLAN) 10 MG tablet  multivitamin, therapeutic (THERA-VIT) TABS tablet  omeprazole (PRILOSEC OTC) 20 MG EC tablet  ondansetron (ZOFRAN) 4 MG tablet  prochlorperazine (COMPAZINE) 5 MG tablet  promethazine (PHENERGAN) 25 MG tablet  acetaminophen (TYLENOL) 325 MG tablet  famotidine (PEPCID) 20 MG tablet        ALLERGIES:  Allergies   Allergen Reactions    Codeine Nausea and Vomiting    Hydromorphone Headache and Nausea and Vomiting    Morphine Nausea and Vomiting    Bacitracin Rash    Methadone Rash       FAMILY HISTORY:  Family History   Problem Relation Age of Onset    Cerebrovascular Disease Mother     Diabetes Mother     Heart Disease Mother     Hypertension Mother     Rheumatologic Disease Mother     Heart Disease Father     Pulmonary Hypertension Father     Kidney failure Father     Cancer Father         melanoma    Diabetes Sister     Hypertension Sister     Hypertension Brother        SOCIAL HISTORY:   Social History     Socioeconomic History    Marital status:    Tobacco Use    Smoking status: Every Day     Current packs/day: 0.75     Average packs/day: 0.8 packs/day for 56.8 years (42.6 ttl pk-yrs)     Types: Cigarettes     Start date: 1/1/1968     "Smokeless tobacco: Never   Vaping Use    Vaping status: Never Used   Substance and Sexual Activity    Alcohol use: Not Currently     Comment: 1 glass a year    Drug use: Never     Social Drivers of Health     Financial Resource Strain: Low Risk  (10/21/2024)    Financial Resource Strain     Within the past 12 months, have you or your family members you live with been unable to get utilities (heat, electricity) when it was really needed?: No   Food Insecurity: Low Risk  (10/21/2024)    Food Insecurity     Within the past 12 months, did you worry that your food would run out before you got money to buy more?: No     Within the past 12 months, did the food you bought just not last and you didn t have money to get more?: No   Transportation Needs: Low Risk  (10/21/2024)    Transportation Needs     Within the past 12 months, has lack of transportation kept you from medical appointments, getting your medicines, non-medical meetings or appointments, work, or from getting things that you need?: No   Interpersonal Safety: Low Risk  (3/6/2024)    Interpersonal Safety     Do you feel physically and emotionally safe where you currently live?: Yes     Within the past 12 months, have you been hit, slapped, kicked or otherwise physically hurt by someone?: No     Within the past 12 months, have you been humiliated or emotionally abused in other ways by your partner or ex-partner?: No   Housing Stability: Low Risk  (10/21/2024)    Housing Stability     Do you have housing? : Yes     Are you worried about losing your housing?: No       VITALS:  BP (!) 180/72   Pulse 61   Temp 97  F (36.1  C) (Temporal)   Resp 20   Ht 1.6 m (5' 3\")   Wt 46.7 kg (103 lb)   SpO2 97%   BMI 18.25 kg/m      PHYSICAL EXAM    Physical Exam  Vitals and nursing note reviewed.   Constitutional:       Appearance: Normal appearance.   HENT:      Head: Atraumatic.      Right Ear: External ear normal.      Left Ear: External ear normal.      Nose: Nose " normal.      Mouth/Throat:      Mouth: Mucous membranes are moist.   Eyes:      Conjunctiva/sclera: Conjunctivae normal.      Pupils: Pupils are equal, round, and reactive to light.   Cardiovascular:      Rate and Rhythm: Normal rate and regular rhythm.      Pulses: Normal pulses.      Heart sounds: Normal heart sounds. No murmur heard.     No friction rub. No gallop.   Pulmonary:      Effort: Pulmonary effort is normal.      Breath sounds: Normal breath sounds. No wheezing or rales.   Abdominal:      Tenderness: There is no abdominal tenderness. There is no guarding or rebound.      Comments: No flank tenderness.    Musculoskeletal:      Cervical back: Normal range of motion.   Skin:     General: Skin is dry.      Capillary Refill: Capillary refill takes less than 2 seconds.      Findings: No rash.   Neurological:      General: No focal deficit present.      Mental Status: She is alert and oriented to person, place, and time. Mental status is at baseline.   Psychiatric:         Mood and Affect: Mood normal.         Thought Content: Thought content normal.          LAB:  All pertinent labs reviewed and interpreted.  Labs Ordered and Resulted from Time of ED Arrival to Time of ED Departure   BASIC METABOLIC PANEL - Abnormal       Result Value    Sodium 141      Potassium 3.8      Chloride 104      Carbon Dioxide (CO2) 28      Anion Gap 9      Urea Nitrogen 10.9      Creatinine 0.66      GFR Estimate >90      Calcium 8.6 (*)     Glucose 134 (*)    ROUTINE UA WITH MICROSCOPIC REFLEX TO CULTURE - Abnormal    Color Urine Yellow      Appearance Urine Turbid (*)     Glucose Urine Negative      Bilirubin Urine Negative      Ketones Urine Negative      Specific Gravity Urine 1.020      Blood Urine >1.0 mg/dL (*)     pH Urine 6.0      Protein Albumin Urine 10 (*)     Urobilinogen Urine <2.0      Nitrite Urine Negative      Leukocyte Esterase Urine Negative      Mucus Urine Present (*)     Uric Acid Crystals Urine Few (*)      RBC Urine >182 (*)     WBC Urine 0     LIPASE - Normal    Lipase 23     HEPATIC FUNCTION PANEL - Normal    Protein Total 6.6      Albumin 4.4      Bilirubin Total <0.2      Alkaline Phosphatase 102      AST 20      ALT 23      Bilirubin Direct <0.20     CBC WITH PLATELETS AND DIFFERENTIAL    WBC Count 6.2      RBC Count 4.38      Hemoglobin 13.1      Hematocrit 39.4      MCV 90      MCH 29.9      MCHC 33.2      RDW 13.0      Platelet Count 237      % Neutrophils 70      % Lymphocytes 19      % Monocytes 7      % Eosinophils 1      % Basophils 0      % Immature Granulocytes 2      NRBCs per 100 WBC 0      Absolute Neutrophils 4.4      Absolute Lymphocytes 1.2      Absolute Monocytes 0.5      Absolute Eosinophils 0.1      Absolute Basophils 0.0      Absolute Immature Granulocytes 0.1      Absolute NRBCs 0.0     BASIC METABOLIC PANEL   CBC WITH PLATELETS        RADIOLOGY:  Reviewed all pertinent imaging. Please see official radiology report.  CT Abdomen Pelvis w Contrast   Final Result   IMPRESSION:   1.  3 mm stone in the proximal right ureter with associated mild hydronephrosis. No other radiodense kidney/ureteral stones. No left hydronephrosis.   2.  Moderate amount of stool in the colon including large stool ball in the rectum, nonspecific, can be seen with constipation.          EKG:    Performed at: 21:08    Impression: Sinus bradycardia. Otherwise normal EKG.     Rate: 57 bpm  Rhythm: Sinus bradycardia   Axis: 61  AZ Interval: 152 ms  QRS Interval: 86 ms  QTc Interval: 389 ms  ST Changes: None  Comparison: When compared with EKG from 10/20/2024, criteria for anterior infarct are no longer present.     I have independently reviewed and interpreted the EKG(s) documented above.      I, Jolynn Martinez, am serving as a scribe to document services personally performed by Shanell Borges PA-C, based on my observation and the provider's statements to me. I, Shanell Borges PA-C, attest that Jolynn Martinez is acting  in a scribe capacity, has observed my performance of the services and has documented them in accordance with my direction.    Shanell Borges PA-C  [facility] EMERGENCY DEPARTMENT  [address] [place] [address] [phone]      Shanell Borges PA-C  11/03/24 0028

## 2024-11-03 NOTE — PROGRESS NOTES
[facility]    Medicine Progress Note - Hospitalist Service    Date of Admission:  11/2/2024    Assessment & Plan   Mariah Koehler is a 68 year old female admitted on 11/2/2024.  Patient has history of COPD, hypertension, and chronic pain syndrome with continuous intrathecal baclofen pump.  Patient presented to the ED right flank pain with associated nausea and vomiting.  Positive for right ureteral calculi with associated hydronephrosis. Patient is s/p stent placement 11/3/24. In continued need of post-procedure pain management and PT/ OT evaluation.      Right ureteral calculi-3 mm  Right-sided hydronephrosis   Nausea and vomiting, mild hematuria  Constipation  - CT abdomen pelvis-3 mm stone in the proximal right ureter with associated mild hydronephrosis. Moderate amount of stool in the colon including large stool ball in the rectum.  - Bowel regimen started, including enema-successful moderate amount of stool  - Slightly hypertensive likely due to pain, see management as noted below   - Tylenol, Dilaudid, oxycodone as needed for pain  - Compazine, reglan, Zofran as needed for nausea  - Urology consulted- performed successful right ureteroscopy with stone retrieval and stent placement.  Patient will follow-up in 1 to 2 weeks for stent removal per urology   - Flomax started in hospital  - advance diet as tolerated   - PT/ OT eval needed     Chronic pain  - Intrathecal pain pump  - consider pain management consult tomorrow pending post-op response   - PT/ OT     COPD   - no acute exacerbation     Hypertension  - as needed hydralazine  - PTA atenolol  - control pain         Observation Goals: -diagnostic tests and consults completed and resulted, -vital signs normal or at patient baseline, -adequate pain control on oral analgesics, Nurse to notify provider when observation goals have been met and patient is ready for discharge.  Diet: Diet  Regular Diet Adult    DVT Prophylaxis: Arsen Bellamy  Catheter: Not present  Lines: None     Cardiac Monitoring: None  Code Status: Full Code      Clinically Significant Risk Factors Present on Admission           # Hypocalcemia: Lowest Ca = 8 mg/dL in last 2 days, will monitor and replace as appropriate       # Drug Induced Platelet Defect: home medication list includes an antiplatelet medication   # Hypertension: Noted on problem list             # Financial/Environmental Concerns: none         Disposition Plan     Medically Ready for Discharge: Anticipated Tomorrow             Eitan Jolly DO  Hospitalist Service  [facility]  Securely message with [employer] (more info)  Text page via Vitals ([email or web address]) Paging/Directory   ______________________________________________________________________    Interval History   In pain this morning. Remains NPO but admitting to nausea.     Physical Exam   Vital Signs: Temp: 98.1  F (36.7  C) Temp src: Core BP: (!) 169/83 Pulse: 87   Resp: 14 SpO2: 95 % O2 Device: None (Room air) Oxygen Delivery: 6 LPM  Weight: 103 lbs 0 oz    General Appearance: Mildly anxious appearing due to pain, nontoxic  Respiratory: CTAB, decreased effort  Cardiovascular: RRR  GI: pain in lower quadrants without rebound or guarding, no CVA tenderness   Skin: no rash  Other: Alert and oriented      Medical Decision Making       55 MINUTES SPENT BY ME on the date of service doing chart review, history, exam, documentation & further activities per the note.      Data     I have personally reviewed the following data over the past 24 hrs:    6.2  \   11.8 / 202     140 104 8.4 /  102 (H)   3.4 26 0.51 \     ALT: 23 AST: 20 AP: 102 TBILI: <0.2   ALB: 4.4 TOT PROTEIN: 6.6 LIPASE: 23       Imaging results reviewed over the past 24 hrs:   Recent Results (from the past 24 hours)   CT Abdomen Pelvis w Contrast    Narrative    EXAM: CT ABDOMEN PELVIS W CONTRAST  LOCATION: [facility]  DATE: 11/2/2024    INDICATION: Right lower  quadrant tenderness.  COMPARISON: CT abdomen and pelvis on 10/20/2024.  TECHNIQUE: CT scan of the abdomen and pelvis was performed after the injection of Isovue 370 90 ml intravenously. Multiplanar reformats were obtained. Dose reduction techniques were used.    FINDINGS:   LOWER CHEST: Basilar pulmonary opacities, likely atelectasis. Mild distal esophageal/proximal gastric wall thickening (series 3 image 20), not significantly changed as compared to 10/20/2024.    ABDOMEN/PELVIS:    HEPATOBILIARY: No suspicious focal hepatic lesion. Scattered pneumobilia. Right upper quadrant postcholecystectomy clips.    PANCREAS: The main pancreatic duct is dilated measuring 6 mm in diameter in the pancreatic head and neck area.    SPLEEN: No splenomegaly.    ADRENAL GLANDS: No adrenal nodules.    KIDNEYS/BLADDER: 3 mm stone in the proximal right ureter with associated mild hydronephrosis. No other radiodense kidney/ureteral stones. No left hydronephrosis. 2 mm cyst at upper pole of the right kidney. Mild diffuse urinary bladder wall thickening,   nonspecific, can be seen with underdistention versus chronic cystitis.    BOWEL: Moderate amount of stool including large stool ball in the rectum. No abnormally dilated bowel loops. The appendix is visualized and appears normal.    PERITONEUM: No evidence of free fluid in the abdomen and pelvis. No free peritoneal or portal venous gas.    PELVIC ORGANS: The uterus is not visualized, likely surgically absent.    VASCULATURE: Moderate atherosclerotic vascular calcification of the abdominal aorta and iliac arteries.    LYMPH NODES: No significant abdominopelvic lymphadenopathy.    MUSCULOSKELETAL: Mild degenerative changes of the spine. No suspicious osseous lesion. Implantable spinal stimulator device in the subcutaneous tissue of the right gluteal area. Multilevel degenerative changes of the spine, most prominent at L4-L5 with   disc height loss and endplate changes.      Impression     IMPRESSION:  1.  3 mm stone in the proximal right ureter with associated mild hydronephrosis. No other radiodense kidney/ureteral stones. No left hydronephrosis.  2.  Moderate amount of stool in the colon including large stool ball in the rectum, nonspecific, can be seen with constipation.   XR Surgery ANNAMARIE Fluoro L/T 5 Min    Narrative    This exam was marked as non-reportable because it will not be read by a   radiologist or a [place] non-radiologist provider.

## 2024-11-03 NOTE — PLAN OF CARE
Problem: Adult Inpatient Plan of Care  Goal: Optimal Comfort and Wellbeing  11/3/2024 0605 by Criselda Garvin RN  Outcome: Progressing  11/3/2024 0554 by Criselda Garvin RN  Outcome: Progressing  Intervention: Monitor Pain and Promote Comfort  Recent Flowsheet Documentation  Taken 11/3/2024 0312 by Criselda Garvin RN  Pain Management Interventions:   rest   quiet environment facilitated  Taken 11/3/2024 0300 by Criselda Garvin RN  Pain Management Interventions: medication (see MAR)  Taken 11/3/2024 0055 by Criselda Garvin RN  Pain Management Interventions:   rest   quiet environment facilitated  Taken 11/3/2024 0043 by Criselda Garvin RN  Pain Management Interventions: medication (see MAR)     Problem: Pain Acute  Goal: Optimal Pain Control and Function  11/3/2024 0605 by Criselda Garvin RN  Outcome: Progressing  11/3/2024 0554 by Criselda Garvin RN  Outcome: Progressing  Intervention: Develop Pain Management Plan  Recent Flowsheet Documentation  Taken 11/3/2024 0312 by Criselda Garvin RN  Pain Management Interventions:   rest   quiet environment facilitated  Taken 11/3/2024 0300 by Criselda Garvin RN  Pain Management Interventions: medication (see MAR)  Taken 11/3/2024 0055 by Criselda Garvin RN  Pain Management Interventions:   rest   quiet environment facilitated  Taken 11/3/2024 0043 by Criselda Garvin RN  Pain Management Interventions: medication (see MAR)  Intervention: Prevent or Manage Pain  Recent Flowsheet Documentation  Taken 11/3/2024 0410 by Criselda Garvin RN  Medication Review/Management: medications reviewed  Taken 11/3/2024 0132 by Criselda Garvin RN  Medication Review/Management: medications reviewed     Problem: Adult Inpatient Plan of Care  Goal: Absence of Hospital-Acquired Illness or Injury  Intervention: Prevent Skin Injury  Recent Flowsheet Documentation  Taken 11/3/2024 0410 by Criselda Garvin RN  Body Position: position changed independently  Taken 11/3/2024  0132 by Criselda Garvin, RN  Body Position: position changed independently   Goal Outcome Evaluation:       Patient admitted for kidney stone, Nausea/vomiting and hematuria. Dilaudid IV administered two times during the night. 1000 ml bolus administered. Zofran also given for nausea. AOX4. RA. NPO from midnight. SBA with mobility. Pain Management and urology consult. Left PIV saline locked. Enema given and was effective. Slept well.

## 2024-11-03 NOTE — ANESTHESIA CARE TRANSFER NOTE
Patient: Mariah Koehler    Procedure: Procedure(s):  CYSTOSCOPY, RIGHT RETROGRADE PYELOGRAM, RIGHt URETEROSCOPY AND BASKET RETRIEVAL OF STONE, RIGHT URETERAL STENT PLACEMENT       Diagnosis: Calculus of ureter [N20.1]  Diagnosis Additional Information: No value filed.    Anesthesia Type:   General     Note:    Oropharynx: oropharynx clear of all foreign objects  Level of Consciousness: awake  Oxygen Supplementation: face mask  Level of Supplemental Oxygen (L/min / FiO2): 6L  Independent Airway: airway patency satisfactory and stable    Vital Signs Stable: post-procedure vital signs reviewed and stable  Report to RN Given: handoff report given  Patient transferred to: PACU    Handoff Report: Identifed the Patient, Identified the Reponsible Provider, Reviewed the pertinent medical history, Discussed the surgical course, Reviewed Intra-OP anesthesia mangement and issues during anesthesia, Set expectations for post-procedure period and Allowed opportunity for questions and acknowledgement of understanding      Vitals:  Vitals Value Taken Time   /83 11/03/24 1133   Temp 36.5  C (97.7  F) 11/03/24 1133   Pulse 79 11/03/24 1133   Resp 15 11/03/24 1133   SpO2 98 % 11/03/24 1133   Vitals shown include unfiled device data.    Electronically Signed By: TIM Medina CRNA  November 3, 2024  11:35 AM

## 2024-11-03 NOTE — PROGRESS NOTES
Care Management Initial Consult    General Information  Assessment completed with: Patient, pt  Type of CM/SW Visit: Initial Assessment    Primary Care Provider verified and updated as needed: Yes   Readmission within the last 30 days: no previous admission in last 30 days      Reason for Consult: length of stay, discharge planning  Advance Care Planning: Advance Care Planning Reviewed: verified with patient, no concerns identified     General Information Comments: independent at baseline and no svcs    Communication Assessment  Patient's communication style: spoken language (English or Bilingual)    Hearing Difficulty or Deaf: no   Wear Glasses or Blind: yes    Cognitive  Cognitive/Neuro/Behavioral: WDL  Level of Consciousness: sedated                   Living Environment:   People in home: spouse     Current living Arrangements: house      Able to return to prior arrangements: yes       Family/Social Support:  Care provided by: self  Provides care for: no one  Marital Status:   Support system:           Description of Support System: Supportive, Involved    Support Assessment: Adequate family and caregiver support, Adequate social supports    Current Resources:   Patient receiving home care services: No        Community Resources: None  Equipment currently used at home: walker, standard  Supplies currently used at home: Other (pain pump)    Employment/Financial:  Employment Status:          Financial Concerns: none   Referral to Financial Worker: No       Does the patient's insurance plan have a 3 day qualifying hospital stay waiver?  Yes     Which insurance plan 3 day waiver is available? Alternative insurance waiver    Will the waiver be used for post-acute placement? Undetermined at this time    Lifestyle & Psychosocial Needs:  Social Drivers of Health     Food Insecurity: Low Risk  (11/3/2024)    Food Insecurity     Within the past 12 months, did you worry that your food would run out before you  got money to buy more?: No     Within the past 12 months, did the food you bought just not last and you didn t have money to get more?: No   Depression: Not at risk (3/6/2024)    PHQ-2     PHQ-2 Score: 0   Housing Stability: Low Risk  (11/3/2024)    Housing Stability     Do you have housing? : Yes     Are you worried about losing your housing?: No   Tobacco Use: High Risk (11/3/2024)    Patient History     Smoking Tobacco Use: Every Day     Smokeless Tobacco Use: Never     Passive Exposure: Not on file   Financial Resource Strain: Low Risk  (11/3/2024)    Financial Resource Strain     Within the past 12 months, have you or your family members you live with been unable to get utilities (heat, electricity) when it was really needed?: No   Alcohol Use: Not on file   Transportation Needs: Low Risk  (11/3/2024)    Transportation Needs     Within the past 12 months, has lack of transportation kept you from medical appointments, getting your medicines, non-medical meetings or appointments, work, or from getting things that you need?: No   Physical Activity: Not on file   Interpersonal Safety: Low Risk  (3/6/2024)    Interpersonal Safety     Do you feel physically and emotionally safe where you currently live?: Yes     Within the past 12 months, have you been hit, slapped, kicked or otherwise physically hurt by someone?: No     Within the past 12 months, have you been humiliated or emotionally abused in other ways by your partner or ex-partner?: No   Stress: Not on file   Social Connections: Not on file   Health Literacy: Not on file       Functional Status:  Prior to admission patient needed assistance:   Dependent ADLs:: Ambulation-walker, Independent  Dependent IADLs:: Cleaning, Cooking, Laundry, Shopping  Assesssment of Functional Status: Not at baseline with ADL Functioning    Mental Health Status:          Chemical Dependency Status:                Values/Beliefs:  Spiritual, Cultural Beliefs, Restorationism Practices,  Values that affect care:                 Discussed  Partnership in Safe Discharge Planning  document with patient/family: No    Additional Information:  Lives w/spouse and is independent at baseline w/no svcs. Discussed MOON. Goals to discharge home w/no needs.    Next Steps: pain mgmt    Bonifacio Praikh RN

## 2024-11-03 NOTE — OP NOTE
Location: [facility]     Patient Name: Mariah Koehler  Patient : 1955  Patient MRN: [id number]  Patient CSN: [id number]  Patient PCP: Alex Nolasco    Date of Service: 24      Pre-operative diagnosis: Right ureteral stone    Post-operative diagnosis: Right ureteral stone    Procedure:  Cystoscopy, Right retrograde pyelogram, Right ureteroscopy with basket extraction of stone, Right 6 Fr x 28 cm ureteral stent placement    Surgeon: Dr. Cristiano Otero    EBL: 5 mL    Anesthesia: General    Indication: 68 year old female who was admitted with a 3 mm right ureteral stone and intractable pain/nausea/vomiting.  No signs of infection.  I recommended treatment of her stone via ureteroscopy.  Risks, benefits, and alternatives to treatment were discussed with the patient and the patient elected to proceed.    Findings: Her urethra was mildly tight and required dilation. Bladder lining was normal and without any tumors or stones. Bilateral ureteral orifices were normal in appearance and location.  She had a severe cystocele and I needed to have the rigid cystoscope almost vertical in order to see the ureteral orifices.  No stone seen on  imaging. On retrograde pyelogram, a jet was seen going into the kidney at the presumed level of the stone (about the UPJ).  Here was moderate hydronephrosis. Urine in right kidney was clear.  The stone had been pushed into the kidney once ureteroscopy occurred. Stone removed intact. A right ureteral stent was in good position.    Specimens: Right ureteral stone    Procedure:  After written informed consent was obtained the patient was brought into the operating room.  The patient was properly identified prior to the procedure, received preprocedure antibiotics, and had sequential compression devices on the legs.  The patient was then induced under anesthesia.    The patient was placed in dorsal lithotomy position and prepped and draped in the usual sterile  fashion. Her urethra was gently dilated from 20 Fr to 24 Fr using Rinard sounds. Cystoscopy was performed with the above findings.    A Right retrograde pyelogram was performed with the above findings.    A straight sensor wire was placed into the Right kidney. The 8/10 Fr coaxial dilators were then used to place a Super Stiff wire into the Right kidney.  Over the Super Stiff wire and under fluoroscopic guidance, a 9.5/11.5 Fr x 28 cm NeuroTronik ureteral access sheath was placed. This was sutured in place using 2-0 Silk.  Flexible ureteroscopy was performed as above. The stone was removed with a basket.  The access sheath was removed under direct vision.  The straight sensor wire was exchanged for the Super Stiff wire. A 6 Fr x 28 cm ureteral stent was then deployed over the Super Stiff wire. There was a good coil in the kidney and a good coil in the bladder.  I verified the distal coil via cystoscopy. I emptied the bladder and removed the scope. At this point, the procedure was completed.  She tolerated the procedure well.    Plan: To the floor. Discharge when medically stable. Orders placed with [facility] for stent removal in office in 1-2 weeks.    Cristiano Otero MD  11:31 AM

## 2024-11-03 NOTE — ANESTHESIA POSTPROCEDURE EVALUATION
Patient: Mariah Koehler    Procedure: Procedure(s):  CYSTOSCOPY, RIGHT RETROGRADE PYELOGRAM, RIGHt URETEROSCOPY AND BASKET RETRIEVAL OF STONE, RIGHT URETERAL STENT PLACEMENT       Anesthesia Type:  General    Note:  Disposition: Inpatient   Postop Pain Control: Uneventful            Sign Out: Well controlled pain   PONV: No   Neuro/Psych: Uneventful            Sign Out: Acceptable/Baseline neuro status   Airway/Respiratory: Uneventful            Sign Out: Acceptable/Baseline resp. status   CV/Hemodynamics: Uneventful            Sign Out: Acceptable CV status; No obvious hypovolemia; No obvious fluid overload   Other NRE:    DID A NON-ROUTINE EVENT OCCUR?        Last vitals:  Vitals Value Taken Time   /86 11/03/24 1215   Temp 36.6  C (97.88  F) 11/03/24 1219   Pulse 71 11/03/24 1219   Resp 22 11/03/24 1219   SpO2 99 % 11/03/24 1219   Vitals shown include unfiled device data.    Electronically Signed By: Cristiano Dow MD  November 3, 2024  12:21 PM

## 2024-11-03 NOTE — MEDICATION SCRIBE - ADMISSION MEDICATION HISTORY
Medication Scribe Admission Medication History    Admission medication history is complete. The information provided in this note is only as accurate as the sources available at the time of the update.    Information Source(s): Patient via in-person    Pertinent Information: Patient have pain pump and does not know the rate.  Patient states that she took all of her scheduled medications today.    Changes made to PTA medication list:  Added: None  Deleted: None  Changed: None    Allergies reviewed with patient and updates made in EHR: yes    Medication History Completed By: Michael Olson 11/2/2024 11:59 PM    PTA Med List   Medication Sig Last Dose/Taking    acetaminophen (TYLENOL) 325 MG tablet Take 1-2 tablets (325-650 mg) by mouth every 6 hours as needed for mild pain or headaches Unknown    aspirin 81 MG EC tablet Take 1 tablet (81 mg) by mouth daily 11/2/2024 Morning    atenolol (TENORMIN) 25 MG tablet Take 0.5 tablets (12.5 mg) by mouth daily 11/2/2024 Morning    baclofen (LIORESAL) 10 MG tablet TAKE 1 TABLET BY MOUTH 3 TIMES DAILY 11/2/2024 Evening    medication given by implanted intrathecal pump continuously. Drug # 1: Fentanyl (Sublimaze) - Conc: 2000 mcg/mL - Total Dose / 24 hours: 824.2 mcg    Drug # 2: Bupivacaine (Marcaine)  - Conc:17.7 mg/mL - Total Dose / 24 hours: 7.294 mg    Drug # 3: Hydromorphone (Dilaudid)  - Conc: 4.6 mg/mL - Total Dose / 24 hours: 1.8956 mg    Diluent: NS    Infusion Rate: 0.4121 mL/24 hrs  Pump Reservoir Volume: 40 mL    Bolus doses: up to 15 bolus doses/24 hours with 1 hour lockout  Each bolus: fentanyl 80.1 mcg, 0.708 mg Bupivacaine, 0.152 mg Dilaudid    Outside Clinic & Provider: [facility] in [place] [phone]  Last Refill Date: 09/19/24  Next Refill Date: 10/24/24  [place] Alarm Volume: 2.0 mL  Pump : Zilyo    Deliver Pump Medication to:   For [place]: If pump is within 7 days of depletion, pharmacist will enter a 'Pain Management  Adult IP Consult' into the EHR, including 'IT pain pump management' as the reason for the consult. 11/2/2024    metoclopramide (REGLAN) 10 MG tablet Take 1 tablet (10 mg) by mouth 3 times daily as needed (nausea and vomiting) Unknown    multivitamin, therapeutic (THERA-VIT) TABS tablet Take 1 tablet by mouth daily 11/2/2024    omeprazole (PRILOSEC) 20 MG DR capsule TAKE 1 CAPSULE BY MOUTH TWICE A DAY BEFORE MEALS (Patient taking differently: Take 20 mg by mouth 2 times daily.) 11/2/2024 Evening    ondansetron (ZOFRAN) 4 MG tablet Take 1 tablet (4 mg) by mouth every 6 hours as needed for nausea. Unknown    prochlorperazine (COMPAZINE) 5 MG tablet Take 1 tablet (5 mg) by mouth every 8 hours as needed for nausea or vomiting Unknown

## 2024-11-03 NOTE — PLAN OF CARE
PRIMARY DIAGNOSIS: ACUTE PAIN  OUTPATIENT/OBSERVATION GOALS TO BE MET BEFORE DISCHARGE:  1. Pain Status: Improved but still requiring IV narcotics.    2. Return to near baseline physical activity: No    3. Cleared for discharge by consultants (if involved): No    Discharge Planner Nurse   Safe discharge environment identified: No  Barriers to discharge: Yes       Entered by: Criselda Garvin RN 11/03/2024 3:11 AM     Please review provider order for any additional goals.   Nurse to notify provider when observation goals have been met and patient is ready for discharge.Goal Outcome Evaluation:

## 2024-11-03 NOTE — CONSULTS
CONSULTATION NOTE  Mariah Koehler   [address]  [place] [address]  68 year old  female  Admission Date/Time: 11/2/2024  8:02 PM  Primary Care Provider:  Alex Nolasco was asked to see this patient by Dr. Patten for evaluation of right ureteral stone.     HPI: 68 year old female with COPD, chronic pain syndrome, s/p hiatal hernia repair with mesh (2/2024), s/p DANO, s/p appy, s/p starla, migraines and reflex sympathetic dystrophy (s/p intrathecal pump) presented to the ER with nausea and vomiting.      Per admission H&P:  Per chart review, patient was admitted to [facility] from 10/20-10/22/2024 with a three day history of nausea, vomiting, and diarrhea. CT A/P demonstrated nonobstructing right renal calculi and intramural pancreatic duct dilatation. She was also found to have microscopic hematuria thought to be due to right renal calculi; patient denied any urinary complaints at that time. Pain Management was consulted and pain pump was found to be functional. She was treated with IVF, Promethazine, Droperidol, and Zofran with gradual symptomatic improvement. Patient tolerated regular diet and was discharged home in stable condition. She subsequently presented to [facility] on 11/1/2024 (1 day ago) with sudden onset of hematuria. UA was obtained with protein, occult blood, >100 RBCs, and calcium oxalate crystals. Urine culture was obtained and patient was prescribed a seven day course of Keflex pending urine culture results. She was discharged in stable condition with recommendation for Urology follow up for cystoscopy.      Since discharge, patient reports ongoing nausea and vomiting with an associated headache and inability to keep down po intake. She also has persistent hematuria and urinary frequency with associated right flank and right lower quadrant abdominal pain. Patient otherwise denies dysuria, hematemesis, constipation, diarrhea, chest pain, dyspnea, or any  "other symptoms or concerns at this time.      She was found to have a 3 mm right ureteral stone and admitted for control of nausea and vomiting. This morning, she is still having significant pain and nausea. No emesis since admission. Having gross hematuria.  Passed a stone \"many years ago\".        REVIEW OF SYSTEMS:  Pertinent items are noted in HPI.    Past Medical History:   Diagnosis Date    Current mild episode of major depressive disorder, unspecified whether recurrent (H) 02/17/2020    Hypertension     Migraine headache 11/06/2023    Nausea and vomiting     Opiate dependence (H)     RSD (reflex sympathetic dystrophy)     Sacral fracture, closed (H)      Past Surgical History:   Procedure Laterality Date    APPENDECTOMY      ESOPHAGOSCOPY, GASTROSCOPY, DUODENOSCOPY (EGD), COMBINED N/A 2/20/2023    Procedure: ESOPHAGOGASTRODUODENOSCOPY with biopsies;  Surgeon: Na Brooks MD;  Location: [facility] Main OR    GYN SURGERY      HC REVISE MEDIAN N/CARPAL TUNNEL SURG      Description: Neuroplasty Decompression Median Nerve At Carpal Tunnel;  Recorded: 09/19/2011;    HYSTERECTOMY  1984    IR CERVICAL EPIDURAL STEROID INJECTION  1/14/2005    LAPAROSCOPIC NISSEN FUNDOPLICATION N/A 2/23/2024    Procedure: Paraesophageal hiatal hernia repair with mesh, ROBOT-ASSISTED, LAPAROSCOPIC, USING DA ROSMERY XI;  Surgeon: Lemuel Ornelas DO;  Location: [facility] Main OR    OOPHORECTOMY Bilateral 1985    AL SYMPATHECTOMY,CERVICOTHORACIC      Description: Surgical Sympathectomy Cervicothoracic;  Recorded: 09/19/2011;    [facility] DECOMPRESS FASCIOTOMY FINGR/HAND      Description: Decompression Fasciotomy Of The Hand;  Recorded: 09/19/2011;    [facility] LAP,CHOLECYSTECTOMY/EXPLORE      Description: Cholecystectomy Laparoscopic;  Recorded: 12/09/2011;    [facility] TOTAL ABDOM HYSTERECTOMY      Description: Hysterectomy;  Recorded: 03/23/2011;     Family History   Problem Relation Age of Onset    Cerebrovascular Disease Mother     Diabetes " Mother     Heart Disease Mother     Hypertension Mother     Rheumatologic Disease Mother     Heart Disease Father     Pulmonary Hypertension Father     Kidney failure Father     Cancer Father         melanoma    Diabetes Sister     Hypertension Sister     Hypertension Brother      Social History     Socioeconomic History    Marital status:      Spouse name: Not on file    Number of children: Not on file    Years of education: Not on file    Highest education level: Not on file   Occupational History    Not on file   Tobacco Use    Smoking status: Every Day     Current packs/day: 0.75     Average packs/day: 0.8 packs/day for 56.8 years (42.6 ttl pk-yrs)     Types: Cigarettes     Start date: 1/1/1968    Smokeless tobacco: Never   Vaping Use    Vaping status: Never Used   Substance and Sexual Activity    Alcohol use: Not Currently     Comment: 1 glass a year    Drug use: Never    Sexual activity: Not on file   Other Topics Concern    Parent/sibling w/ CABG, MI or angioplasty before 65F 55M? Not Asked   Social History Narrative    Not on file     Social Drivers of Health     Financial Resource Strain: Low Risk  (11/3/2024)    Financial Resource Strain     Within the past 12 months, have you or your family members you live with been unable to get utilities (heat, electricity) when it was really needed?: No   Food Insecurity: Low Risk  (11/3/2024)    Food Insecurity     Within the past 12 months, did you worry that your food would run out before you got money to buy more?: No     Within the past 12 months, did the food you bought just not last and you didn t have money to get more?: No   Transportation Needs: Low Risk  (11/3/2024)    Transportation Needs     Within the past 12 months, has lack of transportation kept you from medical appointments, getting your medicines, non-medical meetings or appointments, work, or from getting things that you need?: No   Physical Activity: Not on file   Stress: Not on file  "  Social Connections: Not on file   Interpersonal Safety: Low Risk  (3/6/2024)    Interpersonal Safety     Do you feel physically and emotionally safe where you currently live?: Yes     Within the past 12 months, have you been hit, slapped, kicked or otherwise physically hurt by someone?: No     Within the past 12 months, have you been humiliated or emotionally abused in other ways by your partner or ex-partner?: No   Housing Stability: Low Risk  (11/3/2024)    Housing Stability     Do you have housing? : Yes     Are you worried about losing your housing?: No     No current outpatient medications on file.       ALLERGIES/SENSITIVITIES:   Allergies   Allergen Reactions    Codeine Nausea and Vomiting    Hydromorphone Headache and Nausea and Vomiting    Morphine Nausea and Vomiting    Bacitracin Rash    Methadone Rash       PHYSICAL EXAM:   BP (!) 145/66 (BP Location: Left arm)   Pulse 73   Temp 97.9  F (36.6  C) (Oral)   Resp 19   Ht 1.6 m (5' 3\")   Wt 46.7 kg (103 lb)   SpO2 95%   BMI 18.25 kg/m    Body mass index is 18.25 kg/m .  General Appearance:   Uncomfortable appearing, nontoxic  Unlabored breathing  SND, mild right sided tenderness  No LE edema  A&O x 3      WBC   Date Value Ref Range Status   01/09/2007 7.4 4.0 - 11.0 10e9/L Final     WBC Count   Date Value Ref Range Status   11/03/2024 6.2 4.0 - 11.0 10e3/uL Final   11/02/2024 6.2 4.0 - 11.0 10e3/uL Final   10/21/2024 9.3 4.0 - 11.0 10e3/uL Final     Hemoglobin   Date Value Ref Range Status   11/03/2024 11.8 11.7 - 15.7 g/dL Final   11/02/2024 13.1 11.7 - 15.7 g/dL Final   10/21/2024 13.4 11.7 - 15.7 g/dL Final   01/09/2007 14.0 11.7 - 15.7 g/dL Final     Platelet Count   Date Value Ref Range Status   11/03/2024 202 150 - 450 10e3/uL Final   11/02/2024 237 150 - 450 10e3/uL Final   10/21/2024 197 150 - 450 10e3/uL Final   01/09/2007 268 150 - 450 10e9/L Final     Recent Labs   Lab Test 11/03/24  0529 11/02/24  2058 10/21/24  0558    141 138 "   POTASSIUM 3.4 3.8 3.6   CHLORIDE 104 104 102   CO2 26 28 22   BUN 8.4 10.9 11.5   CR 0.51 0.66 0.54   ANIONGAP 10 9 14   ESTEFANY 8.0* 8.6* 8.3*   * 134* 104*       UA RESULTS:  Recent Labs   Lab Test 11/02/24  2031 05/22/22  1545 12/16/20  1834   COLOR Yellow   < > Yellow   APPEARANCE Turbid*   < > Clear   URINEGLC Negative   < > Negative   URINEBILI Negative   < > Negative   URINEKETONE Negative   < > 20 mg/dL*   SG 1.020   < > >1.050*   UBLD >1.0 mg/dL*   < > Moderate*   URINEPH 6.0   < > 6.5   PROTEIN 10*   < > 30 mg/dL*   UROBILINOGEN  --   --  <2.0 E.U./dL   NITRITE Negative   < > Negative   LEUKEST Negative   < > Negative   RBCU >182*   < > 25-50*   WBCU 0   < > 0-5    < > = values in this interval not displayed.               EXAM: CT ABDOMEN PELVIS W CONTRAST  LOCATION: [facility]  DATE: 11/2/2024     INDICATION: Right lower quadrant tenderness.  COMPARISON: CT abdomen and pelvis on 10/20/2024.  TECHNIQUE: CT scan of the abdomen and pelvis was performed after the injection of Isovue 370 90 ml intravenously. Multiplanar reformats were obtained. Dose reduction techniques were used.     FINDINGS:   LOWER CHEST: Basilar pulmonary opacities, likely atelectasis. Mild distal esophageal/proximal gastric wall thickening (series 3 image 20), not significantly changed as compared to 10/20/2024.     ABDOMEN/PELVIS:     HEPATOBILIARY: No suspicious focal hepatic lesion. Scattered pneumobilia. Right upper quadrant postcholecystectomy clips.     PANCREAS: The main pancreatic duct is dilated measuring 6 mm in diameter in the pancreatic head and neck area.     SPLEEN: No splenomegaly.     ADRENAL GLANDS: No adrenal nodules.     KIDNEYS/BLADDER: 3 mm stone in the proximal right ureter with associated mild hydronephrosis. No other radiodense kidney/ureteral stones. No left hydronephrosis. 2 mm cyst at upper pole of the right kidney. Mild diffuse urinary bladder wall thickening,   nonspecific, can  be seen with underdistention versus chronic cystitis.     BOWEL: Moderate amount of stool including large stool ball in the rectum. No abnormally dilated bowel loops. The appendix is visualized and appears normal.     PERITONEUM: No evidence of free fluid in the abdomen and pelvis. No free peritoneal or portal venous gas.     PELVIC ORGANS: The uterus is not visualized, likely surgically absent.     VASCULATURE: Moderate atherosclerotic vascular calcification of the abdominal aorta and iliac arteries.     LYMPH NODES: No significant abdominopelvic lymphadenopathy.     MUSCULOSKELETAL: Mild degenerative changes of the spine. No suspicious osseous lesion. Implantable spinal stimulator device in the subcutaneous tissue of the right gluteal area. Multilevel degenerative changes of the spine, most prominent at L4-L5 with   disc height loss and endplate changes.                                                                      IMPRESSION:  1.  3 mm stone in the proximal right ureter with associated mild hydronephrosis. No other radiodense kidney/ureteral stones. No left hydronephrosis.  2.  Moderate amount of stool in the colon including large stool ball in the rectum, nonspecific, can be seen with constipation.          CONSULTATION ASSESSMENT AND PLAN:    68 year old female with an obstructing 3 mm stone in the right proximal ureter with renal colic and nausea and vomiting and gross hematuria.  Discussed options including trial of passage vs surgery to remove the stone.  She would like to remove the stone.  Risks and benefits of ureteroscopy with laser lithotripsy and stent placement were discussed.  Discussed that sometimes I just place a stent depending upon how things go in the OR. If that happens, would need to do a second outpatient surgery to remove the stone. All questions answered. She was ok with proceeding.     Cristiano Otero MD

## 2024-11-03 NOTE — H&P
[facility]    History and Physical - Hospitalist Service       Date of Admission:  11/2/2024    Assessment & Plan      Mariah Koehler is a 68 year old female admitted on 11/2/2024.  Principal Problem:    Hydronephrosis with urinary obstruction due to ureteral calculus  Active Problems:    Nausea and vomiting    Chronic obstructive pulmonary disease, unspecified COPD type (H)    Chronic pain syndrome    Presence of intrathecal baclofen pump    CRPS (complex regional pain syndrome), lower limb    Nausea and vomiting, unspecified vomiting type    Kidney stone      Patient is a pleasant 68-year-old female with history of chronic pain  related to RSD has a baclofen pump but presented with nausea vomiting and this appears to be right flank pain which on CT is evident by 3 mm small stone with mild hydro hydronephrosis.  Her lab work otherwise is benign, she has COPD but not flaring, she will need further observation for symptomatic care and neurological evaluation.  She also has significant constipation that may contribute some of her especially being on pain control will schedule some laxative if patient is to take orally.  Can also consider enema.  UA appears with hematuria no active infection likely due to stones itself, no fevers    Right-sided hydronephrosis with urinary obstruction due to ureteral calculus    Nausea and vomiting, mild hematuria    Opioid Dependence history with baclofen pump  Constipation  Will consult urology, being a stone 3 mm she is if this may-passed on its own but patient is not able to keep much of the food orally  -Given fluid bolus will continue hydration, lab work is otherwise normal  -Will consult pharmacy for pain management in the meantime Multimodal pain management with as needed Dilaudid for this immediate control  -Will also start Flomax and will request to strain all urine.  Symptomatic nausea vomiting control  -Bowel regimen placed  -Continue home  baclofen      Chronic obstructive pulmonary disease, unspecified COPD type (H)  -Does not appear to be flaring not wheezing    Chronically low BMI/malnutrition-  -stable if patient stays longer can consider neurology nutrition consult as well.    Chronic Medical problems-Hold outside hospital medication pending a confirmed pharmacy history and further reconciliation.  Initial orders were placed.  Request consultation to urology-appreciated assistance and recommendations. .  Anticipated length of stay of less than 2 midnights of hospitalization depending on progression, planning,findings,further testing or treatment needs. Services are medically necessary for evaluation and treatment of the acute & on chronic superimposed conditions with the treatment plan as outlined above.  Current problem list also has been updated.       Observation Goals: -diagnostic tests and consults completed and resulted, -vital signs normal or at patient baseline, -adequate pain control on oral analgesics, Nurse to notify provider when observation goals have been met and patient is ready for discharge.  Diet: NPO for Medical/Clinical Reasons Except for: Other; Specify: at midnight  DVT Prophylaxis: Enoxaparin (Lovenox) SQ  Bellamy Catheter: Not present  Lines: None     Cardiac Monitoring: None  Code Status: Full Code    Clinically Significant Risk Factors Present on Admission           # Hypocalcemia: Lowest Ca = 8.6 mg/dL in last 2 days, will monitor and replace as appropriate       # Drug Induced Platelet Defect: home medication list includes an antiplatelet medication   # Hypertension: Noted on problem list             # Financial/Environmental Concerns:           Disposition Plan     Medically Ready for Discharge: Anticipated Tomorrow           Dez Patten MD  Hospitalist Service  [facility]  Securely message with [employer] (more info)  Text page via [employer] Paging/[employer] by [employer]      ______________________________________________________________________    Chief Complaint   Abd pain/nsuea    History is obtained from the patient, electronic health record, and emergency department physician    History of Present Illness   Mariah Koehler is a 68 year old female with a pertinent history of HTN, chronic pain disorder with opioid dependence, remote left arm injury complicated by reflex sympathetic dystrophy with intrathecal pump (Bupivacaine/Dilaudid), recurrent chronic nausea on daily Compazine and Zofran, gastroparesis, large hiatal hernia s/p repair with mesh February 2024, intra pancreatic ductal dilatation, GERD, DANO, appendectomy, cholecystectomy, migraines, and tobacco use disorder, who presents to this ED via private vehicle with family member for evaluation of nausea and vomiting.     Per chart review, patient was admitted to [facility] from 10/20-10/22/2024 with a three day history of nausea, vomiting, and diarrhea. CT A/P demonstrated nonobstructing right renal calculi and intramural pancreatic duct dilatation. She was also found to have microscopic hematuria thought to be due to right renal calculi; patient denied any urinary complaints at that time. Pain Management was consulted and pain pump was found to be functional. She was treated with IVF, Promethazine, Droperidol, and Zofran with gradual symptomatic improvement. Patient tolerated regular diet and was discharged home in stable condition. She subsequently presented to [facility] on 11/1/2024 (1 day ago) with sudden onset of hematuria. UA was obtained with protein, occult blood, >100 RBCs, and calcium oxalate crystals. Urine culture was obtained and patient was prescribed a seven day course of Keflex pending urine culture results. She was discharged in stable condition with recommendation for Urology follow up for cystoscopy.      Since discharge, patient reports ongoing nausea and vomiting with an  associated headache and inability to keep down po intake. She also has persistent hematuria and urinary frequency with associated right flank and right lower quadrant abdominal pain. Patient otherwise denies dysuria, hematemesis, constipation, diarrhea, chest pain, dyspnea, or any other symptoms or concerns at this time.         Past Medical History    Past Medical History:   Diagnosis Date    Current mild episode of major depressive disorder, unspecified whether recurrent (H) 02/17/2020    Hypertension     Migraine headache 11/06/2023    Nausea and vomiting     Opiate dependence (H)     RSD (reflex sympathetic dystrophy)     Sacral fracture, closed (H)        Past Surgical History   Past Surgical History:   Procedure Laterality Date    APPENDECTOMY      ESOPHAGOSCOPY, GASTROSCOPY, DUODENOSCOPY (EGD), COMBINED N/A 2/20/2023    Procedure: ESOPHAGOGASTRODUODENOSCOPY with biopsies;  Surgeon: Na Brooks MD;  Location: [facility] OR    GYN SURGERY      HC REVISE MEDIAN N/CARPAL TUNNEL SURG      Description: Neuroplasty Decompression Median Nerve At Carpal Tunnel;  Recorded: 09/19/2011;    HYSTERECTOMY  1984    IR CERVICAL EPIDURAL STEROID INJECTION  1/14/2005    LAPAROSCOPIC NISSEN FUNDOPLICATION N/A 2/23/2024    Procedure: Paraesophageal hiatal hernia repair with mesh, ROBOT-ASSISTED, LAPAROSCOPIC, USING DA ROSMERY XI;  Surgeon: Lemuel Ornelas DO;  Location: [facility] OR    OOPHORECTOMY Bilateral 1985    [place] SYMPATHECTOMY,CERVICOTHORACIC      Description: Surgical Sympathectomy Cervicothoracic;  Recorded: 09/19/2011;    [facility] DECOMPRESS FASCIOTOMY FINGR/HAND      Description: Decompression Fasciotomy Of The Hand;  Recorded: 09/19/2011;    ZC LAP,CHOLECYSTECTOMY/EXPLORE      Description: Cholecystectomy Laparoscopic;  Recorded: 12/09/2011;    ZC TOTAL ABDOM HYSTERECTOMY      Description: Hysterectomy;  Recorded: 03/23/2011;       Prior to Admission Medications   Prior to Admission Medications    Prescriptions Last Dose Informant Patient Reported? Taking?   acetaminophen (TYLENOL) 325 MG tablet  Self No No   Sig: Take 1-2 tablets (325-650 mg) by mouth every 6 hours as needed for mild pain or headaches   aspirin 81 MG EC tablet  Self No No   Sig: Take 1 tablet (81 mg) by mouth daily   atenolol (TENORMIN) 25 MG tablet  Self No No   Sig: Take 0.5 tablets (12.5 mg) by mouth daily   baclofen (LIORESAL) 10 MG tablet   No No   Sig: TAKE 1 TABLET BY MOUTH 3 TIMES DAILY   famotidine (PEPCID) 20 MG tablet  Self No No   Sig: Take 1 tablet (20 mg) by mouth 2 times daily as needed (acid reflux)   medication given by implanted intrathecal pump  Self Yes No   Sig: continuously. Drug # 1: Fentanyl (Sublimaze) - Conc: 2000 mcg/mL - Total Dose / 24 hours: 824.2 mcg    Drug # 2: Bupivacaine (Marcaine)  - Conc:17.7 mg/mL - Total Dose / 24 hours: 7.294 mg    Drug # 3: Hydromorphone (Dilaudid)  - Conc: 4.6 mg/mL - Total Dose / 24 hours: 1.8956 mg    Diluent: NS    Infusion Rate: 0.4121 mL/24 hrs  Pump Reservoir Volume: 40 mL    Bolus doses: up to 15 bolus doses/24 hours with 1 hour lockout  Each bolus: fentanyl 80.1 mcg, 0.708 mg Bupivacaine, 0.152 mg Dilaudid    Outside Clinic & Provider: [facility] in [place] [phone]  Last Refill Date: 09/19/24  Next Refill Date: 10/24/24  Low [place] Alarm Volume: 2.0 mL  Pump : [employer]    Deliver Pump Medication to:   For [place]: If pump is within 7 days of depletion, pharmacist will enter a 'Pain Management Adult IP Consult' into the EHR, including 'IT pain pump management' as the reason for the consult.   metoclopramide (REGLAN) 10 MG tablet  Self No No   Sig: Take 1 tablet (10 mg) by mouth 3 times daily as needed (nausea and vomiting)   multivitamin, therapeutic (THERA-VIT) TABS tablet  Self No No   Sig: Take 1 tablet by mouth daily   omeprazole (PRILOSEC) 20 MG DR capsule   No No   Sig: TAKE 1 CAPSULE BY MOUTH TWICE A DAY BEFORE MEALS   ondansetron (ZOFRAN)  4 MG tablet   No No   Sig: Take 1 tablet (4 mg) by mouth every 6 hours as needed for nausea.   prochlorperazine (COMPAZINE) 5 MG tablet  Self No No   Sig: Take 1 tablet (5 mg) by mouth every 8 hours as needed for nausea or vomiting   promethazine (PHENERGAN) 25 MG tablet   No No   Sig: Take 1 tablet (25 mg) by mouth every 6 hours as needed for nausea      Facility-Administered Medications: None        Review of Systems    The 5 point Review of Systems is negative other than noted in the HPI or here.     Social History   I have reviewed this patient's social history and updated it with pertinent information if needed.  Social History     Tobacco Use    Smoking status: Every Day     Current packs/day: 0.75     Average packs/day: 0.8 packs/day for 56.8 years (42.6 ttl pk-yrs)     Types: Cigarettes     Start date: 1/1/1968    Smokeless tobacco: Never   Vaping Use    Vaping status: Never Used   Substance Use Topics    Alcohol use: Not Currently     Comment: 1 glass a year    Drug use: Never         Family History   I have reviewed this patient's family history and updated it with pertinent information if needed.  Family History   Problem Relation Age of Onset    Cerebrovascular Disease Mother     Diabetes Mother     Heart Disease Mother     Hypertension Mother     Rheumatologic Disease Mother     Heart Disease Father     Pulmonary Hypertension Father     Kidney failure Father     Cancer Father         melanoma    Diabetes Sister     Hypertension Sister     Hypertension Brother          Allergies   Allergies   Allergen Reactions    Codeine Nausea and Vomiting    Hydromorphone Headache and Nausea and Vomiting    Morphine Nausea and Vomiting    Bacitracin Rash    Methadone Rash        Physical Exam   Vital Signs: Temp: 97  F (36.1  C) Temp src: Temporal BP: (!) 144/64 Pulse: 71   Resp: (!) 33 SpO2: 95 % O2 Device: None (Room air)    Weight: 103 lbs 0 oz    Alert awake-dehydrated and underweight looking female, no acute  distress mildly hypertensive but no respiratory distress her respiratory rate was not that high at the time of evaluation, does not appear in distress from this  Vision Baseline  Neck supple  Oral mucosa moist  bilateral air entry heard,  S1-S2 normal  Abdomen is soft no tenderness per se but the right flank pain  Extremities are fully mobile and there is no visible swelling noted  No skin cyanosis  Neurologically- no new Gross deficits from baseline-Moving all 4 extremities  Psych-mood okay and appropriate to circumstances      Medical Decision Making       75 MINUTES SPENT BY ME on the date of service doing chart review, history, exam, documentation & further activities per the note.      Data     I have personally reviewed the following data over the past 24 hrs:    6.2  \   13.1   / 237     141 104 10.9 /  134 (H)   3.8 28 0.66 \     ALT: 23 AST: 20 AP: 102 TBILI: <0.2   ALB: 4.4 TOT PROTEIN: 6.6 LIPASE: 23       Imaging results reviewed over the past 24 hrs:   Recent Results (from the past 24 hours)   CT Abdomen Pelvis w Contrast    Narrative    EXAM: CT ABDOMEN PELVIS W CONTRAST  LOCATION: [facility]  DATE: 11/2/2024    INDICATION: Right lower quadrant tenderness.  COMPARISON: CT abdomen and pelvis on 10/20/2024.  TECHNIQUE: CT scan of the abdomen and pelvis was performed after the injection of Isovue 370 90 ml intravenously. Multiplanar reformats were obtained. Dose reduction techniques were used.    FINDINGS:   LOWER CHEST: Basilar pulmonary opacities, likely atelectasis. Mild distal esophageal/proximal gastric wall thickening (series 3 image 20), not significantly changed as compared to 10/20/2024.    ABDOMEN/PELVIS:    HEPATOBILIARY: No suspicious focal hepatic lesion. Scattered pneumobilia. Right upper quadrant postcholecystectomy clips.    PANCREAS: The main pancreatic duct is dilated measuring 6 mm in diameter in the pancreatic head and neck area.    SPLEEN: No  splenomegaly.    ADRENAL GLANDS: No adrenal nodules.    KIDNEYS/BLADDER: 3 mm stone in the proximal right ureter with associated mild hydronephrosis. No other radiodense kidney/ureteral stones. No left hydronephrosis. 2 mm cyst at upper pole of the right kidney. Mild diffuse urinary bladder wall thickening,   nonspecific, can be seen with underdistention versus chronic cystitis.    BOWEL: Moderate amount of stool including large stool ball in the rectum. No abnormally dilated bowel loops. The appendix is visualized and appears normal.    PERITONEUM: No evidence of free fluid in the abdomen and pelvis. No free peritoneal or portal venous gas.    PELVIC ORGANS: The uterus is not visualized, likely surgically absent.    VASCULATURE: Moderate atherosclerotic vascular calcification of the abdominal aorta and iliac arteries.    LYMPH NODES: No significant abdominopelvic lymphadenopathy.    MUSCULOSKELETAL: Mild degenerative changes of the spine. No suspicious osseous lesion. Implantable spinal stimulator device in the subcutaneous tissue of the right gluteal area. Multilevel degenerative changes of the spine, most prominent at L4-L5 with   disc height loss and endplate changes.      Impression    IMPRESSION:  1.  3 mm stone in the proximal right ureter with associated mild hydronephrosis. No other radiodense kidney/ureteral stones. No left hydronephrosis.  2.  Moderate amount of stool in the colon including large stool ball in the rectum, nonspecific, can be seen with constipation.

## 2024-11-03 NOTE — OR NURSING
Call placed to anesthesiologist, Dr. Dow, regarding patient's /84. This is consistent with what her BP was pre-op. Pt meets all other PACU criteria, and is ready to return to her short stay bed. Per Dr. Dow, OK to send patient.

## 2024-11-03 NOTE — PLAN OF CARE
PRIMARY DIAGNOSIS: ACUTE PAIN  OUTPATIENT/OBSERVATION GOALS TO BE MET BEFORE DISCHARGE:  1. Pain Status: Improved but still requiring IV narcotics.    2. Return to near baseline physical activity: Yes    3. Cleared for discharge by consultants (if involved): No    Discharge Planner Nurse   Safe discharge environment identified: Yes  Barriers to discharge: Yes       Entered by: Stephanie Buchanan RN 11/03/2024 3:38 PM     Please review provider order for any additional goals.   Nurse to notify provider when observation goals have been met and patient is ready for discharge.Goal Outcome Evaluation:       Patient back from surgery, medicated for abdominal pain. Iv dilaudid with minimum relief, plan to give oral pain medication when able. Patient reporting mild nausea, IV Zofran.

## 2024-11-03 NOTE — ED TRIAGE NOTES
Pt here d/t hematuria starting yesterday. Went to urgency room off [address], started vomiting there. Was told to follow up with her primary or go to ED. Not on thinners. Denies blood in emesis. Reports headache and upper ABD pain. Resting helps pain. Moving makes the pain worse. Denies fevers.      Triage Assessment (Adult)       Row Name 11/02/24 1957          Triage Assessment    Airway WDL WDL        Respiratory WDL    Respiratory WDL WDL        Skin Circulation/Temperature WDL    Skin Circulation/Temperature WDL X        Cognitive/Neuro/Behavioral WDL    Cognitive/Neuro/Behavioral WDL WDL

## 2024-11-03 NOTE — ANESTHESIA PREPROCEDURE EVALUATION
Anesthesia Pre-Procedure Evaluation    Patient: Mariah Koehler   MRN: [id number] : 1955        Procedure : Procedure(s):  CYSTOSCOPY, RIGHT RETROGRADE PYELOGRAM, RIGH URETEROSCOPY WITH LASER LITHOTRIPSY, RIGHT URETERAL STENT PLACEMENT          Past Medical History:   Diagnosis Date    Current mild episode of major depressive disorder, unspecified whether recurrent (H) 2020    Hypertension     Migraine headache 2023    Nausea and vomiting     Opiate dependence (H)     RSD (reflex sympathetic dystrophy)     Sacral fracture, closed (H)       Past Surgical History:   Procedure Laterality Date    APPENDECTOMY      ESOPHAGOSCOPY, GASTROSCOPY, DUODENOSCOPY (EGD), COMBINED N/A 2023    Procedure: ESOPHAGOGASTRODUODENOSCOPY with biopsies;  Surgeon: Na Brooks MD;  Location: [facility] OR    GYN SURGERY      HC REVISE MEDIAN N/CARPAL TUNNEL SURG      Description: Neuroplasty Decompression Median Nerve At Carpal Tunnel;  Recorded: 2011;    HYSTERECTOMY  1984    IR CERVICAL EPIDURAL STEROID INJECTION  2005    LAPAROSCOPIC NISSEN FUNDOPLICATION N/A 2024    Procedure: Paraesophageal hiatal hernia repair with mesh, ROBOT-ASSISTED, LAPAROSCOPIC, USING DA ROSMERY XI;  Surgeon: Lemuel Ornelas DO;  Location: [facility] OR    OOPHORECTOMY Bilateral     [place] SYMPATHECTOMY,CERVICOTHORACIC      Description: Surgical Sympathectomy Cervicothoracic;  Recorded: 2011;    ZC DECOMPRESS FASCIOTOMY FINGR/HAND      Description: Decompression Fasciotomy Of The Hand;  Recorded: 2011;    ZZC LAP,CHOLECYSTECTOMY/EXPLORE      Description: Cholecystectomy Laparoscopic;  Recorded: 2011;    ZZC TOTAL ABDOM HYSTERECTOMY      Description: Hysterectomy;  Recorded: 2011;      Allergies   Allergen Reactions    Codeine Nausea and Vomiting    Hydromorphone Headache and Nausea and Vomiting    Morphine Nausea and Vomiting    Bacitracin Rash    Methadone Rash      Social  History     Tobacco Use    Smoking status: Every Day     Current packs/day: 0.75     Average packs/day: 0.8 packs/day for 56.8 years (42.6 ttl pk-yrs)     Types: Cigarettes     Start date: 1/1/1968    Smokeless tobacco: Never   Substance Use Topics    Alcohol use: Not Currently     Comment: 1 glass a year      Wt Readings from Last 1 Encounters:   11/02/24 46.7 kg (103 lb)        Anesthesia Evaluation            ROS/MED HX  ENT/Pulmonary:     (+)                tobacco use, Current use, 1 packs/day,       moderate,  COPD,              Neurologic:       Cardiovascular:     (+)  hypertension- -   -  - -                                      METS/Exercise Tolerance:     Hematologic:       Musculoskeletal:       GI/Hepatic:     (+) GERD, Asymptomatic on medication,    hiatal hernia,              Renal/Genitourinary:     (+) renal disease, type: CRI,            Endo:       Psychiatric/Substance Use:     (+)    H/O chronic opiod use  (fentanyl pain pump). Recreational drug usage: Other (Comment).    Infectious Disease:       Malignancy:       Other:      (+)  , H/O Chronic Pain,         Physical Exam    Airway  airway exam normal      Mallampati: II       Respiratory Devices and Support         Dental  no notable dental history         Cardiovascular   cardiovascular exam normal       Rhythm and rate: regular and normal     Pulmonary   pulmonary exam normal        breath sounds clear to auscultation           OUTSIDE LABS:  CBC:   Lab Results   Component Value Date    WBC 6.2 11/03/2024    WBC 6.2 11/02/2024    HGB 11.8 11/03/2024    HGB 13.1 11/02/2024    HCT 36.2 11/03/2024    HCT 39.4 11/02/2024     11/03/2024     11/02/2024     BMP:   Lab Results   Component Value Date     11/03/2024     11/02/2024    POTASSIUM 3.4 11/03/2024    POTASSIUM 3.8 11/02/2024    CHLORIDE 104 11/03/2024    CHLORIDE 104 11/02/2024    CO2 26 11/03/2024    CO2 28 11/02/2024    BUN 8.4 11/03/2024    BUN 10.9 11/02/2024  "   CR 0.51 11/03/2024    CR 0.66 11/02/2024     (H) 11/03/2024     (H) 11/02/2024     COAGS:   Lab Results   Component Value Date    PTT 29 11/25/2019    INR 1.09 10/21/2024     POC: No results found for: \"BGM\", \"HCG\", \"HCGS\"  HEPATIC:   Lab Results   Component Value Date    ALBUMIN 4.4 11/02/2024    PROTTOTAL 6.6 11/02/2024    ALT 23 11/02/2024    AST 20 11/02/2024    ALKPHOS 102 11/02/2024    BILITOTAL <0.2 11/02/2024     OTHER:   Lab Results   Component Value Date    LACT 1.4 10/20/2024    A1C 5.8 (H) 01/30/2024    ESTEFANY 8.0 (L) 11/03/2024    PHOS 4.1 03/06/2024    MAG 1.9 10/21/2024    LIPASE 23 11/02/2024    TSH 0.62 10/26/2023    T4 1.07 10/24/2023    CRP <0.1 02/18/2023    SED 4 09/30/2022       Anesthesia Plan    ASA Status:  3    NPO Status:  NPO Appropriate    Anesthesia Type: General.     - Airway: LMA   Induction: Intravenous, Propofol.   Maintenance: TIVA.        Consents    Anesthesia Plan(s) and associated risks, benefits, and realistic alternatives discussed. Questions answered and patient/representative(s) expressed understanding.     - Discussed:     - Discussed with:  Patient      - Extended Intubation/Ventilatory Support Discussed: No.      - Patient is DNR/DNI Status: No     Use of blood products discussed: No .     Postoperative Care    Pain management: IV analgesics.   PONV prophylaxis: Ondansetron (or other 5HT-3), Dexamethasone or Solumedrol     Comments:    Other Comments: GALMA  Antiemetics - zofran 4mg/decadron 8mg (4mg if patient is diabetic)             Cristiano Dow MD    I have reviewed the pertinent notes and labs in the chart from the past 30 days and (re)examined the patient.  Any updates or changes from those notes are reflected in this note.       # Hypocalcemia: Lowest Ca = 8 mg/dL in last 2 days, will monitor and replace as appropriate       # Drug Induced Platelet Defect: home medication list includes an antiplatelet medication   # Hypertension: Noted on problem " list             # Financial/Environmental Concerns:

## 2024-11-03 NOTE — DISCHARGE SUMMARY
[facility]  Hospitalist Discharge Summary      Date of Admission:  11/2/2024  Date of Discharge:  11/3/2024  Discharging Provider: Alicia Willoughby NP  Discharge Service: Hospitalist Service    Discharge Diagnoses   Right ureteral calculi s/p cystoscopy with stone extraction and ureteral stent placement    Clinically Significant Risk Factors          Follow-ups Needed After Discharge   Follow-up Appointments       Hospital Follow-up with Existing Primary Care Provider (PCP)      Please see details below         Schedule Primary Care visit within: 7 Days               Unresulted Labs Ordered in the Past 30 Days of this Admission       Date and Time Order Name Status Description    11/3/2024 11:20 AM Stone analysis In process         These results will be followed up by Urology    Discharge Disposition   Discharged to home  Condition at discharge: Stable    Hospital Course      Mariah Koehler is a 68 year old female admitted on 11/2/2024.  Patient has history of COPD, hypertension, and chronic pain syndrome with continuous intrathecal baclofen pump.  Patient presented to the ED right flank pain with associated nausea and vomiting.  Positive for right ureteral calculi.    Right ureteral calculi-3 mm  Right-sided hydronephrosis   Nausea and vomiting, mild hematuria  Constipation  -CT abdomen pelvis-3 mm stone in the proximal right ureter with associated mild hydronephrosis.  Moderate amount of stool in the colon including large stool ball in the rectum.  -Bowel regimen started, including enema-successful moderate amount of stool  -WBC-6.2  -Afebrile  -Hemodynamically stable  -Slightly hypertensive likely due to pain  -Tylenol, Dilaudid, oxycodone as needed for pain  -Compazine, reglan, Zofran as needed for nausea  -Urology consulted-performed successful right ureteroscopy with stone retrieval and stent placement.  Patient will follow-up in 1 to 2 weeks for stent removal per urology   -Flomax  started in hospital. Resume x1 week    COPD   -no acute exacerbation    Hypertension  -as needed hydralazine  -PTA atenolol    Chronic pain  -Intrathecal pain pump        Consultations This Hospital Stay   PAIN MANAGEMENT ADULT IP CONSULT  UROLOGY IP CONSULT  CARE MANAGEMENT / SOCIAL WORK IP CONSULT    Code Status   Full Code    Time Spent on this Encounter   Alicia SALINAS NP, personally saw the patient today and spent greater than 30 minutes discharging this patient.       Alicia Willoughby NP  [facility] PACU  [address] [place] [id number]  Phone: [phone]  Fax: [phone]  ______________________________________________________________________    Physical Exam   Vital Signs: Temp: 98.1  F (36.7  C) Temp src: Core BP: (!) 188/84 Pulse: 71   Resp: 14 SpO2: 94 % O2 Device: None (Room air) Oxygen Delivery: 6 LPM  Weight: 103 lbs 0 oz  Constitutional: awake, alert, cooperative, no apparent distress, and appears stated age  Respiratory: No increased work of breathing, good air exchange, clear to auscultation bilaterally, no crackles or wheezing  Cardiovascular: Normal apical impulse, regular rate and rhythm, normal S1 and S2, no S3 or S4, and no murmur noted  GI: No scars, normal bowel sounds, soft, non-distended, non-tender, no masses palpated, no hepatosplenomegally       Primary Care Physician   Alex Nolasco    Discharge Orders      Reason for your hospital stay    Right ureteral stone     Activity    Your activity upon discharge: activity as tolerated     Diet    Follow this diet upon discharge: Regular     Hospital Follow-up with Existing Primary Care Provider (PCP)    Please see details below            Significant Results and Procedures   Results for orders placed or performed during the hospital encounter of 11/02/24   CT Abdomen Pelvis w Contrast    Narrative    EXAM: CT ABDOMEN PELVIS W CONTRAST  LOCATION: [facility]  DATE:  11/2/2024    INDICATION: Right lower quadrant tenderness.  COMPARISON: CT abdomen and pelvis on 10/20/2024.  TECHNIQUE: CT scan of the abdomen and pelvis was performed after the injection of Isovue 370 90 ml intravenously. Multiplanar reformats were obtained. Dose reduction techniques were used.    FINDINGS:   LOWER CHEST: Basilar pulmonary opacities, likely atelectasis. Mild distal esophageal/proximal gastric wall thickening (series 3 image 20), not significantly changed as compared to 10/20/2024.    ABDOMEN/PELVIS:    HEPATOBILIARY: No suspicious focal hepatic lesion. Scattered pneumobilia. Right upper quadrant postcholecystectomy clips.    PANCREAS: The main pancreatic duct is dilated measuring 6 mm in diameter in the pancreatic head and neck area.    SPLEEN: No splenomegaly.    ADRENAL GLANDS: No adrenal nodules.    KIDNEYS/BLADDER: 3 mm stone in the proximal right ureter with associated mild hydronephrosis. No other radiodense kidney/ureteral stones. No left hydronephrosis. 2 mm cyst at upper pole of the right kidney. Mild diffuse urinary bladder wall thickening,   nonspecific, can be seen with underdistention versus chronic cystitis.    BOWEL: Moderate amount of stool including large stool ball in the rectum. No abnormally dilated bowel loops. The appendix is visualized and appears normal.    PERITONEUM: No evidence of free fluid in the abdomen and pelvis. No free peritoneal or portal venous gas.    PELVIC ORGANS: The uterus is not visualized, likely surgically absent.    VASCULATURE: Moderate atherosclerotic vascular calcification of the abdominal aorta and iliac arteries.    LYMPH NODES: No significant abdominopelvic lymphadenopathy.    MUSCULOSKELETAL: Mild degenerative changes of the spine. No suspicious osseous lesion. Implantable spinal stimulator device in the subcutaneous tissue of the right gluteal area. Multilevel degenerative changes of the spine, most prominent at L4-L5 with   disc height loss and  endplate changes.      Impression    IMPRESSION:  1.  3 mm stone in the proximal right ureter with associated mild hydronephrosis. No other radiodense kidney/ureteral stones. No left hydronephrosis.  2.  Moderate amount of stool in the colon including large stool ball in the rectum, nonspecific, can be seen with constipation.   XR Surgery ANNAMARIE Fluoro L/T 5 Min    Narrative    This exam was marked as non-reportable because it will not be read by a   radiologist or a [place] non-radiologist provider.             Discharge Medications   Current Discharge Medication List        START taking these medications    Details   tamsulosin (FLOMAX) 0.4 MG capsule Take 1 capsule (0.4 mg) by mouth daily for 7 days.  Qty: 7 capsule, Refills: 0    Associated Diagnoses: Hydronephrosis with urinary obstruction due to ureteral calculus           CONTINUE these medications which have NOT CHANGED    Details   aspirin 81 MG EC tablet Take 1 tablet (81 mg) by mouth daily  Qty: 90 tablet, Refills: 3    Associated Diagnoses: Chest pain, unspecified type      atenolol (TENORMIN) 25 MG tablet Take 0.5 tablets (12.5 mg) by mouth daily  Qty: 90 tablet, Refills: 3    Associated Diagnoses: Essential (primary) hypertension      baclofen (LIORESAL) 10 MG tablet TAKE 1 TABLET BY MOUTH 3 TIMES DAILY  Qty: 90 tablet, Refills: 1    Associated Diagnoses: Muscle spasm      medication given by implanted intrathecal pump continuously. Drug # 1: Fentanyl (Sublimaze) - Conc: 2000 mcg/mL - Total Dose / 24 hours: 824.2 mcg    Drug # 2: Bupivacaine (Marcaine)  - Conc:17.7 mg/mL - Total Dose / 24 hours: 7.294 mg    Drug # 3: Hydromorphone (Dilaudid)  - Conc: 4.6 mg/mL - Total Dose / 24 hours: 1.8956 mg    Diluent: NS    Infusion Rate: 0.4121 mL/24 hrs  Pump Reservoir Volume: 40 mL    Bolus doses: up to 15 bolus doses/24 hours with 1 hour lockout  Each bolus: fentanyl 80.1 mcg, 0.708 mg Bupivacaine, 0.152 mg Dilaudid    Outside Clinic & Provider: [facility] [place] [phone]  Last Refill Date: 09/19/24  Next Refill Date: 10/24/24  [place] Alarm Volume: 2.0 mL  Pump : [employer]    Deliver Pump Medication to:   For [place]: If pump is within 7 days of depletion, pharmacist will enter a 'Pain Management Adult IP Consult' into the EHR, including 'IT pain pump management' as the reason for the consult.      metoclopramide (REGLAN) 10 MG tablet Take 1 tablet (10 mg) by mouth 3 times daily as needed (nausea and vomiting)  Qty: 20 tablet, Refills: 0      multivitamin, therapeutic (THERA-VIT) TABS tablet Take 1 tablet by mouth daily  Qty: 90 tablet, Refills: 3    Associated Diagnoses: Moderate protein-calorie malnutrition (H)      omeprazole (PRILOSEC OTC) 20 MG EC tablet Take 20 mg by mouth 2 times daily.      ondansetron (ZOFRAN) 4 MG tablet Take 1 tablet (4 mg) by mouth every 6 hours as needed for nausea.  Qty: 20 tablet, Refills: 0    Associated Diagnoses: Nausea      prochlorperazine (COMPAZINE) 5 MG tablet Take 1 tablet (5 mg) by mouth every 8 hours as needed for nausea or vomiting  Qty: 10 tablet, Refills: 0      promethazine (PHENERGAN) 25 MG tablet Take 1 tablet (25 mg) by mouth every 6 hours as needed for nausea  Qty: 30 tablet, Refills: 0      acetaminophen (TYLENOL) 325 MG tablet Take 1-2 tablets (325-650 mg) by mouth every 6 hours as needed for mild pain or headaches    Associated Diagnoses: Other headache syndrome      famotidine (PEPCID) 20 MG tablet Take 1 tablet (20 mg) by mouth 2 times daily as needed (acid reflux)  Qty: 20 tablet, Refills: 0           Allergies   Allergies   Allergen Reactions    Codeine Nausea and Vomiting    Hydromorphone Headache and Nausea and Vomiting    Morphine Nausea and Vomiting    Bacitracin Rash    Methadone Rash

## 2024-11-03 NOTE — ED NOTES
"[facility] ED Handoff Report    ED Chief Complaint: Nausea and Vomiting, Hematuria    ED Diagnosis:  (N20.0) Kidney stone  Comment:   Plan:     (R11.2) Nausea and vomiting, unspecified vomiting type  Comment:   Plan:        PMH:    Past Medical History:   Diagnosis Date    Current mild episode of major depressive disorder, unspecified whether recurrent (H) 02/17/2020    Hypertension     Migraine headache 11/06/2023    Nausea and vomiting     Opiate dependence (H)     RSD (reflex sympathetic dystrophy)     Sacral fracture, closed (H)         Code Status:  Full Code     Falls Risk: Yes Band: Applied    Current Living Situation/Residence: lives with a significant other and lives in a house     Elimination Status: Continent: Yes     Activity Level: SBA w/ walker    Patients Preferred Language:  English     Needed: No    Vital Signs:  BP (!) 144/64   Pulse 71   Temp 97  F (36.1  C) (Temporal)   Resp (!) 33   Ht 1.6 m (5' 3\")   Wt 46.7 kg (103 lb)   SpO2 95%   BMI 18.25 kg/m       Cardiac Rhythm: Sinus    Pain Score: 4/10, R flank/ abdomen    Is the Patient Confused:  No    Last Food or Drink: 11/02/24 at 1800    Focused Assessment:  Aox4; lungs sounds coarse; heart sounds normal, 4/10 R flank and abdominal pain, hematuria, dark red urine; nausea controlled with Reglan. Kidney stone present in R ureter.     CT Abdomen Pelvis w Contrast   Final Result   IMPRESSION:   1.  3 mm stone in the proximal right ureter with associated mild hydronephrosis. No other radiodense kidney/ureteral stones. No left hydronephrosis.   2.  Moderate amount of stool in the colon including large stool ball in the rectum, nonspecific, can be seen with constipation.       Tests Performed: Done: Labs and Imaging    Treatments Provided:  Antinausea admin, fluid admin    Family Dynamics/Concerns: No    Family Updated On Visitor Policy: Yes    Plan of Care Communicated to Family: Yes    Who Was Updated about Plan of Care: " akash (spouse)    Belongings Checklist Done and Signed by Patient: Yes    Medications sent with patient:     Covid: asymptomatic    Additional Information:     RN: Ney Méndez RN 11/2/2024 11:20 PM

## 2024-11-03 NOTE — ED PROVIDER NOTES
Emergency Department Midlevel Supervisory Note    Date/Time:11/2/2024 10:53 PM    I personally saw the patient and performed a substantive portion of the visit including all aspects of the medical decision making.  I am seeing this patient along with Shanell Borges PA-C.  IRajiv D.O., have reviewed the documentation, personally taken the patient's history, performed an exam and agree with the physical finds, diagnosis and management plan.    Prior records were reviewed.  I personally performed history and physical.  I personally reviewed lab, EKG, and radiology results as indicated.  Care was discussed with mid-level provider.  Diagnosis and disposition were discussed.  Final disposition will be per the [place] depending diagnostic studies and patient's clinical trajectory.      ED Course:  10:53 PM Shanell Borges PA-C staffed the patient with me.    The patient presented to the emergency department today complaint of nausea and vomiting.  She was recently seen for hematuria and started on antibiotics.  She has not been able to keep anything down for the last few days.  No concerning findings on laboratory testing today.  CT shows a 3 mm proximal right ureteral stone.  Given her ongoing symptoms despite medications, we will plan to keep her in the hospital for further management.  She is comfortable with this plan.    DIAGNOSIS:  1. Calculus of ureter    2. Kidney stone    3. Nausea and vomiting, unspecified vomiting type    4. Hydronephrosis with urinary obstruction due to ureteral calculus    5. Chronic pain syndrome          Labs and Imaging:  Results for orders placed or performed during the hospital encounter of 11/02/24   CT Abdomen Pelvis w Contrast    Impression    IMPRESSION:  1.  3 mm stone in the proximal right ureter with associated mild hydronephrosis. No other radiodense kidney/ureteral stones. No left hydronephrosis.  2.  Moderate amount of stool in the colon including large stool ball in  the rectum, nonspecific, can be seen with constipation.   Basic metabolic panel   Result Value Ref Range    Sodium 141 135 - 145 mmol/L    Potassium 3.8 3.4 - 5.3 mmol/L    Chloride 104 98 - 107 mmol/L    Carbon Dioxide (CO2) 28 22 - 29 mmol/L    Anion Gap 9 7 - 15 mmol/L    Urea Nitrogen 10.9 8.0 - 23.0 mg/dL    Creatinine 0.66 0.51 - 0.95 mg/dL    GFR Estimate >90 >60 mL/min/1.73m2    Calcium 8.6 (L) 8.8 - 10.4 mg/dL    Glucose 134 (H) 70 - 99 mg/dL   Result Value Ref Range    Lipase 23 13 - 60 U/L   Hepatic function panel   Result Value Ref Range    Protein Total 6.6 6.4 - 8.3 g/dL    Albumin 4.4 3.5 - 5.2 g/dL    Bilirubin Total <0.2 <=1.2 mg/dL    Alkaline Phosphatase 102 40 - 150 U/L    AST 20 0 - 45 U/L    ALT 23 0 - 50 U/L    Bilirubin Direct <0.20 0.00 - 0.30 mg/dL   UA with Microscopic reflex to Culture    Specimen: Urine, Midstream   Result Value Ref Range    Color Urine Yellow Colorless, Straw, Light Yellow, Yellow    Appearance Urine Turbid (A) Clear    Glucose Urine Negative Negative mg/dL    Bilirubin Urine Negative Negative    Ketones Urine Negative Negative mg/dL    Specific Gravity Urine 1.020 1.001 - 1.030    Blood Urine >1.0 mg/dL (A) Negative    pH Urine 6.0 5.0 - 7.0    Protein Albumin Urine 10 (A) Negative mg/dL    Urobilinogen Urine <2.0 <2.0 mg/dL    Nitrite Urine Negative Negative    Leukocyte Esterase Urine Negative Negative    Mucus Urine Present (A) None Seen /LPF    Uric Acid Crystals Urine Few (A) None Seen /HPF    RBC Urine >182 (H) <=2 /HPF    WBC Urine 0 <=5 /HPF         Rajiv Rubio D.O.  Emergency Medicine  [facility] EMERGENCY DEPARTMENT  [address] [facility] [address]  [phone]  Dept: [phone]             Rajiv Rubio,   11/08/24 0608

## 2024-11-04 ENCOUNTER — APPOINTMENT (OUTPATIENT)
Dept: PHYSICAL THERAPY | Facility: HOSPITAL | Age: 69
End: 2024-11-04
Attending: STUDENT IN AN ORGANIZED HEALTH CARE EDUCATION/TRAINING PROGRAM
Payer: COMMERCIAL

## 2024-11-04 VITALS
HEART RATE: 66 BPM | BODY MASS INDEX: 18.25 KG/M2 | HEIGHT: 63 IN | DIASTOLIC BLOOD PRESSURE: 69 MMHG | RESPIRATION RATE: 16 BRPM | OXYGEN SATURATION: 94 % | TEMPERATURE: 98.5 F | WEIGHT: 103 LBS | SYSTOLIC BLOOD PRESSURE: 141 MMHG

## 2024-11-04 PROCEDURE — 99239 HOSP IP/OBS DSCHRG MGMT >30: CPT | Mod: FS | Performed by: STUDENT IN AN ORGANIZED HEALTH CARE EDUCATION/TRAINING PROGRAM

## 2024-11-04 PROCEDURE — 99207 PR APP CREDIT; MD BILLING SHARED VISIT: CPT | Mod: FS

## 2024-11-04 PROCEDURE — G0378 HOSPITAL OBSERVATION PER HR: HCPCS

## 2024-11-04 PROCEDURE — 97161 PT EVAL LOW COMPLEX 20 MIN: CPT | Mod: GP

## 2024-11-04 PROCEDURE — 250N000013 HC RX MED GY IP 250 OP 250 PS 637: Performed by: HOSPITALIST

## 2024-11-04 PROCEDURE — 97116 GAIT TRAINING THERAPY: CPT | Mod: GP

## 2024-11-04 PROCEDURE — 250N000011 HC RX IP 250 OP 636: Performed by: HOSPITALIST

## 2024-11-04 RX ADMIN — PANTOPRAZOLE SODIUM 40 MG: 40 TABLET, DELAYED RELEASE ORAL at 07:52

## 2024-11-04 RX ADMIN — ACETAMINOPHEN 650 MG: 325 TABLET ORAL at 10:49

## 2024-11-04 RX ADMIN — THERA TABS 1 TABLET: TAB at 07:52

## 2024-11-04 RX ADMIN — BACLOFEN 10 MG: 10 TABLET ORAL at 07:52

## 2024-11-04 RX ADMIN — ACETAMINOPHEN 650 MG: 325 TABLET ORAL at 06:31

## 2024-11-04 RX ADMIN — ACETAMINOPHEN 650 MG: 325 TABLET ORAL at 00:17

## 2024-11-04 RX ADMIN — ATENOLOL 12.5 MG: 25 TABLET ORAL at 07:52

## 2024-11-04 RX ADMIN — TAMSULOSIN HYDROCHLORIDE 0.4 MG: 0.4 CAPSULE ORAL at 07:52

## 2024-11-04 RX ADMIN — ONDANSETRON 4 MG: 4 TABLET, ORALLY DISINTEGRATING ORAL at 06:33

## 2024-11-04 ASSESSMENT — ACTIVITIES OF DAILY LIVING (ADL)
ADLS_ACUITY_SCORE: 0

## 2024-11-04 NOTE — PROGRESS NOTES
Physical Therapy        11/04/24 1000   Appointment Info   Signing Clinician's Name / Credentials (PT) Maria C Tracey, PT, DPT   Quick Adds   Quick Adds Certification   Living Environment   People in Home spouse   Current Living Arrangements house   Home Accessibility stairs to enter home   Number of Stairs, Main Entrance 2   Stair Railings, Main Entrance none   Transportation Anticipated family or friend will provide   Self-Care   Activity/Exercise/Self-Care Comment uses 4WW when she goes out, no AD in home.  independent with mobility, ADLs, IADLs, driving   General Information   Onset of Illness/Injury or Date of Surgery 11/02/24   Referring Physician Eitan Jolly,    Patient/Family Therapy Goals Statement (PT) go home   Pertinent History of Current Problem (include personal factors and/or comorbidities that impact the POC) 68 year old female admitted on 11/2/2024.  Patient has history of COPD, hypertension, and chronic pain syndrome with continuous intrathecal baclofen pump.  Patient presented to the ED right flank pain with associated nausea and vomiting.  Positive for right ureteral calculi with associated hydronephrosis. Patient is s/p stent placement 11/3/24. In continued need of post-procedure pain management and PT/ OT evaluation.   Existing Precautions/Restrictions fall   Cognition   Cognitive Status Comments oriented and conversationally appropriate   Pain Assessment   Patient Currently in Pain Yes, see Vital Sign flowsheet  (4/10)   Posture    Posture Forward head position;Protracted shoulders   Posture Comments significant flexion at hips   Range of Motion (ROM)   ROM Comment wfl except tight hip flexors   Strength (Manual Muscle Testing)   Strength (Manual Muscle Testing) Deficits observed during functional mobility   Transfers   Comment, (Transfers) CGA sit <> stand and toilet transfers with FWW   Gait/Stairs (Locomotion)   Comment, (Gait/Stairs) 10' with FWW, CGA. decreased step length, flexed  posture   Balance   Balance Comments impaired, pt requires walker for safe OOB activity   Sensory Examination   Sensory Perception patient reports no sensory changes   Clinical Impression   Criteria for Skilled Therapeutic Intervention Yes, treatment indicated   PT Diagnosis (PT) difficulty walking   Influenced by the following impairments pain and deconditioning   Functional limitations due to impairments limited household mobility   Clinical Presentation (PT Evaluation Complexity) stable   Clinical Presentation Rationale presents as medically diagnosed   Planned Therapy Interventions (PT) balance training;bed mobility training;gait training;home exercise program;neuromuscular re-education;patient/family education;ROM (range of motion);stair training;strengthening;transfer training;progressive activity/exercise   Risk & Benefits of therapy have been explained evaluation/treatment results reviewed;current/potential barriers reviewed;participants voiced agreement with care plan;participants included;patient   PT Total Evaluation Time   PT Eval, Moderate Complexity Minutes (39775) 10   Therapy Certification   Start of care date 11/04/24   Certification date from 11/04/24   Certification date to 11/04/24   Medical Diagnosis hydronephrosis with urinary obstruction due to ureteral calculus   Physical Therapy Goals   PT Predicted Duration/Target Date for Goal Attainment 11/11/24   PT Goals Bed Mobility;Transfers;Gait;Stairs   PT: Transfers Supervision/stand-by assist;Sit to/from stand;Within precautions;Goal Met   PT: Gait Supervision/stand-by assist;Rolling walker;100 feet;Goal Met   Interventions   Interventions Quick Adds Gait Training   Gait Training   Gait Training Minutes (83172) 10   Symptoms Noted During/After Treatment (Gait Training) increased pain;fatigue   Treatment Detail/Skilled Intervention cues for walker proximity, posture, safety   Distance in Feet 200'   [place] Level (Gait Training) stand-by  assist   Physical Assistance Level (Gait Training) 1 person assist   Weight Bearing (Gait Training) weight-bearing as tolerated   Assistive Device (Gait Training) rolling walker   Pattern Analysis (Gait Training) swing-through gait   Gait Analysis Deviations decreased step length;decreased velocity of limb motion;decreased juancho   PT Discharge Planning   PT Discharge Recommendation (DC Rec) home with home care physical therapy;home with assist   PT Rationale for DC Rec ok to go home with family today, recommend home PT (and OT) to continue progress toward PLOF. Will need to use walker at all times for now (pt ok is with this)   PT Brief overview of current status amb 200' with FWW, SBA transfers, goals met   Physical Therapy Time and Intention   Timed Code Treatment Minutes 10   Total Session Time (sum of timed and untimed services) 20     [facility]  OUTPATIENT PHYSICAL THERAPY EVALUATION  PLAN OF TREATMENT FOR OUTPATIENT REHABILITATION  (COMPLETE FOR INITIAL CLAIMS ONLY)  Patient's Last Name, First Name, M.I.  YOB: 1955  Mariah Koehler                        Provider's Name  [facility] Medical Record No.  [id number]                             Onset Date:  11/02/24   Start of Care Date:  11/04/24   Type:     _X_PT   ___OT   ___SLP Medical Diagnosis:  hydronephrosis with urinary obstruction due to ureteral calculus              PT Diagnosis:  difficulty walking Visits from SOC:  1     See note for plan of treatment, functional goals and certification details    I CERTIFY THE NEED FOR THESE SERVICES FURNISHED UNDER        THIS PLAN OF TREATMENT AND WHILE UNDER MY CARE     (Physician co-signature of this document indicates review and certification of the therapy plan).              Maria C Tracey, DPT 11/4/2024

## 2024-11-04 NOTE — PROGRESS NOTES
PRIMARY DIAGNOSIS: ACUTE PAIN  OUTPATIENT/OBSERVATION GOALS TO BE MET BEFORE DISCHARGE:  1. Pain Status: Improved but still requiring IV narcotics.    2. Return to near baseline physical activity: No    3. Cleared for discharge by consultants (if involved): No    Discharge Planner Nurse   Safe discharge environment identified: No  Barriers to discharge: Yes       Entered by: Darby Stuart RN 11/03/2024 7:25 PM    VSS in RA except BP. C/o right abd pain, Pain decreased with PRN Oxy & Dilaudid also was able to void 600ml after did bladder scan 530ml. Also provided warm compress. Pt have some intermittent nausea & vomiting resolved with rest.      Please review provider order for any additional goals.   Nurse to notify provider when observation goals have been met and patient is ready for discharge.

## 2024-11-04 NOTE — PLAN OF CARE
Problem: Pain Acute  Goal: Optimal Pain Control and Function  Outcome: Progressing  Intervention: Develop Pain Management Plan  Recent Flowsheet Documentation  Taken 11/4/2024 0426 by Criselda Garvin RN  Pain Management Interventions: rest  Taken 11/4/2024 0059 by Criselda Garvin RN  Pain Management Interventions:   rest   quiet environment facilitated  Taken 11/4/2024 0017 by Criselda Garvin RN  Pain Management Interventions: medication (see MAR)  Intervention: Prevent or Manage Pain  Recent Flowsheet Documentation  Taken 11/4/2024 0428 by Criselda Garvin RN  Medication Review/Management: medications reviewed  Taken 11/4/2024 0021 by Criselda Garvin RN  Medication Review/Management: medications reviewed     Problem: Comorbidity Management  Goal: Blood Pressure in Desired Range  Outcome: Progressing  Intervention: Maintain Blood Pressure Management  Recent Flowsheet Documentation  Taken 11/4/2024 0428 by Criselda Garvin RN  Medication Review/Management: medications reviewed  Taken 11/4/2024 0021 by Criselda Garvin RN  Medication Review/Management: medications reviewed     Problem: Adult Inpatient Plan of Care  Goal: Absence of Hospital-Acquired Illness or Injury  Intervention: Prevent Skin Injury  Recent Flowsheet Documentation  Taken 11/4/2024 0428 by Criselda Garvin RN  Body Position: position changed independently  Taken 11/4/2024 0021 by Criselda Garvin RN  Body Position: position changed independently     Problem: Pain Acute  Goal: Optimal Pain Control and Function  Intervention: Develop Pain Management Plan  Recent Flowsheet Documentation  Taken 11/4/2024 0426 by Criselda Garvin RN  Pain Management Interventions: rest  Taken 11/4/2024 0059 by Criselda Garvin RN  Pain Management Interventions:   rest   quiet environment facilitated  Taken 11/4/2024 0017 by Criselda Garvin RN  Pain Management Interventions: medication (see MAR)  Intervention: Prevent or Manage Pain  Recent Flowsheet  Documentation  Taken 11/4/2024 0428 by Criselda Garvin, RN  Medication Review/Management: medications reviewed  Taken 11/4/2024 0021 by Criselda Garvin, RN  Medication Review/Management: medications reviewed   Goal Outcome Evaluation:       Patient admitted for hydronephrosis. Cystoscopy done yesterday 11/3 with stent placement. Tylenol administered around midnight for headache. AOX4. RA. BP in the 140s. PT/OT, social work and pain management consult. Regular diet. Left PIV saline locked. Assist of one with mobility. Uses the bedside commode. Slept well during the night.

## 2024-11-04 NOTE — PLAN OF CARE
PRIMARY DIAGNOSIS: ACUTE PAIN  OUTPATIENT/OBSERVATION GOALS TO BE MET BEFORE DISCHARGE:  1. Pain Status: Improved-controlled with oral pain medications.    2. Return to near baseline physical activity: No    3. Cleared for discharge by consultants (if involved): Yes    Discharge Planner Nurse   Safe discharge environment identified: Yes  Barriers to discharge: Yes       Entered by: Criselda Garvin RN 11/04/2024 12:52 AM     Please review provider order for any additional goals.   Nurse to notify provider when observation goals have been met and patient is ready for discharge.Goal Outcome Evaluation:

## 2024-11-04 NOTE — CONSULTS
Care Management Discharge Note    Discharge Date: 11/04/2024       Discharge Disposition:  home with home care  Discharge Services:  RN PT  Discharge DME:  n/a    Discharge Transportation:  family    Private pay costs discussed: Not applicable    Education Provided on the Discharge Plan:  yes  Persons Notified of Discharge Plans: yes  Patient/Family in Agreement with the Plan: yes      Handoff Referral Completed: Yes, non-[facility] PCP: External handoff communication completed    Additional Information:  Therapy recommending home PT; referral sent and pending for home RN PT (pending). SW met with pt to discuss, and confirm discharge plan to home with home care services; RN PT. All parties aware, and agreeable to discharge plan. [facility] intake notified of discharge. No other needs from  at this time. Family to transport.  10:48 AM    Brenna Kjellberg, BSW LSW  11/4/2024

## 2024-11-04 NOTE — CONSULTS
[facility] ACUTE INPATIENT PAIN SERVICE    [facility], [facility], [place]   PAIN Consult      Discussed with attending.  Pain consult needed.  Pain team will sign off.

## 2024-11-04 NOTE — PLAN OF CARE
Problem: Pain Acute  Goal: Optimal Pain Control and Function  Intervention: Develop Pain Management Plan  Recent Flowsheet Documentation  Taken 11/3/2024 1906 by Darby Stuart RN  Pain Management Interventions:   repositioned   rest  Taken 11/3/2024 1854 by Darby Stuart RN  Pain Management Interventions: medication (see MAR)  Taken 11/3/2024 1714 by Darby Stuart RN  Pain Management Interventions: medication (see MAR)  Taken 11/3/2024 1624 by Darby Stuart RN  Pain Management Interventions: medication (see MAR)  Taken 11/3/2024 1609 by Darby Stuart RN  Pain Management Interventions:   aromatherapy   heat applied   rest   Goal Outcome Evaluation:    VSS in RA except slight elevated BP. C/o right abd pain decreased with IV dilaudid. Ax1 at bedside commode.     Darby Stuart RN

## 2024-11-04 NOTE — DISCHARGE SUMMARY
[facility]  Hospitalist Discharge Summary      Date of Admission:  11/2/2024  Date of Discharge:  11/4/2024  Discharging Provider: Alicia Willoughby NP  Discharge Service: Hospitalist Service    Discharge Diagnoses   Right ureteral calculi s/p cystoscopy with stone extraction and ureteral stent placement    Clinically Significant Risk Factors          Follow-ups Needed After Discharge   Follow-up Appointments       Hospital Follow-up with Existing Primary Care Provider (PCP)      Please see details below         Schedule Primary Care visit within: 7 Days       Follow Up      [facility] will call you to arrange stent removal in 1-2 weeks. You may call to confirm appointment as needed: [phone]                Unresulted Labs Ordered in the Past 30 Days of this Admission       Date and Time Order Name Status Description    11/3/2024 11:20 AM Stone analysis In process         These results will be followed up by Urology    Discharge Disposition   Discharged to home  Condition at discharge: Stable    Hospital Course   Mariah Koehler is a 68 year old female admitted on 11/2/2024.  Patient has history of COPD, hypertension, and chronic pain syndrome with continuous intrathecal baclofen pump.  Patient presented to the ED right flank pain with associated nausea and vomiting.  Positive for right ureteral calculi.     Right ureteral calculi-3 mm  Right-sided hydronephrosis   Nausea and vomiting, mild hematuria  Constipation  -CT abdomen pelvis-3 mm stone in the proximal right ureter with associated mild hydronephrosis.  Moderate amount of stool in the colon including large stool ball in the rectum.  -Bowel regimen started, including enema-successful moderate amount of stool  -WBC-6.2  -Afebrile  -Hemodynamically stable  -Slightly hypertensive likely due to pain  -Tylenol, Dilaudid, oxycodone as needed for pain  -Compazine, reglan, Zofran as needed for nausea  -Urology consulted-performed  successful right ureteroscopy with stone retrieval and stent placement.  Patient will follow-up in 1 to 2 weeks for stent removal per urology   -Flomax started in hospital. Resume x1 week  -PT/OT eval-Recommending home PT. Care management to help arrange this     COPD   -no acute exacerbation     Hypertension  -as needed hydralazine  -PTA atenolol     Chronic pain  -Intrathecal pain pump    Consultations This Hospital Stay   PAIN MANAGEMENT ADULT IP CONSULT  UROLOGY IP CONSULT  CARE MANAGEMENT / SOCIAL WORK IP CONSULT  PHYSICAL THERAPY ADULT IP CONSULT  OCCUPATIONAL THERAPY ADULT IP CONSULT    Code Status   Full Code    Time Spent on this Encounter   Alicia SALINAS NP, personally saw the patient today and spent greater than 30 minutes discharging this patient.       Alicia Willoughby NP  [facility] EXTENDED RECOVERY AND SHORT STAY  [address] [place] [id number]  Phone: [phone]  Fax: [phone]  ______________________________________________________________________    Physical Exam   Vital Signs: Temp: 98.5  F (36.9  C) Temp src: Oral BP: (!) 141/69 Pulse: 66   Resp: 16 SpO2: 94 % O2 Device: None (Room air)    Weight: 103 lbs 0 oz  Constitutional: awake, alert, cooperative, no apparent distress, and appears stated age  Respiratory: No increased work of breathing, good air exchange, clear to auscultation bilaterally, no crackles or wheezing  Cardiovascular: Normal apical impulse, regular rate and rhythm, normal S1 and S2, no S3 or S4, and no murmur noted  GI: No scars, normal bowel sounds, soft, non-distended, non-tender, no masses palpated, no hepatosplenomegally       Primary Care Physician   Alex Nolasco    Discharge Orders      Home Care Referral      Home Care Referral      Reason for your hospital stay    Right ureteral stone     Activity    Your activity upon discharge: activity as tolerated     Reason for your hospital stay    S/p cystoscopy with right  ureteral stent placement.     Activity    Your activity upon discharge: Resume activities as tolerated.     Discharge Instructions    - While you were in the hospital you had right ureteral stent(s) placed.  - A ureteral stent, is a small hollow drainage tube that since inside the ureter. One tip of the stent curls in the kidney and the other in the bladder to keep the stent in place.  - You may notice stent irritation in the form of: urinary frequency, urinary urgency, burning when you urinate, an ache in the back or pelvis.    What you can do to help with these symptoms:  - Minimize activity  - Take a warm bath  - Take pain medications as prescribed  - There are some prescription medications that may help such as flomax, pyridium, detrol and ditropan     Follow Up    [facility] will call you to arrange stent removal in 1-2 weeks. You may call to confirm appointment as needed: [phone]     Diet    Follow this diet upon discharge: Regular     Hospital Follow-up with Existing Primary Care Provider (PCP)    Please see details below            Significant Results and Procedures   Results for orders placed or performed during the hospital encounter of 11/02/24   CT Abdomen Pelvis w Contrast    Narrative    EXAM: CT ABDOMEN PELVIS W CONTRAST  LOCATION: [facility]  DATE: 11/2/2024    INDICATION: Right lower quadrant tenderness.  COMPARISON: CT abdomen and pelvis on 10/20/2024.  TECHNIQUE: CT scan of the abdomen and pelvis was performed after the injection of Isovue 370 90 ml intravenously. Multiplanar reformats were obtained. Dose reduction techniques were used.    FINDINGS:   LOWER CHEST: Basilar pulmonary opacities, likely atelectasis. Mild distal esophageal/proximal gastric wall thickening (series 3 image 20), not significantly changed as compared to 10/20/2024.    ABDOMEN/PELVIS:    HEPATOBILIARY: No suspicious focal hepatic lesion. Scattered pneumobilia. Right upper quadrant  postcholecystectomy clips.    PANCREAS: The main pancreatic duct is dilated measuring 6 mm in diameter in the pancreatic head and neck area.    SPLEEN: No splenomegaly.    ADRENAL GLANDS: No adrenal nodules.    KIDNEYS/BLADDER: 3 mm stone in the proximal right ureter with associated mild hydronephrosis. No other radiodense kidney/ureteral stones. No left hydronephrosis. 2 mm cyst at upper pole of the right kidney. Mild diffuse urinary bladder wall thickening,   nonspecific, can be seen with underdistention versus chronic cystitis.    BOWEL: Moderate amount of stool including large stool ball in the rectum. No abnormally dilated bowel loops. The appendix is visualized and appears normal.    PERITONEUM: No evidence of free fluid in the abdomen and pelvis. No free peritoneal or portal venous gas.    PELVIC ORGANS: The uterus is not visualized, likely surgically absent.    VASCULATURE: Moderate atherosclerotic vascular calcification of the abdominal aorta and iliac arteries.    LYMPH NODES: No significant abdominopelvic lymphadenopathy.    MUSCULOSKELETAL: Mild degenerative changes of the spine. No suspicious osseous lesion. Implantable spinal stimulator device in the subcutaneous tissue of the right gluteal area. Multilevel degenerative changes of the spine, most prominent at L4-L5 with   disc height loss and endplate changes.      Impression    IMPRESSION:  1.  3 mm stone in the proximal right ureter with associated mild hydronephrosis. No other radiodense kidney/ureteral stones. No left hydronephrosis.  2.  Moderate amount of stool in the colon including large stool ball in the rectum, nonspecific, can be seen with constipation.   XR Surgery ANNAMARIE Fluoro L/T 5 Min    Narrative    This exam was marked as non-reportable because it will not be read by a   radiologist or a [place] non-radiologist provider.             Discharge Medications   Current Discharge Medication List        START taking these medications     Details   tamsulosin (FLOMAX) 0.4 MG capsule Take 1 capsule (0.4 mg) by mouth daily for 7 days.  Qty: 7 capsule, Refills: 0    Associated Diagnoses: Hydronephrosis with urinary obstruction due to ureteral calculus           CONTINUE these medications which have NOT CHANGED    Details   aspirin 81 MG EC tablet Take 1 tablet (81 mg) by mouth daily  Qty: 90 tablet, Refills: 3    Associated Diagnoses: Chest pain, unspecified type      atenolol (TENORMIN) 25 MG tablet Take 0.5 tablets (12.5 mg) by mouth daily  Qty: 90 tablet, Refills: 3    Associated Diagnoses: Essential (primary) hypertension      baclofen (LIORESAL) 10 MG tablet TAKE 1 TABLET BY MOUTH 3 TIMES DAILY  Qty: 90 tablet, Refills: 1    Associated Diagnoses: Muscle spasm      medication given by implanted intrathecal pump continuously. Drug # 1: Fentanyl (Sublimaze) - Conc: 2000 mcg/mL - Total Dose / 24 hours: 824.2 mcg    Drug # 2: Bupivacaine (Marcaine)  - Conc:17.7 mg/mL - Total Dose / 24 hours: 7.294 mg    Drug # 3: Hydromorphone (Dilaudid)  - Conc: 4.6 mg/mL - Total Dose / 24 hours: 1.8956 mg    Diluent: NS    Infusion Rate: 0.4121 mL/24 hrs  Pump Reservoir Volume: 40 mL    Bolus doses: up to 15 bolus doses/24 hours with 1 hour lockout  Each bolus: fentanyl 80.1 mcg, 0.708 mg Bupivacaine, 0.152 mg Dilaudid    Outside Clinic & Provider: [facility] in [place] [phone]  Last Refill Date: 09/19/24  Next Refill Date: 10/24/24  Low [place] Alarm Volume: 2.0 mL  Pump : [employer]    Deliver Pump Medication to:   For [place]: If pump is within 7 days of depletion, pharmacist will enter a 'Pain Management Adult IP Consult' into the EHR, including 'IT pain pump management' as the reason for the consult.      metoclopramide (REGLAN) 10 MG tablet Take 1 tablet (10 mg) by mouth 3 times daily as needed (nausea and vomiting)  Qty: 20 tablet, Refills: 0      multivitamin, therapeutic (THERA-VIT) TABS tablet Take 1 tablet by mouth daily  Qty: 90  tablet, Refills: 3    Associated Diagnoses: Moderate protein-calorie malnutrition (H)      omeprazole (PRILOSEC OTC) 20 MG EC tablet Take 20 mg by mouth 2 times daily.      ondansetron (ZOFRAN) 4 MG tablet Take 1 tablet (4 mg) by mouth every 6 hours as needed for nausea.  Qty: 20 tablet, Refills: 0    Associated Diagnoses: Nausea      prochlorperazine (COMPAZINE) 5 MG tablet Take 1 tablet (5 mg) by mouth every 8 hours as needed for nausea or vomiting  Qty: 10 tablet, Refills: 0      promethazine (PHENERGAN) 25 MG tablet Take 1 tablet (25 mg) by mouth every 6 hours as needed for nausea  Qty: 30 tablet, Refills: 0      acetaminophen (TYLENOL) 325 MG tablet Take 1-2 tablets (325-650 mg) by mouth every 6 hours as needed for mild pain or headaches    Associated Diagnoses: Other headache syndrome      famotidine (PEPCID) 20 MG tablet Take 1 tablet (20 mg) by mouth 2 times daily as needed (acid reflux)  Qty: 20 tablet, Refills: 0           Allergies   Allergies   Allergen Reactions    Codeine Nausea and Vomiting    Hydromorphone Headache and Nausea and Vomiting    Morphine Nausea and Vomiting    Bacitracin Rash    Methadone Rash

## 2024-11-04 NOTE — PLAN OF CARE
Goal Outcome Evaluation:    Patient discharged to home at 12:55 via Private Car.  Accompanied by spouse and staff.  Discharge instructions were reviewed with patient, opportunity offered to ask questions.    Prescriptions e-prescribed to pharmacy.  Access discontinued: Yes  Care plan and education discontinued: Yes  Home meds retrieved from pharmacy: No  Belongings were sent home with patient/family:  Cell phone/electronics:  , Clothing: Shirt(s):  , Pants:  , and Underclothes:  , and Shoes:   .

## 2024-11-04 NOTE — PROGRESS NOTES
"  [facility] - POSTOP PROGRESS NOTE    PLACE OF SERVICE:  [facility]     SURGERY: POD # 1 after Cystoscopy, Right retrograde pyelogram, Right ureteroscopy with basket extraction of stone, Right 6 Fr x 28 cm ureteral stent placement by Dr Cristiano Otero for right ureteral stone     SUBJECTIVE:   Events: No acute events overnight.     Pt reports mild right abdominal/flank aching today, improved from pre-op. Denies fever, chills. Voiding and reports some frequency, uncertain if she is emptying completely. Notes some gross hematuria, improving with each void.     OBJECTIVE:  PHYSICAL EXAM  Vital signs:  Temp: 98.5  F (36.9  C) Temp src: Oral BP: (!) 141/69 Pulse: 66   Resp: 16 SpO2: 94 % O2 Device: None (Room air) Oxygen Delivery: 6 LPM Height: 160 cm (5' 3\") Weight: 46.7 kg (103 lb)  Estimated body mass index is 18.25 kg/m  as calculated from the following:    Height as of this encounter: 1.6 m (5' 3\").    Weight as of this encounter: 46.7 kg (103 lb).    General: NAD, alert, cooperative, sitting up in chair.   Abdomen: Soft, mild RLQ tenderness, non-tender otherwise. No bilateral CVA tenderness. No SP fullness or tenderness.   :  deferred.     LABS:  INR   Date Value Ref Range Status   10/21/2024 1.09 0.85 - 1.15 Final   01/09/2007 0.97 0.86 - 1.14 Final      Lab Results   Component Value Date    WBC 6.2 11/03/2024    WBC 7.4 01/09/2007     Lab Results   Component Value Date    HGB 11.8 11/03/2024    HGB 14.0 01/09/2007     Lab Results   Component Value Date     11/03/2024     01/09/2007     Creatinine   Date Value Ref Range Status   11/03/2024 0.51 0.51 - 0.95 mg/dL Final   01/09/2007 0.99 0.60 - 1.30 mg/dL Final     Sodium   Date Value Ref Range Status   11/03/2024 140 135 - 145 mmol/L Final     Potassium   Date Value Ref Range Status   11/03/2024 3.4 3.4 - 5.3 mmol/L Final   02/20/2023 3.6 3.5 - 5.0 mmol/L Final   01/09/2007 4.3 3.4 - 5.3 mmol/L Final     Magnesium   Date Value Ref Range " Status   10/21/2024 1.9 1.7 - 2.3 mg/dL Final     UA RESULTS:  Recent Labs   Lab Test 11/02/24  2031 05/22/22  1545 12/16/20  1834   COLOR Yellow   < > Yellow   APPEARANCE Turbid*   < > Clear   URINEGLC Negative   < > Negative   URINEBILI Negative   < > Negative   URINEKETONE Negative   < > 20 mg/dL*   SG 1.020   < > >1.050*   UBLD >1.0 mg/dL*   < > Moderate*   URINEPH 6.0   < > 6.5   PROTEIN 10*   < > 30 mg/dL*   UROBILINOGEN  --   --  <2.0 E.U./dL   NITRITE Negative   < > Negative   LEUKEST Negative   < > Negative   RBCU >182*   < > 25-50*   WBCU 0   < > 0-5    < > = values in this interval not displayed.         Lab Results: personally reviewed.     ASSESSMENT/PLAN:POD # 1 after Cystoscopy, Right retrograde pyelogram, Right ureteroscopy with basket extraction of stone, Right 6 Fr x 28 cm ureteral stent placement by Dr Cristiano Otero for right ureteral stone     - UA 11/2 benign for infection. WBC 11/3 WNL. Remains afebrile.   - Creatinine WNL on 11/3  - Stone analysis pending.   - Voiding symptom of urinary frequency likely 2/2 ureteral stent. Check PVR to assure not retaining (notify [facility] [place] if >/= 200 ml).   - Dr. Otero arranging stent removal in 1-2 weeks. [facility] contact info and stent discharge instructions added to discharge orders.   - [facility] will sign off. Please re-consult with any new or worsening urologic concerns.    Discussed patient with Dr Cristiano Gottlieb, APRN, CNP  [facility]   [phone]

## 2024-11-05 ENCOUNTER — PATIENT OUTREACH (OUTPATIENT)
Dept: CARE COORDINATION | Facility: CLINIC | Age: 69
End: 2024-11-05

## 2024-11-05 ENCOUNTER — OFFICE VISIT (OUTPATIENT)
Dept: FAMILY MEDICINE | Facility: CLINIC | Age: 69
End: 2024-11-05
Payer: COMMERCIAL

## 2024-11-05 VITALS
WEIGHT: 108 LBS | RESPIRATION RATE: 20 BRPM | BODY MASS INDEX: 19.13 KG/M2 | TEMPERATURE: 98.3 F | SYSTOLIC BLOOD PRESSURE: 129 MMHG | DIASTOLIC BLOOD PRESSURE: 75 MMHG | OXYGEN SATURATION: 98 % | HEART RATE: 77 BPM

## 2024-11-05 DIAGNOSIS — N20.0 NEPHROLITHIASIS: ICD-10-CM

## 2024-11-05 DIAGNOSIS — R41.89 COGNITIVE CHANGE: ICD-10-CM

## 2024-11-05 DIAGNOSIS — R31.9 HEMATURIA, UNSPECIFIED TYPE: Primary | ICD-10-CM

## 2024-11-05 LAB
ALBUMIN UR-MCNC: >=300 MG/DL
APPEARANCE UR: ABNORMAL
BACTERIA #/AREA URNS HPF: ABNORMAL /HPF
BILIRUB UR QL STRIP: ABNORMAL
COLOR UR AUTO: ABNORMAL
GLUCOSE UR STRIP-MCNC: NEGATIVE MG/DL
HGB UR QL STRIP: ABNORMAL
HOLD SPECIMEN: NORMAL
KETONES UR STRIP-MCNC: 15 MG/DL
LEUKOCYTE ESTERASE UR QL STRIP: ABNORMAL
NITRATE UR QL: NEGATIVE
PH UR STRIP: 5.5 [PH] (ref 5–8)
RBC #/AREA URNS AUTO: >100 /HPF
SP GR UR STRIP: 1.02 (ref 1–1.03)
SQUAMOUS #/AREA URNS AUTO: ABNORMAL /LPF
UROBILINOGEN UR STRIP-ACNC: 1 E.U./DL
WBC #/AREA URNS AUTO: ABNORMAL /HPF

## 2024-11-05 PROCEDURE — G2211 COMPLEX E/M VISIT ADD ON: HCPCS | Performed by: FAMILY MEDICINE

## 2024-11-05 PROCEDURE — 81001 URINALYSIS AUTO W/SCOPE: CPT | Performed by: FAMILY MEDICINE

## 2024-11-05 PROCEDURE — 87086 URINE CULTURE/COLONY COUNT: CPT | Performed by: FAMILY MEDICINE

## 2024-11-05 PROCEDURE — 99213 OFFICE O/P EST LOW 20 MIN: CPT | Performed by: FAMILY MEDICINE

## 2024-11-05 RX ORDER — OXYCODONE HYDROCHLORIDE 5 MG/1
5 TABLET ORAL 2 TIMES DAILY PRN
Qty: 15 TABLET | Refills: 0 | Status: SHIPPED | OUTPATIENT
Start: 2024-11-05 | End: 2024-11-12

## 2024-11-05 ASSESSMENT — PATIENT HEALTH QUESTIONNAIRE - PHQ9
SUM OF ALL RESPONSES TO PHQ QUESTIONS 1-9: 2
10. IF YOU CHECKED OFF ANY PROBLEMS, HOW DIFFICULT HAVE THESE PROBLEMS MADE IT FOR YOU TO DO YOUR WORK, TAKE CARE OF THINGS AT HOME, OR GET ALONG WITH OTHER PEOPLE: NOT DIFFICULT AT ALL
SUM OF ALL RESPONSES TO PHQ QUESTIONS 1-9: 2

## 2024-11-05 NOTE — PROGRESS NOTES
Assessment & Plan     Hematuria, unspecified type  Reviewed hospital records with patient and her  today  Severe onset of pain found to have stone underwent stenting and lithotripsy  Has follow-up with urology to have stent removed  No pain medication prescribed by the hospital on discharge and patient is in a lot of discomfort  Discussed small course of oxycodone  - UA Macroscopic with reflex to Microscopic and Culture - Clinic Collect  - UA Microscopic with Reflex to Culture  - Urine Culture    Nephrolithiasis  Please see above  Urine showing quite a bit of blood today we are sent for culture for signs of infection  Discussed with her the necessity for keeping better hydrated with water  - oxyCODONE (ROXICODONE) 5 MG tablet  Dispense: 15 tablet; Refill: 0    Cognitive change  Increasing cognitive changes noted with  about the patient  Discussed referral for neuropsych testing and neurology  - Adult Neurology  Referral          MED REC REQUIRED  Post Medication Reconciliation Status:  Discharge medications reconciled and changed, see notes/orders        Subjective   Mariah is a 68 year old, presenting for the following health issues:  Hospital F/U and Memory Loss      11/5/2024    12:50 PM   Additional Questions   Roomed by Dena PRIUTT CMA   Accompanied by Spouse     HPI   Patient here for hospital follow-up and discussion on memory loss.  Patient presents with her  who has concerns about worsening cognitive changes states prior reason is here at her visit is because she will not recall everything that we discussed.  They have been noticing some worsening changes and we discussed referral to neurology.  Hospitalized for kidney stone requiring stenting and lithotripsy-patient stone has been noted in multiple imaging over the years-stone was collected and being analyzed.  Patient is having a lot of discomfort since being discharged from the hospital stent is likely causing spasming she was  discharged home but no pain medications-she was using Dilaudid in the hospital we discussed a course of oxycodone to use for breakthrough pain.  Reviewed labs and imaging with her today.  Discussed the importance of keeping well-hydrated.    Hospital Follow-up Visit:    Hospital/Nursing Home/IP Rehab Facility: [facility]  Date of Admission: 10/20/24  Date of Discharge: 10/22/24  Reason(s) for Admission: nausea & vomiting     Hospital/Nursing Home/IP Rehab Facility: [facility]  Date of Admission: 11/2/24  Date of Discharge: 11/4/24  Reason(s) for Admission: Right ureteral calculi s/p cystoscopy with stone extraction and ureteral stent placement     Was the patient in the ICU or did the patient experience delirium during hospitalization?  No  Do you have any other stressors you would like to discuss with your provider? OTHER: Cognitive changes    Problems taking medications regularly:  None  Medication changes since discharge: Pain medication  Problems adhering to non-medication therapy:  None    Summary of hospitalization:  [facility] discharge summary reviewed  Diagnostic Tests/Treatments reviewed.  Follow up needed: Culture  Other Healthcare Providers Involved in Patient s Care:          Urology  Update since discharge: stable.         Plan of care communicated with patient and family                     Objective    BP (!) 163/80 (BP Location: Left arm, Patient Position: Sitting, Cuff Size: Adult Regular)   Pulse 75   Temp 98.3  F (36.8  C) (Oral)   Resp 20   Wt 49 kg (108 lb)   SpO2 98%   BMI 19.13 kg/m    Body mass index is 19.13 kg/m .  Physical Exam               Signed Electronically by: Alex Nolasco MD    Answers submitted by the patient for this visit:  Patient Health Questionnaire (Submitted on 11/5/2024)  If you checked off any problems, how difficult have these problems made it for you to do your work, take care of things  at home, or get along with other people?: Not difficult at all  PHQ9 TOTAL SCORE: 2

## 2024-11-05 NOTE — PROGRESS NOTES
Connected Care Resource Center: [facility]    Background: Transitional Care Management program identified per system criteria and reviewed by [facility] team for possible outreach.    Assessment: Upon chart review, CCR Team member will not proceed with patient outreach related to this episode of Transitional Care Management program due to reason below:    Patient has active communication with a nurse, provider or care team for reason of post-hospital follow up plan.  Outreach call by CCRC team not indicated to minimize duplicative efforts.     Plan: Transitional Care Management episode addressed appropriately per reason noted above.      JESSICA Welsh  [facility] [place], [facility]    *Connected Care Resource Team does NOT follow patient ongoing. Referrals are identified based on internal discharge reports and the outreach is to ensure patient has an understanding of their discharge instructions.

## 2024-11-06 ENCOUNTER — MEDICAL CORRESPONDENCE (OUTPATIENT)
Dept: HEALTH INFORMATION MANAGEMENT | Facility: CLINIC | Age: 69
End: 2024-11-06

## 2024-11-06 ENCOUNTER — TELEPHONE (OUTPATIENT)
Dept: FAMILY MEDICINE | Facility: CLINIC | Age: 69
End: 2024-11-06
Payer: COMMERCIAL

## 2024-11-06 LAB
APPEARANCE STONE: NORMAL
BACTERIA UR CULT: NO GROWTH
COMPN STONE: NORMAL
SPECIMEN WT: 17 MG

## 2024-11-06 NOTE — TELEPHONE ENCOUNTER
Called and left detailed message on Genevieve's personally identified confidential VM with verbal orders as requested.    Jolynn Molina RN

## 2024-11-06 NOTE — TELEPHONE ENCOUNTER
Home Care is calling regarding an established patient with [facility].       Requesting orders from: Alex Nolasco  Provider is following patient: No       Orders Requested    Skilled Nursing - completed SOC visit today, 11/6/2024   Request for initial certification (first set of orders)   Frequency: 1x/wk for 4 wks, then every other wk for 4 wks to work on kidney stone monitoring and education    Physical Therapy  Request for initial evaluation and treatment (one time) (first set of orders)  To work on strength and balance      FYI - Patient requested update to Dr. Nolasco to let him know she's still having blood in her urine but no fever or chills.        Information was gathered and will be sent to provider for review.  RN will contact Home Care with information after provider review.  Confirmed ok to leave a detailed message with call back.  Contact information confirmed and updated as needed.    Tereza Casanova RN

## 2024-11-09 LAB
ATRIAL RATE - MUSE: 50 BPM
ATRIAL RATE - MUSE: 57 BPM
DIASTOLIC BLOOD PRESSURE - MUSE: NORMAL MMHG
DIASTOLIC BLOOD PRESSURE - MUSE: NORMAL MMHG
INTERPRETATION ECG - MUSE: NORMAL
INTERPRETATION ECG - MUSE: NORMAL
P AXIS - MUSE: 25 DEGREES
P AXIS - MUSE: 27 DEGREES
PR INTERVAL - MUSE: 126 MS
PR INTERVAL - MUSE: 152 MS
QRS DURATION - MUSE: 86 MS
QRS DURATION - MUSE: 90 MS
QT - MUSE: 400 MS
QT - MUSE: 432 MS
QTC - MUSE: 389 MS
QTC - MUSE: 393 MS
R AXIS - MUSE: 61 DEGREES
R AXIS - MUSE: 63 DEGREES
SYSTOLIC BLOOD PRESSURE - MUSE: NORMAL MMHG
SYSTOLIC BLOOD PRESSURE - MUSE: NORMAL MMHG
T AXIS - MUSE: 59 DEGREES
T AXIS - MUSE: 60 DEGREES
VENTRICULAR RATE- MUSE: 50 BPM
VENTRICULAR RATE- MUSE: 57 BPM

## 2024-11-18 DIAGNOSIS — Z53.9 DIAGNOSIS NOT YET DEFINED: Primary | ICD-10-CM

## 2024-11-18 PROCEDURE — G0180 MD CERTIFICATION HHA PATIENT: HCPCS | Performed by: FAMILY MEDICINE

## 2024-11-27 DIAGNOSIS — I10 ESSENTIAL (PRIMARY) HYPERTENSION: ICD-10-CM

## 2024-11-27 RX ORDER — ATENOLOL 25 MG/1
12.5 TABLET ORAL DAILY
Qty: 90 TABLET | Refills: 3 | Status: SHIPPED | OUTPATIENT
Start: 2024-11-27

## 2024-12-07 ENCOUNTER — HOSPITAL ENCOUNTER (INPATIENT)
Facility: HOSPITAL | Age: 69
LOS: 1 days | Discharge: HOME | End: 2024-12-10
Attending: STUDENT IN AN ORGANIZED HEALTH CARE EDUCATION/TRAINING PROGRAM | Admitting: INTERNAL MEDICINE
Payer: COMMERCIAL

## 2024-12-07 ENCOUNTER — APPOINTMENT (OUTPATIENT)
Dept: PHYSICAL THERAPY | Facility: HOSPITAL | Age: 69
End: 2024-12-07
Attending: INTERNAL MEDICINE
Payer: COMMERCIAL

## 2024-12-07 ENCOUNTER — APPOINTMENT (OUTPATIENT)
Dept: CT IMAGING | Facility: HOSPITAL | Age: 69
End: 2024-12-07
Attending: STUDENT IN AN ORGANIZED HEALTH CARE EDUCATION/TRAINING PROGRAM
Payer: COMMERCIAL

## 2024-12-07 DIAGNOSIS — R19.7 NAUSEA VOMITING AND DIARRHEA: ICD-10-CM

## 2024-12-07 DIAGNOSIS — R11.2 NAUSEA AND VOMITING, UNSPECIFIED VOMITING TYPE: Primary | ICD-10-CM

## 2024-12-07 DIAGNOSIS — R10.84 GENERALIZED ABDOMINAL PAIN: ICD-10-CM

## 2024-12-07 DIAGNOSIS — G89.4 CHRONIC PAIN SYNDROME: ICD-10-CM

## 2024-12-07 DIAGNOSIS — R11.2 NAUSEA VOMITING AND DIARRHEA: ICD-10-CM

## 2024-12-07 LAB
ALBUMIN SERPL BCG-MCNC: 4.6 G/DL (ref 3.5–5.2)
ALBUMIN UR-MCNC: 20 MG/DL
ALP SERPL-CCNC: 96 U/L (ref 40–150)
ALT SERPL W P-5'-P-CCNC: 21 U/L (ref 0–50)
ANION GAP SERPL CALCULATED.3IONS-SCNC: 12 MMOL/L (ref 7–15)
APPEARANCE UR: CLEAR
AST SERPL W P-5'-P-CCNC: 19 U/L (ref 0–45)
BASOPHILS # BLD AUTO: 0 10E3/UL (ref 0–0.2)
BASOPHILS NFR BLD AUTO: 0 %
BILIRUB DIRECT SERPL-MCNC: <0.2 MG/DL (ref 0–0.3)
BILIRUB SERPL-MCNC: 0.3 MG/DL
BILIRUB UR QL STRIP: NEGATIVE
BUN SERPL-MCNC: 14.7 MG/DL (ref 8–23)
CALCIUM SERPL-MCNC: 9.3 MG/DL (ref 8.8–10.4)
CHLORIDE SERPL-SCNC: 100 MMOL/L (ref 98–107)
COLOR UR AUTO: ABNORMAL
CREAT SERPL-MCNC: 0.63 MG/DL (ref 0.51–0.95)
EGFRCR SERPLBLD CKD-EPI 2021: >90 ML/MIN/1.73M2
EOSINOPHIL # BLD AUTO: 0 10E3/UL (ref 0–0.7)
EOSINOPHIL NFR BLD AUTO: 0 %
ERYTHROCYTE [DISTWIDTH] IN BLOOD BY AUTOMATED COUNT: 13 % (ref 10–15)
FLUAV RNA SPEC QL NAA+PROBE: NEGATIVE
FLUBV RNA RESP QL NAA+PROBE: NEGATIVE
GLUCOSE SERPL-MCNC: 139 MG/DL (ref 70–99)
GLUCOSE UR STRIP-MCNC: NEGATIVE MG/DL
HCO3 SERPL-SCNC: 28 MMOL/L (ref 22–29)
HCT VFR BLD AUTO: 45.1 % (ref 35–47)
HGB BLD-MCNC: 14.8 G/DL (ref 11.7–15.7)
HGB UR QL STRIP: ABNORMAL
IMM GRANULOCYTES # BLD: 0 10E3/UL
IMM GRANULOCYTES NFR BLD: 0 %
KETONES UR STRIP-MCNC: 80 MG/DL
LEUKOCYTE ESTERASE UR QL STRIP: NEGATIVE
LIPASE SERPL-CCNC: 13 U/L (ref 13–60)
LYMPHOCYTES # BLD AUTO: 0.7 10E3/UL (ref 0.8–5.3)
LYMPHOCYTES NFR BLD AUTO: 7 %
MAGNESIUM SERPL-MCNC: 1.8 MG/DL (ref 1.7–2.3)
MCH RBC QN AUTO: 29 PG (ref 26.5–33)
MCHC RBC AUTO-ENTMCNC: 32.8 G/DL (ref 31.5–36.5)
MCV RBC AUTO: 88 FL (ref 78–100)
MONOCYTES # BLD AUTO: 0.4 10E3/UL (ref 0–1.3)
MONOCYTES NFR BLD AUTO: 4 %
MUCOUS THREADS #/AREA URNS LPF: PRESENT /LPF
NEUTROPHILS # BLD AUTO: 8.8 10E3/UL (ref 1.6–8.3)
NEUTROPHILS NFR BLD AUTO: 89 %
NITRATE UR QL: NEGATIVE
NRBC # BLD AUTO: 0 10E3/UL
NRBC BLD AUTO-RTO: 0 /100
PH UR STRIP: 6.5 [PH] (ref 5–7)
PLATELET # BLD AUTO: 207 10E3/UL (ref 150–450)
POTASSIUM SERPL-SCNC: 3.6 MMOL/L (ref 3.4–5.3)
PROT SERPL-MCNC: 6.9 G/DL (ref 6.4–8.3)
RBC # BLD AUTO: 5.1 10E6/UL (ref 3.8–5.2)
RBC URINE: 68 /HPF
RSV RNA SPEC NAA+PROBE: NEGATIVE
SARS-COV-2 RNA RESP QL NAA+PROBE: NEGATIVE
SODIUM SERPL-SCNC: 140 MMOL/L (ref 135–145)
SP GR UR STRIP: 1.02 (ref 1–1.03)
SQUAMOUS EPITHELIAL: <1 /HPF
TRANSITIONAL EPI: <1 /HPF
UROBILINOGEN UR STRIP-MCNC: <2 MG/DL
WBC # BLD AUTO: 10 10E3/UL (ref 4–11)
WBC URINE: 1 /HPF

## 2024-12-07 PROCEDURE — G0378 HOSPITAL OBSERVATION PER HR: HCPCS

## 2024-12-07 PROCEDURE — 258N000003 HC RX IP 258 OP 636: Performed by: STUDENT IN AN ORGANIZED HEALTH CARE EDUCATION/TRAINING PROGRAM

## 2024-12-07 PROCEDURE — 250N000009 HC RX 250: Performed by: STUDENT IN AN ORGANIZED HEALTH CARE EDUCATION/TRAINING PROGRAM

## 2024-12-07 PROCEDURE — 99223 1ST HOSP IP/OBS HIGH 75: CPT | Performed by: INTERNAL MEDICINE

## 2024-12-07 PROCEDURE — 250N000009 HC RX 250: Performed by: INTERNAL MEDICINE

## 2024-12-07 PROCEDURE — 96376 TX/PRO/DX INJ SAME DRUG ADON: CPT

## 2024-12-07 PROCEDURE — 83735 ASSAY OF MAGNESIUM: CPT | Performed by: STUDENT IN AN ORGANIZED HEALTH CARE EDUCATION/TRAINING PROGRAM

## 2024-12-07 PROCEDURE — 87637 SARSCOV2&INF A&B&RSV AMP PRB: CPT | Performed by: STUDENT IN AN ORGANIZED HEALTH CARE EDUCATION/TRAINING PROGRAM

## 2024-12-07 PROCEDURE — 250N000011 HC RX IP 250 OP 636: Performed by: STUDENT IN AN ORGANIZED HEALTH CARE EDUCATION/TRAINING PROGRAM

## 2024-12-07 PROCEDURE — 80076 HEPATIC FUNCTION PANEL: CPT | Performed by: STUDENT IN AN ORGANIZED HEALTH CARE EDUCATION/TRAINING PROGRAM

## 2024-12-07 PROCEDURE — 97161 PT EVAL LOW COMPLEX 20 MIN: CPT | Mod: GP

## 2024-12-07 PROCEDURE — 84132 ASSAY OF SERUM POTASSIUM: CPT | Performed by: STUDENT IN AN ORGANIZED HEALTH CARE EDUCATION/TRAINING PROGRAM

## 2024-12-07 PROCEDURE — 85025 COMPLETE CBC W/AUTO DIFF WBC: CPT | Performed by: STUDENT IN AN ORGANIZED HEALTH CARE EDUCATION/TRAINING PROGRAM

## 2024-12-07 PROCEDURE — 82248 BILIRUBIN DIRECT: CPT | Performed by: STUDENT IN AN ORGANIZED HEALTH CARE EDUCATION/TRAINING PROGRAM

## 2024-12-07 PROCEDURE — 96361 HYDRATE IV INFUSION ADD-ON: CPT

## 2024-12-07 PROCEDURE — 96375 TX/PRO/DX INJ NEW DRUG ADDON: CPT

## 2024-12-07 PROCEDURE — 250N000013 HC RX MED GY IP 250 OP 250 PS 637: Performed by: INTERNAL MEDICINE

## 2024-12-07 PROCEDURE — 81003 URINALYSIS AUTO W/O SCOPE: CPT | Performed by: STUDENT IN AN ORGANIZED HEALTH CARE EDUCATION/TRAINING PROGRAM

## 2024-12-07 PROCEDURE — 74177 CT ABD & PELVIS W/CONTRAST: CPT

## 2024-12-07 PROCEDURE — 36415 COLL VENOUS BLD VENIPUNCTURE: CPT | Performed by: STUDENT IN AN ORGANIZED HEALTH CARE EDUCATION/TRAINING PROGRAM

## 2024-12-07 PROCEDURE — 97530 THERAPEUTIC ACTIVITIES: CPT | Mod: GP

## 2024-12-07 PROCEDURE — 83690 ASSAY OF LIPASE: CPT | Performed by: STUDENT IN AN ORGANIZED HEALTH CARE EDUCATION/TRAINING PROGRAM

## 2024-12-07 PROCEDURE — 96374 THER/PROPH/DIAG INJ IV PUSH: CPT

## 2024-12-07 PROCEDURE — 81001 URINALYSIS AUTO W/SCOPE: CPT | Performed by: STUDENT IN AN ORGANIZED HEALTH CARE EDUCATION/TRAINING PROGRAM

## 2024-12-07 PROCEDURE — 84520 ASSAY OF UREA NITROGEN: CPT | Performed by: STUDENT IN AN ORGANIZED HEALTH CARE EDUCATION/TRAINING PROGRAM

## 2024-12-07 PROCEDURE — 99285 EMERGENCY DEPT VISIT HI MDM: CPT | Mod: 25

## 2024-12-07 PROCEDURE — 250N000011 HC RX IP 250 OP 636: Performed by: INTERNAL MEDICINE

## 2024-12-07 RX ORDER — ASPIRIN 81 MG/1
81 TABLET ORAL DAILY
Status: DISCONTINUED | OUTPATIENT
Start: 2024-12-07 | End: 2024-12-07

## 2024-12-07 RX ORDER — MORPHINE SULFATE 2 MG/ML
2-4 INJECTION, SOLUTION INTRAMUSCULAR; INTRAVENOUS
Status: DISCONTINUED | OUTPATIENT
Start: 2024-12-07 | End: 2024-12-09

## 2024-12-07 RX ORDER — AMOXICILLIN 250 MG
2 CAPSULE ORAL 2 TIMES DAILY PRN
Status: DISCONTINUED | OUTPATIENT
Start: 2024-12-07 | End: 2024-12-10 | Stop reason: HOSPADM

## 2024-12-07 RX ORDER — HYDRALAZINE HYDROCHLORIDE 20 MG/ML
10 INJECTION INTRAMUSCULAR; INTRAVENOUS EVERY 4 HOURS PRN
Status: DISCONTINUED | OUTPATIENT
Start: 2024-12-07 | End: 2024-12-10 | Stop reason: HOSPADM

## 2024-12-07 RX ORDER — ONDANSETRON 4 MG/1
4 TABLET, ORALLY DISINTEGRATING ORAL EVERY 6 HOURS PRN
Status: DISCONTINUED | OUTPATIENT
Start: 2024-12-07 | End: 2024-12-10 | Stop reason: HOSPADM

## 2024-12-07 RX ORDER — NALOXONE HYDROCHLORIDE 0.4 MG/ML
0.2 INJECTION, SOLUTION INTRAMUSCULAR; INTRAVENOUS; SUBCUTANEOUS
Status: DISCONTINUED | OUTPATIENT
Start: 2024-12-07 | End: 2024-12-10 | Stop reason: HOSPADM

## 2024-12-07 RX ORDER — NALOXONE HYDROCHLORIDE 0.4 MG/ML
0.4 INJECTION, SOLUTION INTRAMUSCULAR; INTRAVENOUS; SUBCUTANEOUS
Status: DISCONTINUED | OUTPATIENT
Start: 2024-12-07 | End: 2024-12-10 | Stop reason: HOSPADM

## 2024-12-07 RX ORDER — ATENOLOL 25 MG/1
12.5 TABLET ORAL DAILY
Status: DISCONTINUED | OUTPATIENT
Start: 2024-12-07 | End: 2024-12-10 | Stop reason: HOSPADM

## 2024-12-07 RX ORDER — BACLOFEN 10 MG/1
10 TABLET ORAL 3 TIMES DAILY
Status: DISCONTINUED | OUTPATIENT
Start: 2024-12-07 | End: 2024-12-10 | Stop reason: HOSPADM

## 2024-12-07 RX ORDER — ONDANSETRON 2 MG/ML
4 INJECTION INTRAMUSCULAR; INTRAVENOUS EVERY 6 HOURS PRN
Status: DISCONTINUED | OUTPATIENT
Start: 2024-12-07 | End: 2024-12-10 | Stop reason: HOSPADM

## 2024-12-07 RX ORDER — ACETAMINOPHEN 650 MG/1
650 SUPPOSITORY RECTAL EVERY 4 HOURS PRN
Status: DISCONTINUED | OUTPATIENT
Start: 2024-12-07 | End: 2024-12-10 | Stop reason: HOSPADM

## 2024-12-07 RX ORDER — AMOXICILLIN 250 MG
1 CAPSULE ORAL 2 TIMES DAILY PRN
Status: DISCONTINUED | OUTPATIENT
Start: 2024-12-07 | End: 2024-12-10 | Stop reason: HOSPADM

## 2024-12-07 RX ORDER — SODIUM CHLORIDE AND POTASSIUM CHLORIDE 150; 900 MG/100ML; MG/100ML
INJECTION, SOLUTION INTRAVENOUS CONTINUOUS
Status: DISCONTINUED | OUTPATIENT
Start: 2024-12-07 | End: 2024-12-10 | Stop reason: HOSPADM

## 2024-12-07 RX ORDER — ONDANSETRON 2 MG/ML
4 INJECTION INTRAMUSCULAR; INTRAVENOUS ONCE
Status: COMPLETED | OUTPATIENT
Start: 2024-12-07 | End: 2024-12-07

## 2024-12-07 RX ORDER — CALCIUM CARBONATE 500 MG/1
1000 TABLET, CHEWABLE ORAL 4 TIMES DAILY PRN
Status: DISCONTINUED | OUTPATIENT
Start: 2024-12-07 | End: 2024-12-10 | Stop reason: HOSPADM

## 2024-12-07 RX ORDER — ACETAMINOPHEN 325 MG/1
650 TABLET ORAL EVERY 4 HOURS PRN
Status: DISCONTINUED | OUTPATIENT
Start: 2024-12-07 | End: 2024-12-10 | Stop reason: HOSPADM

## 2024-12-07 RX ORDER — PROCHLORPERAZINE MALEATE 5 MG/1
5 TABLET ORAL EVERY 6 HOURS PRN
Status: DISCONTINUED | OUTPATIENT
Start: 2024-12-07 | End: 2024-12-08 | Stop reason: ALTCHOICE

## 2024-12-07 RX ORDER — IOPAMIDOL 755 MG/ML
55 INJECTION, SOLUTION INTRAVASCULAR ONCE
Status: COMPLETED | OUTPATIENT
Start: 2024-12-07 | End: 2024-12-07

## 2024-12-07 RX ORDER — SCOLOPAMINE TRANSDERMAL SYSTEM 1 MG/1
1 PATCH, EXTENDED RELEASE TRANSDERMAL
Status: DISCONTINUED | OUTPATIENT
Start: 2024-12-07 | End: 2024-12-08

## 2024-12-07 RX ORDER — POLYETHYLENE GLYCOL 3350 17 G/17G
17 POWDER, FOR SOLUTION ORAL 2 TIMES DAILY PRN
Status: DISCONTINUED | OUTPATIENT
Start: 2024-12-07 | End: 2024-12-10 | Stop reason: HOSPADM

## 2024-12-07 RX ADMIN — HYDRALAZINE HYDROCHLORIDE 10 MG: 20 INJECTION INTRAMUSCULAR; INTRAVENOUS at 12:36

## 2024-12-07 RX ADMIN — PANTOPRAZOLE SODIUM 40 MG: 40 INJECTION, POWDER, FOR SOLUTION INTRAVENOUS at 08:09

## 2024-12-07 RX ADMIN — SODIUM CHLORIDE 500 ML: 9 INJECTION, SOLUTION INTRAVENOUS at 08:09

## 2024-12-07 RX ADMIN — SCOPOLAMINE 1 PATCH: 1.5 PATCH, EXTENDED RELEASE TRANSDERMAL at 11:47

## 2024-12-07 RX ADMIN — ONDANSETRON 4 MG: 2 INJECTION INTRAMUSCULAR; INTRAVENOUS at 14:27

## 2024-12-07 RX ADMIN — POTASSIUM CHLORIDE AND SODIUM CHLORIDE: 900; 150 INJECTION, SOLUTION INTRAVENOUS at 12:41

## 2024-12-07 RX ADMIN — ONDANSETRON 4 MG: 2 INJECTION INTRAMUSCULAR; INTRAVENOUS at 20:40

## 2024-12-07 RX ADMIN — IOPAMIDOL 55 ML: 755 INJECTION, SOLUTION INTRAVENOUS at 09:38

## 2024-12-07 RX ADMIN — PROCHLORPERAZINE EDISYLATE 5 MG: 5 INJECTION INTRAMUSCULAR; INTRAVENOUS at 10:25

## 2024-12-07 RX ADMIN — ONDANSETRON 4 MG: 2 INJECTION INTRAMUSCULAR; INTRAVENOUS at 08:09

## 2024-12-07 RX ADMIN — POTASSIUM CHLORIDE AND SODIUM CHLORIDE: 900; 150 INJECTION, SOLUTION INTRAVENOUS at 22:55

## 2024-12-07 RX ADMIN — PROCHLORPERAZINE EDISYLATE 5 MG: 5 INJECTION INTRAMUSCULAR; INTRAVENOUS at 16:49

## 2024-12-07 RX ADMIN — MORPHINE SULFATE 2 MG: 2 INJECTION, SOLUTION INTRAMUSCULAR; INTRAVENOUS at 14:59

## 2024-12-07 RX ADMIN — BACLOFEN 10 MG: 10 TABLET ORAL at 20:34

## 2024-12-07 RX ADMIN — FAMOTIDINE 20 MG: 10 INJECTION, SOLUTION INTRAVENOUS at 12:33

## 2024-12-07 RX ADMIN — PANTOPRAZOLE SODIUM 20 MG: 40 INJECTION, POWDER, FOR SOLUTION INTRAVENOUS at 20:32

## 2024-12-07 ASSESSMENT — ACTIVITIES OF DAILY LIVING (ADL)
ADLS_ACUITY_SCORE: 63
ADLS_ACUITY_SCORE: 59
DEPENDENT_IADLS:: CLEANING;COOKING;LAUNDRY;SHOPPING;MEAL PREPARATION;TRANSPORTATION
ADLS_ACUITY_SCORE: 59
ADLS_ACUITY_SCORE: 59
ADLS_ACUITY_SCORE: 63
ADLS_ACUITY_SCORE: 59
ADLS_ACUITY_SCORE: 63
ADLS_ACUITY_SCORE: 66
ADLS_ACUITY_SCORE: 59
ADLS_ACUITY_SCORE: 62
ADLS_ACUITY_SCORE: 62

## 2024-12-07 NOTE — ED PROVIDER NOTES
NAME: Mariah Koehler  AGE: 68 year old female  YOB: 1955  MRN: [id number]  EVALUATION DATE & TIME: 2024  6:49 AM    PCP: Alex Nolasco  ED PROVIDER: Blank Razo MD.    Chief Complaint   Patient presents with    Nausea, Vomiting, & Diarrhea     FINAL IMPRESSION:  1. Nausea vomiting and diarrhea    2. Generalized abdominal pain      MEDICAL DECISION MAKIN:54 AM I met with the patient, obtained history, performed an initial exam, and discussed options and plan for diagnostics and treatment here in the ED.   10:01 AM I rechecked and updated the patient on test results.   10:18 AM I paged for the hospitalist.   10:26 AM I spoke with Dr. Powell, hospitalist, who accepts the patient for admission.  (Recommends observation bed)    69 y/o F with h/o HTN, chronic pain, opioid use, COPD, GERD, gasroparesis, kidney stones among othes who presents with nausea, vomiting, diarrhea since last night.  reports she hasn't been able to keep anything down and is weak, making it difficult for him to take care of her.    Dx includes but not limited to viral infection, colitis, diverticulitis, gastritis, ulcer, pancreatitis, appendicitis, cholelithiasis, cholecystitis, hernia, UTI, kidney stone, metabolic abnormality among others.     Lab work is overall reassuring. CT abd/pelvis showed decompressed colon, possible colitis, no there acute findings.  Despite fluids and medications patient still feeling unwell and unable to tolerate PO, I discussed considering admission and patient and her  in agreement.     Medical Decision Making  Obtained supplemental history:Supplemental history obtained?: Family Member/Significant Other  Reviewed external records: External records reviewed?: Documented in chart  Care impacted by chronic illness:Chronic Pain and Hypertension  Did you consider but not order tests?: Work up considered but not performed and documented in chart, if applicable  Did you  interpret images independently?: Independent interpretation of ECG and images noted in documentation, when applicable.  Consultation discussion with other provider:Did you involve another provider (consultant, , pharmacy, etc.)?: I discussed the care with another health care provider, see documentation for details.  Admit.    MIPS: Not Applicable    MEDICATIONS GIVEN IN THE EMERGENCY:  Medications   sodium chloride 0.9% BOLUS 500 mL (0 mLs Intravenous Stopped 12/7/24 0859)   ondansetron (ZOFRAN) injection 4 mg (4 mg Intravenous $Given 12/7/24 0809)   pantoprazole (PROTONIX) IV push injection 40 mg (40 mg Intravenous $Given 12/7/24 0809)   iopamidol (ISOVUE-370) solution 55 mL (55 mLs Intravenous $Given 12/7/24 0938)   prochlorperazine (COMPAZINE) injection 5 mg (5 mg Intravenous $Given 12/7/24 1025)       NEW PRESCRIPTIONS STARTED AT TODAY'S ER VISIT:  New Prescriptions    No medications on file        =================================================================  HPI    Patient information was obtained from: patient and spouse  Use of : N/A       Mariah Koehler is a 68 year old female with a past medical history of HTN, COPD, chronic pain syndrome with continuous intrathecal baclofen pump and opiate dependence, GERD, appendectomy, cholecystectomy, hiatal hernia s/p nissen fundoplication, DANO, and right ureteral calculi s/p ureteroscopy with stone retrieval and stent placement on 11/3/2024, who presents via private vehicle with spouse for evaluation of vomiting and diarrhea.     Per chart review, patient was admitted to [facility] from 10/20-10/22/2024 with a three day history of nausea, vomiting, and diarrhea. CT A/P demonstrated nonobstructing right renal calculi and intramural pancreatic duct dilatation. She was also found to have microscopic hematuria thought to be due to right renal calculi; patient denied any urinary complaints at that time. Pain Management was consulted and pain  pump was found to be functional. She was treated with IVF, Promethazine, Droperidol, and Zofran with gradual symptomatic improvement and patient discharged home. She subsequently presented to [facility] on 11/1/2024 (1 day ago) with sudden onset of hematuria. UA was obtained with protein, occult blood, >100 RBCs, and calcium oxalate crystals. Urine culture was obtained and patient was prescribed a seven day course of Keflex pending urine culture results. She was discharged in stable condition with recommendation for Urology follow up for cystoscopy. She was most recently admitted to [facility] from 11/2-11/4/2024 with 3 mm stone in the proximal right ureter with associated mild hydronephrosis in the setting of right flank pain with associated nausea and vomiting. CT also notable for moderate amount of stool in the colon including large stool ball in the rectum. Urology consulted and patient underwent right ureteroscopy with stone retrieval and stent placement. Symptoms were well controlled with Tylenol, Dilaudid, Oxycodone, Compazine, Reglan, and Zofran. She was discharged in stable condition on Flomax with plan for close Urology follow up.     Patient presents with a one day history of progressively worsening diffuse mid abdominal pain with associated nausea, vomiting, and diarrhea. Spouse states patient initially had bilious emesis, but that it now appears brown in color. He denies the patient having hematemesis, hematochezia, or melena. Spouse notes the patient has had a two year history of intermittent nausea and vomiting for which she has been instructed to follow with GI for, though he states she has not made an appointment for evaluation. Patient has been using Zofran (one dose taken yesterday), Compazine, Famotidine, and Omeprazole at home for these chronic symptoms, though spouse notes she has been unable to take these the last few days due to worsening nausea and vomiting.  "    Additional symptoms include a two day history of cough. No recent falls or head injuries. Denies any alcohol use. Patient otherwise denies fever, chest pain, dysuria, hematuria, or any other symptoms or concerns at this time.      PHYSICAL EXAM:    Vitals: BP (!) 140/66   Pulse 74   Temp 97.9  F (36.6  C) (Oral)   Resp 16   Ht 1.6 m (5' 3\")   Wt 49.9 kg (110 lb)   SpO2 95%   BMI 19.49 kg/m     Constitutional: Well developed, well nourished. Resting in bed but will wake to voice and answer questions  HENT: Normocephalic, atraumatic. Neck-gross ROM intact.   Eyes: Pupils mid-range, sclera white  Respiratory: CTAB, no respiratory distress  Cardiovascular: Normal heart rate, regular rhythm  GI: Soft, not distended, diffuse tenderness without guarding  Musculoskeletal: Moving extremities intentionally and without pain. No obvious deformity.  Skin: Warm, dry, no rash.  Neurologic: Sleeping in bed but will wake to voice and answer questions, no focal defictis noted    LAB:  All pertinent labs reviewed and interpreted.  Labs Ordered and Resulted from Time of ED Arrival to Time of ED Departure   BASIC METABOLIC PANEL - Abnormal       Result Value    Sodium 140      Potassium 3.6      Chloride 100      Carbon Dioxide (CO2) 28      Anion Gap 12      Urea Nitrogen 14.7      Creatinine 0.63      GFR Estimate >90      Calcium 9.3      Glucose 139 (*)    CBC WITH PLATELETS AND DIFFERENTIAL - Abnormal    WBC Count 10.0      RBC Count 5.10      Hemoglobin 14.8      Hematocrit 45.1      MCV 88      MCH 29.0      MCHC 32.8      RDW 13.0      Platelet Count 207      % Neutrophils 89      % Lymphocytes 7      % Monocytes 4      % Eosinophils 0      % Basophils 0      % Immature Granulocytes 0      NRBCs per 100 WBC 0      Absolute Neutrophils 8.8 (*)     Absolute Lymphocytes 0.7 (*)     Absolute Monocytes 0.4      Absolute Eosinophils 0.0      Absolute Basophils 0.0      Absolute Immature Granulocytes 0.0      Absolute NRBCs " 0.0     INFLUENZA A/B, RSV AND SARS-COV2 PCR - Normal    Influenza A PCR Negative      Influenza B PCR Negative      RSV PCR Negative      SARS CoV2 PCR Negative     HEPATIC FUNCTION PANEL - Normal    Protein Total 6.9      Albumin 4.6      Bilirubin Total 0.3      Alkaline Phosphatase 96      AST 19      ALT 21      Bilirubin Direct <0.20     LIPASE - Normal    Lipase 13     MAGNESIUM - Normal    Magnesium 1.8     ROUTINE UA WITH MICROSCOPIC REFLEX TO CULTURE       RADIOLOGY:  CT Abdomen Pelvis w Contrast   Final Result   IMPRESSION:       1.  Decompressed colon, though possible mild ascending colonic wall thickening which could be from acute colitis. Cannot exclude mild transverse or descending colonic wall thickening as well, completely decompressed.      2.  No other significant bowel findings. No obstruction.      3.  No free fluid or air. No abscess.             I, Jolynn Martinez, am serving as a scribe to document services personally performed by Dr. Blank Razo based on my observation and the provider's statements to me. I, Blank Razo MD attest that Jolynn Martinez is acting in a scribe capacity, has observed my performance of the services and has documented them in accordance with my direction.    Blank Razo M.D.  Emergency Medicine  [facility] EMERGENCY DEPARTMENT  [address] [place] [id number]  [phone]  Dept: [phone]      Blank Razo MD  12/07/24 0290

## 2024-12-07 NOTE — PROGRESS NOTES
Patient admitted to room 1 at approximately 1200 via cart from emergency room.  Reason for Admission:   Report received from:   Patient was accompanied by Self.  Discharge transportation provided by:  Patient ambulated/transferred:  with one assist. self.  Patient is alert and orientated x 3.  Outpatient Observation education provided to: (patient, family, friend)  MDRO Education done if applicable (MRSA, VRE, etc)  Safety risks were identified during admission:  isolation.   Yellow risk/fall band applied:  Yes  Home meds sent home: Yes  Home meds sent to pharmacy:No IF YES add 1/2 sheet laminated page reminder to chart/clipboard   Detailed Belongings: purse and clothing

## 2024-12-07 NOTE — ED TRIAGE NOTES
Pt here with , pt states n/v/d since yesterday afternoon, took anti-nausea medications without relief. Throwing up brown and defecating brown in color. Denies abd pain but does have abd soreness. Denies fever.      Triage Assessment (Adult)       Row Name 12/07/24 0646          Triage Assessment    Airway WDL WDL        Respiratory WDL    Respiratory WDL WDL        Cardiac WDL    Cardiac WDL WDL

## 2024-12-07 NOTE — ED NOTES
"..[facility] ED Handoff Report    ED Chief Complaint: Pt here with , pt states n/v/d since yesterday afternoon, took anti-nausea medications without relief. Throwing up brown and defecating brown in color. Denies abd pain but does have abd soreness. Denies fever.     ED Diagnosis:  (R11.2,  R19.7) Nausea vomiting and diarrhea  Comment: Nausea controled with medications. 1 emesis since arrival  Plan: Observation    (R10.84) Generalized abdominal pain  Comment: No c/o pain at this time.  Plan: Monitor       PMH:    Past Medical History:   Diagnosis Date    Current mild episode of major depressive disorder, unspecified whether recurrent (H) 02/17/2020    Hypertension     Migraine headache 11/06/2023    Nausea and vomiting     Opiate dependence (H)     RSD (reflex sympathetic dystrophy)     Sacral fracture, closed (H)         Code Status:  Full Code     Falls Risk: Yes Band: Applied    Current Living Situation/Residence: lives with a significant other     Elimination Status: Continent: Yes     Activity Level: SBA w/ walker    Patients Preferred Language:  English     Needed: No    Vital Signs:  BP (!) 140/66   Pulse 74   Temp 97.9  F (36.6  C) (Oral)   Resp 16   Ht 1.6 m (5' 3\")   Wt 49.9 kg (110 lb)   SpO2 95%   BMI 19.49 kg/m       Cardiac Rhythm: N/A    Pain Score: 0/10    Is the Patient Confused:  No    Last Food or Drink: 12/6/24 at 1200    Focused Assessment:  GI    Tests Performed: Done: Labs and Imaging    Treatments Provided:  Supportive    Family Dynamics/Concerns: No    Family Updated On Visitor Policy: Yes    Plan of Care Communicated to Family: Yes    Who Was Updated about Plan of Care: Spouse updated    Belongings Checklist Done and Signed by Patient: N/A    Medications sent with patient: Yes    Covid: symptomatic, negative    Additional Information: N/A    Ayah Grissom RN 12/7/2024 11:39 AM      "

## 2024-12-07 NOTE — PROGRESS NOTES
"PT Evaluation   12/07/24 7030   Appointment Info   Signing Clinician's Name / Credentials (PT) Jocy Crotes PT, DPT   Rehab Comments (PT) Spouse, Alfred, present & helps with subjective   Living Environment   People in Home spouse   Current Living Arrangements house   Home Accessibility stairs to enter home   Number of Stairs, Main Entrance 1   Stair Railings, Main Entrance none   Transportation Anticipated family or friend will provide   Living Environment Comments Lives in [facility] with her spouse. Spouse reports he is typically available at home, however does leave the home to go to grandchildren's soccer games, grocery shopping, errands, etc therefore not available 24/7.   Self-Care   Usual Activity Tolerance moderate   Current Activity Tolerance poor   Regular Exercise Other (see comments)  (PT 1x/week recently)   Equipment Currently Used at Home walker, rolling  (Uses 4WW outside the home. Also owns a cane.)   Fall history within last six months yes   Activity/Exercise/Self-Care Comment Previously was IND with ADLs. Spouse completes grocery shopping, other iADLs. Spouse reports PTA pt becoming very weak and needing assistance.   General Information   Onset of Illness/Injury or Date of Surgery 12/07/24   Referring Physician Eitan Powell DO   Patient/Family Therapy Goals Statement (PT) none stated   Pertinent History of Current Problem (include personal factors and/or comorbidities that impact the POC) Per chart: \"68 year old female with history of nephrolithiasis, hydronephrosis, COPD, hypertension, reflex sympathetic dystrophy and chronic pain syndrome on continuous intrathecal pump admitted on 12/7/2024 with nausea, vomiting, diarrhea and diffuse weakness.\"   Existing Precautions/Restrictions fall   Cognition   Affect/Mental Status (Cognition) low arousal/lethargic   Follows Commands (Cognition) WFL   Pain Assessment   Patient Currently in Pain Yes, see Vital Sign flowsheet  (pt reporting nausea) "   Posture    Posture Forward head position;Protracted shoulders   Range of Motion (ROM)   Range of Motion ROM deficits secondary to weakness   Strength (Manual Muscle Testing)   Strength (Manual Muscle Testing) Deficits observed during functional mobility   Bed Mobility   Bed Mobility supine-sit   Supine-Sit [place] (Bed Mobility) minimum assist (75% patient effort)   Bed Mobility Limitations decreased ability to use arms for pushing/pulling;decreased ability to use legs for bridging/pushing;impaired ability to control trunk for mobility   Impairments Contributing to Impaired Bed Mobility decreased strength   Assistive Device (Bed Mobility) bed rails   Comment, (Bed Mobility) visibly effortful, extended time for pt success   Transfers   Transfers sit-stand transfer   Transfer Safety Concerns Noted losing balance backward   Impairments Contributing to Impaired Transfers impaired balance;decreased strength   Sit-Stand Transfer   Sit-Stand [place] (Transfers) moderate assist (50% patient effort)   Assistive Device (Sit-Stand Transfers) walker, front-wheeled   Gait/Stairs (Locomotion)   [place]ford Level (Gait) minimum assist (75% patient effort);1 person assist   Assistive Device (Gait) walker, front-wheeled   Distance in Feet (Gait) 2' lateral stepping along EOB   Deviations/Abnormal Patterns (Gait) juancho decreased;gait speed decreased   Comment, (Gait/Stairs) Patient unsteady on feet, Chantale hunter for navigation of FWW. Deferred stairs trial d/t weakness & fatigue with short distance of amb.   Balance   Balance other (describe)   Balance Comments cga-Chantale with FWW for safety   Clinical Impression   Criteria for Skilled Therapeutic Intervention Yes, treatment indicated   PT Diagnosis (PT) Impaired functional mobility   Influenced by the following impairments Impaired strength, balance, activity tolerance   Functional limitations due to impairments Bed mobility, transfers, gait, endurance   Clinical  Presentation (PT Evaluation Complexity) evolving   Clinical Presentation Rationale Clinical judgment   Clinical Decision Making (Complexity) low complexity   Planned Therapy Interventions (PT) balance training;bed mobility training;gait training;home exercise program;neuromuscular re-education;patient/family education;stair training;strengthening;stretching;transfer training;progressive activity/exercise;home program guidelines   Risk & Benefits of therapy have been explained evaluation/treatment results reviewed;participants included;patient;participants voiced agreement with care plan   PT Total Evaluation Time   PT Eval, Low Complexity Minutes (64288) 7   Physical Therapy Goals   PT Frequency 6x/week   PT Predicted Duration/Target Date for Goal Attainment 12/14/24   PT Goals Bed Mobility;Transfers;Gait;Stairs   PT: Bed Mobility Independent;Supine to/from sit   PT: Transfers Supervision/stand-by assist;Sit to/from stand;Bed to/from chair;Assistive device   PT: Gait Rolling walker;25 feet;Supervision/stand-by assist   PT: Stairs 1 stair;Minimal assist  (no railing)   Interventions   Interventions Quick Adds Therapeutic Activity   Therapeutic Activity   Therapeutic Activities: dynamic activities to improve functional performance Minutes (41200) 9   Symptoms Noted During/After Treatment Fatigue;Dizziness   Treatment Detail/Skilled Intervention eval completed, tx initiated. Extended time spent sitting EOB CGA-SBA, cues for forward scooting for BLE placement to steady self. Pt with report of dizziness sitting EOB that improves with extended time spent sitting. BP taken sitting EOB, SBP in 170s, slightly less than earlier BP in chart. Sit>supine with Chantale for BLEs, cues for technique. Elevated HOB for pt to sit up slightly in bed, pt remaining in sidelying. Pt left sith bed alarm engaged, call light in reach, spouse in room.   PT Discharge Planning   PT Plan Prog bed mob, transf, gait FWW   PT Discharge Recommendation  (DC Rec) Transitional Care Facility   PT Rationale for DC Rec At this time patient needing Ax1 for all mobility, unable to ambulate functional distance yet. Recommend TCU at discharge. If patient declines TCU, will need 24/7 supervision and hands on assist, in addition to home PT to improve safety and IND with functional mobility, and decrease fall risk.   PT Brief overview of current status modA sit/stand FWW, Chantale 2' lateral sidestepping FWW   PT Equipment Needed at Discharge walker, rolling  (owns 4ww)   Physical Therapy Time and Intention   Timed Code Treatment Minutes 9   Total Session Time (sum of timed and untimed services) 16

## 2024-12-07 NOTE — H&P
[facility]    History and Physical - Hospitalist Service       Date of Admission:  12/7/2024    Assessment & Plan   Active Problems:    Generalized abdominal pain    Nausea vomiting and diarrhea       Mariah Koehler is a 68 year old female with history of nephrolithiasis, hydronephrosis, COPD, hypertension, reflex sympathetic dystrophy and chronic pain syndrome on continuous intrathecal pump admitted on 12/7/2024 with nausea, vomiting, diarrhea and diffuse weakness.       Nausea, vomiting, non-bloody diarrhea:  Suspect viral gastroenteritis however patient is at risk for C. Diff given hospitalization last month  WBC, LFT's and lipase normal  Covid/Flu/RSV negative  UA negative for UTI  CT A/P shows possible colitis, no obstruction  -- check stool studies  -- monitor for improvement with supportive cares  -- change home famotidine and PPI to IV form      Diffuse weakness: likely secondary to dehydration  -- PT  -- IVF     Chronic pain  H/O reflex sympathetic dystrophy   -- continue home intrathecal pain pump and home baclofen  -- consider pain management consult tomorrow pending clinical course       H/O COPD   -- not on any chronic inhalers PTA       Hypertension  -- continue home atenolol       H/O GERD:   -- change home famotidine and PPI to IV form for now           Diet: Combination Diet Regular Diet Adult    DVT Prophylaxis: Pneumatic Compression Devices  Bellamy Catheter: Not present  Lines: None     Cardiac Monitoring: None  Code Status: Full Code      Clinically Significant Risk Factors Present on Admission                 # Drug Induced Platelet Defect: home medication list includes an antiplatelet medication   # Hypertension: Noted on problem list               # Financial/Environmental Concerns: none         Disposition Plan      Expected Discharge Date: 12/08/2024      Destination: home with family;other (comment) (unknown at this time if she will need Home Care help.)        Medically Ready for Discharge: Anticipated Tomorrow      Eitan Powell DO  Hospitalist Service  [facility]  Securely message with Snaps (more info)  Text page via [facility] Paging/Directory     ______________________________________________________________________    Chief Complaint   Nausea, vomiting, diarrhea and diffuse weakness.     History is obtained from the patient    History of Present Illness   Mariah Koehler is a 68 year old female who presents with nausea, vomiting, diarrhea and diffuse weakness for the past day.       Past Medical History    Past Medical History:   Diagnosis Date    Current mild episode of major depressive disorder, unspecified whether recurrent (H) 02/17/2020    Hypertension     Migraine headache 11/06/2023    Nausea and vomiting     Opiate dependence (H)     RSD (reflex sympathetic dystrophy)     Sacral fracture, closed (H)        Past Surgical History   Past Surgical History:   Procedure Laterality Date    APPENDECTOMY      COMBINED CYSTOSCOPY, RETROGRADES, URETEROSCOPY, LASER HOLMIUM LITHOTRIPSY URETER(S), INSERT STENT Right 11/3/2024    Procedure: CYSTOSCOPY, RIGHT RETROGRADE PYELOGRAM, RIGHt URETEROSCOPY AND BASKET RETRIEVAL OF STONE, RIGHT URETERAL STENT PLACEMENT;  Surgeon: Cristiano Otero MD;  Location: [facility] OR    ESOPHAGOSCOPY, GASTROSCOPY, DUODENOSCOPY (EGD), COMBINED N/A 2/20/2023    Procedure: ESOPHAGOGASTRODUODENOSCOPY with biopsies;  Surgeon: Na Brooks MD;  Location: [facility] OR    GYN SURGERY      HC REVISE MEDIAN N/CARPAL TUNNEL SURG      Description: Neuroplasty Decompression Median Nerve At Carpal Tunnel;  Recorded: 09/19/2011;    HYSTERECTOMY  1984    IR CERVICAL EPIDURAL STEROID INJECTION  1/14/2005    LAPAROSCOPIC NISSEN FUNDOPLICATION N/A 2/23/2024    Procedure: Paraesophageal hiatal hernia repair with mesh, ROBOT-ASSISTED, LAPAROSCOPIC, USING DA ROSMERY XI;  Surgeon: Lemuel Ornelas DO;   Location: [facility] Main OR    OOPHORECTOMY Bilateral 1985    SC SYMPATHECTOMY,CERVICOTHORACIC      Description: Surgical Sympathectomy Cervicothoracic;  Recorded: 09/19/2011;    [facility] DECOMPRESS FASCIOTOMY FINGR/HAND      Description: Decompression Fasciotomy Of The Hand;  Recorded: 09/19/2011;    [facility] LAP,CHOLECYSTECTOMY/EXPLORE      Description: Cholecystectomy Laparoscopic;  Recorded: 12/09/2011;    [facility] TOTAL ABDOM HYSTERECTOMY      Description: Hysterectomy;  Recorded: 03/23/2011;       Prior to Admission Medications   Prior to Admission Medications   Prescriptions Last Dose Informant Patient Reported? Taking?   acetaminophen (TYLENOL) 325 MG tablet Past Week Self No Yes   Sig: Take 1-2 tablets (325-650 mg) by mouth every 6 hours as needed for mild pain or headaches   aspirin 81 MG EC tablet 12/6/2024 Morning Self No Yes   Sig: Take 1 tablet (81 mg) by mouth daily   atenolol (TENORMIN) 25 MG tablet 12/6/2024 Morning  No Yes   Sig: Take 0.5 tablets (12.5 mg) by mouth daily.   baclofen (LIORESAL) 10 MG tablet 12/6/2024 Morning Self No Yes   Sig: TAKE 1 TABLET BY MOUTH 3 TIMES DAILY   famotidine (PEPCID) 20 MG tablet Past Week Self No Yes   Sig: Take 1 tablet (20 mg) by mouth 2 times daily as needed (acid reflux)   medication given by implanted intrathecal pump  Self Yes Yes   Sig: continuously. Drug # 1: Fentanyl (Sublimaze) - Conc: 2000 mcg/mL - Total Dose / 24 hours: 824.2 mcg    Drug # 2: Bupivacaine (Marcaine)  - Conc:17.7 mg/mL - Total Dose / 24 hours: 7.294 mg    Drug # 3: Hydromorphone (Dilaudid)  - Conc: 5.1 mg/mL - Total Dose / 24 hours: 2.1016 mg    Diluent: NS    Infusion Rate: 0.4121 mL/24 hrs  Pump Reservoir Volume: 40 mL    Bolus doses: up to 15 bolus doses/24 hours with 1 hour lockout  Each bolus: fentanyl 80.1 mcg, 0.708 mg Bupivacaine, 0.2041 mg Dilaudid    Outside Clinic & Provider: [facility] in [place] [phone]  Last Refill Date: 11/25/2024  Next Refill Date: 12/30/24  Low [place] Alarm  Volume: 2.0 mL  Pump : WeddingLovely   multivitamin, therapeutic (THERA-VIT) TABS tablet 12/6/2024 Morning Self No Yes   Sig: Take 1 tablet by mouth daily   omeprazole (PRILOSEC) 20 MG DR capsule 12/6/2024 Morning  Yes Yes   Sig: Take 20 mg by mouth 2 times daily.   ondansetron (ZOFRAN) 4 MG tablet 12/6/2024 Morning Self No Yes   Sig: Take 1 tablet (4 mg) by mouth every 6 hours as needed for nausea.   prochlorperazine (COMPAZINE) 5 MG tablet 12/6/2024 Morning Self No Yes   Sig: Take 1 tablet (5 mg) by mouth every 8 hours as needed for nausea or vomiting      Facility-Administered Medications: None           Physical Exam   Vital Signs: Temp: 97.9  F (36.6  C) Temp src: Oral BP: (!) 140/66 Pulse: 74   Resp: 16 SpO2: 95 % O2 Device: None (Room air)    Weight: 110 lbs 0 oz    General Appearance: In no acute distress  RESPIRATORY: respirations nonlabored  CARDIOVASCULAR:No le edema bilat.  NEUROLOGIC: No focal arm or leg  weakness, speech is clear         Medical Decision Making       >75 MINUTES SPENT BY ME on the date of service doing chart review, history, exam, documentation & further activities per the note.      Data

## 2024-12-07 NOTE — PHARMACY-ADMISSION MEDICATION HISTORY
Pharmacist Admission Medication History    Admission medication history is complete. The information provided in this note is only as accurate as the sources available at the time of the update.    Information Source(s): Patient, CareEverywhere/SureScripts, and [facility] pain clinic on call provider  via in-person and phone    Pertinent Information: verified pain pump settings with [facility] pain clinic, patient gets pump refilled every 4-5 weeks.     Changes made to PTA medication list:  Added: none  Deleted: metoclopramide   Changed: pain pump - dilaudid, omeprazole to caps    Allergies reviewed with patient and updates made in EHR: yes    Medication History Completed By: ADRIANA LEE [facility] 12/7/2024 8:52 AM    PTA Med List   Medication Sig Last Dose/Taking    acetaminophen (TYLENOL) 325 MG tablet Take 1-2 tablets (325-650 mg) by mouth every 6 hours as needed for mild pain or headaches Past Week    aspirin 81 MG EC tablet Take 1 tablet (81 mg) by mouth daily 12/6/2024 Morning    atenolol (TENORMIN) 25 MG tablet Take 0.5 tablets (12.5 mg) by mouth daily. 12/6/2024 Morning    baclofen (LIORESAL) 10 MG tablet TAKE 1 TABLET BY MOUTH 3 TIMES DAILY 12/6/2024 Morning    famotidine (PEPCID) 20 MG tablet Take 1 tablet (20 mg) by mouth 2 times daily as needed (acid reflux) Past Week    medication given by implanted intrathecal pump continuously. Drug # 1: Fentanyl (Sublimaze) - Conc: 2000 mcg/mL - Total Dose / 24 hours: 824.2 mcg    Drug # 2: Bupivacaine (Marcaine)  - Conc:17.7 mg/mL - Total Dose / 24 hours: 7.294 mg    Drug # 3: Hydromorphone (Dilaudid)  - Conc: 5.1 mg/mL - Total Dose / 24 hours: 2.1016 mg    Diluent: NS    Infusion Rate: 0.4121 mL/24 hrs  Pump Reservoir Volume: 40 mL    Bolus doses: up to 15 bolus doses/24 hours with 1 hour lockout  Each bolus: fentanyl 80.1 mcg, 0.708 mg Bupivacaine, 0.2041 mg Dilaudid    Outside Clinic & Provider: [facility] in [place] [phone]  Last Refill Date: 11/25/2024  Next Refill  Date: 12/30/24  Low [place] Alarm Volume: 2.0 mL  Pump : syncromed Taking    multivitamin, therapeutic (THERA-VIT) TABS tablet Take 1 tablet by mouth daily 12/6/2024 Morning    omeprazole (PRILOSEC) 20 MG DR capsule Take 20 mg by mouth 2 times daily. 12/6/2024 Morning    ondansetron (ZOFRAN) 4 MG tablet Take 1 tablet (4 mg) by mouth every 6 hours as needed for nausea. 12/6/2024 Morning    prochlorperazine (COMPAZINE) 5 MG tablet Take 1 tablet (5 mg) by mouth every 8 hours as needed for nausea or vomiting 12/6/2024 Morning

## 2024-12-07 NOTE — CONSULTS
"Care Management Initial Consult    General Information  Assessment completed with: PatientMariah  Type of CM/SW Visit: Initial Assessment    Primary Care Provider verified and updated as needed: Yes   Readmission within the last 30 days: no previous admission in last 30 days      Reason for Consult: discharge planning  Advance Care Planning: Advance Care Planning Reviewed: no concerns identified          Communication Assessment  Patient's communication style: spoken language (English or Bilingual)             Cognitive  Cognitive/Neuro/Behavioral: WDL                      Living Environment:   People in home: spouse  Mariah and  Alfred  Current living Arrangements: house (\"I have a few steps to get inside and then I stay on the main level. There are steps to the basement for the laundry, but my  does the laundry\".)      Able to return to prior arrangements: yes       Family/Social Support:  Care provided by: self, spouse/significant other  Provides care for: no one, unable/limited ability to care for self  Marital Status:   Support system:   Alfred       Description of Support System: Supportive, Involved    Support Assessment: Adequate family and caregiver support, Adequate social supports, Patient communicates needs well met    Current Resources:   Patient receiving home care services: No        Community Resources: DME, Other (see comment) (Pain Clinic and Intrathecal pain pump supplies.)  Equipment currently used at home: walker, rolling (\"I use my 4WW outside the house. I don't need anything inside the house\".)  Supplies currently used at home: Other (\"glasses, dentures, Intrathecal pain pump and supplies)    Employment/Financial:  Employment Status: retired     Employment/ Comments: \"no  history\"  Financial Concerns: none   Referral to Financial Worker: No       Does the patient's insurance plan have a 3 day qualifying hospital stay waiver?  Yes     Which insurance plan " 3 day waiver is available? Alternative insurance waiver    Will the waiver be used for post-acute placement? Undetermined at this time    Lifestyle & Psychosocial Needs:  Social Drivers of Health     Food Insecurity: Low Risk  (11/3/2024)    Food Insecurity     Within the past 12 months, did you worry that your food would run out before you got money to buy more?: No     Within the past 12 months, did the food you bought just not last and you didn t have money to get more?: No   Depression: Not at risk (11/5/2024)    PHQ-2     PHQ-2 Score: 0   Housing Stability: Low Risk  (11/3/2024)    Housing Stability     Do you have housing? : Yes     Are you worried about losing your housing?: No   Tobacco Use: High Risk (11/5/2024)    Patient History     Smoking Tobacco Use: Every Day     Smokeless Tobacco Use: Never     Passive Exposure: Not on file   Financial Resource Strain: Low Risk  (11/3/2024)    Financial Resource Strain     Within the past 12 months, have you or your family members you live with been unable to get utilities (heat, electricity) when it was really needed?: No   Alcohol Use: Not on file   Transportation Needs: Low Risk  (11/3/2024)    Transportation Needs     Within the past 12 months, has lack of transportation kept you from medical appointments, getting your medicines, non-medical meetings or appointments, work, or from getting things that you need?: No   Physical Activity: Not on file   Interpersonal Safety: Low Risk  (3/6/2024)    Interpersonal Safety     Do you feel physically and emotionally safe where you currently live?: Yes     Within the past 12 months, have you been hit, slapped, kicked or otherwise physically hurt by someone?: No     Within the past 12 months, have you been humiliated or emotionally abused in other ways by your partner or ex-partner?: No   Stress: Not on file   Social Connections: Not on file   Health Literacy: Not on file       Functional Status:  Prior to admission patient  "needed assistance:   Dependent ADLs:: Ambulation-walker, Independent  Dependent IADLs:: Cleaning, Cooking, Laundry, Shopping, Meal Preparation, Transportation (\"I only drive a litte bit. Mostly my  helps with transportation\".)  Assesssment of Functional Status:  (unknown at this time)    Mental Health Status:  Mental Health Status: No Current Concerns       Chemical Dependency Status:  Chemical Dependency Status: No Current Concerns             Values/Beliefs:  Spiritual, Cultural Beliefs, Jain Practices, Values that affect care: yes  Description of Beliefs that Will Affect Care: Scientologist    Cultural/Jain Practices Patient Routinely Participates In: ceremony, prayer       Discussed  Partnership in Safe Discharge Planning  document with patient/family: No    Additional Information:  Mariah lives in a house with her  Alfred. \"I have a few steps to get inside and then I stay on the main level. There are steps to the basement for the laundry, but my  does the laundry\".    She is independent with ADLs and gets help with most IADLs. \"I only drive a litte bit. Mostly my  helps with transportation.  also helps with housekeeing, laundry, meal prep, and shopping.\"    \"I use my 4WW outside the house. I don't need anything inside the house\". She has an Intrathecal pain pump and supplies.      to transport at discharge.    BROWN was given and discussed. All questions were answered.    CM to follow for medical progression of care, discharge recommendations, and final discharge plan. Writer verified patient demographics and updated any changes needed in the patient chart.    Next Steps:   Plan: Unknown hospital course. Awaiting PT recommendations.  Needs: Unknown at this time if she will need Home Care help.    Elena Olsen RN    "

## 2024-12-07 NOTE — PLAN OF CARE
PRIMARY DIAGNOSIS: ACUTE PAIN  OUTPATIENT/OBSERVATION GOALS TO BE MET BEFORE DISCHARGE:  1. Pain Status: Improved but still requiring IV narcotics.    2. Return to near baseline physical activity: No    3. Cleared for discharge by consultants (if involved): No    Discharge Planner Nurse   Safe discharge environment identified: Yes  Barriers to discharge: Yes       Entered by: Erna Martinez RN 12/07/2024 3:16 PM     Please review provider order for any additional goals.   Nurse to notify provider when observation goals have been met and patient is ready for discharge.Goal Outcome Evaluation:       AxO4, RA. Pt rating pain 5/10, given 2mg IV morphine. Pt not able to take oral meds due to N/V, given IV zofran. Pt SBA to commode, no stool sample collected yet. IVF 9% + Kcl 20 mEq infusing. Regular diet but has not been able to take anything by mouth.

## 2024-12-07 NOTE — PLAN OF CARE
Goal Outcome Evaluation:      Plan of Care Reviewed With: patient          Outcome Evaluation: Unknown at this time.

## 2024-12-08 LAB
ADV 40+41 DNA STL QL NAA+NON-PROBE: NEGATIVE
ASTRO TYP 1-8 RNA STL QL NAA+NON-PROBE: NEGATIVE
C CAYETANENSIS DNA STL QL NAA+NON-PROBE: NEGATIVE
C DIFF TOX B STL QL: NEGATIVE
CAMPYLOBACTER DNA SPEC NAA+PROBE: NEGATIVE
CRYPTOSP DNA STL QL NAA+NON-PROBE: NEGATIVE
E COLI O157 DNA STL QL NAA+NON-PROBE: NORMAL
E HISTOLYT DNA STL QL NAA+NON-PROBE: NEGATIVE
EAEC ASTA GENE ISLT QL NAA+PROBE: NEGATIVE
EC STX1+STX2 GENES STL QL NAA+NON-PROBE: NEGATIVE
EPEC EAE GENE STL QL NAA+NON-PROBE: NEGATIVE
ETEC LTA+ST1A+ST1B TOX ST NAA+NON-PROBE: NEGATIVE
G LAMBLIA DNA STL QL NAA+NON-PROBE: NEGATIVE
HOLD SPECIMEN: NORMAL
HOLD SPECIMEN: NORMAL
LACTATE SERPL-SCNC: 1 MMOL/L (ref 0.7–2)
NOROVIRUS GI+II RNA STL QL NAA+NON-PROBE: NEGATIVE
P SHIGELLOIDES DNA STL QL NAA+NON-PROBE: NEGATIVE
RVA RNA STL QL NAA+NON-PROBE: NEGATIVE
SALMONELLA SP RPOD STL QL NAA+PROBE: NEGATIVE
SAPO I+II+IV+V RNA STL QL NAA+NON-PROBE: NEGATIVE
SHIGELLA SP+EIEC IPAH ST NAA+NON-PROBE: NEGATIVE
V CHOLERAE DNA SPEC QL NAA+PROBE: NEGATIVE
VIBRIO DNA SPEC NAA+PROBE: NEGATIVE
Y ENTEROCOL DNA STL QL NAA+PROBE: NEGATIVE

## 2024-12-08 PROCEDURE — 250N000011 HC RX IP 250 OP 636: Performed by: HOSPITALIST

## 2024-12-08 PROCEDURE — 36415 COLL VENOUS BLD VENIPUNCTURE: CPT | Performed by: HOSPITALIST

## 2024-12-08 PROCEDURE — G0378 HOSPITAL OBSERVATION PER HR: HCPCS

## 2024-12-08 PROCEDURE — 83605 ASSAY OF LACTIC ACID: CPT | Performed by: HOSPITALIST

## 2024-12-08 PROCEDURE — 250N000009 HC RX 250: Performed by: HOSPITALIST

## 2024-12-08 PROCEDURE — 87493 C DIFF AMPLIFIED PROBE: CPT | Performed by: INTERNAL MEDICINE

## 2024-12-08 PROCEDURE — 96376 TX/PRO/DX INJ SAME DRUG ADON: CPT

## 2024-12-08 PROCEDURE — 250N000013 HC RX MED GY IP 250 OP 250 PS 637: Performed by: HOSPITALIST

## 2024-12-08 PROCEDURE — 250N000009 HC RX 250: Performed by: INTERNAL MEDICINE

## 2024-12-08 PROCEDURE — 87507 IADNA-DNA/RNA PROBE TQ 12-25: CPT | Performed by: INTERNAL MEDICINE

## 2024-12-08 PROCEDURE — 250N000011 HC RX IP 250 OP 636: Performed by: INTERNAL MEDICINE

## 2024-12-08 PROCEDURE — 99233 SBSQ HOSP IP/OBS HIGH 50: CPT | Performed by: HOSPITALIST

## 2024-12-08 PROCEDURE — 250N000013 HC RX MED GY IP 250 OP 250 PS 637: Performed by: INTERNAL MEDICINE

## 2024-12-08 PROCEDURE — 96375 TX/PRO/DX INJ NEW DRUG ADDON: CPT

## 2024-12-08 RX ORDER — METOCLOPRAMIDE HYDROCHLORIDE 5 MG/ML
5 INJECTION INTRAMUSCULAR; INTRAVENOUS ONCE
Status: COMPLETED | OUTPATIENT
Start: 2024-12-08 | End: 2024-12-08

## 2024-12-08 RX ORDER — NICOTINE 21 MG/24HR
1 PATCH, TRANSDERMAL 24 HOURS TRANSDERMAL DAILY
Status: DISCONTINUED | OUTPATIENT
Start: 2025-01-19 | End: 2024-12-10 | Stop reason: HOSPADM

## 2024-12-08 RX ORDER — METOCLOPRAMIDE HYDROCHLORIDE 5 MG/ML
5 INJECTION INTRAMUSCULAR; INTRAVENOUS EVERY 6 HOURS PRN
Status: DISCONTINUED | OUTPATIENT
Start: 2024-12-08 | End: 2024-12-10 | Stop reason: HOSPADM

## 2024-12-08 RX ORDER — SCOLOPAMINE TRANSDERMAL SYSTEM 1 MG/1
1 PATCH, EXTENDED RELEASE TRANSDERMAL
Status: DISCONTINUED | OUTPATIENT
Start: 2024-12-08 | End: 2024-12-10 | Stop reason: HOSPADM

## 2024-12-08 RX ORDER — NICOTINE 21 MG/24HR
1 PATCH, TRANSDERMAL 24 HOURS TRANSDERMAL DAILY
Status: DISCONTINUED | OUTPATIENT
Start: 2024-12-08 | End: 2024-12-10 | Stop reason: HOSPADM

## 2024-12-08 RX ORDER — ATENOLOL 25 MG/1
12.5 TABLET ORAL ONCE
Status: COMPLETED | OUTPATIENT
Start: 2024-12-08 | End: 2024-12-08

## 2024-12-08 RX ADMIN — MORPHINE SULFATE 2 MG: 2 INJECTION, SOLUTION INTRAMUSCULAR; INTRAVENOUS at 22:43

## 2024-12-08 RX ADMIN — ONDANSETRON 4 MG: 2 INJECTION INTRAMUSCULAR; INTRAVENOUS at 17:20

## 2024-12-08 RX ADMIN — BACLOFEN 10 MG: 10 TABLET ORAL at 22:00

## 2024-12-08 RX ADMIN — POTASSIUM CHLORIDE AND SODIUM CHLORIDE: 900; 150 INJECTION, SOLUTION INTRAVENOUS at 20:31

## 2024-12-08 RX ADMIN — SCOPOLAMINE 1 PATCH: 1.5 PATCH, EXTENDED RELEASE TRANSDERMAL at 13:32

## 2024-12-08 RX ADMIN — ONDANSETRON 4 MG: 2 INJECTION INTRAMUSCULAR; INTRAVENOUS at 01:09

## 2024-12-08 RX ADMIN — ONDANSETRON 4 MG: 2 INJECTION INTRAMUSCULAR; INTRAVENOUS at 06:43

## 2024-12-08 RX ADMIN — METOCLOPRAMIDE 5 MG: 5 INJECTION, SOLUTION INTRAMUSCULAR; INTRAVENOUS at 21:57

## 2024-12-08 RX ADMIN — HYDRALAZINE HYDROCHLORIDE 10 MG: 20 INJECTION INTRAMUSCULAR; INTRAVENOUS at 01:14

## 2024-12-08 RX ADMIN — PANTOPRAZOLE SODIUM 20 MG: 40 INJECTION, POWDER, FOR SOLUTION INTRAVENOUS at 08:45

## 2024-12-08 RX ADMIN — METOCLOPRAMIDE 5 MG: 5 INJECTION, SOLUTION INTRAMUSCULAR; INTRAVENOUS at 16:37

## 2024-12-08 RX ADMIN — NICOTINE 1 PATCH: 21 PATCH, EXTENDED RELEASE TRANSDERMAL at 14:55

## 2024-12-08 RX ADMIN — FAMOTIDINE 20 MG: 10 INJECTION, SOLUTION INTRAVENOUS at 01:12

## 2024-12-08 RX ADMIN — HYDRALAZINE HYDROCHLORIDE 10 MG: 20 INJECTION INTRAMUSCULAR; INTRAVENOUS at 17:00

## 2024-12-08 RX ADMIN — PANTOPRAZOLE SODIUM 20 MG: 40 INJECTION, POWDER, FOR SOLUTION INTRAVENOUS at 20:33

## 2024-12-08 RX ADMIN — ATENOLOL 12.5 MG: 25 TABLET ORAL at 19:50

## 2024-12-08 RX ADMIN — POTASSIUM CHLORIDE AND SODIUM CHLORIDE: 900; 150 INJECTION, SOLUTION INTRAVENOUS at 09:20

## 2024-12-08 RX ADMIN — PROCHLORPERAZINE EDISYLATE 5 MG: 5 INJECTION INTRAMUSCULAR; INTRAVENOUS at 08:32

## 2024-12-08 RX ADMIN — METOCLOPRAMIDE 5 MG: 5 INJECTION, SOLUTION INTRAMUSCULAR; INTRAVENOUS at 11:04

## 2024-12-08 RX ADMIN — FAMOTIDINE 20 MG: 10 INJECTION, SOLUTION INTRAVENOUS at 23:41

## 2024-12-08 RX ADMIN — MORPHINE SULFATE 4 MG: 2 INJECTION, SOLUTION INTRAMUSCULAR; INTRAVENOUS at 10:38

## 2024-12-08 RX ADMIN — HYDRALAZINE HYDROCHLORIDE 10 MG: 20 INJECTION INTRAMUSCULAR; INTRAVENOUS at 08:34

## 2024-12-08 RX ADMIN — HYDRALAZINE HYDROCHLORIDE 10 MG: 20 INJECTION INTRAMUSCULAR; INTRAVENOUS at 23:48

## 2024-12-08 RX ADMIN — MORPHINE SULFATE 2 MG: 2 INJECTION, SOLUTION INTRAMUSCULAR; INTRAVENOUS at 17:42

## 2024-12-08 RX ADMIN — FAMOTIDINE 20 MG: 10 INJECTION, SOLUTION INTRAVENOUS at 11:03

## 2024-12-08 ASSESSMENT — ACTIVITIES OF DAILY LIVING (ADL)
ADLS_ACUITY_SCORE: 56
ADLS_ACUITY_SCORE: 59
ADLS_ACUITY_SCORE: 59
ADLS_ACUITY_SCORE: 56
ADLS_ACUITY_SCORE: 59
ADLS_ACUITY_SCORE: 59
ADLS_ACUITY_SCORE: 58
ADLS_ACUITY_SCORE: 58
ADLS_ACUITY_SCORE: 56
ADLS_ACUITY_SCORE: 59
ADLS_ACUITY_SCORE: 56
ADLS_ACUITY_SCORE: 56
ADLS_ACUITY_SCORE: 59
ADLS_ACUITY_SCORE: 59
ADLS_ACUITY_SCORE: 56
ADLS_ACUITY_SCORE: 58
ADLS_ACUITY_SCORE: 59
ADLS_ACUITY_SCORE: 56

## 2024-12-08 NOTE — PLAN OF CARE
Problem: Adult Inpatient Plan of Care  Goal: Absence of Hospital-Acquired Illness or Injury  Intervention: Identify and Manage Fall Risk  Recent Flowsheet Documentation  Taken 12/8/2024 0500 by Felicity Chong RN  Safety Promotion/Fall Prevention: assistive device/personal items within reach  Taken 12/8/2024 0100 by Felicity Chong RN  Safety Promotion/Fall Prevention: assistive device/personal items within reach  Intervention: Prevent Skin Injury  Recent Flowsheet Documentation  Taken 12/8/2024 0500 by Felicity Chong RN  Body Position: position changed independently  Taken 12/8/2024 0100 by Felicity Chong RN  Body Position: position changed independently  Goal: Optimal Comfort and Wellbeing  Outcome: Progressing   Goal Outcome Evaluation:       Neuro: A&Ox4.   Cardiac: Afebrile, VSS.   Respiratory: RA  GI/: Voiding spontaneously. No BM this shift.  Diet/appetite: Tolerating regular diet. Denies nausea.  Activity: Up with stand by assist    Pain: Denies   Skin: No new deficits noted.  Lines: pivSL  Drains: no  Replacements: no

## 2024-12-08 NOTE — PLAN OF CARE
PRIMARY DIAGNOSIS: GASTROENTERITIS    OUTPATIENT/OBSERVATION GOALS TO BE MET BEFORE DISCHARGE  1. Orthostatic performed: N/A    2. Tolerating PO fluid and/or antibiotics (if applicable): No    3. Nausea/Vomiting/Diarrhea symptoms improved: No,  only 1 episode of diarrhea, pretty continuous nausea    4. Pain status: Pain free.    5. Return to near baseline physical activity: No    Discharge Planner Nurse   Safe discharge environment identified: Yes  Barriers to discharge: Yes       Entered by: Erna Martinez RN 12/08/2024 9:34 AM     Please review provider order for any additional goals.   Nurse to notify provider when observation goals have been met and patient is ready for discharge.Goal Outcome Evaluation:

## 2024-12-08 NOTE — CONSULTS
[facility] DIGESTIVE HEALTH CONSULTATION    Mariah Koehler   [address]  [place] [address]  68 year old female  Admission Date/Time: 12/7/2024  6:49 AM    Primary Care Provider:  Alex Nolasco    Requesting Physician: Jody Guajardo MD      CHIEF COMPLAINT:   Abdominal pain, nausea, vomiting, diarrhea.    REASON FOR THE CONSULT: Abdominal pain, nausea, vomiting, diarrhea    HPI:   Mariah Koehler is a pleasant 68-year-old female with history of nephrolithiasis, hydronephrosis, COPD, HTN, reflux sympathetic dystrophy and chronic pain syndrome on continuous intrathecal pump who presented to [facility] on 12/7/2024 with fatigue/weakness, nausea, vomiting, diarrhea and abdominal pain.    Patient states that she has been having diffuse weakness and fatigue that is associated with new development of nausea, nonbloody/nonbilious vomiting and diarrhea for a day before presentation.  She does not recall eating or consuming anything raw or bad.  She has been able to keep any food down.  No recent new medications.  It appears that per records, she has had history of nausea, vomiting and weight loss and had an upper endoscopy in February 2023 which showed normal esophagus, 7 cm hiatal hernia, antral erythema and normal duodenum.  Pathology of the stomach showed mild nonspecific chronic gastritis.  Negative for H. pylori and intestinal metaplasia.    On arrival to the ED, she was afebrile and hemodynamically stable.  Labs including CMP, CBC with differential, lipase and lactate were normal.  Influenza panel, RSV and COVID were negative.  Stool tests including C. difficile and comprehensive stool panel were also negative.  CT abdomen pelvis with IV contrast on admission showed decompressed colon with possible mild ascending colonic wall thickening but no findings of obstruction, free air or abscess.      REVIEW OF SYSTEMS: 13 point review of systems is negative except that noted above.    PAST MEDICAL HISTORY:    Past Medical History:   Diagnosis Date    Current mild episode of major depressive disorder, unspecified whether recurrent (H) 02/17/2020    Hypertension     Migraine headache 11/06/2023    Nausea and vomiting     Opiate dependence (H)     RSD (reflex sympathetic dystrophy)     Sacral fracture, closed (H)        PAST SURGICAL HISTORY:   Past Surgical History:   Procedure Laterality Date    APPENDECTOMY      COMBINED CYSTOSCOPY, RETROGRADES, URETEROSCOPY, LASER HOLMIUM LITHOTRIPSY URETER(S), INSERT STENT Right 11/3/2024    Procedure: CYSTOSCOPY, RIGHT RETROGRADE PYELOGRAM, RIGHt URETEROSCOPY AND BASKET RETRIEVAL OF STONE, RIGHT URETERAL STENT PLACEMENT;  Surgeon: Cristiano Otero MD;  Location: [facility] [place]    ESOPHAGOSCOPY, GASTROSCOPY, DUODENOSCOPY (EGD), COMBINED N/A 2/20/2023    Procedure: ESOPHAGOGASTRODUODENOSCOPY with biopsies;  Surgeon: Na Brooks MD;  Location: [facility] OR    GYN SURGERY      HC REVISE MEDIAN N/CARPAL TUNNEL SURG      Description: Neuroplasty Decompression Median Nerve At Carpal Tunnel;  Recorded: 09/19/2011;    HYSTERECTOMY  1984    IR CERVICAL EPIDURAL STEROID INJECTION  1/14/2005    LAPAROSCOPIC NISSEN FUNDOPLICATION N/A 2/23/2024    Procedure: Paraesophageal hiatal hernia repair with mesh, ROBOT-ASSISTED, LAPAROSCOPIC, USING DA ROSMERY XI;  Surgeon: Lemuel Ornelas DO;  Location: [facility] OR    OOPHORECTOMY Bilateral 1985    HI SYMPATHECTOMY,CERVICOTHORACIC      Description: Surgical Sympathectomy Cervicothoracic;  Recorded: 09/19/2011;    [facility] DECOMPRESS FASCIOTOMY FINGR/HAND      Description: Decompression Fasciotomy Of The Hand;  Recorded: 09/19/2011;    [facility] LAP,CHOLECYSTECTOMY/EXPLORE      Description: Cholecystectomy Laparoscopic;  Recorded: 12/09/2011;    [facility] TOTAL ABDOM HYSTERECTOMY      Description: Hysterectomy;  Recorded: 03/23/2011;       FAMILY HISTORY:   Family History   Problem Relation Age of Onset    Cerebrovascular Disease Mother      Diabetes Mother     Heart Disease Mother     Hypertension Mother     Rheumatologic Disease Mother     Heart Disease Father     Pulmonary Hypertension Father     Kidney failure Father     Cancer Father         melanoma    Diabetes Sister     Hypertension Sister     Hypertension Brother        SOCIAL HISTORY:   Social History     Tobacco Use    Smoking status: Every Day     Current packs/day: 0.75     Average packs/day: 0.8 packs/day for 56.9 years (42.7 ttl pk-yrs)     Types: Cigarettes     Start date: 1/1/1968    Smokeless tobacco: Never   Substance Use Topics    Alcohol use: Not Currently     Comment: 1 glass a year        MEDS:  Medications Prior to Admission   Medication Sig Dispense Refill Last Dose/Taking    acetaminophen (TYLENOL) 325 MG tablet Take 1-2 tablets (325-650 mg) by mouth every 6 hours as needed for mild pain or headaches   Past Week    aspirin 81 MG EC tablet Take 1 tablet (81 mg) by mouth daily 90 tablet 3 12/6/2024 Morning    atenolol (TENORMIN) 25 MG tablet Take 0.5 tablets (12.5 mg) by mouth daily. 90 tablet 3 12/6/2024 Morning    baclofen (LIORESAL) 10 MG tablet TAKE 1 TABLET BY MOUTH 3 TIMES DAILY 90 tablet 1 12/6/2024 Morning    famotidine (PEPCID) 20 MG tablet Take 1 tablet (20 mg) by mouth 2 times daily as needed (acid reflux) 20 tablet 0 Past Week    medication given by implanted intrathecal pump continuously. Drug # 1: Fentanyl (Sublimaze) - Conc: 2000 mcg/mL - Total Dose / 24 hours: 824.2 mcg    Drug # 2: Bupivacaine (Marcaine)  - Conc:17.7 mg/mL - Total Dose / 24 hours: 7.294 mg    Drug # 3: Hydromorphone (Dilaudid)  - Conc: 5.1 mg/mL - Total Dose / 24 hours: 2.1016 mg    Diluent: NS    Infusion Rate: 0.4121 mL/24 hrs  Pump Reservoir Volume: 40 mL    Bolus doses: up to 15 bolus doses/24 hours with 1 hour lockout  Each bolus: fentanyl 80.1 mcg, 0.708 mg Bupivacaine, 0.2041 mg Dilaudid    Outside Clinic & Provider: [facility] [place] [phone]  Last Refill Date:  11/25/2024  Next Refill Date: 12/30/24  Low [place] Alarm Volume: 2.0 mL  Pump : syncromed   Taking    multivitamin, therapeutic (THERA-VIT) TABS tablet Take 1 tablet by mouth daily 90 tablet 3 12/6/2024 Morning    omeprazole (PRILOSEC) 20 MG DR capsule Take 20 mg by mouth 2 times daily.   12/6/2024 Morning    ondansetron (ZOFRAN) 4 MG tablet Take 1 tablet (4 mg) by mouth every 6 hours as needed for nausea. 20 tablet 0 12/6/2024 Morning    prochlorperazine (COMPAZINE) 5 MG tablet Take 1 tablet (5 mg) by mouth every 8 hours as needed for nausea or vomiting 10 tablet 0 12/6/2024 Morning       ALLERGIES/SENSITIVITIES: Adhesive tape, Codeine, Hydromorphone, Morphine, Bacitracin, and Methadone    MEDICATIONS:  No current outpatient medications on file.       PHYSICAL EXAM:  Temp:  [98.1  F (36.7  C)-99.8  F (37.7  C)] 99.8  F (37.7  C)  Pulse:  [] 115  Resp:  [16-28] 28  BP: (117-178)/(57-86) 178/83  SpO2:  [94 %-96 %] 95 %  Body mass index is 18.86 kg/m .  GEN: Well developed, well nourished 68 year old in no acute distress.  HEENT: sclera anicteric, moist mucous membranes.   LYMPH: No cervical lymphadenopathy  PULM: Nonlabored breathing. Breath sounds equal.   CARDIO: Regular rate  GI: Non-distended. Soft.  Non-tender to palpation.  No guarding. No rebound tenderness.  EXT: warm, no lower extremity edema  NEURO: Alert. No focal defects.   PSYCH: Mental status appropriate, mood and affect normal.    SKIN: No rashes  MSK: No joint abnormalities    LABORATORY DATA:  CBC RESULTS:   Recent Labs   Lab Test 12/07/24  0804   WBC 10.0   RBC 5.10   HGB 14.8   HCT 45.1   MCV 88   MCH 29.0   MCHC 32.8   RDW 13.0           CMP Results:   Recent Labs   Lab Test 12/07/24  0804      POTASSIUM 3.6   CHLORIDE 100   CO2 28   ANIONGAP 12   *   BUN 14.7   CR 0.63   BILITOTAL 0.3   ALKPHOS 96   ALT 21   AST 19        INR Results:   Recent Labs   Lab Test 10/21/24  0558   INR 1.09          RELEVANT  IMAGING:    CT Abdomen Pelvis w Contrast    Result Date: 12/7/2024  EXAM: CT ABDOMEN PELVIS W CONTRAST LOCATION: [facility] DATE: 12/7/2024 INDICATION: Nausea, vomiting and diarrhea. Abdominal pain. COMPARISON: 11/2/2024. TECHNIQUE: CT scan of the abdomen and pelvis was performed following injection of IV contrast. Multiplanar reformats were obtained. Dose reduction techniques were used. CONTRAST: ISOVUE 370 55ML FINDINGS: LOWER CHEST: Minimal atelectasis. HEPATOBILIARY: Cholecystectomy with minimal pneumobilia, previously seen. Negative liver. PANCREAS: Stable pancreatic ductal dilatation near 5-6 mm. Stable pancreatic head hypodensity measuring 9 mm (series 3, image 67). SPLEEN: Normal. ADRENAL GLANDS: Normal. KIDNEYS/BLADDER: Prior 3 mm proximal right ureteral stone is no longer seen. Otherwise, unremarkable. BOWEL: Decompressed colon, though suspicion for mild wall thickening of the ascending colon. Mild colonic diverticulosis without diverticulitis. No significant gastric or small bowel wall thickening. LYMPH NODES: No adenopathy. VASCULATURE: Nonaneurysmal aorta with mild-moderate atherosclerosis. PELVIC ORGANS: No free fluid. MUSCULOSKELETAL: Bony demineralization. Spinal stimulator.     IMPRESSION: 1.  Decompressed colon, though possible mild ascending colonic wall thickening which could be from acute colitis. Cannot exclude mild transverse or descending colonic wall thickening as well, completely decompressed. 2.  No other significant bowel findings. No obstruction. 3.  No free fluid or air. No abscess.        ASSESSMENT:   Mariah Koehler is a pleasant 68-year-old female with history of nephrolithiasis, hydronephrosis, COPD, HTN, reflux sympathetic dystrophy and chronic pain syndrome on continuous intrathecal pump who presented to [facility] on 12/7/2024 with fatigue/weakness, nausea, vomiting, diarrhea and abdominal pain.  Differential diagnosis is broad at this time and it  may include gastritis, H. pylori infection, peptic ulcer disease, viral or bacterial gastroenteritis, food poisoning, functional abdominal symptoms.  Given reassuring labs and imaging, severe inflammation or malignancy is less likely.    PLAN:  -Diet as tolerated, keep n.p.o. at midnight  -Antiemetics per primary team  -IV PPI twice daily  -Given severity of her symptoms, we will plan for a diagnostic EGD tomorrow or Tuesday      66 minutes of total time was spent providing patient care, including patient evaluation, reviewing documentation/test results and .                                                Shan Neil M.D.  Thank you for the opportunity to participate in the care of this patient.   Please feel free to call me with any questions or concerns.  Phone number [phone].              CC: [facility], Alex Nolasco

## 2024-12-08 NOTE — PROGRESS NOTES
"PRIMARY DIAGNOSIS: \"GENERIC\" NURSING  OUTPATIENT/OBSERVATION GOALS TO BE MET BEFORE DISCHARGE:  ADLs back to baseline: No    Activity and level of assistance: Assist x 1    Pain status: Pain free.    Return to near baseline physical activity: No     Discharge Planner Nurse   Safe discharge environment identified: No  Barriers to discharge: Yes       Entered by: Jasen España RN 12/07/2024 11:13 PM     A & O x 4. VSS except hypertensive on RA. Denies pain/SOB. Nausea addressed with PRN medication. Enteric precautions maintained. R PIV infusing NS + KCl 20 mEq/L @ 100 mL/hr. Regular diet. SBA w/ walker. Still awaiting stool sample. Nursing continue to monitor.  "

## 2024-12-08 NOTE — PLAN OF CARE
PRIMARY DIAGNOSIS: ACUTE PAIN  OUTPATIENT/OBSERVATION GOALS TO BE MET BEFORE DISCHARGE:  1. Pain Status: Improved but still requiring IV narcotics.    2. Return to near baseline physical activity: No    3. Cleared for discharge by consultants (if involved): No    Discharge Planner Nurse   Safe discharge environment identified: Yes  Barriers to discharge: Yes       Entered by: Erna Martinez RN 12/08/2024 1:49 PM     Please review provider order for any additional goals.   Nurse to notify provider when observation goals have been met and patient is ready for discharge.Goal Outcome Evaluation:       AxO4, VSS but BP elevated, RA. Pt rating pain 7/10, given 4mg morphine and pt self-administered bolus through pain pump. Nausea has been pretty constant, given compazine, reglan, scopolamine patch, aromatherapy, sea bands, and cool washcloth for symptoms without much relief. Pt SBA to commode, no more BM this shift. IVF NS+Kcl 20 mEq/L 100mL/hr infusing. Pt unable to take pills or anything by mouth due to nausea.

## 2024-12-08 NOTE — PLAN OF CARE
PRIMARY DIAGNOSIS: ACUTE PAIN  OUTPATIENT/OBSERVATION GOALS TO BE MET BEFORE DISCHARGE:  1. Pain Status: Pain free.    2. Return to near baseline physical activity: No    3. Cleared for discharge by consultants (if involved): No    Discharge Planner Nurse   Safe discharge environment identified: Yes  Barriers to discharge: Yes       Entered by: Felicity Chong RN 12/08/2024 1:51 AM     Please review provider order for any additional goals.   Nurse to notify provider when observation goals have been met and patient is ready for discharge.Goal Outcome Evaluation:

## 2024-12-08 NOTE — PROGRESS NOTES
[facility]    Medicine Progress Note - Hospitalist Service    Date of Admission:  12/7/2024    Assessment & Plan   Mariah Koehler is a 68 year old female with history of nephrolithiasis, hydronephrosis, COPD, hypertension, reflex sympathetic dystrophy and chronic pain syndrome on continuous intrathecal pump admitted on 12/7/2024 with persistent nausea, vomiting, diarrhea and diffuse weakness. Lab review: no leukocytosis, negative viral panel, LFT and lipase normal. CT of abdomen/pelvis negative for obstruction but noted mild ascending colonic wall thickening from possible colitis. UA negative for UTI but ketones notes. Stool studies pending but C. Diff toxin negative. On evaluation today patient in acute pain distress from no bolus from intrathecal pump. Pharmacy able to assist and patient now with ability for bolus. Patient also with persistent nausea with minimal improvement seen with zofran and compazine. Will trial reglan.        #Nausea, vomiting, non-bloody diarrhea  #Abdominal pain  #Colitis   -Suspect viral gastroenteritis however patient is at risk for C. Diff given hospitalization last month  -WBC, LFT's and lipase normal  -Covid/Flu/RSV negative  -UA negative for UTI  -Lactate normal.   -CT A/P shows possible colitis, no obstruction  -Follow up stool studies. Thought patient has not more than 1 stool since admission.   -Continue famotidine and PPI (PTA meds)   -Continue Scopolamine.   -Continue Zofran prn  -Start Reglan IV prn and discontinue compazine.   -Continue non pharmacological interventions of nausea  -Consider that pain pump not functioning given tachycardia and intractable. Will have pain management consulted.   -GI consulted. Reviewed endoscopy in 2023. No remarkable findings but indication was also nausea/vomiting/weight loss.      #Diffuse weakness   #Malnutrition  #Weight loss   - PT consult   - Nutrition consult   - Continue IVF      #Chronic pain  #H/O reflex  sympathetic dystrophy   -continue home intrathecal pain pump and home baclofen  - Pain management consulted     #H/O COPD   -not on any chronic inhalers PTA    #Hypertension  - continue home atenolol     #H/O GERD:   - change home famotidine and PPI to IV form for now    #Cognitive deficits  -OT to do SLUMS    #Tobacco Dependence  -Nicotine replacement ordered  -Counseling provided           Diet: Combination Diet Regular Diet Adult    DVT Prophylaxis: Pneumatic Compression Devices  Bellamy Catheter: Not present  Lines: None     Cardiac Monitoring: None  Code Status: Full Code      Clinically Significant Risk Factors Present on Admission                 # Drug Induced Platelet Defect: home medication list includes an antiplatelet medication   # Hypertension: Noted on problem list               # Financial/Environmental Concerns: none         Social Drivers of Health    Tobacco Use: High Risk (11/5/2024)    Patient History     Smoking Tobacco Use: Every Day     Smokeless Tobacco Use: Never          Disposition Plan     Medically Ready for Discharge: Anticipated Tomorrow             Jody Guajardo MD  Hospitalist Service  [facility]  Securely message with [employer] (more info)  Text page via [facility] Paging/Directory   ______________________________________________________________________    Interval History   Patient seen this morning. She reports no diarrhea. She reports intractable nausea but no emesis. She does not recall last time she ate. She also reports diffuse abdominal pain. She does not recall last BM. Spoke with  by bedside. She reports patient intermittently has nausea for the past two years. She had a hernia repair as possible cause but still continues to have it. He also reports patient last meal was Friday and has been having significant weight loss. Patient was recommended for colonoscopy but has not been willing to have it done. Symptoms wax and wane with some ending up in the  ED.      Physical Exam   Vital Signs: Temp: 98.5  F (36.9  C) Temp src: Oral BP: (!) 170/86 Pulse: 104   Resp: 18 SpO2: 95 % O2 Device: None (Room air)    Weight: 106 lbs 7.71 oz    Constitutional: awake, alert, cooperative, and painful distress  Eyes: pupils equal, round and reactive to light and sclera clear  ENT: atraumatic, oral pharynx with moist mucus membranes  Respiratory: No increased work of breathing, good air exchange, clear to auscultation bilaterally, no crackles or wheezing  Cardiovascular: tachycardic with regular rhythm and normal S1 and S2  GI: soft, non-distended, and tenderness noted diffusely  Musculoskeletal: no lower extremity pitting edema present  full range of motion noted    Medical Decision Making       50 MINUTES SPENT BY ME on the date of service doing chart review, history, exam, documentation & further activities per the note.      Data     I have personally reviewed the following data over the past 24 hrs:    Procal: N/A CRP: N/A Lactic Acid: 1.0

## 2024-12-09 ENCOUNTER — APPOINTMENT (OUTPATIENT)
Dept: PHYSICAL THERAPY | Facility: HOSPITAL | Age: 69
End: 2024-12-09
Payer: COMMERCIAL

## 2024-12-09 ENCOUNTER — ANESTHESIA (OUTPATIENT)
Dept: SURGERY | Facility: HOSPITAL | Age: 69
End: 2024-12-09
Payer: COMMERCIAL

## 2024-12-09 ENCOUNTER — ANESTHESIA EVENT (OUTPATIENT)
Dept: SURGERY | Facility: HOSPITAL | Age: 69
End: 2024-12-09
Payer: COMMERCIAL

## 2024-12-09 ENCOUNTER — APPOINTMENT (OUTPATIENT)
Dept: OCCUPATIONAL THERAPY | Facility: HOSPITAL | Age: 69
End: 2024-12-09
Attending: HOSPITALIST
Payer: COMMERCIAL

## 2024-12-09 LAB
ANION GAP SERPL CALCULATED.3IONS-SCNC: 12 MMOL/L (ref 7–15)
BUN SERPL-MCNC: 13.5 MG/DL (ref 8–23)
CALCIUM SERPL-MCNC: 8.7 MG/DL (ref 8.8–10.4)
CHLORIDE SERPL-SCNC: 104 MMOL/L (ref 98–107)
CREAT SERPL-MCNC: 0.53 MG/DL (ref 0.51–0.95)
EGFRCR SERPLBLD CKD-EPI 2021: >90 ML/MIN/1.73M2
ERYTHROCYTE [DISTWIDTH] IN BLOOD BY AUTOMATED COUNT: 13.6 % (ref 10–15)
GLUCOSE BLDC GLUCOMTR-MCNC: 74 MG/DL (ref 70–99)
GLUCOSE SERPL-MCNC: 87 MG/DL (ref 70–99)
HCO3 SERPL-SCNC: 23 MMOL/L (ref 22–29)
HCT VFR BLD AUTO: 39.1 % (ref 35–47)
HGB BLD-MCNC: 13 G/DL (ref 11.7–15.7)
MCH RBC QN AUTO: 29.3 PG (ref 26.5–33)
MCHC RBC AUTO-ENTMCNC: 33.2 G/DL (ref 31.5–36.5)
MCV RBC AUTO: 88 FL (ref 78–100)
PLATELET # BLD AUTO: 196 10E3/UL (ref 150–450)
POTASSIUM SERPL-SCNC: 4.3 MMOL/L (ref 3.4–5.3)
RBC # BLD AUTO: 4.43 10E6/UL (ref 3.8–5.2)
SODIUM SERPL-SCNC: 139 MMOL/L (ref 135–145)
UPPER GI ENDOSCOPY: NORMAL
WBC # BLD AUTO: 10.1 10E3/UL (ref 4–11)

## 2024-12-09 PROCEDURE — 250N000011 HC RX IP 250 OP 636: Performed by: HOSPITALIST

## 2024-12-09 PROCEDURE — 250N000011 HC RX IP 250 OP 636: Performed by: INTERNAL MEDICINE

## 2024-12-09 PROCEDURE — 250N000009 HC RX 250: Performed by: NURSE ANESTHETIST, CERTIFIED REGISTERED

## 2024-12-09 PROCEDURE — 80048 BASIC METABOLIC PNL TOTAL CA: CPT | Performed by: HOSPITALIST

## 2024-12-09 PROCEDURE — 120N000001 HC R&B MED SURG/OB

## 2024-12-09 PROCEDURE — 258N000003 HC RX IP 258 OP 636: Performed by: NURSE ANESTHETIST, CERTIFIED REGISTERED

## 2024-12-09 PROCEDURE — 272N000001 HC OR GENERAL SUPPLY STERILE: Performed by: INTERNAL MEDICINE

## 2024-12-09 PROCEDURE — 96376 TX/PRO/DX INJ SAME DRUG ADON: CPT

## 2024-12-09 PROCEDURE — 250N000013 HC RX MED GY IP 250 OP 250 PS 637: Performed by: INTERNAL MEDICINE

## 2024-12-09 PROCEDURE — 999N000141 HC STATISTIC PRE-PROCEDURE NURSING ASSESSMENT: Performed by: INTERNAL MEDICINE

## 2024-12-09 PROCEDURE — 97116 GAIT TRAINING THERAPY: CPT | Mod: GP

## 2024-12-09 PROCEDURE — 360N000075 HC SURGERY LEVEL 2, PER MIN: Performed by: INTERNAL MEDICINE

## 2024-12-09 PROCEDURE — 370N000017 HC ANESTHESIA TECHNICAL FEE, PER MIN: Performed by: INTERNAL MEDICINE

## 2024-12-09 PROCEDURE — 0DB68ZX EXCISION OF STOMACH, VIA NATURAL OR ARTIFICIAL OPENING ENDOSCOPIC, DIAGNOSTIC: ICD-10-PCS | Performed by: INTERNAL MEDICINE

## 2024-12-09 PROCEDURE — 258N000003 HC RX IP 258 OP 636: Performed by: STUDENT IN AN ORGANIZED HEALTH CARE EDUCATION/TRAINING PROGRAM

## 2024-12-09 PROCEDURE — 99222 1ST HOSP IP/OBS MODERATE 55: CPT | Mod: 25 | Performed by: NURSE PRACTITIONER

## 2024-12-09 PROCEDURE — 62367 ANALYZE SPINE INFUS PUMP: CPT | Performed by: NURSE PRACTITIONER

## 2024-12-09 PROCEDURE — 250N000013 HC RX MED GY IP 250 OP 250 PS 637: Performed by: HOSPITALIST

## 2024-12-09 PROCEDURE — 97535 SELF CARE MNGMENT TRAINING: CPT | Mod: GO

## 2024-12-09 PROCEDURE — 250N000009 HC RX 250: Performed by: INTERNAL MEDICINE

## 2024-12-09 PROCEDURE — G0378 HOSPITAL OBSERVATION PER HR: HCPCS

## 2024-12-09 PROCEDURE — 250N000011 HC RX IP 250 OP 636: Performed by: NURSE ANESTHETIST, CERTIFIED REGISTERED

## 2024-12-09 PROCEDURE — 88342 IMHCHEM/IMCYTCHM 1ST ANTB: CPT | Mod: TC | Performed by: INTERNAL MEDICINE

## 2024-12-09 PROCEDURE — 97166 OT EVAL MOD COMPLEX 45 MIN: CPT | Mod: GO

## 2024-12-09 PROCEDURE — 36415 COLL VENOUS BLD VENIPUNCTURE: CPT | Performed by: HOSPITALIST

## 2024-12-09 PROCEDURE — 250N000011 HC RX IP 250 OP 636: Performed by: FAMILY MEDICINE

## 2024-12-09 PROCEDURE — 85018 HEMOGLOBIN: CPT | Performed by: HOSPITALIST

## 2024-12-09 PROCEDURE — 97530 THERAPEUTIC ACTIVITIES: CPT | Mod: GP

## 2024-12-09 PROCEDURE — 99232 SBSQ HOSP IP/OBS MODERATE 35: CPT | Performed by: INTERNAL MEDICINE

## 2024-12-09 RX ORDER — SODIUM CHLORIDE, SODIUM LACTATE, POTASSIUM CHLORIDE, CALCIUM CHLORIDE 600; 310; 30; 20 MG/100ML; MG/100ML; MG/100ML; MG/100ML
INJECTION, SOLUTION INTRAVENOUS CONTINUOUS
Status: DISCONTINUED | OUTPATIENT
Start: 2024-12-09 | End: 2024-12-09 | Stop reason: HOSPADM

## 2024-12-09 RX ORDER — ONDANSETRON 2 MG/ML
INJECTION INTRAMUSCULAR; INTRAVENOUS PRN
Status: DISCONTINUED | OUTPATIENT
Start: 2024-12-09 | End: 2024-12-09

## 2024-12-09 RX ORDER — PROPOFOL 10 MG/ML
INJECTION, EMULSION INTRAVENOUS CONTINUOUS PRN
Status: DISCONTINUED | OUTPATIENT
Start: 2024-12-09 | End: 2024-12-09

## 2024-12-09 RX ORDER — FLUMAZENIL 0.1 MG/ML
0.2 INJECTION, SOLUTION INTRAVENOUS
Status: ACTIVE | OUTPATIENT
Start: 2024-12-09 | End: 2024-12-10

## 2024-12-09 RX ORDER — PROPOFOL 10 MG/ML
INJECTION, EMULSION INTRAVENOUS PRN
Status: DISCONTINUED | OUTPATIENT
Start: 2024-12-09 | End: 2024-12-09

## 2024-12-09 RX ORDER — SODIUM CHLORIDE, SODIUM LACTATE, POTASSIUM CHLORIDE, CALCIUM CHLORIDE 600; 310; 30; 20 MG/100ML; MG/100ML; MG/100ML; MG/100ML
INJECTION, SOLUTION INTRAVENOUS CONTINUOUS PRN
Status: DISCONTINUED | OUTPATIENT
Start: 2024-12-09 | End: 2024-12-09

## 2024-12-09 RX ORDER — LIDOCAINE HYDROCHLORIDE 10 MG/ML
INJECTION, SOLUTION INFILTRATION; PERINEURAL PRN
Status: DISCONTINUED | OUTPATIENT
Start: 2024-12-09 | End: 2024-12-09

## 2024-12-09 RX ORDER — MORPHINE SULFATE 2 MG/ML
2-4 INJECTION, SOLUTION INTRAMUSCULAR; INTRAVENOUS EVERY 4 HOURS PRN
Status: DISCONTINUED | OUTPATIENT
Start: 2024-12-09 | End: 2024-12-10

## 2024-12-09 RX ORDER — LIDOCAINE 40 MG/G
CREAM TOPICAL
Status: DISCONTINUED | OUTPATIENT
Start: 2024-12-09 | End: 2024-12-09 | Stop reason: HOSPADM

## 2024-12-09 RX ADMIN — ONDANSETRON 4 MG: 2 INJECTION INTRAMUSCULAR; INTRAVENOUS at 00:38

## 2024-12-09 RX ADMIN — BACLOFEN 10 MG: 10 TABLET ORAL at 19:09

## 2024-12-09 RX ADMIN — METOCLOPRAMIDE 5 MG: 5 INJECTION, SOLUTION INTRAMUSCULAR; INTRAVENOUS at 19:20

## 2024-12-09 RX ADMIN — LIDOCAINE HYDROCHLORIDE 5 ML: 10 INJECTION, SOLUTION INFILTRATION; PERINEURAL at 11:38

## 2024-12-09 RX ADMIN — ACETAMINOPHEN 650 MG: 325 TABLET ORAL at 13:59

## 2024-12-09 RX ADMIN — ATENOLOL 12.5 MG: 25 TABLET ORAL at 09:23

## 2024-12-09 RX ADMIN — BACLOFEN 10 MG: 10 TABLET ORAL at 09:23

## 2024-12-09 RX ADMIN — POTASSIUM CHLORIDE AND SODIUM CHLORIDE: 900; 150 INJECTION, SOLUTION INTRAVENOUS at 22:22

## 2024-12-09 RX ADMIN — PROPOFOL 175 MCG/KG/MIN: 10 INJECTION, EMULSION INTRAVENOUS at 11:38

## 2024-12-09 RX ADMIN — PROCHLORPERAZINE EDISYLATE 5 MG: 5 INJECTION INTRAMUSCULAR; INTRAVENOUS at 09:22

## 2024-12-09 RX ADMIN — NICOTINE 1 PATCH: 21 PATCH, EXTENDED RELEASE TRANSDERMAL at 09:31

## 2024-12-09 RX ADMIN — POTASSIUM CHLORIDE AND SODIUM CHLORIDE: 900; 150 INJECTION, SOLUTION INTRAVENOUS at 07:11

## 2024-12-09 RX ADMIN — METOCLOPRAMIDE 5 MG: 5 INJECTION, SOLUTION INTRAMUSCULAR; INTRAVENOUS at 04:47

## 2024-12-09 RX ADMIN — SODIUM CHLORIDE, POTASSIUM CHLORIDE, SODIUM LACTATE AND CALCIUM CHLORIDE: 600; 310; 30; 20 INJECTION, SOLUTION INTRAVENOUS at 10:39

## 2024-12-09 RX ADMIN — PANTOPRAZOLE SODIUM 20 MG: 40 INJECTION, POWDER, FOR SOLUTION INTRAVENOUS at 19:10

## 2024-12-09 RX ADMIN — SODIUM CHLORIDE, POTASSIUM CHLORIDE, SODIUM LACTATE AND CALCIUM CHLORIDE: 600; 310; 30; 20 INJECTION, SOLUTION INTRAVENOUS at 11:35

## 2024-12-09 RX ADMIN — PROPOFOL 30 MG: 10 INJECTION, EMULSION INTRAVENOUS at 11:44

## 2024-12-09 RX ADMIN — BACLOFEN 10 MG: 10 TABLET ORAL at 16:55

## 2024-12-09 RX ADMIN — PANTOPRAZOLE SODIUM 20 MG: 40 INJECTION, POWDER, FOR SOLUTION INTRAVENOUS at 09:22

## 2024-12-09 RX ADMIN — ONDANSETRON 4 MG: 2 INJECTION INTRAMUSCULAR; INTRAVENOUS at 11:38

## 2024-12-09 ASSESSMENT — COPD QUESTIONNAIRES
COPD: 1
CAT_SEVERITY: MILD

## 2024-12-09 ASSESSMENT — ACTIVITIES OF DAILY LIVING (ADL)
ADLS_ACUITY_SCORE: 57
ADLS_ACUITY_SCORE: 38
ADLS_ACUITY_SCORE: 57
ADLS_ACUITY_SCORE: 57
ADLS_ACUITY_SCORE: 41
ADLS_ACUITY_SCORE: 41
ADLS_ACUITY_SCORE: 57
ADLS_ACUITY_SCORE: 39
ADLS_ACUITY_SCORE: 57
ADLS_ACUITY_SCORE: 38
ADLS_ACUITY_SCORE: 38
ADLS_ACUITY_SCORE: 57
ADLS_ACUITY_SCORE: 41
ADLS_ACUITY_SCORE: 38
ADLS_ACUITY_SCORE: 57
ADLS_ACUITY_SCORE: 39
ADLS_ACUITY_SCORE: 57
ADLS_ACUITY_SCORE: 41
ADLS_ACUITY_SCORE: 38
ADLS_ACUITY_SCORE: 41
ADLS_ACUITY_SCORE: 38

## 2024-12-09 ASSESSMENT — ENCOUNTER SYMPTOMS: SEIZURES: 0

## 2024-12-09 NOTE — PROGRESS NOTES
[facility]  OUTPATIENT OCCUPATIONAL THERAPY  EVALUATION  PLAN OF TREATMENT FOR OUTPATIENT REHABILITATION  (COMPLETE FOR INITIAL CLAIMS ONLY)  Patient's Last Name, First Name, M.I.  YOB: 1955  Mariah Koehler                          Provider's Name  [facility] Medical Record No.  [id number]                             Onset Date:  12/07/24   Start of Care Date:  12/09/24   Type:     ___PT   _X_OT   ___SLP Medical Diagnosis:  abdominal pain                    OT Diagnosis:  abdominal pain Visits from SOC:  1     See note for plan of treatment, functional goals and certification details    I CERTIFY THE NEED FOR THESE SERVICES FURNISHED UNDER        THIS PLAN OF TREATMENT AND WHILE UNDER MY CARE     (Physician co-signature of this document indicates review and certification of the therapy plan).                                  12/09/24 0801   Appointment Info   Signing Clinician's Name / Credentials (OT) Kalani Pelletier OT   Quick Adds   Quick Adds Certification   Living Environment   People in Home spouse   Current Living Arrangements house   Home Accessibility stairs to enter home   Number of Stairs, Main Entrance 1   Stair Railings, Main Entrance none   Transportation Anticipated family or friend will provide   Living Environment Comments pt was independent w/ADLs and mobility   Self-Care   Usual Activity Tolerance moderate   Current Activity Tolerance fair   Equipment Currently Used at Home walker, rolling;cane, straight   Fall history within last six months no   General Information   Onset of Illness/Injury or Date of Surgery 12/07/24   Referring Physician Dr Robert   Patient/Family Therapy Goal Statement (OT) go home   Additional Occupational Profile Info/Pertinent History of Current Problem Mariah Koehler is a 68 year old female with history of nephrolithiasis, hydronephrosis, COPD, hypertension, reflex sympathetic dystrophy and  chronic pain syndrome on continuous intrathecal pump admitted on 12/7/2024 with nausea, vomiting, diarrhea and diffuse weakness.   Existing Precautions/Restrictions no known precautions/restrictions   Left Upper Extremity (Weight-bearing Status) full weight-bearing (FWB)   Right Upper Extremity (Weight-bearing Status) full weight-bearing (FWB)   Left Lower Extremity (Weight-bearing Status) full weight-bearing (FWB)   Right Lower Extremity (Weight-bearing Status) full weight-bearing (FWB)   Cognitive Status Examination   Orientation Status orientation to person, place and time   Follows Commands WNL   Visual Perception   Visual Impairment/Limitations corrective lenses for reading   Sensory   Sensory Quick Adds sensation intact   Pain Assessment   Patient Currently in Pain Yes, see Vital Sign flowsheet  (abdominal area)   Posture   Posture forward head position   Range of Motion Comprehensive   General Range of Motion no range of motion deficits identified   Strength Comprehensive (MMT)   General Manual Muscle Testing (MMT) Assessment no strength deficits identified   Muscle Tone Assessment   Muscle Tone Quick Adds No deficits were identified   Coordination   Upper Extremity Coordination No deficits were identified   Bed Mobility   Bed Mobility supine-sit   Supine-Sit Gaines (Bed Mobility) modified independence   Transfers   Transfers bed-chair transfer   Transfer Skill: Bed to Chair/Chair to Bed   Bed-Chair Gaines (Transfers) contact guard;minimum assist (75% patient effort)   Assistive Device (Bed-Chair Transfers) rolling walker   Balance   Balance Assessment standing dynamic balance   Standing Balance: Dynamic minimal assist   Position/Device Used, Standing Balance supported;walker, front-wheeled   Activities of Daily Living   BADL Assessment/Intervention lower body dressing   Lower Body Dressing Assessment/Training   Gaines Level (Lower Body Dressing) minimum assist (75% patient effort)    Clinical Impression   Criteria for Skilled Therapeutic Interventions Met (OT) Yes, treatment indicated   OT Diagnosis abdominal pain   Influenced by the following impairments fatigue, decreased ADLs/balance   OT Problem List-Impairments impacting ADL activity tolerance impaired;balance   Assessment of Occupational Performance 3-5 Performance Deficits   Identified Performance Deficits fatigue, decreased ADLs/balance   Planned Therapy Interventions (OT) ADL retraining   Clinical Decision Making Complexity (OT) detailed assessment/moderate complexity   Risk & Benefits of therapy have been explained evaluation/treatment results reviewed;care plan/treatment goals reviewed;risks/benefits reviewed;participants voiced agreement with care plan   OT Total Evaluation Time   OT Eval, Moderate Complexity Minutes (20799) 10   Therapy Certification   Medical Diagnosis abdominal pain   Start of Care Date 12/09/24   Certification date from 12/09/24   Certification date to 12/15/24   OT Goals   Therapy Frequency (OT) 5 times/week   OT Predicted Duration/Target Date for Goal Attainment 12/15/24   OT Goals Hygiene/Grooming;Lower Body Dressing;Toilet Transfer/Toileting;Cognition   OT: Hygiene/Grooming modified independent   OT: Lower Body Dressing Modified independent   OT: Toilet Transfer/Toileting Modified independent   OT: Cognitive Patient/caregiver will verbalize understanding of cognitive assessment results/recommendations as needed for safe discharge planning   Interventions   Interventions Quick Adds Self-Care/Home Management   Self-Care/Home Management   Self-Care/Home Mgmt/ADL, Compensatory, Meal Prep Minutes (57274) 10   Symptoms Noted During/After Treatment (Meal Preparation/Planning Training) fatigue   Treatment Detail/Skilled Intervention transfers Min A w/cues for walker safety, G/H  Min A standing w/cues to initiate task/SBA sitting when fatigues, LB dress Min A due to decreased standing balance   OT Discharge Planning    OT Plan SLUMs, transfers/toileting, G/H, LB dress   OT Discharge Recommendation (DC Rec) Transitional Care Facility   OT Rationale for DC Rec pt has a decline in her ADLs /mobility requiring a TCU to increase skills   OT Brief overview of current status Min A w/ADLs and mobility   Total Session Time   Timed Code Treatment Minutes 10   Total Session Time (sum of timed and untimed services) 20

## 2024-12-09 NOTE — PROGRESS NOTES
PRIMARY DIAGNOSIS: Generalized abdominal pain   OUTPATIENT/OBSERVATION GOALS TO BE MET BEFORE DISCHARGE:  1. Pain Status: Improved but still requiring IV pain meds    2. Return to near baseline physical activity: No    3. Cleared for discharge by consultants (if involved): No    Discharge Planner Nurse   Safe discharge environment identified: No  Barriers to discharge: Yes       Entered by: Darby Stuart RN 12/09/2024 12:21 AM       Please review provider order for any additional goals.   Nurse to notify provider when observation goals have been met and patient is ready for discharge.

## 2024-12-09 NOTE — ANESTHESIA PREPROCEDURE EVALUATION
Anesthesia Pre-Procedure Evaluation    Patient: Mariah Koehler   MRN: [id number] : 1955        Procedure : Procedure(s):  ESOPHAGOGASTRODUODENOSCOPY          Past Medical History:   Diagnosis Date    Current mild episode of major depressive disorder, unspecified whether recurrent (H) 2020    Hypertension     Migraine headache 2023    Nausea and vomiting     Opiate dependence (H)     RSD (reflex sympathetic dystrophy)     Sacral fracture, closed (H)       Past Surgical History:   Procedure Laterality Date    APPENDECTOMY      COMBINED CYSTOSCOPY, RETROGRADES, URETEROSCOPY, LASER HOLMIUM LITHOTRIPSY URETER(S), INSERT STENT Right 11/3/2024    Procedure: CYSTOSCOPY, RIGHT RETROGRADE PYELOGRAM, RIGHt URETEROSCOPY AND BASKET RETRIEVAL OF STONE, RIGHT URETERAL STENT PLACEMENT;  Surgeon: Cristiano Otero MD;  Location: [facility] [place]    ESOPHAGOSCOPY, GASTROSCOPY, DUODENOSCOPY (EGD), COMBINED N/A 2023    Procedure: ESOPHAGOGASTRODUODENOSCOPY with biopsies;  Surgeon: Na Brooks MD;  Location: [facility] OR    GYN SURGERY      HC REVISE MEDIAN N/CARPAL TUNNEL SURG      Description: Neuroplasty Decompression Median Nerve At Carpal Tunnel;  Recorded: 2011;    HYSTERECTOMY  1984    IR CERVICAL EPIDURAL STEROID INJECTION  2005    LAPAROSCOPIC NISSEN FUNDOPLICATION N/A 2024    Procedure: Paraesophageal hiatal hernia repair with mesh, ROBOT-ASSISTED, LAPAROSCOPIC, USING DA ROSMERY XI;  Surgeon: Lemuel Ornelas DO;  Location: [facility] [place] OR    OOPHORECTOMY Bilateral     WI SYMPATHECTOMY,CERVICOTHORACIC      Description: Surgical Sympathectomy Cervicothoracic;  Recorded: 2011;    Z DECOMPRESS FASCIOTOMY FINGR/HAND      Description: Decompression Fasciotomy Of The Hand;  Recorded: 2011;    ZZC LAP,CHOLECYSTECTOMY/EXPLORE      Description: Cholecystectomy Laparoscopic;  Recorded: 2011;    ZZC TOTAL ABDOM HYSTERECTOMY      Description:  Hysterectomy;  Recorded: 03/23/2011;      Allergies   Allergen Reactions    Adhesive Tape     Codeine Nausea and Vomiting    Hydromorphone Headache and Nausea and Vomiting    Morphine Nausea and Vomiting    Bacitracin Rash    Methadone Rash      Social History     Tobacco Use    Smoking status: Every Day     Current packs/day: 0.75     Average packs/day: 0.8 packs/day for 56.9 years (42.7 ttl pk-yrs)     Types: Cigarettes     Start date: 1/1/1968    Smokeless tobacco: Never   Substance Use Topics    Alcohol use: Not Currently     Comment: 1 glass a year      Wt Readings from Last 1 Encounters:   12/07/24 48.3 kg (106 lb 7.7 oz)        Anesthesia Evaluation            ROS/MED HX  ENT/Pulmonary:     (+)                         mild,  COPD,           (-) sleep apnea   Neurologic: Comment: Admitted with generalized weakness    CRPS w/ L sided weakness UE and LE    Cognitive deficits- A&Ox4, feels forgetful at home    (-) no seizures, no CVA and no TIA   Cardiovascular:     (+)  hypertension- -   -  - -                                   (-) CHF and stent   METS/Exercise Tolerance:  Comment: Prior to current illness (1 week ago) was able to climb one FOS and do light housework    Hematologic:       Musculoskeletal: Comment: Chronic pain with IT pump in place, currently on, pain well controlled       GI/Hepatic: Comment: N/V/diarrhea     (+) GERD,                   Renal/Genitourinary: Comment: Hydronephrosis and nephrolithiasis       Endo:       Psychiatric/Substance Use:       Infectious Disease:       Malignancy:       Other:      (+)  , H/O Chronic Pain,         Physical Exam    Airway        Mallampati: II   TM distance: > 3 FB   Neck ROM: full   Mouth opening: > 3 cm    Respiratory Devices and Support         Dental       (+) Edentulous      Cardiovascular   cardiovascular exam normal       Rhythm and rate: regular and normal     Pulmonary   pulmonary exam normal        breath sounds clear to auscultation  "          OUTSIDE LABS:  CBC:   Lab Results   Component Value Date    WBC 10.1 12/09/2024    WBC 10.0 12/07/2024    HGB 13.0 12/09/2024    HGB 14.8 12/07/2024    HCT 39.1 12/09/2024    HCT 45.1 12/07/2024     12/09/2024     12/07/2024     BMP:   Lab Results   Component Value Date     12/09/2024     12/07/2024    POTASSIUM 4.3 12/09/2024    POTASSIUM 3.6 12/07/2024    CHLORIDE 104 12/09/2024    CHLORIDE 100 12/07/2024    CO2 23 12/09/2024    CO2 28 12/07/2024    BUN 13.5 12/09/2024    BUN 14.7 12/07/2024    CR 0.53 12/09/2024    CR 0.63 12/07/2024    GLC 87 12/09/2024     (H) 12/07/2024     COAGS:   Lab Results   Component Value Date    PTT 29 11/25/2019    INR 1.09 10/21/2024     POC: No results found for: \"BGM\", \"HCG\", \"HCGS\"  HEPATIC:   Lab Results   Component Value Date    ALBUMIN 4.6 12/07/2024    PROTTOTAL 6.9 12/07/2024    ALT 21 12/07/2024    AST 19 12/07/2024    ALKPHOS 96 12/07/2024    BILITOTAL 0.3 12/07/2024     OTHER:   Lab Results   Component Value Date    LACT 1.0 12/08/2024    A1C 5.8 (H) 01/30/2024    ESTEFANY 8.7 (L) 12/09/2024    PHOS 4.1 03/06/2024    MAG 1.8 12/07/2024    LIPASE 13 12/07/2024    TSH 0.62 10/26/2023    T4 1.07 10/24/2023    CRP <0.1 02/18/2023    SED 4 09/30/2022       Anesthesia Plan    ASA Status:  3       Anesthesia Type: MAC.   Induction: Intravenous.   Maintenance: TIVA.        Consents    Anesthesia Plan(s) and associated risks, benefits, and realistic alternatives discussed. Questions answered and patient/representative(s) expressed understanding.     - Discussed: Risks, Benefits and Alternatives for BOTH SEDATION and the PROCEDURE were discussed     - Discussed with:  Patient            Postoperative Care            Comments:               Rudi Nichole MD    I have reviewed the pertinent notes and labs in the chart from the past 30 days and (re)examined the patient.  Any updates or changes from those notes are reflected in this note.     "         # Drug Induced Platelet Defect: home medication list includes an antiplatelet medication   # Hypertension: Noted on problem list               # Financial/Environmental Concerns: none

## 2024-12-09 NOTE — PROCEDURES
"Procedure Note - Intrathecal Pump Interrogation       Procedure:  Pump Check     Pre-Operative Diagnosis: Presence of intrathecal pump     Post-Operative Diagnosis: Same     Indication: Presence of intrathecal pump     Procedure Details: The intended procedure, risks, and alternatives were discussed with the patient and informed consent was obtained. \"Pause for the cause\" was utilized to identify correct patient and intended procedure. The pump was interrogated.      Pain team was asked to see the patient for acute abdomen pain in the setting of chronic pain with a [employer] intrathecal pain pump for which the patient follows with [facility] pain clinic. Patient was admitted for fatigue/weakness, nausea, vomiting, diarrhea and abdominal pain. Patient has a history of chronic pain, chronic pain syndrome. A pump check without reprogramming was completed and findings are listed as below.      Findings: The pump is filled with   Drug # 1: Fentanyl (Sublimaze) - Conc:2000 mcg/mL - Total Dose / 24 hours: 824.2 mcg       Drug # 2: Bupivacaine (Marcaine)  - Conc:17.7 mg/mL - Total Dose / 24 hours: 7.294 mg       Drug # 3: Hydromorphone (Dilaudid)  - Conc: 5.1 mg/mL - Total Dose / 24 hours: 2.1016 mg       Diluent: NS       Location of pump on patient: R posterior hip        Infusion Rate: 0.4121 mL/24 hrs   Pump Reservoir Volume: 40 mL  Current volume in Reservoir: 27.3   Alerts: None       Bolus doses: up to 15 bolus doses/24 hours with 1 hour lockout   Each bolus: fentanyl 80.1 mcg, 0.708 mg Bupivacaine, 0.2041 mg Dilaudid       Outside Clinic & Provider: [facility] in [place] [phone]   Last Refill Date: 11/25/2024   Next Refill Date: 12/30/24   [place] Alarm Volume: 2.0 mL   Pump : syncromed          Patient tolerated the procedure well.        ALEKS DavidsonP-C  Acute Care Pain Management Program  [facility] ([place], [place], [place])  Monday-Friday 8a-4p   Page via online paging system " or call [phone]       (4) no limitation

## 2024-12-09 NOTE — PROGRESS NOTES
[facility]    Medicine Progress Note - Hospitalist Service    Date of Admission:  12/7/2024    Assessment & Plan   Mariah Koehler is a 68 year old female with history of nephrolithiasis, hydronephrosis, COPD, hypertension, reflex sympathetic dystrophy and chronic pain syndrome on continuous intrathecal pump admitted on 12/7/2024 with persistent nausea, vomiting, diarrhea and diffuse weakness. Lab review: no leukocytosis, negative viral panel, LFT and lipase normal. CT of abdomen/pelvis negative for obstruction but noted mild ascending colonic wall thickening from possible colitis. UA negative for UTI but ketones notes. Stool studies pending but C. Diff toxin negative. On evaluation today patient in acute pain distress from no bolus from intrathecal pump. Pharmacy able to assist and patient now with ability for bolus. Patient also with persistent nausea with minimal improvement seen with zofran and compazine.      #Nausea, vomiting, non-bloody diarrhea  #Abdominal pain  #Colitis   -Suspect viral gastroenteritis however patient is at risk for C. Diff given hospitalization last month > c dif negative  -WBC, LFT's and lipase normal  -Covid/Flu/RSV negative  -UA negative for UTI  -Lactate normal.   -CT A/P shows possible colitis, no obstruction  -Stool studies negative   -Continue famotidine and PPI (PTA meds)   -Continue Scopolamine.   -Continue Zofran prn  -Continue non pharmacological interventions of nausea   -GI consulted. Reviewed endoscopy in 2023. No remarkable findings but indication was also nausea/vomiting/weight loss.  S/P EGD 12/9 which showed some gastritis but otherwise negative (official EGD report pending_  -  states on 12/9 that patient has these episodes every 6 weeks or so  - ADAT, SL IVFs when taking good PO     #Diffuse weakness   #Malnutrition  #Weight loss   - PT consult   - Nutrition consult   - Continue IVF      #Chronic pain  #H/O reflex sympathetic dystrophy    -continue home intrathecal pain pump and home baclofen  - Pain management input appreciated      #H/O COPD   -not on any chronic inhalers PTA    #Hypertension  - continue home atenolol     #H/O GERD:   - change home famotidine and PPI to IV form for now    #Cognitive deficits  -OT to do SLUMS    #Tobacco Dependence  -Nicotine replacement ordered  -Counseling provided           Diet: Clear Liquid Diet    DVT Prophylaxis: Pneumatic Compression Devices  Bellamy Catheter: Not present  Lines: None     Cardiac Monitoring: None  Code Status: Full Code      Clinically Significant Risk Factors Present on Admission                 # Drug Induced Platelet Defect: home medication list includes an antiplatelet medication   # Hypertension: Noted on problem list               # Financial/Environmental Concerns: none         Social Drivers of Health    Tobacco Use: High Risk (12/9/2024)    Patient History     Smoking Tobacco Use: Every Day     Smokeless Tobacco Use: Never          Disposition Plan     Medically Ready for Discharge: Anticipated Tomorrow             Duane Robert MD  Hospitalist Service  [facility]  Securely message with [employer] (more info)  Text page via [employer] Paging/Directory   ______________________________________________________________________    Interval History   Primary/sole complaint is of nausea, which she states is worse than prior.  No diarrhea today.  EGD went well without complications.    Physical Exam   Vital Signs: Temp: 98.1  F (36.7  C) Temp src: Oral BP: (!) 162/71 Pulse: 70   Resp: 18 SpO2: 94 % O2 Device: None (Room air)    Weight: 106 lbs 7.71 oz    Constitutional: awake, alert, cooperative, and painful distress  Eyes: pupils equal, round and reactive to light and sclera clear  ENT: atraumatic, oral pharynx with moist mucus membranes  Respiratory: No increased work of breathing, good air exchange, clear to auscultation bilaterally, no crackles or  wheezing  Cardiovascular: tachycardic with regular rhythm and normal S1 and S2  GI: soft, non-distended, and tenderness noted diffusely  Musculoskeletal: no lower extremity pitting edema present  full range of motion noted    Medical Decision Making       40 MINUTES SPENT BY ME on the date of service doing chart review, history, exam, documentation & further activities per the note.      Data     I have personally reviewed the following data over the past 24 hrs:    10.1  \   13.0   / 196     139 104 13.5 /  87   4.3 23 0.53 \

## 2024-12-09 NOTE — PROGRESS NOTES
[facility]  OUTPATIENT OCCUPATIONAL THERAPY  EVALUATION  PLAN OF TREATMENT FOR OUTPATIENT REHABILITATION  (COMPLETE FOR INITIAL CLAIMS ONLY)  Patient's Last Name, First Name, M.I.  YOB: 1955  Mariah Koehler                          Provider's Name  [facility] Medical Record No.  [id number]                             Onset Date:  12/07/24   Start of Care Date:  12/09/24   Type:     ___PT   _X_OT   ___SLP Medical Diagnosis:  abdominal pain                    OT Diagnosis:  abdominal pain Visits from SOC:  1     See note for plan of treatment, functional goals and certification details    I CERTIFY THE NEED FOR THESE SERVICES FURNISHED UNDER        THIS PLAN OF TREATMENT AND WHILE UNDER MY CARE     (Physician co-signature of this document indicates review and certification of the therapy plan).                                12/09/24 0801   Appointment Info   Signing Clinician's Name / Credentials (OT) Kalani Pelletier OT   Quick Adds   Quick Adds Certification   Living Environment   People in Home spouse   Current Living Arrangements house   Home Accessibility stairs to enter home   Number of Stairs, Main Entrance 1   Stair Railings, Main Entrance none   Transportation Anticipated family or friend will provide   Living Environment Comments pt was independent w/ADLs and mobility   Self-Care   Usual Activity Tolerance moderate   Current Activity Tolerance fair   Equipment Currently Used at Home walker, rolling;cane, straight   Fall history within last six months no   General Information   Onset of Illness/Injury or Date of Surgery 12/07/24   Referring Physician Dr Robert   Patient/Family Therapy Goal Statement (OT) go home   Additional Occupational Profile Info/Pertinent History of Current Problem Mariah Koehler is a 68 year old female with history of nephrolithiasis, hydronephrosis, COPD, hypertension, reflex sympathetic dystrophy and  chronic pain syndrome on continuous intrathecal pump admitted on 12/7/2024 with nausea, vomiting, diarrhea and diffuse weakness.   Existing Precautions/Restrictions no known precautions/restrictions   Left Upper Extremity (Weight-bearing Status) full weight-bearing (FWB)   Right Upper Extremity (Weight-bearing Status) full weight-bearing (FWB)   Left Lower Extremity (Weight-bearing Status) full weight-bearing (FWB)   Right Lower Extremity (Weight-bearing Status) full weight-bearing (FWB)   Cognitive Status Examination   Orientation Status orientation to person, place and time   Follows Commands WNL   Visual Perception   Visual Impairment/Limitations corrective lenses for reading   Sensory   Sensory Quick Adds sensation intact   Pain Assessment   Patient Currently in Pain Yes, see Vital Sign flowsheet  (abdominal area)   Posture   Posture forward head position   Range of Motion Comprehensive   General Range of Motion no range of motion deficits identified   Strength Comprehensive (MMT)   General Manual Muscle Testing (MMT) Assessment no strength deficits identified   Muscle Tone Assessment   Muscle Tone Quick Adds No deficits were identified   Coordination   Upper Extremity Coordination No deficits were identified   Bed Mobility   Bed Mobility supine-sit   Supine-Sit Ennice (Bed Mobility) modified independence   Transfers   Transfers bed-chair transfer   Transfer Skill: Bed to Chair/Chair to Bed   Bed-Chair Ennice (Transfers) contact guard;minimum assist (75% patient effort)   Assistive Device (Bed-Chair Transfers) rolling walker   Balance   Balance Assessment standing dynamic balance   Standing Balance: Dynamic minimal assist   Position/Device Used, Standing Balance supported;walker, front-wheeled   Activities of Daily Living   BADL Assessment/Intervention lower body dressing   Lower Body Dressing Assessment/Training   Ennice Level (Lower Body Dressing) minimum assist (75% patient effort)    Clinical Impression   Criteria for Skilled Therapeutic Interventions Met (OT) Yes, treatment indicated   OT Diagnosis abdominal pain   Influenced by the following impairments fatigue, decreased ADLs/balance   OT Problem List-Impairments impacting ADL activity tolerance impaired;balance   Assessment of Occupational Performance 3-5 Performance Deficits   Identified Performance Deficits fatigue, decreased ADLs/balance   Planned Therapy Interventions (OT) ADL retraining   Clinical Decision Making Complexity (OT) detailed assessment/moderate complexity   Risk & Benefits of therapy have been explained evaluation/treatment results reviewed;care plan/treatment goals reviewed;risks/benefits reviewed;participants voiced agreement with care plan   OT Total Evaluation Time   OT Eval, Moderate Complexity Minutes (71903) 10   Therapy Certification   Medical Diagnosis abdominal pain   Start of Care Date 12/09/24   Certification date from 12/09/24   Certification date to 12/15/24   OT Goals   Therapy Frequency (OT) Daily   OT Predicted Duration/Target Date for Goal Attainment 12/15/24   OT Goals Hygiene/Grooming;Lower Body Dressing;Toilet Transfer/Toileting;Cognition   OT: Hygiene/Grooming modified independent   OT: Lower Body Dressing Modified independent   OT: Toilet Transfer/Toileting Modified independent   OT: Cognitive Patient/caregiver will verbalize understanding of cognitive assessment results/recommendations as needed for safe discharge planning   Interventions   Interventions Quick Adds Self-Care/Home Management   Self-Care/Home Management   Self-Care/Home Mgmt/ADL, Compensatory, Meal Prep Minutes (08166) 10   Symptoms Noted During/After Treatment (Meal Preparation/Planning Training) fatigue   Treatment Detail/Skilled Intervention transfers Min A w/cues for walker safety, G/H  Min A standing w/cues to initiate task/SBA sitting when fatigues, LB dress Min A due to decreased standing balance   OT Discharge Planning   OT  Plan SLUMs, transfers/toileting, G/H, LB dress   OT Discharge Recommendation (DC Rec) Transitional Care Facility   OT Rationale for DC Rec pt has a decline in her ADLs /mobility requiring a TCU to increase skills   OT Brief overview of current status Min A w/ADLs and mobility   Total Session Time   Timed Code Treatment Minutes 10   Total Session Time (sum of timed and untimed services) 20

## 2024-12-09 NOTE — PROGRESS NOTES
Pre-procedure Note    Reason for procedure: Nausea, emesis    History and Physical Reviewed: Reviewed, no changes.    Pre-sedation assessment:    General: alert, appears stated age, and cooperative  Airway: normal  Heart: regular rate and rhythm  Lungs: clear to auscultation bilaterally    Sedation Plan based on assessment: MAC    Mallampati score: Class II (visualization of the soft palate, fauces, and uvula)      ASA Classification: ASA 3 - Patient with moderate systemic disease with functional limitations    Impression: Patient deemed adequate candidate for MAC sedation    Risks, benefits and alternatives were discussed with the patient and informed consent was obtained.    Plan: esophagogastroduodenoscopy      Kuldeep Cornejo MD 12/9/2024 11:21 AM                                               Kuldeep Cornejo MD  Thank you for the opportunity to participate in the care of this patient.   Please feel free to call me with any questions or concerns.  Phone number [phone].

## 2024-12-09 NOTE — ANESTHESIA CARE TRANSFER NOTE
Patient: Mariah Koehler    Procedure: Procedure(s):  ESOPHAGOGASTRODUODENOSCOPY, GASTRIC BIOPSIES       Diagnosis: Nausea vomiting and diarrhea [R11.2, R19.7]  Diagnosis Additional Information: No value filed.    Anesthesia Type:   MAC     Note:    Oropharynx: oropharynx clear of all foreign objects  Level of Consciousness: awake  Oxygen Supplementation: room air    Independent Airway: airway patency satisfactory and stable  Dentition: dentition unchanged  Vital Signs Stable: post-procedure vital signs reviewed and stable  Report to RN Given: handoff report given  Patient transferred to: Medical/Surgical Unit    Handoff Report: Identifed the Patient, Identified the Reponsible Provider, Reviewed the pertinent medical history, Discussed the surgical course, Reviewed Intra-OP anesthesia mangement and issues during anesthesia, Set expectations for post-procedure period and Allowed opportunity for questions and acknowledgement of understanding      Vitals:  Vitals Value Taken Time   BP     Temp     Pulse     Resp     SpO2         Electronically Signed By: TIM Hill CRNA  December 9, 2024  12:02 PM

## 2024-12-09 NOTE — PLAN OF CARE
Problem: Adult Inpatient Plan of Care  Goal: Absence of Hospital-Acquired Illness or Injury  Intervention: Identify and Manage Fall Risk  Recent Flowsheet Documentation  Taken 12/9/2024 0000 by Darby Stuart RN  Safety Promotion/Fall Prevention:   activity supervised   clutter free environment maintained   lighting adjusted  Intervention: Prevent Skin Injury  Recent Flowsheet Documentation  Taken 12/9/2024 0000 by Darby Stuart RN  Body Position: position changed independently  Intervention: Prevent and Manage VTE (Venous Thromboembolism) Risk  Recent Flowsheet Documentation  Taken 12/9/2024 0000 by Darby Stuart RN  VTE Prevention/Management:   SCDs off (sequential compression devices)   patient refused intervention  Intervention: Prevent Infection  Recent Flowsheet Documentation  Taken 12/9/2024 0000 by Darby Stuart RN  Infection Prevention: rest/sleep promoted     Problem: Nausea and Vomiting  Goal: Nausea and Vomiting Relief  Intervention: Prevent and Manage Nausea and Vomiting  Recent Flowsheet Documentation  Taken 12/9/2024 0044 by Darby Stuart RN  Nausea/Vomiting Interventions:   antiemetic   cool cloth applied  Taken 12/9/2024 0000 by Darby Stuart RN  Nausea/Vomiting Interventions: cool cloth applied  Taken 12/8/2024 2228 by Darby Stuart RN  Nausea/Vomiting Interventions:   antiemetic   cool cloth applied   Goal Outcome Evaluation:      Plan of Care Reviewed With: patient    Overall Patient Progress: improvingOverall Patient Progress: improving    Admitted: Nausea vomiting and diarrhea & Generalized abdominal pain   Vitals: VSS with Elevated BP, afebrile  Neuro: A/O x 4    Cardiac: Increase BP addressed with Hydralazine IV.  Respiratory:  O2 saturation > 93% on RA.  GI/:  Nausea slight relief with PRN Reglan 2x & Zofran 1x. Adequate UO via Bedside commode , LBM 12/9   Diet/appetite: NPO Midnight for EGD at 1130AM  Activity: Ax1 with GB at  bedside commode  Pain:  Addressed with PRN medication Morphine 1x.   Skin: No adjustment needed  LDA's:  L PIV Fluids running 20Meq KCL+NS at 100ml/hr  Nicotine patch at R arm, Scopalamine patch behind right ear.  Plan:  EDG today, PT/OT/PAIN/ Nutritional consult    Darby Stuart RN

## 2024-12-09 NOTE — PLAN OF CARE
Problem: Nausea and Vomiting  Goal: Nausea and Vomiting Relief  Outcome: Progressing  Intervention: Prevent and Manage Nausea and Vomiting  Recent Flowsheet Documentation  Taken 12/9/2024 0912 by Braeden Neely RN  Nausea/Vomiting Interventions: cool cloth applied  Environmental Support: calm environment promoted    Goal Outcome Evaluation:      Plan of Care Reviewed With: patient      A/ox4. RA. GI/pain/nutrition/ot/pt following. Denies pain & c/o nausea. 1x prn iv compazine given. R AC running NS + KCL 20mEq 100ml/hr. Nicotine patch on L deltoid. Went down for EGD this shift. Up ax1 w/gb & walker to bathroom. Continue poc.

## 2024-12-09 NOTE — ANESTHESIA POSTPROCEDURE EVALUATION
Patient: Mariah Koehler    Procedure: Procedure(s):  ESOPHAGOGASTRODUODENOSCOPY, GASTRIC BIOPSIES       Anesthesia Type:  MAC    Note:  Disposition: Inpatient   Postop Pain Control:             Sign Out: Well controlled pain   PONV: No   Neuro/Psych:             Sign Out: Acceptable/Baseline neuro status   Airway/Respiratory:             Sign Out: Acceptable/Baseline resp. status   CV/Hemodynamics:             Sign Out: Acceptable CV status   Other NRE:    DID A NON-ROUTINE EVENT OCCUR? No           Last vitals:  Vitals:    12/09/24 0320 12/09/24 0751 12/09/24 1212   BP: (!) 159/74 (!) 151/69 (!) 162/71   Pulse: 66 64 70   Resp: 17 16 18   Temp: 37.2  C (99  F) 36.7  C (98.1  F) 36.7  C (98.1  F)   SpO2: 94% 96% 94%       Electronically Signed By: Rudi Nichole MD  December 9, 2024  12:49 PM

## 2024-12-09 NOTE — CONSULTS
[facility] ACUTE PAIN SERVICE CONSULTATION   [facility], [facility], [facility], [facility], [place]     Date of Admission:  12/7/2024  Date of Consult (When I saw the patient): 12/09/24     Assessment/Plan:     Mariah Koehler is a 68 year old female who was admitted on 12/7/2024.  Pain team was asked to see the patient for Please evaluate pain pump and assess optimization of pain medication. Admitted for fatigue/weakness, nausea, vomiting, diarrhea and abdominal pain. History of nephrolithiasis, hydronephrosis, COPD, HTN, reflux sympathetic dystrophy and chronic pain syndrome on continuous intrathecal pump managed by [facility] pain clinic.  Describes pain as 2-4/10 and aching in the lower abdomen.     PLAN:   1) Pain is consistent with chronic pain in the setting of an intrathecal pain pump, chronic opioid use. Acute abdomen pain at time of admission minimal per patient during visit. Ongoing nausea continues.  Patient states that she has been having diffuse weakness and fatigue that is associated with new development of nausea, nonbloody/nonbilious vomiting and diarrhea for a day before presentation. She does not recall eating or consuming anything raw or bad. She has been able to keep any food down. No recent new medications. It appears that per records, she has had history of nausea, vomiting and weight loss and had an upper endoscopy in February 2023 which showed normal esophagus, 7 cm hiatal hernia, antral erythema and normal duodenum. Pathology of the stomach showed mild nonspecific chronic gastritis. Negative for H. pylori and intestinal metaplasia. GI consult. EGD planned for today per GI. Differential diagnosis is broad at this time and it may include gastritis, H. pylori infection, peptic ulcer disease, viral or bacterial gastroenteritis, food poisoning, functional abdominal symptoms. Given reassuring labs and imaging, severe inflammation or malignancy is less likely.   Multimodal Medication  Therapy  Topical: none  NSAID'S: Estimated Creatinine Clearance: 77.5 mL/min (based on SCr of 0.53 mg/dL).   Steroids: none   Muscle Relaxants: baclofen 10 mg tid   Adjuvants: APAP prn, pepcid and protonix scheduled   Antidepressants/anxiolytics: none   Opioids: none  IV Pain medication: morphine 2-4 mg q4h prn   Non-medication interventions: Ice, Heat, Rest, and Distraction (TV, Music, Reading)  Constipation Prophylaxis: Senna-docusate    Intrathecal pain pump  Drug # 1: Fentanyl (Sublimaze) - Conc:2000 mcg/mL - Total Dose / 24 hours: 824.2 mcg      Drug # 2: Bupivacaine (Marcaine)  - Conc:17.7 mg/mL - Total Dose / 24 hours: 7.294 mg      Drug # 3: Hydromorphone (Dilaudid)  - Conc: 5.1 mg/mL - Total Dose / 24 hours: 2.1016 mg      Diluent: NS      Location of pump on patient: R posterior hip       Infusion Rate: 0.4121 mL/24 hrs   Pump Reservoir Volume: 40 mL  Current volume in Reservoir: 27.3   Alerts: None      Bolus doses: up to 15 bolus doses/24 hours with 1 hour lockout   Each bolus: fentanyl 80.1 mcg, 0.708 mg Bupivacaine, 0.2041 mg Dilaudid      Outside Clinic & Provider: [facility] in [place] [phone]   Last Refill Date: 11/25/2024   Next Refill Date: 12/30/24   [place] Alarm Volume: 2.0 mL   Pump : syncromed         -Opioid prescriber has been [facility] P[facility] pulled from system on 12/9/24. This indicates two scripts for Oxycodone   11/5/24 Oxycodone 5 mg #14 for 7 days   2/26/24 Oxycodone 5 mg #12 for 3 days   Discharge Recommendations - We recommend prescribing the following at the time of discharge: likely none      History of Present Illness (HPI):       Mariah Koehler is a 68 year old female who presented for fatigue, weakness, nausea, vomiting, abdomen pain, diarrhea.  Past medical history as above. The pain is reported to be acute abdomen pain, chronic LUE pain. Current pain is rated at 2-4/10 and goal is 0-2/10.  The patient denies constipation, chest pain, shortness of  breath, dizziness, fever, and chills. The patient reports nausea, vomiting, and diarrhea.     Per [place]  review, the patient does have an opioid tolerance. Opioid induced side effects noted and include: none    Reviewed medical record, labs, imaging, ED note, and care everywhere.     Medical History   PAST MEDICAL HISTORY:   Past Medical History:   Diagnosis Date    Current mild episode of major depressive disorder, unspecified whether recurrent (H) 02/17/2020    Hypertension     Migraine headache 11/06/2023    Nausea and vomiting     Opiate dependence (H)     RSD (reflex sympathetic dystrophy)     Sacral fracture, closed (H)        PAST SURGICAL HISTORY:   Past Surgical History:   Procedure Laterality Date    APPENDECTOMY      COMBINED CYSTOSCOPY, RETROGRADES, URETEROSCOPY, LASER HOLMIUM LITHOTRIPSY URETER(S), INSERT STENT Right 11/3/2024    Procedure: CYSTOSCOPY, RIGHT RETROGRADE PYELOGRAM, RIGHt URETEROSCOPY AND BASKET RETRIEVAL OF STONE, RIGHT URETERAL STENT PLACEMENT;  Surgeon: Cristiano Otero MD;  Location: [facility]    ESOPHAGOSCOPY, GASTROSCOPY, DUODENOSCOPY (EGD), COMBINED N/A 2/20/2023    Procedure: ESOPHAGOGASTRODUODENOSCOPY with biopsies;  Surgeon: Na Brooks MD;  Location: [facility] OR    GYN SURGERY      HC REVISE MEDIAN N/CARPAL TUNNEL SURG      Description: Neuroplasty Decompression Median Nerve At Carpal Tunnel;  Recorded: 09/19/2011;    HYSTERECTOMY  1984    IR CERVICAL EPIDURAL STEROID INJECTION  1/14/2005    LAPAROSCOPIC NISSEN FUNDOPLICATION N/A 2/23/2024    Procedure: Paraesophageal hiatal hernia repair with mesh, ROBOT-ASSISTED, LAPAROSCOPIC, USING DA ROSMERY XI;  Surgeon: Lemuel Ornelas DO;  Location: [facility] OR    OOPHORECTOMY Bilateral 1985    RI SYMPATHECTOMY,CERVICOTHORACIC      Description: Surgical Sympathectomy Cervicothoracic;  Recorded: 09/19/2011;    ZZC DECOMPRESS FASCIOTOMY FINGR/HAND      Description: Decompression Fasciotomy Of The Hand;   Recorded: 09/19/2011;    [facility] LAP,CHOLECYSTECTOMY/EXPLORE      Description: Cholecystectomy Laparoscopic;  Recorded: 12/09/2011;    ZZC TOTAL ABDOM HYSTERECTOMY      Description: Hysterectomy;  Recorded: 03/23/2011;       FAMILY HISTORY:   Family History   Problem Relation Age of Onset    Cerebrovascular Disease Mother     Diabetes Mother     Heart Disease Mother     Hypertension Mother     Rheumatologic Disease Mother     Heart Disease Father     Pulmonary Hypertension Father     Kidney failure Father     Cancer Father         melanoma    Diabetes Sister     Hypertension Sister     Hypertension Brother        SOCIAL HISTORY:   Social History     Tobacco Use    Smoking status: Every Day     Current packs/day: 0.75     Average packs/day: 0.8 packs/day for 56.9 years (42.7 ttl pk-yrs)     Types: Cigarettes     Start date: 1/1/1968    Smokeless tobacco: Never   Substance Use Topics    Alcohol use: Not Currently     Comment: 1 glass a year        HEALTH & LIFESTYLE PRACTICES  Tobacco:  reports that she has been smoking cigarettes. She started smoking about 56 years ago. She has a 42.7 pack-year smoking history. She has never used smokeless tobacco.  Alcohol:  reports that she does not currently use alcohol.  Illicit drugs:  reports no history of drug use.    Allergies  Allergies   Allergen Reactions    Adhesive Tape     Codeine Nausea and Vomiting    Hydromorphone Headache and Nausea and Vomiting    Morphine Nausea and Vomiting    Bacitracin Rash    Methadone Rash       Problem List  Patient Active Problem List    Diagnosis Date Noted    Generalized abdominal pain 12/07/2024     Priority: Medium    Nausea vomiting and diarrhea 12/07/2024     Priority: Medium    Hydronephrosis with urinary obstruction due to ureteral calculus 11/02/2024     Priority: Medium    Kidney stone 11/02/2024     Priority: Medium    Gastroenteritis 10/21/2024     Priority: Medium    Diarrhea, unspecified type 10/21/2024     Priority: Medium     Vomiting 10/21/2024     Priority: Medium    Gastroparesis 02/22/2024     Priority: Medium    Hiatal hernia 02/22/2024     Priority: Medium    Nausea and vomiting, unspecified vomiting type 02/22/2024     Priority: Medium    Moderate protein-calorie malnutrition (H) 02/02/2024     Priority: Medium    Vitamin B12 deficiency (non anemic) 11/06/2023     Priority: Medium    Migraine headache 11/06/2023     Priority: Medium    History of cholecystectomy 11/06/2023     Priority: Medium    Abnormal CT of the abdomen 11/06/2023     Priority: Medium    CRPS (complex regional pain syndrome), lower limb 11/06/2023     Priority: Medium    Colitis 04/04/2023     Priority: Medium    Intractable nausea and vomiting 04/04/2023     Priority: Medium    Presence of intrathecal baclofen pump 04/04/2023     Priority: Medium    Ketonuria 04/04/2023     Priority: Medium    Viral gastroenteritis 02/21/2023     Priority: Medium    Unintentional weight loss 02/21/2023     Priority: Medium    Current mild episode of major depressive disorder, unspecified whether recurrent (H) 02/17/2020     Priority: Medium    Fatigue 03/22/2018     Priority: Medium    Nausea and vomiting      Priority: Medium     Created by Conversion        Reflex Sympathetic Dystrophy Of The Left Upper Limb      Priority: Medium     Created by Conversion  Replacement Utility updated for latest IMO load        Osteoporosis      Priority: Medium     Created by Conversion  Replacement Utility updated for latest IMO load        Hypertension      Priority: Medium     Created by Conversion  Replacement Utility updated for latest IMO load        Constipation      Priority: Medium     Created by Conversion  Replacement Utility updated for latest IMO load        Abdominal Pain      Priority: Medium     Created by Conversion  Replacement Utility updated for latest IMO load        Opioid Dependence With Continuous Use      Priority: Medium     Created by Conversion        Edema       Priority: Medium     Created by Conversion        Wheezing (Symptom)      Priority: Medium     Created by Conversion        Pain During Urination (Dysuria)      Priority: Medium     Created by Conversion        Frequent, Full-bladder Emptying (Polyuria)      Priority: Medium     Created by Conversion        Chronic obstructive pulmonary disease, unspecified COPD type (H)      Priority: Medium     Created by Conversion        Chronic pain syndrome      Priority: Medium     Created by Conversion        Vitamin B12 Deficiency      Priority: Medium     Created by Conversion        Microscopic Hematuria      Priority: Medium     Created by Conversion        Cough      Priority: Medium     Created by Conversion        Gastroesophageal reflux disease 02/07/2013     Priority: Medium    Altered mental status 02/05/2011     Priority: Medium       Prior to Admission Medications   Medications Prior to Admission   Medication Sig Dispense Refill Last Dose/Taking    acetaminophen (TYLENOL) 325 MG tablet Take 1-2 tablets (325-650 mg) by mouth every 6 hours as needed for mild pain or headaches   Past Week    aspirin 81 MG EC tablet Take 1 tablet (81 mg) by mouth daily 90 tablet 3 12/6/2024 Morning    atenolol (TENORMIN) 25 MG tablet Take 0.5 tablets (12.5 mg) by mouth daily. 90 tablet 3 12/6/2024 Morning    baclofen (LIORESAL) 10 MG tablet TAKE 1 TABLET BY MOUTH 3 TIMES DAILY 90 tablet 1 12/6/2024 Morning    famotidine (PEPCID) 20 MG tablet Take 1 tablet (20 mg) by mouth 2 times daily as needed (acid reflux) 20 tablet 0 Past Week    medication given by implanted intrathecal pump continuously. Drug # 1: Fentanyl (Sublimaze) - Conc: 2000 mcg/mL - Total Dose / 24 hours: 824.2 mcg    Drug # 2: Bupivacaine (Marcaine)  - Conc:17.7 mg/mL - Total Dose / 24 hours: 7.294 mg    Drug # 3: Hydromorphone (Dilaudid)  - Conc: 5.1 mg/mL - Total Dose / 24 hours: 2.1016 mg    Diluent: NS    Infusion Rate: 0.4121 mL/24 hrs  Pump Reservoir Volume: 40  "mL    Bolus doses: up to 15 bolus doses/24 hours with 1 hour lockout  Each bolus: fentanyl 80.1 mcg, 0.708 mg Bupivacaine, 0.2041 mg Dilaudid    Outside Clinic & Provider: [facility] in [place] [phone]  Last Refill Date: 11/25/2024  Next Refill Date: 12/30/24  [place] Alarm Volume: 2.0 mL  Pump : syncromed   Taking    multivitamin, therapeutic (THERA-VIT) TABS tablet Take 1 tablet by mouth daily 90 tablet 3 12/6/2024 Morning    omeprazole (PRILOSEC) 20 MG DR capsule Take 20 mg by mouth 2 times daily.   12/6/2024 Morning    ondansetron (ZOFRAN) 4 MG tablet Take 1 tablet (4 mg) by mouth every 6 hours as needed for nausea. 20 tablet 0 12/6/2024 Morning    prochlorperazine (COMPAZINE) 5 MG tablet Take 1 tablet (5 mg) by mouth every 8 hours as needed for nausea or vomiting 10 tablet 0 12/6/2024 Morning       Review of Systems  Complete ROS reviewed, unless noted in HPI, all other systems reviewed (with patient) and all others found to be negative.      Objective:     Physical Exam:  BP (!) 162/71 (BP Location: Left arm)   Pulse 70   Temp 98.1  F (36.7  C) (Oral)   Resp 18   Ht 1.6 m (5' 3\")   Wt 48.3 kg (106 lb 7.7 oz)   SpO2 94%   BMI 18.86 kg/m    Weight:   Vitals:    12/07/24 0644 12/07/24 1200   Weight: 49.9 kg (110 lb) 48.3 kg (106 lb 7.7 oz)      Body mass index is 18.86 kg/m .    General Appearance:  Alert, cooperative, no distress, sitting up in a chair   Head:  Normocephalic, without obvious abnormality, atraumatic   Eyes:  PERRL, conjunctiva/corneas clear, EOM's intact   ENT/Throat: Lips, mucosa, and tongue normal; teeth and gums normal   Lymph/Neck: Supple, symmetrical, trachea midline   Lungs:   Clear to auscultation bilaterally, respirations unlabored, room air    Chest Wall:  No tenderness or deformity   Cardiovascular/Heart:  Regular rate and rhythm, S1, S2 normal   Abdomen:   Soft, non-tender, bowel sounds active all four quadrants   Musculoskeletal: Extremities normal, " atraumatic   Skin: Skin warm, dry    Neurologic: Alert and oriented X 3, Moves all 4 extremities     Psych: Affect is appropriate      Imaging: Reviewed I have personally reviewed pertinent notes, labs, tests, and radiologic imaging in patient's chart.  Labs: Reviewed I have personally reviewed pertinent notes, labs, tests, and radiologic imaging in patient's chart.  Notes: Reviewed I have personally reviewed pertinent notes, labs, tests, and radiologic imaging in patient's chart.    Total time spent 65 minutes with greater than 50% in consultation, education and coordination of care.   Also discussed with RN.   Treatment plan includes: multimodal pain approach, Hospital Medicine Service for medical management, GI.   Patient educated regarding: multimodal pain approach and medications as listed above.   Elements of Medical Decision Making as described above. Acute or chronic illness or injury or surgery. High risk therapy including opioids, high risk drug therapy including oral and/or parenteral controlled substances.    Patient is understanding of the plan. All questions and concerns addressed to patient's satisfaction.     Thank you for this consultation.    ANA PAULA Davidson-SHIVA  Acute Care Inpatient Pain Management Program  [facility] ([facility])  Hours of coverage Monday-Friday [id number]. After hours please contact Primary team   Page via Epic chat or Vocera Messaging

## 2024-12-09 NOTE — PLAN OF CARE
Problem: Adult Inpatient Plan of Care  Goal: Absence of Hospital-Acquired Illness or Injury  Intervention: Identify and Manage Fall Risk  Recent Flowsheet Documentation  Taken 12/8/2024 1630 by Julien Velazquez RN  Safety Promotion/Fall Prevention: safety round/check completed  Intervention: Prevent Skin Injury  Recent Flowsheet Documentation  Taken 12/8/2024 1630 by Julien Velazquez RN  Body Position: position changed independently  Intervention: Prevent and Manage VTE (Venous Thromboembolism) Risk  Recent Flowsheet Documentation  Taken 12/8/2024 1630 by Julien Velazquez RN  VTE Prevention/Management: SCDs off (sequential compression devices)  Intervention: Prevent Infection  Recent Flowsheet Documentation  Taken 12/8/2024 1630 by Julien Velazquez RN  Infection Prevention:   rest/sleep promoted   single patient room provided  Goal: Optimal Comfort and Wellbeing  Outcome: Progressing     Problem: Nausea and Vomiting  Goal: Nausea and Vomiting Relief  Outcome: Progressing  Intervention: Prevent and Manage Nausea and Vomiting  Recent Flowsheet Documentation  Taken 12/8/2024 1630 by Julien Velazquez RN  Nausea/Vomiting Interventions:   antiemetic   acupressure applied   cool cloth applied   stimuli minimized   Goal Outcome Evaluation:       Patient is A/O x4. BP elevated. Hydralazine given along with atenolol held this AM with MD order for one time dose. Nausea moderately controlled with PRN antiemetics. LLQ pain controlled with IV morphine.

## 2024-12-09 NOTE — PROGRESS NOTES
Care Management Follow Up    Length of Stay (days): 0    Expected Discharge Date: 12/11/2024     Concerns to be Addressed: discharge planning     Patient plan of care discussed at interdisciplinary rounds: Yes    Anticipated Discharge Disposition: Home, Other (Comments) (unknown at this time if she will need Home Care help.)     Anticipated Discharge Services: Other (see comment) (unknown at this time if she will need Home Care help.)  Anticipated Discharge DME: None    Patient/family educated on Medicare website which has current facility and service quality ratings:  yes  Education Provided on the Discharge Plan: Yes  Patient/Family in Agreement with the Plan:  yes    Referrals Placed by CM/SW:  yes  Private pay costs discussed: Not applicable    Discussed  Partnership in Safe Discharge Planning  document with patient/family: Yes: pt     Handoff Completed: No, handoff not indicated or clinically appropriate    Additional Information:  SW met with pt briefly to discuss therapy recommendations o TCU; pt considering but would like additional conversation to be had pending progression with therapy. Agreeable to referrals to be sent; pending.  10:51 AM    Next Steps: medical progression, TCU placement, PAS Brenna Kjellberg, BSW

## 2024-12-10 VITALS
WEIGHT: 106.48 LBS | BODY MASS INDEX: 18.87 KG/M2 | TEMPERATURE: 99 F | DIASTOLIC BLOOD PRESSURE: 73 MMHG | HEART RATE: 60 BPM | HEIGHT: 63 IN | OXYGEN SATURATION: 94 % | RESPIRATION RATE: 18 BRPM | SYSTOLIC BLOOD PRESSURE: 162 MMHG

## 2024-12-10 PROCEDURE — 250N000013 HC RX MED GY IP 250 OP 250 PS 637: Performed by: INTERNAL MEDICINE

## 2024-12-10 PROCEDURE — 99239 HOSP IP/OBS DSCHRG MGMT >30: CPT | Performed by: INTERNAL MEDICINE

## 2024-12-10 PROCEDURE — 250N000009 HC RX 250: Performed by: INTERNAL MEDICINE

## 2024-12-10 PROCEDURE — 250N000011 HC RX IP 250 OP 636: Performed by: INTERNAL MEDICINE

## 2024-12-10 PROCEDURE — 250N000013 HC RX MED GY IP 250 OP 250 PS 637: Performed by: HOSPITALIST

## 2024-12-10 RX ADMIN — ATENOLOL 12.5 MG: 25 TABLET ORAL at 08:09

## 2024-12-10 RX ADMIN — HYDRALAZINE HYDROCHLORIDE 10 MG: 20 INJECTION INTRAMUSCULAR; INTRAVENOUS at 01:04

## 2024-12-10 RX ADMIN — FAMOTIDINE 20 MG: 10 INJECTION, SOLUTION INTRAVENOUS at 00:49

## 2024-12-10 RX ADMIN — Medication 3 MG: at 00:49

## 2024-12-10 RX ADMIN — ACETAMINOPHEN 650 MG: 325 TABLET ORAL at 08:11

## 2024-12-10 RX ADMIN — NICOTINE 1 PATCH: 21 PATCH, EXTENDED RELEASE TRANSDERMAL at 08:09

## 2024-12-10 RX ADMIN — ACETAMINOPHEN 650 MG: 325 TABLET ORAL at 01:04

## 2024-12-10 RX ADMIN — BACLOFEN 10 MG: 10 TABLET ORAL at 08:09

## 2024-12-10 ASSESSMENT — ACTIVITIES OF DAILY LIVING (ADL)
ADLS_ACUITY_SCORE: 39

## 2024-12-10 NOTE — PROGRESS NOTES
Will not see today:  PAIN MANAGEMENT SERVICE CHART CHECK    This patient's chart has been reviewed by the Pain Service. It has been determined that no change is necessary to the pain management regimen at this time. Plans for discharge. Recommend to discontinue IV morphine as not using and patient plans to discharge. Pain team will sign off.     Thank you!      ANA PAULA Davidson-C  Acute Care Pain Management Program [facility]   Monday-Friday 8a-4p   Page via Epic or PhoneTell

## 2024-12-10 NOTE — PLAN OF CARE
Problem: Adult Inpatient Plan of Care  Goal: Optimal Comfort and Wellbeing  Outcome: Progressing     Problem: Nausea and Vomiting  Goal: Nausea and Vomiting Relief  Intervention: Prevent and Manage Nausea and Vomiting  Recent Flowsheet Documentation  Taken 12/9/2024 1653 by Erna Martinez RN  Nausea/Vomiting Interventions: antiemetic     Problem: Comorbidity Management  Goal: Blood Pressure in Desired Range  Outcome: Progressing   Goal Outcome Evaluation:       AxO4, VSS, RA. Pt rating pain 3/10, given scheduled baclofen. Pt tolerating some food and drink but still having some nausea, given reglan some with relief. Pt ambulating SBA with walker to bathroom, no Bms this shift. IVF NS + Kcl 20 mEq still infusing since oral intake is still not adequate.

## 2024-12-10 NOTE — PLAN OF CARE
Patient discharged to home at 930 via Private Car.  Accompanied by spouse and staff.  Discharge instructions were reviewed with patient, opportunity offered to ask questions.    Prescriptions printed and given to patient/family.  Access discontinued: Yes  Care plan and education discontinued: Yes  Home meds retrieved from pharmacy: No  Belongings were sent home with patient/family:  Cell phone/electronics: 1, Clothing: Shirt(s): 1, Pants: 1, and Underclothes: 1, Purse/Wallet: 1, and Shoes: 2 .

## 2024-12-10 NOTE — PLAN OF CARE
Problem: Adult Inpatient Plan of Care  Goal: Optimal Comfort and Wellbeing  Intervention: Monitor Pain and Promote Comfort  Recent Flowsheet Documentation  Taken 12/10/2024 0104 by Sherly Correa, RN  Pain Management Interventions:   medication (see MAR)   emotional support   environmental changes   guided imagery     Problem: Nausea and Vomiting  Goal: Nausea and Vomiting Relief  Outcome: Progressing  Intervention: Prevent and Manage Nausea and Vomiting  Recent Flowsheet Documentation  Taken 12/10/2024 0045 by Sherly Correa, RN  Nausea/Vomiting Interventions: antiemetic   Goal Outcome Evaluation:    Patient alert, oriented x 4. Denied chest pain. Complained of abdominal pain rating 4/10, aching and constant in nature. Administered Tylenol prn, reported relief and was noted sleeping. Ambulated to the bathroom overnight with a walker x 1 assist. /76, administered Hydralazine 10 mg and decreased to 141/67. Will continue to monitor the patient.

## 2024-12-10 NOTE — PLAN OF CARE
Occupational Therapy Discharge Summary    Reason for therapy discharge:    Discharged to home with home therapy.    Progress towards therapy goal(s). See goals on Care Plan in [facility] electronic health record for goal details.  Goals not met.  Barriers to achieving goals:   discharge from facility.    Therapy recommendation(s):    Continued therapy is recommended.  Rationale/Recommendations:  recommended TCU but declined. Home OT to maximize ADL IND.

## 2024-12-10 NOTE — PLAN OF CARE
Physical Therapy Discharge Summary    Reason for therapy discharge:    Discharged to home with home therapy.    Progress towards therapy goal(s). See goals on Care Plan in [facility] electronic health record for goal details.  Goals not met.  Barriers to achieving goals:   Pt was working on the goals. .    Therapy recommendation(s):    Continued therapy is recommended.  Rationale/Recommendations:  To improve mobility and strength. .

## 2024-12-10 NOTE — DISCHARGE SUMMARY
"[facility]  Hospitalist Discharge Summary      Date of Admission:  12/7/2024  Date of Discharge:  12/10/2024  Discharging Provider: Duane Robert MD  Discharge Service: Hospitalist Service    Discharge Diagnoses   Recurrent / Chronic Nausea and Vomiting     Clinically Significant Risk Factors          Follow-ups Needed After Discharge   Follow-up Appointments       Hospital Follow-up with Existing Primary Care Provider (PCP)      Please see details below         Schedule Primary Care visit within: 7 Days               Unresulted Labs Ordered in the Past 30 Days of this Admission       Date and Time Order Name Status Description    12/9/2024 11:54 AM Surgical Pathology Exam In process         These results will be followed up by [facility]     Discharge Disposition   Discharged to home  Condition at discharge: [facility] Course   Mariah Koehler is a 68 year old female with history of nephrolithiasis, hydronephrosis, COPD, hypertension, reflex sympathetic dystrophy and chronic pain syndrome on continuous intrathecal pump admitted on 12/7/2024 with persistent nausea, vomiting, diarrhea and diffuse weakness. Lab review: no leukocytosis, negative viral panel, LFT and lipase normal. CT of abdomen/pelvis negative for obstruction but noted mild ascending colonic wall thickening from possible colitis. UA negative for UTI but ketones notes. Stool studies and C. Diff toxin negative.   Nausea, vomiting, non-bloody diarrhea, Abdominal pain, Colitis   -Suspect viral gastroenteritis at primary etiology for this admission, although noted that she by history reports a \"cyclic\" vomiting pattern every 4-6 weeks for several years  -WBC, LFT's and lipase normal  -Covid/Flu/RSV negative  -UA negative for UTI  -Lactate normal.   -CT A/P shows possible colitis, no obstruction  -Stool studies negative   -GI consulted. Reviewed endoscopy in 2023. No remarkable findings but indication was also nausea/vomiting/weight " loss.  S/P EGD 12/9 which showed some gastritis but otherwise negative).  She will be continued on her home H2 / PPI therapy  - GI affirmed that the likely etiology for her symptoms was viral in etiology   - At the time of discharge she was feeling moderately better and will be discharged to home on her normal regimen  - It is also noted that she had CT imaging of her head in 4/24 that was negative for any potential etiology     Diffuse weakness   - Was seen by therapy and STR was gently recommended, however as the patient was in observation status this would not be covered and she and her  requested she be discharged to home     Chronic pain with Pain Pump, H/O reflex sympathetic dystrophy   -continue home intrathecal pain pump and home baclofen  - Pain management input appreciated with no changes made during this stayy    H/O COPD   -not on any chronic inhalers PTA    Hypertension  - continue home atenolol     Tobacco Dependence  -Nicotine replacement ordered  -Counseling provided     Consultations This Hospital Stay   PHYSICAL THERAPY ADULT IP CONSULT  CARE MANAGEMENT / SOCIAL WORK IP CONSULT  PAIN MANAGEMENT ADULT IP CONSULT  NUTRITION SERVICES ADULT IP CONSULT  SMOKING CESSATION PROGRAM IP CONSULT  OCCUPATIONAL THERAPY ADULT IP CONSULT  GASTROENTEROLOGY IP CONSULT  CARE MANAGEMENT / SOCIAL WORK IP CONSULT    Code Status   Full Code    Time Spent on this Encounter   I, Duane Robert MD, personally saw the patient today and spent greater than 30 minutes discharging this patient.       Duane Robert MD  [facility] EXTENDED RECOVERY AND SHORT STAY  [address] [place] [id number]  Phone: [phone]  Fax: [phone]  ______________________________________________________________________    Physical Exam   Vital Signs: Temp: 98  F (36.7  C) Temp src: Oral BP: (!) 141/67 Pulse: 60   Resp: 18 SpO2: 94 % O2 Device: None (Room air)    Weight: 106 lbs 7.71  oz  ----------------------------------------------------------------------------------------       Primary Care Physician   Alex Nolasco    Discharge Orders      Primary Care - Care Coordination Referral      Reason for your hospital stay    Nausea and Vomiting     Activity    Your activity upon discharge: activity as tolerated     Diet    Follow this diet upon discharge: Current Diet:Orders Placed This Encounter      Regular Diet Adult     Hospital Follow-up with Existing Primary Care Provider (PCP)    Please see details below            Significant Results and Procedures   Most Recent 3 CBC's:  Recent Labs   Lab Test 12/09/24  0733 12/07/24  0804 11/03/24  0529   WBC 10.1 10.0 6.2   HGB 13.0 14.8 11.8   MCV 88 88 89    207 202     Most Recent 3 BMP's:  Recent Labs   Lab Test 12/09/24  1352 12/09/24  0733 12/07/24  0804 11/03/24  0529   NA  --  139 140 140   POTASSIUM  --  4.3 3.6 3.4   CHLORIDE  --  104 100 104   CO2  --  23 28 26   BUN  --  13.5 14.7 8.4   CR  --  0.53 0.63 0.51   ANIONGAP  --  12 12 10   ESTEFANY  --  8.7* 9.3 8.0*   GLC 74 87 139* 102*   ,   Results for orders placed or performed during the hospital encounter of 12/07/24   CT Abdomen Pelvis w Contrast    Narrative    EXAM: CT ABDOMEN PELVIS W CONTRAST  LOCATION: [facility]  DATE: 12/7/2024    INDICATION: Nausea, vomiting and diarrhea. Abdominal pain.  COMPARISON: 11/2/2024.  TECHNIQUE: CT scan of the abdomen and pelvis was performed following injection of IV contrast. Multiplanar reformats were obtained. Dose reduction techniques were used.  CONTRAST: ISOVUE 370 55ML    FINDINGS:   LOWER CHEST: Minimal atelectasis.    HEPATOBILIARY: Cholecystectomy with minimal pneumobilia, previously seen. Negative liver.    PANCREAS: Stable pancreatic ductal dilatation near 5-6 mm. Stable pancreatic head hypodensity measuring 9 mm (series 3, image 67).    SPLEEN: Normal.    ADRENAL GLANDS: Normal.    KIDNEYS/BLADDER: Prior 3  mm proximal right ureteral stone is no longer seen. Otherwise, unremarkable.    BOWEL: Decompressed colon, though suspicion for mild wall thickening of the ascending colon. Mild colonic diverticulosis without diverticulitis. No significant gastric or small bowel wall thickening.    LYMPH NODES: No adenopathy.    VASCULATURE: Nonaneurysmal aorta with mild-moderate atherosclerosis.    PELVIC ORGANS: No free fluid.    MUSCULOSKELETAL: Bony demineralization. Spinal stimulator.      Impression    IMPRESSION:     1.  Decompressed colon, though possible mild ascending colonic wall thickening which could be from acute colitis. Cannot exclude mild transverse or descending colonic wall thickening as well, completely decompressed.    2.  No other significant bowel findings. No obstruction.    3.  No free fluid or air. No abscess.         Discharge Medications   Current Discharge Medication List        CONTINUE these medications which have NOT CHANGED    Details   acetaminophen (TYLENOL) 325 MG tablet Take 1-2 tablets (325-650 mg) by mouth every 6 hours as needed for mild pain or headaches    Associated Diagnoses: Other headache syndrome      aspirin 81 MG EC tablet Take 1 tablet (81 mg) by mouth daily  Qty: 90 tablet, Refills: 3    Associated Diagnoses: Chest pain, unspecified type      atenolol (TENORMIN) 25 MG tablet Take 0.5 tablets (12.5 mg) by mouth daily.  Qty: 90 tablet, Refills: 3    Associated Diagnoses: Essential (primary) hypertension      baclofen (LIORESAL) 10 MG tablet TAKE 1 TABLET BY MOUTH 3 TIMES DAILY  Qty: 90 tablet, Refills: 1    Associated Diagnoses: Muscle spasm      famotidine (PEPCID) 20 MG tablet Take 1 tablet (20 mg) by mouth 2 times daily as needed (acid reflux)  Qty: 20 tablet, Refills: 0      medication given by implanted intrathecal pump continuously. Drug # 1: Fentanyl (Sublimaze) - Conc: 2000 mcg/mL - Total Dose / 24 hours: 824.2 mcg    Drug # 2: Bupivacaine (Marcaine)  - Conc:17.7 mg/mL - Total  Dose / 24 hours: 7.294 mg    Drug # 3: Hydromorphone (Dilaudid)  - Conc: 5.1 mg/mL - Total Dose / 24 hours: 2.1016 mg    Diluent: NS    Infusion Rate: 0.4121 mL/24 hrs  Pump Reservoir Volume: 40 mL    Bolus doses: up to 15 bolus doses/24 hours with 1 hour lockout  Each bolus: fentanyl 80.1 mcg, 0.708 mg Bupivacaine, 0.2041 mg Dilaudid    Outside Clinic & Provider: [facility] in [place] [phone]  Last Refill Date: 11/25/2024  Next Refill Date: 12/30/24  Low [place] Alarm Volume: 2.0 mL  Pump : syncrProsetta      multivitamin, therapeutic (THERA-VIT) TABS tablet Take 1 tablet by mouth daily  Qty: 90 tablet, Refills: 3    Associated Diagnoses: Moderate protein-calorie malnutrition (H)      omeprazole (PRILOSEC) 20 MG DR capsule Take 20 mg by mouth 2 times daily.      ondansetron (ZOFRAN) 4 MG tablet Take 1 tablet (4 mg) by mouth every 6 hours as needed for nausea.  Qty: 20 tablet, Refills: 0    Associated Diagnoses: Nausea      prochlorperazine (COMPAZINE) 5 MG tablet Take 1 tablet (5 mg) by mouth every 8 hours as needed for nausea or vomiting  Qty: 10 tablet, Refills: 0           Allergies   Allergies   Allergen Reactions    Adhesive Tape     Codeine Nausea and Vomiting    Hydromorphone Headache and Nausea and Vomiting    Morphine Nausea and Vomiting    Bacitracin Rash    Methadone Rash

## 2024-12-10 NOTE — PROGRESS NOTES
Care Management Discharge Note    Discharge Date: 12/10/2024       Discharge Disposition: Home, Other (Comments) (unknown at this time if she will need Home Care help.)    Discharge Services: Other (see comment) (unknown at this time if she will need Home Care help.)    Discharge DME: None    Discharge Transportation: family or friend will provide    Private pay costs discussed: Not applicable    Education Provided on the Discharge Plan: Yes  Persons Notified of Discharge Plans: YES  Patient/Family in Agreement with the Plan:  yes    Handoff Referral Completed: No, handoff not indicated or clinically appropriate    Additional Information:  Pt declining TCU recommendation and agreeable to home care; pt current receiving home care through [facility]. SW met with pt to discuss, and confirm discharge plan to home with home care services; RN PT OT. All parties aware, and agreeable to discharge plan. [facility] intake notified of discharge. No other needs from  at this time. Family/spouse to transport.  9:22 AM    Brenna Kjellberg, BSW LSW  12/10/2024

## 2024-12-11 ENCOUNTER — PATIENT OUTREACH (OUTPATIENT)
Dept: CARE COORDINATION | Facility: CLINIC | Age: 69
End: 2024-12-11
Payer: COMMERCIAL

## 2024-12-11 NOTE — PROGRESS NOTES
Clinic Care Coordination Contact  [facility]/Voicemail    Clinical Data: Care Coordinator Outreach    Outreach Documentation Number of Outreach Attempt   12/11/2024   1:04 PM 1       Left message on patient's voicemail with call back information and requested return call.      Plan:  Care Coordinator will try to reach patient again in 1-2 business days.    TCM Episode created  [facility] x 1

## 2024-12-12 ENCOUNTER — NURSE TRIAGE (OUTPATIENT)
Dept: FAMILY MEDICINE | Facility: CLINIC | Age: 69
End: 2024-12-12
Payer: COMMERCIAL

## 2024-12-12 PROCEDURE — 88342 IMHCHEM/IMCYTCHM 1ST ANTB: CPT | Mod: 26 | Performed by: PATHOLOGY

## 2024-12-12 PROCEDURE — 88305 TISSUE EXAM BY PATHOLOGIST: CPT | Mod: 26 | Performed by: PATHOLOGY

## 2024-12-12 NOTE — TELEPHONE ENCOUNTER
Home Care is calling regarding an established patient with [facility].       Requesting orders from: Alex Nolasco  Provider is following patient: Yes  Is this a 60-day recertification request?  No    Orders Requested    Skilled Nursing  Request for resumption in care.     1 per week for 3 weeks.     Physical Therapy  Request for initial evaluation and treatment (one time)     Information was gathered and will be sent to provider for review.  RN will contact Home Care with information after provider review.  Confirmed ok to leave a detailed message with call back.  Contact information confirmed and updated as needed.    Daniela Buitrago, RN

## 2024-12-12 NOTE — PROGRESS NOTES
Clinic Care Coordination Contact  [facility]/Voicemail    Clinical Data: Care Coordinator Outreach    Outreach Documentation Number of Outreach Attempt   12/11/2024   1:04 PM 1   12/12/2024  11:16 AM 2       Left message on patient's voicemail with call back information and requested return call.      Plan: Care Coordinator will send unable to contact letter with care coordinator contact information via mail. Care Coordinator will do no further outreaches at this time.    TCM Episode created  [facility] x 2

## 2024-12-18 ENCOUNTER — TELEPHONE (OUTPATIENT)
Dept: FAMILY MEDICINE | Facility: CLINIC | Age: 69
End: 2024-12-18
Payer: COMMERCIAL

## 2024-12-18 NOTE — TELEPHONE ENCOUNTER
Order/Referral Request    Who is requesting: nolan with [facility] PT    Orders being requested: PT one time a week for 2 weeks    Reason service is needed/diagnosis: strengthening     When are orders needed by: ASAP    Has this been discussed with Provider: No    Does patient have an appointment scheduled?: No    Where to send orders:  verbal call back    Could we send this information to you in [facility] or would you prefer to receive a phone call?:   Patient would prefer a phone call   Okay to leave a detailed message?: Yes at Other phone number:  [phone]

## 2024-12-23 ENCOUNTER — MEDICAL CORRESPONDENCE (OUTPATIENT)
Dept: HEALTH INFORMATION MANAGEMENT | Facility: CLINIC | Age: 69
End: 2024-12-23
Payer: COMMERCIAL

## 2024-12-29 DIAGNOSIS — M62.838 MUSCLE SPASM: ICD-10-CM

## 2024-12-31 ENCOUNTER — OFFICE VISIT (OUTPATIENT)
Dept: FAMILY MEDICINE | Facility: CLINIC | Age: 69
End: 2024-12-31
Payer: COMMERCIAL

## 2024-12-31 VITALS
SYSTOLIC BLOOD PRESSURE: 150 MMHG | RESPIRATION RATE: 16 BRPM | WEIGHT: 106.8 LBS | DIASTOLIC BLOOD PRESSURE: 69 MMHG | OXYGEN SATURATION: 96 % | BODY MASS INDEX: 19.65 KG/M2 | HEIGHT: 62 IN | HEART RATE: 72 BPM | TEMPERATURE: 97.9 F

## 2024-12-31 DIAGNOSIS — R41.3 MEMORY CHANGES: ICD-10-CM

## 2024-12-31 DIAGNOSIS — R11.0 CHRONIC NAUSEA: Primary | ICD-10-CM

## 2024-12-31 PROCEDURE — 99214 OFFICE O/P EST MOD 30 MIN: CPT | Performed by: FAMILY MEDICINE

## 2024-12-31 RX ORDER — BACLOFEN 10 MG/1
10 TABLET ORAL 3 TIMES DAILY
Qty: 90 TABLET | Refills: 1 | Status: SHIPPED | OUTPATIENT
Start: 2024-12-31

## 2024-12-31 NOTE — PROGRESS NOTES
Assessment & Plan     Chronic nausea  Patient presenting for follow-up after hospitalization for chronic nausea and vomiting  Symptoms have resolved but have been repetitive and recurrent over the last few years with no clear source of etiology  Currently asymptomatic    Memory changes  Patient has follow-up with neuro psych for testing in the upcoming months  Discussed importance of keeping physically active as best as possible and keeping mentally active          Subjective   Mariah is a 69 year old, presenting for the following health issues:  Hospital F/U and Memory Loss (Will be seeing Iesha Mccray DO on 2/20/25)        12/31/2024     9:43 AM   Additional Questions   Roomed by Wilma IMNOR CMA     HPI     Patient here for hospital follow-up  Patient currently is asymptomatic but fortunately has been hospitalized multiple times in the past for recurrent vomiting and nausea.  Previously thought to possibly due to outlet obstruction-patient unfortunately after surgery is still continued to have problems with this on a cyclic basis.  No clear source of etiology.  Patient is on multiple medications and has a chronic pain pump and the possibility of some of her memory changes and chronic nausea and vomiting could be secondary to polypharmacy and her medications.  She has follow-up for her memory changes with neuropsych testing in the upcoming month for further evaluation in the meantime is understanding of the need to keep mentally and physically active to try to slowly aggression of any kind of cognition loss.  Reviewed records with her today.    Hospital Follow-up Visit:    Hospital/Nursing Home/IP Rehab Facility: [facility]  Date of Admission: 12/07/2024  Date of Discharge: 12/10/2024  Reason(s) for Admission: Recurrent/chronic nausea and vomiting  Was the patient in the ICU or did the patient experience delirium during hospitalization?  No  Do you have any other stressors you  "would like to discuss with your provider? Health Concerns    Problems taking medications regularly:  None  Medication changes since discharge: None  Problems adhering to non-medication therapy:  None    Summary of hospitalization:  [facility] discharge summary reviewed  Diagnostic Tests/Treatments reviewed.  Follow up needed: none  Other Healthcare Providers Involved in Patient s Care:          Neurology  Update since discharge: improved.         Plan of care communicated with patient                       Objective    BP (!) 154/107 (BP Location: Left arm, Patient Position: Sitting, Cuff Size: Adult Regular)   Pulse 87   Temp 97.9  F (36.6  C) (Oral)   Resp 16   Ht 1.562 m (5' 1.5\")   Wt 48.4 kg (106 lb 12.8 oz)   SpO2 97%   BMI 19.85 kg/m    Body mass index is 19.85 kg/m .  Physical Exam   GENERAL: alert, no distress, frail, and fatigued  RESP: lungs clear to auscultation - no rales, rhonchi or wheezes  CV: regular rate and rhythm  MS: kyphosis, walking with aid of cane  PSYCH: mentation appears normal, affect normal/bright            Signed Electronically by: Alex Nolasco MD    "

## 2024-12-31 NOTE — PATIENT INSTRUCTIONS
If the next episode is starting during the day- during the week contact Dr. Nolasco to see if we can get you into the AD[facility] for nausea medication and IV fluids to try to avoid the ER

## 2025-01-02 ENCOUNTER — TELEPHONE (OUTPATIENT)
Dept: FAMILY MEDICINE | Facility: CLINIC | Age: 70
End: 2025-01-02
Payer: COMMERCIAL

## 2025-01-02 NOTE — TELEPHONE ENCOUNTER
Home Health Care    Reason for call:  Verbal Orders    Orders are needed for this patient.  Skilled Nursin visit every other week, a total of 2 visits for GI Monitoring.    Pt Provider: Dr. Alex Nolasco    Phone Number Homecare Nurse can be reached at: [phone] [facility].  Okay to leave detailed message when returning call. Voice mail is secure and confidential.

## 2025-01-02 NOTE — TELEPHONE ENCOUNTER
Order/Referral Request    Who is requesting: Armando with [facility] care PT    Orders being requested: re certification, one time every other week for PT.    Reason service is needed/diagnosis: gait, balance and strengthening    When are orders needed by: ASAP    Has this been discussed with Provider: No    Does patient have an appointment scheduled?: No    Where to send orders:  verbal call back    Could we send this information to you in [facility] or would you prefer to receive a phone call?:   Patient would prefer a phone call   Okay to leave a detailed message?: Yes at Other phone number:  [phone]

## 2025-01-05 ENCOUNTER — MEDICAL CORRESPONDENCE (OUTPATIENT)
Dept: HEALTH INFORMATION MANAGEMENT | Facility: CLINIC | Age: 70
End: 2025-01-05

## 2025-01-12 ENCOUNTER — APPOINTMENT (OUTPATIENT)
Dept: CT IMAGING | Facility: HOSPITAL | Age: 70
End: 2025-01-12
Attending: EMERGENCY MEDICINE
Payer: COMMERCIAL

## 2025-01-12 ENCOUNTER — HOSPITAL ENCOUNTER (OUTPATIENT)
Facility: HOSPITAL | Age: 70
Setting detail: OBSERVATION
Discharge: HOME | End: 2025-01-13
Attending: EMERGENCY MEDICINE | Admitting: NURSE PRACTITIONER
Payer: COMMERCIAL

## 2025-01-12 DIAGNOSIS — R11.2 NAUSEA VOMITING AND DIARRHEA: ICD-10-CM

## 2025-01-12 DIAGNOSIS — R19.7 NAUSEA VOMITING AND DIARRHEA: ICD-10-CM

## 2025-01-12 DIAGNOSIS — R10.84 GENERALIZED ABDOMINAL PAIN: ICD-10-CM

## 2025-01-12 DIAGNOSIS — R11.2 NAUSEA AND VOMITING, UNSPECIFIED VOMITING TYPE: Primary | ICD-10-CM

## 2025-01-12 PROBLEM — K31.84 GASTROPARESIS: Status: ACTIVE | Noted: 2024-02-22

## 2025-01-12 PROBLEM — G90.529 CRPS (COMPLEX REGIONAL PAIN SYNDROME), LOWER LIMB: Status: ACTIVE | Noted: 2023-11-06

## 2025-01-12 LAB
ALBUMIN SERPL BCG-MCNC: 4.3 G/DL (ref 3.5–5.2)
ALP SERPL-CCNC: 93 U/L (ref 40–150)
ALT SERPL W P-5'-P-CCNC: 21 U/L (ref 0–50)
ANION GAP SERPL CALCULATED.3IONS-SCNC: 12 MMOL/L (ref 7–15)
AST SERPL W P-5'-P-CCNC: 22 U/L (ref 0–45)
BASOPHILS # BLD AUTO: 0 10E3/UL (ref 0–0.2)
BASOPHILS NFR BLD AUTO: 1 %
BILIRUB DIRECT SERPL-MCNC: <0.2 MG/DL (ref 0–0.3)
BILIRUB SERPL-MCNC: 0.4 MG/DL
BUN SERPL-MCNC: 12.5 MG/DL (ref 8–23)
CALCIUM SERPL-MCNC: 9.4 MG/DL (ref 8.8–10.4)
CHLORIDE SERPL-SCNC: 102 MMOL/L (ref 98–107)
CREAT SERPL-MCNC: 0.53 MG/DL (ref 0.51–0.95)
CRP SERPL-MCNC: <3 MG/L
EGFRCR SERPLBLD CKD-EPI 2021: >90 ML/MIN/1.73M2
EOSINOPHIL # BLD AUTO: 0 10E3/UL (ref 0–0.7)
EOSINOPHIL NFR BLD AUTO: 0 %
ERYTHROCYTE [DISTWIDTH] IN BLOOD BY AUTOMATED COUNT: 13.1 % (ref 10–15)
GLUCOSE SERPL-MCNC: 121 MG/DL (ref 70–99)
HCO3 SERPL-SCNC: 24 MMOL/L (ref 22–29)
HCT VFR BLD AUTO: 43.9 % (ref 35–47)
HGB BLD-MCNC: 14.9 G/DL (ref 11.7–15.7)
IMM GRANULOCYTES # BLD: 0 10E3/UL
IMM GRANULOCYTES NFR BLD: 0 %
LIPASE SERPL-CCNC: 17 U/L (ref 13–60)
LYMPHOCYTES # BLD AUTO: 1.1 10E3/UL (ref 0.8–5.3)
LYMPHOCYTES NFR BLD AUTO: 17 %
MAGNESIUM SERPL-MCNC: 1.7 MG/DL (ref 1.7–2.3)
MCH RBC QN AUTO: 29.2 PG (ref 26.5–33)
MCHC RBC AUTO-ENTMCNC: 33.9 G/DL (ref 31.5–36.5)
MCV RBC AUTO: 86 FL (ref 78–100)
MONOCYTES # BLD AUTO: 0.4 10E3/UL (ref 0–1.3)
MONOCYTES NFR BLD AUTO: 6 %
NEUTROPHILS # BLD AUTO: 4.7 10E3/UL (ref 1.6–8.3)
NEUTROPHILS NFR BLD AUTO: 76 %
NRBC # BLD AUTO: 0 10E3/UL
NRBC BLD AUTO-RTO: 0 /100
PLATELET # BLD AUTO: 203 10E3/UL (ref 150–450)
POTASSIUM SERPL-SCNC: 4.2 MMOL/L (ref 3.4–5.3)
PROCALCITONIN SERPL IA-MCNC: 0.03 NG/ML
PROT SERPL-MCNC: 6.5 G/DL (ref 6.4–8.3)
RBC # BLD AUTO: 5.1 10E6/UL (ref 3.8–5.2)
SODIUM SERPL-SCNC: 138 MMOL/L (ref 135–145)
WBC # BLD AUTO: 6.2 10E3/UL (ref 4–11)

## 2025-01-12 PROCEDURE — 96374 THER/PROPH/DIAG INJ IV PUSH: CPT | Mod: 59

## 2025-01-12 PROCEDURE — 258N000003 HC RX IP 258 OP 636: Performed by: EMERGENCY MEDICINE

## 2025-01-12 PROCEDURE — 250N000011 HC RX IP 250 OP 636: Performed by: HOSPITALIST

## 2025-01-12 PROCEDURE — 84155 ASSAY OF PROTEIN SERUM: CPT | Performed by: EMERGENCY MEDICINE

## 2025-01-12 PROCEDURE — 258N000003 HC RX IP 258 OP 636: Performed by: HOSPITALIST

## 2025-01-12 PROCEDURE — 83735 ASSAY OF MAGNESIUM: CPT | Performed by: EMERGENCY MEDICINE

## 2025-01-12 PROCEDURE — 250N000011 HC RX IP 250 OP 636: Performed by: EMERGENCY MEDICINE

## 2025-01-12 PROCEDURE — 99222 1ST HOSP IP/OBS MODERATE 55: CPT | Performed by: HOSPITALIST

## 2025-01-12 PROCEDURE — 84145 PROCALCITONIN (PCT): CPT | Performed by: HOSPITALIST

## 2025-01-12 PROCEDURE — G0378 HOSPITAL OBSERVATION PER HR: HCPCS

## 2025-01-12 PROCEDURE — 82565 ASSAY OF CREATININE: CPT | Performed by: EMERGENCY MEDICINE

## 2025-01-12 PROCEDURE — 82040 ASSAY OF SERUM ALBUMIN: CPT | Performed by: EMERGENCY MEDICINE

## 2025-01-12 PROCEDURE — 85041 AUTOMATED RBC COUNT: CPT | Performed by: EMERGENCY MEDICINE

## 2025-01-12 PROCEDURE — 96375 TX/PRO/DX INJ NEW DRUG ADDON: CPT

## 2025-01-12 PROCEDURE — 82947 ASSAY GLUCOSE BLOOD QUANT: CPT | Performed by: EMERGENCY MEDICINE

## 2025-01-12 PROCEDURE — 83690 ASSAY OF LIPASE: CPT | Performed by: EMERGENCY MEDICINE

## 2025-01-12 PROCEDURE — 36415 COLL VENOUS BLD VENIPUNCTURE: CPT | Performed by: EMERGENCY MEDICINE

## 2025-01-12 PROCEDURE — 86140 C-REACTIVE PROTEIN: CPT | Performed by: HOSPITALIST

## 2025-01-12 PROCEDURE — 74177 CT ABD & PELVIS W/CONTRAST: CPT

## 2025-01-12 PROCEDURE — 96372 THER/PROPH/DIAG INJ SC/IM: CPT | Performed by: HOSPITALIST

## 2025-01-12 PROCEDURE — 85025 COMPLETE CBC W/AUTO DIFF WBC: CPT | Performed by: EMERGENCY MEDICINE

## 2025-01-12 PROCEDURE — 84484 ASSAY OF TROPONIN QUANT: CPT | Performed by: HOSPITALIST

## 2025-01-12 PROCEDURE — 82435 ASSAY OF BLOOD CHLORIDE: CPT | Performed by: EMERGENCY MEDICINE

## 2025-01-12 PROCEDURE — 99285 EMERGENCY DEPT VISIT HI MDM: CPT | Mod: 25

## 2025-01-12 PROCEDURE — 96361 HYDRATE IV INFUSION ADD-ON: CPT

## 2025-01-12 RX ORDER — HYDROMORPHONE HYDROCHLORIDE 1 MG/ML
0.3 INJECTION, SOLUTION INTRAMUSCULAR; INTRAVENOUS; SUBCUTANEOUS
Status: DISCONTINUED | OUTPATIENT
Start: 2025-01-12 | End: 2025-01-13

## 2025-01-12 RX ORDER — AMOXICILLIN 250 MG
1 CAPSULE ORAL 2 TIMES DAILY PRN
Status: DISCONTINUED | OUTPATIENT
Start: 2025-01-12 | End: 2025-01-13 | Stop reason: HOSPADM

## 2025-01-12 RX ORDER — HYDROMORPHONE HYDROCHLORIDE 1 MG/ML
0.5 INJECTION, SOLUTION INTRAMUSCULAR; INTRAVENOUS; SUBCUTANEOUS
Status: DISCONTINUED | OUTPATIENT
Start: 2025-01-12 | End: 2025-01-13

## 2025-01-12 RX ORDER — ACETAMINOPHEN 650 MG/1
650 SUPPOSITORY RECTAL EVERY 4 HOURS PRN
Status: DISCONTINUED | OUTPATIENT
Start: 2025-01-12 | End: 2025-01-13 | Stop reason: HOSPADM

## 2025-01-12 RX ORDER — PROCHLORPERAZINE MALEATE 5 MG/1
5 TABLET ORAL EVERY 6 HOURS PRN
Status: DISCONTINUED | OUTPATIENT
Start: 2025-01-12 | End: 2025-01-13 | Stop reason: HOSPADM

## 2025-01-12 RX ORDER — ONDANSETRON 2 MG/ML
4 INJECTION INTRAMUSCULAR; INTRAVENOUS ONCE
Status: COMPLETED | OUTPATIENT
Start: 2025-01-12 | End: 2025-01-12

## 2025-01-12 RX ORDER — ONDANSETRON 2 MG/ML
4 INJECTION INTRAMUSCULAR; INTRAVENOUS EVERY 6 HOURS PRN
Status: DISCONTINUED | OUTPATIENT
Start: 2025-01-12 | End: 2025-01-13 | Stop reason: HOSPADM

## 2025-01-12 RX ORDER — ENOXAPARIN SODIUM 100 MG/ML
40 INJECTION SUBCUTANEOUS EVERY 24 HOURS
Status: DISCONTINUED | OUTPATIENT
Start: 2025-01-12 | End: 2025-01-13 | Stop reason: HOSPADM

## 2025-01-12 RX ORDER — KETOROLAC TROMETHAMINE 15 MG/ML
15 INJECTION, SOLUTION INTRAMUSCULAR; INTRAVENOUS ONCE
Status: COMPLETED | OUTPATIENT
Start: 2025-01-12 | End: 2025-01-12

## 2025-01-12 RX ORDER — ACETAMINOPHEN 325 MG/1
650 TABLET ORAL EVERY 4 HOURS PRN
Status: DISCONTINUED | OUTPATIENT
Start: 2025-01-12 | End: 2025-01-13 | Stop reason: HOSPADM

## 2025-01-12 RX ORDER — AMOXICILLIN 250 MG
2 CAPSULE ORAL 2 TIMES DAILY PRN
Status: DISCONTINUED | OUTPATIENT
Start: 2025-01-12 | End: 2025-01-13 | Stop reason: HOSPADM

## 2025-01-12 RX ORDER — DIPHENHYDRAMINE HYDROCHLORIDE 50 MG/ML
25 INJECTION INTRAMUSCULAR; INTRAVENOUS ONCE
Status: COMPLETED | OUTPATIENT
Start: 2025-01-12 | End: 2025-01-12

## 2025-01-12 RX ORDER — METOCLOPRAMIDE HYDROCHLORIDE 5 MG/ML
10 INJECTION INTRAMUSCULAR; INTRAVENOUS ONCE
Status: COMPLETED | OUTPATIENT
Start: 2025-01-12 | End: 2025-01-12

## 2025-01-12 RX ORDER — IOPAMIDOL 755 MG/ML
90 INJECTION, SOLUTION INTRAVASCULAR ONCE
Status: COMPLETED | OUTPATIENT
Start: 2025-01-12 | End: 2025-01-12

## 2025-01-12 RX ORDER — ONDANSETRON 4 MG/1
4 TABLET, ORALLY DISINTEGRATING ORAL EVERY 6 HOURS PRN
Status: DISCONTINUED | OUTPATIENT
Start: 2025-01-12 | End: 2025-01-13 | Stop reason: HOSPADM

## 2025-01-12 RX ADMIN — KETOROLAC TROMETHAMINE 15 MG: 15 INJECTION, SOLUTION INTRAMUSCULAR; INTRAVENOUS at 21:05

## 2025-01-12 RX ADMIN — PROMETHAZINE HYDROCHLORIDE 25 MG: 25 INJECTION INTRAMUSCULAR; INTRAVENOUS at 20:25

## 2025-01-12 RX ADMIN — DIPHENHYDRAMINE HYDROCHLORIDE 25 MG: 50 INJECTION INTRAMUSCULAR; INTRAVENOUS at 21:03

## 2025-01-12 RX ADMIN — METOCLOPRAMIDE 10 MG: 5 INJECTION, SOLUTION INTRAMUSCULAR; INTRAVENOUS at 21:06

## 2025-01-12 RX ADMIN — IOPAMIDOL 90 ML: 755 INJECTION, SOLUTION INTRAVENOUS at 21:24

## 2025-01-12 RX ADMIN — SODIUM CHLORIDE 1000 ML: 9 INJECTION, SOLUTION INTRAVENOUS at 23:11

## 2025-01-12 RX ADMIN — ONDANSETRON 4 MG: 2 INJECTION INTRAMUSCULAR; INTRAVENOUS at 18:41

## 2025-01-12 RX ADMIN — ENOXAPARIN SODIUM 40 MG: 40 INJECTION SUBCUTANEOUS at 23:23

## 2025-01-12 RX ADMIN — SODIUM CHLORIDE 1000 ML: 9 INJECTION, SOLUTION INTRAVENOUS at 18:41

## 2025-01-12 ASSESSMENT — ACTIVITIES OF DAILY LIVING (ADL)
ADLS_ACUITY_SCORE: 58

## 2025-01-12 NOTE — ED TRIAGE NOTES
Since this morning pt has been nauseated and vomiting. Has not been able to keep anything down, no diarrhea.         Triage Assessment (Adult)       Row Name 01/12/25 9160          Triage Assessment    Airway WDL WDL        Respiratory WDL    Respiratory WDL WDL        Skin Circulation/Temperature WDL    Skin Circulation/Temperature WDL WDL        Cardiac WDL    Cardiac WDL WDL        Peripheral/Neurovascular WDL    Peripheral Neurovascular WDL WDL        Cognitive/Neuro/Behavioral WDL    Cognitive/Neuro/Behavioral WDL WDL

## 2025-01-13 VITALS
BODY MASS INDEX: 19.69 KG/M2 | OXYGEN SATURATION: 95 % | RESPIRATION RATE: 16 BRPM | DIASTOLIC BLOOD PRESSURE: 63 MMHG | SYSTOLIC BLOOD PRESSURE: 135 MMHG | HEART RATE: 62 BPM | WEIGHT: 104.28 LBS | HEIGHT: 61 IN | TEMPERATURE: 98.1 F

## 2025-01-13 PROBLEM — Z72.0 NICOTINE USE: Status: ACTIVE | Noted: 2025-01-13

## 2025-01-13 PROBLEM — R63.4 UNINTENTIONAL WEIGHT LOSS: Status: ACTIVE | Noted: 2023-02-21

## 2025-01-13 LAB
ANION GAP SERPL CALCULATED.3IONS-SCNC: 9 MMOL/L (ref 7–15)
ATRIAL RATE - MUSE: 50 BPM
BUN SERPL-MCNC: 12.3 MG/DL (ref 8–23)
CALCIUM SERPL-MCNC: 8.6 MG/DL (ref 8.8–10.4)
CHLORIDE SERPL-SCNC: 107 MMOL/L (ref 98–107)
CREAT SERPL-MCNC: 0.55 MG/DL (ref 0.51–0.95)
DIASTOLIC BLOOD PRESSURE - MUSE: NORMAL MMHG
EGFRCR SERPLBLD CKD-EPI 2021: >90 ML/MIN/1.73M2
GLUCOSE SERPL-MCNC: 81 MG/DL (ref 70–99)
HCO3 SERPL-SCNC: 24 MMOL/L (ref 22–29)
INTERPRETATION ECG - MUSE: NORMAL
P AXIS - MUSE: 16 DEGREES
PLATELET # BLD AUTO: 183 10E3/UL (ref 150–450)
POTASSIUM SERPL-SCNC: 3.7 MMOL/L (ref 3.4–5.3)
PR INTERVAL - MUSE: 142 MS
QRS DURATION - MUSE: 86 MS
QT - MUSE: 446 MS
QTC - MUSE: 406 MS
R AXIS - MUSE: 55 DEGREES
SODIUM SERPL-SCNC: 140 MMOL/L (ref 135–145)
SYSTOLIC BLOOD PRESSURE - MUSE: NORMAL MMHG
T AXIS - MUSE: 57 DEGREES
TROPONIN T SERPL HS-MCNC: 17 NG/L
VENTRICULAR RATE- MUSE: 50 BPM

## 2025-01-13 PROCEDURE — 80048 BASIC METABOLIC PNL TOTAL CA: CPT | Performed by: HOSPITALIST

## 2025-01-13 PROCEDURE — 85049 AUTOMATED PLATELET COUNT: CPT | Performed by: HOSPITALIST

## 2025-01-13 PROCEDURE — 82565 ASSAY OF CREATININE: CPT | Performed by: HOSPITALIST

## 2025-01-13 PROCEDURE — 62367 ANALYZE SPINE INFUS PUMP: CPT

## 2025-01-13 PROCEDURE — 93005 ELECTROCARDIOGRAM TRACING: CPT | Performed by: HOSPITALIST

## 2025-01-13 PROCEDURE — 250N000013 HC RX MED GY IP 250 OP 250 PS 637: Performed by: HOSPITALIST

## 2025-01-13 PROCEDURE — 99215 OFFICE O/P EST HI 40 MIN: CPT | Mod: 25 | Performed by: NURSE PRACTITIONER

## 2025-01-13 PROCEDURE — G0378 HOSPITAL OBSERVATION PER HR: HCPCS

## 2025-01-13 PROCEDURE — 250N000013 HC RX MED GY IP 250 OP 250 PS 637: Performed by: STUDENT IN AN ORGANIZED HEALTH CARE EDUCATION/TRAINING PROGRAM

## 2025-01-13 PROCEDURE — 96375 TX/PRO/DX INJ NEW DRUG ADDON: CPT

## 2025-01-13 PROCEDURE — 62367 ANALYZE SPINE INFUS PUMP: CPT | Performed by: NURSE PRACTITIONER

## 2025-01-13 PROCEDURE — 250N000011 HC RX IP 250 OP 636: Performed by: HOSPITALIST

## 2025-01-13 PROCEDURE — 99239 HOSP IP/OBS DSCHRG MGMT >30: CPT | Performed by: STUDENT IN AN ORGANIZED HEALTH CARE EDUCATION/TRAINING PROGRAM

## 2025-01-13 PROCEDURE — 93010 ELECTROCARDIOGRAM REPORT: CPT | Mod: RTG | Performed by: STUDENT IN AN ORGANIZED HEALTH CARE EDUCATION/TRAINING PROGRAM

## 2025-01-13 PROCEDURE — 36415 COLL VENOUS BLD VENIPUNCTURE: CPT | Performed by: HOSPITALIST

## 2025-01-13 PROCEDURE — 99417 PROLNG OP E/M EACH 15 MIN: CPT | Performed by: NURSE PRACTITIONER

## 2025-01-13 RX ORDER — NALOXONE HYDROCHLORIDE 0.4 MG/ML
0.2 INJECTION, SOLUTION INTRAMUSCULAR; INTRAVENOUS; SUBCUTANEOUS
Status: DISCONTINUED | OUTPATIENT
Start: 2025-01-13 | End: 2025-01-13 | Stop reason: HOSPADM

## 2025-01-13 RX ORDER — AMOXICILLIN 250 MG
1 CAPSULE ORAL 2 TIMES DAILY PRN
Qty: 30 TABLET | Refills: 0 | Status: SHIPPED | OUTPATIENT
Start: 2025-01-13

## 2025-01-13 RX ORDER — ATENOLOL 25 MG/1
12.5 TABLET ORAL DAILY
Status: DISCONTINUED | OUTPATIENT
Start: 2025-01-13 | End: 2025-01-13 | Stop reason: HOSPADM

## 2025-01-13 RX ORDER — PROCHLORPERAZINE MALEATE 10 MG
5 TABLET ORAL EVERY 6 HOURS PRN
Qty: 30 TABLET | Refills: 0 | Status: SHIPPED | OUTPATIENT
Start: 2025-01-13

## 2025-01-13 RX ORDER — ASPIRIN 81 MG/1
81 TABLET ORAL DAILY
Status: DISCONTINUED | OUTPATIENT
Start: 2025-01-13 | End: 2025-01-13 | Stop reason: HOSPADM

## 2025-01-13 RX ORDER — BACLOFEN 10 MG/1
10 TABLET ORAL 3 TIMES DAILY
Status: DISCONTINUED | OUTPATIENT
Start: 2025-01-13 | End: 2025-01-13 | Stop reason: HOSPADM

## 2025-01-13 RX ORDER — POLYETHYLENE GLYCOL 3350 17 G/17G
17 POWDER, FOR SOLUTION ORAL DAILY
Qty: 510 G | Refills: 0 | Status: SHIPPED | OUTPATIENT
Start: 2025-01-13

## 2025-01-13 RX ORDER — NALOXONE HYDROCHLORIDE 0.4 MG/ML
0.4 INJECTION, SOLUTION INTRAMUSCULAR; INTRAVENOUS; SUBCUTANEOUS
Status: DISCONTINUED | OUTPATIENT
Start: 2025-01-13 | End: 2025-01-13 | Stop reason: HOSPADM

## 2025-01-13 RX ORDER — MAGNESIUM SULFATE HEPTAHYDRATE 40 MG/ML
2 INJECTION, SOLUTION INTRAVENOUS ONCE
Status: COMPLETED | OUTPATIENT
Start: 2025-01-13 | End: 2025-01-13

## 2025-01-13 RX ORDER — PANTOPRAZOLE SODIUM 40 MG/1
40 TABLET, DELAYED RELEASE ORAL 2 TIMES DAILY
Status: DISCONTINUED | OUTPATIENT
Start: 2025-01-13 | End: 2025-01-13 | Stop reason: HOSPADM

## 2025-01-13 RX ORDER — PROCHLORPERAZINE MALEATE 5 MG/1
5 TABLET ORAL EVERY 8 HOURS PRN
Qty: 10 TABLET | Refills: 0 | Status: SHIPPED | OUTPATIENT
Start: 2025-01-13 | End: 2025-01-13

## 2025-01-13 RX ADMIN — BACLOFEN 10 MG: 10 TABLET ORAL at 12:00

## 2025-01-13 RX ADMIN — ASPIRIN 81 MG: 81 TABLET, COATED ORAL at 11:50

## 2025-01-13 RX ADMIN — MAGNESIUM SULFATE HEPTAHYDRATE 2 G: 40 INJECTION, SOLUTION INTRAVENOUS at 01:15

## 2025-01-13 RX ADMIN — ACETAMINOPHEN 650 MG: 325 TABLET ORAL at 08:47

## 2025-01-13 RX ADMIN — PANTOPRAZOLE SODIUM 40 MG: 40 TABLET, DELAYED RELEASE ORAL at 11:50

## 2025-01-13 ASSESSMENT — ACTIVITIES OF DAILY LIVING (ADL)
ADLS_ACUITY_SCORE: 55
ADLS_ACUITY_SCORE: 57
ADLS_ACUITY_SCORE: 55
ADLS_ACUITY_SCORE: 57
ADLS_ACUITY_SCORE: 57
ADLS_ACUITY_SCORE: 55
ADLS_ACUITY_SCORE: 57
ADLS_ACUITY_SCORE: 55

## 2025-01-13 NOTE — PLAN OF CARE
PRIMARY DIAGNOSIS: n/v, abd pain, GENERALIZED WEAKNESS/FTT     OUTPATIENT/OBSERVATION GOALS TO BE MET BEFORE DISCHARGE  1. Orthostatic performed: No     2. Tolerating PO medications:  has not taken po meds     3. Return to near baseline physical activity: No     4. Cleared for discharge by consultants (if involved): No        Discharge Planner Nurse  Safe discharge environment identified: Yes  Barriers to discharge: Yes

## 2025-01-13 NOTE — PROCEDURES
"Procedure Note - Intrathecal Pump Interrogation       Procedure:  Pump Check     Pre-Operative Diagnosis: Presence of intrathecal pump     Post-Operative Diagnosis: Same     Indication: Presence of intrathecal pump     Procedure Details: The intended procedure, risks, and alternatives were discussed with the patient and informed consent was obtained. \"Pause for the cause\" was utilized to identify correct patient and intended procedure. The pump was interrogated.      Pain team was asked to see the patient for chronic pain, choric opioid use in the setting of chronic pain with a [employer] intrathecal pain pump for which the patient follows with [facility] pain clinic. Patient was admitted for nausea, vomiting, weakness. Patient has a history of chronic pain, chronic pain syndrome. A pump check without reprogramming was completed and findings are listed as below.      Findings: The pump is filled with   Drug # 1: Fentanyl (Sublimaze) - Conc:2000 mcg/mL - Total Dose / 24 hours: 824.2 mcg       Drug # 2: Bupivacaine (Marcaine)  - Conc:17.7 mg/mL - Total Dose / 24 hours: 7.294 mg       Drug # 3: Hydromorphone (Dilaudid)  - Conc: 5.1 mg/mL - Total Dose / 24 hours: 2.1016 mg       Diluent: NS       Location of pump on patient: R posterior hip        Infusion Rate: 0.4121 mL/24 hrs   Pump Reservoir Volume: 40 mL  Current volume in Reservoir: 25.7   Alerts: None       Bolus doses: up to 15 bolus doses/24 hours with 1 hour lockout   Each bolus: fentanyl 80.1 mcg, 0.708 mg Bupivacaine, 0.2041 mg Dilaudid       Outside Clinic & Provider: [facility] in [place] [phone]   Last Refill Date: 12/30/2024   Next Refill Date: 2/3/25   Low [place] Alarm Volume: 2.0 mL  Low reservoir alarm date: 2/5/2025   Pump : [id number]          Patient tolerated the procedure well.        ALEKS DavidsonP-C  Acute Care Pain Management Program  [facility] ([place], [place], [place])  Monday-Friday 8a-4p   Page via online " paging system or call [phone]

## 2025-01-13 NOTE — DISCHARGE SUMMARY
[facility]  Hospitalist Discharge Summary      Date of Admission:  1/12/2025  Date of Discharge:  1/13/2025  Discharging Provider: Eitan Jolly DO  Discharge Service: Hospitalist Service    Discharge Diagnoses   Principal Problem:    Intractable nausea and vomiting  Active Problems:    Opioid Dependence With Continuous Use    Chronic obstructive pulmonary disease, unspecified COPD type (H)    Unintentional weight loss    CRPS (complex regional pain syndrome), lower limb    Gastroparesis    Generalized abdominal pain    Nicotine use        Clinically Significant Risk Factors          Follow-ups Needed After Discharge   Follow-up Appointments       Hospital Follow-up with Existing Primary Care Provider (PCP)      Please see details below         Schedule Primary Care visit within: 7 Days               Discharge Disposition   Discharged to home  Condition at discharge: Stable    Hospital Course   Mariah Koehler is a 68 year old female with history of nephrolithiasis, hydronephrosis, COPD, hypertension, reflex sympathetic dystrophy and chronic pain syndrome on continuous intrathecal pump presented today with persistent nausea, vomiting and diffuse weakness.  Her presentation on admission is similar to her previous episodes.     Lab review: no leukocytosis, LFT and lipase normal. CT of abdomen/pelvis negative for obstruction but noted mild ascending colonic wall thickening from possible colitis- no diarrhea and CRP, Pro-Yuriy normal.     Nausea, vomiting  -symptoms improved with conservative management   -she has a history of this every 4-6 weeks, multiple etiologies ruled out with imaging and no overt infectious symptoms. May be medication related or related to history of sympathetic dystrophy. Can't rule out viral gastroenteritis due to imaging findings but otherwise without infectious s/s  -advised follow-up to discuss meds that may be contributing, particularly chronic pain meds    -discharge on bowel regimen and PRNs as outlined      Chronic pain  H/O reflex sympathetic dystrophy   -continue home intrathecal pain pump and home baclofen  -follow-up as noted above     H/O COPD   -not on any chronic inhalers PTA     Hypertension  -Hold home atenolol on discharge due to bradycardia, restart on follow-up if appropriate      Tobacco Dependence  -Counseling provided        Consultations This Hospital Stay   PAIN MANAGEMENT ADULT IP CONSULT  NUTRITION SERVICES ADULT IP CONSULT    Code Status   Full Code    Time Spent on this Encounter   IEitan DO, personally saw the patient today and spent greater than 30 minutes discharging this patient.       Eitan Jolly DO  [facility] EXTENDED RECOVERY AND SHORT STAY  [address] [place] [id number]  Phone: [phone]  Fax: [phone]  ______________________________________________________________________    Physical Exam   Vital Signs: Temp: 98.1  F (36.7  C) Temp src: Oral BP: 135/63 Pulse: 62   Resp: 16 SpO2: 95 % O2 Device: None (Room air)    Weight: 104 lbs 4.44 oz    General Appearance: Nontoxic and in no acute distress while resting in bed  Respiratory: CTAB with good effort, intermittent deep breathing   Cardiovascular: RRR, no JVD, no bounding pulses   GI: hyperactive bowel sounds, no rebound or guarding, no hepatomegaly or masses   Skin: no rash  Other: No neuro focal deficits          Primary Care Physician   Alex Nolasco    Discharge Orders      Primary Care - Care Coordination Referral      Reason for your hospital stay    Principal Problem:    Intractable nausea and vomiting  Active Problems:    Opioid Dependence With Continuous Use    Chronic obstructive pulmonary disease, unspecified COPD type (H)    Unintentional weight loss    CRPS (complex regional pain syndrome), lower limb    Gastroparesis    Generalized abdominal pain    Nicotine use     Activity    Your activity upon discharge:  activity as tolerated     Diet    Follow this diet upon discharge: Current Diet:Orders Placed This Encounter      Regular Diet Adult     Hospital Follow-up with Existing Primary Care Provider (PCP)    Please see details below            Significant Results and Procedures   Results for orders placed or performed during the hospital encounter of 01/12/25   CT Abdomen Pelvis w Contrast    Narrative    EXAM: CT ABDOMEN PELVIS W CONTRAST  LOCATION: [facility]  DATE: 1/12/2025    INDICATION: Abdominal pain  COMPARISON: CT from 12/7/2024.  TECHNIQUE: CT scan of the abdomen and pelvis was performed following injection of IV contrast. Multiplanar reformats were obtained. Dose reduction techniques were used.  CONTRAST: isovue 370 90ml    FINDINGS:   LOWER CHEST: Normal.    HEPATOBILIARY: Absent gallbladder. Small amount of central pneumobilia.    PANCREAS: Similar pancreatic ductal ectasia and subcentimeter cystic lesion in the pancreatic head.    SPLEEN: Normal.    ADRENAL GLANDS: Normal.    KIDNEYS/BLADDER: Mild right renal cortical thinning with benign upper pole cyst requiring no follow-up. Normal left kidney. Normal bladder.    BOWEL: The stomach is decompressed. Normal caliber small bowel throughout the abdomen and pelvis. Appendix not visualized. No secondary signs of appendicitis. Although similar to prior, there is some borderline wall thickening in the ascending colon   which is accentuated by under distention. There is a moderate amount of stool in the rectum. No free air.    LYMPH NODES: Normal.    VASCULATURE: Moderate aortoiliac atherosclerotic calcification without aneurysm.    PELVIC ORGANS: Absent uterus.    MUSCULOSKELETAL: Implanted electronic device in the right buttock with spinal catheter. Degenerative change of the lumbar spine.      Impression    IMPRESSION:   1.  Borderline proximal colonic wall thickening which may be inflammatory, similar appearance to prior CT. Consider  follow-up with gastroenterology.    2.  No other focal inflammatory change identified in the abdomen or pelvis.       Discharge Medications   Current Discharge Medication List        START taking these medications    Details   polyethylene glycol (MIRALAX) 17 GM/Dose powder Take 17 g by mouth daily.  Qty: 510 g, Refills: 0    Associated Diagnoses: Nausea vomiting and diarrhea      senna-docusate (SENOKOT-S/PERICOLACE) 8.6-50 MG tablet Take 1 tablet by mouth 2 times daily as needed for constipation.  Qty: 30 tablet, Refills: 0    Associated Diagnoses: Nausea vomiting and diarrhea           CONTINUE these medications which have CHANGED    Details   prochlorperazine (COMPAZINE) 10 MG tablet Take 0.5 tablets (5 mg) by mouth every 6 hours as needed for nausea or vomiting.  Qty: 30 tablet, Refills: 0    Associated Diagnoses: Nausea vomiting and diarrhea           CONTINUE these medications which have NOT CHANGED    Details   acetaminophen (TYLENOL) 325 MG tablet Take 1-2 tablets (325-650 mg) by mouth every 6 hours as needed for mild pain or headaches    Associated Diagnoses: Other headache syndrome      aspirin 81 MG EC tablet Take 1 tablet (81 mg) by mouth daily  Qty: 90 tablet, Refills: 3    Associated Diagnoses: Chest pain, unspecified type      baclofen (LIORESAL) 10 MG tablet TAKE 1 TABLET BY MOUTH 3 TIMES DAILY  Qty: 90 tablet, Refills: 1    Associated Diagnoses: Muscle spasm      famotidine (PEPCID) 20 MG tablet Take 1 tablet (20 mg) by mouth 2 times daily as needed (acid reflux)  Qty: 20 tablet, Refills: 0      medication given by implanted intrathecal pump continuously. Drug # 1: Fentanyl (Sublimaze) - Conc: 2000 mcg/mL - Total Dose / 24 hours: 824.2 mcg    Drug # 2: Bupivacaine (Marcaine)  - Conc:17.7 mg/mL - Total Dose / 24 hours: 7.294 mg    Drug # 3: Hydromorphone (Dilaudid)  - Conc: 5.1 mg/mL - Total Dose / 24 hours: 2.1016 mg    Diluent: NS    Infusion Rate: 0.4121 mL/24 hrs  Pump Reservoir Volume: 40  mL    Bolus doses: up to 15 bolus doses/24 hours with 1 hour lockout  Each bolus: fentanyl 80.1 mcg, 0.708 mg Bupivacaine, 0.2041 mg Dilaudid    Outside Clinic & Provider: [facility] in [place] [phone]  Last Refill Date: 12/30/24  Next Refill Date: 02/03/25  Low [place] Alarm Volume: 2.0 mL alarm date 02/04/25  Pump : syncromed II      omeprazole (PRILOSEC) 20 MG DR capsule Take 20 mg by mouth 2 times daily.      ondansetron (ZOFRAN) 4 MG tablet Take 1 tablet (4 mg) by mouth every 6 hours as needed for nausea.  Qty: 20 tablet, Refills: 0    Associated Diagnoses: Nausea           STOP taking these medications       atenolol (TENORMIN) 25 MG tablet Comments:   Reason for Stopping:             Allergies   Allergies   Allergen Reactions    Adhesive Tape     Codeine Nausea and Vomiting    Hydromorphone Headache and Nausea and Vomiting    Morphine Nausea and Vomiting    Bacitracin Rash    Methadone Rash

## 2025-01-13 NOTE — PLAN OF CARE
"PRIMARY DIAGNOSIS: \"GENERIC\" NURSING  OUTPATIENT/OBSERVATION GOALS TO BE MET BEFORE DISCHARGE:  ADLs back to baseline: Yes    Activity and level of assistance: Up with standby assistance.    Pain status: Improved with use of alternative comfort measures i.e.: Patient uses pain pump from home    Return to near baseline physical activity: Yes     Discharge Planner Nurse   Safe discharge environment identified: Yes  Barriers to discharge: Yes       Entered by: Jolynn Claire RN 01/13/2025 10:03 AM     Please review provider order for any additional goals.   Nurse to notify provider when observation goals have been met and patient is ready for discharge.Goal Outcome Evaluation:  Patient alert and oriented. States that pain is at a 3, which she reports is at a tolerable level. Up with SBA.                       "

## 2025-01-13 NOTE — ED PROVIDER NOTES
EMERGENCY DEPARTMENT ENCOUNTER      NAME: Mariah Koehler  AGE: 69 year old female  YOB: 1955  MRN: [id number]  EVALUATION DATE & TIME: 1/12/2025  6:03 PM    PCP: Alex Nolasco    ED PROVIDER: Meka Randolph M.D.      Chief Complaint   Patient presents with    Nausea & Vomiting     FINAL IMPRESSION:  1. Generalized abdominal pain    2. Nausea and vomiting, unspecified vomiting type        ED COURSE & MEDICAL DECISION MAKING:    Pertinent Labs & Imaging studies reviewed. (See chart for details)  ED Course as of 01/12/25 2228   Sun Jan 12, 2025 1918 Patient is a pleasant 69-year-old female who comes in today for evaluation of nausea and vomiting.  She still passing gas but denies any diarrhea.  Symptoms started this morning.  She has had similar episodes before and has been hospitalized in the past but she does not know what the diagnosis is.  She got a liter of fluid before I saw her and feels like she has to pee now so we will get her up to the bathroom.  She denies any fevers or chills.  She looks uncomfortable.  She does not feel like the Zofran has helped much so can try little Phenergan on her.  Abdomen is soft and nontender.  Vital signs look pretty good on arrival and lab work is reassuring.  I think we can get her feeling better then we can try to get her discharged home.  I discussed this with her and she is in agreement with the plan.   2056 I checked back in on the patient.  She still quite uncomfortable and nauseated.  She is complaining of a headache so I ordered some Toradol, Reglan, Benadryl for her.  Will get CT imaging of her belly because at this point I think she may end up requiring admission to the hospital.  She has had multiple scans before but at 69 years old with her symptoms I think we need to check her out a little further if she is in a come in.   2155 CT is similar to previous.  I will check in on the patient see how she is doing symptomatically and we can admit  her to the hospital if we need to.   2202 I checked back in on the patient and she is still quite miserable and feeling nauseated so I will work on getting her admitted to the hospital.  She is comfortable with the plan.   2227 I discussed admission with Dr. Patten with the hospitalist service.  Patient is a [facility] observation admission.       Medical Decision Making    History:  Supplemental history from: None  External Record(s) reviewed: I reviewed patient's echocardiogram from 10/26/2023.  Patient had an ejection fraction of 65 to 70% at that time.  Reviewed Dr. Nolasco's clinic visit on 12/31/2024.  Patient was following up after hospitalization for chronic nausea and vomiting.  Symptoms had resolved but this has been a repetitive issue and recurrent over the past few years with no clear source.  I also reviewed the patient's discharge summary from 12/10/2024.  Patient had been admitted to the hospital for persistent nausea and vomiting and diarrhea and diffuse weakness.  They had suspected viral gastroenteritis although seems to have a cyclic vomiting pattern every 4 to 6 weeks for several years.  GI thought symptoms were likely viral in nature.    Work Up:  Emergent/Severe conditions considered and evaluated for: Electrolyte abnormality, hyperglycemia, hypoglycemia, acidosis, anemia, thrombocytopenia, renal failure, dehydration, small bowel obstruction  I independently reviewed and interpreted none.   In addition to work up documented, I considered the following work up: Considered CT abdomen pelvis with patient had a benign abdominal exam and has had multiple episodes like this before.  Medications given that require intensive monitoring for toxicity: None    External consultation:  Discussion of management with another provider: Hospitalist    Complicating factors:  Care impacted by chronic illness: COPD, hypertension, hydronephrosis, chronic pain    Disposition considerations: Admission        At the  conclusion of the encounter I discussed  the results of all of the tests and the disposition with patient.   All questions were answered.  The patient acknowledged understanding and was involved in the decision making regarding the overall care plan.      MEDICATIONS GIVEN IN THE EMERGENCY:  Medications   sodium chloride 0.9% BOLUS 1,000 mL (0 mLs Intravenous Stopped 1/12/25 1929)   ondansetron (ZOFRAN) injection 4 mg (4 mg Intravenous $Given 1/12/25 1841)   promethazine (PHENERGAN) 25 mg in sodium chloride 0.9 % 55 mL intermittent infusion (25 mg Intravenous $Given 1/12/25 2025)   metoclopramide (REGLAN) injection 10 mg (10 mg Intravenous $Given 1/12/25 2106)   diphenhydrAMINE (BENADRYL) injection 25 mg (25 mg Intravenous $Given 1/12/25 2103)   ketorolac (TORADOL) injection 15 mg (15 mg Intravenous $Given 1/12/25 2105)   iopamidol (ISOVUE-370) solution 90 mL (90 mLs Intravenous $Given 1/12/25 2124)     =================================================================    HPI    Triage Note: Since this morning pt has been nauseated and vomiting. Has not been able to keep anything down, no diarrhea.         Triage Assessment (Adult)       Row Name 01/12/25 2751          Triage Assessment    Airway WDL WDL        Respiratory WDL    Respiratory WDL WDL        Skin Circulation/Temperature WDL    Skin Circulation/Temperature WDL WDL        Cardiac WDL    Cardiac WDL WDL        Peripheral/Neurovascular WDL    Peripheral Neurovascular WDL WDL        Cognitive/Neuro/Behavioral WDL    Cognitive/Neuro/Behavioral WDL WDL                     Use of : None      Mariah Koehler is a 69 year old female who presents with nausea and vomiting and some abdominal discomfort without diarrhea that has been a problem for her in the past.    PAST MEDICAL HISTORY:  Past Medical History:   Diagnosis Date    Current mild episode of major depressive disorder, unspecified whether recurrent 02/17/2020    Hypertension     Migraine  headache 11/06/2023    Nausea and vomiting     Opiate dependence (H)     RSD (reflex sympathetic dystrophy)     Sacral fracture, closed (H)        PAST SURGICAL HISTORY:  Past Surgical History:   Procedure Laterality Date    APPENDECTOMY      COMBINED CYSTOSCOPY, RETROGRADES, URETEROSCOPY, LASER HOLMIUM LITHOTRIPSY URETER(S), INSERT STENT Right 11/3/2024    Procedure: CYSTOSCOPY, RIGHT RETROGRADE PYELOGRAM, RIGHt URETEROSCOPY AND BASKET RETRIEVAL OF STONE, RIGHT URETERAL STENT PLACEMENT;  Surgeon: Cristiano Otero MD;  Location: [facility] OR    ESOPHAGOSCOPY, GASTROSCOPY, DUODENOSCOPY (EGD), COMBINED N/A 2/20/2023    Procedure: ESOPHAGOGASTRODUODENOSCOPY with biopsies;  Surgeon: Na Brooks MD;  Location: [facility] OR    ESOPHAGOSCOPY, GASTROSCOPY, DUODENOSCOPY (EGD), COMBINED N/A 12/9/2024    Procedure: ESOPHAGOGASTRODUODENOSCOPY, GASTRIC BIOPSIES;  Surgeon: Kuldeep Cornejo MD;  Location: [facility] OR    GYN SURGERY      HC REVISE MEDIAN N/CARPAL TUNNEL SURG      Description: Neuroplasty Decompression Median Nerve At Carpal Tunnel;  Recorded: 09/19/2011;    HYSTERECTOMY  1984    IR CERVICAL EPIDURAL STEROID INJECTION  1/14/2005    LAPAROSCOPIC NISSEN FUNDOPLICATION N/A 2/23/2024    Procedure: Paraesophageal hiatal hernia repair with mesh, ROBOT-ASSISTED, LAPAROSCOPIC, USING DA ROSMERY XI;  Surgeon: Lemuel Ornelas DO;  Location: [facility] OR    OOPHORECTOMY Bilateral 1985    NE SYMPATHECTOMY,CERVICOTHORACIC      Description: Surgical Sympathectomy Cervicothoracic;  Recorded: 09/19/2011;    [facility] [place] DECOMPRESS FASCIOTOMY FINGR/HAND      Description: Decompression Fasciotomy Of The Hand;  Recorded: 09/19/2011;    ZZC LAP,CHOLECYSTECTOMY/EXPLORE      Description: Cholecystectomy Laparoscopic;  Recorded: 12/09/2011;    Z TOTAL ABDOM HYSTERECTOMY      Description: Hysterectomy;  Recorded: 03/23/2011;       CURRENT MEDICATIONS:    No current facility-administered medications for this  encounter.    Current Outpatient Medications:     acetaminophen (TYLENOL) 325 MG tablet, Take 1-2 tablets (325-650 mg) by mouth every 6 hours as needed for mild pain or headaches, Disp: , Rfl:     aspirin 81 MG EC tablet, Take 1 tablet (81 mg) by mouth daily, Disp: 90 tablet, Rfl: 3    atenolol (TENORMIN) 25 MG tablet, Take 0.5 tablets (12.5 mg) by mouth daily., Disp: 90 tablet, Rfl: 3    baclofen (LIORESAL) 10 MG tablet, TAKE 1 TABLET BY MOUTH 3 TIMES DAILY, Disp: 90 tablet, Rfl: 1    famotidine (PEPCID) 20 MG tablet, Take 1 tablet (20 mg) by mouth 2 times daily as needed (acid reflux), Disp: 20 tablet, Rfl: 0    medication given by implanted intrathecal pump, continuously. Drug # 1: Fentanyl (Sublimaze) - Conc: 2000 mcg/mL - Total Dose / 24 hours: 824.2 mcg  Drug # 2: Bupivacaine (Marcaine)  - Conc:17.7 mg/mL - Total Dose / 24 hours: 7.294 mg  Drug # 3: Hydromorphone (Dilaudid)  - Conc: 5.1 mg/mL - Total Dose / 24 hours: 2.1016 mg  Diluent: NS  Infusion Rate: 0.4121 mL/24 hrs Pump Reservoir Volume: 40 mL  Bolus doses: up to 15 bolus doses/24 hours with 1 hour lockout Each bolus: fentanyl 80.1 mcg, 0.708 mg Bupivacaine, 0.2041 mg Dilaudid  Outside Clinic & Provider: [facility] in [place] [phone] Last Refill Date: 11/25/2024 Next Refill Date: 12/30/24 Low [place] Alarm Volume: 2.0 mL Pump : OSIsoft, Disp: , Rfl:     multivitamin, therapeutic (THERA-VIT) TABS tablet, Take 1 tablet by mouth daily, Disp: 90 tablet, Rfl: 3    omeprazole (PRILOSEC) 20 MG DR capsule, Take 20 mg by mouth 2 times daily., Disp: , Rfl:     ondansetron (ZOFRAN) 4 MG tablet, Take 1 tablet (4 mg) by mouth every 6 hours as needed for nausea., Disp: 20 tablet, Rfl: 0    prochlorperazine (COMPAZINE) 5 MG tablet, Take 1 tablet (5 mg) by mouth every 8 hours as needed for nausea or vomiting, Disp: 10 tablet, Rfl: 0    ALLERGIES:  Allergies   Allergen Reactions    Adhesive Tape     Codeine Nausea and Vomiting    Hydromorphone  Headache and Nausea and Vomiting    Morphine Nausea and Vomiting    Bacitracin Rash    Methadone Rash       FAMILY HISTORY:  Family History   Problem Relation Age of Onset    Cerebrovascular Disease Mother     Diabetes Mother     Heart Disease Mother     Hypertension Mother     Rheumatologic Disease Mother     Heart Disease Father     Pulmonary Hypertension Father     Kidney failure Father     Cancer Father         melanoma    Diabetes Sister     Hypertension Sister     Hypertension Brother        SOCIAL HISTORY:   Social History     Socioeconomic History    Marital status:    Tobacco Use    Smoking status: Every Day     Current packs/day: 0.75     Average packs/day: 0.8 packs/day for 57.0 years (42.8 ttl pk-yrs)     Types: Cigarettes     Start date: 1/1/1968     Passive exposure: Never    Smokeless tobacco: Never   Vaping Use    Vaping status: Never Used   Substance and Sexual Activity    Alcohol use: Not Currently     Comment: 1 glass a year    Drug use: Never     Social Drivers of Health     Financial Resource Strain: Low Risk  (12/7/2024)    Financial Resource Strain     Within the past 12 months, have you or your family members you live with been unable to get utilities (heat, electricity) when it was really needed?: No   Food Insecurity: Low Risk  (12/7/2024)    Food Insecurity     Within the past 12 months, did you worry that your food would run out before you got money to buy more?: No     Within the past 12 months, did the food you bought just not last and you didn t have money to get more?: No   Transportation Needs: Low Risk  (12/7/2024)    Transportation Needs     Within the past 12 months, has lack of transportation kept you from medical appointments, getting your medicines, non-medical meetings or appointments, work, or from getting things that you need?: No   Interpersonal Safety: Low Risk  (12/9/2024)    Interpersonal Safety     Do you feel physically and emotionally safe where you currently  "live?: Yes     Within the past 12 months, have you been hit, slapped, kicked or otherwise physically hurt by someone?: No     Within the past 12 months, have you been humiliated or emotionally abused in other ways by your partner or ex-partner?: No   Housing Stability: Low Risk  (12/7/2024)    Housing Stability     Do you have housing? : Yes     Are you worried about losing your housing?: No       PHYSICAL EXAM    VITAL SIGNS: BP (!) 166/72   Pulse 73   Temp 97.4  F (36.3  C) (Temporal)   Resp 22   Ht 1.562 m (5' 1.5\")   Wt 47.6 kg (105 lb)   SpO2 96%   BMI 19.52 kg/m     GENERAL: Awake, alert, answering questions appropriately, appears uncomfortable  SPEECH:  Easy to understand speech, Normal volume and juancho  PULMONARY: No respiratory distress, Lungs clear to auscultation bilaterally  CARDIOVASCULAR: Regular rate and rhythm, Distal pulses present and normal.  ABDOMINAL: Soft, Nondistended, Nontender, No rebound or guarding, No palpable masses  EXTREMITIES: No lower extremity edema.  PSYCH: Normal mood and affect     LAB:  All pertinent labs reviewed and interpreted.  Results for orders placed or performed during the hospital encounter of 01/12/25   CT Abdomen Pelvis w Contrast    Impression    IMPRESSION:   1.  Borderline proximal colonic wall thickening which may be inflammatory, similar appearance to prior CT. Consider follow-up with gastroenterology.    2.  No other focal inflammatory change identified in the abdomen or pelvis.   Basic metabolic panel   Result Value Ref Range    Sodium 138 135 - 145 mmol/L    Potassium 4.2 3.4 - 5.3 mmol/L    Chloride 102 98 - 107 mmol/L    Carbon Dioxide (CO2) 24 22 - 29 mmol/L    Anion Gap 12 7 - 15 mmol/L    Urea Nitrogen 12.5 8.0 - 23.0 mg/dL    Creatinine 0.53 0.51 - 0.95 mg/dL    GFR Estimate >90 >60 mL/min/1.73m2    Calcium 9.4 8.8 - 10.4 mg/dL    Glucose 121 (H) 70 - 99 mg/dL   Hepatic function panel   Result Value Ref Range    Protein Total 6.5 6.4 - 8.3 g/dL "    Albumin 4.3 3.5 - 5.2 g/dL    Bilirubin Total 0.4 <=1.2 mg/dL    Alkaline Phosphatase 93 40 - 150 U/L    AST 22 0 - 45 U/L    ALT 21 0 - 50 U/L    Bilirubin Direct <0.20 0.00 - 0.30 mg/dL   Result Value Ref Range    Lipase 17 13 - 60 U/L   Result Value Ref Range    Magnesium 1.7 1.7 - 2.3 mg/dL   CBC with platelets and differential   Result Value Ref Range    WBC Count 6.2 4.0 - 11.0 10e3/uL    RBC Count 5.10 3.80 - 5.20 10e6/uL    Hemoglobin 14.9 11.7 - 15.7 g/dL    Hematocrit 43.9 35.0 - 47.0 %    MCV 86 78 - 100 fL    MCH 29.2 26.5 - 33.0 pg    MCHC 33.9 31.5 - 36.5 g/dL    RDW 13.1 10.0 - 15.0 %    Platelet Count 203 150 - 450 10e3/uL    % Neutrophils 76 %    % Lymphocytes 17 %    % Monocytes 6 %    % Eosinophils 0 %    % Basophils 1 %    % Immature Granulocytes 0 %    NRBCs per 100 WBC 0 <1 /100    Absolute Neutrophils 4.7 1.6 - 8.3 10e3/uL    Absolute Lymphocytes 1.1 0.8 - 5.3 10e3/uL    Absolute Monocytes 0.4 0.0 - 1.3 10e3/uL    Absolute Eosinophils 0.0 0.0 - 0.7 10e3/uL    Absolute Basophils 0.0 0.0 - 0.2 10e3/uL    Absolute Immature Granulocytes 0.0 <=0.4 10e3/uL    Absolute NRBCs 0.0 10e3/uL       RADIOLOGY:  CT Abdomen Pelvis w Contrast   Final Result   IMPRESSION:    1.  Borderline proximal colonic wall thickening which may be inflammatory, similar appearance to prior CT. Consider follow-up with gastroenterology.      2.  No other focal inflammatory change identified in the abdomen or pelvis.            Meka Randolph M.D.  Emergency Medicine  [facility] EMERGENCY DEPARTMENT  [address] [id number] [phone]  Dept: [phone]       Meka Randolph MD  01/12/25 6625

## 2025-01-13 NOTE — H&P
[facility]    History and Physical - Hospitalist Service       Date of Admission:  1/12/2025    Assessment & Plan      Mariah Koehler is a 69 year old female admitted on 1/12/2025.   Principal Problem:    Intractable nausea and vomiting  Active Problems:    Opioid Dependence With Continuous Use    Chronic obstructive pulmonary disease, unspecified COPD type (H)    Unintentional weight loss    CRPS (complex regional pain syndrome), lower limb    Gastroparesis    Generalized abdominal pain    Nicotine use      Mariah Koehler is a 68 year old female with history of nephrolithiasis, hydronephrosis, COPD, hypertension, reflex sympathetic dystrophy and chronic pain syndrome on continuous intrathecal pump presented today with persistent nausea, vomiting, diarrhea and diffuse weakness.  Her presentation today is similar to her previous episodes, lab review: no leukocytosis, LFT and lipase normal. CT of abdomen/pelvis negative for obstruction but noted mild ascending colonic wall thickening from possible colitis-no diarrhea CRP Pro-Yuriy normal no significant colitis.,  -Plan is to observe for hydration and symptomatic control already on Reglan and-Compazine and will also try Phenergan.  She looks fairly thin and probably has some malnutrition as well will consult dietary,-Noted minimal sinus bradycardia after medications, will also get EKG-asymptomatic and give a dose of magnesium at this borderline 1.7.  Will also check a troponin      Nausea, vomiting-?  Viral or gastroparesis or failure to thrive-  Stable vitals and labs  -She looks uncomfortable. She does not feel like the Zofran has helped much ,Abdomen is soft and nontender. Vital signs look pretty good on arrival and lab work is reassuring.  -Noted minimal sinus bradycardia after medications, will also get EKG-asymptomatic and give a dose of magnesium at this borderline 1.7.  Suspect viral gastroenteritis at primary etiology for this  "admission, although noted that she by history reports a \"cyclic\" vomiting pattern every 4-6 weeks for several years  -WBC, LFT's and lipase normal  -CT A/P shows possible colitis, no obstruction-CRP Pro-Yuriy normal  - Reviewed endoscopy in 2023. No remarkable findings but indication was also nausea/vomiting/weight loss.  S/P EGD 12/9 which showed some gastritis but otherwise negative).   -she had CT imaging of her head in 4/24 that was negative for any potential etiology        Chronic pain with Pain Pump, H/O reflex sympathetic dystrophy   -continue home intrathecal pain pump and home baclofen  - Pain management input appreciated      H/O COPD   -not on any chronic inhalers PTA     Hypertension  - continue home atenolol      Tobacco Dependence  -Nicotine replacement ordered  -Counseling provided     Chronic Medical problems-Hold outside hospital medication having active nausea    Initial orders were placed.  Anticipated length of stay of 1-2 midnights of hospitalization depending on progression, planning,findings,further testing or treatment needs. Services are medically necessary for evaluation and treatment of the acute & on chronic superimposed conditions with the treatment plan as outlined above.  Current problem list also has been updated.       Observation Goals: -diagnostic tests and consults completed and resulted, -vital signs normal or at patient baseline, Nurse to notify provider when observation goals have been met and patient is ready for discharge.  Diet: Regular Diet Adult    DVT Prophylaxis: Enoxaparin (Lovenox) SQ  Bellamy Catheter: Not present  Lines: None     Cardiac Monitoring: None  Code Status: Full Code      Clinically Significant Risk Factors Present on Admission                 # Drug Induced Platelet Defect: home medication list includes an antiplatelet medication   # Hypertension: Noted on problem list               # Financial/Environmental Concerns:           Disposition Plan     Medically " Ready for Discharge: Anticipated Tomorrow           Dez Patten MD  Hospitalist Service  [facility]  Securely message with [employer] (more info)  Text page via [facility] Paging/Directory     ______________________________________________________________________    Chief Complaint   Intractable nausea    History is obtained from the patient, electronic health record, and emergency department physician    History of Present Illness   Mariah Koehler is a 69 year old female who who comes in today for evaluation of nausea and vomiting. She still passing gas but denies any diarrhea. Symptoms started this morning. She has had similar episodes before and has been hospitalized in the past but she does not know what the diagnosis is.  She denies any fevers or chills.   -She looks uncomfortable. She does not feel like the Zofran has helped much ,Abdomen is soft and nontender. Vital signs look pretty good on arrival and lab work is reassuring.      Past Medical History    Past Medical History:   Diagnosis Date    Current mild episode of major depressive disorder, unspecified whether recurrent 02/17/2020    Hypertension     Migraine headache 11/06/2023    Nausea and vomiting     Opiate dependence (H)     RSD (reflex sympathetic dystrophy)     Sacral fracture, closed (H)        Past Surgical History   Past Surgical History:   Procedure Laterality Date    APPENDECTOMY      COMBINED CYSTOSCOPY, RETROGRADES, URETEROSCOPY, LASER HOLMIUM LITHOTRIPSY URETER(S), INSERT STENT Right 11/3/2024    Procedure: CYSTOSCOPY, RIGHT RETROGRADE PYELOGRAM, RIGHt URETEROSCOPY AND BASKET RETRIEVAL OF STONE, RIGHT URETERAL STENT PLACEMENT;  Surgeon: Cristiano Otero MD;  Location: [facility] OR    ESOPHAGOSCOPY, GASTROSCOPY, DUODENOSCOPY (EGD), COMBINED N/A 2/20/2023    Procedure: ESOPHAGOGASTRODUODENOSCOPY with biopsies;  Surgeon: Na Brooks MD;  Location: [facility] OR    ESOPHAGOSCOPY, GASTROSCOPY,  DUODENOSCOPY (EGD), COMBINED N/A 12/9/2024    Procedure: ESOPHAGOGASTRODUODENOSCOPY, GASTRIC BIOPSIES;  Surgeon: Kuldeep Cornejo MD;  Location: [facility] OR    GYN SURGERY      HC REVISE MEDIAN N/CARPAL TUNNEL SURG      Description: Neuroplasty Decompression Median Nerve At Carpal Tunnel;  Recorded: 09/19/2011;    HYSTERECTOMY  1984    IR CERVICAL EPIDURAL STEROID INJECTION  1/14/2005    LAPAROSCOPIC NISSEN FUNDOPLICATION N/A 2/23/2024    Procedure: Paraesophageal hiatal hernia repair with mesh, ROBOT-ASSISTED, LAPAROSCOPIC, USING DA ROSMERY XI;  Surgeon: Lemuel Ornelas DO;  Location: [facility] OR    OOPHORECTOMY Bilateral 1985    [place] SYMPATHECTOMY,CERVICOTHORACIC      Description: Surgical Sympathectomy Cervicothoracic;  Recorded: 09/19/2011;    [facility] DECOMPRESS FASCIOTOMY FINGR/HAND      Description: Decompression Fasciotomy Of The Hand;  Recorded: 09/19/2011;    ZC LAP,CHOLECYSTECTOMY/EXPLORE      Description: Cholecystectomy Laparoscopic;  Recorded: 12/09/2011;    Z TOTAL ABDOM HYSTERECTOMY      Description: Hysterectomy;  Recorded: 03/23/2011;       Prior to Admission Medications   Prior to Admission Medications   Prescriptions Last Dose Informant Patient Reported? Taking?   acetaminophen (TYLENOL) 325 MG tablet 1/12/2025 Morning Self No Yes   Sig: Take 1-2 tablets (325-650 mg) by mouth every 6 hours as needed for mild pain or headaches   aspirin 81 MG EC tablet 1/12/2025 Morning Self No Yes   Sig: Take 1 tablet (81 mg) by mouth daily   atenolol (TENORMIN) 25 MG tablet 1/12/2025 Morning  No Yes   Sig: Take 0.5 tablets (12.5 mg) by mouth daily.   baclofen (LIORESAL) 10 MG tablet 1/12/2025 Noon  No Yes   Sig: TAKE 1 TABLET BY MOUTH 3 TIMES DAILY   famotidine (PEPCID) 20 MG tablet 1/12/2025 Morning Self No Yes   Sig: Take 1 tablet (20 mg) by mouth 2 times daily as needed (acid reflux)   medication given by implanted intrathecal pump 1/12/2025 Morning Self Yes Yes   Sig: continuously. Drug # 1:  Fentanyl (Sublimaze) - Conc: 2000 mcg/mL - Total Dose / 24 hours: 824.2 mcg    Drug # 2: Bupivacaine (Marcaine)  - Conc:17.7 mg/mL - Total Dose / 24 hours: 7.294 mg    Drug # 3: Hydromorphone (Dilaudid)  - Conc: 5.1 mg/mL - Total Dose / 24 hours: 2.1016 mg    Diluent: NS    Infusion Rate: 0.4121 mL/24 hrs  Pump Reservoir Volume: 40 mL    Bolus doses: up to 15 bolus doses/24 hours with 1 hour lockout  Each bolus: fentanyl 80.1 mcg, 0.708 mg Bupivacaine, 0.2041 mg Dilaudid    Outside Clinic & Provider: [facility] in [place] [phone]  Last Refill Date: 11/25/2024  Next Refill Date: 12/30/24  Low [place] Alarm Volume: 2.0 mL  Pump : Beaming   omeprazole (PRILOSEC) 20 MG DR capsule 1/12/2025 Morning  Yes Yes   Sig: Take 20 mg by mouth 2 times daily.   ondansetron (ZOFRAN) 4 MG tablet 1/12/2025 Morning Self No Yes   Sig: Take 1 tablet (4 mg) by mouth every 6 hours as needed for nausea.   prochlorperazine (COMPAZINE) 5 MG tablet Past Month Self No Yes   Sig: Take 1 tablet (5 mg) by mouth every 8 hours as needed for nausea or vomiting      Facility-Administered Medications: None        Review of Systems    The 5 point Review of Systems is negative other than noted in the HPI or here.   No chest pain, shortness of breath or fever or chills    Social History   I have reviewed this patient's social history and updated it with pertinent information if needed.  Social History     Tobacco Use    Smoking status: Every Day     Current packs/day: 0.75     Average packs/day: 0.8 packs/day for 57.0 years (42.8 ttl pk-yrs)     Types: Cigarettes     Start date: 1/1/1968     Passive exposure: Never    Smokeless tobacco: Never   Vaping Use    Vaping status: Never Used   Substance Use Topics    Alcohol use: Not Currently     Comment: 1 glass a year    Drug use: Never         Family History   I have reviewed this patient's family history and updated it with pertinent information if needed.  Family History   Problem  Relation Age of Onset    Cerebrovascular Disease Mother     Diabetes Mother     Heart Disease Mother     Hypertension Mother     Rheumatologic Disease Mother     Heart Disease Father     Pulmonary Hypertension Father     Kidney failure Father     Cancer Father         melanoma    Diabetes Sister     Hypertension Sister     Hypertension Brother          Allergies   Allergies   Allergen Reactions    Adhesive Tape     Codeine Nausea and Vomiting    Hydromorphone Headache and Nausea and Vomiting    Morphine Nausea and Vomiting    Bacitracin Rash    Methadone Rash        Physical Exam   Vital Signs: Temp: 98.4  F (36.9  C) Temp src: Oral BP: (!) 177/77 Pulse: (!) 48   Resp: 16 SpO2: 98 % O2 Device: None (Room air)    Weight: 104 lbs 4.44 oz        Medical Decision Making             Data

## 2025-01-13 NOTE — PLAN OF CARE
PRIMARY DIAGNOSIS: n/v, abd pain, GENERALIZED WEAKNESS/FTT    OUTPATIENT/OBSERVATION GOALS TO BE MET BEFORE DISCHARGE  1. Orthostatic performed: No    2. Tolerating PO medications:  has not taken po meds    3. Return to near baseline physical activity: No    4. Cleared for discharge by consultants (if involved): No    Discharge Planner Nurse   Safe discharge environment identified: Yes  Barriers to discharge: Yes       Entered by: Elena Steen RN 01/13/2025 5:55 AM   Pt up to [facility], weak, but stronger than when admitted to obs. Nausea better, declines antiemetics, no vomiting this shift. No abdominal pain, but headache 3/10, no meds needed per pt. Pt has slept overnight & is feeling better. Intrathecal pain pump to R) hip/back. Encouraged to drink fluid, she is wanting food this morning, encouraged lighter foods & eat slow. Plan to c/s nutrition, pain management today, monitor nausea & abd pain, symptoms management. Will cont w/ current POC.   Please review provider order for any additional goals.   Nurse to notify provider when observation goals have been met and patient is ready for discharge.

## 2025-01-13 NOTE — PLAN OF CARE
Patient admitted to room SS13 at approximately 2350 via wheel chair from emergency room.  Patient was accompanied by Self.  Discharge transportation provided by:  Patient ambulated/transferred:  with 2 assist, GB. Able to pivot from  to BSC & x2 staff assist from BSC to bed. Pt is very weak.   Patient is alert and orientated x 3.  Outpatient Observation education provided to: (patient, family, friend)  MDRO Education done if applicable (MRSA, VRE, etc)  Safety risks were identified during admission:  fall and skin.   Yellow risk/fall band applied:  Yes  Home meds sent home: None. Pt does have intrathecal pain pump implanted to r) hip/back area, she says it is infusing 24/7.   Home meds sent to pharmacy:none  IF YES add 1/2 sheet laminated page reminder to chart/clipboard   Detailed Belongings: black coat, teal robe, white tshirt, gray sweatpants, white socks, white shoes, black purse, apple phone, chargers, pain pump remotes/monitorsx2. Wallet w/ cards (pt doesn't know how many), fit bit watch.

## 2025-01-13 NOTE — PHARMACY-ADMISSION MEDICATION HISTORY
Pharmacist Admission Medication History    Admission medication history is complete. The information provided in this note is only as accurate as the sources available at the time of the update.    Information Source(s): Patient and [facility]  via in-person and phone    Pertinent Information: Pain pump contents and refill dates confirmed with [facility] by this writer. Another [facility] completed the interview with the patient for last doses/other medications on 01/12/25.    Changes made to PTA medication list:  Added: None  Deleted: None  Changed: None    Allergies reviewed with patient and updates made in EHR: yes    Medication History Completed By: Cherelle Arreola [facility] 1/13/2025 8:49 AM    PTA Med List   Medication Sig Last Dose/Taking    acetaminophen (TYLENOL) 325 MG tablet Take 1-2 tablets (325-650 mg) by mouth every 6 hours as needed for mild pain or headaches 1/12/2025 Morning    aspirin 81 MG EC tablet Take 1 tablet (81 mg) by mouth daily 1/12/2025 Morning    atenolol (TENORMIN) 25 MG tablet Take 0.5 tablets (12.5 mg) by mouth daily. 1/12/2025 Morning    baclofen (LIORESAL) 10 MG tablet TAKE 1 TABLET BY MOUTH 3 TIMES DAILY 1/12/2025 Noon    famotidine (PEPCID) 20 MG tablet Take 1 tablet (20 mg) by mouth 2 times daily as needed (acid reflux) 1/12/2025 Morning    medication given by implanted intrathecal pump continuously. Drug # 1: Fentanyl (Sublimaze) - Conc: 2000 mcg/mL - Total Dose / 24 hours: 824.2 mcg    Drug # 2: Bupivacaine (Marcaine)  - Conc:17.7 mg/mL - Total Dose / 24 hours: 7.294 mg    Drug # 3: Hydromorphone (Dilaudid)  - Conc: 5.1 mg/mL - Total Dose / 24 hours: 2.1016 mg    Diluent: NS    Infusion Rate: 0.4121 mL/24 hrs  Pump Reservoir Volume: 40 mL    Bolus doses: up to 15 bolus doses/24 hours with 1 hour lockout  Each bolus: fentanyl 80.1 mcg, 0.708 mg Bupivacaine, 0.2041 mg Dilaudid    Outside Clinic & Provider: [facility] in [place] [phone]  Last Refill Date:  12/30/24  Next Refill Date: 02/03/25  Low [place] Alarm Volume: 2.0 mL alarm date 02/04/25  Pump : syncromed II 1/12/2025 Morning    omeprazole (PRILOSEC) 20 MG DR capsule Take 20 mg by mouth 2 times daily. 1/12/2025 Morning    ondansetron (ZOFRAN) 4 MG tablet Take 1 tablet (4 mg) by mouth every 6 hours as needed for nausea. 1/12/2025 Morning    prochlorperazine (COMPAZINE) 5 MG tablet Take 1 tablet (5 mg) by mouth every 8 hours as needed for nausea or vomiting Past Month

## 2025-01-13 NOTE — SIGNIFICANT EVENT
Orders to discharge patient. Discussed discharge instructions with patient. Patient verbalized understanding. Patient discharged to home with . Patient left with all belongings in hand.

## 2025-01-13 NOTE — CONSULTS
[facility] ACUTE PAIN SERVICE CONSULTATION   [facility], [facility], [facility], [facility], [place]     Date of Admission:  1/12/2025  Date of Consult (When I saw the patient): 01/13/25     Assessment/Plan:     Mariah Koehler is a 69 year old female who was admitted on 1/12/2025.  Pain team was asked to see the patient for chronic pain, patient has an intrathecal pain pump. Admitted for nausea and vomiting, diffuse weakness. History of nephrolithiasis, hydronephrosis, COPD, hypertension, reflex sympathetic dystrophy and chronic pain syndrome on continuous intrathecal pump managed by [facility] pain clinic.  Describes pain as 0-3/10 and chronic in the LUE.     PLAN:   1) Pain is consistent with chronic pain, denies acute pain in the setting of chronic opioid use in the setting of an intrathecal pain pump.   Multimodal Medication Therapy  Topical: None  NSAID'S: None  Steroids: None  Muscle Relaxants: baclofen 10 mg tid   Adjuvants: Acetaminophen 650 mg every 4 as needed  Antidepressants/anxiolytics: None  Opioids: Intrathecal pain pump  IV Pain medication: Discontinue IV Dilaudid  Non-medication interventions: Ice, Heat, Rest, and Distraction (TV, Music, Reading)  Constipation Prophylaxis: Senna-docusate    Intrathecal pain pump:   Drug # 1: Fentanyl (Sublimaze) - Conc:2000 mcg/mL - Total Dose / 24 hours: 824.2 mcg       Drug # 2: Bupivacaine (Marcaine)  - Conc:17.7 mg/mL - Total Dose / 24 hours: 7.294 mg       Drug # 3: Hydromorphone (Dilaudid)  - Conc: 5.1 mg/mL - Total Dose / 24 hours: 2.1016 mg       Diluent: NS       Location of pump on patient: R posterior hip        Infusion Rate: 0.4121 mL/24 hrs   Pump Reservoir Volume: 40 mL  Current volume in Reservoir: 25.7   Alerts: None       Bolus doses: up to 15 bolus doses/24 hours with 1 hour lockout   Each bolus: fentanyl 80.1 mcg, 0.708 mg Bupivacaine, 0.2041 mg Dilaudid       Outside Clinic & Provider: [facility] in [place] [phone]   Last Refill Date:  12/30/2024   Next Refill Date: 2/3/25   Low [place] Alarm Volume: 2.0 mL  Low reservoir alarm date: 2/5/2025   Pump : Yoics         -Opioid prescriber has been none recent   -MN  pulled from system on 1/13/2025. This indicates 2 scripts for oxycodone last filled November 2024 in February 2024 for 14 or less tablets for each prescription  Discharge Recommendations - We recommend prescribing the following at the time of discharge: none      History of Present Illness (HPI):       Mariah Koehler is a 69 year old female who presented for nausea and vomiting.  Past medical history as above. The pain is reported to be chronic, located in the left upper extremity.  The patient denies acute pain.  Current pain is rated at 0-3/10 and goal is 0-3/10.  The patient denies constipation, chest pain, shortness of breath, dizziness, fever, and chills. The patient reports nausea, vomiting, and passing flatus.     Per MN  review, the patient does have an opioid tolerance. Opioid induced side effects noted and include: none    Reviewed medical record, labs, imaging, ED note, and care everywhere.     Home pain medications/psych medications/anticoagulation medications include: Acetaminophen 325-650 mg every 6 hours as needed, baclofen 10 mg 3 times daily, intrathecal pain pump    Medical History   PAST MEDICAL HISTORY:   Past Medical History:   Diagnosis Date    Current mild episode of major depressive disorder, unspecified whether recurrent 02/17/2020    Hypertension     Migraine headache 11/06/2023    Nausea and vomiting     Opiate dependence (H)     RSD (reflex sympathetic dystrophy)     Sacral fracture, closed (H)        PAST SURGICAL HISTORY:   Past Surgical History:   Procedure Laterality Date    APPENDECTOMY      COMBINED CYSTOSCOPY, RETROGRADES, URETEROSCOPY, LASER HOLMIUM LITHOTRIPSY URETER(S), INSERT STENT Right 11/3/2024    Procedure: CYSTOSCOPY, RIGHT RETROGRADE PYELOGRAM, RIGHt URETEROSCOPY AND  BASKET RETRIEVAL OF STONE, RIGHT URETERAL STENT PLACEMENT;  Surgeon: Cristiano Otero MD;  Location: [facility] OR    ESOPHAGOSCOPY, GASTROSCOPY, DUODENOSCOPY (EGD), COMBINED N/A 2/20/2023    Procedure: ESOPHAGOGASTRODUODENOSCOPY with biopsies;  Surgeon: Na Brooks MD;  Location: [facility] OR    ESOPHAGOSCOPY, GASTROSCOPY, DUODENOSCOPY (EGD), COMBINED N/A 12/9/2024    Procedure: ESOPHAGOGASTRODUODENOSCOPY, GASTRIC BIOPSIES;  Surgeon: Kuldeep Cornejo MD;  Location: [facility] OR    GYN SURGERY      HC REVISE MEDIAN N/CARPAL TUNNEL SURG      Description: Neuroplasty Decompression Median Nerve At Carpal Tunnel;  Recorded: 09/19/2011;    HYSTERECTOMY  1984    IR CERVICAL EPIDURAL STEROID INJECTION  1/14/2005    LAPAROSCOPIC NISSEN FUNDOPLICATION N/A 2/23/2024    Procedure: Paraesophageal hiatal hernia repair with mesh, ROBOT-ASSISTED, LAPAROSCOPIC, USING DA ROSMERY XI;  Surgeon: Lemuel Ornelas DO;  Location: [facility] OR    OOPHORECTOMY Bilateral 1985    DC SYMPATHECTOMY,CERVICOTHORACIC      Description: Surgical Sympathectomy Cervicothoracic;  Recorded: 09/19/2011;    ZZC DECOMPRESS FASCIOTOMY FINGR/HAND      Description: Decompression Fasciotomy Of The Hand;  Recorded: 09/19/2011;    ZZC LAP,CHOLECYSTECTOMY/EXPLORE      Description: Cholecystectomy Laparoscopic;  Recorded: 12/09/2011;    ZZC TOTAL ABDOM HYSTERECTOMY      Description: Hysterectomy;  Recorded: 03/23/2011;       FAMILY HISTORY:   Family History   Problem Relation Age of Onset    Cerebrovascular Disease Mother     Diabetes Mother     Heart Disease Mother     Hypertension Mother     Rheumatologic Disease Mother     Heart Disease Father     Pulmonary Hypertension Father     Kidney failure Father     Cancer Father         melanoma    Diabetes Sister     Hypertension Sister     Hypertension Brother        SOCIAL HISTORY:   Social History     Tobacco Use    Smoking status: Every Day     Current packs/day: 0.75      Average packs/day: 0.8 packs/day for 57.0 years (42.8 ttl pk-yrs)     Types: Cigarettes     Start date: 1/1/1968     Passive exposure: Never    Smokeless tobacco: Never   Substance Use Topics    Alcohol use: Not Currently     Comment: 1 glass a year        HEALTH & LIFESTYLE PRACTICES  Tobacco:  reports that she has been smoking cigarettes. She started smoking about 57 years ago. She has a 42.8 pack-year smoking history. She has never been exposed to tobacco smoke. She has never used smokeless tobacco.  Alcohol:  reports that she does not currently use alcohol.  Illicit drugs:  reports no history of drug use.    Allergies  Allergies   Allergen Reactions    Adhesive Tape     Codeine Nausea and Vomiting    Hydromorphone Headache and Nausea and Vomiting    Morphine Nausea and Vomiting    Bacitracin Rash    Methadone Rash       Problem List  Patient Active Problem List    Diagnosis Date Noted    Nicotine use 01/13/2025     Priority: Medium    Generalized abdominal pain 12/07/2024     Priority: Medium    Nausea vomiting and diarrhea 12/07/2024     Priority: Medium    Hydronephrosis with urinary obstruction due to ureteral calculus 11/02/2024     Priority: Medium    Kidney stone 11/02/2024     Priority: Medium    Gastroenteritis 10/21/2024     Priority: Medium    Diarrhea, unspecified type 10/21/2024     Priority: Medium    Vomiting 10/21/2024     Priority: Medium    Gastroparesis 02/22/2024     Priority: Medium    Hiatal hernia 02/22/2024     Priority: Medium    Moderate protein-calorie malnutrition 02/02/2024     Priority: Medium    Vitamin B12 deficiency (non anemic) 11/06/2023     Priority: Medium    Migraine headache 11/06/2023     Priority: Medium    History of cholecystectomy 11/06/2023     Priority: Medium    Abnormal CT of the abdomen 11/06/2023     Priority: Medium    CRPS (complex regional pain syndrome), lower limb 11/06/2023     Priority: Medium    Colitis 04/04/2023     Priority: Medium    Intractable nausea  and vomiting 04/04/2023     Priority: Medium    Presence of intrathecal baclofen pump 04/04/2023     Priority: Medium    Ketonuria 04/04/2023     Priority: Medium    Viral gastroenteritis 02/21/2023     Priority: Medium    Unintentional weight loss 02/21/2023     Priority: Medium    Current mild episode of major depressive disorder, unspecified whether recurrent 02/17/2020     Priority: Medium    Fatigue 03/22/2018     Priority: Medium    Nausea and vomiting      Priority: Medium     Created by Conversion        Reflex Sympathetic Dystrophy Of The Left Upper Limb      Priority: Medium     Created by Conversion  Replacement Utility updated for latest IMO load        Osteoporosis      Priority: Medium     Created by Conversion  Replacement Utility updated for latest IMO load        Hypertension      Priority: Medium     Created by Conversion  Replacement Utility updated for latest IMO load        Constipation      Priority: Medium     Created by Conversion  Replacement Utility updated for latest IMO load        Abdominal Pain      Priority: Medium     Created by Conversion  Replacement Utility updated for latest IMO load        Opioid Dependence With Continuous Use      Priority: Medium     Created by Conversion        Edema      Priority: Medium     Created by Conversion        Wheezing (Symptom)      Priority: Medium     Created by Conversion        Pain During Urination (Dysuria)      Priority: Medium     Created by Conversion        Frequent, Full-bladder Emptying (Polyuria)      Priority: Medium     Created by Conversion        Chronic obstructive pulmonary disease, unspecified COPD type (H)      Priority: Medium     Created by Conversion        Chronic pain syndrome      Priority: Medium     Created by Conversion        Vitamin B12 Deficiency      Priority: Medium     Created by Conversion        Microscopic Hematuria      Priority: Medium     Created by Conversion        Cough      Priority: Medium     Created  by Conversion        Gastroesophageal reflux disease 02/07/2013     Priority: Medium    Altered mental status 02/05/2011     Priority: Medium       Prior to Admission Medications   Medications Prior to Admission   Medication Sig Dispense Refill Last Dose/Taking    acetaminophen (TYLENOL) 325 MG tablet Take 1-2 tablets (325-650 mg) by mouth every 6 hours as needed for mild pain or headaches   1/12/2025 Morning    aspirin 81 MG EC tablet Take 1 tablet (81 mg) by mouth daily 90 tablet 3 1/12/2025 Morning    baclofen (LIORESAL) 10 MG tablet TAKE 1 TABLET BY MOUTH 3 TIMES DAILY 90 tablet 1 1/12/2025 Noon    famotidine (PEPCID) 20 MG tablet Take 1 tablet (20 mg) by mouth 2 times daily as needed (acid reflux) 20 tablet 0 1/12/2025 Morning    medication given by implanted intrathecal pump continuously. Drug # 1: Fentanyl (Sublimaze) - Conc: 2000 mcg/mL - Total Dose / 24 hours: 824.2 mcg    Drug # 2: Bupivacaine (Marcaine)  - Conc:17.7 mg/mL - Total Dose / 24 hours: 7.294 mg    Drug # 3: Hydromorphone (Dilaudid)  - Conc: 5.1 mg/mL - Total Dose / 24 hours: 2.1016 mg    Diluent: NS    Infusion Rate: 0.4121 mL/24 hrs  Pump Reservoir Volume: 40 mL    Bolus doses: up to 15 bolus doses/24 hours with 1 hour lockout  Each bolus: fentanyl 80.1 mcg, 0.708 mg Bupivacaine, 0.2041 mg Dilaudid    Outside Clinic & Provider: [facility] in [place] [phone]  Last Refill Date: 12/30/24  Next Refill Date: 02/03/25  Low [place] Alarm Volume: 2.0 mL alarm date 02/04/25  Pump : syncromed II   1/12/2025 Morning    omeprazole (PRILOSEC) 20 MG DR capsule Take 20 mg by mouth 2 times daily.   1/12/2025 Morning    ondansetron (ZOFRAN) 4 MG tablet Take 1 tablet (4 mg) by mouth every 6 hours as needed for nausea. 20 tablet 0 1/12/2025 Morning    [DISCONTINUED] atenolol (TENORMIN) 25 MG tablet Take 0.5 tablets (12.5 mg) by mouth daily. 90 tablet 3 1/12/2025 Morning    [DISCONTINUED] prochlorperazine (COMPAZINE) 5 MG tablet Take 1  "tablet (5 mg) by mouth every 8 hours as needed for nausea or vomiting 10 tablet 0 Past Month       Review of Systems  Complete ROS reviewed, unless noted in HPI, all other systems reviewed (with patient) and all others found to be negative.      Objective:     Physical Exam:  /63 (BP Location: Left arm)   Pulse 62   Temp 98.1  F (36.7  C) (Oral)   Resp 16   Ht 1.562 m (5' 1.5\")   Wt 47.3 kg (104 lb 4.4 oz)   SpO2 95%   BMI 19.39 kg/m    Weight:   Vitals:    01/12/25 1755 01/13/25 0000   Weight: 47.6 kg (105 lb) 47.3 kg (104 lb 4.4 oz)      Body mass index is 19.39 kg/m .    General Appearance:  Alert, cooperative, no distress   Head:  Normocephalic, without obvious abnormality, atraumatic   Eyes:  PERRL, conjunctiva/corneas clear, EOM's intact   ENT/Throat: Lips, mucosa, and tongue normal; teeth and gums normal   Lymph/Neck: Supple, symmetrical, trachea midline   Lungs:   Clear to auscultation bilaterally, respirations unlabored, room air   Chest Wall:  No tenderness or deformity   Cardiovascular/Heart:  Regular rate and rhythm, S1, S2 normal,no murmur, rub or gallop.    Abdomen:   Soft, non-tender   Musculoskeletal: Extremities normal, atraumatic   Skin: Skin warm, dry    Neurologic: Alert and oriented X 3, Moves all 4 extremities     Psych: Affect is appropriate     Imaging: Reviewed I have personally reviewed pertinent notes, labs, tests, and radiologic imaging in patient's chart.  Labs: Reviewed I have personally reviewed pertinent notes, labs, tests, and radiologic imaging in patient's chart.  Notes: Reviewed I have personally reviewed pertinent notes, labs, tests, and radiologic imaging in patient's chart.    Total time spent 65 minutes with greater than 50% in consultation, education and coordination of care.   Treatment plan includes: multimodal pain approach, Hospital Medicine Service for medical management.   Patient educated regarding: multimodal pain approach and medications as listed above. "   Elements of Medical Decision Making as described above. Acute or chronic illness or injury or surgery. High risk therapy including opioids, high risk drug therapy including oral and/or parenteral controlled substances.    Patient is understanding of the plan. All questions and concerns addressed to patient's satisfaction.     Thank you for this consultation.    Marlena DUMONT, ALEKSP-C  Acute Care Inpatient Pain Management Program  [facility] ([facility])  Hours of coverage Monday-Friday [phone]. After hours please contact Primary team   Page via Epic chat or [employer]

## 2025-01-13 NOTE — ED NOTES
"[facility] ED Handoff Report    ED Chief Complaint: Nausea and vomiting    ED Diagnosis:  (R10.84) Generalized abdominal pain  Comment:   Plan:     (R11.2) Nausea and vomiting, unspecified vomiting type  Comment:   Plan:        PMH:    Past Medical History:   Diagnosis Date    Current mild episode of major depressive disorder, unspecified whether recurrent 02/17/2020    Hypertension     Migraine headache 11/06/2023    Nausea and vomiting     Opiate dependence (H)     RSD (reflex sympathetic dystrophy)     Sacral fracture, closed (H)         Code Status:  Full Code     Falls Risk: Yes Band: Applied    Current Living Situation/Residence: lives with a significant other and lives in a house     Elimination Status: Continent: Yes     Activity Level: 2 assist    Patients Preferred Language:  English     Needed: No    Vital Signs:  BP (!) 146/69   Pulse 65   Temp 97.4  F (36.3  C) (Temporal)   Resp 22   Ht 1.562 m (5' 1.5\")   Wt 47.6 kg (105 lb)   SpO2 96%   BMI 19.52 kg/m       Cardiac Rhythm: N/A    Pain Score: 3/10    Is the Patient Confused:  No    Last Food or Drink: 1/11/25 at 1800    Focused Assessment:  Pt c/o nausea and vomiting. Also c/o headache rated \"3\" out of 10. Pt denies diarrhea and denies abdominal pain.    Tests Performed: Done: Labs and Imaging    Treatments Provided:  IV fluids, Phenergan, Zofran, Benadryl, Toradol, Reglan    Family Dynamics/Concerns: No    Family Updated On Visitor Policy: Yes    Plan of Care Communicated to Family: Yes    Who Was Updated about Plan of Care: The pt's .    Belongings Checklist Done and Signed by Patient: Yes    Medications sent with patient: None    Additional Information: Pt states she is very weak and also appears so.    Alexandr Mitchell RN 1/12/2025 11:14 PM       "

## 2025-01-14 ENCOUNTER — TELEPHONE (OUTPATIENT)
Dept: FAMILY MEDICINE | Facility: CLINIC | Age: 70
End: 2025-01-14
Payer: COMMERCIAL

## 2025-01-14 ENCOUNTER — PATIENT OUTREACH (OUTPATIENT)
Dept: CARE COORDINATION | Facility: CLINIC | Age: 70
End: 2025-01-14
Payer: COMMERCIAL

## 2025-01-14 NOTE — TELEPHONE ENCOUNTER
Home Health Care    Reason for call:  Requesting verbal order.    Orders are needed for this patient.  Skilled Nursing: One visit scheduled for the week of 1/27 for GI monitoring.    Pt Provider: Dr. Nolasco.    Phone Number Homecare Nurse can be reached at: [phone]    Okay to leave a detailed message?: Yes, This is a secure line.

## 2025-01-14 NOTE — PROGRESS NOTES
Clinic Care Coordination Contact  Transitions of Care Outreach  Chief Complaint   Patient presents with    Clinic Care Coordination - Post Hospital       Most Recent Admission Date: 1/12/2025   Most Recent Admission Diagnosis: Generalized abdominal pain - R10.84  Nausea and vomiting, unspecified vomiting type - R11.2     Most Recent Discharge Date: 1/13/2025   Most Recent Discharge Diagnosis: Generalized abdominal pain - R10.84  Nausea and vomiting, unspecified vomiting type - R11.2  Nausea vomiting and diarrhea - R11.2, R19.7     Transitions of Care Assessment    Discharge Assessment  How are you doing now that you are home?: doing well - happy to be home; resting; has been able to eat and keep everything done. Pt hasn't picked up meds yet but will tomorrow or Thursday. pt noted they are aware of upcoming appointment with PCP and how to get in touch with clinic if they need anything. Pt noted no needs at this time, no concerns  How are your symptoms? (Red Flag symptoms escalate to triage hotline per guidelines): Improved  Do you know how to contact your clinic care team if you have future questions or changes to your health status? : Yes  Does the patient have their discharge instructions? : Yes  Does the patient have questions regarding their discharge instructions? : No  Were you started on any new medications or were there changes to any of your previous medications? : Yes  Does the patient have all of their medications?: No (see comment) (haven't picked them up yet)  Do you have questions regarding any of your medications? : No         Post-op (Clinicians Only)  Did the patient have surgery or a procedure: No  Fever: No  Chills: No  Eating & Drinking: eating and drinking without complaints/concerns  PO Intake: regular diet        Follow up Plan     Discharge Follow-Up  Discharge follow up appointment scheduled in alignment with recommended follow up timeframe or Transitions of Risk Category? (Low = within 30  days; Moderate= within 14 days; High= within 7 days): Yes  Discharge Follow Up Appointment Date: 01/28/25  Discharge Follow Up Appointment Scheduled with?: Primary Care Provider    Future Appointments   Date Time Provider Department Center   1/28/2025  1:00 PM [facility]       Outpatient Plan as outlined on AVS reviewed with patient.    For any urgent concerns, please contact our 24 hour nurse triage line: [phone] ([phone])       ONESIMO Hall

## 2025-01-15 ENCOUNTER — TELEPHONE (OUTPATIENT)
Dept: FAMILY MEDICINE | Facility: CLINIC | Age: 70
End: 2025-01-15
Payer: COMMERCIAL

## 2025-01-15 NOTE — TELEPHONE ENCOUNTER
Forms/Letter Request    Type of form/letter: OTHER: Letter of Medical Necessity       Do we have the form/letter: Yes: Form was faxed into clinic I put in out guide and put on Dena's desk.    Who is the form from? [employer]    Where did/will the form come from? form was faxed in    When is form/letter needed by: Not Known    How would you like the form/letter returned: Fax : [phone]    Patient Notified form requests are processed in 5-7 business days:No    Could we send this information to you in [employer] or would you prefer to receive a phone call?:   No preference   Okay to leave a detailed message?: N/A at Cell number on file:    Telephone Information:   Mobile [phone]

## 2025-01-16 ENCOUNTER — MEDICAL CORRESPONDENCE (OUTPATIENT)
Dept: HEALTH INFORMATION MANAGEMENT | Facility: CLINIC | Age: 70
End: 2025-01-16
Payer: COMMERCIAL

## 2025-01-17 ENCOUNTER — MEDICAL CORRESPONDENCE (OUTPATIENT)
Dept: HEALTH INFORMATION MANAGEMENT | Facility: CLINIC | Age: 70
End: 2025-01-17
Payer: COMMERCIAL

## 2025-01-28 ENCOUNTER — OFFICE VISIT (OUTPATIENT)
Dept: FAMILY MEDICINE | Facility: CLINIC | Age: 70
End: 2025-01-28
Payer: COMMERCIAL

## 2025-01-28 VITALS
HEART RATE: 108 BPM | BODY MASS INDEX: 19.6 KG/M2 | RESPIRATION RATE: 22 BRPM | WEIGHT: 105.4 LBS | DIASTOLIC BLOOD PRESSURE: 96 MMHG | OXYGEN SATURATION: 94 % | SYSTOLIC BLOOD PRESSURE: 181 MMHG | TEMPERATURE: 98.8 F

## 2025-01-28 DIAGNOSIS — I10 PRIMARY HYPERTENSION: ICD-10-CM

## 2025-01-28 DIAGNOSIS — J44.89 OTHER SPECIFIED CHRONIC OBSTRUCTIVE PULMONARY DISEASE (H): ICD-10-CM

## 2025-01-28 DIAGNOSIS — Z00.00 HEALTH CARE MAINTENANCE: Primary | ICD-10-CM

## 2025-01-28 DIAGNOSIS — F11.20 OPIOID TYPE DEPENDENCE, CONTINUOUS (H): ICD-10-CM

## 2025-01-28 DIAGNOSIS — R53.83 OTHER FATIGUE: ICD-10-CM

## 2025-01-28 DIAGNOSIS — R11.0 NAUSEA: ICD-10-CM

## 2025-01-28 PROCEDURE — 99214 OFFICE O/P EST MOD 30 MIN: CPT | Performed by: FAMILY MEDICINE

## 2025-01-28 RX ORDER — UBIDECARENONE 75 MG
100 CAPSULE ORAL DAILY
Qty: 30 TABLET | Refills: 1 | Status: SHIPPED | OUTPATIENT
Start: 2025-01-28

## 2025-01-28 RX ORDER — MULTIVITAMIN WITH FOLIC ACID 400 MCG
1 TABLET ORAL DAILY
Qty: 90 TABLET | Refills: 1 | Status: SHIPPED | OUTPATIENT
Start: 2025-01-28

## 2025-01-28 RX ORDER — ATENOLOL 25 MG/1
12.5 TABLET ORAL DAILY
Status: SHIPPED
Start: 2025-01-28

## 2025-01-28 RX ORDER — ONDANSETRON 4 MG/1
4 TABLET, FILM COATED ORAL EVERY 6 HOURS PRN
Qty: 20 TABLET | Refills: 1 | Status: SHIPPED | OUTPATIENT
Start: 2025-01-28

## 2025-01-28 NOTE — PROGRESS NOTES
Assessment & Plan     Na ongoing problems with nausea and vomiting  Multiple hospitalizations in the past unclear etiology  Concern is for possibility of pain pump causing systemic symptoms with the narcotics  Discussed with her to talk clearly with her pain doctor to see if there are adjustments or lowering dosing that could be made to try to prevent further exacerbations  She has follow-up with a GI specialist in a couple weeks  - ondansetron (ZOFRAN) 4 MG tablet  Dispense: 20 tablet; Refill: 1  - atenolol (TENORMIN) 25 MG tablet    Health care maintenance  Patient requesting refill of her multivitamin  - Multiple Vitamin (DAILY-BRITNI MULTIVITAMIN) TABS  Dispense: 90 tablet; Refill: 1    Other fatigue  Patient will trial back on B12 supplements she had previously been on injections wondering if that would make an improvement discussed with her oral replacement  - cyanocobalamin (VITAMIN B-12) 100 MCG tablet  Dispense: 30 tablet; Refill: 1    Primary hypertension  Blood pressure elevated and tachycardic  Patient was removed off her beta-blocker at recent hospitalization due to bradycardia  Discussed going back on the atenolol at this time and centimeter blood pressure and heart rate readings in 3 days    Opioid type dependence, continuous (H)  Patient is following with pain clinic has a pain pump has been on it for years there has been some changing in dosing I suspect after multiple workups not finding clear etiology to the reason behind her intractable vomiting and nausea is the possibility of a systemic response from her pain pump with the narcotics  She can further discuss with her pain clinic    Other specified chronic obstructive pulmonary disease (H)  Patient unfortunately continues to smoke encouraged her to discontinue  Saturating on room air normally no difficulty with breathing while sitting in the office            Nicotine/Tobacco Cessation  She reports that she has been smoking cigarettes. She  started smoking about 57 years ago. She has a 42.8 pack-year smoking history. She has never been exposed to tobacco smoke. She has never used smokeless tobacco.  Nicotine/Tobacco Cessation Plan  Self help information given to patient            Subjective   Mariah is a 69 year old, presenting for the following health issues:  Hospital F/U ([facility] 1/12-1/13)    HPI   Patient here for hospital follow-up patient has been hospitalized multiple times in the past for nausea and vomiting  No real clear etiology is been found for these occurrences but I suspect that chronic inflammation and a systemic response to pain pump has been leading to some of the symptoms.  She is can further discuss with her pain clinic.  Although typically better tolerated than oral supplement I suspect the pain pump is the culprit which she understands and will discuss with them further.  She is tachycardic and blood pressure is elevated today on examination they removed her off atenolol at recent hospitalization due to bradycardia we will go back on her previous dosing and have her send readings in a few days.  Patient previously had been on B12 supplements with injections she is wonder if the removal of these injections is because some of her symptoms.  [place] with her going back on oral supplement at this time.  Patient unfortunate continues to smoke and has a history of COPD she is not requiring oxygen or inhalers encouraged her to stop.  Patient questing refill of her Zofran to help with her nausea.      2/22/2023 4/6/2023 11/2/2023 2/27/2024 1/14/2025   Post Discharge Outreach   Admission Date 2/17/2023 4/4/2023 10/30/2023 2/21/2024    Reason for Admission Vomiting Intractable nausea and vomiting Intractable nausea and vomiting 1. Gastroparesis    2. Nausea and vomiting, unspecified vomiting type   3. Hiatal hernia    Discharge Date 2/21/2023 4/5/2023 11/1/2023 2/26/2024    Discharge Diagnosis Viral gastroenteritis       Nausea and  "vomiting    Unintentional weight loss Intractable nausea and vomiting Acute on chronic nausea vomiting likely due to acute cystitis  Essential hypertension  Acute cystitis  Chronic pain with intrathecal pump  Severe protein calorie malnutrition Large hiatal hernia, gastric outlet obstruction    How are you doing now that you are home? just resting, it's going slow \" I am doing ok, homecare people will be coming soon \" Patient states she continues to have nausea. Patient states she has had one episode of emesis. Patient states she is taking the Zofran for nausea, and at the time of our call, had only taken 1 tablet, but RN CC reviewed with her the prescription instructions and that she can take another one. Patient verbalized understanding. Patient has been taking her antibiotic, which she says makes her nauseated and isn't helping with that, but understands she needs to complete her antibiotic course. Patient states she has been drinking Gatorade and had a Twinkie earlier today. Patient states she had 2 soft bowel movements this morning and is worried it will turn to diarrhea if she takes the stool softener. RN CC encouraged patient to schedule a hospital follow up appointment so she can discuss her ongoing symptoms and management. RN CC offered assistance in scheduling, but patient stated her  will take care of that. Patient declined Care Coordination enrollment at this time. Spoke with Mariah.  She is still experiencing some pain.  She has added Tylenol along with her oxycodone.  She is taking this every 6 hours.  [place] from home care but hasn't called them back yet.  Writer encouraged her to call them back today.  Also instructed her that the phone number is on page 2 of her discharge paperwork if she lost the VM for some reason.  She stated an understanding. doing well - happy to be home; resting; has been able to eat and keep everything done. Pt hasn't picked up meds yet but will tomorrow or Thursday. pt " noted they are aware of upcoming appointment with PCP and how to get in touch with clinic if they need anything. Pt noted no needs at this time, no concerns   How are your symptoms? (Red Flag symptoms escalate to triage hotline per guidelines) Improved Improved Unchanged Improved Improved   Do you feel your condition is stable enough to be safe at home until your provider visit? Yes Yes Yes Yes    Does the patient have their discharge instructions?  Yes Yes Yes Yes Yes   Does the patient have questions regarding their discharge instructions?  No No No No No   Were you started on any new medications or were there changes to any of your previous medications?  Yes Yes Yes Yes Yes   Does the patient have all of their medications? Yes Yes Yes Yes No (see comment)   Do you have questions regarding any of your medications?  No No No No No   Do you have all of your needed medical supplies or equipment (DME)?  (i.e. oxygen tank, CPAP, cane, etc.) Yes Yes Yes Yes    Discharge follow-up appointment scheduled within 14 calendar days?  Yes  No No    Discharge Follow Up Appointment Date 3/17/2023    1/28/2025   Discharge Follow Up Appointment Scheduled with? Primary Care Provider    Primary Care Provider       Hospital Follow-up Visit:    Hospital/Nursing Home/IP Rehab Facility: [facility]  Date of Admission: 1/12/25  Date of Discharge: 1/13/25  Reason(s) for Admission: Intractable nausea and vomiting   Was the patient in the ICU or did the patient experience delirium during hospitalization?  No  Do you have any other stressors you would like to discuss with your provider? No    Problems taking medications regularly:  None  Medication changes since discharge: None  Problems adhering to non-medication therapy:  None                         Objective    There were no vitals taken for this visit.  There is no height or weight on file to calculate BMI.  Physical Exam   GENERAL: alert, no distress, and poor  posture kyphotic  EYES: Eyes grossly normal to inspection, PERRL and conjunctivae and sclerae normal  RESP: lungs clear to auscultation - no rales, rhonchi or wheezes  CV: tachycardia and no peripheral edema  ABDOMEN: bowel sounds normal  PSYCH: mentation appears normal and affect normal/bright            Signed Electronically by: Alex Nolasco MD

## 2025-02-10 ENCOUNTER — HOSPITAL ENCOUNTER (EMERGENCY)
Facility: HOSPITAL | Age: 70
Discharge: HOME | End: 2025-02-10
Attending: EMERGENCY MEDICINE | Admitting: EMERGENCY MEDICINE
Payer: COMMERCIAL

## 2025-02-10 VITALS
SYSTOLIC BLOOD PRESSURE: 168 MMHG | TEMPERATURE: 98.3 F | BODY MASS INDEX: 19.32 KG/M2 | WEIGHT: 105 LBS | RESPIRATION RATE: 19 BRPM | OXYGEN SATURATION: 97 % | HEART RATE: 54 BPM | DIASTOLIC BLOOD PRESSURE: 78 MMHG | HEIGHT: 62 IN

## 2025-02-10 DIAGNOSIS — R10.13 EPIGASTRIC PAIN: ICD-10-CM

## 2025-02-10 DIAGNOSIS — R11.2 NAUSEA AND VOMITING, UNSPECIFIED VOMITING TYPE: ICD-10-CM

## 2025-02-10 LAB
ALBUMIN SERPL BCG-MCNC: 4.4 G/DL (ref 3.5–5.2)
ALP SERPL-CCNC: 92 U/L (ref 40–150)
ALT SERPL W P-5'-P-CCNC: 19 U/L (ref 0–50)
ANION GAP SERPL CALCULATED.3IONS-SCNC: 11 MMOL/L (ref 7–15)
AST SERPL W P-5'-P-CCNC: 15 U/L (ref 0–45)
BASOPHILS # BLD AUTO: 0 10E3/UL (ref 0–0.2)
BASOPHILS NFR BLD AUTO: 1 %
BILIRUB DIRECT SERPL-MCNC: <0.2 MG/DL (ref 0–0.3)
BILIRUB SERPL-MCNC: 0.4 MG/DL
BUN SERPL-MCNC: 13.9 MG/DL (ref 8–23)
CALCIUM SERPL-MCNC: 9.3 MG/DL (ref 8.8–10.4)
CHLORIDE SERPL-SCNC: 105 MMOL/L (ref 98–107)
CREAT SERPL-MCNC: 0.58 MG/DL (ref 0.51–0.95)
EGFRCR SERPLBLD CKD-EPI 2021: >90 ML/MIN/1.73M2
EOSINOPHIL # BLD AUTO: 0 10E3/UL (ref 0–0.7)
EOSINOPHIL NFR BLD AUTO: 0 %
ERYTHROCYTE [DISTWIDTH] IN BLOOD BY AUTOMATED COUNT: 13.2 % (ref 10–15)
GLUCOSE SERPL-MCNC: 114 MG/DL (ref 70–99)
HCO3 SERPL-SCNC: 26 MMOL/L (ref 22–29)
HCT VFR BLD AUTO: 42.2 % (ref 35–47)
HGB BLD-MCNC: 13.7 G/DL (ref 11.7–15.7)
HOLD SPECIMEN: NORMAL
IMM GRANULOCYTES # BLD: 0 10E3/UL
IMM GRANULOCYTES NFR BLD: 0 %
LIPASE SERPL-CCNC: 15 U/L (ref 13–60)
LYMPHOCYTES # BLD AUTO: 1.1 10E3/UL (ref 0.8–5.3)
LYMPHOCYTES NFR BLD AUTO: 29 %
MAGNESIUM SERPL-MCNC: 1.9 MG/DL (ref 1.7–2.3)
MCH RBC QN AUTO: 28.2 PG (ref 26.5–33)
MCHC RBC AUTO-ENTMCNC: 32.5 G/DL (ref 31.5–36.5)
MCV RBC AUTO: 87 FL (ref 78–100)
MONOCYTES # BLD AUTO: 0.3 10E3/UL (ref 0–1.3)
MONOCYTES NFR BLD AUTO: 7 %
NEUTROPHILS # BLD AUTO: 2.4 10E3/UL (ref 1.6–8.3)
NEUTROPHILS NFR BLD AUTO: 63 %
NRBC # BLD AUTO: 0 10E3/UL
NRBC BLD AUTO-RTO: 0 /100
PLATELET # BLD AUTO: 188 10E3/UL (ref 150–450)
POTASSIUM SERPL-SCNC: 4.1 MMOL/L (ref 3.4–5.3)
PROT SERPL-MCNC: 6.5 G/DL (ref 6.4–8.3)
RBC # BLD AUTO: 4.86 10E6/UL (ref 3.8–5.2)
SODIUM SERPL-SCNC: 142 MMOL/L (ref 135–145)
WBC # BLD AUTO: 3.8 10E3/UL (ref 4–11)

## 2025-02-10 PROCEDURE — 82248 BILIRUBIN DIRECT: CPT

## 2025-02-10 PROCEDURE — 250N000011 HC RX IP 250 OP 636

## 2025-02-10 PROCEDURE — 96376 TX/PRO/DX INJ SAME DRUG ADON: CPT

## 2025-02-10 PROCEDURE — 99284 EMERGENCY DEPT VISIT MOD MDM: CPT | Mod: 25

## 2025-02-10 PROCEDURE — 83735 ASSAY OF MAGNESIUM: CPT

## 2025-02-10 PROCEDURE — 258N000003 HC RX IP 258 OP 636

## 2025-02-10 PROCEDURE — 85004 AUTOMATED DIFF WBC COUNT: CPT

## 2025-02-10 PROCEDURE — 36415 COLL VENOUS BLD VENIPUNCTURE: CPT

## 2025-02-10 PROCEDURE — 84520 ASSAY OF UREA NITROGEN: CPT

## 2025-02-10 PROCEDURE — 96361 HYDRATE IV INFUSION ADD-ON: CPT

## 2025-02-10 PROCEDURE — 93005 ELECTROCARDIOGRAM TRACING: CPT | Performed by: STUDENT IN AN ORGANIZED HEALTH CARE EDUCATION/TRAINING PROGRAM

## 2025-02-10 PROCEDURE — 96374 THER/PROPH/DIAG INJ IV PUSH: CPT

## 2025-02-10 PROCEDURE — 93005 ELECTROCARDIOGRAM TRACING: CPT | Performed by: EMERGENCY MEDICINE

## 2025-02-10 PROCEDURE — 83690 ASSAY OF LIPASE: CPT

## 2025-02-10 PROCEDURE — 82310 ASSAY OF CALCIUM: CPT

## 2025-02-10 PROCEDURE — 96375 TX/PRO/DX INJ NEW DRUG ADDON: CPT

## 2025-02-10 PROCEDURE — 80053 COMPREHEN METABOLIC PANEL: CPT

## 2025-02-10 RX ORDER — KETOROLAC TROMETHAMINE 15 MG/ML
10 INJECTION, SOLUTION INTRAMUSCULAR; INTRAVENOUS ONCE
Status: COMPLETED | OUTPATIENT
Start: 2025-02-10 | End: 2025-02-10

## 2025-02-10 RX ORDER — ONDANSETRON 2 MG/ML
4 INJECTION INTRAMUSCULAR; INTRAVENOUS EVERY 30 MIN PRN
Status: DISCONTINUED | OUTPATIENT
Start: 2025-02-10 | End: 2025-02-10 | Stop reason: HOSPADM

## 2025-02-10 RX ADMIN — KETOROLAC TROMETHAMINE 10 MG: 15 INJECTION, SOLUTION INTRAMUSCULAR; INTRAVENOUS at 14:32

## 2025-02-10 RX ADMIN — ONDANSETRON 4 MG: 2 INJECTION INTRAMUSCULAR; INTRAVENOUS at 16:00

## 2025-02-10 RX ADMIN — SODIUM CHLORIDE 1000 ML: 0.9 INJECTION, SOLUTION INTRAVENOUS at 14:27

## 2025-02-10 RX ADMIN — ONDANSETRON 4 MG: 2 INJECTION INTRAMUSCULAR; INTRAVENOUS at 14:30

## 2025-02-10 ASSESSMENT — ACTIVITIES OF DAILY LIVING (ADL)
ADLS_ACUITY_SCORE: 59

## 2025-02-10 NOTE — ED PROVIDER NOTES
Emergency Department Midlevel Supervisory Note     I personally saw the patient and performed a substantive portion of the visit including all aspects of the medical decision making.    ED Course:  3:36 PM Karime Garcia PA-C staffed patient with me. I agree with their assessment and plan of management, and I will see the patient.  3:40 PM I met with the patient to introduce myself, gather additional history, perform my initial exam, and discuss the plan.     Brief HPI:     Mariah Koehler is a 69 year old female who presents for evaluation of nausea, vomiting and diarrhea that started yesterday. Taken compazine without much improvement. She also endorses abdominal pain, myalgias, chest pain and mild SOB.     She otherwise denies recent travel, antibiotic use, no new foods or sick contacts. She denies fever, chills, urinary symptoms.     I, Tamara Dover, am serving as a scribe to document services personally performed by Nicolas Arias MD, based on my observations and the provider's statements to me.   I, Nicolas Arias MD, attest that Tamara Dover was acting in a scribe capacity, has observed my performance of the services and has documented them in accordance with my direction.    Brief Physical Exam:  Constitutional:  Alert, in no acute distress  EYES: Conjunctivae clear  HENT:  Atraumatic, normocephalic  Respiratory:  Respirations even, unlabored, in no acute respiratory distress  Cardiovascular:  Regular rate and rhythm, good peripheral perfusion  GI: Soft, nondistended, nontender, no palpable masses, no rebound, no guarding   Musculoskeletal:  No edema. No cyanosis. Range of motion major extremities intact.    Integument: Warm, Dry, No erythema, No rash.   Neurologic:  Alert & oriented, no focal deficits noted  Psych: Normal mood and affect     MDM:    ED Course as of 02/10/25 1652   Mon Feb 10, 2025   1507 [place] supervisory note: n/v/d for 24 hours. She has a hx of abdominal pain, cyclic vomiting.    1508  Reassuring labwork here.   1611 I met the patient.  She feels significant improvement from the fluids, Zofran and Toradol she has been given so far.  Her lab work is reassuring, she is failure with this pattern of pain and feels as though she is going right direction.  We discussed observation here versus going home and she prefers to sleep in her bed tonight and see how things go.  She has outpatient follow-up with GI tomorrow       1. Epigastric pain    2. Nausea and vomiting, unspecified vomiting type        Labs and Imaging:  Results for orders placed or performed during the hospital encounter of 02/10/25   Extra Blue Top Tube   Result Value Ref Range    Hold Specimen JIC    Extra Red Top Tube   Result Value Ref Range    Hold Specimen JIC    Extra Green Top (Lithium Heparin) Tube   Result Value Ref Range    Hold Specimen JIC    Extra Purple Top Tube   Result Value Ref Range    Hold Specimen JIC    Extra Green Top (Lithium Heparin) ON ICE   Result Value Ref Range    Hold Specimen JIC    Basic metabolic panel   Result Value Ref Range    Sodium 142 135 - 145 mmol/L    Potassium 4.1 3.4 - 5.3 mmol/L    Chloride 105 98 - 107 mmol/L    Carbon Dioxide (CO2) 26 22 - 29 mmol/L    Anion Gap 11 7 - 15 mmol/L    Urea Nitrogen 13.9 8.0 - 23.0 mg/dL    Creatinine 0.58 0.51 - 0.95 mg/dL    GFR Estimate >90 >60 mL/min/1.73m2    Calcium 9.3 8.8 - 10.4 mg/dL    Glucose 114 (H) 70 - 99 mg/dL   Hepatic function panel   Result Value Ref Range    Protein Total 6.5 6.4 - 8.3 g/dL    Albumin 4.4 3.5 - 5.2 g/dL    Bilirubin Total 0.4 <=1.2 mg/dL    Alkaline Phosphatase 92 40 - 150 U/L    AST 15 0 - 45 U/L    ALT 19 0 - 50 U/L    Bilirubin Direct <0.20 0.00 - 0.30 mg/dL   Result Value Ref Range    Lipase 15 13 - 60 U/L   Result Value Ref Range    Magnesium 1.9 1.7 - 2.3 mg/dL   CBC with platelets and differential   Result Value Ref Range    WBC Count 3.8 (L) 4.0 - 11.0 10e3/uL    RBC Count 4.86 3.80 - 5.20 10e6/uL    Hemoglobin 13.7 11.7  - 15.7 g/dL    Hematocrit 42.2 35.0 - 47.0 %    MCV 87 78 - 100 fL    MCH 28.2 26.5 - 33.0 pg    MCHC 32.5 31.5 - 36.5 g/dL    RDW 13.2 10.0 - 15.0 %    Platelet Count 188 150 - 450 10e3/uL    % Neutrophils 63 %    % Lymphocytes 29 %    % Monocytes 7 %    % Eosinophils 0 %    % Basophils 1 %    % Immature Granulocytes 0 %    NRBCs per 100 WBC 0 <1 /100    Absolute Neutrophils 2.4 1.6 - 8.3 10e3/uL    Absolute Lymphocytes 1.1 0.8 - 5.3 10e3/uL    Absolute Monocytes 0.3 0.0 - 1.3 10e3/uL    Absolute Eosinophils 0.0 0.0 - 0.7 10e3/uL    Absolute Basophils 0.0 0.0 - 0.2 10e3/uL    Absolute Immature Granulocytes 0.0 <=0.4 10e3/uL    Absolute NRBCs 0.0 10e3/uL     I have reviewed the relevant laboratory and radiology studies    Procedures:  I was present for the key portions of this procedure: none    Nicolas Arias MD  [facility] EMERGENCY DEPARTMENT  [address] [id number]  [phone]       Nicolas Arias MD  02/11/25 0910

## 2025-02-10 NOTE — ED PROVIDER NOTES
Emergency Department Encounter   NAME: Mariah Koehler  AGE: 69 year old female   YOB: 1955 ;   MRN: [id number] ;    ED PROVIDER: Karime Garcia PA-C    PCP: Alex Nolasco    Evaluation Date & Time:   2/10/2025  1:11 PM    CHIEF COMPLAINT:  Nausea, Vomiting, & Diarrhea      FINAL IMPRESSION:    ICD-10-CM    1. Epigastric pain  R10.13       2. Nausea and vomiting, unspecified vomiting type  R11.2             IMPRESSION AND PLAN   MDM: Mariah Koehler is a 69 year old female with a pertinent history of opioid dependence, COPD, chronic pain syndrome with intrathecal pain pump, vitamin B12 deficiency, osteoporosis, HTN, MDD, intractable nausea and vomiting, migraines, GERD, gastroparesis who presents to the ED by walk-in for evaluation of nausea, vomiting and diarrhea that began yesterday.    Vitals hypertensive otherwise vitally stable. On exam patient is ill-appearing but certainly nontoxic.  Abdomen is soft with generalized tenderness to palpation.  Mouth appears somewhat dry which is likely as result of the persistent vomiting and diarrhea.  Upon chart review, patient has episodes of intractable nausea and vomiting every 4 to 6 weeks which does require hospitalization occasionally.  Patient has had several CTs in the past for similar symptoms.  Given this, I do not feel that imaging is required today unless her symptoms are not improved with medications or her lab work reveals an emergent condition.  Will start with IV fluids, Zofran and Toradol.     CBC reveals mild leukopenia which could be consistent with a viral infection.  Remainder of blood work reviewed and unremarkable.  Given reassuring blood work, low suspicion for hepatobiliary etiology, no evidence of renal dysfunction or electrolyte abnormality.  Lipase normal at 15, low suspicion for pancreatitis.  I do not feel that advanced imaging is necessary at this time.  She is not complaining of any urinary symptoms, she is otherwise  afebrile.  Low suspicion for UTI versus pyelonephritis.  Upon reevaluation, patient to new stress comfortably in her hospital bed.  She reports significant symptomatic improvement and feels that her symptoms can be continuingly managed at home which I think is reasonable as it's possible her symptoms are due to a viral GI bug.  Patient is agreeable to this plan and feels comfortable discharge at this time.      Medical Decision Making  Obtained supplemental history:Supplemental history obtained?: No  Reviewed external records: External records reviewed?: Documented in chart  Care impacted by chronic illness:Documented in Chart  Care significantly affected by social determinants of health:N/A  Did you consider but not order tests?: Work up considered but not performed and documented in chart, if applicable  Did you interpret images independently?: Independent interpretation of ECG and images noted in documentation, when applicable.  Consultation discussion with other provider:Did you involve another provider (consultant, , pharmacy, etc.)?: No  Discharge. No recommendations on prescription strength medication(s). See documentation for any additional details.    Not Applicable       ED COURSE:  2:14 PM I met and introduced myself to the patient. I gathered initial history and performed my physical exam. We discussed plan for initial workup.   3:09 PM I have staffed the patient with Dr. Arias, ED MD, who has evaluated the patient and agrees with all aspects of today's care.   4:11 PM I rechecked the patient and discussed results, discharge, follow up, and reasons to return to the ED.        MEDICATIONS GIVEN IN THE EMERGENCY DEPARTMENT:  Medications   sodium chloride 0.9% BOLUS 1,000 mL (0 mLs Intravenous Stopped 2/10/25 1617)   ketorolac (TORADOL) injection 10 mg (10 mg Intravenous $Given 2/10/25 1432)         NEW PRESCRIPTIONS STARTED AT TODAY'S ED VISIT:  Discharge Medication List as of 2/10/2025  4:17 PM             BRIEF HPI   Patient information was obtained from: Patient   Use of Intrepreter: N/A     Mariah Koehler is a 69 year old female who presents to the ED with nausea, vomiting and diarrhea that started yesterday. Patient does have compazine at home but this has not improved her symptoms. She also endorses abdominal pain, myalgias, chest pain and mild SOB. She otherwise denies recent travel, antibiotic use, no new foods or sick contacts. She denies fever, chills, urinary symptoms.     I reviewed hospital admission note from 11/12/2025.  Was admitted for intractable nausea, vomiting and diffuse weakness.  Her presentation on admission is similar to her previous episodes.  Workup including blood work and CT were unremarkable at that time although there was scan findings for possible colitis although patient was not complaining of any diarrhea.  Symptoms improved with conservative management.  Patient does have a history of this every 4 to 6 weeks, multiple etiologies have been ruled out.  It is possible that patient's symptoms may be worsening as a result of her chronic pain med use.  Patient was otherwise discharged in stable condition.      REVIEW OF SYSTEMS:  Pertinent positive and negative symptoms per HPI.       MEDICAL HISTORY     Past Medical History:   Diagnosis Date    Current mild episode of major depressive disorder, unspecified whether recurrent 02/17/2020    Hypertension     Migraine headache 11/06/2023    Nausea and vomiting     Opiate dependence (H)     RSD (reflex sympathetic dystrophy)     Sacral fracture, closed (H)        Past Surgical History:   Procedure Laterality Date    APPENDECTOMY      COMBINED CYSTOSCOPY, RETROGRADES, URETEROSCOPY, LASER HOLMIUM LITHOTRIPSY URETER(S), INSERT STENT Right 11/3/2024    Procedure: CYSTOSCOPY, RIGHT RETROGRADE PYELOGRAM, RIGHt URETEROSCOPY AND BASKET RETRIEVAL OF STONE, RIGHT URETERAL STENT PLACEMENT;  Surgeon: Cristiano Otero MD;  Location: [facility]  Main OR    ESOPHAGOSCOPY, GASTROSCOPY, DUODENOSCOPY (EGD), COMBINED N/A 2/20/2023    Procedure: ESOPHAGOGASTRODUODENOSCOPY with biopsies;  Surgeon: Na Brooks MD;  Location: [facility] OR    ESOPHAGOSCOPY, GASTROSCOPY, DUODENOSCOPY (EGD), COMBINED N/A 12/9/2024    Procedure: ESOPHAGOGASTRODUODENOSCOPY, GASTRIC BIOPSIES;  Surgeon: Kuldeep Cornejo MD;  Location: [facility] OR    GYN SURGERY      HC REVISE MEDIAN N/CARPAL TUNNEL SURG      Description: Neuroplasty Decompression Median Nerve At Carpal Tunnel;  Recorded: 09/19/2011;    HYSTERECTOMY  1984    IR CERVICAL EPIDURAL STEROID INJECTION  1/14/2005    LAPAROSCOPIC NISSEN FUNDOPLICATION N/A 2/23/2024    Procedure: Paraesophageal hiatal hernia repair with mesh, ROBOT-ASSISTED, LAPAROSCOPIC, USING DA ROSMERY XI;  Surgeon: Lemuel Ornelas DO;  Location: [facility] OR    OOPHORECTOMY Bilateral 1985    WA SYMPATHECTOMY,CERVICOTHORACIC      Description: Surgical Sympathectomy Cervicothoracic;  Recorded: 09/19/2011;    ZZC DECOMPRESS FASCIOTOMY FINGR/HAND      Description: Decompression Fasciotomy Of The Hand;  Recorded: 09/19/2011;    ZZC LAP,CHOLECYSTECTOMY/EXPLORE      Description: Cholecystectomy Laparoscopic;  Recorded: 12/09/2011;    ZZC TOTAL ABDOM HYSTERECTOMY      Description: Hysterectomy;  Recorded: 03/23/2011;       Family History   Problem Relation Age of Onset    Cerebrovascular Disease Mother     Diabetes Mother     Heart Disease Mother     Hypertension Mother     Rheumatologic Disease Mother     Heart Disease Father     Pulmonary Hypertension Father     Kidney failure Father     Cancer Father         melanoma    Diabetes Sister     Hypertension Sister     Hypertension Brother        Social History     Tobacco Use    Smoking status: Every Day     Current packs/day: 0.75     Average packs/day: 0.8 packs/day for 57.1 years (42.8 ttl pk-yrs)     Types: Cigarettes     Start date: 1/1/1968     Passive exposure: Never    Smokeless  "tobacco: Never   Vaping Use    Vaping status: Never Used   Substance Use Topics    Alcohol use: Not Currently     Comment: 1 glass a year    Drug use: Never         PHYSICAL EXAM     First Vitals:  Patient Vitals for the past 24 hrs:   BP Temp Temp src Pulse Resp SpO2 Height Weight   02/10/25 1717 (!) 168/78 -- -- 54 19 97 % -- --   02/10/25 1515 -- -- -- 51 -- 96 % -- --   02/10/25 1500 -- -- -- 50 -- 96 % -- --   02/10/25 1445 -- -- -- 76 -- 99 % -- --   02/10/25 1430 (!) 181/81 -- -- 65 -- 96 % -- --   02/10/25 1415 (!) 188/77 -- -- 54 -- 94 % -- --   02/10/25 1400 (!) 171/75 -- -- 51 18 95 % -- --   02/10/25 1345 (!) 180/78 -- -- 76 -- 95 % -- --   02/10/25 1330 (!) 167/74 -- -- 77 -- 97 % -- --   02/10/25 1315 (!) 181/88 -- -- 70 -- 97 % -- --   02/10/25 1211 (!) 164/84 98.3  F (36.8  C) Oral 88 23 100 % 1.562 m (5' 1.5\") 47.6 kg (105 lb)       PHYSICAL EXAM:  Physical Exam  Vitals and nursing note reviewed.   Constitutional:       General: She is not in acute distress.     Appearance: Normal appearance. She is cachectic. She is not diaphoretic.   HENT:      Head: Normocephalic and atraumatic.      Mouth/Throat:      Mouth: Mucous membranes are dry.   Eyes:      Conjunctiva/sclera: Conjunctivae normal.   Cardiovascular:      Rate and Rhythm: Normal rate.      Heart sounds: Normal heart sounds.   Pulmonary:      Effort: Pulmonary effort is normal.   Abdominal:      General: Abdomen is flat. There is no distension.      Palpations: Abdomen is soft.      Tenderness: There is generalized abdominal tenderness. There is no guarding.   Musculoskeletal:      Cervical back: Neck supple.   Skin:     General: Skin is warm and dry.      Findings: No rash.   Neurological:      General: No focal deficit present.      Mental Status: She is alert and oriented to person, place, and time.   Psychiatric:         Mood and Affect: Mood normal.          RESULTS     LAB:  All pertinent labs reviewed and interpreted  Labs Ordered and " Resulted from Time of ED Arrival to Time of ED Departure   BASIC METABOLIC PANEL - Abnormal       Result Value    Sodium 142      Potassium 4.1      Chloride 105      Carbon Dioxide (CO2) 26      Anion Gap 11      Urea Nitrogen 13.9      Creatinine 0.58      GFR Estimate >90      Calcium 9.3      Glucose 114 (*)    CBC WITH PLATELETS AND DIFFERENTIAL - Abnormal    WBC Count 3.8 (*)     RBC Count 4.86      Hemoglobin 13.7      Hematocrit 42.2      MCV 87      MCH 28.2      MCHC 32.5      RDW 13.2      Platelet Count 188      % Neutrophils 63      % Lymphocytes 29      % Monocytes 7      % Eosinophils 0      % Basophils 1      % Immature Granulocytes 0      NRBCs per 100 WBC 0      Absolute Neutrophils 2.4      Absolute Lymphocytes 1.1      Absolute Monocytes 0.3      Absolute Eosinophils 0.0      Absolute Basophils 0.0      Absolute Immature Granulocytes 0.0      Absolute NRBCs 0.0     HEPATIC FUNCTION PANEL - Normal    Protein Total 6.5      Albumin 4.4      Bilirubin Total 0.4      Alkaline Phosphatase 92      AST 15      ALT 19      Bilirubin Direct <0.20     LIPASE - Normal    Lipase 15     MAGNESIUM - Normal    Magnesium 1.9         RADIOLOGY:  No orders to display           Karime Garcia PA-C  Emergency Medicine   [facility] EMERGENCY DEPARTMENT       Karime Garcia PA-C  02/10/25 2007

## 2025-02-10 NOTE — DISCHARGE INSTRUCTIONS
You were given fluids here, so you do not need to try to drink a lot tonight, but I recommend starting with small sips of water.  If your stomach is upset, you can slow down.  If possible try to get a little bit of watered-down juice or electrolytes or Gatorade to help with calories.  Please keep your appointment tomorrow with [facility]

## 2025-02-10 NOTE — ED TRIAGE NOTES
Pt to triage via w/c ( dropped her off at ED), c/o n/v/d since yesterday. Pt c/o body aches and aching chest pain as well, with mild SOB that started today.     Triage Assessment (Adult)       Row Name 02/10/25 1214          Triage Assessment    Airway WDL WDL        Respiratory WDL    Respiratory WDL X;rhythm/pattern     Rhythm/Pattern, Respiratory shortness of breath;tachypneic        Skin Circulation/Temperature WDL    Skin Circulation/Temperature WDL WDL        Cardiac WDL    Cardiac WDL X;chest pain        Cognitive/Neuro/Behavioral WDL    Cognitive/Neuro/Behavioral WDL X  Pt reports generalized weakness, poor mobility, walks with walker.

## 2025-02-10 NOTE — ED NOTES
"Endorses some intermittent nausea but states it is feeling \"better\" than the 1400 hour. Pain is 3/10.   "

## 2025-02-11 ENCOUNTER — TRANSFERRED RECORDS (OUTPATIENT)
Dept: HEALTH INFORMATION MANAGEMENT | Facility: CLINIC | Age: 70
End: 2025-02-11
Payer: COMMERCIAL

## 2025-02-20 LAB
ATRIAL RATE - MUSE: 75 BPM
DIASTOLIC BLOOD PRESSURE - MUSE: NORMAL MMHG
INTERPRETATION ECG - MUSE: NORMAL
P AXIS - MUSE: 27 DEGREES
PR INTERVAL - MUSE: NORMAL MS
QRS DURATION - MUSE: 80 MS
QT - MUSE: 366 MS
QTC - MUSE: 408 MS
R AXIS - MUSE: 51 DEGREES
SYSTOLIC BLOOD PRESSURE - MUSE: NORMAL MMHG
T AXIS - MUSE: 55 DEGREES
VENTRICULAR RATE- MUSE: 75 BPM

## 2025-02-24 ENCOUNTER — PATIENT OUTREACH (OUTPATIENT)
Dept: CARE COORDINATION | Facility: CLINIC | Age: 70
End: 2025-02-24
Payer: COMMERCIAL

## 2025-02-24 DIAGNOSIS — R53.83 OTHER FATIGUE: ICD-10-CM

## 2025-02-24 RX ORDER — UBIDECARENONE 75 MG
100 CAPSULE ORAL DAILY
Qty: 30 TABLET | Refills: 1 | OUTPATIENT
Start: 2025-02-24

## 2025-02-26 DIAGNOSIS — M62.838 MUSCLE SPASM: ICD-10-CM

## 2025-02-26 DIAGNOSIS — R53.83 OTHER FATIGUE: ICD-10-CM

## 2025-02-26 RX ORDER — BACLOFEN 10 MG/1
10 TABLET ORAL 3 TIMES DAILY
Qty: 90 TABLET | Refills: 1 | Status: SHIPPED | OUTPATIENT
Start: 2025-02-26

## 2025-02-26 RX ORDER — UBIDECARENONE 75 MG
100 CAPSULE ORAL DAILY
Qty: 30 TABLET | Refills: 1 | OUTPATIENT
Start: 2025-02-26

## 2025-03-08 DIAGNOSIS — K21.00 GASTRO-ESOPHAGEAL REFLUX DISEASE WITH ESOPHAGITIS, WITHOUT BLEEDING: ICD-10-CM

## 2025-03-11 RX ORDER — OMEPRAZOLE 20 MG/1
CAPSULE, DELAYED RELEASE ORAL
Qty: 180 CAPSULE | Refills: 1 | Status: SHIPPED | OUTPATIENT
Start: 2025-03-11

## 2025-03-24 DIAGNOSIS — R11.0 NAUSEA: ICD-10-CM

## 2025-03-24 RX ORDER — ATENOLOL 25 MG/1
12.5 TABLET ORAL DAILY
Qty: 45 TABLET | Refills: 0 | Status: SHIPPED | OUTPATIENT
Start: 2025-03-24

## 2025-03-24 NOTE — TELEPHONE ENCOUNTER
Medication Question or Refill    Contacts       Contact Date/Time Type Contact Phone/Fax    03/24/2025 02:51 PM CDT Fax (Incoming) [facility]/pharmacy #[id number] - [address] (Pharmacy) [phone]            What medication are you calling about (include dose and sig)?:       Disp Refills Start End BROOKE    atenolol (TENORMIN) 25 MG tablet -- -- 1/28/2025 -- No   Sig - Route: Take 0.5 tablets (12.5 mg) by mouth daily. - Oral       Preferred Pharmacy:    [facility]/pharmacy #[id number] - [address]  [address] [place] [address] E  [facility] [address]  Phone: [phone] Fax: [phone]      Controlled Substance Agreement on file:   CSA -- Patient Level:     [Media Unavailable] Controlled Substance Agreement - Opioid - Scan on 10/17/2018   [Media Unavailable] Controlled Substance Agreement - Opioid - Scan on 11/28/2016       Who prescribed the medication?: PCP Dr. Alex Nolasco    Do you need a refill? Yes    When did you use the medication last? today    Patient offered an appointment? Yes: declined offer to schedule.  Last Visit with PCP = 01/28/25    Do you have any questions or concerns?  Yes: [facility] advised patient to contact clinic as prescription was cancelled by Dr. Nolasco.  Patient states she takes medication for her blood pressure.  She would like to get RX today if at all possible as she is leaving to go out of town tomorrow.       Could we send this information to you in [employer] or would you prefer to receive a phone call?:   Patient would like to be contacted via [employer]

## 2025-04-18 ENCOUNTER — TELEPHONE (OUTPATIENT)
Dept: FAMILY MEDICINE | Facility: CLINIC | Age: 70
End: 2025-04-18

## 2025-04-18 NOTE — TELEPHONE ENCOUNTER
Forms/Letter Request    Type of form/letter: OTHER: Letter of Medical Necessity       Do we have the form/letter: Yes: Form was faxed into clinic today.    Who is the form from? [employer]    Where did/will the form come from? form was faxed in    When is form/letter needed by: Not Known    How would you like the form/letter returned: Fax : [phone]    Patient Notified form requests are processed in 5-7 business days:No    Could we send this information to you in [employer] or would you prefer to receive a phone call?:   No preference   Okay to leave a detailed message?: Yes at Cell number on file:    Telephone Information:   Mobile [phone]

## 2025-04-19 DIAGNOSIS — M62.838 MUSCLE SPASM: ICD-10-CM

## 2025-04-21 RX ORDER — BACLOFEN 10 MG/1
10 TABLET ORAL 3 TIMES DAILY
Qty: 90 TABLET | Refills: 1 | Status: SHIPPED | OUTPATIENT
Start: 2025-04-21

## 2025-05-01 DIAGNOSIS — Z00.00 HEALTH CARE MAINTENANCE: ICD-10-CM

## 2025-05-01 RX ORDER — MULTIVITAMIN WITH FOLIC ACID 400 MCG
1 TABLET ORAL DAILY
Qty: 90 TABLET | Refills: 1 | OUTPATIENT
Start: 2025-05-01

## 2025-05-09 DIAGNOSIS — R53.83 OTHER FATIGUE: ICD-10-CM

## 2025-05-12 RX ORDER — UBIDECARENONE 75 MG
100 CAPSULE ORAL DAILY
Qty: 30 TABLET | Refills: 1 | Status: SHIPPED | OUTPATIENT
Start: 2025-05-12

## 2025-05-24 ENCOUNTER — HEALTH MAINTENANCE LETTER (OUTPATIENT)
Age: 70
End: 2025-05-24

## 2025-06-06 NOTE — ED TRIAGE NOTES
Patient states she's had N/V/D since last night. C/o headache.      Triage Assessment (Adult)       Row Name 06/28/24 3536          Triage Assessment    Airway WDL WDL        Respiratory WDL    Respiratory WDL WDL        Skin Circulation/Temperature WDL    Skin Circulation/Temperature WDL WDL        Cardiac WDL    Cardiac WDL WDL        Peripheral/Neurovascular WDL    Peripheral Neurovascular WDL WDL        Cognitive/Neuro/Behavioral WDL    Cognitive/Neuro/Behavioral WDL WDL                      06-Jun-2025 00:26

## 2025-06-08 DIAGNOSIS — R53.83 OTHER FATIGUE: ICD-10-CM

## 2025-06-09 RX ORDER — UBIDECARENONE 75 MG
100 CAPSULE ORAL DAILY
Qty: 90 TABLET | Refills: 1 | Status: SHIPPED | OUTPATIENT
Start: 2025-06-09

## 2025-06-14 ENCOUNTER — HEALTH MAINTENANCE LETTER (OUTPATIENT)
Age: 70
End: 2025-06-14

## 2025-07-11 ENCOUNTER — TRANSFERRED RECORDS (OUTPATIENT)
Dept: HEALTH INFORMATION MANAGEMENT | Facility: CLINIC | Age: 70
End: 2025-07-11
Payer: COMMERCIAL

## 2025-08-13 ENCOUNTER — TRANSFERRED RECORDS (OUTPATIENT)
Dept: HEALTH INFORMATION MANAGEMENT | Facility: CLINIC | Age: 70
End: 2025-08-13
Payer: COMMERCIAL

## 2025-08-25 DIAGNOSIS — M62.838 MUSCLE SPASM: ICD-10-CM

## 2025-08-28 RX ORDER — BACLOFEN 10 MG/1
10 TABLET ORAL 3 TIMES DAILY
Qty: 90 TABLET | Refills: 1 | Status: SHIPPED | OUTPATIENT
Start: 2025-08-28

## (undated) DEVICE — TUBING SUCTION MEDI-VAC 1/4"X20' N620A

## (undated) DEVICE — LUBRICANT INST ELECTROLUBE EL101

## (undated) DEVICE — KIT ENDO FIRST STEP DISINFECTANT 200ML W/POUCH EP-4

## (undated) DEVICE — FORCEP BIOPSY 2.3MM DISP COATED 000388

## (undated) DEVICE — PREP CHLORAPREP 26ML TINTED HI-LITE ORANGE 930815

## (undated) DEVICE — SUTURE MONOCRYL+ 4-0 PS-2 27IN MCP426H

## (undated) DEVICE — DEVICE CLOSURE V-LOC 0 GS-21 6IN VLOCN0306

## (undated) DEVICE — DAVINCI XI OBTURATOR BLADELESS 8MM 470359

## (undated) DEVICE — CUSTOM PACK CYSTO PREFERRED SOT5BCYHEA

## (undated) DEVICE — GLOVE UNDER INDICATOR PI SZ 6.5 LF 41665

## (undated) DEVICE — BASKET STONE RETRIEVAL NTNL ZERO TIP 1.9FRX90CM M0063901050

## (undated) DEVICE — SYR 50ML SLIP TIP W/O NDL 309654

## (undated) DEVICE — ENDO SEAL BX PORT BPS-A

## (undated) DEVICE — SUTURE VICRYL+ 2-0 27IN SH UND VCP417H

## (undated) DEVICE — DAVINCI XI SEAL UNIVERSAL 5-8MM 470361

## (undated) DEVICE — DAVINCI XI DRAPE ARM 470015

## (undated) DEVICE — DRAPE SHEET TABLE COVER KC 42301*

## (undated) DEVICE — SOLUTION IRRIG 2B7127 .9NS 3000ML BAG

## (undated) DEVICE — GLOVE BIOGEL PI ULTRATOUCH G SZ 7.5 42175

## (undated) DEVICE — TUBING SUCTION MEDI-VAC 1/4"X20' N620A - HE

## (undated) DEVICE — NDL INSUFFLATION 13GA 120MM C2201

## (undated) DEVICE — SOL WATER IRRIG 1000ML BOTTLE 2F7114

## (undated) DEVICE — SUCTION MANIFOLD NEPTUNE 2 SYS 1 PORT 702-025-000

## (undated) DEVICE — CUSTOM PACK LAP CHOLE SBA5BLCHEA

## (undated) DEVICE — TUBING SET THERMEDX UROLOGY SGL USE LL0006

## (undated) DEVICE — PAD POS XL 1X20X40IN PINK PIGAZZI

## (undated) DEVICE — CONTAINER URINE SPEC 4OZ STRL 1053

## (undated) DEVICE — BLADE KNIFE SURG 11 371111

## (undated) DEVICE — SU ETHIBOND 0 CT-2 30" X412H

## (undated) DEVICE — DRAPE U SPLIT 74X120" 29440

## (undated) DEVICE — Device

## (undated) DEVICE — DAVINCI XI HANDPIECE ESU VESSEL SEALER 8MM EXT 480422

## (undated) DEVICE — DAVINCI XI DRAPE COLUMN 470341

## (undated) DEVICE — TUBING SMOKE EVAC PNEUMOCLEAR HIGH FLOW 0620050250

## (undated) DEVICE — TUBE PENROSE 3/8 X 12 STRL LTX 0912020

## (undated) DEVICE — GLOVE BIOGEL PI ORTHOPRO SZ 7.5 47675

## (undated) DEVICE — GUIDEWIRE SENSOR DUAL FLEX STR 0.035"X150CM M0066703080

## (undated) DEVICE — MAT FLOOR WATERPROOF BACKSHEET FMBP30

## (undated) DEVICE — PREP DYNA-HEX 4% CHG SCRUB 4OZ BOTTLE MDS098710

## (undated) DEVICE — GLOVE BIOGEL PI INDICATOR 8.0 LF 41680

## (undated) DEVICE — SU SILK 2-0 FS-1 18" 685G

## (undated) DEVICE — WIRE GUIDE 0.035"X145CM AMPLATZ SUPER STIFF STR M001465230

## (undated) DEVICE — GOWN XLG DISP 9545

## (undated) DEVICE — JUMPSUIT CYSTO DISP SD-100

## (undated) DEVICE — DRAPE SHEET REV FOLD 3/4 9349

## (undated) DEVICE — FORCEP BIOPSY DISP 000386

## (undated) RX ORDER — EPHEDRINE SULFATE 50 MG/ML
INJECTION, SOLUTION INTRAMUSCULAR; INTRAVENOUS; SUBCUTANEOUS
Status: DISPENSED
Start: 2024-11-03

## (undated) RX ORDER — DEXAMETHASONE SODIUM PHOSPHATE 10 MG/ML
INJECTION, SOLUTION INTRAMUSCULAR; INTRAVENOUS
Status: DISPENSED
Start: 2024-02-23

## (undated) RX ORDER — INDOCYANINE GREEN AND WATER 25 MG
KIT INJECTION
Status: DISPENSED
Start: 2024-02-23

## (undated) RX ORDER — ONDANSETRON 2 MG/ML
INJECTION INTRAMUSCULAR; INTRAVENOUS
Status: DISPENSED
Start: 2024-02-23

## (undated) RX ORDER — FENTANYL CITRATE 50 UG/ML
INJECTION, SOLUTION INTRAMUSCULAR; INTRAVENOUS
Status: DISPENSED
Start: 2024-02-23

## (undated) RX ORDER — LIDOCAINE HYDROCHLORIDE 10 MG/ML
INJECTION, SOLUTION EPIDURAL; INFILTRATION; INTRACAUDAL; PERINEURAL
Status: DISPENSED
Start: 2024-02-23

## (undated) RX ORDER — PROPOFOL 10 MG/ML
INJECTION, EMULSION INTRAVENOUS
Status: DISPENSED
Start: 2024-02-23

## (undated) RX ORDER — FENTANYL CITRATE-0.9 % NACL/PF 10 MCG/ML
PLASTIC BAG, INJECTION (ML) INTRAVENOUS
Status: DISPENSED
Start: 2024-02-23

## (undated) RX ORDER — FENTANYL CITRATE 50 UG/ML
INJECTION, SOLUTION INTRAMUSCULAR; INTRAVENOUS
Status: DISPENSED
Start: 2024-11-03